# Patient Record
Sex: FEMALE | Race: WHITE | Employment: OTHER | URBAN - METROPOLITAN AREA
[De-identification: names, ages, dates, MRNs, and addresses within clinical notes are randomized per-mention and may not be internally consistent; named-entity substitution may affect disease eponyms.]

---

## 2017-01-02 ENCOUNTER — HOSPITAL ENCOUNTER (OUTPATIENT)
Dept: GENERAL RADIOLOGY | Age: 77
Discharge: HOME | End: 2017-01-02
Attending: INTERNAL MEDICINE
Payer: MEDICARE

## 2017-01-02 DIAGNOSIS — R13.10 DYSPHAGIA, UNSPECIFIED TYPE: ICD-10-CM

## 2017-01-02 DIAGNOSIS — K22.0 ACHALASIA: ICD-10-CM

## 2017-01-02 PROCEDURE — 74240 X-RAY XM UPR GI TRC 1CNTRST: CPT

## 2017-01-09 RX ORDER — LISINOPRIL 10 MG/1
TABLET ORAL
Qty: 90 TABLET | Refills: 1 | Status: SHIPPED | OUTPATIENT
Start: 2017-01-09 | End: 2017-04-14

## 2017-03-13 RX ORDER — GLYBURIDE 2.5 MG/1
TABLET ORAL
Qty: 5 TABLET | Refills: 0 | Status: SHIPPED | OUTPATIENT
Start: 2017-03-13 | End: 2017-03-15

## 2017-03-15 RX ORDER — GLYBURIDE 2.5 MG/1
2.5 TABLET ORAL
Qty: 30 TABLET | Refills: 0 | Status: SHIPPED
Start: 2017-03-15 | End: 2017-04-24

## 2017-03-15 NOTE — TELEPHONE ENCOUNTER
Per protocol; failed - route to provider  Patient's last a1c not at goal, super visit 4/3/17 with AS but only received 5 tabs.  Needs refill sent

## 2017-04-03 ENCOUNTER — OFFICE VISIT (OUTPATIENT)
Dept: INTERNAL MEDICINE CLINIC | Facility: CLINIC | Age: 77
End: 2017-04-03

## 2017-04-03 VITALS
SYSTOLIC BLOOD PRESSURE: 124 MMHG | HEIGHT: 69 IN | BODY MASS INDEX: 31.4 KG/M2 | HEART RATE: 64 BPM | TEMPERATURE: 98 F | WEIGHT: 212 LBS | DIASTOLIC BLOOD PRESSURE: 76 MMHG | RESPIRATION RATE: 16 BRPM

## 2017-04-03 DIAGNOSIS — E78.00 PURE HYPERCHOLESTEROLEMIA: ICD-10-CM

## 2017-04-03 DIAGNOSIS — E11.59 TYPE 2 DIABETES MELLITUS WITH OTHER CIRCULATORY COMPLICATION, WITHOUT LONG-TERM CURRENT USE OF INSULIN (HCC): ICD-10-CM

## 2017-04-03 DIAGNOSIS — K21.9 GASTROESOPHAGEAL REFLUX DISEASE, ESOPHAGITIS PRESENCE NOT SPECIFIED: ICD-10-CM

## 2017-04-03 DIAGNOSIS — I71.4 ABDOMINAL AORTIC ANEURYSM (AAA) WITHOUT RUPTURE (HCC): ICD-10-CM

## 2017-04-03 DIAGNOSIS — I70.0 AORTIC ATHEROSCLEROSIS (HCC): ICD-10-CM

## 2017-04-03 DIAGNOSIS — H35.30 MACULAR DEGENERATION: ICD-10-CM

## 2017-04-03 DIAGNOSIS — G89.29 CHRONIC BILATERAL LOW BACK PAIN WITHOUT SCIATICA: ICD-10-CM

## 2017-04-03 DIAGNOSIS — I48.0 PAROXYSMAL ATRIAL FIBRILLATION (HCC): ICD-10-CM

## 2017-04-03 DIAGNOSIS — M47.816 ARTHRITIS, LUMBAR SPINE: ICD-10-CM

## 2017-04-03 DIAGNOSIS — E89.0 H/O PARTIAL THYROIDECTOMY: ICD-10-CM

## 2017-04-03 DIAGNOSIS — I70.1 RENAL ARTERY STENOSIS (HCC): ICD-10-CM

## 2017-04-03 DIAGNOSIS — Z91.81 AT RISK FOR FALLING: ICD-10-CM

## 2017-04-03 DIAGNOSIS — M54.50 CHRONIC BILATERAL LOW BACK PAIN WITHOUT SCIATICA: ICD-10-CM

## 2017-04-03 DIAGNOSIS — I10 ESSENTIAL HYPERTENSION: ICD-10-CM

## 2017-04-03 DIAGNOSIS — Z00.00 ENCOUNTER FOR ANNUAL HEALTH EXAMINATION: ICD-10-CM

## 2017-04-03 DIAGNOSIS — E55.9 VITAMIN D DEFICIENCY: ICD-10-CM

## 2017-04-03 DIAGNOSIS — K22.0 ACHALASIA: Primary | ICD-10-CM

## 2017-04-03 DIAGNOSIS — K22.4 CORKSCREW ESOPHAGUS: ICD-10-CM

## 2017-04-03 DIAGNOSIS — I25.10 CORONARY ARTERY DISEASE INVOLVING NATIVE CORONARY ARTERY OF NATIVE HEART WITHOUT ANGINA PECTORIS: ICD-10-CM

## 2017-04-03 DIAGNOSIS — Z96.651 HISTORY OF TOTAL RIGHT KNEE REPLACEMENT: ICD-10-CM

## 2017-04-03 DIAGNOSIS — M20.40 HAMMER TOE, ACQUIRED: ICD-10-CM

## 2017-04-03 DIAGNOSIS — I71.02 DISSECTION OF ABDOMINAL AORTA (HCC): ICD-10-CM

## 2017-04-03 PROBLEM — I71.40 ABDOMINAL AORTIC ANEURYSM (AAA) WITHOUT RUPTURE (HCC): Status: ACTIVE | Noted: 2017-04-03

## 2017-04-03 PROBLEM — E11.51 TYPE 2 DIABETES MELLITUS WITH ATHEROSCLEROSIS OF AORTA (HCC): Status: ACTIVE | Noted: 2017-04-03

## 2017-04-03 PROBLEM — I71.40 ABDOMINAL AORTIC ANEURYSM (AAA) WITHOUT RUPTURE: Status: ACTIVE | Noted: 2017-04-03

## 2017-04-03 PROCEDURE — 93000 ELECTROCARDIOGRAM COMPLETE: CPT | Performed by: INTERNAL MEDICINE

## 2017-04-03 PROCEDURE — 96160 PT-FOCUSED HLTH RISK ASSMT: CPT | Performed by: INTERNAL MEDICINE

## 2017-04-03 PROCEDURE — G0439 PPPS, SUBSEQ VISIT: HCPCS | Performed by: INTERNAL MEDICINE

## 2017-04-03 RX ORDER — GLYBURIDE 2.5 MG/1
2.5 TABLET ORAL
Qty: 90 TABLET | Refills: 1 | Status: SHIPPED | OUTPATIENT
Start: 2017-04-03 | End: 2017-07-26

## 2017-04-03 NOTE — PROGRESS NOTES
HPI:   Theron Lundberg is a 68year old female who presents for a MA (Medicare Advantage) [facility] (Once per calendar year). Here for supervisit and pre op eval for achalasia requiring egd with dilation with Dr Radha Dougherty on 4/20 at [facility].    Pt had pa Value Date   AST 13* 07/22/2016   ALT 14 07/22/2016   CA 9.2 11/29/2016   ALB 3.2* 07/22/2016   TSH 3.050 10/28/2016   CREATSERUM 0.85 11/29/2016   * 11/29/2016        CBC  (most recent labs)     Lab Results  Component Value Date   WBC 8.3 11/29/201 colonoscopy; [id number], screening (); cholecystectomy; hernia surgery; knee replacement surgery; ; tubal ligation; cataract (Bilateral); other; thyroidectomy; and egd (N/A, 2016).     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 Discussed with patient and provided information          PLAN:  The patient indicates understanding of these issues and agrees to the plan. No Follow-up on file.      Derick Millan MD, 4/3/2017     Carmenza Traylor Scoring: 3    Scoring Interpretation: 0 - 3 No Risk     Depression Screening (PHQ-2/PHQ-9): Over the LAST 2 WEEKS   Little interest or pleasure in doing things (over the last two weeks)?: Not at all    Feeling down, depressed, or hopeless (over the last tw Fecal Occult Blood Annually No results found for: FOB No flowsheet data found.     Glaucoma Screening      Ophthalmology Visit Annually: Diabetics, FHx Glaucoma, AA>50, > 65 Data entered on: 6/13/2016   Last Dilated Eye Exam 6/1/2016       Bone Dens Lab or Procedure External Lab or Procedure   Annual Monitoring of Persistent     Medications (ACE/ARB, digoxin diuretics, anticonvulsants.)    Potassium  Annually POTASSIUM (mmol/L)   Date Value   11/29/2016 3.9   02/18/2015 4.9   01/11/2013 4.5    No flow

## 2017-04-03 NOTE — PATIENT INSTRUCTIONS
[facility] SCREENING SCHEDULE   Tests on this list are recommended by your physician but may not be covered, or covered at this frequency, by your insurer. Please check with your insurance carrier before scheduling to verify coverage.    ASH Welcome to Medicare Visit    Abdominal aortic aneurysm screening (once between ages 73-68) IPPE only No results found for this or any previous visit.  Limited to patients who meet one of the following criteria:   • Men who are 73-68 years old and have smoke data found.     Screening Mammogram      Mammogram    Recommend Annually to at least age 76, and as needed after 76 Mammogram,1 Yr due on 04/20/2017 Please get this Mammogram regularly   Immunizations      Influenza  Covered Annually   Orders placed or perf the different types of Advance Directives. It also has the State forms available on it's website for anyone to review and print using their home computer and printer. (the forms are also available in [address])  [email or web address]  This link also has inf

## 2017-04-04 ENCOUNTER — TELEPHONE (OUTPATIENT)
Dept: INTERNAL MEDICINE CLINIC | Facility: CLINIC | Age: 77
End: 2017-04-04

## 2017-04-04 DIAGNOSIS — Z12.31 ENCOUNTER FOR SCREENING MAMMOGRAM FOR MALIGNANT NEOPLASM OF BREAST: Primary | ICD-10-CM

## 2017-04-04 NOTE — TELEPHONE ENCOUNTER
LOV 4/3/17  Orders pended for approval.     Last  mammo - Notes Recorded by Laurita Bales MD on 4/20/2016 at 4:10 PM  WNL; Repeat one year.

## 2017-04-05 ENCOUNTER — LAB ENCOUNTER (OUTPATIENT)
Dept: LAB | Age: 77
End: 2017-04-05
Attending: INTERNAL MEDICINE
Payer: MEDICARE

## 2017-04-05 DIAGNOSIS — I10 ESSENTIAL HYPERTENSION: ICD-10-CM

## 2017-04-05 DIAGNOSIS — I25.10 CORONARY ARTERY DISEASE INVOLVING NATIVE CORONARY ARTERY OF NATIVE HEART WITHOUT ANGINA PECTORIS: ICD-10-CM

## 2017-04-05 DIAGNOSIS — E89.0 H/O PARTIAL THYROIDECTOMY: ICD-10-CM

## 2017-04-05 DIAGNOSIS — E11.59 TYPE 2 DIABETES MELLITUS WITH OTHER CIRCULATORY COMPLICATION, WITHOUT LONG-TERM CURRENT USE OF INSULIN (HCC): ICD-10-CM

## 2017-04-05 PROCEDURE — 83036 HEMOGLOBIN GLYCOSYLATED A1C: CPT

## 2017-04-05 PROCEDURE — 36415 COLL VENOUS BLD VENIPUNCTURE: CPT

## 2017-04-05 PROCEDURE — 85025 COMPLETE CBC W/AUTO DIFF WBC: CPT

## 2017-04-05 PROCEDURE — 84443 ASSAY THYROID STIM HORMONE: CPT

## 2017-04-05 PROCEDURE — 82043 UR ALBUMIN QUANTITATIVE: CPT

## 2017-04-05 PROCEDURE — 80061 LIPID PANEL: CPT

## 2017-04-05 PROCEDURE — 82570 ASSAY OF URINE CREATININE: CPT

## 2017-04-05 PROCEDURE — 80053 COMPREHEN METABOLIC PANEL: CPT

## 2017-04-06 DIAGNOSIS — E11.59 TYPE 2 DIABETES MELLITUS WITH OTHER CIRCULATORY COMPLICATION, WITHOUT LONG-TERM CURRENT USE OF INSULIN (HCC): ICD-10-CM

## 2017-04-14 PROBLEM — I25.10 CORONARY ARTERY DISEASE INVOLVING NATIVE CORONARY ARTERY OF NATIVE HEART WITHOUT ANGINA PECTORIS: Status: ACTIVE | Noted: 2017-04-14

## 2017-04-24 RX ORDER — GLYBURIDE 2.5 MG/1
TABLET ORAL
Qty: 30 TABLET | Refills: 1 | Status: SHIPPED | OUTPATIENT
Start: 2017-04-24 | End: 2017-07-03

## 2017-04-26 RX ORDER — GLYBURIDE 2.5 MG/1
TABLET ORAL
Qty: 5 TABLET | Refills: 0 | OUTPATIENT
Start: 2017-04-26

## 2017-06-07 ENCOUNTER — APPOINTMENT (OUTPATIENT)
Dept: GENERAL RADIOLOGY | Age: 77
End: 2017-06-07
Attending: PHYSICIAN ASSISTANT
Payer: MEDICARE

## 2017-06-07 ENCOUNTER — HOSPITAL ENCOUNTER (OUTPATIENT)
Age: 77
Discharge: HOME | End: 2017-06-07
Attending: FAMILY MEDICINE
Payer: MEDICARE

## 2017-06-07 VITALS
DIASTOLIC BLOOD PRESSURE: 71 MMHG | RESPIRATION RATE: 18 BRPM | TEMPERATURE: 99 F | WEIGHT: 204 LBS | SYSTOLIC BLOOD PRESSURE: 145 MMHG | HEIGHT: 69.5 IN | BODY MASS INDEX: 29.53 KG/M2 | HEART RATE: 68 BPM | OXYGEN SATURATION: 98 %

## 2017-06-07 DIAGNOSIS — S92.502A FRACTURE OF FIFTH TOE, LEFT, CLOSED, INITIAL ENCOUNTER: Primary | ICD-10-CM

## 2017-06-07 DIAGNOSIS — S80.11XA LEG HEMATOMA, RIGHT, INITIAL ENCOUNTER: ICD-10-CM

## 2017-06-07 DIAGNOSIS — S90.31XA CONTUSION OF RIGHT FOOT, INITIAL ENCOUNTER: ICD-10-CM

## 2017-06-07 PROCEDURE — 73590 X-RAY EXAM OF LOWER LEG: CPT | Performed by: PHYSICIAN ASSISTANT

## 2017-06-07 PROCEDURE — 28510 TREATMENT OF TOE FRACTURE: CPT

## 2017-06-07 PROCEDURE — 73610 X-RAY EXAM OF ANKLE: CPT | Performed by: PHYSICIAN ASSISTANT

## 2017-06-07 PROCEDURE — 99203 OFFICE O/P NEW LOW 30 MIN: CPT

## 2017-06-07 PROCEDURE — 73630 X-RAY EXAM OF FOOT: CPT | Performed by: PHYSICIAN ASSISTANT

## 2017-06-07 PROCEDURE — 99214 OFFICE O/P EST MOD 30 MIN: CPT

## 2017-06-07 NOTE — ED INITIAL ASSESSMENT (HPI)
C/O right foot and ankle pain that occurred 2 weeks ago after tripping on a stair and falling against the house.

## 2017-06-08 NOTE — ED PROVIDER NOTES
Patient Seen in: THE [facility]    History   Patient presents with:   Foot Pain    Stated Complaint: rt foot inj    HPI  41-year-old female, review patient's history and PAs note, patient was transferred to my care awaiting a tibia X double contrast Barium Swallow Esophagram     COLONOSCOPY  2003    Comment Screening- Normal    COLONOSCOPY      [id number], SCREENING      CHOLECYSTECTOMY      HERNIA SURGERY      Comment umbilica'    KNEE REPLACEMENT SURGERY            TUBAL mmHg   Pulse 06/07/17 1822 74   Resp 06/07/17 1822 18   Temp 06/07/17 1822 98.5 °F (36.9 °C)   Temp src 06/07/17 1822 Temporal   SpO2 06/07/17 1822 95 %   O2 Device 06/07/17 1822 None (Room air)       Current:/71 mmHg  Pulse 68  Temp(Src) 98.5 °F (36 is the Relevant Clinical Indication / Reason for Exam?  Answer: rt foot inj  XR TIBIA + FIBULA (2 VIEWS), RIGHT (CPT=73590) Once  Order Comments:   rt foot inj C/O right foot and ankle pain that occurred 2 weeks ago after tripping on    a stair and falling first through fifth toes for two weeks. Patient tripped while walking up a step into the house. FINDINGS:  Irregularity to the distal aspect of the proximal phalanx of the fifth digit. Sanchez Ba Phalangeal relationships are within normal limits.   No significant next 5-7 days   Fracture of the fifth toe   Ortho shoe for support   Hematoma of the leg - keep wrapped with ace wrap for compression  If any worsening of pain or any worsening symptoms / discoloration return immediately / go to the ER  Patient wants me to

## 2017-06-08 NOTE — ED PROVIDER NOTES
Patient Seen in: [facility] END    History   Patient presents with:   Foot Pain    Stated Complaint: rt foot inj    HPI    Hailee Loren is a 14-year-old female who presents today for evaluation of pain, swelling and bruising to her right fo Comment right knee replacement 2008    OTHER SURGICAL HISTORY      Comment stent of LAD heart 2005    OTHER SURGICAL HISTORY  04/12/07    Comment EGD- double contrast Barium Swallow Esophagram     COLONOSCOPY  04/21/2003    Comment Screening- Normal    CO All other systems reviewed and negative except as noted above. PSFH elements reviewed from today and agreed except as otherwise stated in HPI.     Physical Exam       ED Triage Vitals   BP 06/07/17 1822 178/63 mmHg   Pulse 06/07/17 1822 74   Resp 06/07/1 6/7/2017  PROCEDURE:  XR ANKLE (MIN 3 VIEWS), RIGHT (CPT=73610)  TECHNIQUE:  Three views were obtained. COMPARISON:  None. INDICATIONS:  Right foot injury.   PATIENT STATED HISTORY: (As transcribed by Technologist)  Patient has swelling and pain throughou 6/7/2017  CONCLUSION:  Right ureter where he to the distal aspect of the proximal phalanx of the fifth digit. Correlation to region of pain is recommended to exclude fracture. . Osteoarthritic changes.    Dictated by: Malachi Holstein, MD on 6/07/2017 at 18:4

## 2017-07-03 RX ORDER — GLYBURIDE 2.5 MG/1
TABLET ORAL
Qty: 30 TABLET | Refills: 0 | Status: SHIPPED | OUTPATIENT
Start: 2017-07-03 | End: 2017-07-26

## 2017-07-03 NOTE — TELEPHONE ENCOUNTER
Pt is requesting that we change the rx to once in am and once in evening like how it used to be before, She also is requesting a 90 day supply. Informed her an appt will be required for change of meds. Pt insists I ask re: change.  Please advise

## 2017-07-18 RX ORDER — PRAVASTATIN SODIUM 80 MG/1
TABLET ORAL
Qty: 90 TABLET | Refills: 2 | Status: SHIPPED | OUTPATIENT
Start: 2017-07-18 | End: 2017-07-26 | Stop reason: ALTCHOICE

## 2017-07-18 RX ORDER — LISINOPRIL 10 MG/1
TABLET ORAL
Qty: 90 TABLET | Refills: 0 | Status: SHIPPED | OUTPATIENT
Start: 2017-07-18 | End: 2017-07-26

## 2017-07-26 ENCOUNTER — OFFICE VISIT (OUTPATIENT)
Dept: INTERNAL MEDICINE CLINIC | Facility: CLINIC | Age: 77
End: 2017-07-26

## 2017-07-26 VITALS
TEMPERATURE: 98 F | RESPIRATION RATE: 16 BRPM | SYSTOLIC BLOOD PRESSURE: 132 MMHG | BODY MASS INDEX: 31 KG/M2 | DIASTOLIC BLOOD PRESSURE: 70 MMHG | HEART RATE: 64 BPM | WEIGHT: 213 LBS

## 2017-07-26 DIAGNOSIS — E11.59 TYPE 2 DIABETES MELLITUS WITH OTHER CIRCULATORY COMPLICATION, WITHOUT LONG-TERM CURRENT USE OF INSULIN (HCC): ICD-10-CM

## 2017-07-26 DIAGNOSIS — E11.42 TYPE 2 DIABETES MELLITUS WITH DIABETIC POLYNEUROPATHY, WITHOUT LONG-TERM CURRENT USE OF INSULIN (HCC): ICD-10-CM

## 2017-07-26 DIAGNOSIS — I10 ESSENTIAL HYPERTENSION: ICD-10-CM

## 2017-07-26 DIAGNOSIS — I71.4 ABDOMINAL AORTIC ANEURYSM (AAA) WITHOUT RUPTURE (HCC): Primary | ICD-10-CM

## 2017-07-26 DIAGNOSIS — E89.0 STATUS POST PARTIAL THYROIDECTOMY: ICD-10-CM

## 2017-07-26 DIAGNOSIS — I70.0 TYPE 2 DIABETES MELLITUS WITH ATHEROSCLEROSIS OF AORTA (HCC): ICD-10-CM

## 2017-07-26 DIAGNOSIS — E11.51 TYPE 2 DIABETES MELLITUS WITH ATHEROSCLEROSIS OF AORTA (HCC): ICD-10-CM

## 2017-07-26 PROCEDURE — 99214 OFFICE O/P EST MOD 30 MIN: CPT | Performed by: PHYSICIAN ASSISTANT

## 2017-07-26 RX ORDER — METOPROLOL TARTRATE 50 MG/1
25 TABLET, FILM COATED ORAL 2 TIMES DAILY
Qty: 90 TABLET | Refills: 0 | Status: SHIPPED | OUTPATIENT
Start: 2017-07-26 | End: 2017-12-22

## 2017-07-26 RX ORDER — GLYBURIDE 5 MG/1
2.5 TABLET ORAL 2 TIMES DAILY WITH MEALS
Qty: 90 TABLET | Refills: 0 | Status: SHIPPED | OUTPATIENT
Start: 2017-07-26 | End: 2017-10-16

## 2017-07-26 RX ORDER — METOPROLOL TARTRATE 50 MG/1
25 TABLET, FILM COATED ORAL 2 TIMES DAILY
COMMUNITY
End: 2017-07-26

## 2017-07-26 NOTE — PROGRESS NOTES
Patient presents with: Follow - Up: medications have been updated with patient       HPI:  Pt presents with need to follow up of a AAA that was found last year on abd CT.   Pt saw Dr. Sofia Thomas (Vascular) last year and was told to re-eval with abd US in 5-6 mo Visual impairment     glasses       Patient Active Problem List:     CAD (coronary artery disease)     Hyperlipemia     GERD (gastroesophageal reflux disease)     Corkscrew esophagus     Chronic low back pain     Hammer toe, acquired     Vitamin D deficien kg/m²   Constitutional:  No distress. HEENT:  Normocephalic and atraumatic. Cardiovascular: Normal rate, regular rhythm. No murmur, rubs or gallops. Pulmonary/Chest: Effort normal and breath sounds normal. No respiratory distress.  No wheezes, rhonchi ABDOMINAL AORTA COMPLETE  (CPT=76770)     Return in about 6 months (around 1/26/2018) for Diabetes etc.  There are no Patient Instructions on file for this visit. All questions were answered and the patient understands the plan.

## 2017-08-01 ENCOUNTER — HOSPITAL ENCOUNTER (OUTPATIENT)
Dept: ULTRASOUND IMAGING | Age: 77
Discharge: HOME | End: 2017-08-01
Attending: PHYSICIAN ASSISTANT
Payer: MEDICARE

## 2017-08-01 DIAGNOSIS — I71.4 ABDOMINAL AORTIC ANEURYSM (AAA) WITHOUT RUPTURE (HCC): Primary | ICD-10-CM

## 2017-08-01 DIAGNOSIS — I71.4 ABDOMINAL AORTIC ANEURYSM (AAA) WITHOUT RUPTURE (HCC): ICD-10-CM

## 2017-08-01 PROCEDURE — 76770 US EXAM ABDO BACK WALL COMP: CPT | Performed by: PHYSICIAN ASSISTANT

## 2017-08-01 NOTE — PROGRESS NOTES
3.8 X 3.7 cm AAA. \"stable\" per radiologist who read (compared to CT from 11/3/16). Repeat US in 6 mos to reassess size.

## 2017-08-18 ENCOUNTER — TELEPHONE (OUTPATIENT)
Dept: INTERNAL MEDICINE CLINIC | Facility: CLINIC | Age: 77
End: 2017-08-18

## 2017-08-18 DIAGNOSIS — E13.9 OTHER SPECIFIED DIABETES MELLITUS: Primary | ICD-10-CM

## 2017-08-18 DIAGNOSIS — H26.9 CATARACT, UNSPECIFIED CATARACT TYPE, UNSPECIFIED LATERALITY: ICD-10-CM

## 2017-08-18 NOTE — TELEPHONE ENCOUNTER
Referral entered as requested and pended to provider.      Ladonna Garcia  [facility] Clinical Staff   Cc: [facility] Referral Pool   Phone Number: [phone]             .Reason for the order/referral:OV 08/22   PCP:  Dr Gus Gr   Refer to Provider (fi

## 2017-09-06 ENCOUNTER — MED REC SCAN ONLY (OUTPATIENT)
Dept: INTERNAL MEDICINE CLINIC | Facility: CLINIC | Age: 77
End: 2017-09-06

## 2017-10-16 DIAGNOSIS — E11.59 TYPE 2 DIABETES MELLITUS WITH OTHER CIRCULATORY COMPLICATION, WITHOUT LONG-TERM CURRENT USE OF INSULIN (HCC): ICD-10-CM

## 2017-10-16 RX ORDER — GLYBURIDE 5 MG/1
TABLET ORAL
Qty: 90 TABLET | Refills: 0 | Status: SHIPPED | OUTPATIENT
Start: 2017-10-16 | End: 2018-02-15

## 2017-10-16 RX ORDER — LISINOPRIL 10 MG/1
TABLET ORAL
Qty: 90 TABLET | Refills: 0 | Status: SHIPPED | OUTPATIENT
Start: 2017-10-16 | End: 2018-02-05

## 2017-10-23 ENCOUNTER — OFFICE VISIT (OUTPATIENT)
Dept: INTERNAL MEDICINE CLINIC | Facility: CLINIC | Age: 77
End: 2017-10-23

## 2017-10-23 VITALS
BODY MASS INDEX: 31.25 KG/M2 | TEMPERATURE: 98 F | HEART RATE: 60 BPM | HEIGHT: 69 IN | SYSTOLIC BLOOD PRESSURE: 128 MMHG | WEIGHT: 211 LBS | DIASTOLIC BLOOD PRESSURE: 88 MMHG

## 2017-10-23 DIAGNOSIS — I70.0 TYPE 2 DIABETES MELLITUS WITH ATHEROSCLEROSIS OF AORTA (HCC): ICD-10-CM

## 2017-10-23 DIAGNOSIS — E78.00 PURE HYPERCHOLESTEROLEMIA: ICD-10-CM

## 2017-10-23 DIAGNOSIS — I70.0 AORTIC ATHEROSCLEROSIS (HCC): ICD-10-CM

## 2017-10-23 DIAGNOSIS — K22.0 ACHALASIA: Primary | ICD-10-CM

## 2017-10-23 DIAGNOSIS — I10 ESSENTIAL HYPERTENSION: ICD-10-CM

## 2017-10-23 DIAGNOSIS — I71.4 ABDOMINAL AORTIC ANEURYSM (AAA) WITHOUT RUPTURE (HCC): ICD-10-CM

## 2017-10-23 DIAGNOSIS — Z01.818 PRE-OP EXAMINATION: ICD-10-CM

## 2017-10-23 DIAGNOSIS — E11.59 TYPE 2 DIABETES MELLITUS WITH OTHER CIRCULATORY COMPLICATION, WITHOUT LONG-TERM CURRENT USE OF INSULIN (HCC): ICD-10-CM

## 2017-10-23 DIAGNOSIS — I25.10 CORONARY ARTERY DISEASE INVOLVING NATIVE CORONARY ARTERY OF NATIVE HEART WITHOUT ANGINA PECTORIS: ICD-10-CM

## 2017-10-23 DIAGNOSIS — E11.51 TYPE 2 DIABETES MELLITUS WITH ATHEROSCLEROSIS OF AORTA (HCC): ICD-10-CM

## 2017-10-23 DIAGNOSIS — I70.1 RENAL ARTERY STENOSIS (HCC): ICD-10-CM

## 2017-10-23 PROCEDURE — G0008 ADMIN INFLUENZA VIRUS VAC: HCPCS | Performed by: INTERNAL MEDICINE

## 2017-10-23 PROCEDURE — 90653 IIV ADJUVANT VACCINE IM: CPT | Performed by: INTERNAL MEDICINE

## 2017-10-23 PROCEDURE — 99214 OFFICE O/P EST MOD 30 MIN: CPT | Performed by: INTERNAL MEDICINE

## 2017-10-23 RX ORDER — FLUCONAZOLE 100 MG/1
1 TABLET ORAL DAILY
Refills: 0 | COMMUNITY
Start: 2017-09-28 | End: 2018-01-29 | Stop reason: ALTCHOICE

## 2017-10-23 NOTE — PATIENT INSTRUCTIONS
Stop your aspirin now. Hold diabetic meds starting 48 hours before surgery, until able to tolerate significant foods beyond clear liquids. Please call with any questions.

## 2017-10-23 NOTE — PROGRESS NOTES
Patient presents with:  Pre-Op Exam: She is having POEM on 11/1/2017 with Dr.Eric Cortez      HPI:  Here for pre op for POEM procedure on 11/1.  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 (HCC)     Aortic heart 2005 04/12/07: OTHER SURGICAL HISTORY      Comment: EGD- double contrast Barium Swallow Esophagram  No date: THYROIDECTOMY  No date: TUBAL LIGATION  Family History   Problem Relation Age of Onset   • Other[other] [OTHER] Mother      Stroke at 64   • 05/21/2015      Pneumovax 23          06/01/2008 04/16/2014      TDAP                  12/31/2013    Pended                  Date(s) Pended    Fluad High dose 72 yr and older (46686)                          10/23/2017        Physical Exam  BP 12 with atherosclerosis of aorta (hcc)-stable, ok to hold meds, see patient instructions  Type 2 diabetes mellitus with other circulatory complication, without long-term current use of insulin (hcc)-see above  Essential hypertension-stable, continue meds  Pre

## 2017-10-27 ENCOUNTER — LAB ENCOUNTER (OUTPATIENT)
Dept: LAB | Age: 77
End: 2017-10-27
Attending: INTERNAL MEDICINE
Payer: MEDICARE

## 2017-10-27 DIAGNOSIS — I25.10 CORONARY ARTERY DISEASE INVOLVING NATIVE CORONARY ARTERY OF NATIVE HEART WITHOUT ANGINA PECTORIS: ICD-10-CM

## 2017-10-27 DIAGNOSIS — E11.51 TYPE 2 DIABETES MELLITUS WITH ATHEROSCLEROSIS OF AORTA (HCC): ICD-10-CM

## 2017-10-27 DIAGNOSIS — E11.59 TYPE 2 DIABETES MELLITUS WITH OTHER CIRCULATORY COMPLICATION, WITHOUT LONG-TERM CURRENT USE OF INSULIN (HCC): Primary | ICD-10-CM

## 2017-10-27 DIAGNOSIS — E89.0 STATUS POST PARTIAL THYROIDECTOMY: ICD-10-CM

## 2017-10-27 DIAGNOSIS — E89.0 STATUS POST PARTIAL THYROIDECTOMY: Primary | ICD-10-CM

## 2017-10-27 DIAGNOSIS — I70.0 TYPE 2 DIABETES MELLITUS WITH ATHEROSCLEROSIS OF AORTA (HCC): ICD-10-CM

## 2017-10-27 DIAGNOSIS — E11.59 TYPE 2 DIABETES MELLITUS WITH OTHER CIRCULATORY COMPLICATION, WITHOUT LONG-TERM CURRENT USE OF INSULIN (HCC): ICD-10-CM

## 2017-10-27 DIAGNOSIS — E78.00 PURE HYPERCHOLESTEROLEMIA: ICD-10-CM

## 2017-10-27 PROCEDURE — 80053 COMPREHEN METABOLIC PANEL: CPT

## 2017-10-27 PROCEDURE — 80061 LIPID PANEL: CPT

## 2017-10-27 PROCEDURE — 36415 COLL VENOUS BLD VENIPUNCTURE: CPT

## 2017-10-27 PROCEDURE — 85025 COMPLETE CBC W/AUTO DIFF WBC: CPT

## 2017-10-27 PROCEDURE — 84443 ASSAY THYROID STIM HORMONE: CPT

## 2017-10-27 PROCEDURE — 83036 HEMOGLOBIN GLYCOSYLATED A1C: CPT

## 2017-11-03 NOTE — PROGRESS NOTES
Addendum to A/P:  Abdominal aortic aneurysm (AAA) without rupture ([address]  I71.4 -stabel evaluated by vascular, continue observation

## 2017-12-22 DIAGNOSIS — I10 ESSENTIAL HYPERTENSION: ICD-10-CM

## 2017-12-22 RX ORDER — METOPROLOL TARTRATE 50 MG/1
TABLET, FILM COATED ORAL
Qty: 90 TABLET | Refills: 0 | Status: SHIPPED | OUTPATIENT
Start: 2017-12-22 | End: 2018-04-06

## 2018-01-19 ENCOUNTER — APPOINTMENT (OUTPATIENT)
Dept: GENERAL RADIOLOGY | Age: 78
DRG: 605 | End: 2018-01-19
Attending: EMERGENCY MEDICINE
Payer: MEDICARE

## 2018-01-19 ENCOUNTER — APPOINTMENT (OUTPATIENT)
Dept: CT IMAGING | Age: 78
DRG: 605 | End: 2018-01-19
Attending: EMERGENCY MEDICINE
Payer: MEDICARE

## 2018-01-19 ENCOUNTER — HOSPITAL ENCOUNTER (INPATIENT)
Facility: HOSPITAL | Age: 78
LOS: 2 days | Discharge: HOME HEALTH | DRG: 605 | End: 2018-01-21
Attending: EMERGENCY MEDICINE | Admitting: INTERNAL MEDICINE
Payer: MEDICARE

## 2018-01-19 DIAGNOSIS — S01.01XA LACERATION OF SCALP, INITIAL ENCOUNTER: ICD-10-CM

## 2018-01-19 DIAGNOSIS — S70.02XA HEMATOMA OF LEFT HIP, INITIAL ENCOUNTER: Primary | ICD-10-CM

## 2018-01-19 DIAGNOSIS — S60.212A CONTUSION OF LEFT WRIST, INITIAL ENCOUNTER: ICD-10-CM

## 2018-01-19 DIAGNOSIS — D64.9 ANEMIA, UNSPECIFIED TYPE: ICD-10-CM

## 2018-01-19 LAB
ATRIAL RATE: 61 BPM
BASOPHILS # BLD AUTO: 0.04 X10(3) UL (ref 0–0.1)
BASOPHILS NFR BLD AUTO: 0.3 %
BUN BLD-MCNC: 18 MG/DL (ref 8–20)
CALCIUM BLD-MCNC: 8.5 MG/DL (ref 8.3–10.3)
CHLORIDE: 105 MMOL/L (ref 101–111)
CO2: 28 MMOL/L (ref 22–32)
CREAT BLD-MCNC: 0.92 MG/DL (ref 0.55–1.02)
EOSINOPHIL # BLD AUTO: 0.11 X10(3) UL (ref 0–0.3)
EOSINOPHIL NFR BLD AUTO: 0.9 %
ERYTHROCYTE [DISTWIDTH] IN BLOOD BY AUTOMATED COUNT: 14.1 % (ref 11.5–16)
ERYTHROCYTE [DISTWIDTH] IN BLOOD BY AUTOMATED COUNT: 14.2 % (ref 11.5–16)
GLUCOSE BLD-MCNC: 140 MG/DL (ref 70–99)
GLUCOSE BLD-MCNC: 175 MG/DL (ref 65–99)
HCT VFR BLD AUTO: 37.8 % (ref 34–50)
HCT VFR BLD AUTO: 40.9 % (ref 34–50)
HGB BLD-MCNC: 11.9 G/DL (ref 12–16)
HGB BLD-MCNC: 13 G/DL (ref 12–16)
IMMATURE GRANULOCYTE COUNT: 0.04 X10(3) UL (ref 0–1)
IMMATURE GRANULOCYTE RATIO %: 0.3 %
LYMPHOCYTES # BLD AUTO: 0.89 X10(3) UL (ref 0.9–4)
LYMPHOCYTES NFR BLD AUTO: 7.4 %
MCH RBC QN AUTO: 27.2 PG (ref 27–33.2)
MCH RBC QN AUTO: 27.2 PG (ref 27–33.2)
MCHC RBC AUTO-ENTMCNC: 31.5 G/DL (ref 31–37)
MCHC RBC AUTO-ENTMCNC: 31.8 G/DL (ref 31–37)
MCV RBC AUTO: 85.6 FL (ref 81–100)
MCV RBC AUTO: 86.3 FL (ref 81–100)
MONOCYTES # BLD AUTO: 0.7 X10(3) UL (ref 0.1–0.6)
MONOCYTES NFR BLD AUTO: 5.8 %
NEUTROPHIL ABS PRELIM: 10.22 X10 (3) UL (ref 1.3–6.7)
NEUTROPHILS # BLD AUTO: 10.22 X10(3) UL (ref 1.3–6.7)
NEUTROPHILS NFR BLD AUTO: 85.3 %
P AXIS: 79 DEGREES
P-R INTERVAL: 180 MS
PLATELET # BLD AUTO: 204 10(3)UL (ref 150–450)
PLATELET # BLD AUTO: 210 10(3)UL (ref 150–450)
POTASSIUM SERPL-SCNC: 4.9 MMOL/L (ref 3.6–5.1)
Q-T INTERVAL: 430 MS
QRS DURATION: 82 MS
QTC CALCULATION (BEZET): 432 MS
R AXIS: 17 DEGREES
RBC # BLD AUTO: 4.38 X10(6)UL (ref 3.8–5.1)
RBC # BLD AUTO: 4.78 X10(6)UL (ref 3.8–5.1)
RED CELL DISTRIBUTION WIDTH-SD: 43.7 FL (ref 35.1–46.3)
RED CELL DISTRIBUTION WIDTH-SD: 43.9 FL (ref 35.1–46.3)
SODIUM SERPL-SCNC: 139 MMOL/L (ref 136–144)
T AXIS: 50 DEGREES
VENTRICULAR RATE: 61 BPM
WBC # BLD AUTO: 12 X10(3) UL (ref 4–13)
WBC # BLD AUTO: 14.8 X10(3) UL (ref 4–13)

## 2018-01-19 PROCEDURE — 99222 1ST HOSP IP/OBS MODERATE 55: CPT | Performed by: INTERNAL MEDICINE

## 2018-01-19 PROCEDURE — 70450 CT HEAD/BRAIN W/O DYE: CPT | Performed by: EMERGENCY MEDICINE

## 2018-01-19 PROCEDURE — 73700 CT LOWER EXTREMITY W/O DYE: CPT | Performed by: EMERGENCY MEDICINE

## 2018-01-19 PROCEDURE — 73110 X-RAY EXAM OF WRIST: CPT | Performed by: EMERGENCY MEDICINE

## 2018-01-19 PROCEDURE — 73502 X-RAY EXAM HIP UNI 2-3 VIEWS: CPT | Performed by: EMERGENCY MEDICINE

## 2018-01-19 PROCEDURE — 76377 3D RENDER W/INTRP POSTPROCES: CPT | Performed by: EMERGENCY MEDICINE

## 2018-01-19 RX ORDER — ACETAMINOPHEN 325 MG/1
650 TABLET ORAL EVERY 6 HOURS PRN
Status: DISCONTINUED | OUTPATIENT
Start: 2018-01-19 | End: 2018-01-21

## 2018-01-19 RX ORDER — DEXTROSE MONOHYDRATE 25 G/50ML
50 INJECTION, SOLUTION INTRAVENOUS
Status: DISCONTINUED | OUTPATIENT
Start: 2018-01-19 | End: 2018-01-21

## 2018-01-19 RX ORDER — ATORVASTATIN CALCIUM 20 MG/1
20 TABLET, FILM COATED ORAL NIGHTLY
Status: DISCONTINUED | OUTPATIENT
Start: 2018-01-19 | End: 2018-01-21

## 2018-01-19 RX ORDER — ONDANSETRON 2 MG/ML
4 INJECTION INTRAMUSCULAR; INTRAVENOUS EVERY 6 HOURS PRN
Status: DISCONTINUED | OUTPATIENT
Start: 2018-01-19 | End: 2018-01-21

## 2018-01-19 RX ORDER — LISINOPRIL 10 MG/1
10 TABLET ORAL DAILY
Status: DISCONTINUED | OUTPATIENT
Start: 2018-01-19 | End: 2018-01-21

## 2018-01-19 RX ORDER — VITS A,C,E/LUTEIN/MINERALS 300MCG-200
1 TABLET ORAL NIGHTLY
Status: DISCONTINUED | OUTPATIENT
Start: 2018-01-19 | End: 2018-01-21

## 2018-01-19 NOTE — ED NOTES
Entered room, Dr Carlos Webb at bedside w/pt on cart. Speech groggy, Dr. Vinayak Cullen to bedside. Pt placed back on telemetry and SaO2 monitors, vitals obtained.  Pt became more alert

## 2018-01-19 NOTE — ED INITIAL ASSESSMENT (HPI)
\"I was walking up steps and lost balance, fell backwards and hit pew behind me in [place], no loc, ambulance called but I didn't go with them\", takes daily baby ASA

## 2018-01-19 NOTE — ED NOTES
Pt returns from  X-ray and CT. Side rails up, bed locked and low, call light with bell in reach. Pt agrees not to get up without help.

## 2018-01-19 NOTE — ED NOTES
Pt assisted to sit on side of bed then to stand, upon standing pt became dizzy and wobbled a little to the left. Assisted back to lying. Skin warm and dry. Color pink. VSS.   Dr Oneyda Bell aware

## 2018-01-19 NOTE — ED NOTES
Leo Vega PCT at bedside to stand pt for road test as ordered by Dr Neymar Kapoor. As pt stood she became dizzy and said she felt like passing out.   Per Leo Vega PCT he assisted pt to lay down and at that time she had a syncopal episode for approx 15 seconds, then became res

## 2018-01-19 NOTE — ED NOTES
\"I just had a moment where I felt borderline like fainting but it passed\". Skin warm and dry. Resp full and easy. Bed remains locked and low, call light in reach.   Dr Vinayak Cullen aware

## 2018-01-19 NOTE — ED PROVIDER NOTES
Patient Seen in: THE [facility] In [place]    History   Patient presents with:  Head Neck Injury (neurologic, musculoskeletal)    Stated Complaint: fall, head injury, laceration. daily baby ASA    HPI      Patient reports a fall.   Patient food   • Type II or unspecified type diabetes mellitus without mention of complication, not stated as uncontrolled    • Unspecified essential hypertension    • Visual impairment     glasses       Past Surgical History:  No date: CATARACT Bilateral  No date conversant. Patient answers questions quickly and appropriately. Face is atraumatic. Over the occipital scalp, there is a 2 cm laceration.   Eyes: sclera white, conjunctiva pink and moist, midrange atraumatic pupils, equal round reactive to light, extrao DIFFERENTIAL WITH PLATELET    Narrative: The following orders were created for panel order CBC WITH DIFFERENTIAL WITH PLATELET.   Procedure                               Abnormality         Status                     --------- fracture or dislocation.  Normal mineralization.  Probable phleboliths.  Surgical clips in the right lower quadrant.  Vascular calcifications.  Mild osteoarthritic changes in the left femoral acetabular joint.    Minimal enthesopathy of the greater trochan encounter  Anemia, unspecified type    Disposition:  Admit  1/19/2018  7:57 pm    Follow-up:  No follow-up provider specified.       Medications Prescribed:  Current Discharge Medication List        Present on Admission  Date Reviewed: 10/23/2017          I

## 2018-01-20 LAB
ANTIBODY SCREEN: NEGATIVE
ERYTHROCYTE [DISTWIDTH] IN BLOOD BY AUTOMATED COUNT: 14.2 % (ref 11.5–16)
EST. AVERAGE GLUCOSE BLD GHB EST-MCNC: 137 MG/DL (ref 68–126)
GLUCOSE BLD-MCNC: 130 MG/DL (ref 65–99)
GLUCOSE BLD-MCNC: 134 MG/DL (ref 65–99)
GLUCOSE BLD-MCNC: 140 MG/DL (ref 65–99)
GLUCOSE BLD-MCNC: 155 MG/DL (ref 65–99)
GLUCOSE BLD-MCNC: 184 MG/DL (ref 65–99)
HBA1C MFR BLD HPLC: 6.4 % (ref ?–5.7)
HCT VFR BLD AUTO: 32.8 % (ref 34–50)
HGB BLD-MCNC: 10.8 G/DL (ref 12–16)
HGB BLD-MCNC: 11.5 G/DL (ref 12–16)
HGB BLD-MCNC: 11.5 G/DL (ref 12–16)
MCH RBC QN AUTO: 27.6 PG (ref 27–33.2)
MCHC RBC AUTO-ENTMCNC: 32.9 G/DL (ref 31–37)
MCV RBC AUTO: 83.9 FL (ref 81–100)
PLATELET # BLD AUTO: 179 10(3)UL (ref 150–450)
RBC # BLD AUTO: 3.91 X10(6)UL (ref 3.8–5.1)
RED CELL DISTRIBUTION WIDTH-SD: 43.2 FL (ref 35.1–46.3)
RH BLOOD TYPE: POSITIVE
WBC # BLD AUTO: 12.3 X10(3) UL (ref 4–13)

## 2018-01-20 PROCEDURE — 99232 SBSQ HOSP IP/OBS MODERATE 35: CPT | Performed by: INTERNAL MEDICINE

## 2018-01-20 NOTE — PHYSICAL THERAPY NOTE
PHYSICAL THERAPY EVALUATION - INPATIENT     Room Number: 524/738-Y  Evaluation Date: 1/20/2018  Type of Evaluation: Initial  Physician Order: PT Eval and Treat    Presenting Problem: S/p Fall on 1/19/18 - Left Hip hematoma  Reason for Therapy: Mobility Bilateral  No date: CHOLECYSTECTOMY  04/21/2003: COLONOSCOPY      Comment: Screening- Normal  No date: COLONOSCOPY  12/6/2016: EGD N/A      Comment: Procedure: ESOPHAGOGASTRODUODENOSCOPY (EGD);                  Surgeon: Norma Donald MD;  Location: [facility] above areas  Management Techniques: Activity promotion; Body mechanics;Breathing techniques;Relaxation;Repositioning    COGNITION  · Overall Cognitive Status:  WFL - within functional limits  · Safety Judgement:  decreased awareness of need for assistance a CMS Modifier (G-Code): CK    FUNCTIONAL ABILITY STATUS  Gait Assessment   Gait Assistance: Dependent assistance (Actual assist - min for safety)  Distance (ft): 15 ft  Assistive Device: Rolling walker  Pattern: L Decreased stance time (Decreased weight b transfers, and gait & stairs management skills. The patient is below baseline and would benefit from skilled inpatient PT to address the above deficits to assist patient in returning to prior to level of function.   DISCHARGE RECOMMENDATIONS  PT Discharge

## 2018-01-20 NOTE — H&P
IRON HOSPITALIST  History and Physical     [facility] Patient Status:  Emergency    1940 MRN [id number]   Location [facility] Attending Rosie Cortes MD   Hosp Day # 0 PCP Kvng Corona MD     [facility] END[facility]  No date: HERNIA SURGERY      Comment: Mark Burdick  No date: HYSTERECTOMY      Comment: partial, still has ovaries  No date: KNEE REPLACEMENT SURGERY  : [id number], SCREENING  No date:   No date: OTHER      Comment: right hammer toe surg 80 MG Oral Tab Take 80 mg by mouth nightly. Disp:  Rfl:    OCUVITE (OCUVITE) Oral Tab Take 1 tablet by mouth nightly. Disp:  Rfl:    Cholecalciferol (VITAMIN D) 1000 UNITS Oral Tab Take 1,000 Units by mouth daily.    Disp:  Rfl:    fluconazole 100 MG Oral hip Hematoma   1. Monitor CBC  2. Hold ASA  3. Type and screen  4. Serial Hgb, transfuse as needed  2. Fall/Chronic gait difficulty  1. PT/OT eval  2. CT head without acute fracture or bleed  3. Afib  1. Rate controlled, not on AC  4. CAD  1.  Continue home

## 2018-01-20 NOTE — PLAN OF CARE
Diabetes/Glucose Control    • Glucose maintained within prescribed range Progressing        Patient/Family Goals    • Patient/Family Long Term Goal Progressing    • Patient/Family Short Term Goal Progressing        Received from [facility] by karan.

## 2018-01-20 NOTE — ED NOTES
Report given to [facility].

## 2018-01-20 NOTE — PLAN OF CARE
Diabetes/Glucose Control    • Glucose maintained within prescribed range Progressing        Patient/Family Goals    • Patient/Family Long Term Goal Progressing    • Patient/Family Short Term Goal Progressing        SKIN/TISSUE INTEGRITY - ADULT    • Davidi

## 2018-01-20 NOTE — OCCUPATIONAL THERAPY NOTE
OCCUPATIONAL THERAPY EVALUATION - INPATIENT     Room Number: 396/741-Q  Evaluation Date: 1/20/2018  Type of Evaluation: Initial  Presenting Problem: fall, L gluteal intramuscular hematoma    Physician Order: IP Consult to Occupational Therapy  Reason for T Bilateral  No date: CHOLECYSTECTOMY  04/21/2003: COLONOSCOPY      Comment: Screening- Normal  No date: COLONOSCOPY  12/6/2016: EGD N/A      Comment: Procedure: ESOPHAGOGASTRODUODENOSCOPY (EGD);                  Surgeon: Rosibel Brown MD;  Location: [facility] limits      RANGE OF MOTION AND STRENGTH ASSESSMENT  pper extremity ROM is within functional limits     Upper extremity strength is within functional limits     COORDINATION  Gross Motor    WFL    Fine Motor    WFL      ADDITIONAL TESTS 1/19/2018 for fall, L hip hematoma. Complete medical history and occupational profile noted above. Functional outcome measures completed include AM-PAC, ROM.  In this OT evaluation patient presents with the following performance deficits: decreased standing perform toileting: with supervision    Functional Transfer Goals  Patient will transfer to toilet:  with supervision    UE Exercise Program Goal  Patient will be independent with bilateral AROM HEP (home exercise program).     Additional Goals:  Pt will sta

## 2018-01-20 NOTE — PROGRESS NOTES
[facility]  Progress Note    Lori Deras Patient Status:  Inpatient    1940 MRN [id number]   [facility] 3SW-A Attending Linda Maxwell MD   [facility] Day # 1 PCP Angel Norwood MD     CC: Fall and left hip pain    SUBJECTIVE:  Gopi Cedeño 32.8 01/20/2018   .0 01/20/2018   CREATSERUM 0.92 01/19/2018   BUN 18 01/19/2018    01/19/2018   K 4.9 01/19/2018    01/19/2018   CO2 28.0 01/19/2018    01/19/2018   CA 8.5 01/19/2018   PGLU 155 01/20/2018       Imaging:     CT HI [facility] M[facility]) injection 4 mg 4 mg Intravenous Q6H PRN   glucose (DEX4) oral liquid 15 g 15 g Oral Q15 Min PRN   Or      Glucose-Vitamin C (DEX-4) 4-0.006 g chewable tab 4 tablet 4 tablet Oral Q15 Min PRN   Or      dextrose 50% injection 50 mL 50 mL Intravenous patient and family[patient's daughter Asuncion] by bedside. Patient's son is one of our ER physicians Dr. Hansa Roy.     **Certification      PHYSICIAN Certification of Need for Inpatient Hospitalization - Initial Certification    Patient will require inpat

## 2018-01-21 VITALS
BODY MASS INDEX: 28.92 KG/M2 | HEART RATE: 67 BPM | DIASTOLIC BLOOD PRESSURE: 67 MMHG | RESPIRATION RATE: 17 BRPM | HEIGHT: 70 IN | TEMPERATURE: 98 F | OXYGEN SATURATION: 98 % | SYSTOLIC BLOOD PRESSURE: 126 MMHG | WEIGHT: 202 LBS

## 2018-01-21 PROBLEM — S01.01XA SCALP LACERATION: Status: ACTIVE | Noted: 2018-01-19

## 2018-01-21 LAB
GLUCOSE BLD-MCNC: 151 MG/DL (ref 65–99)
GLUCOSE BLD-MCNC: 153 MG/DL (ref 65–99)
HGB BLD-MCNC: 10.6 G/DL (ref 12–16)

## 2018-01-21 PROCEDURE — 99238 HOSP IP/OBS DSCHRG MGMT 30/<: CPT | Performed by: INTERNAL MEDICINE

## 2018-01-21 NOTE — PROGRESS NOTES
[facility]  Progress Note    Early Radha Patient Status:  Inpatient    1940 MRN [id number]   [facility] 3SW-A Attending Idania Rojas MD   [facility] Day # 2 PCP Andrei Cardoso MD     CC: Fall and left hip pain    SUBJECTIVE:  Beulah Finn COMPARISON:  None. INDICATIONS:  fall, head injury, laceration. daily baby ASA     TECHNIQUE:  Multi-planar CT images were created without intravenous contrast. Shaded surface renderings are generated on an independent CT scanner workstation.   Dose r (DEX4) oral liquid 30 g 30 g Oral Q15 Min PRN   Or      Glucose-Vitamin C (DEX-4) 4-0.006 g chewable tab 8 tablet 8 tablet Oral Q15 Min PRN   Insulin Aspart Pen (NOVOLOG) 100 UNIT/ML flexpen 1-5 Units 1-5 Units Subcutaneous TID CC and HS       ASSESSMENT /

## 2018-01-21 NOTE — OCCUPATIONAL THERAPY NOTE
OCCUPATIONAL THERAPY TREATMENT NOTE - INPATIENT     Room Number: 335/953-W  Session: 1   Number of Visits to Meet Established Goals: 5    Presenting Problem: fall, L gluteal intramuscular hematoma    History related to current admission: Pt was admitted on HERNIA SURGERY      Comment: [facility]  No date: HYSTERECTOMY      Comment: partial, still has ovaries  No date: KNEE REPLACEMENT SURGERY  : [id number], SCREENING  No date:   No date: OTHER      Comment: right hammer toe surgery  No date: OTHER SURG Supervision for sit to stand, Supervision for ambulation with RW down hallway x 200 feet. Pt seated up in chair upon end of session. Patient End of Session: Up in chair;Needs met;RN aware of session/findings;Call light within reach; All patient questions recommendation ideas. .....  Goal met

## 2018-01-21 NOTE — HOME CARE LIAISON
Referral received for home health. Unable to accept patient's insurance that is documented on face sheet. Per registration this is what was submitted for claims. Kelly Burton SW informed to re-refer the patient.

## 2018-01-21 NOTE — DISCHARGE SUMMARY
[facility]  Discharge Summary    Roro Hernandez Patient Status:  Inpatient    1940 MRN [id number]   [facility] 3SW-A Attending Chuy Hannon MD   [facility] Day # 2 PCP Sophie Sorto MD     Date of Admission: 2018    Date of D daily baby ASA     TECHNIQUE:  Multi-planar CT images were created without intravenous contrast. Shaded surface renderings are generated on an independent CT scanner workstation.  Dose reduction techniques were used.  Dose information is transmitted to the • none    Incidental or significant findings and recommendations (brief descriptions):  • none    Lab/Test results pending at Discharge:   · none      Discharge Medications:        Discharge Medications      CONTINUE taking these medications      Instruc Resp 17   Ht 5' 10\" (1.778 m)   Wt 202 lb (91.6 kg)   SpO2 98%   BMI 28.98 kg/m²       General appearance: alert and cooperative  Head: Normocephalic, without obvious abnormality, atraumatic  Throat: oral mucosa moist  Neck: no adenopathy, no carotid brui

## 2018-01-21 NOTE — CM/SW NOTE
SW notified pt will be medically cleared for d/c today and PT recommendation for [place]. Referral sent to [facility], [facility] liaison notified.      ADDENDUM:  CAT notified by [facility] liaison they are refusing referral due to \"accident\" listed under insurance, pt's insurance

## 2018-01-21 NOTE — PHYSICAL THERAPY NOTE
PHYSICAL THERAPY HIP TREATMENT NOTE - INPATIENT      Room Number: 816/161-M     Session: 2  Number of Visits to Meet Established Goals: 3    Presenting Problem: S/p Fall on 1/19/18 - Left Hip hematoma  History related to current admission: Patient was admi ENDOSCOPY  No date: HERNIA SURGERY      Comment: Rosalia Costello  No date: HYSTERECTOMY      Comment: partial, still has ovaries  No date: KNEE REPLACEMENT SURGERY  : [id number], SCREENING  No date:   No date: OTHER      Comment: right hammer toe surg Degree of Impairment Score: 41.77%   Standardized Score (AM-PAC Scale): 45.44   CMS Modifier (G-Code): CK    FUNCTIONAL ABILITY STATUS  Gait Assessment   Gait Assistance: Maximum assistance (per FIM: actual SBA )  Distance (ft): 100  Assistive Device: Roll Good  Frequency (Obs): Daily    CURRENT GOALS     Goal #1 Patient is able to demonstrate supine - sit EOB @ level: independent   Goal #2 Patient is able to demonstrate transfers EOB to/from Chair/Wheelchair at assistance level: modified independent   Goal

## 2018-01-22 ENCOUNTER — PATIENT MESSAGE (OUTPATIENT)
Dept: CASE MANAGEMENT | Age: 78
End: 2018-01-22

## 2018-01-22 ENCOUNTER — PATIENT OUTREACH (OUTPATIENT)
Dept: CASE MANAGEMENT | Age: 78
End: 2018-01-22

## 2018-01-22 NOTE — CM/SW NOTE
01/22/18 0800   Discharge disposition   Discharged to: Home-Health   Name of [address] services after discharge Skilled home care   Discharge transportation Private car

## 2018-01-29 ENCOUNTER — OFFICE VISIT (OUTPATIENT)
Dept: INTERNAL MEDICINE CLINIC | Facility: CLINIC | Age: 78
End: 2018-01-29

## 2018-01-29 VITALS
TEMPERATURE: 98 F | BODY MASS INDEX: 30 KG/M2 | DIASTOLIC BLOOD PRESSURE: 74 MMHG | SYSTOLIC BLOOD PRESSURE: 126 MMHG | HEART RATE: 68 BPM | RESPIRATION RATE: 17 BRPM | WEIGHT: 209 LBS
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
## 2018-01-29 DIAGNOSIS — T14.8XXA HEMATOMA: Primary | ICD-10-CM

## 2018-01-29 DIAGNOSIS — I25.10 CORONARY ARTERY DISEASE INVOLVING NATIVE CORONARY ARTERY OF NATIVE HEART WITHOUT ANGINA PECTORIS: ICD-10-CM

## 2018-01-29 DIAGNOSIS — S70.02XA HEMATOMA OF LEFT HIP, INITIAL ENCOUNTER: ICD-10-CM

## 2018-01-29 DIAGNOSIS — I10 BENIGN ESSENTIAL HTN: ICD-10-CM

## 2018-01-29 DIAGNOSIS — S01.01XD LACERATION OF SCALP, SUBSEQUENT ENCOUNTER: ICD-10-CM

## 2018-01-29 DIAGNOSIS — E11.59 TYPE 2 DIABETES MELLITUS WITH OTHER CIRCULATORY COMPLICATION, WITHOUT LONG-TERM CURRENT USE OF INSULIN (HCC): ICD-10-CM

## 2018-01-29 DIAGNOSIS — I48.0 PAROXYSMAL ATRIAL FIBRILLATION (HCC): ICD-10-CM

## 2018-01-29 DIAGNOSIS — R26.81 UNSTEADY GAIT: ICD-10-CM

## 2018-01-29 PROCEDURE — 99214 OFFICE O/P EST MOD 30 MIN: CPT | Performed by: NURSE PRACTITIONER

## 2018-01-29 NOTE — PROGRESS NOTES
Alex García is a 68year old female. Patient presents with:  Staple Removal: Room 10      HPI:   Here for staple removal.  She fell backward on the stairs and hit her head on [facility]   [facility] and noted bu Aortic atherosclerosis (HCC)     Renal artery stenosis (HCC)     Achalasia     Arthritis, lumbar spine ([address]     H/O partial thyroidectomy     Coronary artery disease involving native coronary artery of native heart without angina pectoris     Hematoma of Comment: glasses   Social History:  Smoking status: Never Smoker                                                              Smokeless tobacco: Never Used                      Alcohol use:  No              Family History   Problem Relation Age of Onset stable when hospitalized. Same meds. Benign essential htn  stable  Hematoma of left hip, initial encounter  Laceration of scalp, subsequent encounter  Well approximated. Fall, subsequent encounter   Would benefit from PT eval and treat outpatient.   Luis Bueno

## 2018-02-05 RX ORDER — LISINOPRIL 10 MG/1
TABLET ORAL
Qty: 90 TABLET | Refills: 0 | Status: SHIPPED | OUTPATIENT
Start: 2018-02-05 | End: 2018-05-07

## 2018-02-09 ENCOUNTER — OFFICE VISIT (OUTPATIENT)
Dept: PHYSICAL THERAPY | Age: 78
End: 2018-02-09
Attending: FAMILY MEDICINE
Payer: MEDICARE

## 2018-02-09 PROCEDURE — 97112 NEUROMUSCULAR REEDUCATION: CPT | Performed by: PHYSICAL THERAPIST

## 2018-02-09 PROCEDURE — 97530 THERAPEUTIC ACTIVITIES: CPT | Performed by: PHYSICAL THERAPIST

## 2018-02-09 PROCEDURE — 97161 PT EVAL LOW COMPLEX 20 MIN: CPT | Performed by: PHYSICAL THERAPIST

## 2018-02-09 NOTE — PROGRESS NOTES
NEUROLOGICAL EVALUATION:   Referring Provider: Disha Seals. LARRY Morrow  Diagnosis: Fall, subsequent encounter (N70.GWNX), Unsteady gait (R26.81) Date of Service: 2/9/2018     PATIENT SUMMARY   Guillermo Winslow is a 68year old y/o female who presents to therap yes  Gait antalgic left with partially compensated Trendelenburg, with cane no Trendelenburg sign     Timed Up and Go (AD, time): cane, 21 sec; without cane 19 sec   Fall Risk: yes  Functional Gait Assessment: 20/30     Fall Risk: no    Today’s Treatment a your referral. Please co-sign this evaluation at your earliest convenience. If you have any questions, please contact me at Dept: [phone].     Sincerely,  Electronically signed by therapist: Lay Holley PT

## 2018-02-12 ENCOUNTER — OFFICE VISIT (OUTPATIENT)
Dept: PHYSICAL THERAPY | Age: 78
End: 2018-02-12
Attending: FAMILY MEDICINE
Payer: MEDICARE

## 2018-02-12 PROCEDURE — 97112 NEUROMUSCULAR REEDUCATION: CPT | Performed by: PHYSICAL THERAPIST

## 2018-02-12 PROCEDURE — 97110 THERAPEUTIC EXERCISES: CPT | Performed by: PHYSICAL THERAPIST

## 2018-02-12 NOTE — PROGRESS NOTES
Dx: Fall, subsequent encounter (N68.FTED), Unsteady gait (R26.81)  Authorized # of Visits:  2/8  Next MD visit: none scheduled  Fall Risk: standard  Precautions: n/a    Subjective: Patient states that she was in the pool this morning for a 45 minute class

## 2018-02-15 DIAGNOSIS — E11.59 TYPE 2 DIABETES MELLITUS WITH OTHER CIRCULATORY COMPLICATION, WITHOUT LONG-TERM CURRENT USE OF INSULIN (HCC): ICD-10-CM

## 2018-02-16 RX ORDER — GLYBURIDE 5 MG/1
TABLET ORAL
Qty: 90 TABLET | Refills: 0 | Status: SHIPPED | OUTPATIENT
Start: 2018-02-16 | End: 2018-10-11

## 2018-02-17 DIAGNOSIS — E11.59 TYPE 2 DIABETES MELLITUS WITH OTHER CIRCULATORY COMPLICATION, WITHOUT LONG-TERM CURRENT USE OF INSULIN (HCC): ICD-10-CM

## 2018-02-19 ENCOUNTER — OFFICE VISIT (OUTPATIENT)
Dept: PHYSICAL THERAPY | Age: 78
End: 2018-02-19
Attending: NURSE PRACTITIONER
Payer: MEDICARE

## 2018-02-19 PROCEDURE — 97110 THERAPEUTIC EXERCISES: CPT | Performed by: PHYSICAL THERAPIST

## 2018-02-19 PROCEDURE — 97112 NEUROMUSCULAR REEDUCATION: CPT | Performed by: PHYSICAL THERAPIST

## 2018-02-19 RX ORDER — GLYBURIDE 5 MG/1
TABLET ORAL
Qty: 90 TABLET | Refills: 0 | Status: SHIPPED | OUTPATIENT
Start: 2018-02-19 | End: 2018-04-16

## 2018-02-19 NOTE — PROGRESS NOTES
Dx: Fall, subsequent encounter (S72.[facility]), Unsteady gait (R26.81)  Authorized # of Visits:  3/8  Next MD visit: none scheduled  Fall Risk: standard  Precautions: n/a    Subjective: Patient states that she is feeling better and was in the pool this morning

## 2018-02-20 ENCOUNTER — TELEPHONE (OUTPATIENT)
Dept: INTERNAL MEDICINE CLINIC | Facility: CLINIC | Age: 78
End: 2018-02-20

## 2018-02-23 ENCOUNTER — OFFICE VISIT (OUTPATIENT)
Dept: PHYSICAL THERAPY | Age: 78
End: 2018-02-23
Attending: NURSE PRACTITIONER
Payer: MEDICARE

## 2018-02-23 PROCEDURE — 97110 THERAPEUTIC EXERCISES: CPT | Performed by: PHYSICAL THERAPIST

## 2018-02-23 PROCEDURE — 97530 THERAPEUTIC ACTIVITIES: CPT | Performed by: PHYSICAL THERAPIST

## 2018-02-23 NOTE — PROGRESS NOTES
Discharge Summary    Dx:  Fall, subsequent encounter (A90.UIKZ), Unsteady gait (R26.81)  Authorized # of Visits: 4/8  Next MD visit: none scheduled  Fall Risk: standard  Precautions: n/a    Dear Carlos Locke,    Thank you for the referral of Breanne Montano to physical th Moving Around CK: 40-59% impaired, limited, or restricted  Projected G Code:  Mobility: Walking and Moving Around CJ: 20-39% impaired, limited, or restricted    Rehab Potential: good    Plan: Discharge from Physical Therapy with independent home program.

## 2018-03-09 ENCOUNTER — LAB ENCOUNTER (OUTPATIENT)
Dept: LAB | Age: 78
End: 2018-03-09
Attending: INTERNAL MEDICINE
Payer: MEDICARE

## 2018-03-09 DIAGNOSIS — T14.8XXA HEMATOMA: ICD-10-CM

## 2018-03-09 DIAGNOSIS — E11.59 TYPE 2 DIABETES MELLITUS WITH OTHER CIRCULATORY COMPLICATION, WITHOUT LONG-TERM CURRENT USE OF INSULIN (HCC): ICD-10-CM

## 2018-03-09 LAB
ALBUMIN SERPL-MCNC: 3.3 G/DL (ref 3.5–4.8)
ALP LIVER SERPL-CCNC: 80 U/L (ref 55–142)
ALT SERPL-CCNC: 12 U/L (ref 14–54)
AST SERPL-CCNC: 8 U/L (ref 15–41)
BASOPHILS # BLD AUTO: 0.05 X10(3) UL (ref 0–0.1)
BASOPHILS NFR BLD AUTO: 0.7 %
BILIRUB SERPL-MCNC: 0.2 MG/DL (ref 0.1–2)
BUN BLD-MCNC: 16 MG/DL (ref 8–20)
CALCIUM BLD-MCNC: 9.1 MG/DL (ref 8.3–10.3)
CHLORIDE: 105 MMOL/L (ref 101–111)
CHOLEST SMN-MCNC: 145 MG/DL (ref ?–200)
CO2: 31 MMOL/L (ref 22–32)
CREAT BLD-MCNC: 0.8 MG/DL (ref 0.55–1.02)
CREAT UR-SCNC: 51.6 MG/DL
EOSINOPHIL # BLD AUTO: 0.25 X10(3) UL (ref 0–0.3)
EOSINOPHIL NFR BLD AUTO: 3.3 %
ERYTHROCYTE [DISTWIDTH] IN BLOOD BY AUTOMATED COUNT: 14.5 % (ref 11.5–16)
EST. AVERAGE GLUCOSE BLD GHB EST-MCNC: 117 MG/DL (ref 68–126)
GLUCOSE BLD-MCNC: 109 MG/DL (ref 70–99)
HBA1C MFR BLD HPLC: 5.7 % (ref ?–5.7)
HCT VFR BLD AUTO: 42.4 % (ref 34–50)
HDLC SERPL-MCNC: 62 MG/DL (ref 45–?)
HDLC SERPL: 2.34 {RATIO} (ref ?–4.44)
HGB BLD-MCNC: 13.1 G/DL (ref 12–16)
IMMATURE GRANULOCYTE COUNT: 0.01 X10(3) UL (ref 0–1)
IMMATURE GRANULOCYTE RATIO %: 0.1 %
LDLC SERPL CALC-MCNC: 57 MG/DL (ref ?–130)
LYMPHOCYTES # BLD AUTO: 1.23 X10(3) UL (ref 0.9–4)
LYMPHOCYTES NFR BLD AUTO: 16.2 %
M PROTEIN MFR SERPL ELPH: 6.5 G/DL (ref 6.1–8.3)
MCH RBC QN AUTO: 27.6 PG (ref 27–33.2)
MCHC RBC AUTO-ENTMCNC: 30.9 G/DL (ref 31–37)
MCV RBC AUTO: 89.3 FL (ref 81–100)
MICROALBUMIN UR-MCNC: 0.64 MG/DL
MICROALBUMIN/CREAT 24H UR-RTO: 12.4 UG/MG (ref ?–30)
MONOCYTES # BLD AUTO: 0.52 X10(3) UL (ref 0.1–1)
MONOCYTES NFR BLD AUTO: 6.8 %
NEUTROPHIL ABS PRELIM: 5.54 X10 (3) UL (ref 1.3–6.7)
NEUTROPHILS # BLD AUTO: 5.54 X10(3) UL (ref 1.3–6.7)
NEUTROPHILS NFR BLD AUTO: 72.9 %
NONHDLC SERPL-MCNC: 83 MG/DL (ref ?–130)
PLATELET # BLD AUTO: 199 10(3)UL (ref 150–450)
POTASSIUM SERPL-SCNC: 4.4 MMOL/L (ref 3.6–5.1)
RBC # BLD AUTO: 4.75 X10(6)UL (ref 3.8–5.1)
RED CELL DISTRIBUTION WIDTH-SD: 46.7 FL (ref 35.1–46.3)
SODIUM SERPL-SCNC: 140 MMOL/L (ref 136–144)
TRIGL SERPL-MCNC: 128 MG/DL (ref ?–150)
VLDLC SERPL CALC-MCNC: 26 MG/DL (ref 5–40)
WBC # BLD AUTO: 7.6 X10(3) UL (ref 4–13)

## 2018-03-09 PROCEDURE — 36415 COLL VENOUS BLD VENIPUNCTURE: CPT

## 2018-03-09 PROCEDURE — 82043 UR ALBUMIN QUANTITATIVE: CPT

## 2018-03-09 PROCEDURE — 80053 COMPREHEN METABOLIC PANEL: CPT

## 2018-03-09 PROCEDURE — 83036 HEMOGLOBIN GLYCOSYLATED A1C: CPT

## 2018-03-09 PROCEDURE — 80061 LIPID PANEL: CPT

## 2018-03-09 PROCEDURE — 82570 ASSAY OF URINE CREATININE: CPT

## 2018-03-09 PROCEDURE — 85025 COMPLETE CBC W/AUTO DIFF WBC: CPT

## 2018-04-06 DIAGNOSIS — I10 ESSENTIAL HYPERTENSION: ICD-10-CM

## 2018-04-06 RX ORDER — METOPROLOL TARTRATE 50 MG/1
TABLET, FILM COATED ORAL
Qty: 90 TABLET | Refills: 0 | Status: SHIPPED | OUTPATIENT
Start: 2018-04-06 | End: 2018-06-08

## 2018-04-16 ENCOUNTER — OFFICE VISIT (OUTPATIENT)
Dept: INTERNAL MEDICINE CLINIC | Facility: CLINIC | Age: 78
End: 2018-04-16

## 2018-04-16 ENCOUNTER — TELEPHONE (OUTPATIENT)
Dept: INTERNAL MEDICINE CLINIC | Facility: CLINIC | Age: 78
End: 2018-04-16

## 2018-04-16 VITALS
RESPIRATION RATE: 16 BRPM | TEMPERATURE: 98 F | BODY MASS INDEX: 29.49 KG/M2 | DIASTOLIC BLOOD PRESSURE: 82 MMHG | WEIGHT: 206 LBS | SYSTOLIC BLOOD PRESSURE: 138 MMHG | HEART RATE: 60 BPM | HEIGHT: 70 IN

## 2018-04-16 DIAGNOSIS — R22.1 NECK MASS: Primary | ICD-10-CM

## 2018-04-16 PROCEDURE — 99214 OFFICE O/P EST MOD 30 MIN: CPT | Performed by: NURSE PRACTITIONER

## 2018-04-16 NOTE — PROGRESS NOTES
Lori Deras is a 68year old female. Patient presents with:  Mass: TN Exam Room 10: patient here with complaints of a hard mass on the right side of her neck.   Also with complaints of a more non-palpable mass on the back of the head towards the left TWO TIMES A DAY  Disp: 90 tablet Rfl: 0   METFORMIN HCL 1000 MG Oral Tab TAKE 1/2 TABLET BY MOUTH TWO TIMES A DAY WITH MEALS Disp: 90 tablet Rfl: 1   GLYBURIDE 5 MG Oral Tab TAKE 1/2 TABLET BY MOUTH TWO TIMES A DAY WITH MEALS Disp: 90 tablet Rfl: 0   LISIN Text Annotation: Adhesive Tape;             fragile skin-->skin tears    REVIEW OF SYSTEMS:   GENERAL HEALTH: feels well otherwise  SKIN: as above.    RESPIRATORY: denies shortness of breath with exertion, no cough  CARDIOVASCULAR: denies chest pain on exer

## 2018-04-16 NOTE — TELEPHONE ENCOUNTER
Calling in regards to the CT order. Based on her symptoms they recommend only with Contrast. Pt is scheduled for tomorrow.  Please advise

## 2018-04-17 ENCOUNTER — HOSPITAL ENCOUNTER (OUTPATIENT)
Dept: CT IMAGING | Age: 78
Discharge: HOME | End: 2018-04-17
Attending: NURSE PRACTITIONER
Payer: MEDICARE

## 2018-04-17 DIAGNOSIS — R22.1 NECK MASS: ICD-10-CM

## 2018-04-17 DIAGNOSIS — R59.1 LYMPHADENOPATHY: Primary | ICD-10-CM

## 2018-04-17 PROCEDURE — 70491 CT SOFT TISSUE NECK W/DYE: CPT | Performed by: NURSE PRACTITIONER

## 2018-04-23 ENCOUNTER — TELEPHONE (OUTPATIENT)
Dept: INTERNAL MEDICINE CLINIC | Facility: CLINIC | Age: 78
End: 2018-04-23

## 2018-04-23 NOTE — TELEPHONE ENCOUNTER
Forms have been received from [address] [facility] [address] from [facility]. Placed in AS bin SD n/a to sign home health orders.

## 2018-04-24 ENCOUNTER — LAB REQUISITION (OUTPATIENT)
Dept: LAB | Facility: HOSPITAL | Age: 78
End: 2018-04-24
Payer: MEDICARE

## 2018-04-24 ENCOUNTER — TELEPHONE (OUTPATIENT)
Dept: INTERNAL MEDICINE CLINIC | Facility: CLINIC | Age: 78
End: 2018-04-24

## 2018-04-24 DIAGNOSIS — R59.1 LYMPHADENOPATHY: Primary | ICD-10-CM

## 2018-04-24 DIAGNOSIS — A18.2 TUBERCULOUS PERIPHERAL LYMPHADENOPATHY: ICD-10-CM

## 2018-04-24 PROCEDURE — 88184 FLOWCYTOMETRY/ TC 1 MARKER: CPT | Performed by: INTERNAL MEDICINE

## 2018-04-24 PROCEDURE — 88341 IMHCHEM/IMCYTCHM EA ADD ANTB: CPT | Performed by: EMERGENCY MEDICINE

## 2018-04-24 PROCEDURE — 88307 TISSUE EXAM BY PATHOLOGIST: CPT | Performed by: EMERGENCY MEDICINE

## 2018-04-24 PROCEDURE — 88185 FLOWCYTOMETRY/TC ADD-ON: CPT | Performed by: INTERNAL MEDICINE

## 2018-04-24 PROCEDURE — 88312 SPECIAL STAINS GROUP 1: CPT | Performed by: EMERGENCY MEDICINE

## 2018-04-24 PROCEDURE — 88342 IMHCHEM/IMCYTCHM 1ST ANTB: CPT | Performed by: EMERGENCY MEDICINE

## 2018-04-24 NOTE — TELEPHONE ENCOUNTER
Forms received from [place] for POC/Order for PT Plan of care to be signed by PCP. Placed in AS bin for completion .

## 2018-05-02 ENCOUNTER — MED REC SCAN ONLY (OUTPATIENT)
Dept: INTERNAL MEDICINE CLINIC | Facility: CLINIC | Age: 78
End: 2018-05-02

## 2018-05-02 ENCOUNTER — TELEPHONE (OUTPATIENT)
Dept: INTERNAL MEDICINE CLINIC | Facility: CLINIC | Age: 78
End: 2018-05-02

## 2018-05-02 DIAGNOSIS — R22.1 NECK MASS: Primary | ICD-10-CM

## 2018-05-02 DIAGNOSIS — C85.91 LYMPHOMA OF LYMPH NODES OF NECK, UNSPECIFIED LYMPHOMA TYPE (HCC): Primary | ICD-10-CM

## 2018-05-02 NOTE — TELEPHONE ENCOUNTER
Contacted Dr. Fay [facility] [phone] s/w Dejon Rojassten patient has been seen x2 and has return visit for suture removal.  Dejon Christine will fax over all records she has pertaining to patient visits.       Dejon Christine also mentioned pt seen by Dr. Mickael Schwab as wel

## 2018-05-02 NOTE — TELEPHONE ENCOUNTER
Records received just indicates pathology pending, no formal dx at this time.     Called pt LMTCB as pt also requesting referral to Dr. Jimbo Robbins

## 2018-05-02 NOTE — TELEPHONE ENCOUNTER
Called lab spoke with Fabricio Olson at [facility], stating path and flow cytometry path pending under AS have not been signed off by pathologist but they have been resulted. Yan provided pathology ext [id number].  She is unsure why results have not been signed of

## 2018-05-02 NOTE — TELEPHONE ENCOUNTER
Reason for the order/referral: positive findings of lymphoma   PCP:  Adam Schriedel   Refer to Provider (first and last name): Garima Cancer   Specialty: oncology   Patient Insurance: Payor: [facility] MED ADV HMO BLUE / Plan: Mingo Gallagher MA HMO / Product T

## 2018-05-02 NOTE — TELEPHONE ENCOUNTER
Pt called to FU on status of referral to Dr. Elisha Neville, oncologist. No appt can be made until referral is in place.

## 2018-05-02 NOTE — TELEPHONE ENCOUNTER
Please look into this. Path pending from 4/24 ordered by her son???  (he's and ER physician)   Flow cytometry path pending under AS? ? Do not see diagnosis of lymphoma confirmed.

## 2018-05-02 NOTE — TELEPHONE ENCOUNTER
Lalitha Bolton 171, advised pt was seen 04/17, CT ordered and then referred to Dr. Margie Colon we have no information following for referrals. OV notes all in [facility] with primary provider office.     Will need to contact Dr. Milad Long office for consult n

## 2018-05-03 NOTE — TELEPHONE ENCOUNTER
Patient states she had a lymph node-neck bx with Dr. Desiree Philippe. Pt's son is . Rufina STORY MD at [facility] ([phone]) spoke with the pathologist, verna lymphoma and wants pt to see Dr. Jordana llanes(appt scheduled for 5/9/18 12am).   Slides are being sent to

## 2018-05-07 RX ORDER — LISINOPRIL 10 MG/1
TABLET ORAL
Qty: 90 TABLET | Refills: 0 | Status: SHIPPED | OUTPATIENT
Start: 2018-05-07 | End: 2018-08-08

## 2018-05-09 ENCOUNTER — OFFICE VISIT (OUTPATIENT)
Dept: HEMATOLOGY/ONCOLOGY | Facility: HOSPITAL | Age: 78
End: 2018-05-09
Attending: INTERNAL MEDICINE
Payer: MEDICARE

## 2018-05-09 VITALS
WEIGHT: 206.19 LBS | TEMPERATURE: 98 F | HEIGHT: 70 IN | HEART RATE: 59 BPM | BODY MASS INDEX: 29.52 KG/M2 | DIASTOLIC BLOOD PRESSURE: 58 MMHG | SYSTOLIC BLOOD PRESSURE: 187 MMHG | RESPIRATION RATE: 18 BRPM

## 2018-05-09 DIAGNOSIS — C82.11 GRADE 2 FOLLICULAR LYMPHOMA OF LYMPH NODES OF NECK (HCC): Primary | ICD-10-CM

## 2018-05-09 DIAGNOSIS — R53.1 WEAKNESS: ICD-10-CM

## 2018-05-09 PROCEDURE — 99205 OFFICE O/P NEW HI 60 MIN: CPT | Performed by: INTERNAL MEDICINE

## 2018-05-09 RX ORDER — VIT A/VIT C/VIT E/ZINC/COPPER 4296-226
1 CAPSULE ORAL 2 TIMES DAILY
COMMUNITY
End: 2020-10-09 | Stop reason: CLARIF

## 2018-05-10 NOTE — CONSULTS
[facility]    PATIENT'S NAME: Elke Tomlinson   CONSULTING PHYSICIAN: Almita Olmos MD   PATIENT ACCOUNT #: [de-identified] LOCATION: [address] [place] [address] RECORD #: [id number] YOB: 1940   CONSULTATION DATE: 05/09/2018       CANCER taste has been a little bit off the last 6 months. She denies any increased frequency of infections. She is still active and feels well. She denies any other new complaints. She is not aware of any other nodes in other sites.     PAST MEDICAL HISTORY: adopted child. She has 2 children who are . There is no malignancy among her children. REVIEW OF SYSTEMS:  Remarkable for diminished hearing. She states that she is developing macular degeneration. She has no glaucoma.   Her teeth and gums had been done in March. Her CBC had been normal, with a normal hemoglobin, normal platelets, essentially normal indices, and normal white count and differential.  Her chemistries were quite normal as well. Her hemoglobin A1c was 5.7.   She had normal live therapy. I did explain that the majority of patients have gradual progression of the disease and will require treatment.   I also explained that a very small number of patients will progress to more aggressive lymphoma and will require more intensive treat

## 2018-05-14 ENCOUNTER — OFFICE VISIT (OUTPATIENT)
Dept: INTERNAL MEDICINE CLINIC | Facility: CLINIC | Age: 78
End: 2018-05-14

## 2018-05-14 VITALS
DIASTOLIC BLOOD PRESSURE: 80 MMHG | WEIGHT: 207 LBS | HEIGHT: 70 IN | TEMPERATURE: 97 F | BODY MASS INDEX: 29.63 KG/M2 | RESPIRATION RATE: 16 BRPM | HEART RATE: 60 BPM | SYSTOLIC BLOOD PRESSURE: 126 MMHG

## 2018-05-14 DIAGNOSIS — E11.51 TYPE 2 DIABETES MELLITUS WITH ATHEROSCLEROSIS OF AORTA (HCC): ICD-10-CM

## 2018-05-14 DIAGNOSIS — M47.816 ARTHRITIS, LUMBAR SPINE: ICD-10-CM

## 2018-05-14 DIAGNOSIS — Z00.00 ENCOUNTER FOR ANNUAL HEALTH EXAMINATION: ICD-10-CM

## 2018-05-14 DIAGNOSIS — I70.0 TYPE 2 DIABETES MELLITUS WITH ATHEROSCLEROSIS OF AORTA (HCC): ICD-10-CM

## 2018-05-14 DIAGNOSIS — Z00.00 MEDICARE ANNUAL WELLNESS VISIT, SUBSEQUENT: Primary | ICD-10-CM

## 2018-05-14 DIAGNOSIS — I71.02 DISSECTION OF ABDOMINAL AORTA (HCC): ICD-10-CM

## 2018-05-14 DIAGNOSIS — I71.4 ABDOMINAL AORTIC ANEURYSM (AAA) WITHOUT RUPTURE (HCC): ICD-10-CM

## 2018-05-14 DIAGNOSIS — C82.10 FOLLICULAR LYMPHOMA GRADE II, UNSPECIFIED BODY REGION (HCC): ICD-10-CM

## 2018-05-14 DIAGNOSIS — I70.0 AORTIC ATHEROSCLEROSIS (HCC): ICD-10-CM

## 2018-05-14 DIAGNOSIS — I48.0 PAROXYSMAL ATRIAL FIBRILLATION (HCC): ICD-10-CM

## 2018-05-14 DIAGNOSIS — I70.1 RENAL ARTERY STENOSIS (HCC): ICD-10-CM

## 2018-05-14 PROBLEM — S01.01XA SCALP LACERATION: Status: RESOLVED | Noted: 2018-01-19 | Resolved: 2018-05-14

## 2018-05-14 PROBLEM — S70.02XA HEMATOMA OF LEFT HIP: Status: RESOLVED | Noted: 2018-01-19 | Resolved: 2018-05-14

## 2018-05-14 PROBLEM — S70.02XA HEMATOMA OF LEFT HIP, INITIAL ENCOUNTER: Status: RESOLVED | Noted: 2018-01-19 | Resolved: 2018-05-14

## 2018-05-14 PROBLEM — S60.212A CONTUSION OF LEFT WRIST, INITIAL ENCOUNTER: Status: RESOLVED | Noted: 2018-01-19 | Resolved: 2018-05-14

## 2018-05-14 PROCEDURE — G0439 PPPS, SUBSEQ VISIT: HCPCS | Performed by: INTERNAL MEDICINE

## 2018-05-14 PROCEDURE — 96160 PT-FOCUSED HLTH RISK ASSMT: CPT | Performed by: INTERNAL MEDICINE

## 2018-05-14 NOTE — PROGRESS NOTES
HPI:   Roro Hernandez is a 68year old female who presents for a MA (Medicare Advantage) [facility] (Once per calendar year). Here for supervisit.  Has new diagnosis of lymphoma, getting PET this week to determine naga, seeing mago, notes some unus file in [address] [place] [address] Rd. The patient has this document but we do not have it in [facility], and patient is instructed to get our office a copy of it for scanning into Epic. She does have a POA but we do NOT have it on file in [address] [place] [address] Rd.    The patient has this document Results  Component Value Date   CHOLEST 145 03/09/2018   HDL 62 03/09/2018   LDL 57 03/09/2018   TRIG 128 03/09/2018          Last Chemistry Labs:     Lab Results  Component Value Date   AST 8 (L) 03/09/2018   ALT 12 (L) 03/09/2018   CA 9.1 03/09/2018   AL uncontrolled; Unspecified essential hypertension; and Visual impairment.     She  has a past surgical history that includes hysterectomy; other surgical history; other surgical history; other surgical history; other surgical history (04/12/07); colonoscopy to auscultation bilaterally, respirations unlabored   Heart:  Regular rate and rhythm, S1 and S2 normal, no murmur, rub, or gallop   Abdomen:   Soft, non-tender, bowel sounds active all four quadrants,  no masses, no organomegaly   Pelvic: Deferred   Extre Maria Del Carmen Campos MD, 5/14/2018     General Health     In the past six months, have you lost more than 10 pounds without trying?: 2 - No  Has your appetite been poor?: No  How does the patient maintain a good energy level?: Appropriate Exercise  How would you latesha 04/20/2016    No flowsheet data found. Pap and Pelvic      Pap: Every 3 yrs age 21-68 or Pap+HPV every 5 yrs age 33-67, age 72 and older at high risk There are no preventive care reminders to display for this patient.  Update [employer] if applic Date Value   02/18/2015 0.80     Creatinine (mg/dL)   Date Value   03/09/2018 0.80    No flowsheet data found. Drug Serum Conc  Annually No results found for: DIGOXIN, DIG, VALP No flowsheet data found.        Diabetes      HgbA1C  Annually HEMOGLOBIN

## 2018-05-14 NOTE — PATIENT INSTRUCTIONS
[facility] SCREENING SCHEDULE   Tests on this list are recommended by your physician but may not be covered, or covered at this frequency, by your insurer. Please check with your insurance carrier before scheduling to verify coverage.    ASH Visit    Abdominal aortic aneurysm screening (once between ages 73-68) IPPE only No results found for this or any previous visit.  Limited to patients who meet one of the following criteria:   • Men who are 73-68 years old and have smoked more than 100 ciga flowsheet data found. Screening Mammogram      Mammogram    Recommend Annually to at least age 76, and as needed after 76 There are no preventive care reminders to display for this patient.  Please get this Mammogram regularly   Immunizations      Influe of good information including definitions of the different types of Advance Directives.  It also has the State forms available on it's website for anyone to review and print using their home computer and printer. (the forms are also available in [address])  w

## 2018-05-17 ENCOUNTER — HOSPITAL ENCOUNTER (OUTPATIENT)
Dept: NUCLEAR MEDICINE | Facility: HOSPITAL | Age: 78
Discharge: HOME | End: 2018-05-17
Attending: INTERNAL MEDICINE
Payer: MEDICARE

## 2018-05-17 ENCOUNTER — TELEPHONE (OUTPATIENT)
Dept: HEMATOLOGY/ONCOLOGY | Facility: HOSPITAL | Age: 78
End: 2018-05-17

## 2018-05-17 DIAGNOSIS — C82.11 FOLLICULAR LYMPHOMA GRADE II OF LYMPH NODES OF HEAD, FACE, AND NECK (HCC): ICD-10-CM

## 2018-05-17 PROCEDURE — 82962 GLUCOSE BLOOD TEST: CPT

## 2018-05-17 PROCEDURE — 78815 PET IMAGE W/CT SKULL-THIGH: CPT | Performed by: INTERNAL MEDICINE

## 2018-05-17 NOTE — TELEPHONE ENCOUNTER
Called and left message about pet - No uptake except what we already knew about. Will meet on the 29th and may consider a marrow afterward.   Harry Bowie MD

## 2018-05-22 ENCOUNTER — TELEPHONE (OUTPATIENT)
Dept: INTERNAL MEDICINE CLINIC | Facility: CLINIC | Age: 78
End: 2018-05-22

## 2018-05-22 DIAGNOSIS — H35.30 MACULAR DEGENERATION, UNSPECIFIED LATERALITY, UNSPECIFIED TYPE: ICD-10-CM

## 2018-05-22 DIAGNOSIS — E11.59 TYPE 2 DIABETES MELLITUS WITH OTHER CIRCULATORY COMPLICATION, WITHOUT LONG-TERM CURRENT USE OF INSULIN (HCC): Primary | ICD-10-CM

## 2018-05-22 NOTE — TELEPHONE ENCOUNTER
Referral   Received:  Today   Message Contents   Lyly Montaño CNA  [facility] Clinical Staff Cc: [facility] Referral Pool   Phone Number: [phone]             .Reason for the order/referral:Referral   PCP: Dr. Jase Mak   Refer to Provider Dr. Cristal Marie

## 2018-05-29 ENCOUNTER — OFFICE VISIT (OUTPATIENT)
Dept: HEMATOLOGY/ONCOLOGY | Facility: HOSPITAL | Age: 78
End: 2018-05-29
Attending: INTERNAL MEDICINE
Payer: MEDICARE

## 2018-05-29 VITALS
WEIGHT: 206 LBS | HEART RATE: 66 BPM | BODY MASS INDEX: 29.49 KG/M2 | HEIGHT: 70 IN | RESPIRATION RATE: 18 BRPM | SYSTOLIC BLOOD PRESSURE: 160 MMHG | DIASTOLIC BLOOD PRESSURE: 81 MMHG | TEMPERATURE: 98 F | OXYGEN SATURATION: 94 %

## 2018-05-29 DIAGNOSIS — C85.91 LYMPHOMA OF LYMPH NODES OF NECK, UNSPECIFIED LYMPHOMA TYPE (HCC): ICD-10-CM

## 2018-05-29 DIAGNOSIS — C82.11 GRADE 2 FOLLICULAR LYMPHOMA OF LYMPH NODES OF NECK (HCC): Primary | ICD-10-CM

## 2018-05-29 PROCEDURE — 99214 OFFICE O/P EST MOD 30 MIN: CPT | Performed by: INTERNAL MEDICINE

## 2018-05-29 NOTE — PROGRESS NOTES
Patient is here for MD f/u for Lymphoma. Patient was diagnosed with central retinal vein occulusion. Received an injection in the left eye on Thursday. Eye pain has subsided. Patient had a PET on 5/17. Here to discuss results and POC.        Education Recor

## 2018-06-07 ENCOUNTER — OFFICE VISIT (OUTPATIENT)
Dept: HEMATOLOGY/ONCOLOGY | Age: 78
End: 2018-06-07
Attending: INTERNAL MEDICINE
Payer: MEDICARE

## 2018-06-07 VITALS
TEMPERATURE: 99 F | HEART RATE: 58 BPM | OXYGEN SATURATION: 94 % | RESPIRATION RATE: 18 BRPM | DIASTOLIC BLOOD PRESSURE: 63 MMHG | BODY MASS INDEX: 30 KG/M2 | WEIGHT: 206.63 LBS | SYSTOLIC BLOOD PRESSURE: 103 MMHG

## 2018-06-07 DIAGNOSIS — C85.18 B-CELL LYMPHOMA OF LYMPH NODES OF MULTIPLE REGIONS, UNSPECIFIED B-CELL LYMPHOMA TYPE (HCC): ICD-10-CM

## 2018-06-07 DIAGNOSIS — C85.90 LYMPHOMA (HCC): Primary | ICD-10-CM

## 2018-06-07 PROCEDURE — 38222 DX BONE MARROW BX & ASPIR: CPT | Performed by: INTERNAL MEDICINE

## 2018-06-07 PROCEDURE — 12020 TX SUPFC WND DEHSN SMPL CLSR: CPT | Performed by: INTERNAL MEDICINE

## 2018-06-07 NOTE — PROGRESS NOTES
Patient here for bone marrow biopsy and aspiration. Consent signed, universal checklist completed. Tolerated procedure well. Post procedure instructions given. Pressure dressing applied. Questions answered. Verbalized understanding.     Outpatient Onco

## 2018-06-07 NOTE — PROGRESS NOTES
Date of visit: 6/7/2018    Diagnosis:  Lymphoma (hcc)  (primary encounter diagnosis)    Narrative:  Bora Albert presents for a bone marrow biopsy and aspirate. I explained the procedure to the patient including the risk and benefits.   She expressed u

## 2018-06-08 ENCOUNTER — MED REC SCAN ONLY (OUTPATIENT)
Dept: INTERNAL MEDICINE CLINIC | Facility: CLINIC | Age: 78
End: 2018-06-08

## 2018-06-08 DIAGNOSIS — I10 ESSENTIAL HYPERTENSION: ICD-10-CM

## 2018-06-08 RX ORDER — METOPROLOL TARTRATE 50 MG/1
TABLET, FILM COATED ORAL
Qty: 90 TABLET | Refills: 0 | Status: SHIPPED | OUTPATIENT
Start: 2018-06-08 | End: 2018-10-22

## 2018-06-13 ENCOUNTER — TELEPHONE (OUTPATIENT)
Dept: HEMATOLOGY/ONCOLOGY | Facility: HOSPITAL | Age: 78
End: 2018-06-13

## 2018-06-13 NOTE — TELEPHONE ENCOUNTER
Patient is calling for bone marrow biopsy results. Called patient back no answer. Called son Meme Mcginnis, with patient's approval, and notified him no results as of yet.

## 2018-06-18 ENCOUNTER — TELEPHONE (OUTPATIENT)
Dept: HEMATOLOGY/ONCOLOGY | Facility: HOSPITAL | Age: 78
End: 2018-06-18

## 2018-06-18 NOTE — TELEPHONE ENCOUNTER
\"Let her know the marrow was normal.  She should come and see me to finalize plans in the next few weeks. \"    Called to patient. She will call back and schedule.

## 2018-07-01 ENCOUNTER — HOSPITAL ENCOUNTER (OUTPATIENT)
Dept: RADIATION ONCOLOGY | Age: 78
Discharge: HOME | End: 2018-07-01
Attending: RADIOLOGY
Payer: MEDICARE

## 2018-07-10 RX ORDER — PRAVASTATIN SODIUM 80 MG/1
TABLET ORAL
Qty: 90 TABLET | Refills: 1 | Status: SHIPPED | OUTPATIENT
Start: 2018-07-10 | End: 2018-12-28

## 2018-07-10 NOTE — PROGRESS NOTES
[facility]    PATIENT'S NAME: Rosetta Murray   ATTENDING PHYSICIAN: Sarahy Broderick M.D.    PATIENT ACCOUNT #: [de-identified] LOCATION: [address] [place] [address] RECORD #: [id number] YOB: 1940   DATE OF SERVICE: 05/29/2018       CANCER GONZALO Pink conjunctivae, anicteric sclerae. Pharynx is without lesions. LYMPHATICS:  No cervical, supraclavicular or axillary adenopathy. LUNGS:  Resonant to percussion and clear to auscultation. No wheezing, rales or rhonchi.   HEART:  Normal.  ABDOMEN:  No

## 2018-07-12 ENCOUNTER — OFFICE VISIT (OUTPATIENT)
Dept: HEMATOLOGY/ONCOLOGY | Age: 78
End: 2018-07-12
Attending: INTERNAL MEDICINE
Payer: MEDICARE

## 2018-07-12 VITALS
TEMPERATURE: 97 F | WEIGHT: 207.38 LBS | RESPIRATION RATE: 148 BRPM | HEART RATE: 65 BPM | BODY MASS INDEX: 30 KG/M2 | OXYGEN SATURATION: 93 % | SYSTOLIC BLOOD PRESSURE: 150 MMHG | DIASTOLIC BLOOD PRESSURE: 73 MMHG

## 2018-07-12 DIAGNOSIS — C82.11 GRADE 2 FOLLICULAR LYMPHOMA OF LYMPH NODES OF NECK (HCC): Primary | ICD-10-CM

## 2018-07-12 PROCEDURE — 99214 OFFICE O/P EST MOD 30 MIN: CPT | Performed by: INTERNAL MEDICINE

## 2018-07-12 NOTE — PROGRESS NOTES
Pt here for 1 month MD f/u. Pt notes having right sided neck itching. Energy level and appetite has been good. Denies pain. Pt has no further complaints.      Outpatient Oncology Care Plan  Problem list:  fatigue  knowledge deficit    Problems related to:

## 2018-07-13 ENCOUNTER — TELEPHONE (OUTPATIENT)
Dept: RADIATION ONCOLOGY | Facility: HOSPITAL | Age: 78
End: 2018-07-13

## 2018-07-14 PROBLEM — C82.11 GRADE 2 FOLLICULAR LYMPHOMA OF LYMPH NODES OF NECK (HCC): Status: ACTIVE | Noted: 2018-07-14

## 2018-07-16 NOTE — PROGRESS NOTES
[facility]    PATIENT'S NAME: Rosetta Murray   ATTENDING PHYSICIAN: Sarahy Broderick M.D.    PATIENT ACCOUNT #: [de-identified] LOCATION: [address] [place] [address] RECORD #: [id number] YOB: 1940   DATE OF SERVICE: 07/12/2018       CANCER GONZALO EXTREMITIES:  She has no clubbing, cyanosis, or edema. NEUROLOGIC:  She is intact. IMPRESSION:  The patient has a stage II follicular lymphoma. I explained to her that it is reasonable to consider giving her radiation in this area.   Although ther

## 2018-07-17 NOTE — PROGRESS NOTES
Nursing Consultation Note  Patient: Jim Kessler  YOB: 1940  Age: 68year old  Radiation Oncologist: Dr. Yonis Martinez  Referring Physician: Cirilo Noyola, Dr. Leann Ho, Dr. Margie Colon  Diagnosis: FOLLICULAR LYMPHOMA  Con of Systems   Constitutional: Negative. HENT: Negative. Eyes: Negative. Respiratory: Positive for shortness of breath. SOB with increased excertion   Cardiovascular: Negative. Gastrointestinal: Negative. Endocrine: Negative.     [address] [facility] , [phone], [phone]  Lanie   [place] [address]  Phone: [phone] Fax: [phone]      Past Medical History:   Diagnosis Date   • AAA (abdominal aortic aneurysm) ([facility] [address]    • Arrhythmia     a- Social History Main Topics   Smoking status: Never Smoker    Smokeless tobacco: Never Used    Alcohol use No    Drug use: No    Sexual activity: Not on file     Other Topics Concern    Seat Belt Yes     Social History Narrative   None on file       ECO

## 2018-07-18 ENCOUNTER — HOSPITAL ENCOUNTER (OUTPATIENT)
Dept: RADIATION ONCOLOGY | Age: 78
Discharge: HOME | End: 2018-07-18
Attending: RADIOLOGY
Payer: MEDICARE

## 2018-07-18 VITALS
BODY MASS INDEX: 29.8 KG/M2 | DIASTOLIC BLOOD PRESSURE: 79 MMHG | HEIGHT: 70 IN | TEMPERATURE: 98 F | RESPIRATION RATE: 16 BRPM | WEIGHT: 208.19 LBS | SYSTOLIC BLOOD PRESSURE: 151 MMHG | OXYGEN SATURATION: 98 % | HEART RATE: 58 BPM

## 2018-07-18 DIAGNOSIS — C82.11 GRADE 2 FOLLICULAR LYMPHOMA OF LYMPH NODES OF NECK (HCC): ICD-10-CM

## 2018-07-18 PROCEDURE — 99214 OFFICE O/P EST MOD 30 MIN: CPT

## 2018-07-18 NOTE — PATIENT INSTRUCTIONS
RADIATION INSTRUCTIONS:    - CT SIMULATION SCHEDULED FOR July 19 AT 1:30 PM AT [facility]    - IF YOU HAVE ANY QUESTIONS OR CONCERNS REGARDING RADIATION, PLEASE CALL  [phone].

## 2018-07-19 ENCOUNTER — HOSPITAL ENCOUNTER (OUTPATIENT)
Dept: RADIATION ONCOLOGY | Age: 78
Discharge: HOME | End: 2018-07-19
Attending: RADIOLOGY
Payer: MEDICARE

## 2018-07-19 PROCEDURE — 77334 RADIATION TREATMENT AID(S): CPT | Performed by: RADIOLOGY

## 2018-07-19 PROCEDURE — 77290 THER RAD SIMULAJ FIELD CPLX: CPT | Performed by: RADIOLOGY

## 2018-07-20 PROCEDURE — 77399 UNLISTED PX MED RADJ PHYSICS: CPT | Performed by: RADIOLOGY

## 2018-07-20 NOTE — CONSULTS
[facility]    PATIENT'S NAME: Eva Rangel   RADIATION ONCOLOGIST: Lilian Gaming M.D.    PATIENT ACCOUNT #: [de-identified] REREEricka Campoverde   SARAH   MEDICAL RECORD #: [id number] YOB: 1940   CONSULTATION DATE: 07/17/2018       RAD on 05/17/2018. This revealed increased uptake within the right cervical and supraclavicular lymph nodes with maximum SUV of 11.01. This is consistent with her known follicular lymphoma. No abnormal uptake was seen within the chest, abdomen, and pelvis. at [facility] in , EGD performed by Dr. Mckinley Laguna in 2016, cholecystectomy. PAST GYNECOLOGIC HISTORY:  She is  5, para 5. Menarche at age 15, menopause due to hysterectomy. Oral contraceptive use for 1 to 2 years. popliteal, inguinal, or epitrochlear adenopathy. ABDOMEN:  Soft, nondistended, nontender, without hepatosplenomegaly. She has normoactive bowel sounds. EXTREMITIES:  No clubbing, cyanosis, or edema.     ASSESSMENT:  The patient is a 66-year-old female wi

## 2018-07-24 PROCEDURE — 77295 3-D RADIOTHERAPY PLAN: CPT | Performed by: RADIOLOGY

## 2018-07-24 PROCEDURE — 77300 RADIATION THERAPY DOSE PLAN: CPT | Performed by: RADIOLOGY

## 2018-07-24 PROCEDURE — 77334 RADIATION TREATMENT AID(S): CPT | Performed by: RADIOLOGY

## 2018-07-27 NOTE — PROGRESS NOTES
[facility] Radiation Treatment Management Note 1-5    Patient:  Jim Kessler  Age:  68year old  Visit Diagnosis:    1.  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 Tanika Jacome

## 2018-07-30 ENCOUNTER — HOSPITAL ENCOUNTER (OUTPATIENT)
Dept: RADIATION ONCOLOGY | Age: 78
Discharge: HOME | End: 2018-07-30
Attending: RADIOLOGY
Payer: MEDICARE

## 2018-07-30 DIAGNOSIS — C82.11 GRADE 2 FOLLICULAR LYMPHOMA OF LYMPH NODES OF NECK (HCC): Primary | ICD-10-CM

## 2018-07-30 PROCEDURE — 77412 RADIATION TX DELIVERY LVL 3: CPT | Performed by: RADIOLOGY

## 2018-07-30 PROCEDURE — 77280 THER RAD SIMULAJ FIELD SMPL: CPT | Performed by: RADIOLOGY

## 2018-07-31 PROCEDURE — 77412 RADIATION TX DELIVERY LVL 3: CPT | Performed by: RADIOLOGY

## 2018-07-31 PROCEDURE — 77387 GUIDANCE FOR RADJ TX DLVR: CPT | Performed by: RADIOLOGY

## 2018-08-01 ENCOUNTER — HOSPITAL ENCOUNTER (OUTPATIENT)
Dept: RADIATION ONCOLOGY | Age: 78
Discharge: HOME | End: 2018-08-01
Attending: RADIOLOGY
Payer: MEDICARE

## 2018-08-01 PROCEDURE — 77387 GUIDANCE FOR RADJ TX DLVR: CPT | Performed by: RADIOLOGY

## 2018-08-01 PROCEDURE — 77412 RADIATION TX DELIVERY LVL 3: CPT | Performed by: RADIOLOGY

## 2018-08-02 ENCOUNTER — NURSE ONLY (OUTPATIENT)
Dept: HEMATOLOGY/ONCOLOGY | Age: 78
End: 2018-08-02
Attending: RADIOLOGY
Payer: MEDICARE

## 2018-08-02 PROCEDURE — 77412 RADIATION TX DELIVERY LVL 3: CPT | Performed by: RADIOLOGY

## 2018-08-02 PROCEDURE — 77387 GUIDANCE FOR RADJ TX DLVR: CPT | Performed by: RADIOLOGY

## 2018-08-03 PROCEDURE — 77336 RADIATION PHYSICS CONSULT: CPT | Performed by: RADIOLOGY

## 2018-08-03 PROCEDURE — 77412 RADIATION TX DELIVERY LVL 3: CPT | Performed by: RADIOLOGY

## 2018-08-03 PROCEDURE — 77387 GUIDANCE FOR RADJ TX DLVR: CPT | Performed by: RADIOLOGY

## 2018-08-06 ENCOUNTER — HOSPITAL ENCOUNTER (OUTPATIENT)
Dept: RADIATION ONCOLOGY | Age: 78
Discharge: HOME | End: 2018-08-06
Attending: RADIOLOGY
Payer: MEDICARE

## 2018-08-06 VITALS
OXYGEN SATURATION: 95 % | BODY MASS INDEX: 30 KG/M2 | WEIGHT: 208.88 LBS | HEART RATE: 58 BPM | DIASTOLIC BLOOD PRESSURE: 83 MMHG | TEMPERATURE: 97 F | RESPIRATION RATE: 16 BRPM | SYSTOLIC BLOOD PRESSURE: 153 MMHG

## 2018-08-06 DIAGNOSIS — C82.11 GRADE 2 FOLLICULAR LYMPHOMA OF LYMPH NODES OF NECK (HCC): Primary | ICD-10-CM

## 2018-08-06 PROCEDURE — 77412 RADIATION TX DELIVERY LVL 3: CPT | Performed by: RADIOLOGY

## 2018-08-06 PROCEDURE — 77387 GUIDANCE FOR RADJ TX DLVR: CPT | Performed by: RADIOLOGY

## 2018-08-06 NOTE — PROGRESS NOTES
[facility] Radiation Treatment Management Note 6-10    Patient:  Caitie Hammond  Age:  68year old  Visit Diagnosis:    1.  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 Kathie Cedillo

## 2018-08-07 PROCEDURE — 77387 GUIDANCE FOR RADJ TX DLVR: CPT | Performed by: RADIOLOGY

## 2018-08-07 PROCEDURE — 77412 RADIATION TX DELIVERY LVL 3: CPT | Performed by: RADIOLOGY

## 2018-08-08 PROCEDURE — 77387 GUIDANCE FOR RADJ TX DLVR: CPT | Performed by: RADIOLOGY

## 2018-08-08 PROCEDURE — 77412 RADIATION TX DELIVERY LVL 3: CPT | Performed by: RADIOLOGY

## 2018-08-08 RX ORDER — LISINOPRIL 10 MG/1
TABLET ORAL
Qty: 90 TABLET | Refills: 0 | Status: SHIPPED | OUTPATIENT
Start: 2018-08-08 | End: 2018-11-04

## 2018-08-09 PROCEDURE — 77412 RADIATION TX DELIVERY LVL 3: CPT | Performed by: RADIOLOGY

## 2018-08-09 PROCEDURE — 77387 GUIDANCE FOR RADJ TX DLVR: CPT | Performed by: RADIOLOGY

## 2018-08-10 PROCEDURE — 77412 RADIATION TX DELIVERY LVL 3: CPT | Performed by: RADIOLOGY

## 2018-08-10 PROCEDURE — 77387 GUIDANCE FOR RADJ TX DLVR: CPT | Performed by: RADIOLOGY

## 2018-08-10 PROCEDURE — 77336 RADIATION PHYSICS CONSULT: CPT | Performed by: RADIOLOGY

## 2018-08-13 ENCOUNTER — HOSPITAL ENCOUNTER (OUTPATIENT)
Dept: RADIATION ONCOLOGY | Age: 78
Discharge: HOME | End: 2018-08-13
Attending: RADIOLOGY
Payer: MEDICARE

## 2018-08-13 VITALS
HEART RATE: 60 BPM | RESPIRATION RATE: 17 BRPM | WEIGHT: 209.19 LBS | SYSTOLIC BLOOD PRESSURE: 151 MMHG | OXYGEN SATURATION: 98 % | BODY MASS INDEX: 30 KG/M2 | DIASTOLIC BLOOD PRESSURE: 84 MMHG

## 2018-08-13 DIAGNOSIS — C82.11 GRADE 2 FOLLICULAR LYMPHOMA OF LYMPH NODES OF NECK (HCC): Primary | ICD-10-CM

## 2018-08-13 PROCEDURE — 77387 GUIDANCE FOR RADJ TX DLVR: CPT | Performed by: RADIOLOGY

## 2018-08-13 PROCEDURE — 77412 RADIATION TX DELIVERY LVL 3: CPT | Performed by: RADIOLOGY

## 2018-08-13 NOTE — PROGRESS NOTES
[facility] Radiation Treatment Management Note 11-15    Patient:  Kimberly Seay  Age:  68year old  Visit Diagnosis:    1.  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 Carlito Siddiqui

## 2018-08-15 PROCEDURE — 77387 GUIDANCE FOR RADJ TX DLVR: CPT | Performed by: RADIOLOGY

## 2018-08-15 PROCEDURE — 77412 RADIATION TX DELIVERY LVL 3: CPT | Performed by: RADIOLOGY

## 2018-08-16 PROCEDURE — 77387 GUIDANCE FOR RADJ TX DLVR: CPT | Performed by: RADIOLOGY

## 2018-08-16 PROCEDURE — 77412 RADIATION TX DELIVERY LVL 3: CPT | Performed by: RADIOLOGY

## 2018-08-17 ENCOUNTER — TELEPHONE (OUTPATIENT)
Dept: INTERNAL MEDICINE CLINIC | Facility: CLINIC | Age: 78
End: 2018-08-17

## 2018-08-17 PROCEDURE — 77412 RADIATION TX DELIVERY LVL 3: CPT | Performed by: RADIOLOGY

## 2018-08-17 PROCEDURE — 77387 GUIDANCE FOR RADJ TX DLVR: CPT | Performed by: RADIOLOGY

## 2018-08-17 PROCEDURE — 77336 RADIATION PHYSICS CONSULT: CPT | Performed by: RADIOLOGY

## 2018-08-20 PROCEDURE — 77387 GUIDANCE FOR RADJ TX DLVR: CPT | Performed by: RADIOLOGY

## 2018-08-20 PROCEDURE — 77412 RADIATION TX DELIVERY LVL 3: CPT | Performed by: RADIOLOGY

## 2018-08-21 ENCOUNTER — MEDICAL CORRESPONDENCE (OUTPATIENT)
Dept: RADIATION ONCOLOGY | Age: 78
End: 2018-08-21

## 2018-08-21 NOTE — PROGRESS NOTES
RADIATION ONCOLOGY TREATMENT SUMMARY         [facility] Location: [facility]   Date of Birth   11/5/1940 Radiation Oncologist     Iqra Bolton MD, [facility]       RADIATION ONCOLOGY TREATMENT SUMMARY     PHYSICIANS: uptake within the right cervical and supraclavicular lymph nodes with maximum SUV of 11.01. This is consistent with her known follicular lymphoma. No abnormal uptake was seen within the chest, abdomen, and pelvis.   She has a known infrarenal abdominal ao expect with any further radiation therapy effects. FOLLOWUP AND PLAN: The patient will return for routine follow up in 2-3 months to assess healing.   The patient will contact the office sooner if there are any new symptoms, problems, or questions ngozi

## 2018-08-24 PROCEDURE — 77336 RADIATION PHYSICS CONSULT: CPT | Performed by: RADIOLOGY

## 2018-09-12 DIAGNOSIS — E11.59 TYPE 2 DIABETES MELLITUS WITH OTHER CIRCULATORY COMPLICATION, WITHOUT LONG-TERM CURRENT USE OF INSULIN (HCC): ICD-10-CM

## 2018-09-12 RX ORDER — GLYBURIDE 5 MG/1
TABLET ORAL
Qty: 90 TABLET | Refills: 0 | Status: SHIPPED | OUTPATIENT
Start: 2018-09-12 | End: 2018-12-16

## 2018-09-12 NOTE — TELEPHONE ENCOUNTER
Last Office Visit: 5-14-18 with AS for CPE  Last Rx Filled: 2-16-18 90 tabs with no refills  Last Labs: 3-9-18 urine micro albumin/lipid/cbc/cmp/hga1c  Future Appointment: none    Per protocol to provider

## 2018-09-12 NOTE — TELEPHONE ENCOUNTER
LOV: 05/14/2018  Future Visit: No appointment scheduled   Last Labs: 06/07/2018 CBC,  Last Rx: 02/19/2018  Per protocol sent to provider

## 2018-10-11 ENCOUNTER — OFFICE VISIT (OUTPATIENT)
Dept: HEMATOLOGY/ONCOLOGY | Age: 78
End: 2018-10-11
Attending: RADIOLOGY
Payer: MEDICARE

## 2018-10-11 VITALS
SYSTOLIC BLOOD PRESSURE: 158 MMHG | WEIGHT: 212.5 LBS | OXYGEN SATURATION: 96 % | BODY MASS INDEX: 30 KG/M2 | RESPIRATION RATE: 18 BRPM | HEART RATE: 62 BPM | DIASTOLIC BLOOD PRESSURE: 81 MMHG | TEMPERATURE: 97 F

## 2018-10-11 DIAGNOSIS — C82.11 GRADE 2 FOLLICULAR LYMPHOMA OF LYMPH NODES OF NECK (HCC): Primary | ICD-10-CM

## 2018-10-11 PROCEDURE — 99213 OFFICE O/P EST LOW 20 MIN: CPT | Performed by: INTERNAL MEDICINE

## 2018-10-11 NOTE — PROGRESS NOTES
Patient is here for MD f/u for Lymphoma. Denies any fevers, night sweats or chills. Feeling well. Appetite and energy level is good. Last PET was on 5/17/18. Patient completed radiation to the right side of neck in August. No complaints.      Outpatient Onc

## 2018-10-22 DIAGNOSIS — I10 ESSENTIAL HYPERTENSION: ICD-10-CM

## 2018-10-22 RX ORDER — METOPROLOL TARTRATE 50 MG/1
TABLET, FILM COATED ORAL
Qty: 90 TABLET | Refills: 0 | Status: SHIPPED | OUTPATIENT
Start: 2018-10-22 | End: 2018-12-28

## 2018-10-22 NOTE — PROGRESS NOTES
[facility]    PATIENT'S NAME: Lis Davis   ATTENDING PHYSICIAN: Hugh Johansen M.D.    PATIENT ACCOUNT #: [de-identified] LOCATION: [address] [place] [address] RECORD #: [id number] YOB: 1940   DATE OF SERVICE: 10/11/2018       CANCER C to auscultation, with no wheezing, rales, or rhonchi. HEART:  Normal.  ABDOMEN:  No hepatosplenomegaly or tenderness. EXTREMITIES:  She has no clubbing, cyanosis, or edema. NEUROLOGIC:  Intact. LABORATORY DATA:  She had a CBC and CMP drawn today.

## 2018-11-01 ENCOUNTER — TELEPHONE (OUTPATIENT)
Dept: INTERNAL MEDICINE CLINIC | Facility: CLINIC | Age: 78
End: 2018-11-01

## 2018-11-01 DIAGNOSIS — C82.11 GRADE 2 FOLLICULAR LYMPHOMA OF LYMPH NODES OF NECK (HCC): Primary | ICD-10-CM

## 2018-11-01 NOTE — TELEPHONE ENCOUNTER
referral   Received:  Today   Message Contents   Mitchell Gomes [facility] Clinical Staff Cc: [facility] Referral Pool   Phone Number: [phone]             .Reason for the order/referral: office visit   PCP:  Dr Dilshad Garcia   Refer to Provider (first

## 2018-11-05 RX ORDER — LISINOPRIL 10 MG/1
TABLET ORAL
Qty: 90 TABLET | Refills: 0 | Status: SHIPPED | OUTPATIENT
Start: 2018-11-05 | End: 2018-12-28

## 2018-12-06 ENCOUNTER — TELEPHONE (OUTPATIENT)
Dept: INTERNAL MEDICINE CLINIC | Facility: CLINIC | Age: 78
End: 2018-12-06

## 2018-12-06 DIAGNOSIS — D31.31 CHOROIDAL NEVUS OF RIGHT EYE: ICD-10-CM

## 2018-12-06 DIAGNOSIS — H34.8120 CENTRAL RETINAL VEIN OCCLUSION WITH MACULAR EDEMA OF LEFT EYE: ICD-10-CM

## 2018-12-06 DIAGNOSIS — H35.3131 EARLY STAGE NONEXUDATIVE AGE-RELATED MACULAR DEGENERATION OF BOTH EYES: ICD-10-CM

## 2018-12-06 DIAGNOSIS — E11.59 TYPE 2 DIABETES MELLITUS WITH OTHER CIRCULATORY COMPLICATION, WITHOUT LONG-TERM CURRENT USE OF INSULIN (HCC): Primary | ICD-10-CM

## 2018-12-06 NOTE — TELEPHONE ENCOUNTER
Referral needed. Pt is scheduled for a follow up visit on 12/10/18 with Dr Peng Page.      Dx codes   C19.7092   E68.8222   D31.31    E11.9     Please advise

## 2018-12-13 ENCOUNTER — APPOINTMENT (OUTPATIENT)
Dept: RADIATION ONCOLOGY | Age: 78
End: 2018-12-13
Attending: RADIOLOGY
Payer: MEDICARE

## 2018-12-16 DIAGNOSIS — E11.59 TYPE 2 DIABETES MELLITUS WITH OTHER CIRCULATORY COMPLICATION, WITHOUT LONG-TERM CURRENT USE OF INSULIN (HCC): ICD-10-CM

## 2018-12-17 ENCOUNTER — HOSPITAL ENCOUNTER (OUTPATIENT)
Dept: NUCLEAR MEDICINE | Facility: HOSPITAL | Age: 78
Discharge: HOME | End: 2018-12-17
Attending: INTERNAL MEDICINE
Payer: MEDICARE

## 2018-12-17 DIAGNOSIS — C82.11 GRADE 2 FOLLICULAR LYMPHOMA OF LYMPH NODES OF NECK (HCC): ICD-10-CM

## 2018-12-17 PROCEDURE — 82962 GLUCOSE BLOOD TEST: CPT

## 2018-12-17 PROCEDURE — 78815 PET IMAGE W/CT SKULL-THIGH: CPT | Performed by: INTERNAL MEDICINE

## 2018-12-17 RX ORDER — GLYBURIDE 5 MG/1
TABLET ORAL
Qty: 90 TABLET | Refills: 0 | Status: SHIPPED | OUTPATIENT
Start: 2018-12-17 | End: 2018-12-28

## 2018-12-28 ENCOUNTER — TELEPHONE (OUTPATIENT)
Dept: INTERNAL MEDICINE CLINIC | Facility: CLINIC | Age: 78
End: 2018-12-28

## 2018-12-28 ENCOUNTER — OFFICE VISIT (OUTPATIENT)
Dept: INTERNAL MEDICINE CLINIC | Facility: CLINIC | Age: 78
End: 2018-12-28

## 2018-12-28 VITALS
SYSTOLIC BLOOD PRESSURE: 138 MMHG | DIASTOLIC BLOOD PRESSURE: 84 MMHG | TEMPERATURE: 98 F | RESPIRATION RATE: 18 BRPM | WEIGHT: 212 LBS | HEIGHT: 70 IN | BODY MASS INDEX: 30.35 KG/M2 | HEART RATE: 64 BPM | OXYGEN SATURATION: 96 %

## 2018-12-28 DIAGNOSIS — R13.10 DYSPHAGIA, UNSPECIFIED TYPE: Primary | ICD-10-CM

## 2018-12-28 DIAGNOSIS — E11.59 TYPE 2 DIABETES MELLITUS WITH OTHER CIRCULATORY COMPLICATION, WITHOUT LONG-TERM CURRENT USE OF INSULIN (HCC): ICD-10-CM

## 2018-12-28 DIAGNOSIS — I10 ESSENTIAL HYPERTENSION: ICD-10-CM

## 2018-12-28 DIAGNOSIS — M46.96 INFLAMMATORY SPONDYLOPATHY OF LUMBAR REGION (HCC): ICD-10-CM

## 2018-12-28 PROCEDURE — G0008 ADMIN INFLUENZA VIRUS VAC: HCPCS | Performed by: INTERNAL MEDICINE

## 2018-12-28 PROCEDURE — 90653 IIV ADJUVANT VACCINE IM: CPT | Performed by: INTERNAL MEDICINE

## 2018-12-28 PROCEDURE — 99214 OFFICE O/P EST MOD 30 MIN: CPT | Performed by: INTERNAL MEDICINE

## 2018-12-28 RX ORDER — LISINOPRIL 10 MG/1
TABLET ORAL
Qty: 90 TABLET | Refills: 3 | Status: SHIPPED | OUTPATIENT
Start: 2018-12-28 | End: 2019-01-11

## 2018-12-28 RX ORDER — METOPROLOL TARTRATE 50 MG/1
TABLET, FILM COATED ORAL
Qty: 90 TABLET | Refills: 3 | Status: SHIPPED | OUTPATIENT
Start: 2018-12-28 | End: 2019-01-11

## 2018-12-28 RX ORDER — PRAVASTATIN SODIUM 80 MG/1
TABLET ORAL
Qty: 90 TABLET | Refills: 3 | Status: SHIPPED | OUTPATIENT
Start: 2018-12-28 | End: 2019-01-11

## 2018-12-28 RX ORDER — GLYBURIDE 5 MG/1
TABLET ORAL
Qty: 90 TABLET | Refills: 3 | Status: SHIPPED | OUTPATIENT
Start: 2018-12-28 | End: 2019-03-19

## 2018-12-28 NOTE — PROGRESS NOTES
Patient presents with: Follow - Up: Room 9  Difficulty Swallowing: restarted Nexium       HPI:  Here for f/u of dm2, htn, high chol, doing well taking meds no se[s. Pt notes some dysphagia with food and gerd at times.  Rare, not on ppi, hx of pem procedure [facility])     Renal artery stenosis ([facility] [address]     Achalasia     Arthritis, lumbar spine     H/O partial thyroidectomy     Coronary artery disease involving native coronary artery of native heart without angina pectoris     Anemia     Grade 2 follicular lymphoma of distal pulses. No murmur, rubs or gallops. Pulmonary/Chest: Effort normal and breath sounds normal. No respiratory distress. Abdominal: Soft. Bowel sounds are normal. Non tender, no masses, no organomegaly or hernias.   Musculoskeletal: No edema  Lympha

## 2019-01-09 ENCOUNTER — TELEPHONE (OUTPATIENT)
Dept: INTERNAL MEDICINE CLINIC | Facility: CLINIC | Age: 79
End: 2019-01-09

## 2019-01-09 DIAGNOSIS — R13.10 DYSPHAGIA, UNSPECIFIED TYPE: Primary | ICD-10-CM

## 2019-01-09 NOTE — TELEPHONE ENCOUNTER
Specialty: GI    Full Name of Specialist:Dr Leslie Saldivar    Date of Appointment:    Reason for the Appointment (be specific):  colonscopy and upper GI    Has the patient seen a provider in our office for stated problem?:  yes  Is this request for an out of

## 2019-01-09 NOTE — TELEPHONE ENCOUNTER
LOV-12/28/2018  Referral placed to dean ROSE per AS LOV.    A/P:   Type 2 diabetes mellitus with other circulatory complication, without long-term current use of insulin (hcc)  Essential hypertension  Inflammatory spondylopathy of lumbar region (hcc)  Dysphagi

## 2019-01-11 DIAGNOSIS — I10 ESSENTIAL HYPERTENSION: ICD-10-CM

## 2019-01-11 DIAGNOSIS — E11.59 TYPE 2 DIABETES MELLITUS WITH OTHER CIRCULATORY COMPLICATION, WITHOUT LONG-TERM CURRENT USE OF INSULIN (HCC): ICD-10-CM

## 2019-01-11 RX ORDER — LANCETS
1 EACH MISCELLANEOUS DAILY
Qty: 1 BOX | Refills: 0 | Status: SHIPPED | OUTPATIENT
Start: 2019-01-11 | End: 2019-05-16 | Stop reason: ALTCHOICE

## 2019-01-11 RX ORDER — METOPROLOL TARTRATE 50 MG/1
TABLET, FILM COATED ORAL
Qty: 90 TABLET | Refills: 3 | Status: SHIPPED | OUTPATIENT
Start: 2019-01-11 | End: 2019-08-22 | Stop reason: CLARIF

## 2019-01-11 RX ORDER — PRAVASTATIN SODIUM 80 MG/1
TABLET ORAL
Qty: 90 TABLET | Refills: 3 | Status: SHIPPED | OUTPATIENT
Start: 2019-01-11 | End: 2019-08-22 | Stop reason: CLARIF

## 2019-01-11 RX ORDER — ALCOHOL ANTISEPTIC PADS
PADS, MEDICATED (EA) TOPICAL
Qty: 100 EACH | Refills: 0 | Status: SHIPPED | OUTPATIENT
Start: 2019-01-11 | End: 2019-05-16 | Stop reason: ALTCHOICE

## 2019-01-11 RX ORDER — BLOOD-GLUCOSE METER
1 EACH MISCELLANEOUS 2 TIMES DAILY
Qty: 1 KIT | Refills: 0 | Status: SHIPPED | OUTPATIENT
Start: 2019-01-11 | End: 2019-05-16 | Stop reason: ALTCHOICE

## 2019-01-11 RX ORDER — BLOOD SUGAR DIAGNOSTIC
STRIP MISCELLANEOUS
Qty: 100 STRIP | Refills: 1 | Status: SHIPPED | OUTPATIENT
Start: 2019-01-11 | End: 2019-06-29

## 2019-01-11 RX ORDER — LISINOPRIL 10 MG/1
TABLET ORAL
Qty: 90 TABLET | Refills: 3 | Status: SHIPPED | OUTPATIENT
Start: 2019-01-11 | End: 2019-08-22 | Stop reason: CLARIF

## 2019-01-11 NOTE — TELEPHONE ENCOUNTER
Pharmacy is requesting glipizide which is not on medication list or history.  Patient is now using mail order     Protocol failed due to Last HgBA1C < 7.5,HgBA1C procedure resulted in past 6 months for metformin    Please advise,    LOV:12/28/18 AS  FOV:non

## 2019-01-21 ENCOUNTER — TELEPHONE (OUTPATIENT)
Dept: INTERNAL MEDICINE CLINIC | Facility: CLINIC | Age: 79
End: 2019-01-21

## 2019-01-21 DIAGNOSIS — E11.59 TYPE 2 DIABETES MELLITUS WITH OTHER CIRCULATORY COMPLICATION, WITHOUT LONG-TERM CURRENT USE OF INSULIN (HCC): ICD-10-CM

## 2019-01-21 DIAGNOSIS — H35.30 MACULAR DEGENERATION, UNSPECIFIED LATERALITY, UNSPECIFIED TYPE: Primary | ICD-10-CM

## 2019-01-21 NOTE — TELEPHONE ENCOUNTER
New referral for Dr Supa Girard MD for macular degeneration and Type 2 DM ordered with a STAT reference(ok per AS). Pt had appt with Dr Supa Girard MD today. LM for Ryann Lainez at [facility] Referrals [id number]. Hold for response.

## 2019-01-21 NOTE — TELEPHONE ENCOUNTER
[facility] that was [facility] was entered the old way through [facility]. She was told new referral needs to be placed and faxed to Dr Roopa Wills at [phone] asap.  This referral will need to be back dated since she was seen already and due to o

## 2019-01-21 NOTE — TELEPHONE ENCOUNTER
Christie Salas at [facility] Referral Dept got approval for Dr Dorian Ceja referral for 12 visits and faxed to Dr Kain Weathers office. Spoke to Hayes Bravo and Sophia Feng at Dr Kain Weathers office, faxed will not go through, gave referral information to Sophia Feng.   Sophia Feng asking that additional C

## 2019-01-22 NOTE — TELEPHONE ENCOUNTER
Per Elizabeth Vo @ [facility] Centralized Referrals Dr Olivia Morse office needs to call [facility] to see if the additional codes [phone], [id number], and  need to be added or need authorization.  Chely Cage @ Dr Olivia Morse office was notified of the above and he will call [facility]

## 2019-01-22 NOTE — TELEPHONE ENCOUNTER
Spoke to [facility] @ Dr Esteban Delatorre office. I told her we have been unable to fax the referral to them @ fax# [phone]. She confirmed that is their only fax. I gave her the referral number along with the start and exp.  Date and told her it was good for 15

## 2019-02-01 ENCOUNTER — APPOINTMENT (OUTPATIENT)
Dept: LAB | Age: 79
End: 2019-02-01
Attending: INTERNAL MEDICINE
Payer: MEDICARE

## 2019-02-01 DIAGNOSIS — E11.59 TYPE 2 DIABETES MELLITUS WITH OTHER CIRCULATORY COMPLICATION, WITHOUT LONG-TERM CURRENT USE OF INSULIN (HCC): ICD-10-CM

## 2019-02-01 LAB
ALBUMIN SERPL-MCNC: 3.2 G/DL (ref 3.1–4.5)
ALBUMIN/GLOB SERPL: 0.9 {RATIO} (ref 1–2)
ALP LIVER SERPL-CCNC: 78 U/L (ref 55–142)
ALT SERPL-CCNC: 12 U/L (ref 14–54)
ANION GAP SERPL CALC-SCNC: 4 MMOL/L (ref 0–18)
AST SERPL-CCNC: 14 U/L (ref 15–41)
BILIRUB SERPL-MCNC: 0.3 MG/DL (ref 0.1–2)
BUN BLD-MCNC: 14 MG/DL (ref 8–20)
BUN/CREAT SERPL: 17.7 (ref 10–20)
CALCIUM BLD-MCNC: 9.1 MG/DL (ref 8.3–10.3)
CHLORIDE SERPL-SCNC: 105 MMOL/L (ref 101–111)
CHOLEST SMN-MCNC: 168 MG/DL (ref ?–200)
CO2 SERPL-SCNC: 34 MMOL/L (ref 22–32)
CREAT BLD-MCNC: 0.79 MG/DL (ref 0.55–1.02)
CREAT UR-SCNC: 89.3 MG/DL
EST. AVERAGE GLUCOSE BLD GHB EST-MCNC: 143 MG/DL (ref 68–126)
GLOBULIN PLAS-MCNC: 3.5 G/DL (ref 2.8–4.4)
GLUCOSE BLD-MCNC: 141 MG/DL (ref 70–99)
HBA1C MFR BLD HPLC: 6.6 % (ref ?–5.7)
HDLC SERPL-MCNC: 63 MG/DL (ref 40–59)
LDLC SERPL CALC-MCNC: 79 MG/DL (ref ?–100)
M PROTEIN MFR SERPL ELPH: 6.7 G/DL (ref 6.4–8.2)
MICROALBUMIN UR-MCNC: 1.62 MG/DL
MICROALBUMIN/CREAT 24H UR-RTO: 18.1 UG/MG (ref ?–30)
NONHDLC SERPL-MCNC: 105 MG/DL (ref ?–130)
OSMOLALITY SERPL CALC.SUM OF ELEC: 299 MOSM/KG (ref 275–295)
POTASSIUM SERPL-SCNC: 4.9 MMOL/L (ref 3.6–5.1)
SODIUM SERPL-SCNC: 143 MMOL/L (ref 136–144)
TRIGL SERPL-MCNC: 131 MG/DL (ref 30–149)
VLDLC SERPL CALC-MCNC: 26 MG/DL (ref 0–30)

## 2019-02-01 PROCEDURE — 82570 ASSAY OF URINE CREATININE: CPT

## 2019-02-01 PROCEDURE — 80053 COMPREHEN METABOLIC PANEL: CPT

## 2019-02-01 PROCEDURE — 83036 HEMOGLOBIN GLYCOSYLATED A1C: CPT

## 2019-02-01 PROCEDURE — 82043 UR ALBUMIN QUANTITATIVE: CPT

## 2019-02-01 PROCEDURE — 80061 LIPID PANEL: CPT

## 2019-02-01 PROCEDURE — 36415 COLL VENOUS BLD VENIPUNCTURE: CPT

## 2019-03-19 DIAGNOSIS — E11.59 TYPE 2 DIABETES MELLITUS WITH OTHER CIRCULATORY COMPLICATION, WITHOUT LONG-TERM CURRENT USE OF INSULIN (HCC): ICD-10-CM

## 2019-03-19 RX ORDER — GLYBURIDE 5 MG/1
TABLET ORAL
Qty: 90 TABLET | Refills: 2 | Status: SHIPPED | OUTPATIENT
Start: 2019-03-19 | End: 2019-08-22 | Stop reason: CLARIF

## 2019-03-19 NOTE — TELEPHONE ENCOUNTER
Yes, patient does want to use the [facility] Fluid-1 mail order for this prescription.

## 2019-03-19 NOTE — TELEPHONE ENCOUNTER
Received a refill request from [facility] mail order pharmacy for glyburide 5mg. It looks like she was printed a script on 12-28-18 90 tabs with 3 refills.  Wanting to know if she is wanting to use [employer] for her refills

## 2019-03-25 ENCOUNTER — OFFICE VISIT (OUTPATIENT)
Dept: INTERNAL MEDICINE CLINIC | Facility: CLINIC | Age: 79
End: 2019-03-25
Payer: MEDICARE

## 2019-03-25 VITALS
BODY MASS INDEX: 30.96 KG/M2 | WEIGHT: 209 LBS | HEART RATE: 64 BPM | DIASTOLIC BLOOD PRESSURE: 82 MMHG | SYSTOLIC BLOOD PRESSURE: 120 MMHG | RESPIRATION RATE: 16 BRPM | HEIGHT: 69 IN

## 2019-03-25 DIAGNOSIS — R13.10 DYSPHAGIA, UNSPECIFIED TYPE: ICD-10-CM

## 2019-03-25 DIAGNOSIS — I25.10 CORONARY ARTERY DISEASE INVOLVING NATIVE CORONARY ARTERY OF NATIVE HEART WITHOUT ANGINA PECTORIS: Primary | ICD-10-CM

## 2019-03-25 DIAGNOSIS — G89.29 CHRONIC BILATERAL LOW BACK PAIN WITHOUT SCIATICA: ICD-10-CM

## 2019-03-25 DIAGNOSIS — I70.0 TYPE 2 DIABETES MELLITUS WITH ATHEROSCLEROSIS OF AORTA (HCC): ICD-10-CM

## 2019-03-25 DIAGNOSIS — I10 BENIGN ESSENTIAL HTN: ICD-10-CM

## 2019-03-25 DIAGNOSIS — Z91.81 AT RISK FOR FALLING: ICD-10-CM

## 2019-03-25 DIAGNOSIS — Z00.00 ENCOUNTER FOR ANNUAL HEALTH EXAMINATION: ICD-10-CM

## 2019-03-25 DIAGNOSIS — M47.816 ARTHRITIS, LUMBAR SPINE: ICD-10-CM

## 2019-03-25 DIAGNOSIS — I71.02 DISSECTION OF ABDOMINAL AORTA (HCC): ICD-10-CM

## 2019-03-25 DIAGNOSIS — K22.0 ACHALASIA: ICD-10-CM

## 2019-03-25 DIAGNOSIS — I70.1 RENAL ARTERY STENOSIS (HCC): ICD-10-CM

## 2019-03-25 DIAGNOSIS — K21.9 GASTROESOPHAGEAL REFLUX DISEASE, ESOPHAGITIS PRESENCE NOT SPECIFIED: ICD-10-CM

## 2019-03-25 DIAGNOSIS — M46.96 INFLAMMATORY SPONDYLOPATHY OF LUMBAR REGION (HCC): ICD-10-CM

## 2019-03-25 DIAGNOSIS — I48.0 PAROXYSMAL ATRIAL FIBRILLATION (HCC): ICD-10-CM

## 2019-03-25 DIAGNOSIS — E89.0 H/O PARTIAL THYROIDECTOMY: ICD-10-CM

## 2019-03-25 DIAGNOSIS — E04.9 ENLARGED THYROID: ICD-10-CM

## 2019-03-25 DIAGNOSIS — E11.59 TYPE 2 DIABETES MELLITUS WITH OTHER CIRCULATORY COMPLICATION, WITHOUT LONG-TERM CURRENT USE OF INSULIN (HCC): ICD-10-CM

## 2019-03-25 DIAGNOSIS — Z96.651 HISTORY OF TOTAL RIGHT KNEE REPLACEMENT: ICD-10-CM

## 2019-03-25 DIAGNOSIS — E78.00 PURE HYPERCHOLESTEROLEMIA: ICD-10-CM

## 2019-03-25 DIAGNOSIS — I71.4 ABDOMINAL AORTIC ANEURYSM (AAA) WITHOUT RUPTURE (HCC): ICD-10-CM

## 2019-03-25 DIAGNOSIS — K22.4 CORKSCREW ESOPHAGUS: ICD-10-CM

## 2019-03-25 DIAGNOSIS — D62 ANEMIA DUE TO ACUTE BLOOD LOSS: ICD-10-CM

## 2019-03-25 DIAGNOSIS — I70.0 AORTIC ATHEROSCLEROSIS (HCC): ICD-10-CM

## 2019-03-25 DIAGNOSIS — C82.11 GRADE 2 FOLLICULAR LYMPHOMA OF LYMPH NODES OF NECK (HCC): ICD-10-CM

## 2019-03-25 DIAGNOSIS — H35.30 MACULAR DEGENERATION, UNSPECIFIED LATERALITY, UNSPECIFIED TYPE: ICD-10-CM

## 2019-03-25 DIAGNOSIS — M54.50 CHRONIC BILATERAL LOW BACK PAIN WITHOUT SCIATICA: ICD-10-CM

## 2019-03-25 DIAGNOSIS — M20.40 HAMMER TOE, ACQUIRED: ICD-10-CM

## 2019-03-25 DIAGNOSIS — E11.51 TYPE 2 DIABETES MELLITUS WITH ATHEROSCLEROSIS OF AORTA (HCC): ICD-10-CM

## 2019-03-25 DIAGNOSIS — E55.9 VITAMIN D DEFICIENCY: ICD-10-CM

## 2019-03-25 DIAGNOSIS — E04.1 THYROID NODULE: ICD-10-CM

## 2019-03-25 PROCEDURE — 96160 PT-FOCUSED HLTH RISK ASSMT: CPT | Performed by: INTERNAL MEDICINE

## 2019-03-25 PROCEDURE — 99397 PER PM REEVAL EST PAT 65+ YR: CPT | Performed by: INTERNAL MEDICINE

## 2019-03-25 PROCEDURE — G0439 PPPS, SUBSEQ VISIT: HCPCS | Performed by: INTERNAL MEDICINE

## 2019-03-25 NOTE — PROGRESS NOTES
HPI:   Bruce Madera is a 66year old female who presents for a MA (Medicare Advantage) [facility] (Once per calendar year). Here for supervisit. Stable chronic issues.    Annual Physical due on 05/14/2019        Fall/Risk Assessment   She has been (Physical Therapy)  Dhara Jo MD (Radiation Oncology)    Patient Active Problem List:     CAD (coronary artery disease)     Hyperlipemia     GERD (gastroesophageal reflux disease)     Dysphagia     Corkscrew esophagus     Chronic low back pain MD:  glyBURIDE 5 MG Oral Tab TAKE 1/2 TABLET BY MOUTH TWO TIMES A DAY WITH MEALS   Blood Glucose Monitoring Suppl (ACCU-CHEK WAYNE PLUS) w/Device Does not apply Kit 1 Device by Other route 2 (two) times daily.    Glucose Blood (ACCU-CHEK WAYNE PLUS) In Vitr colonoscopy; [id number], screening (); cholecystectomy; hernia surgery; knee replacement surgery (Right); ; tubal ligation; cataract (Bilateral); other; thyroidectomy; esophagus surg procedure unlisted (2017); endovas repair, infrarenl abdom aort General Appearance:  Alert, cooperative, no distress, appears stated age   Head:  Normocephalic, without obvious abnormality, atraumatic   Eyes:  PERRL, conjunctiva/corneas clear, EOM's intact both eyes   Ears:  Normal TM's and external ear canals, bot meds  Paroxysmal atrial fibrillation (HCC)  stable continue meds  Dissection of abdominal aorta (HCC)  Stable continue meds  Inflammatory spondylopathy of lumbar region ([facility] [address]  Stable continue meds  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  Update Health Maintenance if applicable    Chlamydia  Annually if high risk No results found for: [facility] 02/01/2019 0.79    No flowsheet data found. Drug Serum Conc  Annually No results found for: DIGOXIN, DIG, VALP No flowsheet data found.        Diabetes      HgbA1C  Annually HEMOGLOBIN A1c (% of total Hgb)   Date Value   02/18/2015 5.8 (H)     HgbA1C (

## 2019-03-25 NOTE — PATIENT INSTRUCTIONS
[facility] SCREENING SCHEDULE   Tests on this list are recommended by your physician but may not be covered, or covered at this frequency, by your insurer. Please check with your insurance carrier before scheduling to verify coverage.    ASH screening (once between ages 73-68) IPPE only No results found for this or any previous visit.  Limited to patients who meet one of the following criteria:   • Men who are 73-68 years old and have smoked more than 100 cigarettes in their lifetime   • Anyone Mammogram      Mammogram    Recommend Annually to at least age 76, and as needed after 76 There are no preventive care reminders to display for this patient.  Please get this Mammogram regularly   Immunizations      Influenza  Covered Annually Orders placed definitions of the different types of Advance Directives. It also has the State forms available on it's website for anyone to review and print using their home computer and printer. (the forms are also available in [place]  www. putitinwriting. org  This li

## 2019-04-04 ENCOUNTER — TELEPHONE (OUTPATIENT)
Dept: INTERNAL MEDICINE CLINIC | Facility: CLINIC | Age: 79
End: 2019-04-04

## 2019-04-04 DIAGNOSIS — H35.30 MACULAR DEGENERATION, UNSPECIFIED LATERALITY, UNSPECIFIED TYPE: Primary | ICD-10-CM

## 2019-04-04 NOTE — TELEPHONE ENCOUNTER
Specialty: Opthamology     Full Name of Specialist: Dr. Jovon Galo     Date of Appointment: 5-6-19    Reason for the Appointment (be specific):  Annual check up, Macular degeneration     Has the patient seen a provider in our office for stated problem?:

## 2019-05-09 ENCOUNTER — HOSPITAL ENCOUNTER (OUTPATIENT)
Facility: HOSPITAL | Age: 79
Setting detail: HOSPITAL OUTPATIENT SURGERY
Discharge: HOME | End: 2019-05-09
Attending: INTERNAL MEDICINE | Admitting: INTERNAL MEDICINE
Payer: MEDICARE

## 2019-05-09 ENCOUNTER — ANESTHESIA (OUTPATIENT)
Dept: ENDOSCOPY | Facility: HOSPITAL | Age: 79
End: 2019-05-09
Payer: MEDICARE

## 2019-05-09 ENCOUNTER — ANESTHESIA EVENT (OUTPATIENT)
Dept: ENDOSCOPY | Facility: HOSPITAL | Age: 79
End: 2019-05-09
Payer: MEDICARE

## 2019-05-09 VITALS
BODY MASS INDEX: 30.51 KG/M2 | SYSTOLIC BLOOD PRESSURE: 169 MMHG | WEIGHT: 206 LBS | TEMPERATURE: 98 F | RESPIRATION RATE: 18 BRPM | DIASTOLIC BLOOD PRESSURE: 68 MMHG | HEIGHT: 69 IN | OXYGEN SATURATION: 98 % | HEART RATE: 59 BPM

## 2019-05-09 DIAGNOSIS — K22.0 ACHALASIA: ICD-10-CM

## 2019-05-09 DIAGNOSIS — Z86.010 PERSONAL HISTORY OF COLONIC POLYPS: ICD-10-CM

## 2019-05-09 DIAGNOSIS — R12 CHRONIC HEARTBURN: ICD-10-CM

## 2019-05-09 PROCEDURE — 0DBL8ZX EXCISION OF TRANSVERSE COLON, VIA NATURAL OR ARTIFICIAL OPENING ENDOSCOPIC, DIAGNOSTIC: ICD-10-PCS | Performed by: INTERNAL MEDICINE

## 2019-05-09 PROCEDURE — 88305 TISSUE EXAM BY PATHOLOGIST: CPT | Performed by: INTERNAL MEDICINE

## 2019-05-09 PROCEDURE — 82962 GLUCOSE BLOOD TEST: CPT

## 2019-05-09 PROCEDURE — 0DBN8ZX EXCISION OF SIGMOID COLON, VIA NATURAL OR ARTIFICIAL OPENING ENDOSCOPIC, DIAGNOSTIC: ICD-10-PCS | Performed by: INTERNAL MEDICINE

## 2019-05-09 PROCEDURE — 0DBH8ZX EXCISION OF CECUM, VIA NATURAL OR ARTIFICIAL OPENING ENDOSCOPIC, DIAGNOSTIC: ICD-10-PCS | Performed by: INTERNAL MEDICINE

## 2019-05-09 PROCEDURE — 0DBK8ZX EXCISION OF ASCENDING COLON, VIA NATURAL OR ARTIFICIAL OPENING ENDOSCOPIC, DIAGNOSTIC: ICD-10-PCS | Performed by: INTERNAL MEDICINE

## 2019-05-09 PROCEDURE — 0DB98ZX EXCISION OF DUODENUM, VIA NATURAL OR ARTIFICIAL OPENING ENDOSCOPIC, DIAGNOSTIC: ICD-10-PCS | Performed by: INTERNAL MEDICINE

## 2019-05-09 PROCEDURE — 0DB68ZX EXCISION OF STOMACH, VIA NATURAL OR ARTIFICIAL OPENING ENDOSCOPIC, DIAGNOSTIC: ICD-10-PCS | Performed by: INTERNAL MEDICINE

## 2019-05-09 RX ORDER — SODIUM CHLORIDE, SODIUM LACTATE, POTASSIUM CHLORIDE, CALCIUM CHLORIDE 600; 310; 30; 20 MG/100ML; MG/100ML; MG/100ML; MG/100ML
INJECTION, SOLUTION INTRAVENOUS CONTINUOUS
Status: DISCONTINUED | OUTPATIENT
Start: 2019-05-09 | End: 2019-05-09

## 2019-05-09 RX ORDER — SODIUM CHLORIDE, SODIUM LACTATE, POTASSIUM CHLORIDE, CALCIUM CHLORIDE 600; 310; 30; 20 MG/100ML; MG/100ML; MG/100ML; MG/100ML
INJECTION, SOLUTION INTRAVENOUS CONTINUOUS
Status: CANCELLED | OUTPATIENT
Start: 2019-05-09

## 2019-05-09 RX ORDER — DEXTROSE MONOHYDRATE 25 G/50ML
50 INJECTION, SOLUTION INTRAVENOUS
Status: CANCELLED | OUTPATIENT
Start: 2019-05-09

## 2019-05-09 RX ORDER — DEXTROSE MONOHYDRATE 25 G/50ML
50 INJECTION, SOLUTION INTRAVENOUS
Status: DISCONTINUED | OUTPATIENT
Start: 2019-05-09 | End: 2019-05-09

## 2019-05-09 RX ORDER — NALOXONE HYDROCHLORIDE 0.4 MG/ML
80 INJECTION, SOLUTION INTRAMUSCULAR; INTRAVENOUS; SUBCUTANEOUS AS NEEDED
Status: CANCELLED | OUTPATIENT
Start: 2019-05-09 | End: 2019-05-09

## 2019-05-09 NOTE — H&P
[place] Oost[address]el [address] Patient Status:  Hospital Outpatient Surgery    1940 MRN [id number]   [facility] ENDOSCOPY Attending Sayda Kate MD   Hosp Day # 0 PCP Andrew Whitley MD     History umbilica'   • HYSTERECTOMY      partial, still has ovaries   • KNEE REPLACEMENT SURGERY Right    • [id number], SCREENING     •      • OTHER      right hammer toe surgery   • OTHER SURGICAL HISTORY      Hernia 2010- ventral or umbilical   • OTHER SARA (PRESERVISION AREDS) Oral Cap, Take by mouth daily. , Disp: , Rfl:   •  aspirin 81 MG Oral Tab, Take 81 mg by mouth daily. , Disp: , Rfl:   •  Cholecalciferol (VITAMIN D) 1000 UNITS Oral Tab, Take 1,000 Units by mouth daily.   , Disp: , Rfl:   •  Blood Glucos sleep apnea. Genitourinary: Denies kidney stones, painful/difficult urination, frequent urinary infections, frequent urination, blood in urine, incontinence, kidney failure, prostate problems. Endocrine: Denies thyroid disease, denies diabetes.   Female c Colonoscopy  Risk/Benefits: The risks and benefits of the procedure were discussed in detail with the patient, including the risk of bleeding, infection, pain, sedation, and perforation.   The patient was also informed that polyps and tumors can be missed

## 2019-05-09 NOTE — OPERATIVE REPORT
Colon operative report  Patient Name: Harshil Ferrell  Procedure: Colonoscopy with snare polypectomy  Date of procedure: 5/9/2019    Indication: Personal history of adenomatous colon polyps  Date of last colonoscopy: 6/6/2013  Attending: Melissa Sanchez position, and the rectal bulb was thus visualized. The endoscope was righted, and air was suctioned from the colon to the best of my ability, as it was during withdrawn from the colon. The endoscope was then removed from the patient.   The patient tolerat

## 2019-05-09 NOTE — ANESTHESIA PREPROCEDURE EVALUATION
PRE-OP EVALUATION    Patient Name: Nguyen Ngo    Pre-op Diagnosis: Chronic heartburn [R12]  Personal history of colonic polyps [Z86.010]  Achalasia [K22.0]    Procedure(s):  COLONOSCOPY WITH COLD SNARE POLYPECTOMY, CLIP APPLICATION  ESOPHAGOGASTRODUO Cardiovascular                  (+) hypertension   (+) hyperlipidemia  (+) CAD  (+) past MI           (+) dysrhythmias and atrial fibrillation                  Endo/Other      (+) diabetes                            Pulmonary                           Ne management plan discussed with surgeon and patient.       Plan/risks discussed with: patient                Present on Admission:  **None**

## 2019-05-09 NOTE — OPERATIVE REPORT
EGD operative report  Patient Name: Kelly Guzman  Procedure: Esophagogastroduodenoscopy with cold forceps biopsy   Date of procedure: 5/9/2019    Indication: Heartburn, achalasia  Attending: Alex Rizvi M.D.   Consent:  The risks, benefits, and al and angularis were normal, without masses, polyps, ulcers, erosions, diverticula, or varices. There was some subtle granularity in the fundus and cardia. Non-specific patchy erythema was appreciated. Upon retroflexion, the EGJxn is patent.   Cold forceps

## 2019-05-15 ENCOUNTER — TELEPHONE (OUTPATIENT)
Dept: INTERNAL MEDICINE CLINIC | Facility: CLINIC | Age: 79
End: 2019-05-15

## 2019-05-16 ENCOUNTER — APPOINTMENT (OUTPATIENT)
Dept: CT IMAGING | Age: 79
End: 2019-05-16
Attending: PHYSICIAN ASSISTANT
Payer: MEDICARE

## 2019-05-16 ENCOUNTER — HOSPITAL ENCOUNTER (OUTPATIENT)
Age: 79
Discharge: HOME | End: 2019-05-16
Payer: MEDICARE

## 2019-05-16 VITALS
RESPIRATION RATE: 18 BRPM | OXYGEN SATURATION: 95 % | DIASTOLIC BLOOD PRESSURE: 74 MMHG | SYSTOLIC BLOOD PRESSURE: 182 MMHG | HEART RATE: 74 BPM | TEMPERATURE: 98 F

## 2019-05-16 DIAGNOSIS — S02.2XXB OPEN FRACTURE OF NASAL BONE, INITIAL ENCOUNTER: Primary | ICD-10-CM

## 2019-05-16 DIAGNOSIS — I10 ELEVATED BLOOD PRESSURE READING WITH DIAGNOSIS OF HYPERTENSION: ICD-10-CM

## 2019-05-16 DIAGNOSIS — S13.9XXA CERVICAL SPRAIN, INITIAL ENCOUNTER: ICD-10-CM

## 2019-05-16 DIAGNOSIS — S09.90XA INJURY OF HEAD, INITIAL ENCOUNTER: ICD-10-CM

## 2019-05-16 PROCEDURE — 99214 OFFICE O/P EST MOD 30 MIN: CPT

## 2019-05-16 PROCEDURE — 76377 3D RENDER W/INTRP POSTPROCES: CPT

## 2019-05-16 PROCEDURE — 70450 CT HEAD/BRAIN W/O DYE: CPT | Performed by: PHYSICIAN ASSISTANT

## 2019-05-16 PROCEDURE — 72125 CT NECK SPINE W/O DYE: CPT | Performed by: PHYSICIAN ASSISTANT

## 2019-05-16 PROCEDURE — 99215 OFFICE O/P EST HI 40 MIN: CPT

## 2019-05-16 PROCEDURE — 21310 HC CLOSED TX NASAL BONE FX WITHOUT MANIPULATION: CPT

## 2019-05-16 PROCEDURE — 12011 RPR F/E/E/N/L/M 2.5 CM/<: CPT

## 2019-05-16 PROCEDURE — 70486 CT MAXILLOFACIAL W/O DYE: CPT | Performed by: PHYSICIAN ASSISTANT

## 2019-05-16 PROCEDURE — 21310 PR CLOSED TX NASAL BONE FX WITHOUT MANIPULATION: CPT

## 2019-05-16 RX ORDER — ACETAMINOPHEN 500 MG
1000 TABLET ORAL ONCE
Status: COMPLETED | OUTPATIENT
Start: 2019-05-16 | End: 2019-05-16

## 2019-05-16 RX ORDER — AMOXICILLIN AND CLAVULANATE POTASSIUM 875; 125 MG/1; MG/1
1 TABLET, FILM COATED ORAL 2 TIMES DAILY
Qty: 20 TABLET | Refills: 0 | Status: SHIPPED | OUTPATIENT
Start: 2019-05-16 | End: 2019-05-26

## 2019-05-17 ENCOUNTER — TELEPHONE (OUTPATIENT)
Dept: INTERNAL MEDICINE CLINIC | Facility: CLINIC | Age: 79
End: 2019-05-17

## 2019-05-17 DIAGNOSIS — Z91.81 STATUS POST FALL: Primary | ICD-10-CM

## 2019-05-17 DIAGNOSIS — S02.2XXD CLOSED FRACTURE OF NASAL BONE WITH ROUTINE HEALING, SUBSEQUENT ENCOUNTER: ICD-10-CM

## 2019-05-17 NOTE — ED PROVIDER NOTES
Patient Seen in: [facility]    History   Patient presents with:  Fall (musculoskeletal, neurologic)    Stated Complaint: nose lac    HPI    26-year-old female here with complaint of a facial head and neck contusion in tandem wit INFRARENL ABDOM AORTIC ANEURYSM/DISSECT     • ESOPHAGEAL MANOMETRY N/A 11/18/2016    Performed by Cuauhtemoc Larson MD at [facility] ENDOSCOPY   • ESOPHAGOGASTRODUODENOSCOPY (EGD) N/A 5/9/2019    Performed by Cuauhtemoc Larson MD at [facility] ENDOSCOPY   • [address] [facility] Pulse 73   Resp 16   Temp 98.2 °F (36.8 °C)   Temp src Temporal   SpO2 95 %   O2 Device None (Room air)       Current:BP (!) 181/84   Pulse 73   Temp 98.2 °F (36.8 °C) (Temporal)   Resp 16   SpO2 95%         Physical Exam   Constitutional: She is oriente Radiology) NRDR ([address]) which includes the Dose Index Registry.   PATIENT STATED HISTORY: (As transcribed by Technologist)  Patient states she fell on her face and frontal head prior to arrival. Pain around frontal head, neck and maxillary sinus. Left-sided middle turbinate isauro bullosa. Small amount of mucosal disease within the left ostium and infundibulum of the maxillary sinus. NASAL FOSSA:  Nondepressed fracture right and left nasal ala.   Fracture of the anterior nasal sep multiple level mild disc narrowing C4-5 thru C6-7 most severe at C6-7. There anterior and posterior endplate osteophytes noted. Small central C3-4 and to a lesser extent C5-6 disc protrusion noted. No canal stenosis. No nerve root deformity.       CONCL [facility]  [phone]    Schedule an appointment as soon as possible for a visit       Christian Merrill MD  [address] [facility] Filipe Ibanez [address] [place] [address] Emiliano [address]          Dominic Martin MD  [facility]

## 2019-05-17 NOTE — ED INITIAL ASSESSMENT (HPI)
Tripped on furniture leg and fell face forward to ground. Here with laceration of nose bridge. No loss of consciousness.

## 2019-05-17 NOTE — TELEPHONE ENCOUNTER
Pt called stating last night she fell on her face and went to [facility]-they did xrays and she does have fx nose and sutures in her face-she stated today her L nostril is still bleeding and her knuckle on her hand is very swollen and painful-they did

## 2019-05-17 NOTE — TELEPHONE ENCOUNTER
Patient called back indicating her son who is an MD indicates she does not need to return to [facility], just needs a referral to see Dr Krystle Britt ENT. Spoke to TB, ok to put in referral for Dr Dalton Never for 3 visits.   Ravinder Whitfield notified pt to schedule with Dr Carlee Ferreira

## 2019-05-18 ENCOUNTER — HOSPITAL ENCOUNTER (OUTPATIENT)
Age: 79
Discharge: HOME | End: 2019-05-18
Payer: MEDICARE

## 2019-05-18 ENCOUNTER — HOSPITAL ENCOUNTER (EMERGENCY)
Facility: HOSPITAL | Age: 79
Discharge: HOME | End: 2019-05-18
Attending: EMERGENCY MEDICINE
Payer: MEDICARE

## 2019-05-18 VITALS
HEIGHT: 70 IN | HEART RATE: 66 BPM | RESPIRATION RATE: 16 BRPM | WEIGHT: 200 LBS | BODY MASS INDEX: 28.63 KG/M2 | DIASTOLIC BLOOD PRESSURE: 94 MMHG | TEMPERATURE: 98 F | SYSTOLIC BLOOD PRESSURE: 192 MMHG

## 2019-05-18 VITALS
HEART RATE: 72 BPM | OXYGEN SATURATION: 97 % | RESPIRATION RATE: 16 BRPM | DIASTOLIC BLOOD PRESSURE: 75 MMHG | SYSTOLIC BLOOD PRESSURE: 158 MMHG

## 2019-05-18 DIAGNOSIS — I10 HYPERTENSION, UNSPECIFIED TYPE: ICD-10-CM

## 2019-05-18 DIAGNOSIS — R04.0 EPISTAXIS: Primary | ICD-10-CM

## 2019-05-18 PROCEDURE — 30903 CONTROL OF NOSEBLEED: CPT

## 2019-05-18 PROCEDURE — 85610 PROTHROMBIN TIME: CPT | Performed by: EMERGENCY MEDICINE

## 2019-05-18 PROCEDURE — 99214 OFFICE O/P EST MOD 30 MIN: CPT

## 2019-05-18 PROCEDURE — 99283 EMERGENCY DEPT VISIT LOW MDM: CPT

## 2019-05-18 PROCEDURE — 99284 EMERGENCY DEPT VISIT MOD MDM: CPT

## 2019-05-18 PROCEDURE — 85025 COMPLETE CBC W/AUTO DIFF WBC: CPT | Performed by: EMERGENCY MEDICINE

## 2019-05-18 PROCEDURE — 99213 OFFICE O/P EST LOW 20 MIN: CPT

## 2019-05-18 PROCEDURE — 36415 COLL VENOUS BLD VENIPUNCTURE: CPT

## 2019-05-18 PROCEDURE — 85025 COMPLETE CBC W/AUTO DIFF WBC: CPT | Performed by: NURSE PRACTITIONER

## 2019-05-18 NOTE — ED INITIAL ASSESSMENT (HPI)
Pt arrives with left nostril nose bleed  Ecchymosis to upper cheeks and nose. Sutures intact to bridge of nose. Pt states she fell on cement floor striking face 2 days ago. ED had CT shows nasal fracture.  (C-spine, face and head CT done.)  Nasal clamp a

## 2019-05-18 NOTE — ED INITIAL ASSESSMENT (HPI)
Here for nosebleeding, have nasal packing on left nose, fall 2 days ago and broke her nose. Also complaining of right shoulder pain, right hand pain, pt does not think she broke anything.

## 2019-05-18 NOTE — ED PROVIDER NOTES
Patient Seen in: [facility] Emergency Department    History   Patient presents with:  Nose Bleed (nasopharyngeal)    Stated Complaint: nose bleed    HPI    68-year-old female presents emergency room with chief complaint of nosebleed.   Patient presen ANEURYSM/DISSECT     • ESOPHAGEAL MANOMETRY N/A 11/18/2016    Performed by Pretty Murillo MD at [facility] ENDOSCOPY   • ESOPHAGOGASTRODUODENOSCOPY (EGD) N/A 5/9/2019    Performed by Pretty Murillo MD at [facility] ENDOSCOPY   • ESOPHAGOGASTRODUODENOSCOPY (EGD) N/A Oropharynx clear, uvula midline. Small amount of blood in posterior pharynx. There is a Rhino Rocket in the left nares with blood dripping anteriorly, the balloon is not inflated. HEART: Regular rate and rhythm, no murmurs.   LUNGS: Clear to auscultati Clinical Impression:  Epistaxis  (primary encounter diagnosis)  Hypertension, unspecified type    Disposition:  Discharge  5/18/2019  5:03 pm    Follow-up:  Raji Donnelly MD  [address] [address]  [phone]    In 2 d

## 2019-05-18 NOTE — ED PROVIDER NOTES
Patient Seen in: [facility]    History   Patient presents with:  Nose Bleed (nasopharyngeal)    Stated Complaint: NOSE BLEED    HPI  Patient is a 75-year-old female with past medical history of hypertension, GERD, MI with cardia coronary artery    • Type II or unspecified type diabetes mellitus without mention of complication, not stated as uncontrolled     NIDDM   • Unspecified essential hypertension    • Visual impairment     glasses       Past Surgical History:   Procedure Late BLEED  Other systems are as noted in HPI. Constitutional and vital signs reviewed. All other systems reviewed and negative except as noted above.     Physical Exam     ED Triage Vitals [05/18/19 1133]   BP (!) 166/76   Pulse 72   Resp 20   Temp    Tem specific site of active leading identified. Rhino Rocket insertion recommended by Dr. Memo Sosa. Rhino Rocket soaked in saline and then inserted into left nare without difficulty. Afrin spray applied topically following insertion. Nasal clip applied.   E

## 2019-05-22 NOTE — PROGRESS NOTES
[facility]    Patients Name: Kendy Georges  Attending Physician: Olena att. providers found  CSN: [id number]    Location:  [facility] ENDO POOL ROOMS/EH EN*  MRN: [id number]    YOB: 1940  Admission Date: 5/9/2019     Anesthesia Post-op Note    Pr

## 2019-06-19 NOTE — TELEPHONE ENCOUNTER
Alison from the [facility] calling and needing information from us.   Needs last office visit notes and results of biopsy of lymph node    Also checking on referral Leg Pain  Increase water, potassium intake. Quit smoking. Eat a healthy diet, decrease sugar intake.     Smoking Cessation  Set a quit date.  Begin tapering your tobacco use one week prior.    Let people know that you are planning to quit, looking for support/encouragement, and what your triggers are for smoking so that they can help you avoid them.     Make a list of your triggers and some viable alternatives to smoking when you have an urge.     List stress management techniques. New stresses will occur during this period of time. Having techniques to deal with this is important.    Counseling and support is available from the tobacco settlement fund through [employer] app.  They have coaches and phone counselors available.     Context app  Https://www.[employer]      Patient Education   Personalized Prevention Plan  You are due for the preventive services outlined below.  Your care team is available to assist you in scheduling these services.  If you have already completed any of these items, please share that information with your care team to update in your medical record.  Health Maintenance Due   Topic Date Due     Zoster (Shingles) Vaccine (1 of 2) 02/07/1985     Annual Wellness Visit  03/31/2016     PHQ-2  01/01/2019       Understanding USDA MyPlate  The USDA (U.S. Department of Agriculture) has guidelines to help you make healthy food choices. These are called MyPlate. MyPlate shows the food groups that make up healthy meals using the image of a place setting. Before you eat, think about the healthiest choices for what to put onto your plate or into your cup or bowl. To learn more about building a healthy plate, visit www.choosemyplate.gov.    The food groups    Fruits. Any fruit or 100% fruit juice counts as part of the Fruit Group. Fruits may be fresh, canned, frozen, or dried, and may be whole, cut-up, or pureed. Make half your plate fruits and vegetables.    Vegetables. Any vegetable or 100% vegetable juice  counts as a member of the Vegetable Group. Vegetables may be fresh, frozen, canned, or dried. They can be served raw or cooked and may be whole, cut-up, or mashed. Make half your plate fruits and vegetables.    Grains. All foods made from grains are part of the Grains Group. These include wheat, rice, oats, cornmeal, and barley such as bread, pasta, oatmeal, cereal, tortillas, and grits. Grains should be no more than a quarter of your plate. At least half of your grains should be whole grains.    Protein. This group includes meat, poultry, seafood, beans and peas, eggs, processed soy products (like tofu), nuts (including nut butters), and seeds. Make protein choices no more than a quarter of your plate. Meat and poultry choices should be lean or low fat.    Dairy. All fluid milk products and foods made from milk that contain calcium, like yogurt and cheese, are part of the Dairy Group. (Foods that have little calcium, such as cream, butter, and cream cheese, are not part of the group.) Most dairy choices should be low-fat or fat-free.    Oils. These are fats that are liquid at room temperature. They include canola, corn, olive, soybean, and sunflower oil. Foods that are mainly oil include mayonnaise, certain salad dressings, and soft margarines. You should have only 5 to 7 teaspoons of oils a day. You probably already get this much from the food you eat.  Date Last Reviewed: 8/1/2017 2000-2018 [employer]. [address] [place] [address]. All rights reserved. This information is not intended as a substitute for professional medical care. Always follow your healthcare professional's instructions.

## 2019-06-29 RX ORDER — BLOOD SUGAR DIAGNOSTIC
STRIP MISCELLANEOUS
Qty: 100 STRIP | Refills: 2 | Status: SHIPPED | OUTPATIENT
Start: 2019-06-29 | End: 2019-08-22 | Stop reason: CLARIF

## 2019-07-01 RX ORDER — LANCETS
EACH MISCELLANEOUS
Qty: 100 EACH | Refills: 2 | Status: SHIPPED | OUTPATIENT
Start: 2019-07-01 | End: 2019-08-22 | Stop reason: CLARIF

## 2019-08-01 ENCOUNTER — TELEPHONE (OUTPATIENT)
Dept: INTERNAL MEDICINE CLINIC | Facility: CLINIC | Age: 79
End: 2019-08-01

## 2019-08-01 NOTE — TELEPHONE ENCOUNTER
Received DM eye exam from [facility] done on 7/29/19 it was abstracted in chart and placed on AS bin for review.

## 2019-08-22 ENCOUNTER — APPOINTMENT (OUTPATIENT)
Dept: GENERAL RADIOLOGY | Facility: HOSPITAL | Age: 79
DRG: 247 | End: 2019-08-22
Attending: EMERGENCY MEDICINE
Payer: MEDICARE

## 2019-08-22 ENCOUNTER — OFFICE VISIT (OUTPATIENT)
Dept: INTERNAL MEDICINE CLINIC | Facility: CLINIC | Age: 79
End: 2019-08-22
Payer: MEDICARE

## 2019-08-22 ENCOUNTER — HOSPITAL ENCOUNTER (INPATIENT)
Facility: HOSPITAL | Age: 79
LOS: 2 days | Discharge: HOME | DRG: 247 | End: 2019-08-24
Attending: EMERGENCY MEDICINE | Admitting: STUDENT IN AN ORGANIZED HEALTH CARE EDUCATION/TRAINING PROGRAM
Payer: MEDICARE

## 2019-08-22 VITALS
HEIGHT: 69 IN | HEART RATE: 60 BPM | SYSTOLIC BLOOD PRESSURE: 130 MMHG | WEIGHT: 199 LBS | TEMPERATURE: 98 F | BODY MASS INDEX: 29.47 KG/M2 | DIASTOLIC BLOOD PRESSURE: 82 MMHG

## 2019-08-22 DIAGNOSIS — R07.89 CHEST PRESSURE: Primary | ICD-10-CM

## 2019-08-22 DIAGNOSIS — I21.4 NON-ST ELEVATION MI (NSTEMI) (HCC): Primary | ICD-10-CM

## 2019-08-22 LAB
ALBUMIN SERPL-MCNC: 3.4 G/DL (ref 3.4–5)
ALBUMIN/GLOB SERPL: 0.9 {RATIO} (ref 1–2)
ALP LIVER SERPL-CCNC: 88 U/L (ref 55–142)
ALT SERPL-CCNC: 15 U/L (ref 13–56)
ANION GAP SERPL CALC-SCNC: 5 MMOL/L (ref 0–18)
APTT PPP: 34 SECONDS (ref 25.4–36.1)
AST SERPL-CCNC: 15 U/L (ref 15–37)
BASOPHILS # BLD AUTO: 0.03 X10(3) UL (ref 0–0.2)
BASOPHILS NFR BLD AUTO: 0.3 %
BILIRUB SERPL-MCNC: 0.3 MG/DL (ref 0.1–2)
BUN BLD-MCNC: 14 MG/DL (ref 7–18)
BUN/CREAT SERPL: 15.4 (ref 10–20)
CALCIUM BLD-MCNC: 9.5 MG/DL (ref 8.5–10.1)
CHLORIDE SERPL-SCNC: 102 MMOL/L (ref 98–112)
CO2 SERPL-SCNC: 34 MMOL/L (ref 21–32)
CREAT BLD-MCNC: 0.91 MG/DL (ref 0.55–1.02)
D-DIMER: 0.68 UG/ML FEU (ref ?–0.78)
DEPRECATED RDW RBC AUTO: 44.4 FL (ref 35.1–46.3)
EOSINOPHIL # BLD AUTO: 0.25 X10(3) UL (ref 0–0.7)
EOSINOPHIL NFR BLD AUTO: 2.5 %
ERYTHROCYTE [DISTWIDTH] IN BLOOD BY AUTOMATED COUNT: 14.6 % (ref 11–15)
GLOBULIN PLAS-MCNC: 3.8 G/DL (ref 2.8–4.4)
GLUCOSE BLD-MCNC: 130 MG/DL (ref 70–99)
HCT VFR BLD AUTO: 44.3 % (ref 35–48)
HGB BLD-MCNC: 13.8 G/DL (ref 12–16)
IMM GRANULOCYTES # BLD AUTO: 0.02 X10(3) UL (ref 0–1)
IMM GRANULOCYTES NFR BLD: 0.2 %
INR BLD: 1.02 (ref 0.9–1.1)
LYMPHOCYTES # BLD AUTO: 1.32 X10(3) UL (ref 1–4)
LYMPHOCYTES NFR BLD AUTO: 13 %
M PROTEIN MFR SERPL ELPH: 7.2 G/DL (ref 6.4–8.2)
MCH RBC QN AUTO: 26.5 PG (ref 26–34)
MCHC RBC AUTO-ENTMCNC: 31.2 G/DL (ref 31–37)
MCV RBC AUTO: 85 FL (ref 80–100)
MONOCYTES # BLD AUTO: 0.74 X10(3) UL (ref 0.1–1)
MONOCYTES NFR BLD AUTO: 7.3 %
NEUTROPHILS # BLD AUTO: 7.76 X10 (3) UL (ref 1.5–7.7)
NEUTROPHILS # BLD AUTO: 7.76 X10(3) UL (ref 1.5–7.7)
NEUTROPHILS NFR BLD AUTO: 76.7 %
OSMOLALITY SERPL CALC.SUM OF ELEC: 294 MOSM/KG (ref 275–295)
PLATELET # BLD AUTO: 195 10(3)UL (ref 150–450)
POTASSIUM SERPL-SCNC: 4.2 MMOL/L (ref 3.5–5.1)
PSA SERPL DL<=0.01 NG/ML-MCNC: 13.8 SECONDS (ref 12.5–14.7)
RBC # BLD AUTO: 5.21 X10(6)UL (ref 3.8–5.3)
SODIUM SERPL-SCNC: 141 MMOL/L (ref 136–145)
TROPONIN I SERPL-MCNC: 0.27 NG/ML (ref ?–0.04)
TROPONIN I SERPL-MCNC: 0.28 NG/ML (ref ?–0.04)
WBC # BLD AUTO: 10.1 X10(3) UL (ref 4–11)

## 2019-08-22 PROCEDURE — 99214 OFFICE O/P EST MOD 30 MIN: CPT | Performed by: PHYSICIAN ASSISTANT

## 2019-08-22 PROCEDURE — 93000 ELECTROCARDIOGRAM COMPLETE: CPT | Performed by: PHYSICIAN ASSISTANT

## 2019-08-22 PROCEDURE — 99223 1ST HOSP IP/OBS HIGH 75: CPT | Performed by: STUDENT IN AN ORGANIZED HEALTH CARE EDUCATION/TRAINING PROGRAM

## 2019-08-22 PROCEDURE — 71045 X-RAY EXAM CHEST 1 VIEW: CPT | Performed by: EMERGENCY MEDICINE

## 2019-08-22 RX ORDER — ASPIRIN 81 MG/1
324 TABLET, CHEWABLE ORAL ONCE
Status: COMPLETED | OUTPATIENT
Start: 2019-08-22 | End: 2019-08-22

## 2019-08-22 RX ORDER — PRAVASTATIN SODIUM 80 MG/1
80 TABLET ORAL NIGHTLY
COMMUNITY
End: 2019-11-27

## 2019-08-22 RX ORDER — GLYBURIDE 5 MG/1
2.5 TABLET ORAL 2 TIMES DAILY
COMMUNITY
End: 2019-11-27

## 2019-08-22 RX ORDER — HYDRALAZINE HYDROCHLORIDE 20 MG/ML
10 INJECTION INTRAMUSCULAR; INTRAVENOUS
Status: DISCONTINUED | OUTPATIENT
Start: 2019-08-22 | End: 2019-08-24

## 2019-08-22 RX ORDER — METOPROLOL TARTRATE 50 MG/1
25 TABLET, FILM COATED ORAL 2 TIMES DAILY
COMMUNITY
End: 2019-11-13

## 2019-08-22 RX ORDER — LISINOPRIL 10 MG/1
10 TABLET ORAL DAILY
COMMUNITY
End: 2019-11-27

## 2019-08-22 RX ORDER — POLYETHYLENE GLYCOL 3350 17 G/17G
17 POWDER, FOR SOLUTION ORAL DAILY PRN
Status: DISCONTINUED | OUTPATIENT
Start: 2019-08-22 | End: 2019-08-24

## 2019-08-22 RX ORDER — LISINOPRIL 10 MG/1
10 TABLET ORAL DAILY
Status: DISCONTINUED | OUTPATIENT
Start: 2019-08-22 | End: 2019-08-24

## 2019-08-22 RX ORDER — DEXTROSE MONOHYDRATE 25 G/50ML
50 INJECTION, SOLUTION INTRAVENOUS
Status: DISCONTINUED | OUTPATIENT
Start: 2019-08-22 | End: 2019-08-24

## 2019-08-22 RX ORDER — ONDANSETRON 2 MG/ML
4 INJECTION INTRAMUSCULAR; INTRAVENOUS EVERY 6 HOURS PRN
Status: DISCONTINUED | OUTPATIENT
Start: 2019-08-22 | End: 2019-08-24

## 2019-08-22 RX ORDER — HEPARIN SODIUM AND DEXTROSE 10000; 5 [USP'U]/100ML; G/100ML
INJECTION INTRAVENOUS CONTINUOUS
Status: DISCONTINUED | OUTPATIENT
Start: 2019-08-23 | End: 2019-08-24

## 2019-08-22 RX ORDER — ENOXAPARIN SODIUM 100 MG/ML
40 INJECTION SUBCUTANEOUS DAILY
Status: CANCELLED | OUTPATIENT
Start: 2019-08-23

## 2019-08-22 RX ORDER — ASPIRIN 325 MG
325 TABLET ORAL DAILY
Status: DISCONTINUED | OUTPATIENT
Start: 2019-08-22 | End: 2019-08-23

## 2019-08-22 RX ORDER — ACETAMINOPHEN 325 MG/1
650 TABLET ORAL EVERY 6 HOURS PRN
Status: DISCONTINUED | OUTPATIENT
Start: 2019-08-22 | End: 2019-08-24

## 2019-08-22 RX ORDER — NITROGLYCERIN 0.4 MG/1
0.4 TABLET SUBLINGUAL EVERY 5 MIN PRN
Status: DISCONTINUED | OUTPATIENT
Start: 2019-08-22 | End: 2019-08-24

## 2019-08-22 RX ORDER — METOCLOPRAMIDE HYDROCHLORIDE 5 MG/ML
10 INJECTION INTRAMUSCULAR; INTRAVENOUS EVERY 8 HOURS PRN
Status: DISCONTINUED | OUTPATIENT
Start: 2019-08-22 | End: 2019-08-24

## 2019-08-22 RX ORDER — SODIUM PHOSPHATE, DIBASIC AND SODIUM PHOSPHATE, MONOBASIC 7; 19 G/133ML; G/133ML
1 ENEMA RECTAL ONCE AS NEEDED
Status: DISCONTINUED | OUTPATIENT
Start: 2019-08-22 | End: 2019-08-24

## 2019-08-22 RX ORDER — SODIUM CHLORIDE 9 MG/ML
INJECTION, SOLUTION INTRAVENOUS
Status: ACTIVE | OUTPATIENT
Start: 2019-08-23 | End: 2019-08-23

## 2019-08-22 RX ORDER — HEPARIN SODIUM 5000 [USP'U]/ML
5000 INJECTION INTRAVENOUS; SUBCUTANEOUS ONCE
Status: COMPLETED | OUTPATIENT
Start: 2019-08-22 | End: 2019-08-22

## 2019-08-22 RX ORDER — ATORVASTATIN CALCIUM 20 MG/1
20 TABLET, FILM COATED ORAL NIGHTLY
Status: DISCONTINUED | OUTPATIENT
Start: 2019-08-22 | End: 2019-08-24

## 2019-08-22 RX ORDER — SODIUM CHLORIDE 9 MG/ML
INJECTION, SOLUTION INTRAVENOUS CONTINUOUS
Status: DISCONTINUED | OUTPATIENT
Start: 2019-08-22 | End: 2019-08-24

## 2019-08-22 RX ORDER — BISACODYL 10 MG
10 SUPPOSITORY, RECTAL RECTAL
Status: DISCONTINUED | OUTPATIENT
Start: 2019-08-22 | End: 2019-08-24

## 2019-08-22 RX ORDER — HEPARIN SODIUM AND DEXTROSE 10000; 5 [USP'U]/100ML; G/100ML
1000 INJECTION INTRAVENOUS ONCE
Status: COMPLETED | OUTPATIENT
Start: 2019-08-22 | End: 2019-08-23

## 2019-08-22 NOTE — ED INITIAL ASSESSMENT (HPI)
Pt to ED from home with c/o chest pain for a couple of hours yesterday and some this morning.  She reports it was about 5/10 tightness and pressure

## 2019-08-22 NOTE — CONSULTS
[facility] Cardiology Consultation    Heavenly Mahoney Patient Status:  Emergency    1940 MRN [id number]   Location [facility] Attending Sheryl Alexis MD   Hosp Day # 0 PCP Kerry Huntley MD     Reason for Consultation:  Chest pa Normal   • COLONOSCOPY     • COLONOSCOPY N/A 5/9/2019    Performed by Sayda Kate MD at [facility] ENDOSCOPY   • ENDOVAS REPAIR, INFRARENL ABDOM AORTIC ANEURYSM/DISSECT     • ESOPHAGEAL MANOMETRY N/A 11/18/2016    Performed by Sayda Kate MD at [facility] ENDO smoked. She has never used smokeless tobacco. She reports that she does not drink alcohol or use drugs. Review of Systems:  All systems were reviewed and are negative except as described above in HPI. Physical Exam:      Temp:  [97.3 °F (36.3 °C)-97.

## 2019-08-22 NOTE — ED PROVIDER NOTES
Patient Seen in: [facility] Emergency Department    History   Patient presents with:  Chest Pain Angina (cardiovascular)    Stated Complaint: cp    HPI    Patient is a pleasant 79-year-old female with a history of hypertension, high cholesterol, par ([facility] [address]    • Other and unspecified hyperlipidemia    • PONV (postoperative nausea and vomiting)    • Problems with swallowing     chokes very easily on food   • Stented coronary artery    • Type II or unspecified type diabetes mellitus without mention of com use: No      Alcohol/week: 0.0 standard drinks    Drug use: No             Review of Systems    Positive for stated complaint: cp  Other systems are as noted in HPI. Constitutional and vital signs reviewed.       All other systems reviewed and negative exc Units.    D-Dimer results of less than 0.5 ug/mL (FEU) have been shown to contribute to the exclusion of venous thromboembolism with a negative predictive value of approximately 95% when results are used as part of the total clinical evaluation of the richard Clinical Impression:  Non-ST elevation MI (NSTEMI) ([facility] [address]  (primary encounter diagnosis)    Disposition:  Admit  8/22/2019  6:28 pm    Follow-up:  No follow-up provider specified.       Medications Prescribed:  Current Discharge Medication List

## 2019-08-22 NOTE — PROGRESS NOTES
Patient presents with:  Chest Pressure: LG. Room 6. Some lightheadedness and dizziness. No SOB.  Chest pressure that lasted a couple of hours yesterday that felt alot like when she had a previous heart attack       HPI:  Pt presents with c/o chest pressure (gastroesophageal reflux disease)     Dysphagia     Corkscrew esophagus     Chronic low back pain     Hammer toe, acquired     Vitamin D deficiency     Macular degeneration     History of total right knee replacement     At risk for falling     Enlarged th daily.   Disp:  Rfl:        Physical Exam  /82 (BP Location: Left arm, Patient Position: Sitting, Cuff Size: adult)   Pulse 60   Temp 97.5 °F (36.4 °C) (Oral)   Ht 69\"   Wt 199 lb   BMI 29.39 kg/m²   Constitutional:  No distress.    HEENT:  Normoceph

## 2019-08-23 ENCOUNTER — APPOINTMENT (OUTPATIENT)
Dept: INTERVENTIONAL RADIOLOGY/VASCULAR | Facility: HOSPITAL | Age: 79
DRG: 247 | End: 2019-08-23
Attending: INTERNAL MEDICINE
Payer: MEDICARE

## 2019-08-23 ENCOUNTER — APPOINTMENT (OUTPATIENT)
Dept: CV DIAGNOSTICS | Facility: HOSPITAL | Age: 79
DRG: 247 | End: 2019-08-23
Attending: STUDENT IN AN ORGANIZED HEALTH CARE EDUCATION/TRAINING PROGRAM
Payer: MEDICARE

## 2019-08-23 LAB
ANION GAP SERPL CALC-SCNC: 7 MMOL/L (ref 0–18)
APTT PPP: 107.6 SECONDS (ref 25.4–36.1)
APTT PPP: 37.6 SECONDS (ref 25.4–36.1)
ATRIAL RATE: 66 BPM
ATRIAL RATE: 74 BPM
BASOPHILS # BLD AUTO: 0.03 X10(3) UL (ref 0–0.2)
BASOPHILS NFR BLD AUTO: 0.4 %
BUN BLD-MCNC: 14 MG/DL (ref 7–18)
BUN/CREAT SERPL: 21.2 (ref 10–20)
CALCIUM BLD-MCNC: 8.4 MG/DL (ref 8.5–10.1)
CHLORIDE SERPL-SCNC: 107 MMOL/L (ref 98–112)
CHOLEST SMN-MCNC: 155 MG/DL (ref ?–200)
CO2 SERPL-SCNC: 28 MMOL/L (ref 21–32)
CREAT BLD-MCNC: 0.66 MG/DL (ref 0.55–1.02)
DEPRECATED RDW RBC AUTO: 44.1 FL (ref 35.1–46.3)
EOSINOPHIL # BLD AUTO: 0.3 X10(3) UL (ref 0–0.7)
EOSINOPHIL NFR BLD AUTO: 3.6 %
ERYTHROCYTE [DISTWIDTH] IN BLOOD BY AUTOMATED COUNT: 14.3 % (ref 11–15)
EST. AVERAGE GLUCOSE BLD GHB EST-MCNC: 154 MG/DL (ref 68–126)
GLUCOSE BLD-MCNC: 116 MG/DL (ref 70–99)
GLUCOSE BLD-MCNC: 126 MG/DL (ref 70–99)
GLUCOSE BLD-MCNC: 131 MG/DL (ref 70–99)
GLUCOSE BLD-MCNC: 133 MG/DL (ref 70–99)
GLUCOSE BLD-MCNC: 136 MG/DL (ref 70–99)
GLUCOSE BLD-MCNC: 155 MG/DL (ref 70–99)
GLUCOSE BLD-MCNC: 181 MG/DL (ref 70–99)
HBA1C MFR BLD HPLC: 7 % (ref ?–5.7)
HCT VFR BLD AUTO: 39.3 % (ref 35–48)
HDLC SERPL-MCNC: 57 MG/DL (ref 40–59)
HGB BLD-MCNC: 12.1 G/DL (ref 12–16)
IMM GRANULOCYTES # BLD AUTO: 0.01 X10(3) UL (ref 0–1)
IMM GRANULOCYTES NFR BLD: 0.1 %
LDLC SERPL CALC-MCNC: 61 MG/DL (ref ?–100)
LYMPHOCYTES # BLD AUTO: 1 X10(3) UL (ref 1–4)
LYMPHOCYTES NFR BLD AUTO: 11.9 %
MCH RBC QN AUTO: 26.4 PG (ref 26–34)
MCHC RBC AUTO-ENTMCNC: 30.8 G/DL (ref 31–37)
MCV RBC AUTO: 85.8 FL (ref 80–100)
MONOCYTES # BLD AUTO: 0.6 X10(3) UL (ref 0.1–1)
MONOCYTES NFR BLD AUTO: 7.1 %
NEUTROPHILS # BLD AUTO: 6.48 X10 (3) UL (ref 1.5–7.7)
NEUTROPHILS # BLD AUTO: 6.48 X10(3) UL (ref 1.5–7.7)
NEUTROPHILS NFR BLD AUTO: 76.9 %
NONHDLC SERPL-MCNC: 98 MG/DL (ref ?–130)
OSMOLALITY SERPL CALC.SUM OF ELEC: 296 MOSM/KG (ref 275–295)
P AXIS: 81 DEGREES
P AXIS: 87 DEGREES
P-R INTERVAL: 174 MS
P-R INTERVAL: 202 MS
PLATELET # BLD AUTO: 165 10(3)UL (ref 150–450)
POTASSIUM SERPL-SCNC: 3.9 MMOL/L (ref 3.5–5.1)
Q-T INTERVAL: 394 MS
Q-T INTERVAL: 400 MS
QRS DURATION: 84 MS
QRS DURATION: 92 MS
QTC CALCULATION (BEZET): 419 MS
QTC CALCULATION (BEZET): 437 MS
R AXIS: 3 DEGREES
R AXIS: 9 DEGREES
RBC # BLD AUTO: 4.58 X10(6)UL (ref 3.8–5.3)
SODIUM SERPL-SCNC: 142 MMOL/L (ref 136–145)
T AXIS: 25 DEGREES
T AXIS: 76 DEGREES
TRIGL SERPL-MCNC: 183 MG/DL (ref 30–149)
TROPONIN I SERPL-MCNC: 0.18 NG/ML (ref ?–0.04)
VENTRICULAR RATE: 66 BPM
VENTRICULAR RATE: 74 BPM
VLDLC SERPL CALC-MCNC: 37 MG/DL (ref 0–30)
WBC # BLD AUTO: 8.4 X10(3) UL (ref 4–11)

## 2019-08-23 PROCEDURE — 99232 SBSQ HOSP IP/OBS MODERATE 35: CPT | Performed by: HOSPITALIST

## 2019-08-23 PROCEDURE — B2151ZZ FLUOROSCOPY OF LEFT HEART USING LOW OSMOLAR CONTRAST: ICD-10-PCS | Performed by: INTERNAL MEDICINE

## 2019-08-23 PROCEDURE — 4A023N7 MEASUREMENT OF CARDIAC SAMPLING AND PRESSURE, LEFT HEART, PERCUTANEOUS APPROACH: ICD-10-PCS | Performed by: INTERNAL MEDICINE

## 2019-08-23 PROCEDURE — 93306 TTE W/DOPPLER COMPLETE: CPT | Performed by: STUDENT IN AN ORGANIZED HEALTH CARE EDUCATION/TRAINING PROGRAM

## 2019-08-23 PROCEDURE — B2111ZZ FLUOROSCOPY OF MULTIPLE CORONARY ARTERIES USING LOW OSMOLAR CONTRAST: ICD-10-PCS | Performed by: INTERNAL MEDICINE

## 2019-08-23 PROCEDURE — 027035Z DILATION OF CORONARY ARTERY, ONE ARTERY WITH TWO DRUG-ELUTING INTRALUMINAL DEVICES, PERCUTANEOUS APPROACH: ICD-10-PCS | Performed by: INTERNAL MEDICINE

## 2019-08-23 RX ORDER — LIDOCAINE HYDROCHLORIDE 10 MG/ML
INJECTION, SOLUTION EPIDURAL; INFILTRATION; INTRACAUDAL; PERINEURAL
Status: COMPLETED
Start: 2019-08-23 | End: 2019-08-23

## 2019-08-23 RX ORDER — MIDAZOLAM HYDROCHLORIDE 1 MG/ML
INJECTION INTRAMUSCULAR; INTRAVENOUS
Status: COMPLETED
Start: 2019-08-23 | End: 2019-08-23

## 2019-08-23 RX ORDER — ONDANSETRON 2 MG/ML
INJECTION INTRAMUSCULAR; INTRAVENOUS
Status: COMPLETED
Start: 2019-08-23 | End: 2019-08-23

## 2019-08-23 RX ORDER — CLOPIDOGREL BISULFATE 75 MG/1
75 TABLET ORAL DAILY
Status: DISCONTINUED | OUTPATIENT
Start: 2019-08-24 | End: 2019-08-24

## 2019-08-23 RX ORDER — NICARDIPINE HYDROCHLORIDE 2.5 MG/ML
INJECTION INTRAVENOUS
Status: COMPLETED
Start: 2019-08-23 | End: 2019-08-23

## 2019-08-23 RX ORDER — HEPARIN SODIUM 5000 [USP'U]/ML
INJECTION, SOLUTION INTRAVENOUS; SUBCUTANEOUS
Status: COMPLETED
Start: 2019-08-23 | End: 2019-08-23

## 2019-08-23 RX ORDER — VERAPAMIL HYDROCHLORIDE 2.5 MG/ML
INJECTION, SOLUTION INTRAVENOUS
Status: COMPLETED
Start: 2019-08-23 | End: 2019-08-23

## 2019-08-23 RX ORDER — ASPIRIN 81 MG/1
81 TABLET ORAL DAILY
Status: DISCONTINUED | OUTPATIENT
Start: 2019-08-24 | End: 2019-08-24

## 2019-08-23 RX ORDER — HYDRALAZINE HYDROCHLORIDE 20 MG/ML
INJECTION INTRAMUSCULAR; INTRAVENOUS
Status: COMPLETED
Start: 2019-08-23 | End: 2019-08-23

## 2019-08-23 RX ORDER — CLOPIDOGREL BISULFATE 75 MG/1
TABLET ORAL
Status: COMPLETED
Start: 2019-08-23 | End: 2019-08-23

## 2019-08-23 RX ORDER — SODIUM CHLORIDE 9 MG/ML
INJECTION, SOLUTION INTRAVENOUS CONTINUOUS
Status: ACTIVE | OUTPATIENT
Start: 2019-08-23 | End: 2019-08-23

## 2019-08-23 NOTE — PROCEDURES
[facility]    PATIENT'S NAME: [facility]   ATTENDING PHYSICIAN: Kathleen Spain M.D. OPERATING PHYSICIAN: Morenita Funez.  Carmen Rojas MD   PATIENT ACCOUNT#:   [id number]    LOCATION:  [address] [place] [address]  MEDICAL RECORD #:   [id number]       DATE OF BIRTH:  [birth year] ended the case. Of note, we gave Cardene intracoronary to relieve any vasospasm final to final pictures. ACC DATA:    1. Codominant.     2.   A 99% right coronary artery stenosis with SARANYA 1 flow, reduced to 0% stenosis and SARANYA 3 flow with 2 [place] d

## 2019-08-23 NOTE — PROGRESS NOTES
[facility]  Progress Note     James Moss Patient Status:  Inpatient    1940 MRN [id number]   [facility] 8NE-A Attending Chato Yap MD   Hosp Day # 1 PCP James Sanders MD     Chief Complaint: Chest pain    S: Patient fe Epic.    Medications:   • aspirin  325 mg Oral Daily   • lisinopril  10 mg Oral Daily   • Metoprolol Tartrate  25 mg Oral 2x Daily(Beta Blocker)   • atorvastatin  20 mg Oral Nightly   • sodium chloride   Intravenous On Call   • Insulin Aspart Pen  1-5 Unit

## 2019-08-23 NOTE — PROGRESS NOTES
Cath:      LM: Nearly separate ostia. LAD: Large. Patent stent. 90% after diagonal.  CX: Large, codominant. Mild disease. RCA: TIMI1 flow. Moderate caliber. Large AM, moderate PDA. 99% proximal stenosis.   PCI: 2.5x15 and 2.59k30rc Alvaro CHANTELL, overlap

## 2019-08-23 NOTE — PROGRESS NOTES
[facility] Cardiology Progress Note        Rolandmatt [place]ahouston Patient Status:  Inpatient    1940 MRN [id number]   [facility] 8NE-A Attending Allyssa Tompkins MD   Hosp Day # 1 PCP Mandi Thompson MD     Subjective:  No s Labs   Lab 08/22/19  1656   ALT 15   AST 15   ALB 3.4       Recent Labs   Lab 08/22/19  1656 08/22/19  2034 08/23/19  0215   TROP 0.285* 0.274* 0.185*       Recent Labs   Lab 08/22/19  1656   PTP 13.8   INR 1.02                 Impression:  1.  Chest pain,

## 2019-08-23 NOTE — PAYOR COMM NOTE
--------------  ADMISSION REVIEW     Koreycarolyne Crouch [facility]  Subscriber #:  [id number]  Authorization Number: [id number]    Admit date: 8/22/19  Admit time: 1932       Admitting Physician: Josesito Clark MD  Attending Physician:  Yonatan Prater MD  Primary Car lymph node    • Exposure to medical diagnostic radiation 8/20148   • GERD (gastroesophageal reflux disease)    • Glaucoma    • Hammer toe 2013   • Heart attack [facility] 2008   • High cholesterol    • Myocardial infarction [facility])    • Other and unspecified hype Sean Anguiano MD at [facility] MAIN OR   • TOTAL ABDOM HYSTERECTOMY         Physical Exam     ED Triage Vitals [08/22/19 1643]   BP (!) 162/85   Pulse 65   Resp 16   Temp 97.3 °F (36.3 °C)   Temp src Temporal   SpO2 98 %   O2 Device None (Room air)     Current:BP (!) 11/5/1940 MRN [id number]   Location [address] [facility] Attending Aimee Noel MD   Hosp Day # 0 PCP Kvng Corona MD     Chief Complaint: Chest pressure     History of Present Illness: Cee Pro is a 66year old female with  H/o Type 2 DM  6. Mixed HL        Plan:  IV heparin  Cardiac cath in am.  Stop metformin. 8/23:    CARDS:    No sx's over night.   Tele negative.     Medications:  • aspirin  325 mg Oral Daily   • lisinopril  10 mg Oral Daily   • Metoprolol Tartrate  25 mg Intravenous Sedonia Orn, RN      lisinopril (PRINIVIL,ZESTRIL) tab 10 mg     Date Action Dose Route User    8/23/2019 0757 Given 10 mg Oral Severino WEAVER RN      metoprolol Tartrate (LOPRESSOR) tab 25 mg     Date Action Dose Route User    8/23/2019 0

## 2019-08-23 NOTE — PLAN OF CARE
NURSING ADMISSION NOTE      Patient admitted via Cart  Oriented to room. Safety precautions initiated. Bed in low position. Call light in reach. Pt AOX4. RA. NSR/SB on tele. Denies CP at this time. Heparin gtt started per protocol.  IVF infusing

## 2019-08-23 NOTE — PLAN OF CARE
Denies any chest pain, no sob, on room air  Heparin gtt on going at 7.5 cc/hr   Troponin 0.185  Npo maintained,consent signed,groin prep done      Problem: CARDIOVASCULAR - ADULT  Goal: Maintains optimal cardiac output and hemodynamic stability  Descriptio

## 2019-08-24 VITALS
DIASTOLIC BLOOD PRESSURE: 66 MMHG | OXYGEN SATURATION: 97 % | TEMPERATURE: 99 F | SYSTOLIC BLOOD PRESSURE: 148 MMHG | WEIGHT: 199.31 LBS | BODY MASS INDEX: 29 KG/M2 | RESPIRATION RATE: 16 BRPM | HEART RATE: 59 BPM

## 2019-08-24 LAB
ANION GAP SERPL CALC-SCNC: 5 MMOL/L (ref 0–18)
BUN BLD-MCNC: 14 MG/DL (ref 7–18)
BUN/CREAT SERPL: 17.9 (ref 10–20)
CALCIUM BLD-MCNC: 8.5 MG/DL (ref 8.5–10.1)
CHLORIDE SERPL-SCNC: 106 MMOL/L (ref 98–112)
CO2 SERPL-SCNC: 31 MMOL/L (ref 21–32)
CREAT BLD-MCNC: 0.78 MG/DL (ref 0.55–1.02)
DEPRECATED RDW RBC AUTO: 46.1 FL (ref 35.1–46.3)
ERYTHROCYTE [DISTWIDTH] IN BLOOD BY AUTOMATED COUNT: 14.6 % (ref 11–15)
GLUCOSE BLD-MCNC: 114 MG/DL (ref 70–99)
GLUCOSE BLD-MCNC: 147 MG/DL (ref 70–99)
HCT VFR BLD AUTO: 38 % (ref 35–48)
HGB BLD-MCNC: 12 G/DL (ref 12–16)
MCH RBC QN AUTO: 27.2 PG (ref 26–34)
MCHC RBC AUTO-ENTMCNC: 31.6 G/DL (ref 31–37)
MCV RBC AUTO: 86.2 FL (ref 80–100)
OSMOLALITY SERPL CALC.SUM OF ELEC: 295 MOSM/KG (ref 275–295)
PLATELET # BLD AUTO: 169 10(3)UL (ref 150–450)
POTASSIUM SERPL-SCNC: 4.2 MMOL/L (ref 3.5–5.1)
RBC # BLD AUTO: 4.41 X10(6)UL (ref 3.8–5.3)
SODIUM SERPL-SCNC: 142 MMOL/L (ref 136–145)
WBC # BLD AUTO: 9.7 X10(3) UL (ref 4–11)

## 2019-08-24 PROCEDURE — 99239 HOSP IP/OBS DSCHRG MGMT >30: CPT | Performed by: HOSPITALIST

## 2019-08-24 RX ORDER — CLOPIDOGREL BISULFATE 75 MG/1
75 TABLET ORAL DAILY
Qty: 30 TABLET | Refills: 0 | Status: SHIPPED | OUTPATIENT
Start: 2019-08-24 | End: 2019-09-05

## 2019-08-24 RX ORDER — METOPROLOL TARTRATE 50 MG/1
50 TABLET, FILM COATED ORAL
Status: DISCONTINUED | OUTPATIENT
Start: 2019-08-24 | End: 2019-08-24

## 2019-08-24 NOTE — PROGRESS NOTES
IRON HOSPITALIST  Progress Note     April Noss Patient Status:  Inpatient    1940 MRN [id number]   [facility] 8NE-A Attending Kaylah Grossman MD   Hosp Day # 2 PCP Ritu Spivey MD     Chief Complaint: Chest pain    S: Patient do 08/22/19 2034 08/23/19  0215   TROP 0.285* 0.274* 0.185*            Imaging: Imaging data reviewed in Epic.     Medications:   • Metoprolol Tartrate  50 mg Oral 2x Daily(Beta Blocker)   • Clopidogrel Bisulfate  75 mg Oral Daily   • aspirin  81 mg Oral Jae

## 2019-08-24 NOTE — PLAN OF CARE
Received patient on bed, awake, alert and oriented. Denied chest pain, denied SOB. Right wrist TR band over LHC site removed. No oozing noted, band aid applied. Site bruised, puffy and tender. Discussed POC. Monitored puncture site, arm precaution applied.

## 2019-08-24 NOTE — DIETARY NOTE
Nutrition Short Note    Dietitian consult received per cardiac rehab/CHF protocol. Pt to be educated by cardiac rehab staff and encouraged to attend outpatient classes taught by JONES. JONES available PRN.     Viola Fernandez RD, LDN, CSP, CNSC

## 2019-08-24 NOTE — PLAN OF CARE
Received report on, and this Pt., from cath lab to room 8627 at 32 61 16. Pt., A&Ox4, was very drowsy, calm and cooperative, her sons stayed at her bedside for over an hour.   Pt., is SR on Tele monitor, sats greater than 92% on RA, denies any pain or distress

## 2019-08-24 NOTE — CARDIAC REHAB
CAD teaching complete stent card given to Pt needs staged PCI so cardiac rehab offered and explained will wait until after next procedure

## 2019-08-24 NOTE — PROGRESS NOTES
[facility] Cardiology Progress Note        Herman Maya Patient Status:  Inpatient    1940 MRN [id number]   [facility] 8NE-A Attending Cb Aguilera MD   Hosp Day # 2 PCP Bhargav Brown MD     Subjective:  Post 141 142 142   K 4.2 3.9 4.2    107 106   CO2 34.0* 28.0 31.0   BUN 14 14 14   CREATSERUM 0.91 0.66 0.78   CA 9.5 8.4* 8.5   * 131* 114*       Recent Labs   Lab 08/22/19  1656   ALT 15   AST 15   ALB 3.4       Recent Labs   Lab 08/22/19  1656 0

## 2019-08-24 NOTE — PLAN OF CARE
Aox4, room air, no complaints of chest pain or SOB, right radial site c/d/I, bruised but no signs of hematoma or oozing, VSS, NSR on tele, safety precautions in place; bed in lowest position, call light within reach, anti-slip socks placed.  Will continue t

## 2019-08-25 NOTE — DISCHARGE SUMMARY
IRON HOSPITALIST  DISCHARGE SUMMARY     Cee Meter Patient Status:  Inpatient    1940 MRN [id number]   [facility] 8NE-A Attending No att. providers found   Hosp Day # 2 PCP Kvng Corona MD     Date of Admission: 2019  Date 30 tablet  Refills:  0        CONTINUE taking these medications      Instructions Prescription details   aspirin 81 MG Tabs      Take 81 mg by mouth nightly.    Refills:  0     glyBURIDE 5 MG Tabs  Commonly known as:  DIABETA      Take 2.5 mg by mouth 2 (t Musculoskeletal: Moves all extremities. Extremities: No edema.   -----------------------------------------------------------------------------------------------  PATIENT DISCHARGE INSTRUCTIONS: See electronic chart    Carlee Schuster MD    Time spent:  > 3

## 2019-08-26 ENCOUNTER — PATIENT OUTREACH (OUTPATIENT)
Dept: CASE MANAGEMENT | Age: 79
End: 2019-08-26

## 2019-08-26 ENCOUNTER — TELEPHONE (OUTPATIENT)
Dept: CASE MANAGEMENT | Age: 79
End: 2019-08-26

## 2019-08-26 ENCOUNTER — PATIENT MESSAGE (OUTPATIENT)
Dept: CASE MANAGEMENT | Age: 79
End: 2019-08-26

## 2019-08-26 DIAGNOSIS — Z02.9 ENCOUNTERS FOR UNSPECIFIED ADMINISTRATIVE PURPOSE: ICD-10-CM

## 2019-08-26 NOTE — TELEPHONE ENCOUNTER
Called to make TCC appt per post f/u dc order. Patient declined an [place]. Says she's going back for another stent soon. Waiting to hear back from cardilogy. She'd like to hold off on scheduling anything at this time. She's very busy.   Her son is a physicia

## 2019-08-26 NOTE — PAYOR COMM NOTE
--------------  DISCHARGE REVIEW    Demond Camara MA [facility]  Subscriber #:  [id number]  Authorization Number: [id number]/ [id number]    Admit date: 8/22/19  Admit time:  1932  Discharge Date: 8/24/2019 11:56 AM     Admitting Physician: Arlene Mondragon MD  Attendi was referred to the [facility]. TCM Follow-Up Recommendation:  LACE > 58:  High Risk of readmission after discharge from the hospital.    Procedures during hospitalization:   • As above, cardiac angiogram    Incidental or significa n/a    Follow-up appointment:   Vitor Butts MD  [address] [phone]    In 1 week      Appointments for Next 30 Days 8/25/2019 - 9/24/2019    None          Vital signs:  Temp:  [98.7 °F (37.1 °C)] 98.7 °

## 2019-08-27 ENCOUNTER — TELEPHONE (OUTPATIENT)
Dept: OTHER | Facility: HOSPITAL | Age: 79
End: 2019-08-27

## 2019-08-27 ENCOUNTER — TELEPHONE (OUTPATIENT)
Dept: INTERNAL MEDICINE CLINIC | Facility: CLINIC | Age: 79
End: 2019-08-27

## 2019-08-27 NOTE — TELEPHONE ENCOUNTER
Pt was contacted for TCM. Offered an appt at [facility] initially however pt declined stating she was seeing the cardiologist soon and would need another procedure therefore she plans to FU after all that is complete.  Pt was then offered an appt with PCP for HFU,

## 2019-08-27 NOTE — PROGRESS NOTES
Initial Post Discharge Follow Up   Discharge Date: 8/24/19  Contact Date: 8/27/2019    Consent Verification:  Assessment Completed With: Patient  HIPAA Verified?   Yes    Discharge Dx:    NSTEMI  CAD  PAF  HTN    Was TCC ordered: yes    General:   • How other medications were the same. Asked to review each one and pt declined stating they are all the same. Med rec to be completed at [facility] appt.      Current Outpatient Medications:  Clopidogrel Bisulfate 75 MG Oral Tab Take 1 tablet (75 mg total) by mouth autumn much which caused her to feel SOB. Pt stated the SOB improved once she rested and she denies SOB at this time. Did you have a procedure (cardiac cath or angiogram) while in the hospital? yes    If yes, is the site healed?     yes      Is the site red or s to call 911 or go the ED. Discussed s/s of infection and AMI. Pt verbalized understanding and will contact the office with any further questions or concerns. TE sent to PCP Office to FU on HFU appt.      BOOK BY DATE:     [x]  Discharge Summary, Discharge

## 2019-08-27 NOTE — TELEPHONE ENCOUNTER
Appt scheduled with CB for 8/29/19 at 9:45pm 30 minutes. Pt states she will cancel though if she can get in to see either Dr Orquidea Badillo, Dr Barb Hawley or Dr Milton Beatty as she needs an additional stent placed in the LAD. FYI to CB.

## 2019-08-27 NOTE — PROGRESS NOTES
AMI Follow up Call  1. How are you doing now that you're home? Patient just returned from the [employer], c/o mild shortness of breath with exertion at this time. By the end of our conversation, the patient was no longer dyspneic.   Denies any pain,

## 2019-08-27 NOTE — TELEPHONE ENCOUNTER
I do not see that she is even scheduled with Cardiology at this time. I suppose my answer depends on if and when she will be seeing her cardiologist.  She needs to follow up with one of us within 1 week.

## 2019-08-28 NOTE — TELEPHONE ENCOUNTER
Pt is scheduled to see Dr Jason Camejo Tuesday to have another stent put in. Ok to cxl appt tomorrow? Or does she need to keep that appt?  Please call [phone]

## 2019-08-28 NOTE — TELEPHONE ENCOUNTER
Discussed with AS. Since she will be seeing Cards next Tuesday it is OK for her to cancel appt with me tomorrow IF she is NOT having any problems. If she is having any complaints or issues then of course she should keep her visit with me tomorrow.   If sh

## 2019-08-28 NOTE — TELEPHONE ENCOUNTER
Called and spoke to pt, she is not having any current complaints and she would like to cancel appt tomorrow with CB.  HFU cancelled. Pt aware and verbalized understanding. Advised pt if she has any CP or SOB, she needs to proceed to ER.   Pt verbalized un

## 2019-09-03 ENCOUNTER — HOSPITAL ENCOUNTER (OUTPATIENT)
Dept: INTERVENTIONAL RADIOLOGY/VASCULAR | Facility: HOSPITAL | Age: 79
Discharge: HOME | End: 2019-09-03
Attending: INTERNAL MEDICINE | Admitting: INTERNAL MEDICINE
Payer: MEDICARE

## 2019-09-03 VITALS
HEART RATE: 60 BPM | BODY MASS INDEX: 27.86 KG/M2 | TEMPERATURE: 97 F | RESPIRATION RATE: 20 BRPM | OXYGEN SATURATION: 98 % | WEIGHT: 199 LBS | DIASTOLIC BLOOD PRESSURE: 66 MMHG | SYSTOLIC BLOOD PRESSURE: 159 MMHG | HEIGHT: 71 IN

## 2019-09-03 DIAGNOSIS — I21.4 NON-STEMI (NON-ST ELEVATED MYOCARDIAL INFARCTION) (HCC): ICD-10-CM

## 2019-09-03 LAB
GLUCOSE BLD-MCNC: 149 MG/DL (ref 70–99)
ISTAT ACTIVATED CLOTTING TIME: 356 SECONDS (ref 74–137)

## 2019-09-03 PROCEDURE — 99153 MOD SED SAME PHYS/QHP EA: CPT

## 2019-09-03 PROCEDURE — 85347 COAGULATION TIME ACTIVATED: CPT

## 2019-09-03 PROCEDURE — 027135Z DILATION OF CORONARY ARTERY, TWO ARTERIES WITH TWO DRUG-ELUTING INTRALUMINAL DEVICES, PERCUTANEOUS APPROACH: ICD-10-PCS | Performed by: INTERNAL MEDICINE

## 2019-09-03 PROCEDURE — 99152 MOD SED SAME PHYS/QHP 5/>YRS: CPT

## 2019-09-03 PROCEDURE — 82962 GLUCOSE BLOOD TEST: CPT

## 2019-09-03 PROCEDURE — 99211 OFF/OP EST MAY X REQ PHY/QHP: CPT

## 2019-09-03 PROCEDURE — 93010 ELECTROCARDIOGRAM REPORT: CPT | Performed by: INTERNAL MEDICINE

## 2019-09-03 PROCEDURE — 93005 ELECTROCARDIOGRAM TRACING: CPT

## 2019-09-03 RX ORDER — HEPARIN SODIUM 5000 [USP'U]/ML
INJECTION, SOLUTION INTRAVENOUS; SUBCUTANEOUS
Status: COMPLETED
Start: 2019-09-03 | End: 2019-09-03

## 2019-09-03 RX ORDER — TRAMADOL HYDROCHLORIDE 50 MG/1
100 TABLET ORAL EVERY 6 HOURS PRN
Status: DISCONTINUED | OUTPATIENT
Start: 2019-09-03 | End: 2019-09-03

## 2019-09-03 RX ORDER — VERAPAMIL HYDROCHLORIDE 2.5 MG/ML
INJECTION, SOLUTION INTRAVENOUS
Status: COMPLETED
Start: 2019-09-03 | End: 2019-09-03

## 2019-09-03 RX ORDER — SODIUM CHLORIDE 9 MG/ML
INJECTION, SOLUTION INTRAVENOUS
Status: DISCONTINUED | OUTPATIENT
Start: 2019-09-04 | End: 2019-09-03

## 2019-09-03 RX ORDER — LIDOCAINE HYDROCHLORIDE 10 MG/ML
INJECTION, SOLUTION EPIDURAL; INFILTRATION; INTRACAUDAL; PERINEURAL
Status: COMPLETED
Start: 2019-09-03 | End: 2019-09-03

## 2019-09-03 RX ORDER — TRAMADOL HYDROCHLORIDE 50 MG/1
50 TABLET ORAL EVERY 6 HOURS PRN
Status: DISCONTINUED | OUTPATIENT
Start: 2019-09-03 | End: 2019-09-03

## 2019-09-03 RX ORDER — CLOPIDOGREL BISULFATE 75 MG/1
75 TABLET ORAL DAILY
Status: DISCONTINUED | OUTPATIENT
Start: 2019-09-04 | End: 2019-09-03

## 2019-09-03 RX ORDER — SODIUM CHLORIDE 9 MG/ML
INJECTION, SOLUTION INTRAVENOUS CONTINUOUS
Status: DISCONTINUED | OUTPATIENT
Start: 2019-09-03 | End: 2019-09-03

## 2019-09-03 RX ORDER — ACETAMINOPHEN 325 MG/1
650 TABLET ORAL EVERY 4 HOURS PRN
Status: DISCONTINUED | OUTPATIENT
Start: 2019-09-03 | End: 2019-09-03

## 2019-09-03 RX ORDER — MIDAZOLAM HYDROCHLORIDE 1 MG/ML
INJECTION INTRAMUSCULAR; INTRAVENOUS
Status: COMPLETED
Start: 2019-09-03 | End: 2019-09-03

## 2019-09-03 RX ORDER — ASPIRIN 81 MG/1
81 TABLET ORAL DAILY
Status: DISCONTINUED | OUTPATIENT
Start: 2019-09-04 | End: 2019-09-03

## 2019-09-03 NOTE — PROGRESS NOTES
Pt A&O x 3 and ready to go home. VSS on RA. Vasc band removed from right radial area and pressure dressing applied. Pt recovered for 4 hours with the same day PCI. Cardiac rehab here to see her, EKG done. Discharge instructions reviewed with pt.  All questi

## 2019-09-03 NOTE — PROCEDURES
[address]    PATIENT'S NAME: Chris Mg   ATTENDING PHYSICIAN: Juan M Bustos MD   OPERATING PHYSICIAN: Juan M Bustos MD   PATIENT ACCOUNT#:   [id number]    LOCATION:  [facility] 7 EDWP 10  MEDICAL RECORD #:   [id number]       DATE OF BIRTH:  [birth year] We then turned our attention to the obtuse marginal.  We wired it with a BMW wire. We stented it with a 2.25 x 12 Alvaro drug-eluting stent at 14 atmospheres. We post it with the 2.5 mm noncompliant balloon making 2 inflations to 16 atmospheres.   We had a

## 2019-09-03 NOTE — PROGRESS NOTES
Cards    PCI. 2.0x12 Alvaro to mid LAD just after diagonal, posted with 2.5mm NC to 16ATM. 2.89d13xp [place] to OM posted with 2.5NC to 16ATM. Excellent result in both lesions. Radial.    PLAN: DAPT 1 year.   If ambulating and good hemostasis, can DC

## 2019-09-03 NOTE — H&P
Cards    See EMR. No changes.     Past Medical History:   Diagnosis Date   • AAA (abdominal aortic aneurysm) ([facility] [address]    • Abdominal hernia    • Arrhythmia     a-fib   • Arthritis    • Atherosclerosis of coronary artery    • CAD (coronary artery disease)

## 2019-09-05 LAB
ATRIAL RATE: 57 BPM
P AXIS: 86 DEGREES
P-R INTERVAL: 216 MS
Q-T INTERVAL: 428 MS
QRS DURATION: 90 MS
QTC CALCULATION (BEZET): 416 MS
R AXIS: -5 DEGREES
T AXIS: 22 DEGREES
VENTRICULAR RATE: 57 BPM

## 2019-10-01 ENCOUNTER — CARDPULM VISIT (OUTPATIENT)
Dept: CARDIAC REHAB | Facility: HOSPITAL | Age: 79
End: 2019-10-01
Attending: INTERNAL MEDICINE
Payer: MEDICARE

## 2019-10-01 PROCEDURE — 82962 GLUCOSE BLOOD TEST: CPT

## 2019-10-01 PROCEDURE — 93798 PHYS/QHP OP CAR RHAB W/ECG: CPT

## 2019-10-02 ENCOUNTER — TELEPHONE (OUTPATIENT)
Dept: HEMATOLOGY/ONCOLOGY | Facility: HOSPITAL | Age: 79
End: 2019-10-02

## 2019-10-02 DIAGNOSIS — C82.11 GRADE 2 FOLLICULAR LYMPHOMA OF LYMPH NODES OF NECK (HCC): Primary | ICD-10-CM

## 2019-10-02 NOTE — TELEPHONE ENCOUNTER
CHRISTA to call back and schedule follow-up appt with Dr. Courtney Light.     Re:  Labs, need to complete LFTs and LDH since 8/19 CBC and BMP were WNL

## 2019-10-02 NOTE — TELEPHONE ENCOUNTER
[place] called to say that she was coming up on the 1 year kevin since she was last seen by Dr. Marshall Moreno. She had called to see if there was any sort of follow up that needed to be done because there was nothing she could find in her notes saying there was. She ask that she please get a call to clarify what needs to be done.  Please advsie

## 2019-10-03 ENCOUNTER — CARDPULM VISIT (OUTPATIENT)
Dept: CARDIAC REHAB | Facility: HOSPITAL | Age: 79
End: 2019-10-03
Attending: INTERNAL MEDICINE
Payer: MEDICARE

## 2019-10-03 PROCEDURE — 93798 PHYS/QHP OP CAR RHAB W/ECG: CPT

## 2019-10-07 ENCOUNTER — APPOINTMENT (OUTPATIENT)
Dept: CARDIAC REHAB | Facility: HOSPITAL | Age: 79
End: 2019-10-07
Attending: INTERNAL MEDICINE
Payer: MEDICARE

## 2019-10-07 PROCEDURE — 93798 PHYS/QHP OP CAR RHAB W/ECG: CPT

## 2019-10-09 ENCOUNTER — APPOINTMENT (OUTPATIENT)
Dept: CARDIAC REHAB | Facility: HOSPITAL | Age: 79
End: 2019-10-09
Attending: INTERNAL MEDICINE
Payer: MEDICARE

## 2019-10-11 ENCOUNTER — APPOINTMENT (OUTPATIENT)
Dept: CARDIAC REHAB | Facility: HOSPITAL | Age: 79
End: 2019-10-11
Attending: INTERNAL MEDICINE
Payer: MEDICARE

## 2019-10-11 PROCEDURE — 93798 PHYS/QHP OP CAR RHAB W/ECG: CPT

## 2019-10-14 ENCOUNTER — APPOINTMENT (OUTPATIENT)
Dept: CARDIAC REHAB | Facility: HOSPITAL | Age: 79
End: 2019-10-14
Attending: INTERNAL MEDICINE
Payer: MEDICARE

## 2019-10-14 PROCEDURE — 93798 PHYS/QHP OP CAR RHAB W/ECG: CPT

## 2019-10-16 ENCOUNTER — APPOINTMENT (OUTPATIENT)
Dept: CARDIAC REHAB | Facility: HOSPITAL | Age: 79
End: 2019-10-16
Attending: INTERNAL MEDICINE
Payer: MEDICARE

## 2019-10-16 PROCEDURE — 93798 PHYS/QHP OP CAR RHAB W/ECG: CPT

## 2019-10-18 ENCOUNTER — APPOINTMENT (OUTPATIENT)
Dept: CARDIAC REHAB | Facility: HOSPITAL | Age: 79
End: 2019-10-18
Attending: INTERNAL MEDICINE
Payer: MEDICARE

## 2019-10-21 ENCOUNTER — CARDPULM VISIT (OUTPATIENT)
Dept: CARDIAC REHAB | Facility: HOSPITAL | Age: 79
End: 2019-10-21
Attending: INTERNAL MEDICINE
Payer: MEDICARE

## 2019-10-21 PROCEDURE — 93798 PHYS/QHP OP CAR RHAB W/ECG: CPT

## 2019-10-23 ENCOUNTER — APPOINTMENT (OUTPATIENT)
Dept: CARDIAC REHAB | Facility: HOSPITAL | Age: 79
End: 2019-10-23
Attending: INTERNAL MEDICINE
Payer: MEDICARE

## 2019-10-23 PROCEDURE — 93798 PHYS/QHP OP CAR RHAB W/ECG: CPT

## 2019-10-25 ENCOUNTER — APPOINTMENT (OUTPATIENT)
Dept: CARDIAC REHAB | Facility: HOSPITAL | Age: 79
End: 2019-10-25
Attending: INTERNAL MEDICINE
Payer: MEDICARE

## 2019-10-25 PROCEDURE — 93798 PHYS/QHP OP CAR RHAB W/ECG: CPT

## 2019-10-25 RX ORDER — BLOOD SUGAR DIAGNOSTIC
STRIP MISCELLANEOUS
Qty: 100 STRIP | Refills: 0 | Status: SHIPPED | OUTPATIENT
Start: 2019-10-25 | End: 2019-12-31

## 2019-10-28 ENCOUNTER — APPOINTMENT (OUTPATIENT)
Dept: CARDIAC REHAB | Facility: HOSPITAL | Age: 79
End: 2019-10-28
Attending: INTERNAL MEDICINE
Payer: MEDICARE

## 2019-10-28 ENCOUNTER — TELEPHONE (OUTPATIENT)
Dept: INTERNAL MEDICINE CLINIC | Facility: CLINIC | Age: 79
End: 2019-10-28

## 2019-10-28 DIAGNOSIS — C82.11 GRADE 2 FOLLICULAR LYMPHOMA OF LYMPH NODES OF NECK (HCC): Primary | ICD-10-CM

## 2019-10-28 PROCEDURE — 93798 PHYS/QHP OP CAR RHAB W/ECG: CPT

## 2019-10-28 NOTE — TELEPHONE ENCOUNTER
LOV 3/25/19 with AS. Pended referral for Dr Chula Hurd on 10/31/19 for f/u on radiation for Lymphoma. OK to order?

## 2019-10-28 NOTE — TELEPHONE ENCOUNTER
Specialty: Oncology    Full Name of Specialist: Dr. Ira Stafford     Date of Appointment: 10/31/19    Reason for the Appointment (be specific): FU on radiation for lymphoma     Has the patient seen a provider in our office for stated problem?: AS aware

## 2019-10-30 ENCOUNTER — CARDPULM VISIT (OUTPATIENT)
Dept: CARDIAC REHAB | Facility: HOSPITAL | Age: 79
End: 2019-10-30
Attending: INTERNAL MEDICINE
Payer: MEDICARE

## 2019-10-30 PROCEDURE — 93798 PHYS/QHP OP CAR RHAB W/ECG: CPT

## 2019-10-31 ENCOUNTER — OFFICE VISIT (OUTPATIENT)
Dept: HEMATOLOGY/ONCOLOGY | Age: 79
End: 2019-10-31
Attending: INTERNAL MEDICINE
Payer: MEDICARE

## 2019-10-31 VITALS
BODY MASS INDEX: 29 KG/M2 | OXYGEN SATURATION: 96 % | WEIGHT: 198.81 LBS | TEMPERATURE: 98 F | DIASTOLIC BLOOD PRESSURE: 85 MMHG | RESPIRATION RATE: 18 BRPM | HEART RATE: 69 BPM | SYSTOLIC BLOOD PRESSURE: 173 MMHG

## 2019-10-31 DIAGNOSIS — C82.11 GRADE 2 FOLLICULAR LYMPHOMA OF LYMPH NODES OF NECK (HCC): Primary | ICD-10-CM

## 2019-10-31 PROCEDURE — 99214 OFFICE O/P EST MOD 30 MIN: CPT | Performed by: INTERNAL MEDICINE

## 2019-10-31 NOTE — PROGRESS NOTES
Patient is here for MD f/u Lymphoma. Patient noticed a few new lumps in the right side of her neck. Patient does have problems with dysphagia but attributes this to achalasia. Denies fevers, night sweats or chills.  Patient had a heart attack in August and

## 2019-11-01 ENCOUNTER — APPOINTMENT (OUTPATIENT)
Dept: CARDIAC REHAB | Facility: HOSPITAL | Age: 79
End: 2019-11-01
Attending: INTERNAL MEDICINE
Payer: MEDICARE

## 2019-11-01 PROCEDURE — 93798 PHYS/QHP OP CAR RHAB W/ECG: CPT

## 2019-11-04 ENCOUNTER — CARDPULM VISIT (OUTPATIENT)
Dept: CARDIAC REHAB | Facility: HOSPITAL | Age: 79
End: 2019-11-04
Attending: INTERNAL MEDICINE
Payer: MEDICARE

## 2019-11-04 PROCEDURE — 93798 PHYS/QHP OP CAR RHAB W/ECG: CPT

## 2019-11-05 NOTE — PROGRESS NOTES
[facility]    PATIENT'S NAME: Melodie MCCONNELL   ATTENDING PHYSICIAN: Sincere Briones M.D.    PATIENT ACCOUNT #: [de-identified] LOCATION: [address] [place] [address] RECORD #: [id number] YOB: 1940   DATE OF SERVICE: 10/31/2019       [facility] pounds. Her blood pressure today is a little elevated at 173/85, but it has been normal in other settings. Pulse 69, respiratory rate 20, temperature 98. 3. HEENT:  To me does not demonstrate any pathologically enlarged lymph nodes.   She has small shot

## 2019-11-06 ENCOUNTER — CARDPULM VISIT (OUTPATIENT)
Dept: CARDIAC REHAB | Facility: HOSPITAL | Age: 79
End: 2019-11-06
Attending: INTERNAL MEDICINE
Payer: MEDICARE

## 2019-11-06 PROCEDURE — 93798 PHYS/QHP OP CAR RHAB W/ECG: CPT

## 2019-11-08 ENCOUNTER — APPOINTMENT (OUTPATIENT)
Dept: CARDIAC REHAB | Facility: HOSPITAL | Age: 79
End: 2019-11-08
Attending: INTERNAL MEDICINE
Payer: MEDICARE

## 2019-11-11 ENCOUNTER — CARDPULM VISIT (OUTPATIENT)
Dept: CARDIAC REHAB | Facility: HOSPITAL | Age: 79
End: 2019-11-11
Attending: INTERNAL MEDICINE
Payer: MEDICARE

## 2019-11-11 PROCEDURE — 93798 PHYS/QHP OP CAR RHAB W/ECG: CPT

## 2019-11-13 ENCOUNTER — CARDPULM VISIT (OUTPATIENT)
Dept: CARDIAC REHAB | Facility: HOSPITAL | Age: 79
End: 2019-11-13
Attending: INTERNAL MEDICINE
Payer: MEDICARE

## 2019-11-13 PROCEDURE — 93798 PHYS/QHP OP CAR RHAB W/ECG: CPT

## 2019-11-13 RX ORDER — METOPROLOL TARTRATE 50 MG/1
TABLET, FILM COATED ORAL
Qty: 90 TABLET | Refills: 0 | Status: SHIPPED | OUTPATIENT
Start: 2019-11-13 | End: 2019-12-26

## 2019-11-15 ENCOUNTER — CARDPULM VISIT (OUTPATIENT)
Dept: CARDIAC REHAB | Facility: HOSPITAL | Age: 79
End: 2019-11-15
Attending: INTERNAL MEDICINE
Payer: MEDICARE

## 2019-11-15 PROCEDURE — 93798 PHYS/QHP OP CAR RHAB W/ECG: CPT

## 2019-11-18 ENCOUNTER — APPOINTMENT (OUTPATIENT)
Dept: CARDIAC REHAB | Facility: HOSPITAL | Age: 79
End: 2019-11-18
Attending: INTERNAL MEDICINE
Payer: MEDICARE

## 2019-11-20 ENCOUNTER — CARDPULM VISIT (OUTPATIENT)
Dept: CARDIAC REHAB | Facility: HOSPITAL | Age: 79
End: 2019-11-20
Attending: INTERNAL MEDICINE
Payer: MEDICARE

## 2019-11-20 PROCEDURE — 93798 PHYS/QHP OP CAR RHAB W/ECG: CPT

## 2019-11-22 ENCOUNTER — CARDPULM VISIT (OUTPATIENT)
Dept: CARDIAC REHAB | Facility: HOSPITAL | Age: 79
End: 2019-11-22
Attending: INTERNAL MEDICINE
Payer: MEDICARE

## 2019-11-22 ENCOUNTER — HOSPITAL ENCOUNTER (OUTPATIENT)
Dept: CT IMAGING | Age: 79
Discharge: HOME | End: 2019-11-22
Attending: INTERNAL MEDICINE
Payer: MEDICARE

## 2019-11-22 DIAGNOSIS — C82.11 GRADE 2 FOLLICULAR LYMPHOMA OF LYMPH NODES OF NECK (HCC): ICD-10-CM

## 2019-11-22 PROCEDURE — 82565 ASSAY OF CREATININE: CPT

## 2019-11-22 PROCEDURE — 74177 CT ABD & PELVIS W/CONTRAST: CPT | Performed by: INTERNAL MEDICINE

## 2019-11-22 PROCEDURE — 93798 PHYS/QHP OP CAR RHAB W/ECG: CPT

## 2019-11-22 PROCEDURE — 71260 CT THORAX DX C+: CPT | Performed by: INTERNAL MEDICINE

## 2019-11-25 ENCOUNTER — TELEPHONE (OUTPATIENT)
Dept: INTERNAL MEDICINE CLINIC | Facility: CLINIC | Age: 79
End: 2019-11-25

## 2019-11-25 ENCOUNTER — CARDPULM VISIT (OUTPATIENT)
Dept: CARDIAC REHAB | Facility: HOSPITAL | Age: 79
End: 2019-11-25
Attending: INTERNAL MEDICINE
Payer: MEDICARE

## 2019-11-25 DIAGNOSIS — I71.4 ABDOMINAL AORTIC ANEURYSM (AAA) WITHOUT RUPTURE (HCC): ICD-10-CM

## 2019-11-25 DIAGNOSIS — I71.02 DISSECTION OF ABDOMINAL AORTA (HCC): Primary | ICD-10-CM

## 2019-11-25 PROCEDURE — 93798 PHYS/QHP OP CAR RHAB W/ECG: CPT

## 2019-11-25 NOTE — TELEPHONE ENCOUNTER
LOV 3/25/19 with AS. Pended referral for Dr Carissa Jansen Vascular Surgeon for 3 visits. OK to order?

## 2019-11-25 NOTE — TELEPHONE ENCOUNTER
Specialty: Vascular surgeon     Full Name of Specialist: Dr. Ronell Brittle    Date of Appointment: 12/17/19    Reason for the Appointment (be specific): Abdominal Aortic Aneurysm     Has the patient seen a provider in our office for stated problem?:  Yes

## 2019-11-26 ENCOUNTER — HOSPITAL ENCOUNTER (OUTPATIENT)
Dept: CARDIOLOGY CLINIC | Facility: HOSPITAL | Age: 79
Discharge: HOME | End: 2019-11-26
Attending: INTERNAL MEDICINE

## 2019-11-26 DIAGNOSIS — Z13.9 ENCOUNTER FOR SCREENING: ICD-10-CM

## 2019-11-27 ENCOUNTER — CARDPULM VISIT (OUTPATIENT)
Dept: CARDIAC REHAB | Facility: HOSPITAL | Age: 79
End: 2019-11-27
Attending: INTERNAL MEDICINE
Payer: MEDICARE

## 2019-11-27 PROCEDURE — 93798 PHYS/QHP OP CAR RHAB W/ECG: CPT

## 2019-11-27 RX ORDER — LISINOPRIL 10 MG/1
TABLET ORAL
Qty: 90 TABLET | Refills: 0 | Status: SHIPPED | OUTPATIENT
Start: 2019-11-27 | End: 2019-12-26

## 2019-11-27 RX ORDER — GLYBURIDE 5 MG/1
TABLET ORAL
Qty: 90 TABLET | Refills: 0 | Status: SHIPPED | OUTPATIENT
Start: 2019-11-27 | End: 2020-01-27

## 2019-11-27 RX ORDER — PRAVASTATIN SODIUM 80 MG/1
TABLET ORAL
Qty: 90 TABLET | Refills: 0 | Status: SHIPPED | OUTPATIENT
Start: 2019-11-27 | End: 2020-01-27

## 2019-11-29 ENCOUNTER — APPOINTMENT (OUTPATIENT)
Dept: CARDIAC REHAB | Facility: HOSPITAL | Age: 79
End: 2019-11-29
Attending: INTERNAL MEDICINE
Payer: MEDICARE

## 2019-12-02 ENCOUNTER — CARDPULM VISIT (OUTPATIENT)
Dept: CARDIAC REHAB | Facility: HOSPITAL | Age: 79
End: 2019-12-02
Attending: INTERNAL MEDICINE
Payer: MEDICARE

## 2019-12-02 PROCEDURE — 93798 PHYS/QHP OP CAR RHAB W/ECG: CPT

## 2019-12-04 ENCOUNTER — CARDPULM VISIT (OUTPATIENT)
Dept: CARDIAC REHAB | Facility: HOSPITAL | Age: 79
End: 2019-12-04
Attending: INTERNAL MEDICINE
Payer: MEDICARE

## 2019-12-04 PROCEDURE — 93798 PHYS/QHP OP CAR RHAB W/ECG: CPT

## 2019-12-06 ENCOUNTER — CARDPULM VISIT (OUTPATIENT)
Dept: CARDIAC REHAB | Facility: HOSPITAL | Age: 79
End: 2019-12-06
Attending: INTERNAL MEDICINE
Payer: MEDICARE

## 2019-12-06 PROCEDURE — 93798 PHYS/QHP OP CAR RHAB W/ECG: CPT

## 2019-12-09 ENCOUNTER — CARDPULM VISIT (OUTPATIENT)
Dept: CARDIAC REHAB | Facility: HOSPITAL | Age: 79
End: 2019-12-09
Attending: INTERNAL MEDICINE
Payer: MEDICARE

## 2019-12-09 PROCEDURE — 93798 PHYS/QHP OP CAR RHAB W/ECG: CPT

## 2019-12-11 ENCOUNTER — CARDPULM VISIT (OUTPATIENT)
Dept: CARDIAC REHAB | Facility: HOSPITAL | Age: 79
End: 2019-12-11
Attending: INTERNAL MEDICINE
Payer: MEDICARE

## 2019-12-11 PROCEDURE — 93798 PHYS/QHP OP CAR RHAB W/ECG: CPT

## 2019-12-13 ENCOUNTER — APPOINTMENT (OUTPATIENT)
Dept: CARDIAC REHAB | Facility: HOSPITAL | Age: 79
End: 2019-12-13
Attending: INTERNAL MEDICINE
Payer: MEDICARE

## 2019-12-16 ENCOUNTER — APPOINTMENT (OUTPATIENT)
Dept: CARDIAC REHAB | Facility: HOSPITAL | Age: 79
End: 2019-12-16
Attending: INTERNAL MEDICINE
Payer: MEDICARE

## 2019-12-18 ENCOUNTER — APPOINTMENT (OUTPATIENT)
Dept: CARDIAC REHAB | Facility: HOSPITAL | Age: 79
End: 2019-12-18
Attending: INTERNAL MEDICINE
Payer: MEDICARE

## 2019-12-20 ENCOUNTER — APPOINTMENT (OUTPATIENT)
Dept: CARDIAC REHAB | Facility: HOSPITAL | Age: 79
End: 2019-12-20
Attending: INTERNAL MEDICINE
Payer: MEDICARE

## 2019-12-23 ENCOUNTER — APPOINTMENT (OUTPATIENT)
Dept: CARDIAC REHAB | Facility: HOSPITAL | Age: 79
End: 2019-12-23
Attending: INTERNAL MEDICINE
Payer: MEDICARE

## 2019-12-25 ENCOUNTER — APPOINTMENT (OUTPATIENT)
Dept: CARDIAC REHAB | Facility: HOSPITAL | Age: 79
End: 2019-12-25
Attending: INTERNAL MEDICINE
Payer: MEDICARE

## 2019-12-31 RX ORDER — BLOOD SUGAR DIAGNOSTIC
1 STRIP MISCELLANEOUS DAILY
Qty: 100 STRIP | Refills: 1 | Status: SHIPPED | OUTPATIENT
Start: 2019-12-31 | End: 2020-01-27

## 2020-01-02 ENCOUNTER — TELEPHONE (OUTPATIENT)
Dept: INTERNAL MEDICINE CLINIC | Facility: CLINIC | Age: 80
End: 2020-01-02

## 2020-01-02 ENCOUNTER — LAB ENCOUNTER (OUTPATIENT)
Dept: LAB | Age: 80
End: 2020-01-02
Attending: SURGERY
Payer: MEDICARE

## 2020-01-02 DIAGNOSIS — I10 HTN (HYPERTENSION): ICD-10-CM

## 2020-01-02 DIAGNOSIS — E11.9 DIABETES (HCC): ICD-10-CM

## 2020-01-02 DIAGNOSIS — E78.00 HYPERCHOLESTEREMIA: ICD-10-CM

## 2020-01-02 DIAGNOSIS — Z01.818 PRE-OP TESTING: ICD-10-CM

## 2020-01-02 DIAGNOSIS — I71.4 AAA (ABDOMINAL AORTIC ANEURYSM) (HCC): ICD-10-CM

## 2020-01-02 DIAGNOSIS — C82.11 GRADE 2 FOLLICULAR LYMPHOMA OF LYMPH NODES OF NECK (HCC): ICD-10-CM

## 2020-01-02 LAB
ALBUMIN SERPL-MCNC: 3.1 G/DL (ref 3.4–5)
ALBUMIN/GLOB SERPL: 0.9 {RATIO} (ref 1–2)
ALP LIVER SERPL-CCNC: 85 U/L (ref 55–142)
ALT SERPL-CCNC: 11 U/L (ref 13–56)
ANION GAP SERPL CALC-SCNC: 3 MMOL/L (ref 0–18)
ANTIBODY SCREEN: NEGATIVE
APTT PPP: 36 SECONDS (ref 25.4–36.1)
AST SERPL-CCNC: 5 U/L (ref 15–37)
BASOPHILS # BLD AUTO: 0.05 X10(3) UL (ref 0–0.2)
BASOPHILS NFR BLD AUTO: 0.5 %
BILIRUB DIRECT SERPL-MCNC: 0.1 MG/DL (ref 0–0.2)
BILIRUB SERPL-MCNC: 0.4 MG/DL (ref 0.1–2)
BUN BLD-MCNC: 11 MG/DL (ref 7–18)
BUN/CREAT SERPL: 13.3 (ref 10–20)
CALCIUM BLD-MCNC: 9 MG/DL (ref 8.5–10.1)
CHLORIDE SERPL-SCNC: 104 MMOL/L (ref 98–112)
CO2 SERPL-SCNC: 34 MMOL/L (ref 21–32)
CREAT BLD-MCNC: 0.83 MG/DL (ref 0.55–1.02)
DEPRECATED RDW RBC AUTO: 45.1 FL (ref 35.1–46.3)
EOSINOPHIL # BLD AUTO: 0.27 X10(3) UL (ref 0–0.7)
EOSINOPHIL NFR BLD AUTO: 3 %
ERYTHROCYTE [DISTWIDTH] IN BLOOD BY AUTOMATED COUNT: 14.5 % (ref 11–15)
GLOBULIN PLAS-MCNC: 3.6 G/DL (ref 2.8–4.4)
GLUCOSE BLD-MCNC: 153 MG/DL (ref 70–99)
HCT VFR BLD AUTO: 43.9 % (ref 35–48)
HGB BLD-MCNC: 13.2 G/DL (ref 12–16)
IMM GRANULOCYTES # BLD AUTO: 0.02 X10(3) UL (ref 0–1)
IMM GRANULOCYTES NFR BLD: 0.2 %
INR BLD: 1.08 (ref 0.9–1.1)
LDH SERPL L TO P-CCNC: 144 U/L (ref 84–246)
LYMPHOCYTES # BLD AUTO: 1.01 X10(3) UL (ref 1–4)
LYMPHOCYTES NFR BLD AUTO: 11.1 %
M PROTEIN MFR SERPL ELPH: 6.7 G/DL (ref 6.4–8.2)
MCH RBC QN AUTO: 26.1 PG (ref 26–34)
MCHC RBC AUTO-ENTMCNC: 30.1 G/DL (ref 31–37)
MCV RBC AUTO: 86.8 FL (ref 80–100)
MONOCYTES # BLD AUTO: 0.66 X10(3) UL (ref 0.1–1)
MONOCYTES NFR BLD AUTO: 7.2 %
NEUTROPHILS # BLD AUTO: 7.11 X10 (3) UL (ref 1.5–7.7)
NEUTROPHILS # BLD AUTO: 7.11 X10(3) UL (ref 1.5–7.7)
NEUTROPHILS NFR BLD AUTO: 78 %
OSMOLALITY SERPL CALC.SUM OF ELEC: 294 MOSM/KG (ref 275–295)
PATIENT FASTING Y/N/NP: NO
PLATELET # BLD AUTO: 208 10(3)UL (ref 150–450)
POTASSIUM SERPL-SCNC: 4.6 MMOL/L (ref 3.5–5.1)
PSA SERPL DL<=0.01 NG/ML-MCNC: 14.5 SECONDS (ref 12.5–14.7)
RBC # BLD AUTO: 5.06 X10(6)UL (ref 3.8–5.3)
RH BLOOD TYPE: POSITIVE
SODIUM SERPL-SCNC: 141 MMOL/L (ref 136–145)
WBC # BLD AUTO: 9.1 X10(3) UL (ref 4–11)

## 2020-01-02 PROCEDURE — 85025 COMPLETE CBC W/AUTO DIFF WBC: CPT

## 2020-01-02 PROCEDURE — 85730 THROMBOPLASTIN TIME PARTIAL: CPT

## 2020-01-02 PROCEDURE — 80053 COMPREHEN METABOLIC PANEL: CPT

## 2020-01-02 PROCEDURE — 86850 RBC ANTIBODY SCREEN: CPT

## 2020-01-02 PROCEDURE — 82248 BILIRUBIN DIRECT: CPT

## 2020-01-02 PROCEDURE — 83615 LACTATE (LD) (LDH) ENZYME: CPT

## 2020-01-02 PROCEDURE — 36415 COLL VENOUS BLD VENIPUNCTURE: CPT

## 2020-01-02 PROCEDURE — 85610 PROTHROMBIN TIME: CPT

## 2020-01-02 PROCEDURE — 86900 BLOOD TYPING SEROLOGIC ABO: CPT

## 2020-01-02 PROCEDURE — 86901 BLOOD TYPING SEROLOGIC RH(D): CPT

## 2020-01-02 NOTE — TELEPHONE ENCOUNTER
Pt called back and stated that she is dealing with an AAA and is getting that repaired with Dr. Pedro Rueda on 01/08/20.      She will callback after to schedule

## 2020-01-07 ENCOUNTER — ANESTHESIA EVENT (OUTPATIENT)
Dept: CARDIAC SURGERY | Facility: HOSPITAL | Age: 80
DRG: 269 | End: 2020-01-07
Payer: MEDICARE

## 2020-01-07 NOTE — ANESTHESIA PREPROCEDURE EVALUATION
PRE-OP EVALUATION    Patient Name: Theron Lundberg    Pre-op Diagnosis: abdominal aortic aneurysm    Procedure(s):  stent graft repair abdominal aortic aneurysm, using cell saver  SA pending    Surgeon(s) and Role:     Lucinda Robles MD - Primary    Pr reviewed.             (+) hypertension     (+) CAD    (+) CABG/stent                            Endo/Other  Comment: Right TKR  H/o  Thyroidectomy  Treated for lymphoma neck region.    (+) diabetes  type 2, not using insulin  (+) hypothyroidism Used    Alcohol use: No      Alcohol/week: 0.0 standard drinks      Drug use: No     Available pre-op labs reviewed.   Lab Results   Component Value Date    WBC 9.1 01/02/2020    RBC 5.06 01/02/2020    HGB 13.2 01/02/2020    HCT 43.9 01/02/2020    MCV 86.8

## 2020-01-08 ENCOUNTER — HOSPITAL ENCOUNTER (INPATIENT)
Facility: HOSPITAL | Age: 80
LOS: 1 days | Discharge: HOME | DRG: 269 | End: 2020-01-09
Attending: SURGERY | Admitting: SURGERY
Payer: MEDICARE

## 2020-01-08 ENCOUNTER — ANESTHESIA (OUTPATIENT)
Dept: CARDIAC SURGERY | Facility: HOSPITAL | Age: 80
DRG: 269 | End: 2020-01-08
Payer: MEDICARE

## 2020-01-08 PROBLEM — I71.4 ENLARGING ABDOMINAL AORTIC ANEURYSM (AAA): Status: ACTIVE | Noted: 2020-01-08

## 2020-01-08 PROBLEM — I71.40 ENLARGING ABDOMINAL AORTIC ANEURYSM (AAA): Status: ACTIVE | Noted: 2020-01-08

## 2020-01-08 PROBLEM — I71.40 ENLARGING ABDOMINAL AORTIC ANEURYSM (AAA) (HCC): Status: ACTIVE | Noted: 2020-01-08

## 2020-01-08 PROBLEM — I71.4 ENLARGING ABDOMINAL AORTIC ANEURYSM (AAA) (HCC): Status: ACTIVE | Noted: 2020-01-08

## 2020-01-08 PROBLEM — E11.9 TYPE 2 DIABETES MELLITUS WITHOUT COMPLICATION, WITHOUT LONG-TERM CURRENT USE OF INSULIN (HCC): Status: ACTIVE | Noted: 2017-04-03

## 2020-01-08 LAB
ALBUMIN SERPL-MCNC: 2.6 G/DL (ref 3.4–5)
ALBUMIN/GLOB SERPL: 0.9 {RATIO} (ref 1–2)
ALP LIVER SERPL-CCNC: 71 U/L (ref 55–142)
ALT SERPL-CCNC: 9 U/L (ref 13–56)
ANION GAP SERPL CALC-SCNC: 3 MMOL/L (ref 0–18)
APTT PPP: 44.9 SECONDS (ref 25.4–36.1)
AST SERPL-CCNC: 6 U/L (ref 15–37)
ATRIAL RATE: 59 BPM
BILIRUB SERPL-MCNC: 0.5 MG/DL (ref 0.1–2)
BUN BLD-MCNC: 15 MG/DL (ref 7–18)
BUN/CREAT SERPL: 18.8 (ref 10–20)
CALCIUM BLD-MCNC: 7.9 MG/DL (ref 8.5–10.1)
CHLORIDE SERPL-SCNC: 109 MMOL/L (ref 98–112)
CO2 SERPL-SCNC: 29 MMOL/L (ref 21–32)
CREAT BLD-MCNC: 0.8 MG/DL (ref 0.55–1.02)
DEPRECATED RDW RBC AUTO: 44.3 FL (ref 35.1–46.3)
ERYTHROCYTE [DISTWIDTH] IN BLOOD BY AUTOMATED COUNT: 14.4 % (ref 11–15)
EST. AVERAGE GLUCOSE BLD GHB EST-MCNC: 151 MG/DL (ref 68–126)
GLOBULIN PLAS-MCNC: 3 G/DL (ref 2.8–4.4)
GLUCOSE BLD-MCNC: 148 MG/DL (ref 70–99)
GLUCOSE BLD-MCNC: 150 MG/DL (ref 70–99)
GLUCOSE BLD-MCNC: 151 MG/DL (ref 70–99)
GLUCOSE BLD-MCNC: 159 MG/DL (ref 70–99)
GLUCOSE BLD-MCNC: 185 MG/DL (ref 70–99)
HBA1C MFR BLD HPLC: 6.9 % (ref ?–5.7)
HCT VFR BLD AUTO: 36.6 % (ref 35–48)
HGB BLD-MCNC: 11.4 G/DL (ref 12–16)
INR BLD: 1.24 (ref 0.9–1.1)
ISTAT ACTIVATED CLOTTING TIME: 142 SECONDS (ref 74–137)
ISTAT ACTIVATED CLOTTING TIME: 368 SECONDS (ref 74–137)
ISTAT BLOOD GAS BASE EXCESS: 9 MMOL/L (ref ?–30)
ISTAT BLOOD GAS HCO3: 31.8 MEQ/L (ref 22–26)
ISTAT BLOOD GAS O2 SATURATION: 100 % (ref 92–100)
ISTAT BLOOD GAS PCO2: 41.7 MMHG (ref 35–45)
ISTAT BLOOD GAS PH: 7.49 (ref 7.35–7.45)
ISTAT BLOOD GAS PO2: 295 MMHG (ref 80–105)
ISTAT BLOOD GAS TCO2: 33 MMOL/L (ref 22–32)
ISTAT HEMATOCRIT: 34 % (ref 34–50)
ISTAT IONIZED CALCIUM: 1.11 MMOL/L (ref 1.12–1.32)
ISTAT POTASSIUM: 4.2 MMOL/L (ref 3.6–5.1)
ISTAT SODIUM: 139 MMOL/L (ref 136–145)
M PROTEIN MFR SERPL ELPH: 5.6 G/DL (ref 6.4–8.2)
MCH RBC QN AUTO: 26.5 PG (ref 26–34)
MCHC RBC AUTO-ENTMCNC: 31.1 G/DL (ref 31–37)
MCV RBC AUTO: 84.9 FL (ref 80–100)
OSMOLALITY SERPL CALC.SUM OF ELEC: 298 MOSM/KG (ref 275–295)
P AXIS: 81 DEGREES
P-R INTERVAL: 192 MS
PLATELET # BLD AUTO: 134 10(3)UL (ref 150–450)
POTASSIUM SERPL-SCNC: 3.9 MMOL/L (ref 3.5–5.1)
PSA SERPL DL<=0.01 NG/ML-MCNC: 16.2 SECONDS (ref 12.5–14.7)
Q-T INTERVAL: 410 MS
QRS DURATION: 88 MS
QTC CALCULATION (BEZET): 405 MS
R AXIS: 15 DEGREES
RBC # BLD AUTO: 4.31 X10(6)UL (ref 3.8–5.3)
SODIUM SERPL-SCNC: 141 MMOL/L (ref 136–145)
T AXIS: 39 DEGREES
VENTRICULAR RATE: 59 BPM
WBC # BLD AUTO: 10.4 X10(3) UL (ref 4–11)

## 2020-01-08 PROCEDURE — 04V03FZ RESTRICTION OF ABDOMINAL AORTA WITH BRANCHED OR FENESTRATED INTRALUMINAL DEVICE, THREE OR MORE ARTERIES, PERCUTANEOUS APPROACH: ICD-10-PCS | Performed by: SURGERY

## 2020-01-08 PROCEDURE — B41D1ZZ FLUOROSCOPY OF AORTA AND BILATERAL LOWER EXTREMITY ARTERIES USING LOW OSMOLAR CONTRAST: ICD-10-PCS | Performed by: SURGERY

## 2020-01-08 PROCEDURE — 99222 1ST HOSP IP/OBS MODERATE 55: CPT | Performed by: STUDENT IN AN ORGANIZED HEALTH CARE EDUCATION/TRAINING PROGRAM

## 2020-01-08 RX ORDER — MORPHINE SULFATE 4 MG/ML
2 INJECTION, SOLUTION INTRAMUSCULAR; INTRAVENOUS EVERY 2 HOUR PRN
Status: DISCONTINUED | OUTPATIENT
Start: 2020-01-08 | End: 2020-01-09

## 2020-01-08 RX ORDER — NITROGLYCERIN 20 MG/100ML
INJECTION INTRAVENOUS CONTINUOUS
Status: DISCONTINUED | OUTPATIENT
Start: 2020-01-08 | End: 2020-01-09

## 2020-01-08 RX ORDER — CEFAZOLIN SODIUM/WATER 2 G/20 ML
SYRINGE (ML) INTRAVENOUS AS NEEDED
Status: DISCONTINUED | OUTPATIENT
Start: 2020-01-08 | End: 2020-01-08 | Stop reason: SURG

## 2020-01-08 RX ORDER — BISACODYL 10 MG
10 SUPPOSITORY, RECTAL RECTAL
Status: DISCONTINUED | OUTPATIENT
Start: 2020-01-08 | End: 2020-01-09

## 2020-01-08 RX ORDER — DEXTROSE MONOHYDRATE 25 G/50ML
50 INJECTION, SOLUTION INTRAVENOUS
Status: DISCONTINUED | OUTPATIENT
Start: 2020-01-08 | End: 2020-01-09

## 2020-01-08 RX ORDER — DEXTROSE MONOHYDRATE 50 MG/ML
INJECTION, SOLUTION INTRAVENOUS CONTINUOUS
Status: DISCONTINUED | OUTPATIENT
Start: 2020-01-08 | End: 2020-01-09

## 2020-01-08 RX ORDER — CEFAZOLIN SODIUM/WATER 2 G/20 ML
2 SYRINGE (ML) INTRAVENOUS EVERY 8 HOURS
Status: COMPLETED | OUTPATIENT
Start: 2020-01-08 | End: 2020-01-08

## 2020-01-08 RX ORDER — NITROGLYCERIN 20 MG/100ML
INJECTION INTRAVENOUS CONTINUOUS
Status: DISCONTINUED | OUTPATIENT
Start: 2020-01-08 | End: 2020-01-08

## 2020-01-08 RX ORDER — DEXTROSE MONOHYDRATE 25 G/50ML
50 INJECTION, SOLUTION INTRAVENOUS
Status: DISCONTINUED | OUTPATIENT
Start: 2020-01-08 | End: 2020-01-08 | Stop reason: HOSPADM

## 2020-01-08 RX ORDER — HEPARIN SODIUM 5000 [USP'U]/ML
5000 INJECTION, SOLUTION INTRAVENOUS; SUBCUTANEOUS ONCE
Status: DISCONTINUED | OUTPATIENT
Start: 2020-01-08 | End: 2020-01-08 | Stop reason: HOSPADM

## 2020-01-08 RX ORDER — DEXAMETHASONE SODIUM PHOSPHATE 4 MG/ML
VIAL (ML) INJECTION AS NEEDED
Status: DISCONTINUED | OUTPATIENT
Start: 2020-01-08 | End: 2020-01-08 | Stop reason: SURG

## 2020-01-08 RX ORDER — BLOOD SUGAR DIAGNOSTIC
1 STRIP MISCELLANEOUS DAILY
Status: DISCONTINUED | OUTPATIENT
Start: 2020-01-08 | End: 2020-01-08 | Stop reason: RX

## 2020-01-08 RX ORDER — METOPROLOL TARTRATE 50 MG/1
50 TABLET, FILM COATED ORAL 2 TIMES DAILY
Status: DISCONTINUED | OUTPATIENT
Start: 2020-01-08 | End: 2020-01-09

## 2020-01-08 RX ORDER — HEPARIN SODIUM 1000 [USP'U]/ML
INJECTION, SOLUTION INTRAVENOUS; SUBCUTANEOUS AS NEEDED
Status: DISCONTINUED | OUTPATIENT
Start: 2020-01-08 | End: 2020-01-08 | Stop reason: SURG

## 2020-01-08 RX ORDER — ONDANSETRON 2 MG/ML
INJECTION INTRAMUSCULAR; INTRAVENOUS AS NEEDED
Status: DISCONTINUED | OUTPATIENT
Start: 2020-01-08 | End: 2020-01-08 | Stop reason: SURG

## 2020-01-08 RX ORDER — BUPIVACAINE HYDROCHLORIDE 5 MG/ML
INJECTION, SOLUTION EPIDURAL; INTRACAUDAL AS NEEDED
Status: DISCONTINUED | OUTPATIENT
Start: 2020-01-08 | End: 2020-01-08 | Stop reason: HOSPADM

## 2020-01-08 RX ORDER — ACETAMINOPHEN 325 MG/1
650 TABLET ORAL EVERY 4 HOURS PRN
Status: DISCONTINUED | OUTPATIENT
Start: 2020-01-08 | End: 2020-01-09

## 2020-01-08 RX ORDER — PRAVASTATIN SODIUM 20 MG
80 TABLET ORAL
Status: DISCONTINUED | OUTPATIENT
Start: 2020-01-08 | End: 2020-01-09

## 2020-01-08 RX ORDER — INSULIN ASPART 100 [IU]/ML
INJECTION, SOLUTION INTRAVENOUS; SUBCUTANEOUS
Status: COMPLETED
Start: 2020-01-08 | End: 2020-01-08

## 2020-01-08 RX ORDER — EPHEDRINE SULFATE 50 MG/ML
INJECTION, SOLUTION INTRAVENOUS AS NEEDED
Status: DISCONTINUED | OUTPATIENT
Start: 2020-01-08 | End: 2020-01-08 | Stop reason: SURG

## 2020-01-08 RX ORDER — NALOXONE HYDROCHLORIDE 0.4 MG/ML
80 INJECTION, SOLUTION INTRAMUSCULAR; INTRAVENOUS; SUBCUTANEOUS AS NEEDED
Status: DISCONTINUED | OUTPATIENT
Start: 2020-01-08 | End: 2020-01-08 | Stop reason: HOSPADM

## 2020-01-08 RX ORDER — LABETALOL HYDROCHLORIDE 5 MG/ML
INJECTION, SOLUTION INTRAVENOUS
Status: COMPLETED
Start: 2020-01-08 | End: 2020-01-08

## 2020-01-08 RX ORDER — CLOPIDOGREL BISULFATE 75 MG/1
75 TABLET ORAL DAILY
Status: DISCONTINUED | OUTPATIENT
Start: 2020-01-08 | End: 2020-01-09

## 2020-01-08 RX ORDER — SODIUM CHLORIDE, SODIUM LACTATE, POTASSIUM CHLORIDE, CALCIUM CHLORIDE 600; 310; 30; 20 MG/100ML; MG/100ML; MG/100ML; MG/100ML
INJECTION, SOLUTION INTRAVENOUS CONTINUOUS
Status: DISCONTINUED | OUTPATIENT
Start: 2020-01-08 | End: 2020-01-09

## 2020-01-08 RX ORDER — INSULIN ASPART 100 [IU]/ML
INJECTION, SOLUTION INTRAVENOUS; SUBCUTANEOUS ONCE
Status: COMPLETED | OUTPATIENT
Start: 2020-01-08 | End: 2020-01-08

## 2020-01-08 RX ORDER — PHENYLEPHRINE HCL 10 MG/ML
VIAL (ML) INJECTION AS NEEDED
Status: DISCONTINUED | OUTPATIENT
Start: 2020-01-08 | End: 2020-01-08 | Stop reason: SURG

## 2020-01-08 RX ORDER — HYDROCODONE BITARTRATE AND ACETAMINOPHEN 5; 325 MG/1; MG/1
2 TABLET ORAL EVERY 4 HOURS PRN
Status: DISCONTINUED | OUTPATIENT
Start: 2020-01-08 | End: 2020-01-09

## 2020-01-08 RX ORDER — PROTAMINE SULFATE 10 MG/ML
INJECTION, SOLUTION INTRAVENOUS AS NEEDED
Status: DISCONTINUED | OUTPATIENT
Start: 2020-01-08 | End: 2020-01-08 | Stop reason: SURG

## 2020-01-08 RX ORDER — NITROGLYCERIN 20 MG/100ML
INJECTION INTRAVENOUS
Status: DISCONTINUED | OUTPATIENT
Start: 2020-01-08 | End: 2020-01-09

## 2020-01-08 RX ORDER — NEOSTIGMINE METHYLSULFATE 1 MG/ML
INJECTION INTRAVENOUS AS NEEDED
Status: DISCONTINUED | OUTPATIENT
Start: 2020-01-08 | End: 2020-01-08 | Stop reason: SURG

## 2020-01-08 RX ORDER — AMLODIPINE BESYLATE 5 MG/1
5 TABLET ORAL DAILY
Status: DISCONTINUED | OUTPATIENT
Start: 2020-01-08 | End: 2020-01-09

## 2020-01-08 RX ORDER — ROCURONIUM BROMIDE 10 MG/ML
INJECTION, SOLUTION INTRAVENOUS AS NEEDED
Status: DISCONTINUED | OUTPATIENT
Start: 2020-01-08 | End: 2020-01-08 | Stop reason: SURG

## 2020-01-08 RX ORDER — GLYCOPYRROLATE 0.2 MG/ML
INJECTION, SOLUTION INTRAMUSCULAR; INTRAVENOUS AS NEEDED
Status: DISCONTINUED | OUTPATIENT
Start: 2020-01-08 | End: 2020-01-08 | Stop reason: SURG

## 2020-01-08 RX ORDER — MIDAZOLAM HYDROCHLORIDE 1 MG/ML
INJECTION INTRAMUSCULAR; INTRAVENOUS AS NEEDED
Status: DISCONTINUED | OUTPATIENT
Start: 2020-01-08 | End: 2020-01-08 | Stop reason: SURG

## 2020-01-08 RX ORDER — ONDANSETRON 2 MG/ML
INJECTION INTRAMUSCULAR; INTRAVENOUS
Status: COMPLETED
Start: 2020-01-08 | End: 2020-01-08

## 2020-01-08 RX ORDER — LIDOCAINE HYDROCHLORIDE 10 MG/ML
INJECTION, SOLUTION EPIDURAL; INFILTRATION; INTRACAUDAL; PERINEURAL AS NEEDED
Status: DISCONTINUED | OUTPATIENT
Start: 2020-01-08 | End: 2020-01-08 | Stop reason: SURG

## 2020-01-08 RX ORDER — DEXTROSE MONOHYDRATE 25 G/50ML
50 INJECTION, SOLUTION INTRAVENOUS
Status: DISCONTINUED | OUTPATIENT
Start: 2020-01-08 | End: 2020-01-08

## 2020-01-08 RX ORDER — LISINOPRIL 20 MG/1
20 TABLET ORAL 2 TIMES DAILY
Status: DISCONTINUED | OUTPATIENT
Start: 2020-01-08 | End: 2020-01-09

## 2020-01-08 RX ORDER — HYDROCODONE BITARTRATE AND ACETAMINOPHEN 5; 325 MG/1; MG/1
1 TABLET ORAL EVERY 4 HOURS PRN
Status: DISCONTINUED | OUTPATIENT
Start: 2020-01-08 | End: 2020-01-09

## 2020-01-08 RX ORDER — SODIUM CHLORIDE, SODIUM LACTATE, POTASSIUM CHLORIDE, CALCIUM CHLORIDE 600; 310; 30; 20 MG/100ML; MG/100ML; MG/100ML; MG/100ML
INJECTION, SOLUTION INTRAVENOUS CONTINUOUS PRN
Status: DISCONTINUED | OUTPATIENT
Start: 2020-01-08 | End: 2020-01-08 | Stop reason: SURG

## 2020-01-08 RX ORDER — SODIUM CHLORIDE 9 MG/ML
INJECTION, SOLUTION INTRAVENOUS CONTINUOUS
Status: DISCONTINUED | OUTPATIENT
Start: 2020-01-08 | End: 2020-01-09

## 2020-01-08 RX ORDER — HYDROMORPHONE HYDROCHLORIDE 1 MG/ML
0.4 INJECTION, SOLUTION INTRAMUSCULAR; INTRAVENOUS; SUBCUTANEOUS EVERY 5 MIN PRN
Status: DISCONTINUED | OUTPATIENT
Start: 2020-01-08 | End: 2020-01-08 | Stop reason: HOSPADM

## 2020-01-08 RX ORDER — LABETALOL HYDROCHLORIDE 5 MG/ML
5 INJECTION, SOLUTION INTRAVENOUS EVERY 5 MIN PRN
Status: DISCONTINUED | OUTPATIENT
Start: 2020-01-08 | End: 2020-01-08 | Stop reason: HOSPADM

## 2020-01-08 RX ORDER — ASPIRIN 81 MG/1
81 TABLET ORAL DAILY
Status: DISCONTINUED | OUTPATIENT
Start: 2020-01-08 | End: 2020-01-09

## 2020-01-08 RX ORDER — DOCUSATE SODIUM 100 MG/1
100 CAPSULE, LIQUID FILLED ORAL 2 TIMES DAILY
Status: DISCONTINUED | OUTPATIENT
Start: 2020-01-08 | End: 2020-01-09

## 2020-01-08 RX ORDER — SODIUM CHLORIDE 9 MG/ML
INJECTION, SOLUTION INTRAVENOUS CONTINUOUS PRN
Status: DISCONTINUED | OUTPATIENT
Start: 2020-01-08 | End: 2020-01-08 | Stop reason: SURG

## 2020-01-08 RX ORDER — LABETALOL HYDROCHLORIDE 5 MG/ML
INJECTION, SOLUTION INTRAVENOUS AS NEEDED
Status: DISCONTINUED | OUTPATIENT
Start: 2020-01-08 | End: 2020-01-08 | Stop reason: SURG

## 2020-01-08 RX ORDER — ONDANSETRON 2 MG/ML
4 INJECTION INTRAMUSCULAR; INTRAVENOUS EVERY 6 HOURS PRN
Status: DISCONTINUED | OUTPATIENT
Start: 2020-01-08 | End: 2020-01-09

## 2020-01-08 RX ORDER — ONDANSETRON 2 MG/ML
4 INJECTION INTRAMUSCULAR; INTRAVENOUS ONCE
Status: COMPLETED | OUTPATIENT
Start: 2020-01-08 | End: 2020-01-08

## 2020-01-08 RX ORDER — SODIUM CHLORIDE, SODIUM LACTATE, POTASSIUM CHLORIDE, CALCIUM CHLORIDE 600; 310; 30; 20 MG/100ML; MG/100ML; MG/100ML; MG/100ML
INJECTION, SOLUTION INTRAVENOUS CONTINUOUS
Status: DISCONTINUED | OUTPATIENT
Start: 2020-01-08 | End: 2020-01-08 | Stop reason: HOSPADM

## 2020-01-08 RX ORDER — MORPHINE SULFATE 4 MG/ML
1 INJECTION, SOLUTION INTRAMUSCULAR; INTRAVENOUS EVERY 2 HOUR PRN
Status: DISCONTINUED | OUTPATIENT
Start: 2020-01-08 | End: 2020-01-09

## 2020-01-08 RX ADMIN — ONDANSETRON 4 MG: 2 INJECTION INTRAMUSCULAR; INTRAVENOUS at 07:46:00

## 2020-01-08 RX ADMIN — CEFAZOLIN SODIUM/WATER 2 G: 2 G/20 ML SYRINGE (ML) INTRAVENOUS at 07:31:00

## 2020-01-08 RX ADMIN — PROTAMINE SULFATE 25 MG: 10 INJECTION, SOLUTION INTRAVENOUS at 09:23:00

## 2020-01-08 RX ADMIN — EPHEDRINE SULFATE 5 MG: 50 INJECTION, SOLUTION INTRAVENOUS at 07:34:00

## 2020-01-08 RX ADMIN — ROCURONIUM BROMIDE 40 MG: 10 INJECTION, SOLUTION INTRAVENOUS at 07:17:00

## 2020-01-08 RX ADMIN — MIDAZOLAM HYDROCHLORIDE 1 MG: 1 INJECTION INTRAMUSCULAR; INTRAVENOUS at 07:17:00

## 2020-01-08 RX ADMIN — HEPARIN SODIUM 12000 UNITS: 1000 INJECTION, SOLUTION INTRAVENOUS; SUBCUTANEOUS at 08:21:00

## 2020-01-08 RX ADMIN — SODIUM CHLORIDE: 9 INJECTION, SOLUTION INTRAVENOUS at 07:30:00

## 2020-01-08 RX ADMIN — MIDAZOLAM HYDROCHLORIDE 1 MG: 1 INJECTION INTRAMUSCULAR; INTRAVENOUS at 08:08:00

## 2020-01-08 RX ADMIN — GLYCOPYRROLATE 0.4 MG: 0.2 INJECTION, SOLUTION INTRAMUSCULAR; INTRAVENOUS at 09:36:00

## 2020-01-08 RX ADMIN — PROTAMINE SULFATE 10 MG: 10 INJECTION, SOLUTION INTRAVENOUS at 09:28:00

## 2020-01-08 RX ADMIN — EPHEDRINE SULFATE 5 MG: 50 INJECTION, SOLUTION INTRAVENOUS at 07:38:00

## 2020-01-08 RX ADMIN — SODIUM CHLORIDE, SODIUM LACTATE, POTASSIUM CHLORIDE, CALCIUM CHLORIDE: 600; 310; 30; 20 INJECTION, SOLUTION INTRAVENOUS at 07:39:00

## 2020-01-08 RX ADMIN — LABETALOL HYDROCHLORIDE 5 MG: 5 INJECTION, SOLUTION INTRAVENOUS at 09:22:00

## 2020-01-08 RX ADMIN — DEXAMETHASONE SODIUM PHOSPHATE 4 MG: 4 MG/ML VIAL (ML) INJECTION at 07:46:00

## 2020-01-08 RX ADMIN — EPHEDRINE SULFATE 5 MG: 50 INJECTION, SOLUTION INTRAVENOUS at 07:32:00

## 2020-01-08 RX ADMIN — NEOSTIGMINE METHYLSULFATE 3 MG: 1 INJECTION INTRAVENOUS at 09:36:00

## 2020-01-08 RX ADMIN — PHENYLEPHRINE HCL 40 MCG: 10 MG/ML VIAL (ML) INJECTION at 07:37:00

## 2020-01-08 RX ADMIN — LIDOCAINE HYDROCHLORIDE 50 MG: 10 INJECTION, SOLUTION EPIDURAL; INFILTRATION; INTRACAUDAL; PERINEURAL at 07:17:00

## 2020-01-08 RX ADMIN — LABETALOL HYDROCHLORIDE 5 MG: 5 INJECTION, SOLUTION INTRAVENOUS at 09:20:00

## 2020-01-08 RX ADMIN — ROCURONIUM BROMIDE 10 MG: 10 INJECTION, SOLUTION INTRAVENOUS at 08:08:00

## 2020-01-08 NOTE — OPERATIVE REPORT
[facility]    PATIENT'S NAME: Louis Duke   ATTENDING PHYSICIAN: Lana Rosenbaum M.D. OPERATING PHYSICIAN: Lana Rosenbaum M.D.    PATIENT ACCOUNT#:   [de-identified]    LOCATION:  PACU [facility] PACU 1 [facility]  MEDICAL RECORD #:   [id number]       DATE OF BIRTH: portion, landing zone of the stent graft, and also allow for possible future ChEVAR procedure in the future. Case reviewed and discussed with Dr. Arpita Kidd and Dr. Nusrat Westfall.     The procedure with risks and complications were explained to the patient and f after initial angiogram was done showing the renal arteries, the neck of the aneurysm, the aneurysm itself, the aortic bifurcation, and iliac bifurcations.   The patient then had, over the Amplatz Super Stiff guidewire, an 18-[place] [employer] DrySeal sheath plac performed showed a type 1 endoleak, delayed. The patient had this reinflated with the MOB, and it showed marked improvement, but still slight endoleak type 1 along the left posterior wall.   The patient then had a Coda balloon catheter placed up and reinfl Excluder stent graft, main body right of 35 mm x 14.5 mm x 16 cm, a contralateral limb of 14.5 mm x 12 cm, with a right limb extension of 14.5 mm x 7 cm.     Dictated By Ana Monroe M.D.  d: 01/08/2020 09:44:24  t: 01/08/2020 10:30:16  [facility] [id number]

## 2020-01-08 NOTE — OPERATIVE REPORT
Pre-op Dx: Enlarging AAA. Post-op Dx: Same. Procedure: Duplex US guided femoral artery percutaneous access. Aortic angiogram/ Placement Main body right WL [place] Excluder stent graft- 35x14.5x16cm/ contralateral- 14.5x12/ right extension- 14.5x7cm.  18Fr Dry

## 2020-01-08 NOTE — PLAN OF CARE
1330-received from PACU. Aa/ox4. Breathing unlabored. Pain controlled. Advancing diet, tolerating well so far. Bedrest for today. Incisions cdi. Family at bedside.

## 2020-01-08 NOTE — CONSULTS
[facility]  [address] Cardiology Progress Note - Shazia Cm Patient Status:  Inpatient    1940 MRN [id number]   [facility] 6NE-A Attending Sami Dominguez MD   Hosp Day # 0 PCP Ivory Rubi MD     Subjective:  Pt fee 120/38    Intake/Output:     Intake/Output Summary (Last 24 hours) at 1/8/2020 1434  Last data filed at 1/8/2020 1225  Gross per 24 hour   Intake 1050 ml   Output 275 ml   Net 775 ml       Last 3 Weights  01/08/20 0623 : 189 lb 11.2 oz (86 kg)  12/30/19 14 Or  HYDROcodone-acetaminophen (NORCO) 5-325 MG per tab 1 tablet, 1 tablet, Oral, Q4H PRN    Or  HYDROcodone-acetaminophen (NORCO) 5-325 MG per tab 2 tablet, 2 tablet, Oral, Q4H PRN  morphINE sulfate (PF) 4 MG/ML injection 1 mg, 1 mg, Intravenous, Q2H PRN negative  Respiratory: denies shortness of breath, wheezing or cough   Cardiovascular:  See HPI  GI: denies nausea, vomiting,   Genital/: no dysuria,   Musculoskeletal: no joint complaints upper or lower extremities  Neuro: no sensory or motor complaint

## 2020-01-08 NOTE — H&P
[facility]    PATIENT'S NAME: Claudette Anis   ATTENDING PHYSICIAN: Melanie Rosas M.D.    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 REVIEW OF SYSTEMS:  She currently denies any CVA, TIA, visual field defect, or aphasia. She has no gangrene, tissue loss, ulceration, or rest pain in the lower legs. Currently, no chest pain or shortness of breath.   No loss, future graft thrombosis, future limb loss, renal failure, colon ischemia, multisystem organ failure, endoleak, graft structural fatigue, were all explained. Risks and complications of medical management alone were explained.   The patient and son are

## 2020-01-08 NOTE — OPERATIVE REPORT
[facility]    PATIENT'S NAME: Listerry Davis   ATTENDING PHYSICIAN: William Velasquez M.D. OPERATING PHYSICIAN: Rayshawn Horvath M.D.    PATIENT ACCOUNT#:   [de-identified]    LOCATION:  [place] [address] EDWP  MEDICAL RECORD #:   [id number]       DATE OF BIR somewhat to the right. Next, a Kumpe catheter and a glidewire were used to cannulate the gate without difficulty. We exchanged the Kumpe catheter to a pigtail catheter, which spun very nicely in the graft. This was exchanged again to an Amplatz wire.   Sami Solano underwent placement of an abdominal aortic stent graft, as noted above.     Dictated By Joanie Hopkins M.D.  d: 01/08/2020 09:27:12  t: 01/08/2020 09:41:50  [facility] [id number]  AR/

## 2020-01-08 NOTE — CONSULTS
[address] [facility] Patient Status:  Inpatient    1940 MRN [id number]   [facility] 6NE-A Attending Kyara Matamoros MD   Hosp Day # 0 PCP Sophie Sorto MD     Reason for consult: Medical Mgt    Requested ANEURYSM/DISSECT     • ESOPHAGEAL MANOMETRY N/A 11/18/2016    Performed by Sonya Brandt MD at [facility] ENDOSCOPY   • ESOPHAGOGASTRODUODENOSCOPY (EGD) N/A 5/9/2019    Performed by Sonya Brandt MD at [facility] ENDOSCOPY   • ESOPHAGOGASTRODUODENOSCOPY (EGD) N/A facility-administered medications on file prior to encounter.    PRAVASTATIN SODIUM 80 MG Oral Tab, TAKE 1 TABLET EVERY DAY, Disp: 90 tablet, Rfl: 0  GLYBURIDE 5 MG Oral Tab, TAKE 1/2 TABLET BY MOUTH TWO TIMES A DAY WITH MEALS, Disp: 90 tablet, Rfl: 0  Esom 10. 4   HGB 13.2 11.4*   MCV 86.8 84.9   .0 134.0*   INR 1.08 1.24*       Recent Labs   Lab 01/02/20  0729 01/08/20  1026   * 185*   BUN 11 15   CREATSERUM 0.83 0.80   GFRAA 78 81   GFRNAA 67 70   CA 9.0 7.9*   ALB 3.1* 2.6*    141   K 4

## 2020-01-08 NOTE — ANESTHESIA PROCEDURE NOTES
Airway  Date/Time: 1/8/2020 7:23 AM  Urgency: elective    Airway not difficult    General Information and Staff    Patient location during procedure: OR  Anesthesiologist: Aislinn Beebe MD  Performed: anesthesiologist     Indications and Patient Conditi

## 2020-01-08 NOTE — ANESTHESIA PROCEDURE NOTES
Arterial Line  Performed by: Fernandez Vega MD  Authorized by: Fernandez Vega MD     General Information and Staff    Procedure Start:  1/8/2020 7:26 AM  Procedure End:  1/8/2020 7:27 AM  Anesthesiologist:  Fernandez Vega MD  Performed By:  Carol Piedra

## 2020-01-08 NOTE — ANESTHESIA POSTPROCEDURE EVALUATION
[address] [facility] Patient Status:  Inpatient   Age/Gender 78year old female MRN [id number]   Location [address] Attending Yessica Wilkerson MD   Hosp Day # 0 PCP Andrew Whitley MD       Anesthesia Post-op No

## 2020-01-09 VITALS
HEART RATE: 58 BPM | RESPIRATION RATE: 19 BRPM | WEIGHT: 198.19 LBS | TEMPERATURE: 98 F | OXYGEN SATURATION: 95 % | SYSTOLIC BLOOD PRESSURE: 131 MMHG | BODY MASS INDEX: 28.37 KG/M2 | HEIGHT: 70 IN | DIASTOLIC BLOOD PRESSURE: 57 MMHG

## 2020-01-09 LAB
ALBUMIN SERPL-MCNC: 2.4 G/DL (ref 3.4–5)
ALBUMIN/GLOB SERPL: 0.9 {RATIO} (ref 1–2)
ALP LIVER SERPL-CCNC: 69 U/L (ref 55–142)
ALT SERPL-CCNC: 8 U/L (ref 13–56)
ANION GAP SERPL CALC-SCNC: 5 MMOL/L (ref 0–18)
AST SERPL-CCNC: 8 U/L (ref 15–37)
BILIRUB SERPL-MCNC: 0.3 MG/DL (ref 0.1–2)
BUN BLD-MCNC: 13 MG/DL (ref 7–18)
BUN/CREAT SERPL: 18.8 (ref 10–20)
CALCIUM BLD-MCNC: 8.1 MG/DL (ref 8.5–10.1)
CHLORIDE SERPL-SCNC: 110 MMOL/L (ref 98–112)
CO2 SERPL-SCNC: 29 MMOL/L (ref 21–32)
CREAT BLD-MCNC: 0.69 MG/DL (ref 0.55–1.02)
GLOBULIN PLAS-MCNC: 2.8 G/DL (ref 2.8–4.4)
GLUCOSE BLD-MCNC: 107 MG/DL (ref 70–99)
GLUCOSE BLD-MCNC: 120 MG/DL (ref 70–99)
GLUCOSE BLD-MCNC: 154 MG/DL (ref 70–99)
M PROTEIN MFR SERPL ELPH: 5.2 G/DL (ref 6.4–8.2)
OSMOLALITY SERPL CALC.SUM OF ELEC: 299 MOSM/KG (ref 275–295)
POTASSIUM SERPL-SCNC: 4 MMOL/L (ref 3.5–5.1)
SODIUM SERPL-SCNC: 144 MMOL/L (ref 136–145)

## 2020-01-09 PROCEDURE — 99232 SBSQ HOSP IP/OBS MODERATE 35: CPT | Performed by: STUDENT IN AN ORGANIZED HEALTH CARE EDUCATION/TRAINING PROGRAM

## 2020-01-09 RX ORDER — HYDROCODONE BITARTRATE AND ACETAMINOPHEN 5; 325 MG/1; MG/1
1 TABLET ORAL EVERY 6 HOURS PRN
Qty: 12 TABLET | Refills: 0 | Status: SHIPPED | OUTPATIENT
Start: 2020-01-09 | End: 2020-01-27

## 2020-01-09 NOTE — PLAN OF CARE
Assumed care of this patient at [address] [place] [address]. A&O x 4, slightly forgetful at times. Bilateral groin sites soft, nontender, no hematoma; some soreness in the right upper thigh area.  Drinking fluids slowly, pain with swallowing (hx of achalasia.) No other acute even

## 2020-01-09 NOTE — PROGRESS NOTES
No complaints- afebrile/ wounds clean, dry. Neurologically intact. Pulses present. Abdomen soft- no tenderness. Discussed surgery/ wound care/ activity/ follow up.  If doing well- home today

## 2020-01-09 NOTE — OCCUPATIONAL THERAPY NOTE
OCCUPATIONAL THERAPY QUICK EVALUATION - INPATIENT    Room Number: 5782/7860-W  Evaluation Date: 1/9/2020     Type of Evaluation: Initial  Presenting Problem: AAA repair    Physician Order: IP Consult to Occupational Therapy  Reason for Therapy:  ADL/IADL D ESOPHAGEAL MANOMETRY N/A 11/18/2016    Performed by Glenis Balderas MD at [facility] ENDOSCOPY   • ESOPHAGOGASTRODUODENOSCOPY (EGD) N/A 5/9/2019    Performed by Glenis Balderas MD at [facility] ENDOSCOPY   • ESOPHAGOGASTRODUODENOSCOPY (EGD) N/A 12/6/2016    Performed OF MOTION AND STRENGTH ASSESSMENT  Upper extremity ROM is within functional limits     Upper extremity strength is within functional limits     NEUROLOGICAL FINDINGS                   ACTIVITY TOLERANCE                         O2 SATURATIONS without loss of balance, and at supervision level or above. Patient reports no further questions or concerns about safe return to I/ADL tasks. No further OT services needed at this time.       Patient Complexity  Occupational Profile/Medical History  LOW -

## 2020-01-09 NOTE — PHYSICAL THERAPY NOTE
PHYSICAL THERAPY QUICK EVALUATION - INPATIENT    Room Number: 8767/5715-R  Evaluation Date: 1/9/2020  Presenting Problem: S/p AAA repair 1/8/20  Physician Order: PT Eval and Treat     History Related to Current Admission: Pt is 78year old female admitte Screening- Normal   • COLONOSCOPY     • COLONOSCOPY N/A 5/9/2019    Performed by Bahman Ayala MD at [facility] ENDOSCOPY   • ENDOVAS REPAIR, INFRARENL ABDOM AORTIC ANEURYSM/DISSECT     • ESOPHAGEAL MANOMETRY N/A 11/18/2016    Performed by Bahman Ayala MD thigh pain  Management Techniques: Repositioning   RANGE OF MOTION AND STRENGTH ASSESSMENT  Upper extremity ROM and strength -see OT evaluation    Lower extremity ROM is within functional limits     Lower extremity strength is within functional limits met;Call light within reach;RN aware of session/findings; All patient questions and concerns addressed; Family present(Bienvenido Courtney aware of eval session)    ASSESSMENT   Patient is a 78year old female admitted on 1/8/2020 for AAA repair.   Pertinent comorbidi

## 2020-01-09 NOTE — PROGRESS NOTES
[facility] Cardiology Progress Note - Destin Kirk Patient Status:  Inpatient    1940 MRN [id number]   [facility] 6NE-A Attending Chelsi Youngblood MD   Hosp Day # 1 PCP Franny Gupta MD     Subjective:  Felipe Pearson 1/9/2020 1118  Last data filed at 1/9/2020 0800  Gross per 24 hour   Intake 3206 ml   Output 1325 ml   Net 1881 ml       Last 3 Weights  01/09/20 0500 : 198 lb 3.1 oz (89.9 kg)  01/08/20 0623 : 189 lb 11.2 oz (86 kg)  12/30/19 1445 : 196 lb (88.9 kg)  11/1 Or  HYDROcodone-acetaminophen (NORCO) 5-325 MG per tab 2 tablet, 2 tablet, Oral, Q4H PRN  morphINE sulfate (PF) 4 MG/ML injection 1 mg, 1 mg, Intravenous, Q2H PRN    Or  morphINE sulfate (PF) 4 MG/ML injection 2 mg, 2 mg, Intravenous, Q2H PRN  nitroGLYCERI Cardiovascular:  See HPI  GI: denies nausea, vomiting,   Genital/: no dysuria,   Musculoskeletal: no joint complaints upper or lower extremities  Neuro: no sensory or motor complaint  Psyche: no symptoms of depression or anxiety  Hematology: denies bru

## 2020-01-09 NOTE — PROGRESS NOTES
IRON HOSPITALIST  Progress Note     Josph Friendly Patient Status:  Inpatient    1940 MRN [id number]   [facility] 6NE-A Attending Unique Hayward MD   Hosp Day # 1 PCP Jasmyne Tavarez MD     Chief Complaint: Medical Mgt    S: Jannette Cox in [address] [place] [address] Rd.     Medications:   • aspirin  81 mg Oral Daily   • docusate sodium  100 mg Oral BID   • amLODIPine Besylate  5 mg Oral Daily   • Clopidogrel Bisulfate  75 mg Oral Daily   • lisinopril  20 mg Oral BID   • Metoprolol Tartrate  50 mg Oral BID   • Prav

## 2020-01-09 NOTE — PLAN OF CARE
C/O of Some tenderness to right groin. No hematoma dermabond. C/D/I. Received on 2 liters NC for desat's lungs clear on right diminished on left. Non productive cough occasional. Son @ bedside encouraging IS. Weaned off O2 will monitor .  Monitor with sat's

## 2020-01-10 ENCOUNTER — PATIENT OUTREACH (OUTPATIENT)
Dept: CASE MANAGEMENT | Age: 80
End: 2020-01-10

## 2020-01-10 ENCOUNTER — TELEPHONE (OUTPATIENT)
Dept: INTERNAL MEDICINE CLINIC | Facility: CLINIC | Age: 80
End: 2020-01-10

## 2020-01-10 DIAGNOSIS — I48.0 PAROXYSMAL ATRIAL FIBRILLATION (HCC): Primary | ICD-10-CM

## 2020-01-10 DIAGNOSIS — E11.59 TYPE 2 DIABETES MELLITUS WITH OTHER CIRCULATORY COMPLICATION, WITHOUT LONG-TERM CURRENT USE OF INSULIN (HCC): ICD-10-CM

## 2020-01-10 DIAGNOSIS — I10 BENIGN ESSENTIAL HTN: ICD-10-CM

## 2020-01-10 DIAGNOSIS — I25.10 CORONARY ARTERY DISEASE INVOLVING NATIVE CORONARY ARTERY OF NATIVE HEART WITHOUT ANGINA PECTORIS: ICD-10-CM

## 2020-01-10 DIAGNOSIS — I71.4 ABDOMINAL AORTIC ANEURYSM (AAA) WITHOUT RUPTURE (HCC): ICD-10-CM

## 2020-01-10 DIAGNOSIS — Z02.9 ENCOUNTERS FOR UNSPECIFIED ADMINISTRATIVE PURPOSE: ICD-10-CM

## 2020-01-10 PROCEDURE — 1111F DSCHRG MED/CURRENT MED MERGE: CPT

## 2020-01-10 NOTE — TELEPHONE ENCOUNTER
Spoke to pt for TCM today. Pt does not have HFU appt scheduled at this time. TCM/HFU appt recommended by 1/16/2020 as pt is a high risk for readmission.  The patient deferred scheduling with NCM preferring to only follow up with cardiologist and vascular

## 2020-01-10 NOTE — PROGRESS NOTES
Initial Post Discharge Follow Up   Discharge Date: 1/9/20  Contact Date: 1/10/2020    Consent Verification:  Assessment Completed With: Patient  HIPAA Verified?   Yes    Discharge Dx:     S/p endograft d/t abdominal aortic aneurysm enlargement  AFIB  Hyp patient any urinary frequency, increased thirst, or fruity breath. The patient will continue to monitor this closely as well.    • Do you have any pain since discharge?  yes   If Yes:  o Where right groin tenderness    Rating on pain scale 1-10, 10 being th Metoprolol Tartrate 50 MG Oral Tab Take 1 tablet (50 mg total) by mouth 2 (two) times daily.  90 tablet 0   • PRAVASTATIN SODIUM 80 MG Oral Tab TAKE 1 TABLET EVERY DAY 90 tablet 0   • GLYBURIDE 5 MG Oral Tab TAKE 1/2 TABLET BY MOUTH TWO TIMES A DAY WITH CLARISSA are home, are there any needs or concerns you need addressed before your next visit with your PCP?  (DME, meds, disease concerns, Etc): No     Follow up appointments:      Your appointments     Date & Time Appointment Department [facility])    Jan 23, 2020 10 will contact the office with any further questions or concerns. NCM referred to CCM.        BOOK BY DATE: 1/23/2020    [x]  Discharge Summary, Discharge medications reviewed/discussed/and reconciled against outpatient medications with patient,  and orders r

## 2020-01-11 LAB — BLOOD TYPE BARCODE: 5100

## 2020-01-13 ENCOUNTER — TELEPHONE (OUTPATIENT)
Dept: INTERNAL MEDICINE CLINIC | Facility: CLINIC | Age: 80
End: 2020-01-13

## 2020-01-13 RX ORDER — LISINOPRIL 20 MG/1
20 TABLET ORAL 2 TIMES DAILY
Qty: 180 TABLET | Refills: 0 | Status: ON HOLD | OUTPATIENT
Start: 2020-01-13 | End: 2020-03-07

## 2020-01-13 RX ORDER — METOPROLOL TARTRATE 50 MG/1
50 TABLET, FILM COATED ORAL 2 TIMES DAILY
Qty: 180 TABLET | Refills: 0 | Status: ON HOLD | OUTPATIENT
Start: 2020-01-13 | End: 2020-03-07

## 2020-01-13 NOTE — TELEPHONE ENCOUNTER
Metoprolol 50mg and Lisinopril 20mg refilled by Dr. Dayna Roy refilled on 12/26/19. Please advise if OK to fill or should be refilled by Dr. Dayna Roy.

## 2020-01-13 NOTE — TELEPHONE ENCOUNTER
Metoprolol Tartrate 50 MG Oral Tab 90 tablet 0 12/26/2019    Sig:   Take 1 tablet (50 mg total) by mouth 2 (two) times daily. lisinopril 20 MG Oral Tab   12/26/2019    Sig:   Take 1 tablet (20 mg total) by mouth 2 (two) times daily.        Pt is dasha

## 2020-01-13 NOTE — CM/SW NOTE
01/13/20 0900   Discharge disposition   Expected discharge disposition Home or Self   Name of [id number] [facility]   Discharge transportation Private car

## 2020-01-14 ENCOUNTER — PATIENT OUTREACH (OUTPATIENT)
Dept: CASE MANAGEMENT | Age: 80
End: 2020-01-14

## 2020-01-16 NOTE — PROGRESS NOTES
Spoke with patient to introduce CCM services. Pt relates she is very knowledgeable and does well with keeping up with her health. Pt relates she also has a great support system and refused CCM services.     Patient identified with a potential need for Chron

## 2020-01-22 ENCOUNTER — TELEPHONE (OUTPATIENT)
Dept: INTERNAL MEDICINE CLINIC | Facility: CLINIC | Age: 80
End: 2020-01-22

## 2020-01-22 DIAGNOSIS — H35.30 MACULAR DEGENERATION, UNSPECIFIED LATERALITY, UNSPECIFIED TYPE: Primary | ICD-10-CM

## 2020-01-22 PROBLEM — I71.4 ENLARGING ABDOMINAL AORTIC ANEURYSM (AAA): Status: RESOLVED | Noted: 2020-01-08 | Resolved: 2020-01-22

## 2020-01-22 PROBLEM — I25.10 CORONARY ARTERY DISEASE INVOLVING NATIVE CORONARY ARTERY OF NATIVE HEART WITHOUT ANGINA PECTORIS: Status: RESOLVED | Noted: 2017-04-14 | Resolved: 2020-01-22

## 2020-01-22 PROBLEM — I71.40 ENLARGING ABDOMINAL AORTIC ANEURYSM (AAA) (HCC): Status: RESOLVED | Noted: 2020-01-08 | Resolved: 2020-01-22

## 2020-01-22 PROBLEM — I71.40 ENLARGING ABDOMINAL AORTIC ANEURYSM (AAA): Status: RESOLVED | Noted: 2020-01-08 | Resolved: 2020-01-22

## 2020-01-22 PROBLEM — I71.4 ENLARGING ABDOMINAL AORTIC ANEURYSM (AAA) (HCC): Status: RESOLVED | Noted: 2020-01-08 | Resolved: 2020-01-22

## 2020-01-22 NOTE — TELEPHONE ENCOUNTER
Specialty:  Ophthalmology     Full Name of Specialist: Amber Mahan    Date of Appointment: 1/27/20    Reason for the Appointment (be specific): fup to retinal occlusion    Has the patient seen a provider in our office for stated problem?: yes    Is this r

## 2020-01-23 ENCOUNTER — HOSPITAL ENCOUNTER (OUTPATIENT)
Age: 80
Discharge: HOME | End: 2020-01-23
Attending: FAMILY MEDICINE
Payer: MEDICARE

## 2020-01-23 VITALS
HEIGHT: 70 IN | HEART RATE: 72 BPM | DIASTOLIC BLOOD PRESSURE: 70 MMHG | RESPIRATION RATE: 20 BRPM | BODY MASS INDEX: 26.77 KG/M2 | WEIGHT: 187 LBS | TEMPERATURE: 99 F | SYSTOLIC BLOOD PRESSURE: 172 MMHG | OXYGEN SATURATION: 96 %

## 2020-01-23 DIAGNOSIS — D64.9 ANEMIA, UNSPECIFIED TYPE: ICD-10-CM

## 2020-01-23 DIAGNOSIS — R53.1 WEAKNESS GENERALIZED: Primary | ICD-10-CM

## 2020-01-23 DIAGNOSIS — K92.2 GASTROINTESTINAL HEMORRHAGE, UNSPECIFIED GASTROINTESTINAL HEMORRHAGE TYPE: ICD-10-CM

## 2020-01-23 LAB
#MXD IC: 1 X10ˆ3/UL (ref 0.1–1)
HCT VFR BLD AUTO: 34.5 % (ref 35–48)
HGB BLD-MCNC: 10.8 G/DL (ref 12–16)
LYMPHOCYTES # BLD AUTO: 0.8 X10ˆ3/UL (ref 1–4)
LYMPHOCYTES NFR BLD AUTO: 7.4 %
MCH RBC QN AUTO: 26.4 PG (ref 26–34)
MCHC RBC AUTO-ENTMCNC: 31.3 G/DL (ref 31–37)
MCV RBC AUTO: 84.4 FL (ref 80–100)
MIXED CELL %: 9.1 %
NEUTROPHILS # BLD AUTO: 8.8 X10ˆ3/UL (ref 1.5–7.7)
NEUTROPHILS NFR BLD AUTO: 83.5 %
PLATELET # BLD AUTO: 271 X10ˆ3/UL (ref 150–450)
POCT HEMOCCULT: POSITIVE
RBC # BLD AUTO: 4.09 X10ˆ6/UL (ref 3.8–5.3)
WBC # BLD AUTO: 10.6 X10ˆ3/UL (ref 4–11)

## 2020-01-23 PROCEDURE — 36415 COLL VENOUS BLD VENIPUNCTURE: CPT

## 2020-01-23 PROCEDURE — 82272 OCCULT BLD FECES 1-3 TESTS: CPT | Performed by: FAMILY MEDICINE

## 2020-01-23 PROCEDURE — 85025 COMPLETE CBC W/AUTO DIFF WBC: CPT | Performed by: FAMILY MEDICINE

## 2020-01-23 PROCEDURE — 99213 OFFICE O/P EST LOW 20 MIN: CPT

## 2020-01-24 NOTE — ED PROVIDER NOTES
Patient Seen in: THE [facility] In [facility]      History   Patient presents with:  Blood In Stool    Stated Complaint: post surgery, possible blood in stool and SOB 1 wk    HPI    79-year-old female status post AAA repair January 8, 2020 with a Unspecified essential hypertension    • Visual impairment     glasses              No pertinent past surgical history. Family history reviewed with patient/caregiver and is not pertinent to presenting problem. No pertinent social history. Normal affect    ED Course     Labs Reviewed   POCT CBC - Abnormal; Notable for the following components:       Result Value    HGB IC 10.8 (*)     HCT IC 34.5 (*)     # Neutrophil 8.8 (*)     # Lymphocyte 0.8 (*)     All other components within normal lopez

## 2020-01-24 NOTE — ED INITIAL ASSESSMENT (HPI)
Pt lizama had recent surgery, and is on a lot of blood thinners, she was pale at dinner, and she believes she may have some blood in her stool.   Her son is an ER Md and was concerned for a GI bleed

## 2020-01-27 ENCOUNTER — APPOINTMENT (OUTPATIENT)
Dept: GENERAL RADIOLOGY | Facility: HOSPITAL | Age: 80
End: 2020-01-27
Attending: EMERGENCY MEDICINE
Payer: MEDICARE

## 2020-01-27 ENCOUNTER — HOSPITAL ENCOUNTER (OUTPATIENT)
Facility: HOSPITAL | Age: 80
Setting detail: OBSERVATION
Discharge: HOME | End: 2020-01-29
Attending: EMERGENCY MEDICINE | Admitting: HOSPITALIST
Payer: MEDICARE

## 2020-01-27 ENCOUNTER — TELEPHONE (OUTPATIENT)
Dept: INTERNAL MEDICINE CLINIC | Facility: CLINIC | Age: 80
End: 2020-01-27

## 2020-01-27 DIAGNOSIS — I50.9 ACUTE ON CHRONIC CONGESTIVE HEART FAILURE, UNSPECIFIED HEART FAILURE TYPE (HCC): Primary | ICD-10-CM

## 2020-01-27 LAB
ALBUMIN SERPL-MCNC: 2.8 G/DL (ref 3.4–5)
ALBUMIN/GLOB SERPL: 0.7 {RATIO} (ref 1–2)
ALP LIVER SERPL-CCNC: 79 U/L (ref 55–142)
ALT SERPL-CCNC: 8 U/L (ref 13–56)
ANION GAP SERPL CALC-SCNC: 3 MMOL/L (ref 0–18)
ANTIBODY SCREEN: NEGATIVE
APTT PPP: 56.4 SECONDS (ref 25.4–36.1)
AST SERPL-CCNC: 5 U/L (ref 15–37)
ATRIAL RATE: 300 BPM
BASOPHILS # BLD AUTO: 0.03 X10(3) UL (ref 0–0.2)
BASOPHILS NFR BLD AUTO: 0.3 %
BILIRUB SERPL-MCNC: 0.6 MG/DL (ref 0.1–2)
BUN BLD-MCNC: 10 MG/DL (ref 7–18)
BUN/CREAT SERPL: 11.5 (ref 10–20)
CALCIUM BLD-MCNC: 9.2 MG/DL (ref 8.5–10.1)
CHLORIDE SERPL-SCNC: 104 MMOL/L (ref 98–112)
CO2 SERPL-SCNC: 32 MMOL/L (ref 21–32)
CREAT BLD-MCNC: 0.87 MG/DL (ref 0.55–1.02)
DEPRECATED RDW RBC AUTO: 44.6 FL (ref 35.1–46.3)
EOSINOPHIL # BLD AUTO: 0.12 X10(3) UL (ref 0–0.7)
EOSINOPHIL NFR BLD AUTO: 1.2 %
ERYTHROCYTE [DISTWIDTH] IN BLOOD BY AUTOMATED COUNT: 14.6 % (ref 11–15)
GLOBULIN PLAS-MCNC: 4.2 G/DL (ref 2.8–4.4)
GLUCOSE BLD-MCNC: 115 MG/DL (ref 70–99)
GLUCOSE BLD-MCNC: 164 MG/DL (ref 70–99)
GLUCOSE BLD-MCNC: 201 MG/DL (ref 70–99)
HCT VFR BLD AUTO: 36.1 % (ref 35–48)
HGB BLD-MCNC: 11.1 G/DL (ref 12–16)
IMM GRANULOCYTES # BLD AUTO: 0.03 X10(3) UL (ref 0–1)
IMM GRANULOCYTES NFR BLD: 0.3 %
INR BLD: 2.35 (ref 0.9–1.1)
LYMPHOCYTES # BLD AUTO: 0.58 X10(3) UL (ref 1–4)
LYMPHOCYTES NFR BLD AUTO: 5.9 %
M PROTEIN MFR SERPL ELPH: 7 G/DL (ref 6.4–8.2)
MCH RBC QN AUTO: 26.2 PG (ref 26–34)
MCHC RBC AUTO-ENTMCNC: 30.7 G/DL (ref 31–37)
MCV RBC AUTO: 85.3 FL (ref 80–100)
MONOCYTES # BLD AUTO: 0.56 X10(3) UL (ref 0.1–1)
MONOCYTES NFR BLD AUTO: 5.7 %
NEUTROPHILS # BLD AUTO: 8.47 X10 (3) UL (ref 1.5–7.7)
NEUTROPHILS # BLD AUTO: 8.47 X10(3) UL (ref 1.5–7.7)
NEUTROPHILS NFR BLD AUTO: 86.6 %
NT-PROBNP SERPL-MCNC: 4014 PG/ML (ref ?–450)
OSMOLALITY SERPL CALC.SUM OF ELEC: 293 MOSM/KG (ref 275–295)
PLATELET # BLD AUTO: 247 10(3)UL (ref 150–450)
POTASSIUM SERPL-SCNC: 4 MMOL/L (ref 3.5–5.1)
PSA SERPL DL<=0.01 NG/ML-MCNC: 27.2 SECONDS (ref 12.5–14.7)
Q-T INTERVAL: 418 MS
QRS DURATION: 86 MS
QTC CALCULATION (BEZET): 413 MS
R AXIS: 31 DEGREES
RBC # BLD AUTO: 4.23 X10(6)UL (ref 3.8–5.3)
RH BLOOD TYPE: POSITIVE
SODIUM SERPL-SCNC: 139 MMOL/L (ref 136–145)
T AXIS: 18 DEGREES
TROPONIN I SERPL-MCNC: <0.045 NG/ML (ref ?–0.04)
VENTRICULAR RATE: 59 BPM
WBC # BLD AUTO: 9.8 X10(3) UL (ref 4–11)

## 2020-01-27 PROCEDURE — 71045 X-RAY EXAM CHEST 1 VIEW: CPT | Performed by: EMERGENCY MEDICINE

## 2020-01-27 PROCEDURE — 99220 INITIAL OBSERVATION CARE,LEVL III: CPT | Performed by: HOSPITALIST

## 2020-01-27 RX ORDER — DEXTROSE MONOHYDRATE 25 G/50ML
50 INJECTION, SOLUTION INTRAVENOUS
Status: DISCONTINUED | OUTPATIENT
Start: 2020-01-27 | End: 2020-01-29

## 2020-01-27 RX ORDER — GLYBURIDE 5 MG/1
2.5 TABLET ORAL 2 TIMES DAILY WITH MEALS
COMMUNITY
End: 2020-02-03

## 2020-01-27 RX ORDER — ENOXAPARIN SODIUM 100 MG/ML
40 INJECTION SUBCUTANEOUS DAILY
Status: CANCELLED | OUTPATIENT
Start: 2020-01-27

## 2020-01-27 RX ORDER — CLOPIDOGREL BISULFATE 75 MG/1
75 TABLET ORAL DAILY
Status: DISCONTINUED | OUTPATIENT
Start: 2020-01-28 | End: 2020-01-29

## 2020-01-27 RX ORDER — METOCLOPRAMIDE HYDROCHLORIDE 5 MG/ML
10 INJECTION INTRAMUSCULAR; INTRAVENOUS EVERY 8 HOURS PRN
Status: DISCONTINUED | OUTPATIENT
Start: 2020-01-27 | End: 2020-01-29

## 2020-01-27 RX ORDER — ATORVASTATIN CALCIUM 20 MG/1
20 TABLET, FILM COATED ORAL NIGHTLY
Status: DISCONTINUED | OUTPATIENT
Start: 2020-01-27 | End: 2020-01-27

## 2020-01-27 RX ORDER — FUROSEMIDE 10 MG/ML
40 INJECTION INTRAMUSCULAR; INTRAVENOUS
Status: DISCONTINUED | OUTPATIENT
Start: 2020-01-27 | End: 2020-01-29

## 2020-01-27 RX ORDER — PANTOPRAZOLE SODIUM 20 MG/1
20 TABLET, DELAYED RELEASE ORAL
Status: DISCONTINUED | OUTPATIENT
Start: 2020-01-28 | End: 2020-01-29

## 2020-01-27 RX ORDER — LISINOPRIL 20 MG/1
20 TABLET ORAL 2 TIMES DAILY
Status: DISCONTINUED | OUTPATIENT
Start: 2020-01-27 | End: 2020-01-29

## 2020-01-27 RX ORDER — PRAVASTATIN SODIUM 80 MG/1
80 TABLET ORAL NIGHTLY
COMMUNITY
End: 2020-02-03

## 2020-01-27 RX ORDER — METOPROLOL TARTRATE 50 MG/1
50 TABLET, FILM COATED ORAL 2 TIMES DAILY
Status: DISCONTINUED | OUTPATIENT
Start: 2020-01-27 | End: 2020-01-27

## 2020-01-27 RX ORDER — PRAVASTATIN SODIUM 20 MG
20 TABLET ORAL NIGHTLY
Status: DISCONTINUED | OUTPATIENT
Start: 2020-01-27 | End: 2020-01-29

## 2020-01-27 RX ORDER — AMLODIPINE BESYLATE 5 MG/1
5 TABLET ORAL DAILY
Status: DISCONTINUED | OUTPATIENT
Start: 2020-01-27 | End: 2020-01-29

## 2020-01-27 RX ORDER — ONDANSETRON 2 MG/ML
4 INJECTION INTRAMUSCULAR; INTRAVENOUS EVERY 6 HOURS PRN
Status: DISCONTINUED | OUTPATIENT
Start: 2020-01-27 | End: 2020-01-29

## 2020-01-27 RX ORDER — METOPROLOL TARTRATE 50 MG/1
50 TABLET, FILM COATED ORAL
Status: DISCONTINUED | OUTPATIENT
Start: 2020-01-28 | End: 2020-01-29

## 2020-01-27 RX ORDER — FUROSEMIDE 10 MG/ML
40 INJECTION INTRAMUSCULAR; INTRAVENOUS ONCE
Status: COMPLETED | OUTPATIENT
Start: 2020-01-27 | End: 2020-01-27

## 2020-01-27 NOTE — CONSULTS
[facility] Cardiology Consultation Napoleon Holt MD    The patient was interviewed, examined, the chart was reviewed and the consult was dictated. This is a 78year old female with a chief complaint of shortness of breath. Impression:  1. CHF  2.   Status p

## 2020-01-27 NOTE — ED PROVIDER NOTES
Patient Seen in: [facility] Emergency Department      History   Patient presents with:  Dyspnea HAYLEE SOB    Stated Complaint: shortnees of breath x 1 week, AAA repair on 1/8    HPI    She is a very pleasant 41-year-old female with a history of atrial 04/21/2003    Screening- Normal   • COLONOSCOPY     • COLONOSCOPY N/A 5/9/2019    Performed by Radha Garcia MD at [facility] 50 ABDOM AORTIC ANEURYSM/DISSECT     • ESOPHAGEAL MANOMETRY N/A 11/18/2016    Performed by Roro Gifford Device None (Room air)       Current:/75   Pulse 57   Temp 97.7 °F (36.5 °C) (Temporal)   Resp 18   Ht 177.8 cm (5' 10\")   Wt 84.4 kg   SpO2 94%   BMI 26.69 kg/m²         Physical Exam    General: Patient is resting comfortably in no acute distress ------                     CBC W/ DIFFERENTIAL[[id number]]          Abnormal            Final result                 Please view results for these tests on the individual orders. TYPE AND SCREEN    Narrative:      The following orders were created for pm    Follow-up:  No follow-up provider specified.       Medications Prescribed:  Current Discharge Medication List                   Present on Admission  Date Reviewed: 1/23/2020          ICD-10-CM Noted POA    Acute on chronic congestive heart failure (H

## 2020-01-27 NOTE — CONSULTS
[facility]    PATIENT'S NAME: Douglas Hillman   ATTENDING PHYSICIAN: Yobany Rey M.D.   [facility] Medin: Mardene Goodpasture, M.D.    PATIENT ACCOUNT#:   [de-identified]    LOCATION:  [address]  MEDICAL RECORD #:   [id number]       DATE OF LOUISE might have been related to her stress and anxiety, and it had actually been better in recent visits and checks. The patient states over the last several days she just has not felt well. She has had some breathlessness with exertion.   Last night she was o distention, carotid bruit, third heart sound, or murmur. ABDOMEN:  Soft, nontender. EXTREMITIES:  Without clubbing, cyanosis, or edema. Distal pulses are present. There is no significant peripheral edema. SKIN:  Warm and dry.   PSYCHIATRIC:  Alert, viki

## 2020-01-27 NOTE — TELEPHONE ENCOUNTER
AS-  ROSHAN  Spoke w/ pt. She is 3wks s/p AAA repair (1/8). Hx a-fib, was off thinners for a while and just started back on xarelto about a week ago. Past week has had difficulty breathing, pain with deep inspiration, increased sob with exertion.  Wakes her up

## 2020-01-27 NOTE — ED INITIAL ASSESSMENT (HPI)
Pt here for sob x 1 week. Increasing since yesterday and last night. Pt states slight nausea and diarrhea x 1 last night. Pt denies fever. Pt had sx on 1.8. 20.

## 2020-01-27 NOTE — TELEPHONE ENCOUNTER
Amisha Miguel, [phone] calling stating she is having a hard time breathing-had stroke and surgery recently and wants appt today-can someone please triage now-sending to triage

## 2020-01-27 NOTE — H&P
IRON HOSPITALIST  History and Physical     [address] [facility] Patient Status:  Emergency    1940 MRN [id number]   Location [facility] Attending Kev Weinberg MD   Hosp Day # 0 PCP Tk Rosenthal MD     Chief Complain hypertension    • Visual impairment     glasses        Past Surgical History:   Past Surgical History:   Procedure Laterality Date   • ABDOMINAL AORTIC ANEURYSM ENDOSCOPIC N/A 1/8/2020    Performed by Severino Arauz MD at [facility] CVOR   • CATARACT Bilateral Mother         Stroke at 64   • Hypertension Brother    • Heart Disorder Brother    • Diabetes Brother    • Heart Attack Brother    • Diabetes Maternal Grandmother    • Hypertension Brother    • Heart Attack Brother    • Stroke Maternal Aunt    • Stroke Ma 18   Ht 5' 10\" (1.778 m)   Wt 186 lb (84.4 kg)   SpO2 94%   BMI 26.69 kg/m²   General: No acute distress. Alert and oriented x 3. HEENT: Normocephalic atraumatic. Moist mucous membranes. EOM-I. PERRLA. Anicteric. Neck: No lymphadenopathy. No JVD.  No car

## 2020-01-28 ENCOUNTER — APPOINTMENT (OUTPATIENT)
Dept: CV DIAGNOSTICS | Facility: HOSPITAL | Age: 80
End: 2020-01-28
Attending: INTERNAL MEDICINE
Payer: MEDICARE

## 2020-01-28 LAB
BASOPHILS # BLD AUTO: 0.04 X10(3) UL (ref 0–0.2)
BASOPHILS NFR BLD AUTO: 0.5 %
BUN BLD-MCNC: 12 MG/DL (ref 7–18)
CALCIUM BLD-MCNC: 8.6 MG/DL (ref 8.5–10.1)
CHLORIDE SERPL-SCNC: 104 MMOL/L (ref 98–112)
CO2 SERPL-SCNC: 34 MMOL/L (ref 21–32)
CREAT BLD-MCNC: 0.67 MG/DL (ref 0.55–1.02)
DEPRECATED RDW RBC AUTO: 44.6 FL (ref 35.1–46.3)
EOSINOPHIL # BLD AUTO: 0.25 X10(3) UL (ref 0–0.7)
EOSINOPHIL NFR BLD AUTO: 3.2 %
ERYTHROCYTE [DISTWIDTH] IN BLOOD BY AUTOMATED COUNT: 14.6 % (ref 11–15)
GLUCOSE BLD-MCNC: 126 MG/DL (ref 70–99)
GLUCOSE BLD-MCNC: 137 MG/DL (ref 70–99)
GLUCOSE BLD-MCNC: 161 MG/DL (ref 70–99)
GLUCOSE BLD-MCNC: 161 MG/DL (ref 70–99)
GLUCOSE BLD-MCNC: 207 MG/DL (ref 70–99)
HAV IGM SER QL: 1.5 MG/DL (ref 1.6–2.6)
HCT VFR BLD AUTO: 33.5 % (ref 35–48)
HGB BLD-MCNC: 10.2 G/DL (ref 12–16)
IMM GRANULOCYTES # BLD AUTO: 0.02 X10(3) UL (ref 0–1)
IMM GRANULOCYTES NFR BLD: 0.3 %
LYMPHOCYTES # BLD AUTO: 0.71 X10(3) UL (ref 1–4)
LYMPHOCYTES NFR BLD AUTO: 9.2 %
MCH RBC QN AUTO: 25.8 PG (ref 26–34)
MCHC RBC AUTO-ENTMCNC: 30.4 G/DL (ref 31–37)
MCV RBC AUTO: 84.8 FL (ref 80–100)
MONOCYTES # BLD AUTO: 0.67 X10(3) UL (ref 0.1–1)
MONOCYTES NFR BLD AUTO: 8.7 %
NEUTROPHILS # BLD AUTO: 6.05 X10 (3) UL (ref 1.5–7.7)
NEUTROPHILS # BLD AUTO: 6.05 X10(3) UL (ref 1.5–7.7)
NEUTROPHILS NFR BLD AUTO: 78.1 %
PLATELET # BLD AUTO: 209 10(3)UL (ref 150–450)
POTASSIUM SERPL-SCNC: 3.9 MMOL/L (ref 3.5–5.1)
RBC # BLD AUTO: 3.95 X10(6)UL (ref 3.8–5.3)
SODIUM SERPL-SCNC: 143 MMOL/L (ref 136–145)
WBC # BLD AUTO: 7.7 X10(3) UL (ref 4–11)

## 2020-01-28 PROCEDURE — 93306 TTE W/DOPPLER COMPLETE: CPT | Performed by: INTERNAL MEDICINE

## 2020-01-28 PROCEDURE — 99225 SUBSEQUENT OBSERVATION CARE: CPT | Performed by: HOSPITALIST

## 2020-01-28 RX ORDER — MAGNESIUM OXIDE 400 MG (241.3 MG MAGNESIUM) TABLET
800 TABLET ONCE
Status: COMPLETED | OUTPATIENT
Start: 2020-01-28 | End: 2020-01-28

## 2020-01-28 NOTE — PROGRESS NOTES
IRON HOSPITALIST  Progress Note     Fan Oconnor Patient Status:  Observation    1940 MRN [id number]   [facility] 8NE-A Attending Jose Horn MD   Hosp Day # 0 PCP Laura South MD     Chief Complaint: SOB    S: Patient is Imaging data reviewed in Epic.     Medications:   • amLODIPine Besylate  5 mg Oral Daily   • Clopidogrel Bisulfate  75 mg Oral Daily   • lisinopril  20 mg Oral BID   • Pravastatin Sodium  20 mg Oral Nightly   • Rivaroxaban  20 mg Oral Daily with food   • fu

## 2020-01-28 NOTE — PROGRESS NOTES
Pt received from ED via cart. Ambulatory, A&OX4, no c/o pain. SOB on exertion. O2 sat 94% on room air. A fib on the monitor. Admission navigator completed with family at bedside with patient's consent. Lung sounds clear. No edema noted.  Incisions to bilate

## 2020-01-28 NOTE — PROGRESS NOTES
[facility] Cardiology Progress Note - Marlene Eastman Patient Status:  Observation    1940 MRN [id number]   [facility] 8NE-A Attending Wes Crowe MD   Hosp Day # 0 PCP Derick Millan MD     Subjective:  Feel artery     Acute on chronic congestive heart failure (HCC)     Acute on chronic congestive heart failure, unspecified heart failure type (HCC)      Objective:   Temp: 97.7 °F (36.5 °C)  Pulse: 66  Resp: 18  BP: 135/77    Intake/Output:     Intake/Output Barriga RASH    Comment:Cerner Allergy Text Annotation: Adhesive Tape;             fragile skin-->skin tears    Medications:  magnesium oxide (MAG-OX) tab 800 mg, 800 mg, Oral, Once  amLODIPine Besylate (NORVASC) tab 5 mg, 5 mg, Oral, Daily  Clopidogrel Bis ruba Medina MD  1/28/2020  9:06 AM

## 2020-01-28 NOTE — DIETARY NOTE
[address] [facility] [place]     Admitting diagnosis:  Acute on chronic congestive heart failure, unspecified heart failure type ([facility] [address] [I50.9]    Ht: 177.8 cm (5' 10\")  Wt: 83.9 kg (184 lb 15.5 oz).  This is 123% of IBW  Body ma

## 2020-01-28 NOTE — PLAN OF CARE
Alert. Oriented. Afib per tele. Hr 50-60s. Denies sob, cp. No edema noted. Up w/ sb assist. Desats to 85% on room air while sleeping. 2l/nc applied and sat up to 99%. Declined scds. Poc updated w/ pt. Cont. to monitor pt.

## 2020-01-29 ENCOUNTER — APPOINTMENT (OUTPATIENT)
Dept: CV DIAGNOSTICS | Facility: HOSPITAL | Age: 80
End: 2020-01-29
Attending: INTERNAL MEDICINE
Payer: MEDICARE

## 2020-01-29 VITALS
SYSTOLIC BLOOD PRESSURE: 125 MMHG | RESPIRATION RATE: 18 BRPM | WEIGHT: 181 LBS | HEART RATE: 59 BPM | TEMPERATURE: 98 F | BODY MASS INDEX: 25.91 KG/M2 | OXYGEN SATURATION: 98 % | DIASTOLIC BLOOD PRESSURE: 83 MMHG | HEIGHT: 70 IN

## 2020-01-29 LAB
BUN BLD-MCNC: 18 MG/DL (ref 7–18)
CALCIUM BLD-MCNC: 9.1 MG/DL (ref 8.5–10.1)
CHLORIDE SERPL-SCNC: 100 MMOL/L (ref 98–112)
CO2 SERPL-SCNC: 39 MMOL/L (ref 21–32)
CREAT BLD-MCNC: 0.71 MG/DL (ref 0.55–1.02)
GLUCOSE BLD-MCNC: 133 MG/DL (ref 70–99)
GLUCOSE BLD-MCNC: 148 MG/DL (ref 70–99)
GLUCOSE BLD-MCNC: 179 MG/DL (ref 70–99)
GLUCOSE BLD-MCNC: 194 MG/DL (ref 70–99)
HAV IGM SER QL: 1.7 MG/DL (ref 1.6–2.6)
POTASSIUM SERPL-SCNC: 3.7 MMOL/L (ref 3.5–5.1)
SODIUM SERPL-SCNC: 141 MMOL/L (ref 136–145)

## 2020-01-29 PROCEDURE — 78452 HT MUSCLE IMAGE SPECT MULT: CPT | Performed by: INTERNAL MEDICINE

## 2020-01-29 PROCEDURE — 99217 OBSERVATION CARE DISCHARGE: CPT | Performed by: INTERNAL MEDICINE

## 2020-01-29 PROCEDURE — 93017 CV STRESS TEST TRACING ONLY: CPT | Performed by: INTERNAL MEDICINE

## 2020-01-29 PROCEDURE — 93018 CV STRESS TEST I&R ONLY: CPT | Performed by: INTERNAL MEDICINE

## 2020-01-29 RX ORDER — FUROSEMIDE 20 MG/1
20 TABLET ORAL DAILY
Status: DISCONTINUED | OUTPATIENT
Start: 2020-01-30 | End: 2020-01-29

## 2020-01-29 RX ORDER — POTASSIUM CHLORIDE 20 MEQ/1
40 TABLET, EXTENDED RELEASE ORAL ONCE
Status: COMPLETED | OUTPATIENT
Start: 2020-01-29 | End: 2020-01-29

## 2020-01-29 RX ORDER — FUROSEMIDE 20 MG/1
20 TABLET ORAL DAILY
Qty: 30 TABLET | Refills: 3 | Status: ON HOLD | OUTPATIENT
Start: 2020-01-30 | End: 2020-03-07

## 2020-01-29 RX ORDER — MAGNESIUM OXIDE 400 MG (241.3 MG MAGNESIUM) TABLET
400 TABLET ONCE
Status: COMPLETED | OUTPATIENT
Start: 2020-01-29 | End: 2020-01-29

## 2020-01-29 NOTE — PLAN OF CARE
Preliminary stress test results per radiology: EF 62% with small area of apical ischemia, chronic and unchanged from prior imaging from 2014. Discussed with Dr. Bell Nation, stable for discharge from cardiology standpoint.  Follow up with cardiology in 1-2

## 2020-01-29 NOTE — DISCHARGE SUMMARY
[facility] [place] HOSPITALIST  DISCHARGE SUMMARY     Randall Baca Patient Status:  Observation    1940 MRN [id number]   [facility] 8NE-A Attending Jamie Vaughan MD   Hosp Day # 0 PCP Kerry Huntley MD     Date of Admission: 2020  Da Tabs  Commonly known as:  LASIX      Take 1 tablet (20 mg total) by mouth daily.    Quantity:  30 tablet  Refills:  3        CONTINUE taking these medications      Instructions Prescription details   amLODIPine Besylate 5 MG Tabs  Commonly known as:  James Shahids na    Follow-up appointment:   Chelsie Cortes  [address] [address] [phone]    On 2/5/2020  cardiology follow up 2/5/20 at 10 am     Appointments for Next 30 Days 1/30/2020 - 2/29/2020      Date Arrival Time Visit

## 2020-01-29 NOTE — PROGRESS NOTES
IRON HOSPITALIST  Progress Note     Nguyen Ngo Patient Status:  Observation    1940 MRN [id number]   [facility] 8NE-A Attending Jeanette Urbina MD   Hosp Day # 0 PCP Ritu Spivey MD     Chief Complaint: SOB    S: Patient wit 01/27/20  1046   PTP 27.2*   INR 2.35*       Recent Labs   Lab 01/27/20  1046   TROP <0.045            Imaging: Imaging data reviewed in Epic.     Medications:   • regadenoson       • Potassium Chloride ER  40 mEq Oral Once   • magnesium oxide  400 mg Oral

## 2020-01-29 NOTE — PROGRESS NOTES
[facility] Cardiology Progress Note - Riana Mcleod Patient Status:  Observation    1940 MRN [id number]   [place] 8NE-A Attending Ariadna Reeves MD   Hosp Day # 0 PCP Galileo Swanson MD     Subjective:  Pa ([address]      Objective:   Temp: 98.1 °F (36.7 °C)  Pulse: 63  Resp: 18  BP: 140/70    Intake/Output:     Intake/Output Summary (Last 24 hours) at 1/29/2020 1238  Last data filed at 1/29/2020 7972  Gross per 24 hour   Intake 240 ml   Output 2000 ml   Net -176 tears    Medications:  amLODIPine Besylate (NORVASC) tab 5 mg, 5 mg, Oral, Daily  Clopidogrel Bisulfate (PLAVIX) tab 75 mg, 75 mg, Oral, Daily  lisinopril tab 20 mg, 20 mg, Oral, BID  Pravastatin Sodium (PRAVACHOL) tab 20 mg, 20 mg, Oral, Nightly  Rivaroxa

## 2020-01-29 NOTE — PLAN OF CARE
NURSING DISCHARGE NOTE    Discharged Home via Wheelchair. Accompanied by Support staff  Belongings Taken by patient/family     IV removed. Dc instructions and f/u appts discussed, HF education given, all questions answered.  Pt requested RN cancel OP C Performed By: Linda DENG CMA

## 2020-01-29 NOTE — PLAN OF CARE
Alert. Oriented. Afib per tele. Hr 50-60s. Desats on room air while sleeping to 86%. O2-2lnc applied and o2 sat up to 97%. Denies sob, pain. Up w/ standby assist. No edema noted. Pt requesting if her outpt scheduled ct abd can be done today.  Will inform th

## 2020-01-29 NOTE — PLAN OF CARE
Problem: CARDIOVASCULAR - ADULT  Goal: Maintains optimal cardiac output and hemodynamic stability  Description  INTERVENTIONS:  - Monitor vital signs, rhythm, and trends  - Monitor for bleeding, hypotension and signs of decreased cardiac output  - Evalua Monitor Blood Glucose as ordered  - Assess for signs and symptoms of hyperglycemia and hypoglycemia  - Administer ordered medications to maintain glucose within target range  - Assess barriers to adequate nutritional intake and initiate nutrition consult a

## 2020-01-30 ENCOUNTER — PATIENT OUTREACH (OUTPATIENT)
Dept: CASE MANAGEMENT | Age: 80
End: 2020-01-30

## 2020-01-30 ENCOUNTER — TELEPHONE (OUTPATIENT)
Dept: INTERNAL MEDICINE CLINIC | Facility: CLINIC | Age: 80
End: 2020-01-30

## 2020-01-30 DIAGNOSIS — Z02.9 ENCOUNTERS FOR UNSPECIFIED ADMINISTRATIVE PURPOSE: ICD-10-CM

## 2020-01-30 DIAGNOSIS — I50.9 ACUTE ON CHRONIC CONGESTIVE HEART FAILURE, UNSPECIFIED HEART FAILURE TYPE (HCC): ICD-10-CM

## 2020-01-30 PROBLEM — I50.32 CHRONIC DIASTOLIC CHF (CONGESTIVE HEART FAILURE) (HCC): Status: ACTIVE | Noted: 2020-01-30

## 2020-01-30 PROCEDURE — 1111F DSCHRG MED/CURRENT MED MERGE: CPT

## 2020-01-30 NOTE — TELEPHONE ENCOUNTER
Spoke to pt for TCM today. Pt does not have HFU appt scheduled at this time. TCM/HFU appt recommended by 2/5/2020 as pt is a high risk for readmission. Please advise.     BOOK BY DATE (last date for TCM):  2/12/2020    TRIAGE:  Please f/u with pt and try

## 2020-01-30 NOTE — PROGRESS NOTES
Initial Post Discharge Follow Up   Discharge Date: 1/29/20  Contact Date: 1/30/2020    Consent Verification:  Assessment Completed With: Patient  HIPAA Verified?   Yes    Discharge Dx:    CHF    Was TCC ordered: yes    General:   • How have you been sinc Oral Tab Take 1 tablet (20 mg total) by mouth daily with food. 30 tablet 11   • amLODIPine Besylate 5 MG Oral Tab Take 1 tablet (5 mg total) by mouth daily.  90 tablet 0   • Esomeprazole Magnesium 20 MG Oral Capsule Delayed Release Take 20 mg by mouth every no      Ankles or Legs?   no  Questions/Concerns: none per pt. Re-Admit:  Tell me what led up to your readmission:  Pt states she started feeling SOB and had pain with deep breaths.     Did you call your PCP office first? yes  Did you attempt to get in w exacerbation with pt. Advised pt to contact cardiology with any weight gain of 2lbs overnight or 5lbs within 1 week. Reviewed low-sodium diet and fluid restrictions with pt.   Educated pt on the importance of taking all meds as prescribed as well as close

## 2020-02-02 ENCOUNTER — HOSPITAL ENCOUNTER (OUTPATIENT)
Age: 80
Discharge: HOME | End: 2020-02-02
Attending: FAMILY MEDICINE
Payer: MEDICARE

## 2020-02-02 VITALS
SYSTOLIC BLOOD PRESSURE: 119 MMHG | HEART RATE: 61 BPM | RESPIRATION RATE: 18 BRPM | DIASTOLIC BLOOD PRESSURE: 77 MMHG | TEMPERATURE: 98 F | OXYGEN SATURATION: 93 %

## 2020-02-02 DIAGNOSIS — N30.01 ACUTE CYSTITIS WITH HEMATURIA: Primary | ICD-10-CM

## 2020-02-02 LAB
POCT BILIRUBIN URINE: NEGATIVE
POCT GLUCOSE URINE: NEGATIVE MG/DL
POCT KETONE URINE: NEGATIVE MG/DL
POCT NITRITE URINE: NEGATIVE
POCT PH URINE: 6.5 (ref 5–8)
POCT PROTEIN URINE: 30 MG/DL
POCT SPECIFIC GRAVITY URINE: 1.01
POCT UROBILINOGEN URINE: 0.2 MG/DL

## 2020-02-02 PROCEDURE — 81002 URINALYSIS NONAUTO W/O SCOPE: CPT | Performed by: FAMILY MEDICINE

## 2020-02-02 PROCEDURE — 99214 OFFICE O/P EST MOD 30 MIN: CPT

## 2020-02-02 PROCEDURE — 87086 URINE CULTURE/COLONY COUNT: CPT | Performed by: FAMILY MEDICINE

## 2020-02-02 RX ORDER — CEFPROZIL 250 MG/1
250 TABLET, FILM COATED ORAL 2 TIMES DAILY
Qty: 14 TABLET | Refills: 0 | Status: SHIPPED | OUTPATIENT
Start: 2020-02-02 | End: 2020-02-12

## 2020-02-02 NOTE — ED PROVIDER NOTES
Patient Seen in: THE [facility]mediate Care In [facility]      History   Patient presents with:  Urinary Symptoms    Stated Complaint: blood in urine    HPI    This 77-year-old female with history for atrial fibrillation, repair of AAA on 1/8/2020 and recent Procedure Laterality Date   • ABDOMINAL AORTIC ANEURYSM ENDOSCOPIC N/A 1/8/2020    Performed by Cee Avila MD at [address] [place] [address] And [address] [facility]    • CATH DRUG ELUTING STENT     • CHOLECYSTECTOMY     • COLONOSCOPY  04/21/2003    Screening- Normal   • noted in HPI. Constitutional and vital signs reviewed. All other systems reviewed and negative except as noted above.     Physical Exam     ED Triage Vitals [02/02/20 1201]   /77   Pulse 61   Resp 18   Temp 97.7 °F (36.5 °C)   Temp src Oral   Sp days for any new or worsening symptoms. We will call her with her culture results.       Disposition and Plan     Clinical Impression:  Acute cystitis with hematuria  (primary encounter diagnosis)    Disposition:  Discharge  2/2/2020 12:46 pm    Follow-up:

## 2020-02-02 NOTE — ED INITIAL ASSESSMENT (HPI)
Urinary symptoms-  Started  Friday  C/o dribbling, low pressure, and burning with urination. Pt was  Discharged Wednesday  EDW hosptial for CHF ,  Had Triple A January 8. Pt here due to blood in urine which started Friday. Denies fever.     Pt states she

## 2020-02-03 RX ORDER — GLYBURIDE 5 MG/1
TABLET ORAL
Qty: 90 TABLET | Refills: 0 | Status: SHIPPED | OUTPATIENT
Start: 2020-02-03 | End: 2020-05-07

## 2020-02-03 RX ORDER — PRAVASTATIN SODIUM 80 MG/1
TABLET ORAL
Qty: 90 TABLET | Refills: 0 | Status: SHIPPED | OUTPATIENT
Start: 2020-02-03 | End: 2020-05-07

## 2020-02-06 ENCOUNTER — LAB ENCOUNTER (OUTPATIENT)
Dept: LAB | Age: 80
End: 2020-02-06
Attending: PHYSICIAN ASSISTANT
Payer: MEDICARE

## 2020-02-06 DIAGNOSIS — R31.9 HEMATURIA, UNSPECIFIED TYPE: ICD-10-CM

## 2020-02-06 LAB
BASOPHILS # BLD AUTO: 0.04 X10(3) UL (ref 0–0.2)
BASOPHILS NFR BLD AUTO: 0.5 %
DEPRECATED RDW RBC AUTO: 46.5 FL (ref 35.1–46.3)
EOSINOPHIL # BLD AUTO: 0.3 X10(3) UL (ref 0–0.7)
EOSINOPHIL NFR BLD AUTO: 4 %
ERYTHROCYTE [DISTWIDTH] IN BLOOD BY AUTOMATED COUNT: 14.6 % (ref 11–15)
HCT VFR BLD AUTO: 40.7 % (ref 35–48)
HGB BLD-MCNC: 11.9 G/DL (ref 12–16)
IMM GRANULOCYTES # BLD AUTO: 0.02 X10(3) UL (ref 0–1)
IMM GRANULOCYTES NFR BLD: 0.3 %
LYMPHOCYTES # BLD AUTO: 0.8 X10(3) UL (ref 1–4)
LYMPHOCYTES NFR BLD AUTO: 10.6 %
MCH RBC QN AUTO: 25.3 PG (ref 26–34)
MCHC RBC AUTO-ENTMCNC: 29.2 G/DL (ref 31–37)
MCV RBC AUTO: 86.6 FL (ref 80–100)
MONOCYTES # BLD AUTO: 0.53 X10(3) UL (ref 0.1–1)
MONOCYTES NFR BLD AUTO: 7 %
NEUTROPHILS # BLD AUTO: 5.87 X10 (3) UL (ref 1.5–7.7)
NEUTROPHILS # BLD AUTO: 5.87 X10(3) UL (ref 1.5–7.7)
NEUTROPHILS NFR BLD AUTO: 77.6 %
PLATELET # BLD AUTO: 192 10(3)UL (ref 150–450)
RBC # BLD AUTO: 4.7 X10(6)UL (ref 3.8–5.3)
WBC # BLD AUTO: 7.6 X10(3) UL (ref 4–11)

## 2020-02-06 PROCEDURE — 85025 COMPLETE CBC W/AUTO DIFF WBC: CPT

## 2020-02-06 PROCEDURE — 36415 COLL VENOUS BLD VENIPUNCTURE: CPT

## 2020-02-07 ENCOUNTER — HOSPITAL ENCOUNTER (OUTPATIENT)
Dept: CT IMAGING | Facility: HOSPITAL | Age: 80
Discharge: HOME | End: 2020-02-07
Attending: SURGERY
Payer: MEDICARE

## 2020-02-07 DIAGNOSIS — I71.4 AAA (ABDOMINAL AORTIC ANEURYSM) (HCC): ICD-10-CM

## 2020-02-07 PROCEDURE — 74174 CTA ABD&PLVS W/CONTRAST: CPT | Performed by: SURGERY

## 2020-02-12 ENCOUNTER — OFFICE VISIT (OUTPATIENT)
Dept: INTERNAL MEDICINE CLINIC | Facility: CLINIC | Age: 80
End: 2020-02-12
Payer: MEDICARE

## 2020-02-12 VITALS
RESPIRATION RATE: 18 BRPM | DIASTOLIC BLOOD PRESSURE: 76 MMHG | HEIGHT: 71 IN | TEMPERATURE: 97 F | HEART RATE: 60 BPM | WEIGHT: 184 LBS | BODY MASS INDEX: 25.76 KG/M2 | OXYGEN SATURATION: 95 % | SYSTOLIC BLOOD PRESSURE: 122 MMHG

## 2020-02-12 DIAGNOSIS — I48.0 PAROXYSMAL ATRIAL FIBRILLATION (HCC): ICD-10-CM

## 2020-02-12 DIAGNOSIS — I10 BENIGN ESSENTIAL HTN: ICD-10-CM

## 2020-02-12 DIAGNOSIS — L98.9 SKIN LESION: ICD-10-CM

## 2020-02-12 DIAGNOSIS — E11.59 TYPE 2 DIABETES MELLITUS WITH OTHER CIRCULATORY COMPLICATION, WITHOUT LONG-TERM CURRENT USE OF INSULIN (HCC): ICD-10-CM

## 2020-02-12 DIAGNOSIS — I71.02 DISSECTION OF ABDOMINAL AORTA (HCC): ICD-10-CM

## 2020-02-12 DIAGNOSIS — Z95.828 HISTORY OF REPAIR OF ANEURYSM OF ABDOMINAL AORTA USING ENDOVASCULAR STENT GRAFT: ICD-10-CM

## 2020-02-12 DIAGNOSIS — I50.32 CHRONIC DIASTOLIC CHF (CONGESTIVE HEART FAILURE) (HCC): Primary | ICD-10-CM

## 2020-02-12 DIAGNOSIS — M46.96 INFLAMMATORY SPONDYLOPATHY OF LUMBAR REGION (HCC): ICD-10-CM

## 2020-02-12 DIAGNOSIS — C82.11 GRADE 2 FOLLICULAR LYMPHOMA OF LYMPH NODES OF NECK (HCC): ICD-10-CM

## 2020-02-12 DIAGNOSIS — Z09 HOSPITAL DISCHARGE FOLLOW-UP: ICD-10-CM

## 2020-02-12 DIAGNOSIS — R31.0 GROSS HEMATURIA: ICD-10-CM

## 2020-02-12 PROBLEM — D69.6 THROMBOCYTOPENIA (HCC): Chronic | Status: ACTIVE | Noted: 2020-02-12

## 2020-02-12 PROCEDURE — 99495 TRANSJ CARE MGMT MOD F2F 14D: CPT | Performed by: INTERNAL MEDICINE

## 2020-02-12 PROCEDURE — 1111F DSCHRG MED/CURRENT MED MERGE: CPT | Performed by: INTERNAL MEDICINE

## 2020-02-12 NOTE — PROGRESS NOTES
HPI:    Laurence Mcgee is a 78year old female here today for hospital follow up.    She was discharged from Inpatient hospital, [facility] to Home   Admission Date: 2/2/20   Discharge Date: 2/2/20  Hospital Discharge Diagnoses (since 1/13/2020) edema or wegith gain. Allergies:  She is allergic to adhesive tape. Current Meds:  amLODIPine Besylate 5 MG Oral Tab, Take 1 tablet (5 mg total) by mouth daily.   PRAVASTATIN SODIUM 80 MG Oral Tab, TAKE 1 TABLET EVERY DAY  GLYBURIDE 5 MG Oral Tab, TA Problems with swallowing, Stented coronary artery, Type II or unspecified type diabetes mellitus without mention of complication, not stated as uncontrolled, Unspecified essential hypertension, and Visual impairment.     She  has a past surgical history miller denies thyroid history  ALL/ASTHMA: denies hx of allergy or asthma    PHYSICAL EXAM:   No LMP recorded. Patient has had a hysterectomy. Estimated body mass index is 25.66 kg/m² as calculated from the following:    Height as of this encounter: 71\".     [place] [place] ([address]  Stable contieu f/u with hem/oc  History of repair of aneurysm of abdominal aorta using endovascular stent graft  Stable see above  Gross hematuria  -     UROLOGY - INTERNAL  -     Cancel: CBC WITH DIFFERENTIAL WITH PLATELET;  Future  -     Cancel: BA

## 2020-03-01 ENCOUNTER — APPOINTMENT (OUTPATIENT)
Dept: GENERAL RADIOLOGY | Facility: HOSPITAL | Age: 80
DRG: 481 | End: 2020-03-01
Attending: EMERGENCY MEDICINE
Payer: MEDICARE

## 2020-03-01 ENCOUNTER — HOSPITAL ENCOUNTER (INPATIENT)
Facility: HOSPITAL | Age: 80
LOS: 8 days | Discharge: NURSING, REHAB OR LONG-TERM CARE | DRG: 481 | End: 2020-03-09
Attending: EMERGENCY MEDICINE | Admitting: HOSPITALIST
Payer: MEDICARE

## 2020-03-01 ENCOUNTER — APPOINTMENT (OUTPATIENT)
Dept: CT IMAGING | Facility: HOSPITAL | Age: 80
DRG: 481 | End: 2020-03-01
Attending: EMERGENCY MEDICINE
Payer: MEDICARE

## 2020-03-01 ENCOUNTER — ANESTHESIA EVENT (OUTPATIENT)
Dept: EMERGENCY DEPT | Facility: HOSPITAL | Age: 80
DRG: 481 | End: 2020-03-01
Payer: MEDICARE

## 2020-03-01 ENCOUNTER — ANESTHESIA (OUTPATIENT)
Dept: EMERGENCY DEPT | Facility: HOSPITAL | Age: 80
DRG: 481 | End: 2020-03-01
Payer: MEDICARE

## 2020-03-01 DIAGNOSIS — M25.552 PAIN OF LEFT HIP JOINT: ICD-10-CM

## 2020-03-01 DIAGNOSIS — S72.002A CLOSED FRACTURE OF NECK OF LEFT FEMUR, INITIAL ENCOUNTER (HCC): Primary | ICD-10-CM

## 2020-03-01 PROBLEM — R73.9 HYPERGLYCEMIA: Status: ACTIVE | Noted: 2020-03-01

## 2020-03-01 PROBLEM — D64.9 NORMOCYTIC ANEMIA: Status: ACTIVE | Noted: 2018-01-19

## 2020-03-01 LAB
ALBUMIN SERPL-MCNC: 3.2 G/DL (ref 3.4–5)
ALBUMIN/GLOB SERPL: 0.8 {RATIO} (ref 1–2)
ALP LIVER SERPL-CCNC: 74 U/L (ref 55–142)
ALT SERPL-CCNC: 15 U/L (ref 13–56)
ANION GAP SERPL CALC-SCNC: 4 MMOL/L (ref 0–18)
AST SERPL-CCNC: 20 U/L (ref 15–37)
BASOPHILS # BLD AUTO: 0.05 X10(3) UL (ref 0–0.2)
BASOPHILS NFR BLD AUTO: 0.5 %
BILIRUB SERPL-MCNC: 0.5 MG/DL (ref 0.1–2)
BUN BLD-MCNC: 16 MG/DL (ref 7–18)
BUN/CREAT SERPL: 18.8 (ref 10–20)
CALCIUM BLD-MCNC: 9.1 MG/DL (ref 8.5–10.1)
CHLORIDE SERPL-SCNC: 102 MMOL/L (ref 98–112)
CO2 SERPL-SCNC: 32 MMOL/L (ref 21–32)
CREAT BLD-MCNC: 0.85 MG/DL (ref 0.55–1.02)
DEPRECATED RDW RBC AUTO: 46 FL (ref 35.1–46.3)
EOSINOPHIL # BLD AUTO: 0.16 X10(3) UL (ref 0–0.7)
EOSINOPHIL NFR BLD AUTO: 1.6 %
ERYTHROCYTE [DISTWIDTH] IN BLOOD BY AUTOMATED COUNT: 15.3 % (ref 11–15)
GLOBULIN PLAS-MCNC: 3.8 G/DL (ref 2.8–4.4)
GLUCOSE BLD-MCNC: 148 MG/DL (ref 70–99)
HCT VFR BLD AUTO: 39.1 % (ref 35–48)
HGB BLD-MCNC: 11.8 G/DL (ref 12–16)
IMM GRANULOCYTES # BLD AUTO: 0.09 X10(3) UL (ref 0–1)
IMM GRANULOCYTES NFR BLD: 0.9 %
LYMPHOCYTES # BLD AUTO: 0.62 X10(3) UL (ref 1–4)
LYMPHOCYTES NFR BLD AUTO: 6.2 %
M PROTEIN MFR SERPL ELPH: 7 G/DL (ref 6.4–8.2)
MCH RBC QN AUTO: 25.4 PG (ref 26–34)
MCHC RBC AUTO-ENTMCNC: 30.2 G/DL (ref 31–37)
MCV RBC AUTO: 84.1 FL (ref 80–100)
MONOCYTES # BLD AUTO: 0.64 X10(3) UL (ref 0.1–1)
MONOCYTES NFR BLD AUTO: 6.4 %
MRSA NASAL: NEGATIVE
NEUTROPHILS # BLD AUTO: 8.43 X10 (3) UL (ref 1.5–7.7)
NEUTROPHILS # BLD AUTO: 8.43 X10(3) UL (ref 1.5–7.7)
NEUTROPHILS NFR BLD AUTO: 84.4 %
OSMOLALITY SERPL CALC.SUM OF ELEC: 290 MOSM/KG (ref 275–295)
PLATELET # BLD AUTO: 176 10(3)UL (ref 150–450)
POTASSIUM SERPL-SCNC: 4.5 MMOL/L (ref 3.5–5.1)
RBC # BLD AUTO: 4.65 X10(6)UL (ref 3.8–5.3)
SODIUM SERPL-SCNC: 138 MMOL/L (ref 136–145)
STAPH A BY PCR: NEGATIVE
WBC # BLD AUTO: 10 X10(3) UL (ref 4–11)

## 2020-03-01 PROCEDURE — 0JQH0ZZ REPAIR LEFT LOWER ARM SUBCUTANEOUS TISSUE AND FASCIA, OPEN APPROACH: ICD-10-PCS | Performed by: EMERGENCY MEDICINE

## 2020-03-01 PROCEDURE — 70450 CT HEAD/BRAIN W/O DYE: CPT | Performed by: EMERGENCY MEDICINE

## 2020-03-01 PROCEDURE — 3E0T3BZ INTRODUCTION OF ANESTHETIC AGENT INTO PERIPHERAL NERVES AND PLEXI, PERCUTANEOUS APPROACH: ICD-10-PCS | Performed by: EMERGENCY MEDICINE

## 2020-03-01 PROCEDURE — 71045 X-RAY EXAM CHEST 1 VIEW: CPT | Performed by: EMERGENCY MEDICINE

## 2020-03-01 PROCEDURE — 99223 1ST HOSP IP/OBS HIGH 75: CPT | Performed by: HOSPITALIST

## 2020-03-01 PROCEDURE — 73502 X-RAY EXAM HIP UNI 2-3 VIEWS: CPT | Performed by: EMERGENCY MEDICINE

## 2020-03-01 RX ORDER — ATORVASTATIN CALCIUM 20 MG/1
20 TABLET, FILM COATED ORAL NIGHTLY
Status: DISCONTINUED | OUTPATIENT
Start: 2020-03-01 | End: 2020-03-09

## 2020-03-01 RX ORDER — MORPHINE SULFATE 4 MG/ML
2 INJECTION, SOLUTION INTRAMUSCULAR; INTRAVENOUS EVERY 2 HOUR PRN
Status: DISCONTINUED | OUTPATIENT
Start: 2020-03-01 | End: 2020-03-09

## 2020-03-01 RX ORDER — ONDANSETRON 2 MG/ML
4 INJECTION INTRAMUSCULAR; INTRAVENOUS EVERY 6 HOURS PRN
Status: DISCONTINUED | OUTPATIENT
Start: 2020-03-01 | End: 2020-03-09

## 2020-03-01 RX ORDER — METOPROLOL TARTRATE 50 MG/1
50 TABLET, FILM COATED ORAL
Status: DISCONTINUED | OUTPATIENT
Start: 2020-03-01 | End: 2020-03-04

## 2020-03-01 RX ORDER — AMLODIPINE BESYLATE 5 MG/1
5 TABLET ORAL DAILY
Status: DISCONTINUED | OUTPATIENT
Start: 2020-03-01 | End: 2020-03-04

## 2020-03-01 RX ORDER — 0.9 % SODIUM CHLORIDE 0.9 %
10 VIAL (ML) INJECTION AS NEEDED
Status: DISCONTINUED | OUTPATIENT
Start: 2020-03-01 | End: 2020-03-09

## 2020-03-01 RX ORDER — ROPIVACAINE HYDROCHLORIDE 5 MG/ML
30 INJECTION, SOLUTION EPIDURAL; INFILTRATION; PERINEURAL AS NEEDED
Status: DISCONTINUED | OUTPATIENT
Start: 2020-03-01 | End: 2020-03-09

## 2020-03-01 RX ORDER — DIPHENHYDRAMINE HYDROCHLORIDE 50 MG/ML
25 INJECTION INTRAMUSCULAR; INTRAVENOUS ONCE
Status: COMPLETED | OUTPATIENT
Start: 2020-03-01 | End: 2020-03-01

## 2020-03-01 RX ORDER — ENOXAPARIN SODIUM 100 MG/ML
40 INJECTION SUBCUTANEOUS DAILY
Status: DISCONTINUED | OUTPATIENT
Start: 2020-03-02 | End: 2020-03-03

## 2020-03-01 RX ORDER — MORPHINE SULFATE 4 MG/ML
4 INJECTION, SOLUTION INTRAMUSCULAR; INTRAVENOUS ONCE
Status: COMPLETED | OUTPATIENT
Start: 2020-03-01 | End: 2020-03-01

## 2020-03-01 RX ORDER — METOCLOPRAMIDE HYDROCHLORIDE 5 MG/ML
5 INJECTION INTRAMUSCULAR; INTRAVENOUS ONCE
Status: COMPLETED | OUTPATIENT
Start: 2020-03-01 | End: 2020-03-01

## 2020-03-01 RX ORDER — BUPRENORPHINE HYDROCHLORIDE 0.32 MG/ML
INJECTION INTRAMUSCULAR; INTRAVENOUS AS NEEDED
Status: DISCONTINUED | OUTPATIENT
Start: 2020-03-01 | End: 2020-03-01

## 2020-03-01 RX ORDER — DEXTROSE MONOHYDRATE 25 G/50ML
50 INJECTION, SOLUTION INTRAVENOUS
Status: DISCONTINUED | OUTPATIENT
Start: 2020-03-01 | End: 2020-03-09

## 2020-03-01 RX ORDER — SODIUM PHOSPHATE, DIBASIC AND SODIUM PHOSPHATE, MONOBASIC 7; 19 G/133ML; G/133ML
1 ENEMA RECTAL ONCE AS NEEDED
Status: DISCONTINUED | OUTPATIENT
Start: 2020-03-01 | End: 2020-03-09

## 2020-03-01 RX ORDER — DEXAMETHASONE SODIUM PHOSPHATE 10 MG/ML
INJECTION, SOLUTION INTRAMUSCULAR; INTRAVENOUS
Status: DISPENSED
Start: 2020-03-01 | End: 2020-03-02

## 2020-03-01 RX ORDER — SODIUM CHLORIDE 9 MG/ML
INJECTION, SOLUTION INTRAVENOUS ONCE
Status: COMPLETED | OUTPATIENT
Start: 2020-03-01 | End: 2020-03-01

## 2020-03-01 RX ORDER — LIDOCAINE HYDROCHLORIDE 10 MG/ML
2 INJECTION, SOLUTION EPIDURAL; INFILTRATION; INTRACAUDAL; PERINEURAL AS NEEDED
Status: DISCONTINUED | OUTPATIENT
Start: 2020-03-01 | End: 2020-03-09

## 2020-03-01 RX ORDER — BUPIVACAINE HYDROCHLORIDE AND EPINEPHRINE 5; 5 MG/ML; UG/ML
30 INJECTION, SOLUTION EPIDURAL; INTRACAUDAL; PERINEURAL ONCE
Status: DISCONTINUED | OUTPATIENT
Start: 2020-03-01 | End: 2020-03-09

## 2020-03-01 RX ORDER — MORPHINE SULFATE 4 MG/ML
1 INJECTION, SOLUTION INTRAMUSCULAR; INTRAVENOUS EVERY 2 HOUR PRN
Status: DISCONTINUED | OUTPATIENT
Start: 2020-03-01 | End: 2020-03-09

## 2020-03-01 RX ORDER — MORPHINE SULFATE 4 MG/ML
4 INJECTION, SOLUTION INTRAMUSCULAR; INTRAVENOUS EVERY 30 MIN PRN
Status: ACTIVE | OUTPATIENT
Start: 2020-03-01 | End: 2020-03-02

## 2020-03-01 RX ORDER — ONDANSETRON 2 MG/ML
4 INJECTION INTRAMUSCULAR; INTRAVENOUS EVERY 4 HOURS PRN
Status: DISCONTINUED | OUTPATIENT
Start: 2020-03-01 | End: 2020-03-09

## 2020-03-01 RX ORDER — POLYETHYLENE GLYCOL 3350 17 G/17G
17 POWDER, FOR SOLUTION ORAL DAILY PRN
Status: DISCONTINUED | OUTPATIENT
Start: 2020-03-01 | End: 2020-03-09

## 2020-03-01 RX ORDER — MORPHINE SULFATE 4 MG/ML
4 INJECTION, SOLUTION INTRAMUSCULAR; INTRAVENOUS EVERY 2 HOUR PRN
Status: DISCONTINUED | OUTPATIENT
Start: 2020-03-01 | End: 2020-03-09

## 2020-03-01 RX ORDER — PANTOPRAZOLE SODIUM 20 MG/1
20 TABLET, DELAYED RELEASE ORAL
Status: DISCONTINUED | OUTPATIENT
Start: 2020-03-02 | End: 2020-03-09

## 2020-03-01 RX ORDER — DEXAMETHASONE SODIUM PHOSPHATE 10 MG/ML
10 INJECTION, SOLUTION INTRAMUSCULAR; INTRAVENOUS ONCE
Status: DISCONTINUED | OUTPATIENT
Start: 2020-03-01 | End: 2020-03-09

## 2020-03-01 RX ORDER — DEXAMETHASONE SODIUM PHOSPHATE 10 MG/ML
INJECTION, SOLUTION INTRAMUSCULAR; INTRAVENOUS AS NEEDED
Status: DISCONTINUED | OUTPATIENT
Start: 2020-03-01 | End: 2020-03-01

## 2020-03-01 RX ORDER — BISACODYL 10 MG
10 SUPPOSITORY, RECTAL RECTAL
Status: DISCONTINUED | OUTPATIENT
Start: 2020-03-01 | End: 2020-03-09

## 2020-03-01 RX ORDER — ONDANSETRON 2 MG/ML
4 INJECTION INTRAMUSCULAR; INTRAVENOUS ONCE
Status: COMPLETED | OUTPATIENT
Start: 2020-03-01 | End: 2020-03-01

## 2020-03-01 RX ORDER — LISINOPRIL 20 MG/1
20 TABLET ORAL 2 TIMES DAILY
Status: DISCONTINUED | OUTPATIENT
Start: 2020-03-01 | End: 2020-03-04

## 2020-03-01 RX ORDER — ACETAMINOPHEN 325 MG/1
650 TABLET ORAL EVERY 6 HOURS PRN
Status: DISCONTINUED | OUTPATIENT
Start: 2020-03-01 | End: 2020-03-09

## 2020-03-01 RX ORDER — DOCUSATE SODIUM 100 MG/1
100 CAPSULE, LIQUID FILLED ORAL 2 TIMES DAILY PRN
Status: DISCONTINUED | OUTPATIENT
Start: 2020-03-01 | End: 2020-03-09

## 2020-03-01 RX ORDER — BUPIVACAINE HYDROCHLORIDE 5 MG/ML
INJECTION, SOLUTION EPIDURAL; INTRACAUDAL AS NEEDED
Status: DISCONTINUED | OUTPATIENT
Start: 2020-03-01 | End: 2020-03-01

## 2020-03-01 RX ADMIN — BUPRENORPHINE HYDROCHLORIDE 0.3 MG: 0.32 INJECTION INTRAMUSCULAR; INTRAVENOUS at 20:50:00

## 2020-03-01 RX ADMIN — BUPIVACAINE HYDROCHLORIDE 25 ML: 5 INJECTION, SOLUTION EPIDURAL; INTRACAUDAL at 20:50:00

## 2020-03-01 RX ADMIN — DEXAMETHASONE SODIUM PHOSPHATE 2 MG: 10 INJECTION, SOLUTION INTRAMUSCULAR; INTRAVENOUS at 20:50:00

## 2020-03-02 ENCOUNTER — ANESTHESIA EVENT (OUTPATIENT)
Dept: SURGERY | Facility: HOSPITAL | Age: 80
DRG: 481 | End: 2020-03-02
Payer: MEDICARE

## 2020-03-02 LAB
ANION GAP SERPL CALC-SCNC: 1 MMOL/L (ref 0–18)
ANTIBODY SCREEN: NEGATIVE
ATRIAL RATE: 75 BPM
BASOPHILS # BLD AUTO: 0.01 X10(3) UL (ref 0–0.2)
BASOPHILS NFR BLD AUTO: 0.1 %
BILIRUB UR QL STRIP.AUTO: NEGATIVE
BUN BLD-MCNC: 14 MG/DL (ref 7–18)
BUN/CREAT SERPL: 18.4 (ref 10–20)
CALCIUM BLD-MCNC: 8.8 MG/DL (ref 8.5–10.1)
CHLORIDE SERPL-SCNC: 103 MMOL/L (ref 98–112)
CO2 SERPL-SCNC: 33 MMOL/L (ref 21–32)
COLOR UR AUTO: YELLOW
CREAT BLD-MCNC: 0.76 MG/DL (ref 0.55–1.02)
DEPRECATED RDW RBC AUTO: 47.9 FL (ref 35.1–46.3)
EOSINOPHIL # BLD AUTO: 0.01 X10(3) UL (ref 0–0.7)
EOSINOPHIL NFR BLD AUTO: 0.1 %
ERYTHROCYTE [DISTWIDTH] IN BLOOD BY AUTOMATED COUNT: 15.2 % (ref 11–15)
GLUCOSE BLD-MCNC: 163 MG/DL (ref 70–99)
GLUCOSE BLD-MCNC: 165 MG/DL (ref 70–99)
GLUCOSE BLD-MCNC: 172 MG/DL (ref 70–99)
GLUCOSE BLD-MCNC: 197 MG/DL (ref 70–99)
GLUCOSE BLD-MCNC: 204 MG/DL (ref 70–99)
GLUCOSE UR STRIP.AUTO-MCNC: NEGATIVE MG/DL
HCT VFR BLD AUTO: 36.6 % (ref 35–48)
HGB BLD-MCNC: 10.9 G/DL (ref 12–16)
IMM GRANULOCYTES # BLD AUTO: 0.05 X10(3) UL (ref 0–1)
IMM GRANULOCYTES NFR BLD: 0.6 %
INR BLD: 1.38 (ref 0.9–1.1)
KETONES UR STRIP.AUTO-MCNC: NEGATIVE MG/DL
LYMPHOCYTES # BLD AUTO: 0.36 X10(3) UL (ref 1–4)
LYMPHOCYTES NFR BLD AUTO: 4.1 %
MCH RBC QN AUTO: 25.8 PG (ref 26–34)
MCHC RBC AUTO-ENTMCNC: 29.8 G/DL (ref 31–37)
MCV RBC AUTO: 86.5 FL (ref 80–100)
MONOCYTES # BLD AUTO: 0.26 X10(3) UL (ref 0.1–1)
MONOCYTES NFR BLD AUTO: 3 %
NEUTROPHILS # BLD AUTO: 7.99 X10 (3) UL (ref 1.5–7.7)
NEUTROPHILS # BLD AUTO: 7.99 X10(3) UL (ref 1.5–7.7)
NEUTROPHILS NFR BLD AUTO: 92.1 %
NITRITE UR QL STRIP.AUTO: NEGATIVE
OSMOLALITY SERPL CALC.SUM OF ELEC: 289 MOSM/KG (ref 275–295)
PH UR STRIP.AUTO: 5 [PH] (ref 4.5–8)
PLATELET # BLD AUTO: 166 10(3)UL (ref 150–450)
POTASSIUM SERPL-SCNC: 5 MMOL/L (ref 3.5–5.1)
PROT UR STRIP.AUTO-MCNC: 30 MG/DL
PSA SERPL DL<=0.01 NG/ML-MCNC: 17.7 SECONDS (ref 12.5–14.7)
Q-T INTERVAL: 390 MS
QRS DURATION: 80 MS
QTC CALCULATION (BEZET): 438 MS
R AXIS: 20 DEGREES
RBC # BLD AUTO: 4.23 X10(6)UL (ref 3.8–5.3)
RBC UR QL AUTO: NEGATIVE
RH BLOOD TYPE: POSITIVE
SODIUM SERPL-SCNC: 137 MMOL/L (ref 136–145)
SP GR UR STRIP.AUTO: 1.02 (ref 1–1.03)
T AXIS: 19 DEGREES
UROBILINOGEN UR STRIP.AUTO-MCNC: 2 MG/DL
VENTRICULAR RATE: 76 BPM
WBC # BLD AUTO: 8.7 X10(3) UL (ref 4–11)
WBC #/AREA URNS AUTO: >50 /HPF

## 2020-03-02 PROCEDURE — 99233 SBSQ HOSP IP/OBS HIGH 50: CPT | Performed by: INTERNAL MEDICINE

## 2020-03-02 NOTE — PLAN OF CARE
Per Pt lives with son Barrington Coates, other son Sandro Garcia) is an ER MD here at [facility], [address] has a Hx of A-fib and cardiac stents, last took Xeralto the morning of 3/1/2020. Normally has no issues ambulating and uses no DME.

## 2020-03-02 NOTE — PLAN OF CARE
ER admit Lt femur Fx S/P fall over rug, from home, nerve block still in effect, controlling pain well, Cardiology consulted for Sx clearance, Pt is AAOX4, room air, TELE, currently in A-Fib, VSS, room air, SCD's, IVF, glucose monitored and treated per orde

## 2020-03-02 NOTE — ANESTHESIA PREPROCEDURE EVALUATION
PRE-OP EVALUATION    Patient Name: Alex García    Pre-op Diagnosis: Pain of left hip joint [M25.552]    Procedure(s):  LEFT HIP PERCUTANEOUS SCREW FIXATION    Surgeon(s) and Role:     Eun Santiago MD - Primary    Pre-op vitals reviewed.   Temp: 9 100 mg, Oral, BID PRN  PEG 3350 (MIRALAX) powder packet 17 g, 17 g, Oral, Daily PRN  magnesium hydroxide (MILK OF MAGNESIA) 400 MG/5ML suspension 30 mL, 30 mL, Oral, Daily PRN  bisacodyl (DULCOLAX) rectal suppository 10 mg, 10 mg, Rectal, Daily PRN  Fleet Evaluation    Patient summary reviewed.     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

## 2020-03-02 NOTE — ED NOTES
Pt remained alert & coherent, CT of brain within normal, no nausea or vomiting noted.  Suturing & dressing to L upper arm done

## 2020-03-02 NOTE — ED INITIAL ASSESSMENT (HPI)
Pt tripped over rug at home injurying L hip and L elbow. Pt denies hitting head. Arrives alert and oriented. She states she cannot bear any weight on L leg.

## 2020-03-02 NOTE — PAYOR COMM NOTE
--------------  ADMISSION REVIEW     PayorSamie Meghana [facility]  Subscriber #:  [id number]  Authorization Number: [id number]    Admit date: 3/1/20  Admit time: 2324       Admitting Physician: Devaughn Doyle MD  Attending Physician:  Sarika Abrams MD  Primary C Zuleika Flores MD at [facility] CVOR   • CATARACT Bilateral    • CATH DRUG ELUTING STENT     • CHOLECYSTECTOMY     • COLONOSCOPY  04/21/2003    Screening- Normal   • COLONOSCOPY     • COLONOSCOPY N/A 5/9/2019    Performed by Barry Kelley MD at [facility] ENDOSCOPY METABOLIC PANEL (14) - Abnormal; Notable for the following components:       Result Value    Glucose 148 (*)     Albumin 3.2 (*)     A/G Ratio 0.8 (*)     All other components within normal limits   CBC W/ DIFFERENTIAL - Abnormal; Notable for the following her left. Patient denies hitting head or LOC. Patient immediately developed pain to left hip rated 8/10 and described as sharp. Patient denies CP/SOB. Denies N/V/D. Medications:  No current facility-administered medications on file prior to encounter. to pain. Extremities: No edema or cyanosis.     Lab 03/01/20  1849   WBC 10.0   HGB 11.8*   MCV 84.1   .0     *   BUN 16   CREATSERUM 0.85   GFRAA 75   GFRNAA 65   CA 9.1   ALB 3.2*      K 4.5      CO2 32.0   ALKPHO 74   AST 20 atorvastatin  20 mg Oral Nightly   • enoxaparin  40 mg Subcutaneous Daily   • Insulin Aspart Pen  1-10 Units Subcutaneous TID AC and HS   • dexamethasone PF  10 mg Intravenous Once         ASSESSMENT / PLAN:      1. Left hip fracture secondary to fall/hema Given 0.3 mg Injection Kenyon Morataya MD      dexamethasone PF (DECADRON) 10 MG/ML injection     Date Action Dose Route User    3/1/2020 2050 Given 2 mg Injection Kenyon Morataya MD      diphenhydrAMINE HCl (BENADRYL) injection 25 mg     Date Action Dose Rout Appropriate for INPT status per guidelines for HIP FX MELA  PT WITH multiple comorbidities.     PLEASE FAX INPT DAYS AUTHORIZED ALONG WITH NRD [phone]    [facility] YOU

## 2020-03-02 NOTE — ANESTHESIA PROCEDURE NOTES
LV  Performed by: Trung Gibbs MD  Authorized by: Trung Gibbs MD       General Information and Staff    Start Time:   Anesthesiologist: Trung Gibbs MD  Performed by:   Anesthesiologist  Patient Location:  ED    Block Placement: Post Induction  Site I

## 2020-03-02 NOTE — PHYSICAL THERAPY NOTE
Per ABDOUL Tom : Patient is awaiting surgery. Will d/c PT evaluation this time. Will need new orders post surgical with appropriate weight bearing status.

## 2020-03-02 NOTE — PROGRESS NOTES
IRON HOSPITALIST  Progress Note     Bruce Madera Patient Status:  Inpatient    1940 MRN [id number]   [facility] 3SW-A Attending Albertina Omalley MD   Hosp Day # 1 PCP Mandi Thompson MD     Chief Complaint: fall, hip pain    S: for input(s): TROP, CK in the last 168 hours. Imaging: Imaging data reviewed in Epic.     Medications:   • bupivacaine-EPINEPHrine (PF)  30 mL Infiltration Once   • amLODIPine Besylate  5 mg Oral Daily   • Pantoprazole Sodium  20 mg Oral QAM AC   • two midnight's based on the clinical documentation in H+P. Based on patients current state of illness, I anticipate that, after discharge, patient will require TBD.

## 2020-03-02 NOTE — PAYOR COMM NOTE
--------------  CONTINUED STAY REVIEW    Chani Humphreys MA O  Subscriber #:  [id number]  Authorization Number: [id number]    3/2:    ORTHO:  Reason for Consultation:  Left femoral neck fracture     History of Present Illness:  Melyssa Graham is a a(n) 78 Action Dose Route User    3/2/2020 0905 Given 20 mg Oral Nelly Menjivar RN    3/2/2020 0045 Given 20 mg Oral Cyndi Greene RN      Metoclopramide HCl (REGLAN) injection 5 mg     Date Action Dose Route User    3/1/2020 2158 Given 5 mg Intravenous Alejandro Man

## 2020-03-02 NOTE — ED PROVIDER NOTES
Patient Seen in: [facility] Emergency Department      History   Patient presents with:  Fall    Stated Complaint: fall, on thinners    HPI    Patient is a very pleasant 66-year-old woman with multiple medical problems including A. fib, coronary dise ENDOSCOPY   • ENDOVAS REPAIR, INFRARENL ABDOM AORTIC ANEURYSM/DISSECT     • ESOPHAGEAL MANOMETRY N/A 11/18/2016    Performed by Franci Rivera MD at [facility] ENDOSCOPY   • ESOPHAGOGASTRODUODENOSCOPY (EGD) N/A 5/9/2019    Performed by Franci Rivera MD at  98%   BMI 26.26 kg/m²         Physical Exam    General: Patient is resting comfortably in no acute distress  HEENT: Normal cephalic atraumatic. Nonicteric sclera. Moist mucous membranes. No meningismus. No adenopathy  Lungs: No tachypnea.   Lungs clear changes. Accident was noted. Otherwise, agree with EKG report              X-ray: Atelectasis. No acute pathology.   Report reviewed, x-ray reviewed    Left hip: Cortical lucency along the medial aspect of the left femoral neck concerning for likely nond

## 2020-03-02 NOTE — PLAN OF CARE
Pt A&O. On O2 wL per NC. Desat to 88% on room air today. Encouraged use of incentive spirometer and ankle pump exercises every hour while awake. Scds to BLE. Lovenox/xarelto on hold. Tele and  monitoring maintained. Last BM 3/1/20.  Tolerating ADA diet a

## 2020-03-02 NOTE — ANESTHESIA POSTPROCEDURE EVALUATION
[address] [facility] Patient Status:  Emergency   Age/Gender 78year old female MRN [id number]   Location [facility] Attending Oliver Finn MD   Hosp Day # 0 PCP Andrew Whitley MD       Anesthesia Post-op Note

## 2020-03-02 NOTE — ANESTHESIA PREPROCEDURE EVALUATION
PRE-OP EVALUATION    Patient Name: Kimberly Seay    Pre-op Diagnosis:Hip fracture    *Regional block    * Surgery not found *    Pre-op vitals reviewed.   Temp: 98.2 °F (36.8 °C)  Pulse: 70  Resp: 18  BP: 172/85  SpO2: 98 %  Body mass index is 26.26 kg/m Procedure Laterality Date   • ABDOMINAL AORTIC ANEURYSM ENDOSCOPIC N/A 1/8/2020    Performed by Chiquita Bell MD at [address] [place] [address] And [address] [place] [address]    • CATH DRUG ELUTING STENT     • CHOLECYSTECTOMY     • COLONOSCOPY  04/21/2003    Screening- Normal   • 03/01/2020    MCHC 30.2 (L) 03/01/2020    RDW 15.3 (H) 03/01/2020    .0 03/01/2020     Lab Results   Component Value Date     03/01/2020    K 4.5 03/01/2020     03/01/2020    CO2 32.0 03/01/2020    BUN 16 03/01/2020    CREATSERUM 0.85 03

## 2020-03-02 NOTE — CONSULTS
[facility] Cardiology Consultation Dane Gu MD    The patient was interviewed, examined, the chart was reviewed and the consult was dictated. This is a 78year old female with a chief complaint of hip pain. Impression: 1. Fx hip with hematoma,

## 2020-03-02 NOTE — H&P
IRON HOSPITALIST  History and Physical     [address] [facility] Patient Status:  Emergency    1940 MRN [id number]   Location [facility] Attending Lucila Navas, [address] Day # 0 PCP Leann Ho MD     Chief Complaint: STENT     • CHOLECYSTECTOMY     • COLONOSCOPY  04/21/2003    Screening- Normal   • COLONOSCOPY     • COLONOSCOPY N/A 5/9/2019    Performed by Glenis Balderas MD at [place] [address] ABDOM AORTIC ANEURYSM/DISSECT     • ESOPHAGEAL Attack Other         cousin        Allergies:   Adhesive Tape           RASH    Comment:Tresa Allergy Text Annotation: Adhesive Tape;             fragile skin-->skin tears    Medications:  No current facility-administered medications on file prior to enco (Temporal)   Resp 18   Ht 177.8 cm (5' 10\")   Wt 182 lb 15.7 oz (83 kg)   SpO2 98%   BMI 26.26 kg/m²   General: No acute distress. Alert and oriented x 3. HEENT: Normocephalic atraumatic. Moist mucous membranes. Neck:  No carotid bruits.   Respiratory: Lovenox  · CODE status: Full  · Chavez: No    Plan of care discussed with patient and family.     Gilbert Alcocer DO  3/1/2020

## 2020-03-02 NOTE — CONSULTS
[facility]    PATIENT'S NAME: [facility]   ATTENDING PHYSICIAN: Jessica Naidu MD   CONSULTING PHYSICIAN: Trino Mcnally M.D.    PATIENT ACCOUNT#:   [de-identified]    LOCATION:  [address] [place] [address]  MEDICAL RECORD #:   [id number]       DATE OF BIRTH: nonsmoker. FAMILY HISTORY:  Noncontributory. REVIEW OF SYSTEMS:  Hip pain. Otherwise, ten-point organ system review is negative. PHYSICAL EXAMINATION:    GENERAL:  Healthy-appearing female in no acute distress.   VITAL SIGNS:  Blood pressure is

## 2020-03-02 NOTE — CONSULTS
[facility]  Report of Consultation    Paula Buchanan Patient Status:  Inpatient    1940 MRN [id number]   [facility] 3SW-A Attending Sarika Abrams MD   Hosp Day # 1 PCP Tk Rosenthal MD     Reason for Consultation:  Left fe N/A 11/18/2016    Performed by Layne Heimlich, MD at [facility] ENDOSCOPY   • ESOPHAGOGASTRODUODENOSCOPY (EGD) N/A 5/9/2019    Performed by Layne Heimlich, MD at [facility] ENDOSCOPY   • ESOPHAGOGASTRODUODENOSCOPY (EGD) N/A 12/6/2016    Performed by Layne Heimlich, PRN  •  lidocaine PF (XYLOCAINE) 1% injection, 2 mL, Injection, PRN  •  ropivacaine (NAROPIN) 0.5% injection, 30 mL, Injection, PRN  •  EPINEPHrine HCl (ADRENALIN) 1 MG/ML injection (Allergic Reaction Kit) 1 mg, 1 mg, Injection, PRN  •  Sodium Chloride 0.9 (NOVOLOG) 100 UNIT/ML flexpen 1-10 Units, 1-10 Units, Subcutaneous, TID AC and HS  •  dexamethasone PF (DECADRON) 10 MG/ML injection 10 mg, 10 mg, Intravenous, Once    Review of Systems:  Pertinent items are noted in HPI.     Physical Exam:    General: Matthew (CPT=73502), 1/19/2018, 15:00.      INDICATIONS:  fall, on thinners     PATIENT STATED HISTORY: (As transcribed by Technologist)  Patient fell, left hip injury.            =====  CONCLUSION:  Diffuse demineralization of the pelvis and left hip limits radiog Closed fracture of neck of left femur (HCC)     Hyperglycemia     Closed fracture of neck of left femur, initial encounter ([facility])     Dyslipidemia      Nondisplaced left femoral neck fracture. PAR consultation re operative vs nonoperative treatment held.

## 2020-03-02 NOTE — ED NOTES
Mild dizziness persitent, otherwise neurologically intact, Dr Zane Landers aware of sx,, no orders. Dr Marilu Keller aware.

## 2020-03-02 NOTE — CM/SW NOTE
03/02/20 1400   CM/SW Referral Data   Referral Source    Reason for Referral Discharge planning   Informant Patient; Children  (dtr dona)   Pertinent Medical Hx   Primary Care Physician Name Luis Carlos Rios   Patient Info   Patient's Mental too far. I encouraged her to speak with her kids and have them assist her with making a safe discharge plan. I advised her myself or unit SW would f/u with her and her family once PT makes recommendations post op.      Daria Shell RN, [facility]  Extension [phone]

## 2020-03-03 ENCOUNTER — ANESTHESIA (OUTPATIENT)
Dept: SURGERY | Facility: HOSPITAL | Age: 80
DRG: 481 | End: 2020-03-03
Payer: MEDICARE

## 2020-03-03 ENCOUNTER — APPOINTMENT (OUTPATIENT)
Dept: GENERAL RADIOLOGY | Facility: HOSPITAL | Age: 80
DRG: 481 | End: 2020-03-03
Attending: ORTHOPAEDIC SURGERY
Payer: MEDICARE

## 2020-03-03 LAB
GLUCOSE BLD-MCNC: 124 MG/DL (ref 70–99)
GLUCOSE BLD-MCNC: 129 MG/DL (ref 70–99)
GLUCOSE BLD-MCNC: 142 MG/DL (ref 70–99)
GLUCOSE BLD-MCNC: 162 MG/DL (ref 70–99)

## 2020-03-03 PROCEDURE — 99232 SBSQ HOSP IP/OBS MODERATE 35: CPT | Performed by: INTERNAL MEDICINE

## 2020-03-03 PROCEDURE — 0QS734Z REPOSITION LEFT UPPER FEMUR WITH INTERNAL FIXATION DEVICE, PERCUTANEOUS APPROACH: ICD-10-PCS | Performed by: ORTHOPAEDIC SURGERY

## 2020-03-03 PROCEDURE — 76000 FLUOROSCOPY <1 HR PHYS/QHP: CPT | Performed by: ORTHOPAEDIC SURGERY

## 2020-03-03 DEVICE — SCREW BN 6.5MM 8MM 90MM 16MM: Type: IMPLANTABLE DEVICE | Site: HIP | Status: FUNCTIONAL

## 2020-03-03 DEVICE — IMPLANTABLE DEVICE: Type: IMPLANTABLE DEVICE | Site: HIP | Status: FUNCTIONAL

## 2020-03-03 RX ORDER — ACETAMINOPHEN 500 MG
1000 TABLET ORAL ONCE AS NEEDED
Status: DISCONTINUED | OUTPATIENT
Start: 2020-03-03 | End: 2020-03-03 | Stop reason: HOSPADM

## 2020-03-03 RX ORDER — PHENYLEPHRINE HCL 10 MG/ML
VIAL (ML) INJECTION AS NEEDED
Status: DISCONTINUED | OUTPATIENT
Start: 2020-03-03 | End: 2020-03-03 | Stop reason: SURG

## 2020-03-03 RX ORDER — HYDROCODONE BITARTRATE AND ACETAMINOPHEN 5; 325 MG/1; MG/1
1 TABLET ORAL AS NEEDED
Status: DISCONTINUED | OUTPATIENT
Start: 2020-03-03 | End: 2020-03-03 | Stop reason: HOSPADM

## 2020-03-03 RX ORDER — MEPERIDINE HYDROCHLORIDE 25 MG/ML
12.5 INJECTION INTRAMUSCULAR; INTRAVENOUS; SUBCUTANEOUS AS NEEDED
Status: DISCONTINUED | OUTPATIENT
Start: 2020-03-03 | End: 2020-03-03 | Stop reason: HOSPADM

## 2020-03-03 RX ORDER — HYDROMORPHONE HYDROCHLORIDE 1 MG/ML
0.4 INJECTION, SOLUTION INTRAMUSCULAR; INTRAVENOUS; SUBCUTANEOUS EVERY 5 MIN PRN
Status: DISCONTINUED | OUTPATIENT
Start: 2020-03-03 | End: 2020-03-03 | Stop reason: HOSPADM

## 2020-03-03 RX ORDER — HYDROCODONE BITARTRATE AND ACETAMINOPHEN 5; 325 MG/1; MG/1
2 TABLET ORAL EVERY 6 HOURS PRN
Status: DISCONTINUED | OUTPATIENT
Start: 2020-03-03 | End: 2020-03-09

## 2020-03-03 RX ORDER — ONDANSETRON 2 MG/ML
4 INJECTION INTRAMUSCULAR; INTRAVENOUS AS NEEDED
Status: DISCONTINUED | OUTPATIENT
Start: 2020-03-03 | End: 2020-03-03 | Stop reason: HOSPADM

## 2020-03-03 RX ORDER — HYDROCODONE BITARTRATE AND ACETAMINOPHEN 5; 325 MG/1; MG/1
2 TABLET ORAL AS NEEDED
Status: DISCONTINUED | OUTPATIENT
Start: 2020-03-03 | End: 2020-03-03 | Stop reason: HOSPADM

## 2020-03-03 RX ORDER — DEXTROSE MONOHYDRATE 25 G/50ML
50 INJECTION, SOLUTION INTRAVENOUS
Status: DISCONTINUED | OUTPATIENT
Start: 2020-03-03 | End: 2020-03-03 | Stop reason: HOSPADM

## 2020-03-03 RX ORDER — HYDROCODONE BITARTRATE AND ACETAMINOPHEN 5; 325 MG/1; MG/1
1-2 TABLET ORAL EVERY 6 HOURS PRN
Status: DISCONTINUED | OUTPATIENT
Start: 2020-03-03 | End: 2020-03-03

## 2020-03-03 RX ORDER — DEXAMETHASONE SODIUM PHOSPHATE 4 MG/ML
VIAL (ML) INJECTION AS NEEDED
Status: DISCONTINUED | OUTPATIENT
Start: 2020-03-03 | End: 2020-03-03 | Stop reason: SURG

## 2020-03-03 RX ORDER — NALOXONE HYDROCHLORIDE 0.4 MG/ML
80 INJECTION, SOLUTION INTRAMUSCULAR; INTRAVENOUS; SUBCUTANEOUS AS NEEDED
Status: DISCONTINUED | OUTPATIENT
Start: 2020-03-03 | End: 2020-03-03 | Stop reason: HOSPADM

## 2020-03-03 RX ORDER — LABETALOL HYDROCHLORIDE 5 MG/ML
5 INJECTION, SOLUTION INTRAVENOUS EVERY 5 MIN PRN
Status: DISCONTINUED | OUTPATIENT
Start: 2020-03-03 | End: 2020-03-03 | Stop reason: HOSPADM

## 2020-03-03 RX ORDER — MIDAZOLAM HYDROCHLORIDE 1 MG/ML
1 INJECTION INTRAMUSCULAR; INTRAVENOUS EVERY 5 MIN PRN
Status: DISCONTINUED | OUTPATIENT
Start: 2020-03-03 | End: 2020-03-03 | Stop reason: HOSPADM

## 2020-03-03 RX ORDER — SODIUM CHLORIDE, SODIUM LACTATE, POTASSIUM CHLORIDE, CALCIUM CHLORIDE 600; 310; 30; 20 MG/100ML; MG/100ML; MG/100ML; MG/100ML
INJECTION, SOLUTION INTRAVENOUS CONTINUOUS
Status: DISCONTINUED | OUTPATIENT
Start: 2020-03-03 | End: 2020-03-03 | Stop reason: HOSPADM

## 2020-03-03 RX ORDER — HYDROCODONE BITARTRATE AND ACETAMINOPHEN 5; 325 MG/1; MG/1
1 TABLET ORAL EVERY 6 HOURS PRN
Status: DISCONTINUED | OUTPATIENT
Start: 2020-03-03 | End: 2020-03-09

## 2020-03-03 RX ORDER — SODIUM CHLORIDE 9 MG/ML
INJECTION, SOLUTION INTRAVENOUS CONTINUOUS
Status: DISCONTINUED | OUTPATIENT
Start: 2020-03-03 | End: 2020-03-04

## 2020-03-03 RX ORDER — METOCLOPRAMIDE HYDROCHLORIDE 5 MG/ML
10 INJECTION INTRAMUSCULAR; INTRAVENOUS AS NEEDED
Status: DISCONTINUED | OUTPATIENT
Start: 2020-03-03 | End: 2020-03-03 | Stop reason: HOSPADM

## 2020-03-03 RX ORDER — CEFAZOLIN SODIUM/WATER 2 G/20 ML
SYRINGE (ML) INTRAVENOUS AS NEEDED
Status: DISCONTINUED | OUTPATIENT
Start: 2020-03-03 | End: 2020-03-03 | Stop reason: SURG

## 2020-03-03 RX ADMIN — PHENYLEPHRINE HCL 100 MCG: 10 MG/ML VIAL (ML) INJECTION at 18:37:00

## 2020-03-03 RX ADMIN — PHENYLEPHRINE HCL 100 MCG: 10 MG/ML VIAL (ML) INJECTION at 18:14:00

## 2020-03-03 RX ADMIN — CEFAZOLIN SODIUM/WATER 2 G: 2 G/20 ML SYRINGE (ML) INTRAVENOUS at 18:24:00

## 2020-03-03 RX ADMIN — PHENYLEPHRINE HCL 100 MCG: 10 MG/ML VIAL (ML) INJECTION at 18:46:00

## 2020-03-03 RX ADMIN — LIDOCAINE HYDROCHLORIDE 50 MG: 10 INJECTION, SOLUTION EPIDURAL; INFILTRATION; INTRACAUDAL; PERINEURAL at 18:11:00

## 2020-03-03 RX ADMIN — DEXAMETHASONE SODIUM PHOSPHATE 4 MG: 4 MG/ML VIAL (ML) INJECTION at 18:14:00

## 2020-03-03 NOTE — PLAN OF CARE
Patient alert and oriented ,resting in bed ,denies pain when resting ,repositioned in bed for comfort and to prevent skin breakdown ,heel protectors placed ,left hip is hard to touch and swollen ,bruised around the area ,ice in place ,tele with AFIB heart

## 2020-03-03 NOTE — PROGRESS NOTES
IRON HOSPITALIST  Progress Note     Kimberlee Sagastume Patient Status:  Inpatient    1940 MRN [id number]   [facility] 3SW-A Attending Devonte Houston MD   Hosp Day # 2 PCP Luzma Nuñez MD     Chief Complaint: fall, hip pain    S: bupivacaine-EPINEPHrine (PF)  30 mL Infiltration Once   • amLODIPine Besylate  5 mg Oral Daily   • Pantoprazole Sodium  20 mg Oral QAM AC   • lisinopril  20 mg Oral BID   • Metoprolol Tartrate  50 mg Oral 2x Daily(Beta Blocker)   • atorvastatin  20 mg Oral

## 2020-03-03 NOTE — PROGRESS NOTES
Pt sent to OR, accompanied by her son, Katherine Gilliam. Tele and jewelry removed. Personal items kept at pt's bedside.

## 2020-03-03 NOTE — PLAN OF CARE
Pt A&O. On O2 wL per NC. Desat to 88-89% on room air today. Encouraged use of incentive spirometer and ankle pump exercises every hour while awake. Scds to BLE. Lovenox/xarelto on hold. Tele and  monitoring maintained. Last BM 3/1/20.  Reains NPO for margareth

## 2020-03-03 NOTE — PROGRESS NOTES
[facility] Cardiology Progress Note - Roxene Dubin Patient Status:  Inpatient    1940 MRN [id number]   [facility] 3SW-A Attending Sarika Abrams MD   Hosp Day # 2 PCP Tk Rosenthal MD     Subjective:  Ulises Echeverria 15  BP: 114/52    Intake/Output:     Intake/Output Summary (Last 24 hours) at 3/3/2020 1007  Last data filed at 3/3/2020 0600  Gross per 24 hour   Intake 420 ml   Output 800 ml   Net -380 ml       Last 3 Weights  03/01/20 1837 : 182 lb 15.7 oz (83 kg)  02/ injection (Allergic Reaction Kit) 1 mg, 1 mg, Injection, PRN  Sodium Chloride 0.9 % solution 10 mL, 10 mL, Injection, PRN  bupivacaine 0.5%-EPINEPHrine 1:200,000 PF (MARCAINE/EPINEPHRINE) injection, 30 mL, Infiltration, Once  amLODIPine Besylate (NORVASC) stable  Constitutiona: no recent fevers  Skin: denies any unusual skin lesions or rashes  Eyes: no visual complaints or deficits  HEENT: denies nasal congestion, sinus pain; hearing loss negative  Respiratory: denies shortness of breath, wheezing or cough

## 2020-03-04 LAB
ANION GAP SERPL CALC-SCNC: 2 MMOL/L (ref 0–18)
BASOPHILS # BLD AUTO: 0.02 X10(3) UL (ref 0–0.2)
BASOPHILS NFR BLD AUTO: 0.2 %
BUN BLD-MCNC: 21 MG/DL (ref 7–18)
BUN/CREAT SERPL: 27.3 (ref 10–20)
CALCIUM BLD-MCNC: 8.3 MG/DL (ref 8.5–10.1)
CHLORIDE SERPL-SCNC: 104 MMOL/L (ref 98–112)
CO2 SERPL-SCNC: 32 MMOL/L (ref 21–32)
CREAT BLD-MCNC: 0.77 MG/DL (ref 0.55–1.02)
DEPRECATED RDW RBC AUTO: 46.4 FL (ref 35.1–46.3)
EOSINOPHIL # BLD AUTO: 0.02 X10(3) UL (ref 0–0.7)
EOSINOPHIL NFR BLD AUTO: 0.2 %
ERYTHROCYTE [DISTWIDTH] IN BLOOD BY AUTOMATED COUNT: 15.2 % (ref 11–15)
GLUCOSE BLD-MCNC: 151 MG/DL (ref 70–99)
GLUCOSE BLD-MCNC: 156 MG/DL (ref 70–99)
GLUCOSE BLD-MCNC: 159 MG/DL (ref 70–99)
GLUCOSE BLD-MCNC: 177 MG/DL (ref 70–99)
GLUCOSE BLD-MCNC: 182 MG/DL (ref 70–99)
HCT VFR BLD AUTO: 31.1 % (ref 35–48)
HGB BLD-MCNC: 9.2 G/DL (ref 12–16)
IMM GRANULOCYTES # BLD AUTO: 0.03 X10(3) UL (ref 0–1)
IMM GRANULOCYTES NFR BLD: 0.3 %
LYMPHOCYTES # BLD AUTO: 0.57 X10(3) UL (ref 1–4)
LYMPHOCYTES NFR BLD AUTO: 6.4 %
MCH RBC QN AUTO: 25.2 PG (ref 26–34)
MCHC RBC AUTO-ENTMCNC: 29.6 G/DL (ref 31–37)
MCV RBC AUTO: 85.2 FL (ref 80–100)
MONOCYTES # BLD AUTO: 0.73 X10(3) UL (ref 0.1–1)
MONOCYTES NFR BLD AUTO: 8.2 %
NEUTROPHILS # BLD AUTO: 7.56 X10 (3) UL (ref 1.5–7.7)
NEUTROPHILS # BLD AUTO: 7.56 X10(3) UL (ref 1.5–7.7)
NEUTROPHILS NFR BLD AUTO: 84.7 %
OSMOLALITY SERPL CALC.SUM OF ELEC: 292 MOSM/KG (ref 275–295)
PLATELET # BLD AUTO: 176 10(3)UL (ref 150–450)
POTASSIUM SERPL-SCNC: 4.1 MMOL/L (ref 3.5–5.1)
RBC # BLD AUTO: 3.65 X10(6)UL (ref 3.8–5.3)
SODIUM SERPL-SCNC: 138 MMOL/L (ref 136–145)
WBC # BLD AUTO: 8.9 X10(3) UL (ref 4–11)

## 2020-03-04 PROCEDURE — 99232 SBSQ HOSP IP/OBS MODERATE 35: CPT | Performed by: HOSPITALIST

## 2020-03-04 RX ORDER — FUROSEMIDE 20 MG/1
20 TABLET ORAL DAILY
Status: DISCONTINUED | OUTPATIENT
Start: 2020-03-04 | End: 2020-03-06

## 2020-03-04 NOTE — CONSULTS
[facility]    Kimberlee Sagastume Patient Status:  Inpatient    1940 MRN [id number]   [facility] 3SW-A Attending Michael Mccarthy MD   Hosp Day # 3 PCP Luzma Nuñez MD     Patient Identification  Kimberlee Sagastume is a 78year old f (coronary artery disease)    • Cancer (HCC)     lymphoma   • Cataracts, bilateral 4/16/2014   • Coronary atherosclerosis    • Disorder of thyroid    • Dysphagia    • Easy bruising    • Enlarged lymph node    • Exposure to medical diagnostic radiation 8/201 SURGICAL HISTORY      right knee replacement 2008   • OTHER SURGICAL HISTORY      stent of LAD heart 2005   • OTHER SURGICAL HISTORY  04/12/07    EGD- double contrast Barium Swallow Esophagram    • REMOVAL GALLBLADDER     • REPAIR ING HERNIA,5+Y/O,REDUCIBL Intravenous, Q2H PRN    Or  morphINE sulfate (PF) 4 MG/ML injection 2 mg, 2 mg, Intravenous, Q2H PRN    Or  morphINE sulfate (PF) 4 MG/ML injection 4 mg, 4 mg, Intravenous, Q2H PRN  ondansetron HCl (ZOFRAN) injection 4 mg, 4 mg, Intravenous, Q6H PRN  docus Heart Attack Other         cousin       Allergies:    Adhesive Tape           RASH    Comment:Cerner Allergy Text Annotation: Adhesive Tape;             fragile skin-->skin tears        Lab Results   Component Value Date    WBC 8.9 03/04/2020    HGB 9.2 03 Extremity:  Strength  is 5. ROM WNL. Left Lower Extremity: Strength  is 5 at ankle. ROM WNL. Neuro: CNII-XII are grossly intact.  Sensation to dull touch intact in all extremities and reflexes are 2+, bilateral and symmetric for biceps, brach rehabilitation level of care is 2 weeks. The patient has good potential to achieve the goal to return home at Mod I level of function. Advance directives reviewed and noted.           Would be happy to reassess should medi

## 2020-03-04 NOTE — PROGRESS NOTES
[facility] Cardiology Progress Note - Destin Kirk Patient Status:  Inpatient    1940 MRN [id number]   [facility] 3SW-A Attending Carisa Justin MD   Hosp Day # 3 PCP Franny Gupta MD     Subjective:  Mild po Temp: 98.6 °F (37 °C)  Pulse: 70  Resp: 18  BP: 102/56    Intake/Output:     Intake/Output Summary (Last 24 hours) at 3/4/2020 1417  Last data filed at 3/4/2020 1408  Gross per 24 hour   Intake 1234 ml   Output 1030 ml   Net 204 ml       Last 3 Weights (NORCO) 5-325 MG per tab 1 tablet, 1 tablet, Oral, Q6H PRN    Or  HYDROcodone-acetaminophen (NORCO) 5-325 MG per tab 2 tablet, 2 tablet, Oral, Q6H PRN  ondansetron HCl (ZOFRAN) injection 4 mg, 4 mg, Intravenous, Q4H PRN  lidocaine PF (XYLOCAINE) 1% injecti Units, 1-10 Units, Subcutaneous, TID AC and HS  dexamethasone PF (DECADRON) 10 MG/ML injection 10 mg, 10 mg, Intravenous, Once        ROS:  General Health: otherwise feels well, weight stable  Constitutiona: no recent fevers  Skin: denies any unusual skin

## 2020-03-04 NOTE — PROGRESS NOTES
Comfortable    /64   Pulse 64   Temp 98.3 °F (36.8 °C) (Oral)   Resp 20   Ht 5' 10\" (1.778 m)   Wt 182 lb 15.7 oz (83 kg)   SpO2 95%   BMI 26.26 kg/m²       Dressing intact  Neuro intact  Neg homans       Left hip percutaneous screw fixation.   50% w

## 2020-03-04 NOTE — PHYSICAL THERAPY NOTE
PHYSICAL THERAPY EVALUATION - INPATIENT     Room Number: 384/384-A  Evaluation Date: 3/4/2020  Type of Evaluation: Initial  Physician Order: PT Eval and Treat    Presenting Problem: s/p left hip percutaneous screw fixation 3/3/2020  Reason for Therap Performed by Lakia Valera MD at [facility] CVOR   • CATARACT Bilateral    • CATH DRUG ELUTING STENT     • CHOLECYSTECTOMY     • COLONOSCOPY  04/21/2003    Screening- Normal   • COLONOSCOPY     • COLONOSCOPY N/A 5/9/2019    Performed by Kayla Hsu MD at  participates in many activities.       SUBJECTIVE  \"The pain is not so bad, it's more about my equilibrium\"  rn aware    Patient self-stated goal is to go home    OBJECTIVE  Precautions: (50% wt bearing left LE)  Fall Risk: High fall risk    WEIGHT BEARIN need...   -   Moving to and from a bed to a chair (including a wheelchair)?: A Lot   -   Need to walk in hospital room?: A Lot   -   Climbing 3-5 steps with a railing?: Total       AM-PAC Score:  Raw Score: 11   Approx Degree of Impairment: 72.57%   Favian Clement Session: Up in chair;Needs met;Call light within reach;RN aware of session/findings; All patient questions and concerns addressed;SCDs in place; Ice applied    ASSESSMENT   Patient is a 78year old female admitted on 3/1/2020 for fall resulting in left hip f transfers Sit to/from Stand at assistance level: minimum assistance 50% wt bearing left LE     Goal #3 Patient is able to ambulate 50 feet with assist device: walker - rolling at assistance level: moderate assistance 50% wt bearing left LE     Goal #4    G

## 2020-03-04 NOTE — BRIEF OP NOTE
Pre-Operative Diagnosis: Pain of left hip joint [M25.552]     Post-Operative Diagnosis: same as pre-op      Procedure Performed:   Procedure(s):  LEFT HIP PERCUTANEOUS SCREW FIXATION    Surgeon(s) and Role:     Letty Voss MD - Primary    Assistant(

## 2020-03-04 NOTE — PROGRESS NOTES
IRON HOSPITALIST  Progress Note     Nguyen Huber Patient Status:  Inpatient    1940 MRN [id number]   [facility] 3SW-A Attending Kristeen Hashimoto, MD   Hosp Day # 3 PCP Ritu Spivey MD     Chief Complaint: fall, hip pain    S: bupivacaine-EPINEPHrine (PF)  30 mL Infiltration Once   • Pantoprazole Sodium  20 mg Oral QAM AC   • Metoprolol Tartrate  50 mg Oral 2x Daily(Beta Blocker)   • atorvastatin  20 mg Oral Nightly   • Insulin Aspart Pen  1-10 Units Subcutaneous TID AC and HS

## 2020-03-04 NOTE — PLAN OF CARE
Pt is AOx4,   Pt c/o mild pain or no pain and declines pain medication. Able to lift legs, sensation normal, denies n/t. Microfoam dressing to the left hip is cdi. Discussed with Dr. Donya Childress and dressing to be changed starting tomorrow to dry dressing.   I

## 2020-03-04 NOTE — CM/SW NOTE
Spoke with Martínez Walker from PT who stated acute rehab is recommended.   She spoke with pt who is still hopeful to return to home and expressed concern about distance to [facility], but was agreeable with acute rehab consult in order to consider [facility] re[facility] as an opti

## 2020-03-04 NOTE — PLAN OF CARE
PT arrived from PACU drowsy, easily awakened, oriented x4. Pain controlled. Strong dorsi/plantar flexion, palpable pulses, cool dry extremities. VSS on 2LO2. , IS encouraged. Continues to be in afib. On tele. Scds on bilaterally. Voiding via nobles.  Pres

## 2020-03-04 NOTE — ANESTHESIA POSTPROCEDURE EVALUATION
[address] [facility] Patient Status:  Inpatient   Age/Gender 78year old female MRN [id number]   [facility] SURGERY Attending Alfredo Tang, [address] [facility] [address] Day # 2 PCP James Sanders MD       Anesthesia Post-op Note    Procedure(

## 2020-03-05 ENCOUNTER — APPOINTMENT (OUTPATIENT)
Dept: GENERAL RADIOLOGY | Facility: HOSPITAL | Age: 80
DRG: 481 | End: 2020-03-05
Attending: PHYSICIAN ASSISTANT
Payer: MEDICARE

## 2020-03-05 LAB
ANION GAP SERPL CALC-SCNC: 1 MMOL/L (ref 0–18)
BASOPHILS # BLD AUTO: 0.03 X10(3) UL (ref 0–0.2)
BASOPHILS NFR BLD AUTO: 0.3 %
BUN BLD-MCNC: 19 MG/DL (ref 7–18)
BUN/CREAT SERPL: 28.4 (ref 10–20)
CALCIUM BLD-MCNC: 8.3 MG/DL (ref 8.5–10.1)
CHLORIDE SERPL-SCNC: 103 MMOL/L (ref 98–112)
CO2 SERPL-SCNC: 35 MMOL/L (ref 21–32)
CREAT BLD-MCNC: 0.67 MG/DL (ref 0.55–1.02)
DEPRECATED RDW RBC AUTO: 46.7 FL (ref 35.1–46.3)
EOSINOPHIL # BLD AUTO: 0.36 X10(3) UL (ref 0–0.7)
EOSINOPHIL NFR BLD AUTO: 4.1 %
ERYTHROCYTE [DISTWIDTH] IN BLOOD BY AUTOMATED COUNT: 15.3 % (ref 11–15)
GLUCOSE BLD-MCNC: 155 MG/DL (ref 70–99)
GLUCOSE BLD-MCNC: 179 MG/DL (ref 70–99)
GLUCOSE BLD-MCNC: 179 MG/DL (ref 70–99)
GLUCOSE BLD-MCNC: 197 MG/DL (ref 70–99)
GLUCOSE BLD-MCNC: 221 MG/DL (ref 70–99)
HCT VFR BLD AUTO: 30.1 % (ref 35–48)
HGB BLD-MCNC: 9.1 G/DL (ref 12–16)
IMM GRANULOCYTES # BLD AUTO: 0.03 X10(3) UL (ref 0–1)
IMM GRANULOCYTES NFR BLD: 0.3 %
LYMPHOCYTES # BLD AUTO: 0.55 X10(3) UL (ref 1–4)
LYMPHOCYTES NFR BLD AUTO: 6.2 %
MCH RBC QN AUTO: 25.6 PG (ref 26–34)
MCHC RBC AUTO-ENTMCNC: 30.2 G/DL (ref 31–37)
MCV RBC AUTO: 84.6 FL (ref 80–100)
MONOCYTES # BLD AUTO: 0.69 X10(3) UL (ref 0.1–1)
MONOCYTES NFR BLD AUTO: 7.8 %
NEUTROPHILS # BLD AUTO: 7.19 X10 (3) UL (ref 1.5–7.7)
NEUTROPHILS # BLD AUTO: 7.19 X10(3) UL (ref 1.5–7.7)
NEUTROPHILS NFR BLD AUTO: 81.3 %
OSMOLALITY SERPL CALC.SUM OF ELEC: 293 MOSM/KG (ref 275–295)
PLATELET # BLD AUTO: 163 10(3)UL (ref 150–450)
POTASSIUM SERPL-SCNC: 4.5 MMOL/L (ref 3.5–5.1)
RBC # BLD AUTO: 3.56 X10(6)UL (ref 3.8–5.3)
SODIUM SERPL-SCNC: 139 MMOL/L (ref 136–145)
WBC # BLD AUTO: 8.9 X10(3) UL (ref 4–11)

## 2020-03-05 PROCEDURE — 71045 X-RAY EXAM CHEST 1 VIEW: CPT | Performed by: PHYSICIAN ASSISTANT

## 2020-03-05 PROCEDURE — 99232 SBSQ HOSP IP/OBS MODERATE 35: CPT | Performed by: HOSPITALIST

## 2020-03-05 RX ORDER — HYDROCODONE BITARTRATE AND ACETAMINOPHEN 5; 325 MG/1; MG/1
1 TABLET ORAL EVERY 6 HOURS PRN
Qty: 40 TABLET | Refills: 0 | Status: SHIPPED | OUTPATIENT
Start: 2020-03-05 | End: 2020-06-16 | Stop reason: ALTCHOICE

## 2020-03-05 RX ORDER — FUROSEMIDE 20 MG/1
20 TABLET ORAL DAILY
Status: DISCONTINUED | OUTPATIENT
Start: 2020-03-05 | End: 2020-03-05

## 2020-03-05 RX ORDER — FUROSEMIDE 10 MG/ML
20 INJECTION INTRAMUSCULAR; INTRAVENOUS DAILY
Status: DISCONTINUED | OUTPATIENT
Start: 2020-03-05 | End: 2020-03-06

## 2020-03-05 RX ORDER — IPRATROPIUM BROMIDE AND ALBUTEROL SULFATE 2.5; .5 MG/3ML; MG/3ML
3 SOLUTION RESPIRATORY (INHALATION)
Status: DISCONTINUED | OUTPATIENT
Start: 2020-03-05 | End: 2020-03-09

## 2020-03-05 NOTE — PROGRESS NOTES
IRON HOSPITALIST  Progress Note     Vernel Picking Patient Status:  Inpatient    1940 MRN [id number]   [facility] 3SW-A Attending Sb Murguia MD   Hosp Day # 4 PCP Miguelina Osborne MD     Chief Complaint: fall, hip pain    S: mg/dL). Recent Labs   Lab 03/02/20  0807   PTP 17.7*   INR 1.38*       No results for input(s): TROP, CK in the last 168 hours. Imaging: Imaging data reviewed in Epic.     Medications:   • furosemide  20 mg Oral Daily   • Metoprolol Tartrate  25

## 2020-03-05 NOTE — OCCUPATIONAL THERAPY NOTE
OCCUPATIONAL THERAPY EVALUATION - INPATIENT     Room Number: 384/384-A  Evaluation Date: 3/5/2020  Type of Evaluation: Initial  Presenting Problem: Left hip fx s/p hip pinning 3/3/20    Physician Order: IP Consult to Occupational Therapy  Reason for Lloyd Orozco Procedure Laterality Date   • ABDOMINAL AORTIC ANEURYSM ENDOSCOPIC N/A 1/8/2020    Performed by Severino Arauz MD at [address] [place] [address] And [address] [place] [address]    • CATH DRUG ELUTING STENT     • CHOLECYSTECTOMY     • COLONOSCOPY  04/21/2003    Screening- Normal   • Level of Function: Independent with ADLs and functional mobility without use of AD. Patient reports she is active in the community, however also states she has not been doing much since last admit in Jan/2020.   Patient reports 2 falls in the past 6 months does the patient currently need…  -   Putting on and taking off regular lower body clothing?: A Lot  -   Bathing (including washing, rinsing, drying)?: A Lot  -   Toileting, which includes using toilet, bedpan or urinal? : A Lot  -   Putting on and taking the following performance deficits: decreased balance, endurance, strength, insight, safety judgement.  These deficits impact the patient’s ability to participate in self-care, functional mobility, instrumental activities of daily living, rest and sleep, wo activities; Compensatory technique education  Rehab Potential : Good  Frequency (Obs): 5x/week  Number of Visits to Meet Established Goals: 5    ADL GOALS:  Patient will perform lower body dressing w/ mod I and with adaptive equipment PRN  Patient will perf

## 2020-03-05 NOTE — PROGRESS NOTES
Cardiology F/U  Pt has mild SOB but is requiring O2 to keep sat in 90s  DDx: atelectasis, CHF, PE  exam - decreased BS bilat  Plan -   -CXR  -consider more aggressive diuretic, CTA PE, IS  discussed with Gold Barclay and Maisha Ballard

## 2020-03-05 NOTE — PLAN OF CARE
Per im md will do voiding trial tomorrow. Pt A&Ox4. On ra  & scds in place tolerating diet, pt having some nausea per hx, declining antinausea meds. Chavez cath in place draining clear yellow urine. Up with mod assist walker & gait belt, 50% wb to LLE.

## 2020-03-05 NOTE — CM/SW NOTE
Noted PMR recommendation for acute. Spoke with pt's Zoë QUEEN who stated pt is now agreeable with [facility] at [place]. Spoke with Brett Crews from [facility]  who stated insurance Carlos Av[employer] is pending.   / to remain available for support and/or discharge plann

## 2020-03-05 NOTE — OPERATIVE REPORT
[facility]    PATIENT'S NAME: Harpal Tineo   ATTENDING PHYSICIAN: Carlos Perez M.D. OPERATING PHYSICIAN: Saad Reilly M.D.    PATIENT ACCOUNT#:   [de-identified]    LOCATION:  [facility]  MEDICAL RECORD #:   [id number]       DATE OF BIRTH:  1

## 2020-03-05 NOTE — PLAN OF CARE
Pt is AOX4. Denies any pain. MF dressing is C/D/I. Pain management plan explained. Up with mod x 2 using the walker + gait belt, 50% WB allowed. Ankle pumps, IS encouraged.  Patient had a hard time sleeping last night, refused for RN to page hospitalist for

## 2020-03-05 NOTE — PROGRESS NOTES
[facility] Cardiology Progress Note - Nata Bruno Patient Status:  Inpatient    1940 MRN [id number]   [facility] 3SW-A Attending Bird Steward MD   Hosp Day # 4 PCP Jasmyne Tavarez MD     Subjective:  Feeling 113/50    Intake/Output:     Intake/Output Summary (Last 24 hours) at 3/5/2020 1226  Last data filed at 3/5/2020 0838  Gross per 24 hour   Intake 720 ml   Output 1150 ml   Net -430 ml       Last 3 Weights  03/05/20 0500 : 194 lb 8 oz (88.2 kg)  03/01/20 18 (LASIX) tab 20 mg, 20 mg, Oral, Daily  metoprolol Tartrate (LOPRESSOR) tab 25 mg, 25 mg, Oral, 2x Daily(Beta Blocker)  rivaroxaban (XARELTO) tab 20 mg, 20 mg, Oral, Daily with food  HYDROcodone-acetaminophen (NORCO) 5-325 MG per tab 1 tablet, 1 tablet, Tenna Job Or  Glucose-Vitamin C (DEX-4) chewable tab 8 tablet, 8 tablet, Oral, Q15 Min PRN  Insulin Aspart Pen (NOVOLOG) 100 UNIT/ML flexpen 1-10 Units, 1-10 Units, Subcutaneous, TID AC and HS  dexamethasone PF (DECADRON) 10 MG/ML injection 10 mg, 10 mg, Intravenous

## 2020-03-05 NOTE — PROGRESS NOTES
[facility] Patient Status:  Inpatient    1940 MRN [id number]   [facility] 3SW-A Attending Michael Mccarthy MD   Hosp Day # 4 PCP Luzma Nuñez MD         S:  Patient doing well. No nausea.   No SOB, CP or calf p

## 2020-03-05 NOTE — PROGRESS NOTES
Cardiology F/U  CXR - mild PV redistribution  Will give dose of IV Lasix, plan BD and flutter valve and encourage IS. Pt is on full dose factor X inhibitor that will be continued due to a fib. , will not do CTA.  Discussed with Gold Mccracken and Tavares Echeverria

## 2020-03-05 NOTE — PHYSICAL THERAPY NOTE
PHYSICAL THERAPY TREATMENT NOTE - INPATIENT    Room Number: 384/384-A     Session: 1   Number of Visits to Meet Established Goals: 5    Presenting Problem: s/p left hip percutaneous screw fixation 3/3/2020  History related to current admission:  Pt admitt CHOLECYSTECTOMY     • COLONOSCOPY  04/21/2003    Screening- Normal   • COLONOSCOPY     • COLONOSCOPY N/A 5/9/2019    Performed by Talita Keen MD at [facility] ENDOSCOPY   • ENDOVAS REPAIR, INFRARENL ABDOM AORTIC ANEURYSM/DISSECT     • ESOPHAGEAL MANOMETRY N/ Activity promotion; Body mechanics;Repositioning    BALANCE                                                                                                                       Static Sitting: Good  Dynamic Sitting: Fair -           Static Standing: Poor + demonstrating as evidenced by placement of therapist's hand under pt left LE during stance phase. Pt amb two trials with seated rest break between due to fatigue. Pt O2sat on RA with activity >90%. Discussed cont recommendation for rehab upon edw luis alberto GARCIA assistance 50% wt bearing left LE      Goal #3 Patient is able to ambulate 50 feet with assist device: walker - rolling at assistance level: moderate assistance 50% wt bearing left LE      Goal #4     Goal #5     Goal #6     Goal Comments: Goals establishe

## 2020-03-06 LAB
ANION GAP SERPL CALC-SCNC: 1 MMOL/L (ref 0–18)
BASOPHILS # BLD AUTO: 0.04 X10(3) UL (ref 0–0.2)
BASOPHILS NFR BLD AUTO: 0.5 %
BUN BLD-MCNC: 21 MG/DL (ref 7–18)
BUN/CREAT SERPL: 33.9 (ref 10–20)
CALCIUM BLD-MCNC: 8.4 MG/DL (ref 8.5–10.1)
CHLORIDE SERPL-SCNC: 101 MMOL/L (ref 98–112)
CO2 SERPL-SCNC: 37 MMOL/L (ref 21–32)
CREAT BLD-MCNC: 0.62 MG/DL (ref 0.55–1.02)
DEPRECATED RDW RBC AUTO: 46.6 FL (ref 35.1–46.3)
EOSINOPHIL # BLD AUTO: 0.4 X10(3) UL (ref 0–0.7)
EOSINOPHIL NFR BLD AUTO: 4.6 %
ERYTHROCYTE [DISTWIDTH] IN BLOOD BY AUTOMATED COUNT: 15.5 % (ref 11–15)
GLUCOSE BLD-MCNC: 160 MG/DL (ref 70–99)
GLUCOSE BLD-MCNC: 175 MG/DL (ref 70–99)
GLUCOSE BLD-MCNC: 181 MG/DL (ref 70–99)
GLUCOSE BLD-MCNC: 208 MG/DL (ref 70–99)
GLUCOSE BLD-MCNC: 229 MG/DL (ref 70–99)
HAV IGM SER QL: 1.9 MG/DL (ref 1.6–2.6)
HCT VFR BLD AUTO: 28.8 % (ref 35–48)
HGB BLD-MCNC: 8.8 G/DL (ref 12–16)
IMM GRANULOCYTES # BLD AUTO: 0.04 X10(3) UL (ref 0–1)
IMM GRANULOCYTES NFR BLD: 0.5 %
LYMPHOCYTES # BLD AUTO: 0.71 X10(3) UL (ref 1–4)
LYMPHOCYTES NFR BLD AUTO: 8.2 %
MCH RBC QN AUTO: 25.5 PG (ref 26–34)
MCHC RBC AUTO-ENTMCNC: 30.6 G/DL (ref 31–37)
MCV RBC AUTO: 83.5 FL (ref 80–100)
MONOCYTES # BLD AUTO: 0.67 X10(3) UL (ref 0.1–1)
MONOCYTES NFR BLD AUTO: 7.8 %
NEUTROPHILS # BLD AUTO: 6.78 X10 (3) UL (ref 1.5–7.7)
NEUTROPHILS # BLD AUTO: 6.78 X10(3) UL (ref 1.5–7.7)
NEUTROPHILS NFR BLD AUTO: 78.4 %
OSMOLALITY SERPL CALC.SUM OF ELEC: 294 MOSM/KG (ref 275–295)
PLATELET # BLD AUTO: 177 10(3)UL (ref 150–450)
POTASSIUM SERPL-SCNC: 4.1 MMOL/L (ref 3.5–5.1)
RBC # BLD AUTO: 3.45 X10(6)UL (ref 3.8–5.3)
SODIUM SERPL-SCNC: 139 MMOL/L (ref 136–145)
WBC # BLD AUTO: 8.6 X10(3) UL (ref 4–11)

## 2020-03-06 PROCEDURE — 99232 SBSQ HOSP IP/OBS MODERATE 35: CPT | Performed by: HOSPITALIST

## 2020-03-06 RX ORDER — POLYETHYLENE GLYCOL 3350 17 G/17G
17 POWDER, FOR SOLUTION ORAL DAILY
Status: DISCONTINUED | OUTPATIENT
Start: 2020-03-06 | End: 2020-03-09

## 2020-03-06 RX ORDER — MAGNESIUM OXIDE 400 MG (241.3 MG MAGNESIUM) TABLET
3 TABLET NIGHTLY
Status: DISCONTINUED | OUTPATIENT
Start: 2020-03-06 | End: 2020-03-09

## 2020-03-06 RX ORDER — SODIUM CHLORIDE 9 MG/ML
INJECTION, SOLUTION INTRAVENOUS CONTINUOUS
Status: ACTIVE | OUTPATIENT
Start: 2020-03-06 | End: 2020-03-06

## 2020-03-06 NOTE — CM/SW NOTE
Per Do with Stefan Force, acute rehab was denied by insurance. Dr. Gabby Ballard will do peer to peer appeal with insurance. Do was also reviewing if pt could be appropirate for [facility] subacute unit.  If not appropriate for [facility] subacute and only subacute level i

## 2020-03-06 NOTE — OCCUPATIONAL THERAPY NOTE
OCCUPATIONAL THERAPY TREATMENT NOTE - INPATIENT     Room Number: 384/384-A  Session: 1   Number of Visits to Meet Established Goals: 5    Presenting Problem: Left hip fx s/p hip pinning 3/3/20    History related to current admission: Patient is a 67y/o fem MD at [facility]   • CATARACT Bilateral    • CATH DRUG ELUTING STENT     • CHOLECYSTECTOMY     • COLONOSCOPY  04/21/2003    Screening- Normal   • COLONOSCOPY     • COLONOSCOPY N/A 5/9/2019    Performed by Bahman Ayala MD at [facility] ENDOSCOPY   • Shakeel Morelosable ASSESSMENT  AM-PAC ‘6-Clicks’ Inpatient Daily Activity Short Form  How much help from another person does the patient currently need…  -   Putting on and taking off regular lower body clothing?: A Lot  -   Bathing (including washing, rinsing, drying)?: A L Continues to be appropriate for continued skilled OT intervention to maximize ADL I and safety. OT Discharge Recommendations: Acute rehabilitation  OT Device Recommendations: TBD    PLAN  OT Treatment Plan: Balance activities; Energy conservation/work si

## 2020-03-06 NOTE — PLAN OF CARE
While getting patient up from commode to bed ,she felt dizzy ,diaphoretic ,states she is going to pass out ,unable to keep her eyes open or stay awake ,had some shakes for few seconds ,lifted her to bed with assist,blood  Pressure in the 90's ,02 sat 94% ,

## 2020-03-06 NOTE — CM/SW NOTE
Spoke with Clara Rae from [facility] who stated that pt's insurance has denied acute rehab even after peer to peer completed by Dr Low Mack. [facility] does not have any beds available on their [facility]. They do not anticipate [facility] beds until Tuesday.       Spoke with pt's Tiana QUEEN

## 2020-03-06 NOTE — PROGRESS NOTES
Had vasovagal episode. Better now    /53 (BP Location: Right arm)   Pulse 64   Temp 98.3 °F (36.8 °C) (Oral)   Resp 16   Ht 5' 10\" (1.778 m)   Wt 194 lb 8 oz (88.2 kg)   SpO2 95%   BMI 27.91 kg/m²     Wound clean dry and intact.   Neuro intact  Neg

## 2020-03-06 NOTE — PROGRESS NOTES
[facility] H[facility]PITAL[facility]  Progress Note     [facility] Patient Status:  Inpatient    1940 MRN [id number]   [facility] 3SW-A Attending Joselyn Vasquez MD   Hosp Day # 5 PCP Elizabet Delgado MD     Chief Complaint: fall, hip pain    S: --    AST 20  --   --   --   --    ALT 15  --   --   --   --    BILT 0.5  --   --   --   --    TP 7.0  --   --   --   --     < > = values in this interval not displayed. Estimated Creatinine Clearance: 79.6 mL/min (based on SCr of 0.62 mg/dL).     Rec Ms. Maisha Ballard is expected to be discharge to: ar    Plan of care discussed with patient, RN, Santy Dave, SW/CM.     Ronda Mehta MD  [facility]

## 2020-03-06 NOTE — PHYSICAL THERAPY NOTE
PHYSICAL THERAPY TREATMENT NOTE - INPATIENT    Room Number: 384/384-A     Session: 2   Number of Visits to Meet Established Goals: 5    Presenting Problem: s/p left hip percutaneous screw fixation 3/3/2020  History related to current admission:  Pt admitt CHOLECYSTECTOMY     • COLONOSCOPY  04/21/2003    Screening- Normal   • COLONOSCOPY     • COLONOSCOPY N/A 5/9/2019    Performed by Lisy Robins MD at [facility] ENDOSCOPY   • ENDOVAS REPAIR, INFRARENL ABDOM AORTIC ANEURYSM/DISSECT     • ESOPHAGEAL MANOMETRY N/ LE  Management Techniques:  Activity promotion    BALANCE                                                                                                                       Static Sitting: Good  Dynamic Sitting: Fair           Static Standing: Poor  Yumiko Pt denied feeling light headed in standing. Pt requesting use of commode, transferred to commode with min a. Pt instructed in performing intentional breathing to prevent vasovagal response again. Pt dependent for pericare.   Gait training with rw 50% RECOMMENDATIONS  PT Discharge Recommendations: Acute rehabilitation     PLAN  PT Treatment Plan: Bed mobility; Endurance; Energy conservation;Patient education;Gait training;Range of motion;Strengthening;Transfer training  Rehab Potential : Good  Frequency (

## 2020-03-06 NOTE — PLAN OF CARE
Patient alert and oriented ,gets up with moderate assist with walker ,pain is minimal and controlled with norco ,blood pressure stable ,tele with a fib heart rate in the 70's ,on 1 L of o2 via cannula ,sats in the 90's ,neb treatment and CPT done by RT,den

## 2020-03-06 NOTE — PROGRESS NOTES
[facility] Cardiology Progress Note - [place] Patient Status:  Inpatient    1940 MRN [id number]   [facility] 3SW-A Attending Tan Jarrett MD   Hosp Day # 5 PCP Abhishek Mejia MD     Subjective:  Leno Camarena initial encounter ([facility]     Dyslipidemia      Objective:   Temp: 97.5 °F (36.4 °C)  Pulse: 71  Resp: 18  BP: 115/48    Intake/Output:     Intake/Output Summary (Last 24 hours) at 3/6/2020 1418  Last data filed at 3/6/2020 1300  Gross per 24 hour   Intake 7 03/01/20  1849   ALT 15   AST 20   ALB 3.2*       No results for input(s): TROP in the last 168 hours.     Allergies:     Adhesive Tape           RASH    Comment:Cerner Allergy Text Annotation: Adhesive Tape;             fragile skin-->skin tears    Medicat rectal suppository 10 mg, 10 mg, Rectal, Daily PRN  Fleet Enema (FLEET) 7-19 GM/118ML enema 133 mL, 1 enema, Rectal, Once PRN  glucose (DEX4) oral liquid 15 g, 15 g, Oral, Q15 Min PRN    Or  Glucose-Vitamin C (DEX-4) chewable tab 4 tablet, 4 tablet, Oral,

## 2020-03-07 LAB
ANION GAP SERPL CALC-SCNC: 2 MMOL/L (ref 0–18)
BASOPHILS # BLD AUTO: 0.04 X10(3) UL (ref 0–0.2)
BASOPHILS NFR BLD AUTO: 0.5 %
BUN BLD-MCNC: 20 MG/DL (ref 7–18)
BUN/CREAT SERPL: 30.3 (ref 10–20)
CALCIUM BLD-MCNC: 9 MG/DL (ref 8.5–10.1)
CHLORIDE SERPL-SCNC: 101 MMOL/L (ref 98–112)
CO2 SERPL-SCNC: 37 MMOL/L (ref 21–32)
CREAT BLD-MCNC: 0.66 MG/DL (ref 0.55–1.02)
DEPRECATED RDW RBC AUTO: 48.2 FL (ref 35.1–46.3)
EOSINOPHIL # BLD AUTO: 0.3 X10(3) UL (ref 0–0.7)
EOSINOPHIL NFR BLD AUTO: 3.6 %
ERYTHROCYTE [DISTWIDTH] IN BLOOD BY AUTOMATED COUNT: 15.7 % (ref 11–15)
GLUCOSE BLD-MCNC: 169 MG/DL (ref 70–99)
GLUCOSE BLD-MCNC: 178 MG/DL (ref 70–99)
GLUCOSE BLD-MCNC: 181 MG/DL (ref 70–99)
GLUCOSE BLD-MCNC: 186 MG/DL (ref 70–99)
GLUCOSE BLD-MCNC: 228 MG/DL (ref 70–99)
HCT VFR BLD AUTO: 29.6 % (ref 35–48)
HGB BLD-MCNC: 8.9 G/DL (ref 12–16)
IMM GRANULOCYTES # BLD AUTO: 0.04 X10(3) UL (ref 0–1)
IMM GRANULOCYTES NFR BLD: 0.5 %
LYMPHOCYTES # BLD AUTO: 0.69 X10(3) UL (ref 1–4)
LYMPHOCYTES NFR BLD AUTO: 8.2 %
MCH RBC QN AUTO: 25.6 PG (ref 26–34)
MCHC RBC AUTO-ENTMCNC: 30.1 G/DL (ref 31–37)
MCV RBC AUTO: 85.3 FL (ref 80–100)
MONOCYTES # BLD AUTO: 0.68 X10(3) UL (ref 0.1–1)
MONOCYTES NFR BLD AUTO: 8.1 %
NEUTROPHILS # BLD AUTO: 6.68 X10 (3) UL (ref 1.5–7.7)
NEUTROPHILS # BLD AUTO: 6.68 X10(3) UL (ref 1.5–7.7)
NEUTROPHILS NFR BLD AUTO: 79.1 %
OSMOLALITY SERPL CALC.SUM OF ELEC: 297 MOSM/KG (ref 275–295)
PLATELET # BLD AUTO: 193 10(3)UL (ref 150–450)
POTASSIUM SERPL-SCNC: 4.7 MMOL/L (ref 3.5–5.1)
RBC # BLD AUTO: 3.47 X10(6)UL (ref 3.8–5.3)
SODIUM SERPL-SCNC: 140 MMOL/L (ref 136–145)
WBC # BLD AUTO: 8.4 X10(3) UL (ref 4–11)

## 2020-03-07 PROCEDURE — 99232 SBSQ HOSP IP/OBS MODERATE 35: CPT | Performed by: HOSPITALIST

## 2020-03-07 RX ORDER — METOPROLOL TARTRATE 50 MG/1
25 TABLET, FILM COATED ORAL 2 TIMES DAILY
Qty: 180 TABLET | Refills: 0 | Status: SHIPPED | OUTPATIENT
Start: 2020-03-07 | End: 2020-03-09

## 2020-03-07 NOTE — CM/SW NOTE
CM spoke with admissions at [place] [address] who states they have medically accepted pt and have submitted for insurance auth. Await  insurance auth.     VINH Lazaro RN, [facility]  P: [phone]

## 2020-03-07 NOTE — PHYSICAL THERAPY NOTE
PHYSICAL THERAPY TREATMENT NOTE - INPATIENT    Room Number: 384/384-A     Session: 3   Number of Visits to Meet Established Goals: 5    Presenting Problem: s/p left hip percutaneous screw fixation 3/3/2020  History related to current admission:  Pt admitt CHOLECYSTECTOMY     • COLONOSCOPY  04/21/2003    Screening- Normal   • COLONOSCOPY     • COLONOSCOPY N/A 5/9/2019    Performed by Meng Ellis MD at [facility] ENDOSCOPY   • ENDOVAS REPAIR, INFRARENL ABDOM AORTIC ANEURYSM/DISSECT     • ESOPHAGEAL MANOMETRY N/ Static Sitting: Good  Dynamic Sitting: Fair           Static Standing: Fair -  Dynamic Standing: Fair -    ACTIVITY TOLERANCE  Pulse: 73  Heart Rate Source: Monitor     BP: 120/50  BP Location: Left arm  BP Method: Automatic  Patient Position: Lying    Sit demonstrated maintenance of precautions with transfers and gait - pt needs further education, discussed HEP of AROM therex as above, d/c planning, goals, pt participative, needs further reinforcement    Patient End of Session: Up in chair; With [facility] staff;Zainab

## 2020-03-07 NOTE — PLAN OF CARE
Left hip dressing dry, intact. Bruising noted to left hip. Denies numbness, tingling. Cap refill < 3 seconds to ble. Sitting up in chair. States pain well controlled on oral pain medication.   Instructed on plan of care, call don't fall protocol, call

## 2020-03-07 NOTE — PROGRESS NOTES
[facility] Cardiology Progress Note - Celeste Portillo Patient Status:  Inpatient    1940 MRN [id number]   [facility] 3SW-A Attending Uli Salmeron MD   Hosp Day # 6 PCP Danish Zelaya MD     Subjective:  Had Ricki Brito Closed fracture of neck of left femur (HCC)     Hyperglycemia     Closed fracture of neck of left femur, initial encounter ([facility])     Dyslipidemia      Objective:   Temp: 97.2 °F (36.2 °C)  Pulse: 68  Resp: 20  BP: 127/61    Intake/Output:     Intake/Outp BUN 14 21* 19* 21* 20*   CREATSERUM 0.76 0.77 0.67 0.62 0.66   CA 8.8 8.3* 8.3* 8.4* 9.0   MG  --   --   --  1.9  --    * 151* 155* 160* 186*       Recent Labs   Lab 03/01/20  7239   ALT 15   AST 20   ALB 3.2*       No results for input(s): TROP i 3350 (MIRALAX) powder packet 17 g, 17 g, Oral, Daily PRN  magnesium hydroxide (MILK OF MAGNESIA) 400 MG/5ML suspension 30 mL, 30 mL, Oral, Daily PRN  bisacodyl (DULCOLAX) rectal suppository 10 mg, 10 mg, Rectal, Daily PRN  Fleet Enema (FLEET) 7-19 GM/118ML

## 2020-03-07 NOTE — PLAN OF CARE
Alert and oriented x4. VSS on RA. , IS encouraged and demonstrated. On tele-Afib. SCDs on/Xarelto. Ankle pumps encouraged bilaterally. Tolerating diet, no N/V. SSI given as ordered. Voiding freely to commode, monitoring PVRs. Last BM 3/6, colace given.

## 2020-03-07 NOTE — PROGRESS NOTES
IRON HOSPITALIST  Progress Note     Jim Kessler Patient Status:  Inpatient    1940 MRN [id number]   [facility] 3SW-A Attending Lance Nichols MD   Hosp Day # 6 PCP Leann Ho MD     Chief Complaint: fall, hip pain    S: --   --   --   --    BILT 0.5  --   --   --   --    TP 7.0  --   --   --   --     < > = values in this interval not displayed. Estimated Creatinine Clearance: 74.7 mL/min (based on SCr of 0.66 mg/dL).     Recent Labs   Lab 03/02/20  0807   PTP 17.7* MD  [facility]

## 2020-03-07 NOTE — PROGRESS NOTES
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

## 2020-03-08 LAB
GLUCOSE BLD-MCNC: 162 MG/DL (ref 70–99)
GLUCOSE BLD-MCNC: 178 MG/DL (ref 70–99)
GLUCOSE BLD-MCNC: 191 MG/DL (ref 70–99)
GLUCOSE BLD-MCNC: 232 MG/DL (ref 70–99)

## 2020-03-08 PROCEDURE — 99231 SBSQ HOSP IP/OBS SF/LOW 25: CPT | Performed by: HOSPITALIST

## 2020-03-08 NOTE — PHYSICAL THERAPY NOTE
PHYSICAL THERAPY TREATMENT NOTE - INPATIENT    Room Number: 384/384-A     Session: 4   Number of Visits to Meet Established Goals: 5    Presenting Problem: s/p left hip percutaneous screw fixation 3/3/2020  History related to current admission:  Pt admitt CHOLECYSTECTOMY     • COLONOSCOPY  04/21/2003    Screening- Normal   • COLONOSCOPY     • COLONOSCOPY N/A 5/9/2019    Performed by Pretty Murillo MD at [facility] ENDOSCOPY   • ENDOVAS REPAIR, INFRARENL ABDOM AORTIC ANEURYSM/DISSECT     • ESOPHAGEAL MANOMETRY N/ Static Sitting: Good  Dynamic Sitting: Good           Static Standing: Fair +  Dynamic Standing: Poor +    ACTIVITY TOLERANCE                         Sittin/67  Standin/56, 82bpm  Recovery sittin/58     O2 WALK length DC recommendations to home with HHPT and intermittent supervision. Discussed safety awareness, fall precautions, activity level and the importance of adhering to WB restrictions. Pt in understanding.  Pt reports confidence in return to home, Discusse ambulate x 25 with 50% L LE WB and supervision with RW   Goal #4  Pt to ascend/descend 1 step with supervision   Goal #5     Goal #6     Goal Comments: Goals established on 3/4/2020, in progress 3/8/20

## 2020-03-08 NOTE — RESPIRATORY THERAPY NOTE
Pt on RA, BS clear. Pt states she does not feel she needs neb treatments at the moment. Pt does not feel wheezy or SOB when asked about her breathing. Pt continues to do IS and flutter regularly. Will continue to monitor pt.

## 2020-03-08 NOTE — PLAN OF CARE
Alert and oriented x4. VSS on 2L O2 via NC while sleeping. , IS encouraged and demonstrated. On tele-Afib. SCDs on/Xarelto. Ankle pumps encouraged bilaterally. Tolerating diet, no N/V. SSI given as ordered. Voiding freely to commode. Last BM 3/6.  Denies

## 2020-03-08 NOTE — PROGRESS NOTES
[facility] Cardiology Progress Note - Marlene Eastman Patient Status:  Inpatient    1940 MRN [id number]   [facility] 3SW-A Attending Jorgito Aden MD   Hosp Day # 7 PCP Derick Millan MD     Subjective:  Had Juma Riley fracture of neck of left femur (HCC)     Hyperglycemia     Closed fracture of neck of left femur, initial encounter ([facility])     Dyslipidemia      Objective:   Temp: 98.2 °F (36.8 °C)  Pulse: 60  Resp: 20  BP: 129/61    Intake/Output:     Intake/Output Summar 32.0 35.0* 37.0* 37.0*   BUN 14 21* 19* 21* 20*   CREATSERUM 0.76 0.77 0.67 0.62 0.66   CA 8.8 8.3* 8.3* 8.4* 9.0   MG  --   --   --  1.9  --    * 151* 155* 160* 186*       Recent Labs   Lab 03/01/20  1849   ALT 15   AST 20   ALB 3.2*       No resul mg, Oral, BID PRN  PEG 3350 (MIRALAX) powder packet 17 g, 17 g, Oral, Daily PRN  magnesium hydroxide (MILK OF MAGNESIA) 400 MG/5ML suspension 30 mL, 30 mL, Oral, Daily PRN  bisacodyl (DULCOLAX) rectal suppository 10 mg, 10 mg, Rectal, Daily PRN  Fleet Enem

## 2020-03-08 NOTE — OCCUPATIONAL THERAPY NOTE
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 [facility].    OT Discharge Recommendations: Home; Intermittent Supervision(Home Health OT)  OT Device Recommendations: TBD    PLAN  OT Treatment Plan: Balance activities; Energy conservation/work

## 2020-03-08 NOTE — PROGRESS NOTES
[facility]  Progress Note    Jocelyn Gamez Patient Status:  Inpatient    1940 MRN [id number]   [facility] 3SW-A Attending Jorgito Aden MD   Hosp Day # 7 PCP Derick Millan MD     Subjective:  Jocelyn Gamez is a(n) 78 year aneurysm of abdominal aorta using endovascular stent graft     Closed fracture of neck of left femur (HCC)     Hyperglycemia     Closed fracture of neck of left femur, initial encounter ([facility])     Dyslipidemia      L hip fem neck fx    S/P pinning  PT  DVT

## 2020-03-08 NOTE — PROGRESS NOTES
[facility] HOSPITAL[facility]  Progress Note     Paula Buchanan Patient Status:  Inpatient    1940 MRN [id number]   [facility] 3SW-A Attending Sarika Abarms MD   Hosp Day # 7 PCP Tk Rosenthal MD     Chief Complaint: fall, hip pain    S: --   --   --    BILT 0.5  --   --   --   --    TP 7.0  --   --   --   --     < > = values in this interval not displayed. Estimated Creatinine Clearance: 74.7 mL/min (based on SCr of 0.66 mg/dL).     Recent Labs   Lab 03/02/20  0807   PTP 17.7*   INR RN.    Breanna Tamez MD  [facility]

## 2020-03-08 NOTE — CM/SW NOTE
SW spoke with RN and OT, new recommendation for home health upgraded from [facility] recommendation. SW spoke with patient regarding d/c planning. Patient reported h/o [employer] and does not wish to use them again.  Patient stated she is indifferent of home healt

## 2020-03-09 VITALS
WEIGHT: 190.5 LBS | SYSTOLIC BLOOD PRESSURE: 118 MMHG | HEART RATE: 66 BPM | TEMPERATURE: 99 F | HEIGHT: 70 IN | OXYGEN SATURATION: 90 % | BODY MASS INDEX: 27.27 KG/M2 | DIASTOLIC BLOOD PRESSURE: 71 MMHG | RESPIRATION RATE: 20 BRPM

## 2020-03-09 LAB
BASOPHILS # BLD AUTO: 0.04 X10(3) UL (ref 0–0.2)
BASOPHILS NFR BLD AUTO: 0.5 %
DEPRECATED RDW RBC AUTO: 48.9 FL (ref 35.1–46.3)
EOSINOPHIL # BLD AUTO: 0.3 X10(3) UL (ref 0–0.7)
EOSINOPHIL NFR BLD AUTO: 3.4 %
ERYTHROCYTE [DISTWIDTH] IN BLOOD BY AUTOMATED COUNT: 15.8 % (ref 11–15)
GLUCOSE BLD-MCNC: 170 MG/DL (ref 70–99)
GLUCOSE BLD-MCNC: 183 MG/DL (ref 70–99)
HCT VFR BLD AUTO: 29.8 % (ref 35–48)
HGB BLD-MCNC: 8.6 G/DL (ref 12–16)
IMM GRANULOCYTES # BLD AUTO: 0.03 X10(3) UL (ref 0–1)
IMM GRANULOCYTES NFR BLD: 0.3 %
LYMPHOCYTES # BLD AUTO: 0.59 X10(3) UL (ref 1–4)
LYMPHOCYTES NFR BLD AUTO: 6.7 %
MCH RBC QN AUTO: 24.9 PG (ref 26–34)
MCHC RBC AUTO-ENTMCNC: 28.9 G/DL (ref 31–37)
MCV RBC AUTO: 86.1 FL (ref 80–100)
MONOCYTES # BLD AUTO: 0.69 X10(3) UL (ref 0.1–1)
MONOCYTES NFR BLD AUTO: 7.8 %
NEUTROPHILS # BLD AUTO: 7.22 X10 (3) UL (ref 1.5–7.7)
NEUTROPHILS # BLD AUTO: 7.22 X10(3) UL (ref 1.5–7.7)
NEUTROPHILS NFR BLD AUTO: 81.3 %
PLATELET # BLD AUTO: 205 10(3)UL (ref 150–450)
RBC # BLD AUTO: 3.46 X10(6)UL (ref 3.8–5.3)
WBC # BLD AUTO: 8.9 X10(3) UL (ref 4–11)

## 2020-03-09 PROCEDURE — 99239 HOSP IP/OBS DSCHRG MGMT >30: CPT | Performed by: HOSPITALIST

## 2020-03-09 RX ORDER — FUROSEMIDE 20 MG/1
10 TABLET ORAL DAILY
Qty: 15 TABLET | Refills: 11 | Status: SHIPPED | OUTPATIENT
Start: 2020-03-09 | End: 2020-06-12

## 2020-03-09 RX ORDER — IPRATROPIUM BROMIDE AND ALBUTEROL SULFATE 2.5; .5 MG/3ML; MG/3ML
3 SOLUTION RESPIRATORY (INHALATION) EVERY 6 HOURS PRN
Status: DISCONTINUED | OUTPATIENT
Start: 2020-03-09 | End: 2020-03-09

## 2020-03-09 NOTE — PLAN OF CARE
Pt A&Ox4. On ra  & scds in place tolerating diet. pt voiding Up with mod assist walker & gait belt, 50% wb to LLE. Coverlet  changed this am by noc rn. Pt verbalized understanding of poc & call dont fall protocol. Will continue to monitor.  plan dc to ho

## 2020-03-09 NOTE — PLAN OF CARE
POD #6, A/oX4, 2L O2 vica NC at HS, , IS aggressively encouraged, Tele with A-Nicolasa, SCD's on, on xarelto, LBM 03/6/20, voiding freely, pain medication offered but denies pain, 50% weight bearing to Left Leg, up with min assist, gait belt and walker, mode

## 2020-03-09 NOTE — PHYSICAL THERAPY NOTE
PHYSICAL THERAPY TREATMENT NOTE - INPATIENT    Room Number: 384/384-A     Session: 5  Number of Visits to Meet Established Goals: 5    Presenting Problem: s/p left hip percutaneous screw fixation 3/3/2020  History related to current admission:  Bhupendra rodriguez CHOLECYSTECTOMY     • COLONOSCOPY  04/21/2003    Screening- Normal   • COLONOSCOPY     • COLONOSCOPY N/A 5/9/2019    Performed by Conrado Longo MD at [facility] ENDOSCOPY   • ENDOVAS REPAIR, INFRARENL ABDOM AORTIC ANEURYSM/DISSECT     • ESOPHAGEAL MANOMETRY N/ techniques;Relaxation;Repositioning    BALANCE                                                                                                                     Static Sitting: Good  Dynamic Sitting: Good           Static Standin [address]  Dynamic Standing concerns addressed;SCDs in place; Alarm set; Discussed recommendations with /    ASSESSMENT   Patient with significant progression in overall functional mobility.  Patient Mod I for bed mobility, supervision for transfers and ambulati

## 2020-03-09 NOTE — PLAN OF CARE
Pt son (Dr. Marina Baker) at bedside. Patient able to get herself out of bed & ambulate with s/b asst and walker in hallway. Ambulated approx 50 feet. States tolerated well. Discharge plan discussed.   Both in agreement to home health with rn and home p

## 2020-03-09 NOTE — PLAN OF CARE
Pt ok to dc to [facility] per cards, ortho & im md. Report given to rn at [facility].

## 2020-03-09 NOTE — PROGRESS NOTES
[facility] HOSPITAL[facility]  Progress Note     [facility] Patient Status:  Inpatient    1940 MRN [id number]   [facility] 3SW-A Attending Angelina Lazaro MD   Hosp Day # 8 PCP Jeramie Zabala MD     Chief Complaint: fall, hip pain    S: • melatonin  3 mg Oral Nightly   • ipratropium-albuterol  3 mL Nebulization Q6H WA   • Metoprolol Tartrate  25 mg Oral 2x Daily(Beta Blocker)   • rivaroxaban  20 mg Oral Daily with food   • bupivacaine-EPINEPHrine (PF)  30 mL Infiltration Once   • Pantop

## 2020-03-09 NOTE — CM/SW NOTE
Spoke with Dionicio Lynne from [place] who confirmed they have received insurance auth and pt can be accepted to [facility] today. Met with pt who does agree with plan for [facility] today.   Discussed transportation and informed pt of costs for medicar transp

## 2020-03-09 NOTE — PROGRESS NOTES
[facility] Cardiology Progress Note - Calin Weaver Patient Status:  Inpatient    1940 MRN [id number]   [facility] 3SW-A Attending Shauna Foley MD   Hosp Day # 8 PCP Rowan Sanders MD     Subjective:  feeling Dyslipidemia      Objective:   Temp: 98.6 °F (37 °C)  Pulse: 66  Resp: 20  BP: 118/71    Intake/Output:     Intake/Output Summary (Last 24 hours) at 3/9/2020 1343  Last data filed at 3/9/2020 0800  Gross per 24 hour   Intake 720 ml   Output 475 ml   Net 24 last 168 hours. Invalid input(s): ALPHOS, TBIL, DBIL, TPROT    No results for input(s): TROP in the last 168 hours.     Allergies:     Adhesive Tape           RASH    Comment:Cerner Allergy Text Annotation: Adhesive Tape;             fragile skin-->skin (DULCOLAX) rectal suppository 10 mg, 10 mg, Rectal, Daily PRN  Fleet Enema (FLEET) 7-19 GM/118ML enema 133 mL, 1 enema, Rectal, Once PRN  glucose (DEX4) oral liquid 15 g, 15 g, Oral, Q15 Min PRN    Or  Glucose-Vitamin C (DEX-4) chewable tab 4 tablet, 4 tab

## 2020-03-09 NOTE — DISCHARGE SUMMARY
[facility] [place] HOSPITALIST  DISCHARGE SUMMARY     Early [facility] Patient Status:  Inpatient    1940 MRN [id number]   [facility] 3SW-A Attending No att. providers found   [address] Day # 8 PCP Andrei Cardoso MD     Date of Admission: 3/1/2020  Da Instructions Prescription details   HYDROcodone-acetaminophen 5-325 MG Tabs  Commonly known as:  Norco      Take 1 tablet by mouth every 6 (six) hours as needed for Pain.    Quantity:  40 tablet  Refills:  0        CHANGE how you take these medications known as:  NORVASC        lisinopril 20 MG Tabs              Where to Get Your Medications      These medications were sent to [facility] [address], [phone], [phone]  Lanie  deficits. Musculoskeletal: Moves all extremities. Extremities: No edema.   -----------------------------------------------------------------------------------------------  PATIENT DISCHARGE INSTRUCTIONS: See electronic chart    João Motta MD    Time s

## 2020-03-09 NOTE — CM/SW NOTE
03/09/20 1100   Discharge disposition   Expected discharge disposition Skilled Nurs   Name of [facility]   Discharge transportation [facility]

## 2020-03-10 NOTE — PAYOR COMM NOTE
--------------  DISCHARGE REVIEW    Val Mejia [place] [facility]  Subscriber #:  [id number]  Authorization Number: [id number]    Admit date: 3/1/20  Admit time:  2324  Discharge Date: 3/9/2020  3:07 PM     Admitting Physician: Anuj Stearns MD  Attending Physician: vasovagal episode. She has not had any further events, her medications were adjusted by cardiology. She was evaluated by physical therapy who initially recommended acute rehabilitation.   Insurance denied acute rehabilitation, thus patient was transferred as:   GLUCOPHAGE      TAKE 1/2 TABLET BY MOUTH TWO TIMES A DAY WITH MEALS   Quantity:  90 tablet  Refills:  0     mupirocin 2 % Oint  Commonly known as:  BACTROBAN      Apply to wounds 3 times daily for 10 days   Quantity:  15 g  Refills:  1     Pravastatin Next 30 Days 3/9/2020 - 4/8/2020      Date Arrival Time Visit Type Length Department Provider     3/27/2020  3:00 PM  SURGERY 30 [37108] 30 min.  Axel Siemens, PC Jenean Brawn, MD    Patient Instructions:          Location Instructions:

## 2020-03-13 ENCOUNTER — TELEPHONE (OUTPATIENT)
Dept: INTERNAL MEDICINE CLINIC | Facility: CLINIC | Age: 80
End: 2020-03-13

## 2020-03-13 NOTE — TELEPHONE ENCOUNTER
Needs verbal OK that AS will sign HH orders     Verbal OK can be left on confidential VM.  Planning to see opt Sunday, Please call ASAP

## 2020-03-13 NOTE — TELEPHONE ENCOUNTER
LM for Jose Eduardo Craft at [facility] [place] [facility] orders per AS.

## 2020-03-20 ENCOUNTER — TELEPHONE (OUTPATIENT)
Dept: INTERNAL MEDICINE CLINIC | Facility: CLINIC | Age: 80
End: 2020-03-20

## 2020-03-27 ENCOUNTER — TELEPHONE (OUTPATIENT)
Dept: INTERNAL MEDICINE CLINIC | Facility: CLINIC | Age: 80
End: 2020-03-27

## 2020-03-30 ENCOUNTER — TELEPHONE (OUTPATIENT)
Dept: INTERNAL MEDICINE CLINIC | Facility: CLINIC | Age: 80
End: 2020-03-30

## 2020-03-30 NOTE — TELEPHONE ENCOUNTER
Orders from [facility] [place] received for discharge. Placed in MD bin for signature. Holding for fax.

## 2020-04-24 ENCOUNTER — TELEPHONE (OUTPATIENT)
Dept: INTERNAL MEDICINE CLINIC | Facility: CLINIC | Age: 80
End: 2020-04-24

## 2020-04-24 NOTE — TELEPHONE ENCOUNTER
Received fax from [facility] with discharge summary for our records for this patient. Placed in provider bin to review.

## 2020-05-04 ENCOUNTER — TELEPHONE (OUTPATIENT)
Dept: INTERNAL MEDICINE CLINIC | Facility: CLINIC | Age: 80
End: 2020-05-04

## 2020-05-04 NOTE — TELEPHONE ENCOUNTER
Received discharge paperwork from [facility] [place] requesting AS signature. Paperwork signed and faxed back to [phone]. Confirmation received.

## 2020-05-05 ENCOUNTER — TELEPHONE (OUTPATIENT)
Dept: INTERNAL MEDICINE CLINIC | Facility: CLINIC | Age: 80
End: 2020-05-05

## 2020-05-07 RX ORDER — PRAVASTATIN SODIUM 80 MG/1
TABLET ORAL
Qty: 90 TABLET | Refills: 0 | Status: SHIPPED | OUTPATIENT
Start: 2020-05-07 | End: 2020-09-09

## 2020-05-07 RX ORDER — GLYBURIDE 5 MG/1
TABLET ORAL
Qty: 90 TABLET | Refills: 0 | Status: SHIPPED | OUTPATIENT
Start: 2020-05-07 | End: 2020-06-16

## 2020-05-11 ENCOUNTER — TELEPHONE (OUTPATIENT)
Dept: INTERNAL MEDICINE CLINIC | Facility: CLINIC | Age: 80
End: 2020-05-11

## 2020-05-11 DIAGNOSIS — H90.12 CONDUCTIVE HEARING LOSS OF LEFT EAR WITH UNRESTRICTED HEARING OF RIGHT EAR: Primary | ICD-10-CM

## 2020-05-11 NOTE — TELEPHONE ENCOUNTER
Patient notified/referred to Dr Andria Leblanc ENT for evaluation of left ear, hearing. Pt verbalizes understanding. Referral placed as urgent.

## 2020-05-11 NOTE — TELEPHONE ENCOUNTER
Pt is losing the hearing left ear, been getting worse. Has spoken to Dr. Amaya Bernstein already to discuss a problem with that ear. Pt fell In May last year, broke her nose.  Pt did see Dr. Ciaran Kearns (did not go in for that reason but he did look)  Pt commente

## 2020-05-11 NOTE — TELEPHONE ENCOUNTER
Patient states a year ago she fell on her left side, hit her head/left ear, saw Dr Lucia Aquino who indicated she had a 'wet ear' which resolved.   Pt fell again March 1st, fell on her left side again but this time did not hit her head but hit the floor which w

## 2020-05-19 RX ORDER — LANCETS
EACH MISCELLANEOUS
Qty: 100 EACH | Refills: 0 | Status: SHIPPED | OUTPATIENT
Start: 2020-05-19 | End: 2020-07-24

## 2020-05-21 NOTE — TELEPHONE ENCOUNTER
Future Appointments   Date Time Provider Nahid Hobbs   6/16/2020  1:00 PM WILLY Bautista [facility] [facility]   7/28/2020  1:00 PM Maya Serrano  [facility],[address]

## 2020-06-15 ENCOUNTER — LAB ENCOUNTER (OUTPATIENT)
Dept: LAB | Age: 80
End: 2020-06-15
Attending: INTERNAL MEDICINE
Payer: MEDICARE

## 2020-06-15 DIAGNOSIS — E11.59 TYPE 2 DIABETES MELLITUS WITH OTHER CIRCULATORY COMPLICATION, WITHOUT LONG-TERM CURRENT USE OF INSULIN (HCC): ICD-10-CM

## 2020-06-15 DIAGNOSIS — Z79.899 LONG TERM CURRENT USE OF DIURETIC: ICD-10-CM

## 2020-06-15 PROCEDURE — 80053 COMPREHEN METABOLIC PANEL: CPT

## 2020-06-15 PROCEDURE — 82570 ASSAY OF URINE CREATININE: CPT

## 2020-06-15 PROCEDURE — 82043 UR ALBUMIN QUANTITATIVE: CPT

## 2020-06-15 PROCEDURE — 80061 LIPID PANEL: CPT

## 2020-06-15 PROCEDURE — 83036 HEMOGLOBIN GLYCOSYLATED A1C: CPT

## 2020-06-15 PROCEDURE — 36415 COLL VENOUS BLD VENIPUNCTURE: CPT

## 2020-06-15 NOTE — PROGRESS NOTES
Ms. Layne Yates,     Your lab work is stable from prior. Continue with your current medications and follow up as scheduled.      LARRY Haro

## 2020-06-16 ENCOUNTER — OFFICE VISIT (OUTPATIENT)
Dept: INTERNAL MEDICINE CLINIC | Facility: CLINIC | Age: 80
End: 2020-06-16
Payer: MEDICARE

## 2020-06-16 VITALS
SYSTOLIC BLOOD PRESSURE: 112 MMHG | DIASTOLIC BLOOD PRESSURE: 60 MMHG | HEART RATE: 72 BPM | WEIGHT: 177 LBS | BODY MASS INDEX: 26.22 KG/M2 | RESPIRATION RATE: 16 BRPM | TEMPERATURE: 99 F | HEIGHT: 69 IN

## 2020-06-16 DIAGNOSIS — I25.10 CORONARY ARTERY DISEASE INVOLVING NATIVE CORONARY ARTERY OF NATIVE HEART WITHOUT ANGINA PECTORIS: ICD-10-CM

## 2020-06-16 DIAGNOSIS — I25.2 HISTORY OF MI (MYOCARDIAL INFARCTION): ICD-10-CM

## 2020-06-16 DIAGNOSIS — K22.0 ACHALASIA: ICD-10-CM

## 2020-06-16 DIAGNOSIS — D09.9 SQUAMOUS CELL CARCINOMA IN SITU (SCCIS): ICD-10-CM

## 2020-06-16 DIAGNOSIS — E11.59 TYPE 2 DIABETES MELLITUS WITH OTHER CIRCULATORY COMPLICATION, WITHOUT LONG-TERM CURRENT USE OF INSULIN (HCC): ICD-10-CM

## 2020-06-16 DIAGNOSIS — Z95.5 STENTED CORONARY ARTERY: ICD-10-CM

## 2020-06-16 DIAGNOSIS — I71.02 DISSECTION OF ABDOMINAL AORTA (HCC): ICD-10-CM

## 2020-06-16 DIAGNOSIS — I10 BENIGN ESSENTIAL HTN: ICD-10-CM

## 2020-06-16 DIAGNOSIS — Z96.651 HISTORY OF TOTAL RIGHT KNEE REPLACEMENT: ICD-10-CM

## 2020-06-16 DIAGNOSIS — I70.0 AORTIC ATHEROSCLEROSIS (HCC): ICD-10-CM

## 2020-06-16 DIAGNOSIS — C82.11 GRADE 2 FOLLICULAR LYMPHOMA OF LYMPH NODES OF NECK (HCC): ICD-10-CM

## 2020-06-16 DIAGNOSIS — E89.0 H/O PARTIAL THYROIDECTOMY: ICD-10-CM

## 2020-06-16 DIAGNOSIS — R13.10 DYSPHAGIA, UNSPECIFIED TYPE: ICD-10-CM

## 2020-06-16 DIAGNOSIS — K21.9 GASTROESOPHAGEAL REFLUX DISEASE, ESOPHAGITIS PRESENCE NOT SPECIFIED: ICD-10-CM

## 2020-06-16 DIAGNOSIS — H35.30 MACULAR DEGENERATION, UNSPECIFIED LATERALITY, UNSPECIFIED TYPE: ICD-10-CM

## 2020-06-16 DIAGNOSIS — D64.9 NORMOCYTIC ANEMIA: ICD-10-CM

## 2020-06-16 DIAGNOSIS — E55.9 VITAMIN D DEFICIENCY: ICD-10-CM

## 2020-06-16 DIAGNOSIS — I70.1 RENAL ARTERY STENOSIS (HCC): ICD-10-CM

## 2020-06-16 DIAGNOSIS — K22.4 CORKSCREW ESOPHAGUS: ICD-10-CM

## 2020-06-16 DIAGNOSIS — E04.9 ENLARGED THYROID: ICD-10-CM

## 2020-06-16 DIAGNOSIS — E11.9 TYPE 2 DIABETES MELLITUS WITHOUT COMPLICATION, WITHOUT LONG-TERM CURRENT USE OF INSULIN (HCC): ICD-10-CM

## 2020-06-16 DIAGNOSIS — E04.1 THYROID NODULE: ICD-10-CM

## 2020-06-16 DIAGNOSIS — M54.50 CHRONIC BILATERAL LOW BACK PAIN WITHOUT SCIATICA: ICD-10-CM

## 2020-06-16 DIAGNOSIS — M20.40 HAMMER TOE, ACQUIRED: ICD-10-CM

## 2020-06-16 DIAGNOSIS — I48.0 PAROXYSMAL ATRIAL FIBRILLATION (HCC): ICD-10-CM

## 2020-06-16 DIAGNOSIS — G89.29 CHRONIC BILATERAL LOW BACK PAIN WITHOUT SCIATICA: ICD-10-CM

## 2020-06-16 DIAGNOSIS — M47.816 ARTHRITIS, LUMBAR SPINE: ICD-10-CM

## 2020-06-16 DIAGNOSIS — I71.4 ABDOMINAL AORTIC ANEURYSM (AAA) WITHOUT RUPTURE (HCC): ICD-10-CM

## 2020-06-16 DIAGNOSIS — Z00.00 ENCOUNTER FOR ANNUAL HEALTH EXAMINATION: Primary | ICD-10-CM

## 2020-06-16 DIAGNOSIS — D69.6 THROMBOCYTOPENIA (HCC): Chronic | ICD-10-CM

## 2020-06-16 DIAGNOSIS — Z95.828 HISTORY OF REPAIR OF ANEURYSM OF ABDOMINAL AORTA USING ENDOVASCULAR STENT GRAFT: ICD-10-CM

## 2020-06-16 DIAGNOSIS — E78.5 DYSLIPIDEMIA: ICD-10-CM

## 2020-06-16 DIAGNOSIS — E78.00 PURE HYPERCHOLESTEROLEMIA: ICD-10-CM

## 2020-06-16 DIAGNOSIS — I50.32 CHRONIC DIASTOLIC CHF (CONGESTIVE HEART FAILURE) (HCC): ICD-10-CM

## 2020-06-16 DIAGNOSIS — M46.96 INFLAMMATORY SPONDYLOPATHY OF LUMBAR REGION (HCC): ICD-10-CM

## 2020-06-16 PROBLEM — Z87.81 HISTORY OF FEMUR FRACTURE: Status: ACTIVE | Noted: 2020-03-01

## 2020-06-16 PROBLEM — S72.002A CLOSED FRACTURE OF NECK OF LEFT FEMUR (HCC): Status: RESOLVED | Noted: 2020-03-01 | Resolved: 2020-06-16

## 2020-06-16 PROBLEM — R73.9 HYPERGLYCEMIA: Status: RESOLVED | Noted: 2020-03-01 | Resolved: 2020-06-16

## 2020-06-16 PROCEDURE — 99397 PER PM REEVAL EST PAT 65+ YR: CPT | Performed by: NURSE PRACTITIONER

## 2020-06-16 PROCEDURE — G0439 PPPS, SUBSEQ VISIT: HCPCS | Performed by: NURSE PRACTITIONER

## 2020-06-16 PROCEDURE — 96160 PT-FOCUSED HLTH RISK ASSMT: CPT | Performed by: NURSE PRACTITIONER

## 2020-06-16 NOTE — PATIENT INSTRUCTIONS
[facility] SCREENING SCHEDULE   Tests on this list are recommended by your physician but may not be covered, or covered at this frequency, by your insurer. Please check with your insurance carrier before scheduling to verify coverage.    ASH only No results found for this or any previous visit.  Limited to patients who meet one of the following criteria:   • Men who are 73-68 years old and have smoked more than 100 cigarettes in their lifetime   • Anyone with a family history    Colorectal Canc Annually to at least age 76, and as needed after 76 There are no preventive care reminders to display for this patient.  Please get this Mammogram regularly   Immunizations      Influenza  Covered Annually Orders placed or performed in visit on 12/11/14   • Advance Directives. It also has the State forms available on it's website for anyone to review and print using their home computer and printer. (the forms are also available in [address])  [email or web address] org  This link also has information from the [facility] [place]

## 2020-06-16 NOTE — PROGRESS NOTES
HPI:   Paula Buchanan is a 78year old female who presents for a MA (Medicare Advantage) [facility] (Once per calendar year). Squamous cell carcinoma in situ (SCCIS)  Stable  Per Dr Dhiraj Arredondo.     Type 2 diabetes mellitus with other circulatory compl Stable as above. Hold glyburde. Thyroid nodule  Due for US  Order placed. Enlarged thyroid  Stable  Due for US  Order placed. Vitamin D deficiency  Stable  Cont vit d supplement.      Gastroesophageal reflux disease, esophagitis presence not sp helps)   She has Hearing problems based on screening of functional status.    Hearing Problems?: Yes      Depression Screening (PHQ-2/PHQ-9): Over the LAST 2 WEEKS   Little interest or pleasure in doing things (over the last two weeks)?: Not at all  Feeling Benign essential HTN     Type 2 diabetes mellitus with circulatory disorder, without long-term current use of insulin (HCC)     Dissection of abdominal aorta (HCC)     Abdominal aortic aneurysm (AAA) without rupture (HCC)     Type 2 diabetes mellitus witho (20 MG TOTAL) BY MOUTH DAILY WITH FOOD.  metoprolol Tartrate 25 MG Oral Tab, Take 1 tablet (25 mg total) by mouth 2x Daily(Beta Blocker).   METFORMIN HCL 1000 MG Oral Tab, TAKE 1/2 TABLET BY MOUTH TWO TIMES A DAY WITH MEALS  Esomeprazole Magnesium 20 MG Ora hysterectomy; repair ing hernia,5+y/o,reducibl; removal gallbladder; Eye surgery; total knee replacement; cath drug eluting stent; and hip surgery (Left, 3/3/20--Dr. Esdras Pabon).     Her family history includes Diabetes in her brother, maternal grandmother, and canals, both ears   Nose: Nares normal, septum midline,mucosa normal, no drainage or sinus tenderness   Throat: Lips, mucosa, and tongue normal; teeth and gums normal   Neck: Supple, symmetrical, trachea midline, no adenopathy;  thyroid: not enlarged, no J her   Will try off glyburide at this time and monitor glucoses. She has had 4 fsting glucoses < 90 in the am with glyburide bid. She will email me readings in a few weeks.   May consider just am.  On statin     Stented coronary artery  Stable  Per DR Kaitlyn Dye closely what she eats and chews extra to avoid discomfor twith swallowing. Cont to joeior. Achalasia Stable  She watches very closely what she eats and chews extra to avoid discomfor twith swallowing. Cont to joeior.   Dr Murali Benjamin     Inflammatory GLUCOSE (mg/dL)   Date Value   02/18/2015 116 (H)          Cardiovascular Disease Screening     LDL Annually LDL Cholesterol (mg/dL)   Date Value   06/15/2020 62     LDL-CHOLESTEROL (mg/dL (calc))   Date Value   02/18/2015 68        EKG - w/ Initial Pr Medium/high risk factors:   End-stage renal disease   Hemophiliacs who received Factor VIII or IX concentrates   Clients of institutions for the mentally retarded   Persons who live in the same house as a HepB virus carrier   Homosexual men   Illicit injec

## 2020-06-25 ENCOUNTER — TELEPHONE (OUTPATIENT)
Dept: INTERNAL MEDICINE CLINIC | Facility: CLINIC | Age: 80
End: 2020-06-25

## 2020-06-25 DIAGNOSIS — E11.59 TYPE 2 DIABETES MELLITUS WITH OTHER CIRCULATORY COMPLICATION, WITHOUT LONG-TERM CURRENT USE OF INSULIN (HCC): Primary | ICD-10-CM

## 2020-06-25 NOTE — TELEPHONE ENCOUNTER
Specialty:Opthamalogist    Full Name of Specialist:Dr. Orlando Soto, but has also seen wife Campbell Lai    Date of Appointment:No appt yet, she needs a referral 1st    Reason for the Appointment (be specific):DM eye exam    Has the patient seen a bernice

## 2020-07-02 ENCOUNTER — HOSPITAL ENCOUNTER (OUTPATIENT)
Dept: ULTRASOUND IMAGING | Age: 80
Discharge: HOME | End: 2020-07-02
Attending: NURSE PRACTITIONER
Payer: MEDICARE

## 2020-07-02 DIAGNOSIS — E04.9 ENLARGED THYROID: ICD-10-CM

## 2020-07-02 DIAGNOSIS — E04.1 THYROID NODULE: ICD-10-CM

## 2020-07-02 PROCEDURE — 76536 US EXAM OF HEAD AND NECK: CPT | Performed by: NURSE PRACTITIONER

## 2020-07-23 ENCOUNTER — HOSPITAL ENCOUNTER (OUTPATIENT)
Dept: CARDIOLOGY CLINIC | Facility: HOSPITAL | Age: 80
Discharge: HOME | End: 2020-07-23
Attending: SURGERY
Payer: MEDICARE

## 2020-07-23 DIAGNOSIS — I71.4 ABDOMINAL AORTIC ANEURYSM (AAA) WITHOUT RUPTURE (HCC): ICD-10-CM

## 2020-07-24 RX ORDER — LANCETS
EACH MISCELLANEOUS
Qty: 100 EACH | Refills: 2 | Status: SHIPPED | OUTPATIENT
Start: 2020-07-24 | End: 2020-11-04

## 2020-07-30 NOTE — TELEPHONE ENCOUNTER
Pt called stating Dr. Cinthia Zarate office does not have referral-I printed it and took over to their office and put referral under the door since they were not in the office and I also faxed it to fax [phone] and I received confirmation that it went thru

## 2020-08-13 NOTE — TELEPHONE ENCOUNTER
Cirilo Multani At Dr Wayne Jacome office [phone], Calling to follow up on referral. Stated she has been calling the referral dept and pt has been calling as well     I called and spoke to RAISA WINKLER in the referral dept and she stated this referral has been approv

## 2020-08-17 ENCOUNTER — APPOINTMENT (OUTPATIENT)
Dept: CT IMAGING | Facility: HOSPITAL | Age: 80
End: 2020-08-17
Attending: EMERGENCY MEDICINE
Payer: MEDICARE

## 2020-08-17 ENCOUNTER — HOSPITAL ENCOUNTER (EMERGENCY)
Facility: HOSPITAL | Age: 80
Discharge: HOME | End: 2020-08-17
Attending: EMERGENCY MEDICINE
Payer: MEDICARE

## 2020-08-17 VITALS
SYSTOLIC BLOOD PRESSURE: 160 MMHG | HEART RATE: 70 BPM | BODY MASS INDEX: 25.48 KG/M2 | OXYGEN SATURATION: 96 % | DIASTOLIC BLOOD PRESSURE: 76 MMHG | RESPIRATION RATE: 18 BRPM | WEIGHT: 172 LBS | HEIGHT: 69 IN

## 2020-08-17 DIAGNOSIS — S09.90XA INJURY OF HEAD, INITIAL ENCOUNTER: Primary | ICD-10-CM

## 2020-08-17 PROCEDURE — 70450 CT HEAD/BRAIN W/O DYE: CPT | Performed by: EMERGENCY MEDICINE

## 2020-08-17 PROCEDURE — 99284 EMERGENCY DEPT VISIT MOD MDM: CPT

## 2020-08-18 NOTE — ED INITIAL ASSESSMENT (HPI)
Patient ambulatory to ED, reports she fell this afternoon, hit her head, is on xarelto.  + right hip pain, + right elbow abrasion.     Denies LOC

## 2020-08-18 NOTE — ED PROVIDER NOTES
Patient Seen in: [facility] Emergency Department      History   Patient presents with:  Fall    Stated Complaint: fall hit head on blood thinners     HPI    This is a pleasant 66-year-old female coming with complaints of fall.   Patient slipped falli Normal   • COLONOSCOPY     • COLONOSCOPY N/A 5/9/2019    Performed by Lisy Robins MD at [facility] ENDOSCOPY   • ENDOVAS REPAIR, INFRARENL ABDOM AORTIC ANEURYSM/DISSECT     • ESOPHAGEAL MANOMETRY N/A 11/18/2016    Performed by Lisy Robins MD at [facility] ENDO Vitals [08/17/20 1923]   /76   Pulse 70   Resp 18   Temp    Temp src    SpO2 96 %   O2 Device None (Room air)       Current:/76   Pulse 70   Resp 18   Ht 175.3 cm (5' 9\")   Wt 78 kg   SpO2 96%   BMI 25.40 kg/m²         Physical Exam  Alert and

## 2020-09-09 RX ORDER — PRAVASTATIN SODIUM 80 MG/1
TABLET ORAL
Qty: 90 TABLET | Refills: 0 | Status: SHIPPED | OUTPATIENT
Start: 2020-09-09 | End: 2020-10-09 | Stop reason: CLARIF

## 2020-09-16 ENCOUNTER — TELEPHONE (OUTPATIENT)
Dept: INTERNAL MEDICINE CLINIC | Facility: CLINIC | Age: 80
End: 2020-09-16

## 2020-09-16 DIAGNOSIS — E11.59 TYPE 2 DIABETES MELLITUS WITH OTHER CIRCULATORY COMPLICATION, WITHOUT LONG-TERM CURRENT USE OF INSULIN (HCC): Primary | ICD-10-CM

## 2020-09-16 NOTE — TELEPHONE ENCOUNTER
Specialty: Opthalmology     Full Name of Specialist: Dr. Nic Kiser    Date of Appointment: N/A is requesting as many visits as possible     Reason for the Appointment (be specific): Diabetic eye F/U     Has the patient seen a provider in our office fo

## 2020-10-02 ENCOUNTER — HOSPITAL ENCOUNTER (OUTPATIENT)
Age: 80
Discharge: ANOTHER HOSPITAL | End: 2020-10-02
Attending: EMERGENCY MEDICINE
Payer: MEDICARE

## 2020-10-02 ENCOUNTER — APPOINTMENT (OUTPATIENT)
Dept: GENERAL RADIOLOGY | Age: 80
End: 2020-10-02
Attending: EMERGENCY MEDICINE
Payer: MEDICARE

## 2020-10-02 ENCOUNTER — HOSPITAL ENCOUNTER (OUTPATIENT)
Facility: HOSPITAL | Age: 80
Setting detail: OBSERVATION
Discharge: HOME | End: 2020-10-05
Attending: EMERGENCY MEDICINE | Admitting: HOSPITALIST
Payer: MEDICARE

## 2020-10-02 ENCOUNTER — APPOINTMENT (OUTPATIENT)
Dept: CT IMAGING | Age: 80
End: 2020-10-02
Attending: PHYSICIAN ASSISTANT
Payer: MEDICARE

## 2020-10-02 VITALS
HEART RATE: 74 BPM | DIASTOLIC BLOOD PRESSURE: 83 MMHG | OXYGEN SATURATION: 94 % | TEMPERATURE: 98 F | SYSTOLIC BLOOD PRESSURE: 162 MMHG | RESPIRATION RATE: 14 BRPM

## 2020-10-02 DIAGNOSIS — S09.90XA HEAD INJURY, CLOSED, WITHOUT LOC, INITIAL ENCOUNTER: Primary | ICD-10-CM

## 2020-10-02 DIAGNOSIS — R42 DIZZINESS: ICD-10-CM

## 2020-10-02 DIAGNOSIS — R55 SYNCOPE, UNSPECIFIED SYNCOPE TYPE: ICD-10-CM

## 2020-10-02 DIAGNOSIS — S09.90XA INJURY OF HEAD, INITIAL ENCOUNTER: ICD-10-CM

## 2020-10-02 DIAGNOSIS — I48.91 ATRIAL FIBRILLATION WITH CONTROLLED VENTRICULAR RESPONSE (HCC): ICD-10-CM

## 2020-10-02 DIAGNOSIS — R29.6 FALLS FREQUENTLY: Primary | ICD-10-CM

## 2020-10-02 DIAGNOSIS — S01.01XA SCALP LACERATION, INITIAL ENCOUNTER: ICD-10-CM

## 2020-10-02 PROCEDURE — 85025 COMPLETE CBC W/AUTO DIFF WBC: CPT | Performed by: EMERGENCY MEDICINE

## 2020-10-02 PROCEDURE — 84484 ASSAY OF TROPONIN QUANT: CPT

## 2020-10-02 PROCEDURE — 93005 ELECTROCARDIOGRAM TRACING: CPT

## 2020-10-02 PROCEDURE — 71046 X-RAY EXAM CHEST 2 VIEWS: CPT | Performed by: EMERGENCY MEDICINE

## 2020-10-02 PROCEDURE — 82962 GLUCOSE BLOOD TEST: CPT

## 2020-10-02 PROCEDURE — 99220 INITIAL OBSERVATION CARE,LEVL III: CPT | Performed by: HOSPITALIST

## 2020-10-02 PROCEDURE — 12001 RPR S/N/AX/GEN/TRNK 2.5CM/<: CPT

## 2020-10-02 PROCEDURE — 70450 CT HEAD/BRAIN W/O DYE: CPT | Performed by: PHYSICIAN ASSISTANT

## 2020-10-02 PROCEDURE — 36415 COLL VENOUS BLD VENIPUNCTURE: CPT

## 2020-10-02 PROCEDURE — 80047 BASIC METABLC PNL IONIZED CA: CPT

## 2020-10-02 PROCEDURE — 93010 ELECTROCARDIOGRAM REPORT: CPT

## 2020-10-02 PROCEDURE — 99215 OFFICE O/P EST HI 40 MIN: CPT

## 2020-10-02 RX ORDER — SODIUM CHLORIDE 9 MG/ML
INJECTION, SOLUTION INTRAVENOUS CONTINUOUS
Status: DISCONTINUED | OUTPATIENT
Start: 2020-10-02 | End: 2020-10-05

## 2020-10-02 RX ORDER — GLYBURIDE 5 MG/1
2.5 TABLET ORAL 2 TIMES DAILY WITH MEALS
COMMUNITY
End: 2020-11-16

## 2020-10-02 RX ORDER — METOCLOPRAMIDE HYDROCHLORIDE 5 MG/ML
10 INJECTION INTRAMUSCULAR; INTRAVENOUS EVERY 8 HOURS PRN
Status: DISCONTINUED | OUTPATIENT
Start: 2020-10-02 | End: 2020-10-05

## 2020-10-02 RX ORDER — ACETAMINOPHEN 325 MG/1
650 TABLET ORAL EVERY 6 HOURS PRN
Status: DISCONTINUED | OUTPATIENT
Start: 2020-10-02 | End: 2020-10-05

## 2020-10-02 RX ORDER — DEXTROSE MONOHYDRATE 25 G/50ML
50 INJECTION, SOLUTION INTRAVENOUS
Status: DISCONTINUED | OUTPATIENT
Start: 2020-10-02 | End: 2020-10-05

## 2020-10-02 RX ORDER — PANTOPRAZOLE SODIUM 20 MG/1
20 TABLET, DELAYED RELEASE ORAL
Status: DISCONTINUED | OUTPATIENT
Start: 2020-10-03 | End: 2020-10-05

## 2020-10-02 RX ORDER — ONDANSETRON 2 MG/ML
4 INJECTION INTRAMUSCULAR; INTRAVENOUS EVERY 6 HOURS PRN
Status: DISCONTINUED | OUTPATIENT
Start: 2020-10-02 | End: 2020-10-05

## 2020-10-02 RX ORDER — ATORVASTATIN CALCIUM 20 MG/1
20 TABLET, FILM COATED ORAL 2 TIMES DAILY
Status: DISCONTINUED | OUTPATIENT
Start: 2020-10-02 | End: 2020-10-05

## 2020-10-02 NOTE — ED INITIAL ASSESSMENT (HPI)
Patient presents to IC with cc of losing her balance at 1545 today and fell straight back unto the ceramic tile. No LOC. On xarelto. Alert and oriented x3. Ambulating with single prong cane. Sustained laceration to back of her head.

## 2020-10-02 NOTE — ED PROVIDER NOTES
Patient Seen in: [facility]      History   Patient presents with:  Laceration/Abrasion    Stated Complaint: Head Laceration     HPI  24-year-old female with a known history of atrial fibrillation on Xarelto, coronary artery dise • CHOLECYSTECTOMY     • COLONOSCOPY  04/21/2003    Screening- Normal   • COLONOSCOPY     • COLONOSCOPY N/A 5/9/2019    Performed by Lisy Robins MD at [facility] ENDOSCOPY   • Uriel Romero ABDOM AORTIC ANEURYSM/DISSECT     • ESOPHAGEAL Cecillia Paci HPI.  Constitutional and vital signs reviewed. All other systems reviewed and negative except as noted above.     Physical Exam     ED Triage Vitals [10/02/20 1646]   /85   Pulse 64   Resp 16   Temp 98.2 °F (36.8 °C)   Temp src Temporal   SpO2 96 and lower extremity motor strength is intact   Lymphadenopathy:      Cervical: No cervical adenopathy. Skin:     General: Skin is warm and dry. Coloration: Skin is not pale. Findings: No erythema or rash.    Neurological:      General: No focal partially imaged upper abdomen. Elevation of the left diaphragm. Minimal atelectasis/scarring in the lower lungs. Calcified plaque in the thoracic aorta. No pleural effusion or pneumothorax. Degenerative changes in the spine. Stable COPD changes. Sudeep Marie DO on 10/02/2020 at 6:05 PM     Finalized by (CST): Sudeep Marie DO on 10/02/2020 at 6:07 PM             MDM      Discussed with and evaluated the patient with Dr. Nanette Gomez who agrees with assessment and plan.       While patient was in CT scan

## 2020-10-03 ENCOUNTER — APPOINTMENT (OUTPATIENT)
Dept: CV DIAGNOSTICS | Facility: HOSPITAL | Age: 80
End: 2020-10-03
Attending: NURSE PRACTITIONER
Payer: MEDICARE

## 2020-10-03 ENCOUNTER — APPOINTMENT (OUTPATIENT)
Dept: ULTRASOUND IMAGING | Facility: HOSPITAL | Age: 80
End: 2020-10-03
Attending: NURSE PRACTITIONER
Payer: MEDICARE

## 2020-10-03 PROCEDURE — 93880 EXTRACRANIAL BILAT STUDY: CPT | Performed by: NURSE PRACTITIONER

## 2020-10-03 PROCEDURE — 93306 TTE W/DOPPLER COMPLETE: CPT | Performed by: NURSE PRACTITIONER

## 2020-10-03 PROCEDURE — 99225 SUBSEQUENT OBSERVATION CARE: CPT | Performed by: HOSPITALIST

## 2020-10-03 RX ORDER — POTASSIUM CHLORIDE 20 MEQ/1
40 TABLET, EXTENDED RELEASE ORAL ONCE
Status: COMPLETED | OUTPATIENT
Start: 2020-10-03 | End: 2020-10-03

## 2020-10-03 NOTE — PLAN OF CARE
Assumed care @0730  VSS,   Controlled afib, HR in the 50's. Orthostatics (-). Denies Pain,   Up with 1 assist and walker as tolerated. Still complains of dizziness  Head hematoma w/staples with dried blood at site. D/I.  PT/OT to see  IVF infusing 0.

## 2020-10-03 NOTE — DIETARY NOTE
NUTRITION INITIAL ASSESSMENT    Pt is at moderate nutrition risk. Pt does not meet malnutrition criteria.     NUTRITION DIAGNOSIS/PROBLEM:    Inadequate oral intake related to inability to take sufficient po evidenced by achalasia, decreased appetite, previ per kg)  Fluid: ~1 ml/kcal or per MD discretion    MONITOR AND EVALUATE/NUTRITION GOALS:    1. PO intake to meet at least 75% patient nutrition prescription  2. At least 75% intake of oral supplements  3. No signs of skin breakdown  4.  Maintain lean body m

## 2020-10-03 NOTE — ED PROVIDER NOTES
Patient Seen in: [facility] Emergency Department      History   No chief complaint on file. Stated Complaint:     HPI    This is a pleasant 79-year-old female presenting to the emergency department for falls.   Patient was seen by the same ED phys Procedure Laterality Date   • ABDOMINAL AORTIC ANEURYSM ENDOSCOPIC N/A 1/8/2020    Performed by Ke Alexis MD at [address] [place] [address] And [address] [place] [address]    • CATH DRUG ELUTING STENT     • CHOLECYSTECTOMY     • COLONOSCOPY  04/21/2003    Screening- Normal   • Systems    Positive for stated complaint:   Other systems are as noted in HPI. Constitutional and vital signs reviewed. All other systems reviewed and negative except as noted above.     Physical Exam     ED Triage Vitals [10/02/20 1959]   BP (!) 161/

## 2020-10-03 NOTE — PROGRESS NOTES
10/03/20 0850 10/03/20 0855 10/03/20 0900   Vital Signs   Pulse 71 79 66   /66 129/83 123/72   Patient Position Lying Sitting Standing

## 2020-10-03 NOTE — PLAN OF CARE
NURSING ADMISSION NOTE    Pt admitted from ED to room 5671, dx: syncope. Patient admitted via Cart. Oriented to room. Safety precautions initiated. Bed in low position. Call light in reach. Admission navigator completed. Pt a/ox4. VSS.   Afib c

## 2020-10-03 NOTE — PHYSICAL THERAPY NOTE
Orders received; per RN, patient is getting an Echo. Will see patient for PT Evaluation tomorrow, RN aware.

## 2020-10-03 NOTE — PROGRESS NOTES
[facility]  Progress Note     Amada Richardson Patient Status:  Observation    1940 MRN [id number]   [facility] 7NE-A Attending Lito Tafoya MD   Hosp Day # 0 PCP Rivera Ruth MD     Chief Complaint: Syncope    S: Patient PTP, INR in the last 168 hours. No results for input(s): TROP, CK in the last 168 hours. Imaging: Imaging data reviewed in Epic.     Medications:   • Pantoprazole Sodium  20 mg Oral QAM AC   • metoprolol Tartrate  25 mg Oral 2x Daily(Beta Blocker

## 2020-10-03 NOTE — H&P
[facility]ITAL[facility]  History and Physical     [address] [facility] Patient Status:  Observation    1940 MRN [id number]   [facility] 7NE-A Attending Samira Bush MD   Hosp Day # 0 PCP Galileo Swanson MD     Chief Complaint: syncope    H Unspecified essential hypertension    • Visual impairment     glasses        Past Surgical History:   Past Surgical History:   Procedure Laterality Date   • ABDOMINAL AORTIC ANEURYSM ENDOSCOPIC N/A 1/8/2020    Performed by Corona Mon MD at [facility] CVOR   • reports that she does not drink alcohol or use drugs.     Family History:   Family History   Problem Relation Age of Onset   • Diabetes Mother    • Stroke Mother    • Other (Other) Mother         Stroke at 64   • Hypertension Brother    • Heart Disorder Bro Disp: 100 each, Rfl: 2        Review of Systems:   A comprehensive 14 point review of systems was completed. Pertinent positives and negatives noted in the HPI.     Physical Exam:    /71 (BP Location: Left arm)   Pulse 75   Temp 97 °F (36.1 °C) (Or laceration s/p staples  1. Gentle IVF (elevated BNP)  2. Recheck orthostatics  3. Nutrition  4. Consider GI eval in AM to f/u on achalasia. Will defer to AM hospitalist  5. afib  1. brian  2. Was in afib in ED and currently  3.  Rate controlled, hx more c

## 2020-10-03 NOTE — PLAN OF CARE
Pt's cane not w/ pt when admitted to floor. Transport person checked back in ED, cane not found. Notified charge RN.

## 2020-10-03 NOTE — CONSULTS
[facility]  Report of Consultation    Nava Lainez Patient Status:  Observation    1940 MRN [id number]   [facility] 7NE-A Attending Rolanda Moctezuma MD   Hosp Day # 0 PCP Jennifer Haynes MD     Reason for Consultation:  Syncope, with swallowing     chokes very easily on food   • Stented coronary artery    • Type II or unspecified type diabetes mellitus without mention of complication, not stated as uncontrolled     NIDDM   • Unspecified essential hypertension    • Visual impairmen TOTAL ABDOM HYSTERECTOMY     • TOTAL KNEE REPLACEMENT     • TUBAL LIGATION       Family History   Problem Relation Age of Onset   • Diabetes Mother    • Stroke Mother    • Other (Other) Mother         Stroke at 64   • Hypertension Brother    • Heart Disord Exam:  Blood pressure 123/72, pulse 66, temperature 98.2 °F (36.8 °C), temperature source Oral, resp. rate 18, height 5' 11\" (1.803 m), weight 172 lb (78 kg), SpO2 93 %, not currently breastfeeding.   Temp (24hrs), Av.9 °F (36.6 °C), Min:97 °F (36.1 °C Component Value Date    INR 1.38 (H) 03/02/2020    INR 2.35 (H) 01/27/2020    INR 1.24 (H) 01/08/2020       Assessment/Plan:    Syncope/Dizziness  - possibly 2/2 dehydration/orthostatic hypotension.  - She has had reduced PO intake 2/2 achalasia, and sti

## 2020-10-03 NOTE — ED INITIAL ASSESSMENT (HPI)
PT FELL, HIT HER HEAD, SAW MD AT , PT HAD COUPLE SYNCOPAL EPISODES AFTER HER CT. PT IS AAOX4 AT THIS TIME AND HAS NO COMPLAINTS.

## 2020-10-04 ENCOUNTER — APPOINTMENT (OUTPATIENT)
Dept: CT IMAGING | Facility: HOSPITAL | Age: 80
End: 2020-10-04
Attending: HOSPITALIST
Payer: MEDICARE

## 2020-10-04 ENCOUNTER — APPOINTMENT (OUTPATIENT)
Dept: MRI IMAGING | Facility: HOSPITAL | Age: 80
End: 2020-10-04
Attending: Other
Payer: MEDICARE

## 2020-10-04 PROCEDURE — 70549 MR ANGIOGRAPH NECK W/O&W/DYE: CPT | Performed by: OTHER

## 2020-10-04 PROCEDURE — 99225 SUBSEQUENT OBSERVATION CARE: CPT | Performed by: HOSPITALIST

## 2020-10-04 PROCEDURE — 70450 CT HEAD/BRAIN W/O DYE: CPT | Performed by: HOSPITALIST

## 2020-10-04 PROCEDURE — 70546 MR ANGIOGRAPH HEAD W/O&W/DYE: CPT | Performed by: OTHER

## 2020-10-04 PROCEDURE — 70553 MRI BRAIN STEM W/O & W/DYE: CPT | Performed by: OTHER

## 2020-10-04 PROCEDURE — 99205 OFFICE O/P NEW HI 60 MIN: CPT | Performed by: OTHER

## 2020-10-04 NOTE — CONSULTS
[facility]  Report of Consultation    [facility] Patient Status:  Observation    1940 MRN [id number]   [facility] 7NE-A Attending Jessica Zhu MD   Hosp Day # 0 PCP Kenn Marin MD     Reason for Consultation:  Av Conley chills, nausea, double vision/ blurry vision / loss of vision, chest pain, palpitations, shortness of breath, rashes, joint pains, bowel / bladder incontinence or mood issues.        History:  Past Medical History:   Diagnosis Date   • AAA (abdominal aortic ENDOSCOPY   • ESOPHAGUS SURG PROCEDURE UNLISTED  11/2017    done at [facility]   • EYE SURGERY      Cataract   • HERNIA SURGERY      Jocelynn Lerma'   • HIP PINNING Left 3/3/2020    Performed by Valerie Stephens MD at [address] [place] [address] OR   • HIP SURGERY Left 3/3/20--Dr. Bubba Gooden , Rfl: , 10/2/2020 at 0900    •  PRAVASTATIN SODIUM 80 MG Oral Tab, TAKE 1 TABLET EVERY DAY (Patient taking differently: 80 mg 2 (two) times a day.  ), Disp: 90 tablet, Rfl: 0, 10/2/2020 at 0900    •  metoprolol Tartrate 25 MG Oral Tab, Take 1 tablet (25 m injection 4 mg, 4 mg, Intravenous, Q6H PRN  •  Metoclopramide HCl (REGLAN) injection 10 mg, 10 mg, Intravenous, Q8H PRN  •  Insulin Aspart Pen (NOVOLOG) 100 UNIT/ML flexpen 2-10 Units, 2-10 Units, Subcutaneous, TID CC and HS    Review of Systems:  A 10-poi tone normal; no drift, though left LE limited by hip pain and not able to lift as high  DTR: 2+ symmetric throughout, except absent ankle jerks; toes downgoing bilaterally, no clonus  Sensory: intact to light touch, pinprick and proprioception symmetricall region measuring 5.5 x 3.1 cm in craniocaudal and transverse dimensions respectively. OTHER:             None.                   =====  CONCLUSION:    1. Posterior scalp hematoma as described above. No underlying fracture.   2. No acute intracranial proce repeat CT Brain negative as well    Patient initially orthostatic and history concerning for orthostatic hypotension but now with nausea and elevated BP this AM, along with vertigo on lying down.     Differential includes peripheral vertigo vs central ische

## 2020-10-04 NOTE — PLAN OF CARE
Problem: Impaired Activities of Daily Living  Goal: Achieve highest/safest level of independence in self care  Description: Interventions:  - Assess ability and encourage patient to participate in ADLs to maximize function  - Promote sitting position [facility]

## 2020-10-04 NOTE — PLAN OF CARE
Assumed care of pt. At [address]  A&O x 4  Room Air- lungs clear  Afib on telemetry  Orthostatics positive this evening  Pt.  Educated to call before getting out of bed  Voids  No c/o pain  Head hematoma/laceration to anterior head- staples SARWAT  IVF  Will contin

## 2020-10-04 NOTE — OCCUPATIONAL THERAPY NOTE
OCCUPATIONAL THERAPY QUICK EVALUATION - INPATIENT    Room Number: 7310/6088-B  Evaluation Date: 10/4/2020     Type of Evaluation: Initial  Presenting Problem: falls    Physician Order: IP Consult to Occupational Therapy  Reason for Therapy:  ADL/IADL Dysfu DRUG ELUTING STENT     • CHOLECYSTECTOMY     • COLONOSCOPY  04/21/2003    Screening- Normal   • COLONOSCOPY     • COLONOSCOPY N/A 5/9/2019    Performed by Meng Ellis MD at [facility], INFRARENL ABDOM AORTIC ANEURYSM/DISSECT AD.    SUBJECTIVE   Pt stated, \"I am doing okay, I just have been dizzy. \"    Patient self-stated goal is to go home    OBJECTIVE     Fall Risk: High fall risk    WEIGHT BEARING RESTRICTION  Weight Bearing Restriction: None                PAIN ASSESSMENT dizziness. Pt educated on gaze stabilization. RN notified this writer that STAT CT ordered during session, pt t/f back to bed due to this. Patient End of Session: Up in chair;Needs met;Call light within reach;RN aware of session/findings; All patient q

## 2020-10-04 NOTE — PROGRESS NOTES
[facility]  Progress Note     Silvio Sic Patient Status:  Observation    1940 MRN [id number]   [facility] 7NE-A Attending Hipolito Saldivar MD   Hosp Day # 0 PCP Robin Pack MD     Chief Complaint: Syncope    S: Patient 78.4 mL/min (based on SCr of 0.65 mg/dL). No results for input(s): PTP, INR in the last 168 hours. No results for input(s): TROP, CK in the last 168 hours. Imaging: Imaging data reviewed in Epic.     Medications:   • metoprolol Tartrate  12.5

## 2020-10-04 NOTE — PHYSICAL THERAPY NOTE
PHYSICAL THERAPY EVALUATION - INPATIENT     Room Number: 1006/6672-Y  Evaluation Date: 10/4/2020  Type of Evaluation: Initial  Physician Order: PT Eval and Treat    Presenting Problem: LOB with fall  Reason for Therapy: Mobility Dysfunction and Disch NIDDM   • Unspecified essential hypertension    • Visual impairment     glasses       Past Surgical History  Past Surgical History:   Procedure Laterality Date   • ABDOMINAL AORTIC ANEURYSM ENDOSCOPIC N/A 1/8/2020    Performed by Kyara Matamoros MD at [facility] C Lives With: Son  Drives: Yes  Patient Owned Equipment: Cane       Prior Level of Wirt: Pt reported using the cane when out in the community & was independent gait, was able to drive, was independent with ADL, was very active    SUBJECTIVE  \ CK    FUNCTIONAL ABILITY STATUS  Gait Assessment   Gait Assistance: Contact guard assist  Distance (ft): 3  Assistive Device: Rolling walker  Pattern: (decr roxy/step length/heel-toe pattern)        PPE worn: gloves, mask    Skilled Therapy Provided: Pa functional limitations in independent bed mobility, transfers, and gait. The patient is below baseline and would benefit from skilled inpatient PT to address the above deficits to assist patient in returning to prior to level of function.   DISCHARGE RECOM

## 2020-10-04 NOTE — PROGRESS NOTES
[address] [facility] Cardiology  Progress Note    Kimberlee Sagastume Patient Status:  Observation    1940 MRN [id number]   [facility] 7NE-A Attending Pola Sheth MD   Hosp Day # 0 PCP Luzma Nuñez MD     Outpatient Cardiologist: Carlos Hernandez PGLU 127* 128* 145* 147* 132*       Tele: Afib. Imaging:    US Carotid Doppler Bilat - Diag Img (cpt=93880)    Result Date: 10/3/2020    CONCLUSION:   1.  Mildly elevated velocities in the distal bilateral internal carotid artery suggest a 50-69% sten BP stable, HR stable.      HFpEF  - well compensated at present. - hold diuretics  - echo stable as above.      Afib with slow VR  - no pauses noted  - rates have increased without metoprolol, but are still well controlled.  D/C metoprolol.   - continue x

## 2020-10-05 VITALS
BODY MASS INDEX: 24.08 KG/M2 | HEIGHT: 71 IN | OXYGEN SATURATION: 95 % | TEMPERATURE: 98 F | WEIGHT: 172 LBS | SYSTOLIC BLOOD PRESSURE: 137 MMHG | HEART RATE: 60 BPM | RESPIRATION RATE: 19 BRPM | DIASTOLIC BLOOD PRESSURE: 62 MMHG

## 2020-10-05 PROCEDURE — 99214 OFFICE O/P EST MOD 30 MIN: CPT | Performed by: OTHER

## 2020-10-05 PROCEDURE — 99217 OBSERVATION CARE DISCHARGE: CPT | Performed by: HOSPITALIST

## 2020-10-05 RX ORDER — ASPIRIN 81 MG/1
81 TABLET ORAL DAILY
Status: DISCONTINUED | OUTPATIENT
Start: 2020-10-05 | End: 2020-10-05

## 2020-10-05 RX ORDER — ASPIRIN 81 MG/1
81 TABLET ORAL DAILY
Qty: 30 TABLET | Refills: 1 | Status: SHIPPED | OUTPATIENT
Start: 2020-10-05

## 2020-10-05 NOTE — PROGRESS NOTES
[address] [facility] Neurology Progress Note    Josph Friendly Patient Status:  Observation    1940 MRN [id number]   [facility] 7NE-A Attending Jose G Simmons MD   Hosp Day # 0 PCP Jasmyne Tavarez MD       Subjective:  Miya Zuniga (36.8 °C) (Oral)   Resp 21   Ht 71\"   Wt 172 lb (78 kg)   SpO2 98%   BMI 23.99 kg/m²   GENERAL:  Patient is a 78year old female in no acute distress.   HEENT:  Normocephalic, atraumatic  ABD: Soft, non tender  SKIN: Warm, dry, no rashes    NEUROLOGICAL: artery suggest a 50-69% stenosis that could be further assessed by CTA and/or MRA of the neck as clinically directed.    2. Irregular heart rate suggesting arrhythmia.     Echo TTE 10/3/20: EF 60-65%, no WMA, LA and RA dilated      Assessment:   Syncope, di head. Being on [facility] puts her at higher risk of intracranial bleed if she does sustain head trauma. She verbalized understanding. She is otherwise very active and should uses a rolling walker for balance. PT to evaluate gait. P:  1.  Dizziness/veertigo  -

## 2020-10-05 NOTE — PLAN OF CARE
Assumed care of pt @ 8961. Pt is A/Ox4, VSS. RA, clear to auscultation. AFib on tele. Increased dizziness, weakness, and nausea. Neuro consulted. Repeat CTH negative, MRI/MRA pending. Orthos negative this AM but positive this afternoon. Voids. No pain.  IVF ambulation  Outcome: Progressing     Problem: Impaired Activities of Daily Living  Goal: Achieve highest/safest level of independence in self care  Description: Interventions:  - Assess ability and encourage patient to participate in ADLs to maximize funct

## 2020-10-05 NOTE — PHYSICAL THERAPY NOTE
PHYSICAL THERAPY TREATMENT NOTE - INPATIENT    Room Number: 0891/2188-O     Session: 1  Number of Visits to Meet Established Goals: 3    Presenting Problem: LOB with fall  History related to current admission:  Patient went to [facility] after incident of [facility] Arrhythmia     a-fib   • Arthritis    • Atherosclerosis of coronary artery    • CAD (coronary artery disease)    • Cancer (HCC)     lymphoma   • Cataracts, bilateral 4/16/2014   • Coronary atherosclerosis    • Disorder of thyroid    • Dysphagia    • Easy b percutaneous screw fixation   • HYSTERECTOMY      partial, still has ovaries   • KNEE REPLACEMENT SURGERY Right    • [id number], SCREENING     •      • OTHER      right hammer toe surgery   • OTHER SURGICAL HISTORY      Hernia 2010- ventral or umbil Marina       AM-PAC Score:  Raw Score: 18   Approx Degree of Impairment: 46.58%   Standardized Score (AM-PAC Scale): 43.63   CMS Modifier (G-Code): CK    FUNCTIONAL ABILITY STATUS  Gait Assessment   Gait Assistance: Minimum assistance  Distance (ft): 150 Discharge Recommendations: Outpatient PT     PLAN  PT Treatment Plan: Bed mobility; Patient education;Gait training;Range of motion;Strengthening;Transfer training  Rehab Potential : Good  Frequency (Obs): 5x/week    CURRENT GOALS     Goal #1 Patient is abl

## 2020-10-05 NOTE — PROGRESS NOTES
[facility] HOSPITALIST  Progress Note     [facility] Patient Status:  Observation    1940 MRN [id number]   [facility] 7[facility]-A Attending Jessica Zhu MD   Hosp Day # 0 PCP Kenn Marin MD     Chief Complaint: Syncope    S: Patient Creatinine Clearance: 78.4 mL/min (based on SCr of 0.65 mg/dL). No results for input(s): PTP, INR in the last 168 hours. No results for input(s): TROP, CK in the last 168 hours. Imaging: Imaging data reviewed in Epic.     Medications:   • Pant

## 2020-10-05 NOTE — PLAN OF CARE
Assumed care of pt.  At 1930  A&O x 4  Room Air  NSR on telemetry  Orthostatics positive  C/o dizziness  IVF  Abdomen soft round nontender  Voids  Up with walker x1 assist  Will continue to monitor      Problem: Patient/Family Goals  Goal: Patient/Family Lo mobility/gait  Description: Interventions:  - Assess patient's functional ability and stability  - Promote increasing activity/tolerance for mobility and gait  - Educate and engage patient/family in tolerated activity level and precautions    Outcome: Prog

## 2020-10-05 NOTE — PLAN OF CARE
NURSING DISCHARGE NOTE    Assumed pt care at 0730  VSS  Pt denies pain, states dizziness is improved  Scalp incision c/d/I  Ok to d/c per all consults    IV removed  Tele removed    Medications, education and follow ups reviewed with patient  Questions engage patient/family in tolerated activity level and precautions    Outcome: Adequate for Discharge     Problem: Impaired Activities of Daily Living  Goal: Achieve highest/safest level of independence in self care  Description: Interventions:  - Assess ab

## 2020-10-05 NOTE — PROGRESS NOTES
[facility] Cardiology Progress Note - Jacy Okeefe Patient Status:  Observation    1940 MRN [id number]   [facility] 7NE-A Attending Pollo Chin MD   Hosp Day # 0 PCP Patt Alcantara MD     Subjective:  Elva Bueno aortic aneurysm (AAA) without rupture (HCC)     Type 2 diabetes mellitus without complication, without long-term current use of insulin (HCC)     Aortic atherosclerosis (HCC)     Renal artery stenosis (HCC)     Achalasia     Arthritis, lumbar spine     H/O are 2+. Neurologic: Alert and oriented, normal affect. No motor or coordinational deficit. Skin: Warm and dry.      Laboratory/Data:    Labs:         Recent Labs   Lab 10/02/20  1832 10/02/20  2006 10/03/20  0555 10/03/20  1139 10/04/20  1136   WBC 12.2* well, weight stable  Constitutiona: no recent fevers  Skin: denies any unusual skin lesions or rashes  Eyes: no visual complaints or deficits  HEENT: denies nasal congestion, sinus pain; hearing loss negative  Respiratory: denies shortness of breath, wheez

## 2020-10-06 ENCOUNTER — PATIENT OUTREACH (OUTPATIENT)
Dept: CASE MANAGEMENT | Age: 80
End: 2020-10-06

## 2020-10-06 DIAGNOSIS — R29.6 FALLS FREQUENTLY: ICD-10-CM

## 2020-10-06 DIAGNOSIS — Z02.9 ENCOUNTERS FOR UNSPECIFIED ADMINISTRATIVE PURPOSE: ICD-10-CM

## 2020-10-06 PROCEDURE — 1111F DSCHRG MED/CURRENT MED MERGE: CPT

## 2020-10-06 NOTE — PROGRESS NOTES
Initial Post Discharge Follow Up   Discharge Date: 10/5/20  Contact Date: 10/6/2020    Consent Verification:  Assessment Completed With: Patient  HIPAA Verified? Yes    Discharge Dx:  1. Syncope, orthostatic  2. Dizziness, likely peripheral  3.  Subacut Take 1 tablet (81 mg total) by mouth daily. 30 tablet 1   • glyBURIDE 2.5 MG Oral Tab Take 2.5 mg by mouth 2 (two) times daily with meals.      • PRAVASTATIN SODIUM 80 MG Oral Tab TAKE 1 TABLET EVERY DAY (Patient taking differently: 80 mg 2 (two) times a da with Raad Vega, [address] [facility])        Oct 13, 2020  8:30 AM CDT Follow Up Visit with Roni Banks Dr, [place] (Dionna Aguilar Dr)            Alexey Reinoso

## 2020-10-06 NOTE — DISCHARGE SUMMARY
[facility] HOSPITALIST  DISCHARGE SUMMARY     Randall Baca Patient Status:  Observation    1940 MRN [id number]   [facility] 7NE-A Attending No att. providers found   Hosp Day # 0 PCP Kerry Huntley MD     Date of Admission: 10/2/2020 [facility]. TCM Follow-Up Recommendation:  LACE > 58:  High Risk of readmission after discharge from the hospital.    Procedures during hospitalization:   • none    Incidental or significant findings and recommendations (brief description known as: LOPRESSOR              Where to Get Your Medications      These medications were sent to Liane Chaparro, [address] AT [address], [phone], [phone]  [place] , [place] [address]

## 2020-10-08 PROBLEM — R00.1 BRADYCARDIA: Status: ACTIVE | Noted: 2020-10-08

## 2020-10-08 PROBLEM — I63.9 CVA (CEREBROVASCULAR ACCIDENT) (HCC): Status: ACTIVE | Noted: 2020-10-08

## 2020-10-08 NOTE — PROGRESS NOTES
Oni Fragoso 6      HISTORY   CHIEF COMPLAINT: post hospital follow up visit; syncope, subacute CVA, bradycardia  HPI: Janes Jj is a 78year old female here today for follow up after hospitalization for syncop tablet, Rfl: 0    •  Esomeprazole Magnesium 20 MG Oral Capsule Delayed Release, Take 20 mg by mouth every morning before breakfast., Disp: , Rfl:     •  Multiple Vitamins-Minerals (PRESERVISION AREDS) Oral Cap, Take 1 capsule by mouth 2 (two) times daily. ANEURYSM/DISSECT     • ESOPHAGEAL MANOMETRY N/A 11/18/2016    Performed by Sandro Valera MD at [facility] ENDOSCOPY   • ESOPHAGOGASTRODUODENOSCOPY (EGD) N/A 5/9/2019    Performed by Sandro Valera MD at [facility] ENDOSCOPY   • ESOPHAGOGASTRODUODENOSCOPY (EGD) N/A Consults:  Xr Chest Pa + Lat Chest (cpt=71046)    Result Date: 10/2/2020  CONCLUSION:  No lobar pneumonia or overt congestive failure. Minimal atelectasis/scarring in the lower lungs.     Dictated by (CST): Dawn Goodpasture, MD on 10/02/2020 at 5:53 PM     F 10/02/2020 at 6:05 PM     Finalized by (CST): Desiree De La Cruz DO on 10/02/2020 at 6:07 PM       Us Carotid Doppler Bilat - Diag Img (cpt=93880)    Result Date: 10/3/2020  CONCLUSION:   1.  Mildly elevated velocities in the distal bilateral internal carotid ar CO2 32.0 10/03/2020 05:54 AM    BUN 21 (H) 10/03/2020 05:54 AM    CREATSERUM 0.65 10/03/2020 05:54 AM    CA 8.6 10/03/2020 05:54 AM    MG 1.9 03/06/2020 05:12 AM    ALB 3.2 (L) 10/02/2020 08:06 PM    ALT 12 (L) 10/02/2020 08:06 PM    AST 7 (L) 10/02/2020 0 discontinued  · PT/OT vestibular therapy  · Meclizine PRN  · Staple to posterior aspect of scalp removed    3.  CAD - h/o stenting/Bradycardia/Chronic afib/Diastolic heart failure/Hypertension/Hyperlipidemia  · Check daily weights, BP, HR at home; bring rec Eye Exam due on 07/29/2020  Influenza Vaccine(1) due on 10/01/2020  Diabetes Care A1C due on 04/02/2021  LDL Control due on 06/15/2021  Diabetes Care Nephropathy Screening due on 06/15/2021  Annual Physical due on 06/16/2021  Annual Depression Screen due o Dom)        Nov 19, 2020 10:20 AM [facility] Follow Up with MD Sal Durán 26 ([facility])            [address], [address], [address]  [place] [id number]

## 2020-10-09 ENCOUNTER — OFFICE VISIT (OUTPATIENT)
Dept: INTERNAL MEDICINE CLINIC | Facility: CLINIC | Age: 80
End: 2020-10-09
Payer: MEDICARE

## 2020-10-09 VITALS
OXYGEN SATURATION: 96 % | TEMPERATURE: 98 F | DIASTOLIC BLOOD PRESSURE: 65 MMHG | WEIGHT: 185 LBS | RESPIRATION RATE: 18 BRPM | HEIGHT: 70 IN | SYSTOLIC BLOOD PRESSURE: 136 MMHG | HEART RATE: 77 BPM | BODY MASS INDEX: 26.48 KG/M2

## 2020-10-09 DIAGNOSIS — R00.1 BRADYCARDIA: ICD-10-CM

## 2020-10-09 DIAGNOSIS — R55 SYNCOPE, UNSPECIFIED SYNCOPE TYPE: ICD-10-CM

## 2020-10-09 DIAGNOSIS — I95.1 ORTHOSTATIC HYPOTENSION: ICD-10-CM

## 2020-10-09 DIAGNOSIS — I63.9 CEREBROVASCULAR ACCIDENT (CVA), UNSPECIFIED MECHANISM (HCC): Primary | ICD-10-CM

## 2020-10-09 PROCEDURE — 99495 TRANSJ CARE MGMT MOD F2F 14D: CPT | Performed by: CLINICAL NURSE SPECIALIST

## 2020-10-09 PROCEDURE — 3008F BODY MASS INDEX DOCD: CPT | Performed by: CLINICAL NURSE SPECIALIST

## 2020-10-09 PROCEDURE — 1111F DSCHRG MED/CURRENT MED MERGE: CPT | Performed by: CLINICAL NURSE SPECIALIST

## 2020-10-09 PROCEDURE — 3075F SYST BP GE 130 - 139MM HG: CPT | Performed by: CLINICAL NURSE SPECIALIST

## 2020-10-09 PROCEDURE — 3078F DIAST BP <80 MM HG: CPT | Performed by: CLINICAL NURSE SPECIALIST

## 2020-10-09 RX ORDER — PRAVASTATIN SODIUM 80 MG/1
80 TABLET ORAL DAILY
COMMUNITY
End: 2020-11-07

## 2020-10-09 RX ORDER — ANTIOX #8/OM3/DHA/EPA/LUT/ZEAX 250-2.5 MG
1 CAPSULE ORAL 2 TIMES DAILY
COMMUNITY

## 2020-10-09 NOTE — PROGRESS NOTES
TRANSITIONAL CARE CLINIC PHARMACIST MEDICATION RECONCILIATION        Jagdeep Hayes MRN [id number]    1940 PCP Franny Gupta MD       Comments: Medication history completed by the [address] [place] [address] Pharmacist with the patient in the [facility] confirmed understanding.      Thank you,    Donnell Dandy, PharmD, 10/9/2020, 10:27 AM  [facility]

## 2020-11-04 ENCOUNTER — APPOINTMENT (OUTPATIENT)
Dept: GENERAL RADIOLOGY | Facility: HOSPITAL | Age: 80
DRG: 202 | End: 2020-11-04
Attending: EMERGENCY MEDICINE
Payer: MEDICARE

## 2020-11-04 ENCOUNTER — HOSPITAL ENCOUNTER (INPATIENT)
Facility: HOSPITAL | Age: 80
LOS: 1 days | Discharge: HOME | DRG: 202 | End: 2020-11-05
Attending: EMERGENCY MEDICINE | Admitting: INTERNAL MEDICINE
Payer: MEDICARE

## 2020-11-04 DIAGNOSIS — J98.01 BRONCHOSPASM: ICD-10-CM

## 2020-11-04 DIAGNOSIS — B34.9 VIRAL SYNDROME: Primary | ICD-10-CM

## 2020-11-04 PROBLEM — D72.829 LEUKOCYTOSIS: Status: ACTIVE | Noted: 2020-11-04

## 2020-11-04 PROCEDURE — 99223 1ST HOSP IP/OBS HIGH 75: CPT | Performed by: HOSPITALIST

## 2020-11-04 PROCEDURE — 71045 X-RAY EXAM CHEST 1 VIEW: CPT | Performed by: EMERGENCY MEDICINE

## 2020-11-04 RX ORDER — DEXTROSE MONOHYDRATE 25 G/50ML
50 INJECTION, SOLUTION INTRAVENOUS
Status: DISCONTINUED | OUTPATIENT
Start: 2020-11-04 | End: 2020-11-05

## 2020-11-04 RX ORDER — METHYLPREDNISOLONE SODIUM SUCCINATE 125 MG/2ML
80 INJECTION, POWDER, LYOPHILIZED, FOR SOLUTION INTRAMUSCULAR; INTRAVENOUS ONCE
Status: COMPLETED | OUTPATIENT
Start: 2020-11-04 | End: 2020-11-04

## 2020-11-04 RX ORDER — FUROSEMIDE 20 MG/1
20 TABLET ORAL
Status: DISCONTINUED | OUTPATIENT
Start: 2020-11-04 | End: 2020-11-05

## 2020-11-04 RX ORDER — PANTOPRAZOLE SODIUM 20 MG/1
20 TABLET, DELAYED RELEASE ORAL DAILY
Status: DISCONTINUED | OUTPATIENT
Start: 2020-11-05 | End: 2020-11-05

## 2020-11-04 RX ORDER — ATORVASTATIN CALCIUM 20 MG/1
20 TABLET, FILM COATED ORAL NIGHTLY
Status: DISCONTINUED | OUTPATIENT
Start: 2020-11-04 | End: 2020-11-05

## 2020-11-04 RX ORDER — METHYLPREDNISOLONE SODIUM SUCCINATE 40 MG/ML
40 INJECTION, POWDER, LYOPHILIZED, FOR SOLUTION INTRAMUSCULAR; INTRAVENOUS EVERY 8 HOURS
Status: DISCONTINUED | OUTPATIENT
Start: 2020-11-04 | End: 2020-11-05

## 2020-11-04 RX ORDER — ONDANSETRON 2 MG/ML
4 INJECTION INTRAMUSCULAR; INTRAVENOUS EVERY 6 HOURS PRN
Status: DISCONTINUED | OUTPATIENT
Start: 2020-11-04 | End: 2020-11-05

## 2020-11-04 RX ORDER — ASPIRIN 81 MG/1
81 TABLET ORAL DAILY
Status: DISCONTINUED | OUTPATIENT
Start: 2020-11-04 | End: 2020-11-05

## 2020-11-04 RX ORDER — ALBUTEROL SULFATE 90 UG/1
2 AEROSOL, METERED RESPIRATORY (INHALATION) 4 TIMES DAILY
Status: DISCONTINUED | OUTPATIENT
Start: 2020-11-04 | End: 2020-11-05

## 2020-11-04 RX ORDER — LOSARTAN POTASSIUM 25 MG/1
12.5 TABLET ORAL DAILY
Status: DISCONTINUED | OUTPATIENT
Start: 2020-11-04 | End: 2020-11-05

## 2020-11-04 RX ORDER — ALBUTEROL SULFATE 90 UG/1
AEROSOL, METERED RESPIRATORY (INHALATION)
Status: COMPLETED
Start: 2020-11-04 | End: 2020-11-04

## 2020-11-04 RX ORDER — ALBUTEROL SULFATE 90 UG/1
2 AEROSOL, METERED RESPIRATORY (INHALATION) 4 TIMES DAILY
Status: DISCONTINUED | OUTPATIENT
Start: 2020-11-04 | End: 2020-11-04

## 2020-11-04 RX ORDER — ACETAMINOPHEN 325 MG/1
650 TABLET ORAL EVERY 6 HOURS PRN
Status: DISCONTINUED | OUTPATIENT
Start: 2020-11-04 | End: 2020-11-05

## 2020-11-04 NOTE — ED PROVIDER NOTES
Patient Seen in: [facility] Emergency Department      History   Patient presents with:  Difficulty Breathing  Cough/URI    Stated Complaint: sob x5 days     HPI    This is a 43-year-old woman, history of atrial fibrillation on Xarelto, AAA status po wheezing to bilateral lung fields along with rhonchi. Productive cough.   Good air entry bilaterally  Abdominal: Soft nontender nondistended, normal bowel sounds, no guarding no rebound tenderness  Skin: Warm and dry  Neurological: Awake alert, speech is n Please view results for these tests on the individual orders.    URINALYSIS WITH CULTURE REFLEX   RAINBOW DRAW BLUE   RAINBOW DRAW LAVENDER   RAINBOW DRAW LIGHT GREEN   RAINBOW DRAW GOLD   BLOOD CULTURE   BLOOD CULTURE   SPUTUM CULTURE   SARS-COV-2 BY P meantime. Patient is hemodynamically stable, will be admitted for further treatment and evaluation.   Admission disposition: 11/4/2020  9:48 AM                        Disposition and Plan     Clinical Impression:  Viral syndrome  (primary encounter diagnos

## 2020-11-04 NOTE — ED NOTES
Pt taken to the bathroom via WC. Pt became sob, labored breathing pattern, audible wheezing. SPO2 back in room on RA 88%. Pt weak, needs assistance to bathroom and back to bed.

## 2020-11-04 NOTE — ED NOTES
Called lab, spoke to Reagan murillo regarding re-order of labs. Sts she will be able to match up the barcodes on blood work in lab.

## 2020-11-04 NOTE — ED NOTES
Pt to ED with c/o cough and chills for 10 days. Pt sts she has been coughing up yellow sputum. Labored breathing noted at rest. Pt sts she did not take her temp but was diaphoretic and had chills last night. Last COVID test one month ago, negative.  Pt repo

## 2020-11-04 NOTE — H&P
[facility] HOSPITALIST  History and Physical     [address] [facility] Patient Status:  Emergency    1940 MRN [id number]   Location [address] [facility] [address] Attending Daryn Bingham MD   Hosp Day # 0 PCP Kvng Corona MD     Chief Complaint: ABDOMINAL AORTIC ANEURYSM ENDOSCOPIC N/A 1/8/2020    Performed by Gabe Bedolla MD at [address] [place] [address] [address] [facility]    • CATH DRUG ELUTING STENT     • CHOLECYSTECTOMY     • COLONOSCOPY  04/21/2003    Screening- Normal   • COLONOSCOPY     • COLONOSCOPY N Mother    • Other (Other) Mother         Stroke at 64   • Hypertension Brother    • Heart Disorder Brother    • Diabetes Brother    • Heart Attack Brother    • Diabetes Maternal Grandmother    • Hypertension Brother    • Heart Attack Brother    • Stroke Ma Systems:   A comprehensive 14 point review of systems was completed. Pertinent positives and negatives noted in the HPI.     Physical Exam:    /70   Pulse 77   Temp 98.4 °F (36.9 °C) (Oral)   Resp 22   Ht 177.8 cm (5' 10\")   Wt 175 lb (79.4 kg) lymphoma     Quality:  · DVT Prophylaxis: Xarelto  · CODE status: Full  · Chavez: No  · If COVID testing is negative, may discontinue isolation: Yes     Plan of care discussed with patient.      Vasile Gomez DO  11/4/2020

## 2020-11-04 NOTE — ED NOTES
Round on pt. All personal belongings packed up. Pt remains on 2L O2. Pt handed glasses. Pt denies any needs prior to transport. Pt thanks staff for care.

## 2020-11-04 NOTE — ED NOTES
Ensure provided and spring veliz. Pt smiling. Able to talk in complete sentences without being sob. 2L O2 remains on pt.

## 2020-11-05 VITALS
HEART RATE: 84 BPM | HEIGHT: 70 IN | TEMPERATURE: 98 F | BODY MASS INDEX: 25.05 KG/M2 | OXYGEN SATURATION: 94 % | WEIGHT: 175 LBS | DIASTOLIC BLOOD PRESSURE: 73 MMHG | SYSTOLIC BLOOD PRESSURE: 129 MMHG | RESPIRATION RATE: 18 BRPM

## 2020-11-05 PROCEDURE — 99232 SBSQ HOSP IP/OBS MODERATE 35: CPT | Performed by: HOSPITALIST

## 2020-11-05 RX ORDER — METHYLPREDNISOLONE 4 MG/1
TABLET ORAL
Qty: 21 TABLET | Refills: 0 | Status: SHIPPED | OUTPATIENT
Start: 2020-11-05 | End: 2020-11-19

## 2020-11-05 RX ORDER — ALBUTEROL SULFATE 90 UG/1
2 AEROSOL, METERED RESPIRATORY (INHALATION) EVERY 6 HOURS PRN
Qty: 1 INHALER | Refills: 0 | Status: SHIPPED | OUTPATIENT
Start: 2020-11-05 | End: 2021-03-22

## 2020-11-05 RX ORDER — CEPHALEXIN 500 MG/1
500 CAPSULE ORAL 4 TIMES DAILY
Qty: 20 CAPSULE | Refills: 0 | Status: SHIPPED | OUTPATIENT
Start: 2020-11-05 | End: 2020-11-10

## 2020-11-05 NOTE — PAYOR COMM NOTE
--------------  ADMISSION REVIEW     [facility] [employer]  Subscriber #:  [id number]  Authorization Number: [id number]       ED Provider Notes        Patient Seen in: [facility] Emergency Department      History   Patient presents with:  Difficulty [facility] Normal rate and regular rhythm. No Edema  Pulmonary: Pulmonary effort is normal,  scattered expiratory wheezing to bilateral lung fields along with rhonchi. Productive cough.   Good air entry bilaterally  Abdominal: Soft nontender nondistended, normal bow CBC W/ DIFFERENTIAL[[id number]]          Abnormal            Final result                 Please view results for these tests on the individual orders.    URINALYSIS WITH CULTURE REFLEX   RAINBOW DRAW BLUE   RAINBOW DRAW LAVENDER   RAINBOW DRAW LIG recent positive contacts, we will send in M 2000 for confirmation and maintain isolation in the meantime. Patient is hemodynamically stable, will be admitted for further treatment and evaluation.       Disposition and Plan     Clinical Impression:  Viral s MCV 84.1   .0       Recent Labs   Lab 11/04/20  0844   *   BUN 17   CREATSERUM 0.70   GFRAA 95   GFRNAA 83   CA 9.1   ALB 3.3*      K 4.6      CO2 31.0   ALKPHO 91   AST 11*   ALT 10*   BILT 0.4   TP 6.9       Recent Labs   Lab

## 2020-11-05 NOTE — PLAN OF CARE
Page to Dr. Snider Bearded at approx [phone] regarding isolation status. Patient w negative rapid and negative . Page to clarify if isolation is required. Writer will continue strict Isolation pending clarification.      Protocol for patient to remain on isolati

## 2020-11-05 NOTE — PLAN OF CARE
Assumed care of pt at 1900. Alert, oriented x4.   2L O2. Lung sounds diminished, exp wheezing. Denies any sob. Has a congested cough. Aflutter on tele. Denies any pain. Up with sba and a walker. Needs attended to. Will monitor.     Problem: RESPIR

## 2020-11-05 NOTE — PLAN OF CARE
Patient cleared for DC from all services. Remains on room air w no hypoxia. Walking room air trial performed with saturations 93-98% on room air with activity. Patient remains afebrile. Educated on new medications at own pharmacy.  Family Kris Gins informed of

## 2020-11-05 NOTE — PLAN OF CARE
Patient discharged at approx 1350 via wheelchair accompanied by transport to private vehicle driven by son. AVS provided. All belongings with patient. New medications discussed w patient.

## 2020-11-05 NOTE — PHYSICAL THERAPY NOTE
PHYSICAL THERAPY QUICK EVALUATION - INPATIENT    Room Number: 333/333-A  Evaluation Date: 11/5/2020  Presenting Problem: viral syndrome  Physician Order: PT Eval and Treat     Pt admitted with dyspnea and cough, Rapid and PCR (-) for covid, falls March a at [facility] ENDOSCOPY   • ESOPHAGOGASTRODUODENOSCOPY (EGD) N/A 5/9/2019    Performed by Ines Carlton MD at [facility] ENDOSCOPY   • ESOPHAGOGASTRODUODENOSCOPY (EGD) N/A 12/6/2016    Performed by Ines Carlton MD at [facility] ENDOSCOPY   • ESOPHAGUS SURG PROCEDURE Chevy Menchaca Lower extremity ROM is within functional limits     Lower extremity strength is within functional limits     NEUROLOGICAL FINDINGS                      ACTIVITY TOLERANCE                         O2 WALK                  AM-PAC '6-Clicks' INPATIENT SHOR complexity is considered low. PT Discharge Recommendations: Home    PLAN  Patient has been evaluated and presents with no skilled Physical Therapy needs at this time. Patient discharged from Physical Therapy services.   Please re-order if a new functional

## 2020-11-05 NOTE — DISCHARGE SUMMARY
[facility] HOSPITALIST  DISCHARGE SUMMARY     Bruce Madera Patient Status:  Inpatient    1940 MRN [id number]   [facility] 3NW-A Attending Gabby Herrera, [address] [facility] Day # 1 PCP Mandi Thompson MD     Date of Admission: 2020  Date of pending at Discharge:   · None    Consultants:  • None    Discharge Medication List:     Discharge Medications      START taking these medications      Instructions Prescription details   Albuterol Sulfate  (90 Base) MCG/ACT Aers      Inhale 2 puffs Caps      Take 1 capsule by mouth 2 (two) times a day. Refills: 0     Vitamin D 1000 units Tabs      Take 1,000 Units by mouth daily.    Refills: 0     Xarelto 20 MG Tabs  Generic drug: Rivaroxaban      TAKE 1 TABLET EVERY DAY WITH FOOD   Quantity: 90 tab

## 2020-11-06 ENCOUNTER — PATIENT OUTREACH (OUTPATIENT)
Dept: CASE MANAGEMENT | Age: 80
End: 2020-11-06

## 2020-11-06 DIAGNOSIS — Z02.9 ENCOUNTERS FOR UNSPECIFIED ADMINISTRATIVE PURPOSE: ICD-10-CM

## 2020-11-06 PROCEDURE — 1111F DSCHRG MED/CURRENT MED MERGE: CPT

## 2020-11-06 NOTE — PROGRESS NOTES
Attempted to reach the patient to complete [facility]Hospital FU call. NCM unable to leave a message due to the mailbox being full. NCM will try again later.

## 2020-11-07 RX ORDER — PRAVASTATIN SODIUM 80 MG/1
TABLET ORAL
Qty: 90 TABLET | Refills: 0 | Status: SHIPPED | OUTPATIENT
Start: 2020-11-07 | End: 2021-06-07

## 2020-11-07 NOTE — TELEPHONE ENCOUNTER
PASSED per protocol, refill sent.   Last PE 6.16.20   Future Appointments   Date Time Provider Nahid Faby   11/11/2020  3:00 PM Vanita Reyes MD [facility]

## 2020-11-09 NOTE — PROGRESS NOTES
Initial Post Discharge Follow Up   Discharge Date: 11/5/20  Contact Date: 11/9/2020    Consent Verification:  Assessment Completed With: Patient  HIPAA Verified?   Yes    Discharge Dx:     Bronchitis  Right foot cellulitis  CAD  PAF  Chronic diastolic he 11/9 at 135. The patient denies any symptoms of hypo/hyperglycemia since discharge. • Do you have any pain since discharge?   no  • How well was your pain managed while in the hospital? N/A  • When you were leaving the hospital were your discharge instru Tab EC Take 1 tablet (81 mg total) by mouth daily. 30 tablet 1   • metFORMIN HCl 500 MG Oral Tab Take 500 mg by mouth 2 (two) times daily with meals. • glyBURIDE 5 MG Oral Tab Take 2.5 mg by mouth 2 (two) times daily with meals.        • Esomeprazole Needs post D/C:   Now that you are home, are there any needs or concerns you need addressed before your next visit with your PCP?  (DME, meds, disease concerns, Etc): No     Follow up appointments:      Your appointments     Date & Time Appointment Depar questions or concerns. CCM referral placed:  No; A referral was placed in 10/2020.      BOOK BY DATE: 11/19/2020    [x]  Discharge Summary, Discharge medications reviewed/discussed/and reconciled against outpatient medications with patient,  and orders

## 2020-11-12 NOTE — PAYOR COMM NOTE
--------------  DISCHARGE REVIEW    Joseph Seo MA [facility]  Subscriber #:  [id number]  Authorization Number: [id number]    Admit date: 11/4/20  Admit time:  6771  Discharge Date: 11/5/2020  2:01 PM     Admitting Physician: DO Bang Ram with antibiotics. Patient discharged home in good condition. Lace+ Score: 78  59-90 High Risk  29-58 Medium Risk  0-28   Low Risk         TCM Follow-Up Recommendation:  LACE > 58:  High Risk of readmission after discharge from the hospital.  **Certifica 2.5 mg by mouth 2 (two) times daily with meals. Refills: 0     Losartan Potassium 25 MG Tabs  Commonly known as: COZAAR      Take 0.5 tablets (12.5 mg total) by mouth daily.    Quantity: 45 tablet  Refills: 3     metFORMIN HCl 500 MG Tabs  Commonly known (114-138)/(50-73) 129/73    Physical Exam:    General: No acute distress. Respiratory: Clear to auscultation bilaterally. No wheezes. No rhonchi. Cardiovascular: Irregularly irregular. Abdomen: Soft, nontender, nondistended. Positive bowel sounds.  No

## 2020-11-16 RX ORDER — GLYBURIDE 5 MG/1
TABLET ORAL
Qty: 90 TABLET | Refills: 0 | Status: SHIPPED | OUTPATIENT
Start: 2020-11-16 | End: 2021-04-20

## 2020-11-16 NOTE — TELEPHONE ENCOUNTER
PASSED per protocol, refill sent.   Last PE 6.16.20   Future Appointments   Date Time Provider Nahid Hobbs   11/19/2020 10:00 AM Erlene Days, APRN [facility]

## 2020-11-19 ENCOUNTER — OFFICE VISIT (OUTPATIENT)
Dept: INTERNAL MEDICINE CLINIC | Facility: CLINIC | Age: 80
End: 2020-11-19
Payer: MEDICARE

## 2020-11-19 VITALS
HEART RATE: 64 BPM | HEIGHT: 70 IN | TEMPERATURE: 98 F | RESPIRATION RATE: 16 BRPM | BODY MASS INDEX: 25.05 KG/M2 | WEIGHT: 175 LBS | SYSTOLIC BLOOD PRESSURE: 116 MMHG | DIASTOLIC BLOOD PRESSURE: 64 MMHG | OXYGEN SATURATION: 97 %

## 2020-11-19 DIAGNOSIS — I10 BENIGN ESSENTIAL HTN: ICD-10-CM

## 2020-11-19 DIAGNOSIS — E11.59 TYPE 2 DIABETES MELLITUS WITH OTHER CIRCULATORY COMPLICATION, WITHOUT LONG-TERM CURRENT USE OF INSULIN (HCC): ICD-10-CM

## 2020-11-19 DIAGNOSIS — I48.91 ATRIAL FIBRILLATION WITH CONTROLLED VENTRICULAR RESPONSE (HCC): ICD-10-CM

## 2020-11-19 DIAGNOSIS — I48.0 PAF (PAROXYSMAL ATRIAL FIBRILLATION) (HCC): ICD-10-CM

## 2020-11-19 DIAGNOSIS — Z91.81 AT RISK FOR FALLING: ICD-10-CM

## 2020-11-19 DIAGNOSIS — J40 BRONCHITIS: Primary | ICD-10-CM

## 2020-11-19 DIAGNOSIS — C82.11 GRADE 2 FOLLICULAR LYMPHOMA OF LYMPH NODES OF NECK (HCC): ICD-10-CM

## 2020-11-19 DIAGNOSIS — E78.00 PURE HYPERCHOLESTEROLEMIA: ICD-10-CM

## 2020-11-19 PROBLEM — S09.90XA INJURY OF HEAD, INITIAL ENCOUNTER: Status: RESOLVED | Noted: 2020-10-02 | Resolved: 2020-11-19

## 2020-11-19 PROBLEM — R00.1 BRADYCARDIA: Status: RESOLVED | Noted: 2020-10-08 | Resolved: 2020-11-19

## 2020-11-19 PROBLEM — D72.829 LEUKOCYTOSIS: Status: RESOLVED | Noted: 2020-11-04 | Resolved: 2020-11-19

## 2020-11-19 PROCEDURE — 3008F BODY MASS INDEX DOCD: CPT | Performed by: NURSE PRACTITIONER

## 2020-11-19 PROCEDURE — 3078F DIAST BP <80 MM HG: CPT | Performed by: NURSE PRACTITIONER

## 2020-11-19 PROCEDURE — 3074F SYST BP LT 130 MM HG: CPT | Performed by: NURSE PRACTITIONER

## 2020-11-19 PROCEDURE — 99496 TRANSJ CARE MGMT HIGH F2F 7D: CPT | Performed by: NURSE PRACTITIONER

## 2020-11-19 RX ORDER — AZITHROMYCIN 250 MG/1
TABLET, FILM COATED ORAL
Qty: 6 TABLET | Refills: 0 | Status: SHIPPED | OUTPATIENT
Start: 2020-11-19 | End: 2020-11-24

## 2020-11-19 RX ORDER — AZITHROMYCIN 250 MG/1
TABLET, FILM COATED ORAL
Qty: 6 TABLET | Refills: 0 | Status: SHIPPED | OUTPATIENT
Start: 2020-11-19 | End: 2020-11-19

## 2020-11-19 NOTE — PROGRESS NOTES
HPI:    Theron Lundberg is a [de-identified]year old female here today for hospital follow up.    She was discharged from Inpatient hospital, [facility] to Home   Admission Date: 11/4/20   Discharge Date: 11/5/20  Hospital Discharge Diagnoses (since 10/20/2020) glyburide coverage      Sometimes waking < 100   Will stop evening dose of glyburide.   (she just got 90 tabs which will last her now 6 months.)  Labs early 2021    CHF  Lasix    GERD   nexium     afib  Xarelto  toprol XL    Dyslipidemia  Pravastatin.   LDL Enlarged lymph node, Exposure to medical diagnostic radiation (8/20148), GERD (gastroesophageal reflux disease), Glaucoma, Hammer toe (2013), Heart attack ([facility] (2008), High blood pressure, High cholesterol, [facility] Obesity, Other and of motion of extremities  NEURO: denies headaches, denies dizziness, denies weakness  PSYCHE: denies depression or anxiety    PHYSICAL EXAM:   No LMP recorded. Patient has had a hysterectomy.   Estimated body mass index is 25.11 kg/m² as calculated from the tablets (500 mg total) by mouth daily for 1 day, THEN 1 tablet (250 mg total) daily for 4 days. -     azithromycin (ZITHROMAX Z-MALLY) 250 MG Oral Tab; Take 2 tablets (500 mg total) by mouth daily for 1 day, THEN 1 tablet (250 mg total) daily for 4 days.

## 2020-12-16 ENCOUNTER — TELEPHONE (OUTPATIENT)
Dept: INTERNAL MEDICINE CLINIC | Facility: CLINIC | Age: 80
End: 2020-12-16

## 2020-12-16 NOTE — TELEPHONE ENCOUNTER
Received fax from [facility] with attached medication list and allergies. Verified that all meds are correct. Placed in SD bin to sign off.

## 2020-12-24 ENCOUNTER — TELEPHONE (OUTPATIENT)
Dept: INTERNAL MEDICINE CLINIC | Facility: CLINIC | Age: 80
End: 2020-12-24

## 2020-12-24 NOTE — TELEPHONE ENCOUNTER
ROSHAN    Pt had an [place] attack\" a couple days ago. Has since resolved. Denies cough, but has mild amount of yellow phlegm. Denies all other sx or covid exposure. Spoke to her son who is an ER MD. He told her to get tested for covid. Advised no appts this week and would best to go to [address] [place] [address] to be evaluated. Pt states these symtpoms are \"pretty normal\" for her. Pt doesn't seem too concerned. Aware of advice and other testing centers. Suggested isolation from all non-sick persons. Verbs understanding.

## 2021-01-01 NOTE — PROGRESS NOTES
Pt NPO since MN. Page sent to Dr Natalee Christine to see if he would like some IVFs 0.9% NS or D5 d/t Hx CHF and DM. Awaiting response. ,DirectAddress_Unknown

## 2021-01-25 DIAGNOSIS — Z23 NEED FOR VACCINATION: ICD-10-CM

## 2021-01-29 ENCOUNTER — IMMUNIZATION (OUTPATIENT)
Dept: LAB | Age: 81
End: 2021-01-29
Attending: HOSPITALIST
Payer: MEDICARE

## 2021-01-29 DIAGNOSIS — Z23 NEED FOR VACCINATION: Primary | ICD-10-CM

## 2021-01-29 PROCEDURE — 0001A SARSCOV2 VAC 30MCG/0.3ML IM: CPT

## 2021-02-19 ENCOUNTER — IMMUNIZATION (OUTPATIENT)
Dept: LAB | Age: 81
End: 2021-02-19
Attending: HOSPITALIST
Payer: MEDICARE

## 2021-02-19 DIAGNOSIS — Z23 NEED FOR VACCINATION: Primary | ICD-10-CM

## 2021-02-19 PROCEDURE — 0002A SARSCOV2 VAC 30MCG/0.3ML IM: CPT

## 2021-03-11 RX ORDER — BLOOD SUGAR DIAGNOSTIC
STRIP MISCELLANEOUS
Qty: 100 STRIP | Refills: 0 | Status: SHIPPED | OUTPATIENT
Start: 2021-03-11 | End: 2021-06-07

## 2021-03-11 NOTE — TELEPHONE ENCOUNTER
PASSED per protocol, refill sent.   Last PE 6.16.20   Future Appointments   Date Time Provider Nahid Hobbs   3/22/2021  2:40 PM Jf Martinez MD [facility]

## 2021-03-19 ENCOUNTER — LAB ENCOUNTER (OUTPATIENT)
Dept: LAB | Age: 81
End: 2021-03-19
Attending: INTERNAL MEDICINE
Payer: MEDICARE

## 2021-03-19 DIAGNOSIS — I50.33 ACUTE ON CHRONIC DIASTOLIC (CONGESTIVE) HEART FAILURE (HCC): ICD-10-CM

## 2021-03-19 DIAGNOSIS — I10 BENIGN ESSENTIAL HTN: ICD-10-CM

## 2021-03-19 DIAGNOSIS — E11.59 TYPE 2 DIABETES MELLITUS WITH OTHER CIRCULATORY COMPLICATION, WITHOUT LONG-TERM CURRENT USE OF INSULIN (HCC): ICD-10-CM

## 2021-03-19 LAB
ALBUMIN SERPL-MCNC: 3.2 G/DL (ref 3.4–5)
ALBUMIN/GLOB SERPL: 0.9 {RATIO} (ref 1–2)
ALP LIVER SERPL-CCNC: 77 U/L
ALT SERPL-CCNC: 10 U/L
ANION GAP SERPL CALC-SCNC: 7 MMOL/L (ref 0–18)
AST SERPL-CCNC: 6 U/L (ref 15–37)
BILIRUB SERPL-MCNC: 0.4 MG/DL (ref 0.1–2)
BUN BLD-MCNC: 19 MG/DL (ref 7–18)
BUN/CREAT SERPL: 21.3 (ref 10–20)
CALCIUM BLD-MCNC: 9.3 MG/DL (ref 8.5–10.1)
CHLORIDE SERPL-SCNC: 100 MMOL/L (ref 98–112)
CHOLEST SMN-MCNC: 179 MG/DL (ref ?–200)
CO2 SERPL-SCNC: 32 MMOL/L (ref 21–32)
CREAT BLD-MCNC: 0.89 MG/DL
CREAT UR-SCNC: 210 MG/DL
EST. AVERAGE GLUCOSE BLD GHB EST-MCNC: 143 MG/DL (ref 68–126)
GLOBULIN PLAS-MCNC: 3.4 G/DL (ref 2.8–4.4)
GLUCOSE BLD-MCNC: 150 MG/DL (ref 70–99)
HBA1C MFR BLD HPLC: 6.6 % (ref ?–5.7)
HDLC SERPL-MCNC: 65 MG/DL (ref 40–59)
LDLC SERPL CALC-MCNC: 88 MG/DL (ref ?–100)
M PROTEIN MFR SERPL ELPH: 6.6 G/DL (ref 6.4–8.2)
MICROALBUMIN UR-MCNC: 7.04 MG/DL
MICROALBUMIN/CREAT 24H UR-RTO: 33.5 UG/MG (ref ?–30)
NONHDLC SERPL-MCNC: 114 MG/DL (ref ?–130)
OSMOLALITY SERPL CALC.SUM OF ELEC: 293 MOSM/KG (ref 275–295)
PATIENT FASTING Y/N/NP: YES
PATIENT FASTING Y/N/NP: YES
POTASSIUM SERPL-SCNC: 3.9 MMOL/L (ref 3.5–5.1)
SODIUM SERPL-SCNC: 139 MMOL/L (ref 136–145)
TRIGL SERPL-MCNC: 131 MG/DL (ref 30–149)
VLDLC SERPL CALC-MCNC: 26 MG/DL (ref 0–30)

## 2021-03-19 PROCEDURE — 82043 UR ALBUMIN QUANTITATIVE: CPT

## 2021-03-19 PROCEDURE — 80053 COMPREHEN METABOLIC PANEL: CPT

## 2021-03-19 PROCEDURE — 80061 LIPID PANEL: CPT

## 2021-03-19 PROCEDURE — 82570 ASSAY OF URINE CREATININE: CPT

## 2021-03-19 PROCEDURE — 83036 HEMOGLOBIN GLYCOSYLATED A1C: CPT

## 2021-03-19 PROCEDURE — 36415 COLL VENOUS BLD VENIPUNCTURE: CPT

## 2021-04-12 ENCOUNTER — OFFICE VISIT (OUTPATIENT)
Dept: INTERNAL MEDICINE CLINIC | Facility: CLINIC | Age: 81
End: 2021-04-12
Payer: MEDICARE

## 2021-04-12 VITALS
SYSTOLIC BLOOD PRESSURE: 106 MMHG | BODY MASS INDEX: 26.92 KG/M2 | HEIGHT: 70 IN | HEART RATE: 54 BPM | DIASTOLIC BLOOD PRESSURE: 54 MMHG | TEMPERATURE: 97 F | WEIGHT: 188 LBS

## 2021-04-12 DIAGNOSIS — H35.30 MACULAR DEGENERATION, UNSPECIFIED LATERALITY, UNSPECIFIED TYPE: ICD-10-CM

## 2021-04-12 DIAGNOSIS — Z85.72 HISTORY OF FOLLICULAR LYMPHOMA: ICD-10-CM

## 2021-04-12 DIAGNOSIS — Z91.81 AT RISK FOR FALLING: ICD-10-CM

## 2021-04-12 DIAGNOSIS — Z96.651 HISTORY OF TOTAL RIGHT KNEE REPLACEMENT: ICD-10-CM

## 2021-04-12 DIAGNOSIS — M20.40 HAMMER TOE, ACQUIRED: ICD-10-CM

## 2021-04-12 DIAGNOSIS — E55.9 VITAMIN D DEFICIENCY: ICD-10-CM

## 2021-04-12 DIAGNOSIS — R13.10 DYSPHAGIA, UNSPECIFIED TYPE: ICD-10-CM

## 2021-04-12 DIAGNOSIS — I25.10 CORONARY ARTERY DISEASE INVOLVING NATIVE CORONARY ARTERY OF NATIVE HEART WITHOUT ANGINA PECTORIS: ICD-10-CM

## 2021-04-12 DIAGNOSIS — E04.1 THYROID NODULE: ICD-10-CM

## 2021-04-12 DIAGNOSIS — Z87.81 HISTORY OF FEMUR FRACTURE: ICD-10-CM

## 2021-04-12 DIAGNOSIS — Z00.00 ENCOUNTER FOR ANNUAL HEALTH EXAMINATION: Primary | ICD-10-CM

## 2021-04-12 DIAGNOSIS — E89.0 H/O PARTIAL THYROIDECTOMY: ICD-10-CM

## 2021-04-12 DIAGNOSIS — I70.0 AORTIC ATHEROSCLEROSIS (HCC): ICD-10-CM

## 2021-04-12 DIAGNOSIS — Z95.828 HISTORY OF REPAIR OF ANEURYSM OF ABDOMINAL AORTA USING ENDOVASCULAR STENT GRAFT: ICD-10-CM

## 2021-04-12 DIAGNOSIS — E11.59 TYPE 2 DIABETES MELLITUS WITH OTHER CIRCULATORY COMPLICATION, WITHOUT LONG-TERM CURRENT USE OF INSULIN (HCC): ICD-10-CM

## 2021-04-12 DIAGNOSIS — R80.9 MICROALBUMINURIA DUE TO TYPE 2 DIABETES MELLITUS (HCC): ICD-10-CM

## 2021-04-12 DIAGNOSIS — D64.9 NORMOCYTIC ANEMIA: ICD-10-CM

## 2021-04-12 DIAGNOSIS — K22.4 CORKSCREW ESOPHAGUS: ICD-10-CM

## 2021-04-12 DIAGNOSIS — D69.6 THROMBOCYTOPENIA (HCC): Chronic | ICD-10-CM

## 2021-04-12 DIAGNOSIS — M46.96 INFLAMMATORY SPONDYLOPATHY OF LUMBAR REGION (HCC): ICD-10-CM

## 2021-04-12 DIAGNOSIS — E78.00 PURE HYPERCHOLESTEROLEMIA: ICD-10-CM

## 2021-04-12 DIAGNOSIS — Z86.73 HISTORY OF EMBOLIC STROKE: ICD-10-CM

## 2021-04-12 DIAGNOSIS — Z95.5 STENTED CORONARY ARTERY: ICD-10-CM

## 2021-04-12 DIAGNOSIS — I10 BENIGN ESSENTIAL HTN: ICD-10-CM

## 2021-04-12 DIAGNOSIS — E04.9 ENLARGED THYROID: ICD-10-CM

## 2021-04-12 DIAGNOSIS — G89.29 CHRONIC BILATERAL LOW BACK PAIN WITHOUT SCIATICA: ICD-10-CM

## 2021-04-12 DIAGNOSIS — M54.50 CHRONIC BILATERAL LOW BACK PAIN WITHOUT SCIATICA: ICD-10-CM

## 2021-04-12 DIAGNOSIS — M47.816 ARTHRITIS, LUMBAR SPINE: ICD-10-CM

## 2021-04-12 DIAGNOSIS — E78.5 DYSLIPIDEMIA: ICD-10-CM

## 2021-04-12 DIAGNOSIS — I50.32 CHRONIC DIASTOLIC CHF (CONGESTIVE HEART FAILURE) (HCC): ICD-10-CM

## 2021-04-12 DIAGNOSIS — I48.91 ATRIAL FIBRILLATION WITH CONTROLLED VENTRICULAR RESPONSE (HCC): ICD-10-CM

## 2021-04-12 DIAGNOSIS — I70.1 RENAL ARTERY STENOSIS (HCC): ICD-10-CM

## 2021-04-12 DIAGNOSIS — I71.4 ABDOMINAL AORTIC ANEURYSM (AAA) WITHOUT RUPTURE (HCC): ICD-10-CM

## 2021-04-12 DIAGNOSIS — E11.29 MICROALBUMINURIA DUE TO TYPE 2 DIABETES MELLITUS (HCC): ICD-10-CM

## 2021-04-12 DIAGNOSIS — K21.9 GASTROESOPHAGEAL REFLUX DISEASE, UNSPECIFIED WHETHER ESOPHAGITIS PRESENT: ICD-10-CM

## 2021-04-12 DIAGNOSIS — I25.2 HISTORY OF MI (MYOCARDIAL INFARCTION): ICD-10-CM

## 2021-04-12 DIAGNOSIS — K22.0 ACHALASIA: ICD-10-CM

## 2021-04-12 PROBLEM — B34.9 VIRAL SYNDROME: Status: RESOLVED | Noted: 2020-11-04 | Resolved: 2021-04-12

## 2021-04-12 PROBLEM — R29.6 FALLS FREQUENTLY: Status: RESOLVED | Noted: 2020-10-02 | Resolved: 2021-04-12

## 2021-04-12 PROBLEM — E11.9 TYPE 2 DIABETES MELLITUS WITHOUT COMPLICATION, WITHOUT LONG-TERM CURRENT USE OF INSULIN (HCC): Status: RESOLVED | Noted: 2017-04-03 | Resolved: 2021-04-12

## 2021-04-12 PROBLEM — I63.9 CVA (CEREBROVASCULAR ACCIDENT) (HCC): Status: RESOLVED | Noted: 2020-10-08 | Resolved: 2021-04-12

## 2021-04-12 PROBLEM — I71.02 DISSECTION OF ABDOMINAL AORTA (HCC): Status: RESOLVED | Noted: 2017-04-03 | Resolved: 2021-04-12

## 2021-04-12 PROCEDURE — 3008F BODY MASS INDEX DOCD: CPT | Performed by: NURSE PRACTITIONER

## 2021-04-12 PROCEDURE — 3078F DIAST BP <80 MM HG: CPT | Performed by: NURSE PRACTITIONER

## 2021-04-12 PROCEDURE — 3074F SYST BP LT 130 MM HG: CPT | Performed by: NURSE PRACTITIONER

## 2021-04-12 PROCEDURE — G0439 PPPS, SUBSEQ VISIT: HCPCS | Performed by: NURSE PRACTITIONER

## 2021-04-12 PROCEDURE — 96160 PT-FOCUSED HLTH RISK ASSMT: CPT | Performed by: NURSE PRACTITIONER

## 2021-04-12 PROCEDURE — 99397 PER PM REEVAL EST PAT 65+ YR: CPT | Performed by: NURSE PRACTITIONER

## 2021-04-12 NOTE — PROGRESS NOTES
HPI:   Nava Lainez is a [de-identified]year old female who presents for a MA (Medicare Advantage) [facility] [place] (Once per calendar year).   Type 2 diabetes mellitus with other circulatory complication, without long-term current use of insulin ([facility] [address]  Checking her gl monitor. She has seen Dr Reji Haines   Will continue to monitor. Vitamin D deficiency  Stable  Cont vit d supplement. Gastroesophageal reflux disease, unspecified whether esophagitis present  Stable   Cont PPI  Per Dr Alexis Nichols.       Dysphagia, unspec based on screening of functional status.    Difficulty walking?: Yes (cane)       Depression Screening (PHQ-2/PHQ-9): Over the LAST 2 WEEKS   Little interest or pleasure in doing things: Not at all  Feeling down, depressed, or hopeless: Not at all  PHQ-2 SC lymphoma     Inflammatory spondylopathy of lumbar region [facility])     History of MI (myocardial infarction)     Stented coronary artery     Chronic diastolic CHF (congestive heart failure) (HCC)     Thrombocytopenia (HCC)     History of repair of aneurysm of daily.  Esomeprazole Magnesium 20 MG Oral Capsule Delayed Release, Take 20 mg by mouth every morning before breakfast.  Cholecalciferol (VITAMIN D) 1000 UNITS Oral Tab, Take 1,000 Units by mouth daily.          MEDICAL INFORMATION:   She  has a past medical Disorder in her brother; Hypertension in her brother and brother; Other in her mother; Stroke in her maternal aunt, maternal uncle, and mother. SOCIAL HISTORY:   She  reports that she has never smoked.  She has never used smokeless tobacco. She reports th no rashes or lesions   Lymph nodes: Cervical, supraclavicular, and axillary nodes normal   Neurologic: Normal   Diabetic foot exam: Both feet visually inspected. Bilateral barefoot monofilament exam completed and is within normal limits. No wounds.   Peda Diabetes Screening      HbgA1C   Annually Lab Results   Component Value Date    A1C 6.6 (H) 03/19/2021    No flowsheet data found.     Fasting Blood Sugar (FSB)Annually Glucose   Date Value   03/19/2021 150 mg/dL (H)   03/12/2021 132 mg/dL (H)   06/22/201 (Prevnar)  Covered Once after 65 05/21/2015 Please get once after your 65th birthday    Pneumococcal 23 (Pneumovax)  Covered Once after 65 04/16/2014 Please get once after your 65th birthday    Hepatitis B for Moderate/High Risk No vaccine history found Me (mg/dL (calc))   Date Value   02/18/2015 68    No flowsheet data found. Dilated Eye exam  Annually Data entered on: 7/29/2019   Last Dilated Eye Exam 7/29/2019     No flowsheet data found.              Template: SULEMA MONSON MEDICARE ANNUAL ASSESSMENT FEMALE

## 2021-04-12 NOTE — PATIENT INSTRUCTIONS
[facility] SCREENING SCHEDULE   Tests on this list are recommended by your physician but may not be covered, or covered at this frequency, by your insurer. Please check with your insurance carrier before scheduling to verify coverage.    ASH between ages 73-68) IPPE only No results found for this or any previous visit.  Limited to patients who meet one of the following criteria:   • Men who are 73-68 years old and have smoked more than 100 cigarettes in their lifetime   • Anyone with a family h Mammogram    Recommend Annually to at least age 76, and as needed after 76 There are no preventive care reminders to display for this patient.  Please get this Mammogram regularly   Immunizations      Influenza  Covered Annually Orders placed or performed i the different types of Advance Directives. It also has the State forms available on it's website for anyone to review and print using their home computer and printer. (the forms are also available in [address])  [email or web address]  This link also has inf

## 2021-04-20 RX ORDER — GLYBURIDE 5 MG/1
2.5 TABLET ORAL 2 TIMES DAILY WITH MEALS
Qty: 90 TABLET | Refills: 0 | Status: SHIPPED | OUTPATIENT
Start: 2021-04-20 | End: 2021-07-17

## 2021-05-19 ENCOUNTER — LAB ENCOUNTER (OUTPATIENT)
Dept: LAB | Age: 81
End: 2021-05-19
Attending: INTERNAL MEDICINE
Payer: MEDICARE

## 2021-05-19 DIAGNOSIS — I50.32 CHRONIC DIASTOLIC CHF (CONGESTIVE HEART FAILURE) (HCC): ICD-10-CM

## 2021-05-19 DIAGNOSIS — I48.91 ATRIAL FIBRILLATION WITH CONTROLLED VENTRICULAR RESPONSE (HCC): ICD-10-CM

## 2021-05-19 PROCEDURE — 84439 ASSAY OF FREE THYROXINE: CPT

## 2021-05-19 PROCEDURE — 80048 BASIC METABOLIC PNL TOTAL CA: CPT

## 2021-05-19 PROCEDURE — 84443 ASSAY THYROID STIM HORMONE: CPT

## 2021-05-19 PROCEDURE — 36415 COLL VENOUS BLD VENIPUNCTURE: CPT

## 2021-06-07 RX ORDER — BLOOD SUGAR DIAGNOSTIC
STRIP MISCELLANEOUS
Qty: 100 STRIP | Refills: 0 | Status: SHIPPED | OUTPATIENT
Start: 2021-06-07 | End: 2021-09-10

## 2021-06-07 RX ORDER — PRAVASTATIN SODIUM 80 MG/1
TABLET ORAL
Qty: 90 TABLET | Refills: 0 | Status: SHIPPED | OUTPATIENT
Start: 2021-06-07 | End: 2021-07-16

## 2021-06-07 RX ORDER — LEVOTHYROXINE SODIUM 0.05 MG/1
50 TABLET ORAL
Refills: 0 | COMMUNITY
Start: 2021-06-07

## 2021-06-07 NOTE — TELEPHONE ENCOUNTER
PASSED per protocol, refills sent.   Last PE 4.12.21  Future Appointments   Date Time Provider [facility] Faby   6/16/2021  1:30 PM [address]   6/22/2021  1:00 PM Lou Lucero MD [facility]

## 2021-06-16 ENCOUNTER — HOSPITAL ENCOUNTER (OUTPATIENT)
Dept: ULTRASOUND IMAGING | Age: 81
Discharge: HOME | End: 2021-06-16
Attending: OTOLARYNGOLOGY
Payer: MEDICARE

## 2021-06-16 DIAGNOSIS — E04.2 NONTOXIC MULTINODULAR GOITER: ICD-10-CM

## 2021-06-16 PROCEDURE — 76536 US EXAM OF HEAD AND NECK: CPT | Performed by: OTOLARYNGOLOGY

## 2021-06-25 RX ORDER — LANCETS
EACH MISCELLANEOUS
Qty: 100 EACH | Refills: 0 | Status: SHIPPED | OUTPATIENT
Start: 2021-06-25 | End: 2021-09-10

## 2021-06-25 RX ORDER — METOPROLOL TARTRATE 50 MG/1
TABLET, FILM COATED ORAL
Qty: 180 TABLET | Refills: 0 | Status: SHIPPED | OUTPATIENT
Start: 2021-06-25 | End: 2021-11-10

## 2021-07-15 NOTE — TELEPHONE ENCOUNTER
Last Ov: 4/12/21, SD, CPE  Last labs: TSH w Ref, BMP, Free T4 5/19/19  Last Rx: esomeprazole 20mg entered historically    Future Appointments   Date Time Provider Nahid Hobbs   12/28/2021  1:00 PM Soy Asencio MD [facility]       Per Pro

## 2021-07-15 NOTE — TELEPHONE ENCOUNTER
Prescription Refill Request - Patient advised can take 48-72 hours.     Name of Medication (strength, dose, qty requested:   Esomeprazole Magnesium 20 MG Oral Capsule Delayed Release       Sig:   Take 20 mg by mouth every morning before breakfast.

## 2021-07-17 RX ORDER — GLYBURIDE 5 MG/1
2.5 TABLET ORAL 2 TIMES DAILY WITH MEALS
Qty: 90 TABLET | Refills: 1 | Status: SHIPPED | OUTPATIENT
Start: 2021-07-17

## 2021-07-17 RX ORDER — PRAVASTATIN SODIUM 80 MG/1
80 TABLET ORAL DAILY
Qty: 90 TABLET | Refills: 1 | Status: SHIPPED | OUTPATIENT
Start: 2021-07-17

## 2021-08-12 ENCOUNTER — LAB ENCOUNTER (OUTPATIENT)
Dept: LAB | Age: 81
End: 2021-08-12
Attending: OTOLARYNGOLOGY
Payer: MEDICARE

## 2021-08-12 DIAGNOSIS — E04.2 NONTOXIC MULTINODULAR GOITER: ICD-10-CM

## 2021-08-12 DIAGNOSIS — R79.89 ABNORMAL THYROID BLOOD TEST: ICD-10-CM

## 2021-08-12 DIAGNOSIS — E89.0 HISTORY OF PARTIAL THYROIDECTOMY: ICD-10-CM

## 2021-08-12 LAB
T4 FREE SERPL-MCNC: 1.1 NG/DL (ref 0.8–1.7)
T4 SERPL-MCNC: 9.6 UG/DL
TSI SER-ACNC: 2.02 MIU/ML (ref 0.36–3.74)

## 2021-08-12 PROCEDURE — 84439 ASSAY OF FREE THYROXINE: CPT

## 2021-08-12 PROCEDURE — 84443 ASSAY THYROID STIM HORMONE: CPT

## 2021-08-12 PROCEDURE — 84436 ASSAY OF TOTAL THYROXINE: CPT

## 2021-08-12 PROCEDURE — 36415 COLL VENOUS BLD VENIPUNCTURE: CPT

## 2021-08-16 DIAGNOSIS — E03.9 HYPOTHYROIDISM, UNSPECIFIED TYPE: Primary | ICD-10-CM

## 2021-08-30 ENCOUNTER — TELEPHONE (OUTPATIENT)
Dept: INTERNAL MEDICINE CLINIC | Facility: CLINIC | Age: 81
End: 2021-08-30

## 2021-08-30 DIAGNOSIS — H35.30 MACULAR DEGENERATION, UNSPECIFIED LATERALITY, UNSPECIFIED TYPE: Primary | ICD-10-CM

## 2021-08-30 NOTE — TELEPHONE ENCOUNTER
Specialty: Opthalmology     Full Name of Specialist: Dr. Margreta Gitelman     Name of the Provider Group: [facility]  Address: [facility]   Phone number: [phone]  Fax number :     Date of Appointment: 09/24/2021    Reason for the Appointment (b

## 2021-09-10 RX ORDER — LANCETS
EACH MISCELLANEOUS
Qty: 100 EACH | Refills: 2 | Status: SHIPPED | OUTPATIENT
Start: 2021-09-10

## 2021-09-10 RX ORDER — BLOOD SUGAR DIAGNOSTIC
STRIP MISCELLANEOUS
Qty: 100 STRIP | Refills: 2 | Status: SHIPPED | OUTPATIENT
Start: 2021-09-10

## 2021-10-26 ENCOUNTER — TELEPHONE (OUTPATIENT)
Dept: INTERNAL MEDICINE CLINIC | Facility: CLINIC | Age: 81
End: 2021-10-26

## 2021-10-26 DIAGNOSIS — I50.32 CHRONIC DIASTOLIC CHF (CONGESTIVE HEART FAILURE) (HCC): ICD-10-CM

## 2021-10-26 DIAGNOSIS — Z95.5 STENTED CORONARY ARTERY: Primary | ICD-10-CM

## 2021-10-26 DIAGNOSIS — I70.0 AORTIC ATHEROSCLEROSIS (HCC): ICD-10-CM

## 2021-10-26 DIAGNOSIS — I25.2 HISTORY OF MI (MYOCARDIAL INFARCTION): ICD-10-CM

## 2021-10-26 DIAGNOSIS — I71.4 ABDOMINAL AORTIC ANEURYSM (AAA) WITHOUT RUPTURE (HCC): ICD-10-CM

## 2021-10-26 NOTE — TELEPHONE ENCOUNTER
Specialty: Cardiology     Full Name of Specialist: Dr Quiana Rooney    Name of the Provider Group:  Address:  Phone number:  Fax number :    Date of Appointment: 10/26/21    Reason for the Appointment (be specific with diagnosis code):  Stress test appt will nee

## 2021-10-26 NOTE — TELEPHONE ENCOUNTER
LOV 4/12/21 with [facility]. Pended referral for 6 visits for Dr Akosua Mckeon [facility]. OK to order?

## 2021-11-08 ENCOUNTER — HOSPITAL ENCOUNTER (OUTPATIENT)
Dept: GENERAL RADIOLOGY | Age: 81
Discharge: HOME | End: 2021-11-08
Attending: NURSE PRACTITIONER
Payer: MEDICARE

## 2021-11-08 ENCOUNTER — LAB ENCOUNTER (OUTPATIENT)
Dept: LAB | Age: 81
End: 2021-11-08
Attending: NURSE PRACTITIONER
Payer: MEDICARE

## 2021-11-08 DIAGNOSIS — R06.02 SHORTNESS OF BREATH: ICD-10-CM

## 2021-11-08 DIAGNOSIS — E03.9 HYPOTHYROIDISM, UNSPECIFIED TYPE: ICD-10-CM

## 2021-11-08 DIAGNOSIS — D64.9 ANEMIA, UNSPECIFIED TYPE: ICD-10-CM

## 2021-11-08 DIAGNOSIS — I50.32 CHRONIC DIASTOLIC CHF (CONGESTIVE HEART FAILURE) (HCC): ICD-10-CM

## 2021-11-08 PROCEDURE — 83550 IRON BINDING TEST: CPT

## 2021-11-08 PROCEDURE — 80053 COMPREHEN METABOLIC PANEL: CPT

## 2021-11-08 PROCEDURE — 85025 COMPLETE CBC W/AUTO DIFF WBC: CPT

## 2021-11-08 PROCEDURE — 84443 ASSAY THYROID STIM HORMONE: CPT

## 2021-11-08 PROCEDURE — 36415 COLL VENOUS BLD VENIPUNCTURE: CPT

## 2021-11-08 PROCEDURE — 82728 ASSAY OF FERRITIN: CPT

## 2021-11-08 PROCEDURE — 84439 ASSAY OF FREE THYROXINE: CPT

## 2021-11-08 PROCEDURE — 71046 X-RAY EXAM CHEST 2 VIEWS: CPT | Performed by: NURSE PRACTITIONER

## 2021-11-08 PROCEDURE — 83880 ASSAY OF NATRIURETIC PEPTIDE: CPT

## 2021-11-08 PROCEDURE — 83540 ASSAY OF IRON: CPT

## 2021-11-09 DIAGNOSIS — D64.9 ANEMIA, UNSPECIFIED TYPE: Primary | ICD-10-CM

## 2021-11-15 ENCOUNTER — OFFICE VISIT (OUTPATIENT)
Dept: INTERNAL MEDICINE CLINIC | Facility: CLINIC | Age: 81
End: 2021-11-15
Payer: MEDICARE

## 2021-11-15 VITALS
HEART RATE: 72 BPM | BODY MASS INDEX: 25.77 KG/M2 | SYSTOLIC BLOOD PRESSURE: 124 MMHG | WEIGHT: 180 LBS | RESPIRATION RATE: 17 BRPM | HEIGHT: 70 IN | TEMPERATURE: 98 F | DIASTOLIC BLOOD PRESSURE: 82 MMHG

## 2021-11-15 DIAGNOSIS — D50.9 IRON DEFICIENCY ANEMIA, UNSPECIFIED IRON DEFICIENCY ANEMIA TYPE: Primary | ICD-10-CM

## 2021-11-15 DIAGNOSIS — E78.5 DYSLIPIDEMIA: ICD-10-CM

## 2021-11-15 DIAGNOSIS — K21.9 GASTROESOPHAGEAL REFLUX DISEASE, UNSPECIFIED WHETHER ESOPHAGITIS PRESENT: ICD-10-CM

## 2021-11-15 DIAGNOSIS — E11.59 TYPE 2 DIABETES MELLITUS WITH OTHER CIRCULATORY COMPLICATION, WITHOUT LONG-TERM CURRENT USE OF INSULIN (HCC): ICD-10-CM

## 2021-11-15 DIAGNOSIS — I50.32 CHRONIC DIASTOLIC CHF (CONGESTIVE HEART FAILURE) (HCC): ICD-10-CM

## 2021-11-15 DIAGNOSIS — Z87.81 HISTORY OF FEMUR FRACTURE: ICD-10-CM

## 2021-11-15 DIAGNOSIS — I10 BENIGN ESSENTIAL HTN: ICD-10-CM

## 2021-11-15 DIAGNOSIS — I25.10 CORONARY ARTERY DISEASE INVOLVING NATIVE CORONARY ARTERY OF NATIVE HEART WITHOUT ANGINA PECTORIS: ICD-10-CM

## 2021-11-15 PROCEDURE — 3008F BODY MASS INDEX DOCD: CPT | Performed by: NURSE PRACTITIONER

## 2021-11-15 PROCEDURE — 3074F SYST BP LT 130 MM HG: CPT | Performed by: NURSE PRACTITIONER

## 2021-11-15 PROCEDURE — 3079F DIAST BP 80-89 MM HG: CPT | Performed by: NURSE PRACTITIONER

## 2021-11-15 PROCEDURE — 83036 HEMOGLOBIN GLYCOSYLATED A1C: CPT | Performed by: NURSE PRACTITIONER

## 2021-11-15 PROCEDURE — 99214 OFFICE O/P EST MOD 30 MIN: CPT | Performed by: NURSE PRACTITIONER

## 2021-11-15 RX ORDER — FUROSEMIDE 20 MG/1
20 TABLET ORAL DAILY
COMMUNITY
Start: 2021-10-02 | End: 2021-11-24

## 2021-11-15 NOTE — PROGRESS NOTES
Alexei Steward is a 80year old female. Patient presents with: Follow - Up: RM 10 JY, lab f/u      HPI:   Here for lab follow up     Diabetes. Glyburide 5mg and metformin 500mg bid. Checking at home. a1c today 7.3 from 6.6 On statin and ARB.   discssed repair of aneurysm of abdominal aorta using endovascular stent graft     History of femur fracture     Dyslipidemia     Atrial fibrillation with controlled ventricular response (HCC)     History of embolic stroke     Microalbuminuria due to type 2 diabetes a-fib   • Arthritis    • Atherosclerosis of coronary artery    • CAD (coronary artery disease)    • Cancer (HCC)     lymphoma   • Cataracts, bilateral 4/16/2014   • Coronary atherosclerosis    • Disorder of thyroid    • Dysphagia    • Easy bruising    • constipation  MUSCULOSKELETAL:  No arthralgias or myalgias  NEURO: denies headaches,     EXAM:   /82 (BP Location: Left arm, Patient Position: Sitting, Cuff Size: adult)   Pulse 72   Temp 98.4 °F (36.9 °C)   Resp 17   Ht 5' 10\" (1.778 m)   Wt 180 lb

## 2021-11-23 ENCOUNTER — LAB ENCOUNTER (OUTPATIENT)
Dept: LAB | Age: 81
End: 2021-11-23
Attending: NURSE PRACTITIONER
Payer: MEDICARE

## 2021-11-23 DIAGNOSIS — I50.32 CHRONIC DIASTOLIC CHF (CONGESTIVE HEART FAILURE) (HCC): ICD-10-CM

## 2021-11-23 PROCEDURE — 36415 COLL VENOUS BLD VENIPUNCTURE: CPT

## 2021-11-23 PROCEDURE — 83880 ASSAY OF NATRIURETIC PEPTIDE: CPT

## 2021-11-23 PROCEDURE — 80048 BASIC METABOLIC PNL TOTAL CA: CPT

## 2021-12-27 ENCOUNTER — LAB ENCOUNTER (OUTPATIENT)
Dept: LAB | Age: 81
End: 2021-12-27
Attending: NURSE PRACTITIONER
Payer: MEDICARE

## 2021-12-27 DIAGNOSIS — I50.32 CHRONIC DIASTOLIC CHF (CONGESTIVE HEART FAILURE) (HCC): ICD-10-CM

## 2021-12-27 PROCEDURE — 36415 COLL VENOUS BLD VENIPUNCTURE: CPT

## 2021-12-27 PROCEDURE — 80048 BASIC METABOLIC PNL TOTAL CA: CPT

## 2022-02-14 ENCOUNTER — TELEPHONE (OUTPATIENT)
Dept: INTERNAL MEDICINE CLINIC | Facility: CLINIC | Age: 82
End: 2022-02-14

## 2022-02-14 NOTE — TELEPHONE ENCOUNTER
Pt dropped off form to be completed by AS. It's a verification of chronic conditions to be sent to [facility]. Fax number is on the form. Pt would also like a call once complete and sent. Form is AS folder up front.

## 2022-02-21 ENCOUNTER — LAB ENCOUNTER (OUTPATIENT)
Dept: LAB | Age: 82
End: 2022-02-21
Attending: NURSE PRACTITIONER
Payer: MEDICARE

## 2022-02-21 DIAGNOSIS — D50.9 IRON DEFICIENCY ANEMIA, UNSPECIFIED IRON DEFICIENCY ANEMIA TYPE: ICD-10-CM

## 2022-02-21 DIAGNOSIS — E11.59 TYPE 2 DIABETES MELLITUS WITH OTHER CIRCULATORY COMPLICATION, WITHOUT LONG-TERM CURRENT USE OF INSULIN (HCC): ICD-10-CM

## 2022-02-21 LAB
ALBUMIN SERPL-MCNC: 3.1 G/DL (ref 3.4–5)
ALBUMIN/GLOB SERPL: 0.9 {RATIO} (ref 1–2)
ALP LIVER SERPL-CCNC: 97 U/L
ALT SERPL-CCNC: 10 U/L
ANION GAP SERPL CALC-SCNC: 6 MMOL/L (ref 0–18)
AST SERPL-CCNC: 9 U/L (ref 15–37)
BASOPHILS # BLD AUTO: 0.05 X10(3) UL (ref 0–0.2)
BASOPHILS NFR BLD AUTO: 0.5 %
BILIRUB SERPL-MCNC: 0.4 MG/DL (ref 0.1–2)
BUN BLD-MCNC: 20 MG/DL (ref 7–18)
CALCIUM BLD-MCNC: 9.6 MG/DL (ref 8.5–10.1)
CHLORIDE SERPL-SCNC: 102 MMOL/L (ref 98–112)
CHOLEST SERPL-MCNC: 192 MG/DL (ref ?–200)
CO2 SERPL-SCNC: 33 MMOL/L (ref 21–32)
CREAT BLD-MCNC: 1.26 MG/DL
CREAT UR-SCNC: 172 MG/DL
EOSINOPHIL # BLD AUTO: 0.4 X10(3) UL (ref 0–0.7)
EOSINOPHIL NFR BLD AUTO: 4.3 %
ERYTHROCYTE [DISTWIDTH] IN BLOOD BY AUTOMATED COUNT: 18.3 %
FASTING PATIENT LIPID ANSWER: YES
FASTING STATUS PATIENT QL REPORTED: YES
GLOBULIN PLAS-MCNC: 3.3 G/DL (ref 2.8–4.4)
GLUCOSE BLD-MCNC: 229 MG/DL (ref 70–99)
HCT VFR BLD AUTO: 41.4 %
HDLC SERPL-MCNC: 60 MG/DL (ref 40–59)
HGB BLD-MCNC: 12.5 G/DL
IMM GRANULOCYTES # BLD AUTO: 0.02 X10(3) UL (ref 0–1)
IMM GRANULOCYTES NFR BLD: 0.2 %
LDLC SERPL CALC-MCNC: 95 MG/DL (ref ?–100)
LYMPHOCYTES # BLD AUTO: 0.78 X10(3) UL (ref 1–4)
LYMPHOCYTES NFR BLD AUTO: 8.3 %
MCH RBC QN AUTO: 26.1 PG (ref 26–34)
MCHC RBC AUTO-ENTMCNC: 30.2 G/DL (ref 31–37)
MCV RBC AUTO: 86.4 FL
MICROALBUMIN UR-MCNC: 130 MG/DL
MICROALBUMIN/CREAT 24H UR-RTO: 755.8 UG/MG (ref ?–30)
MONOCYTES # BLD AUTO: 0.77 X10(3) UL (ref 0.1–1)
MONOCYTES NFR BLD AUTO: 8.2 %
NEUTROPHILS # BLD AUTO: 7.36 X10 (3) UL (ref 1.5–7.7)
NEUTROPHILS # BLD AUTO: 7.36 X10(3) UL (ref 1.5–7.7)
NEUTROPHILS NFR BLD AUTO: 78.5 %
NONHDLC SERPL-MCNC: 132 MG/DL (ref ?–130)
OSMOLALITY SERPL CALC.SUM OF ELEC: 302 MOSM/KG (ref 275–295)
PLATELET # BLD AUTO: 226 10(3)UL (ref 150–450)
POTASSIUM SERPL-SCNC: 5 MMOL/L (ref 3.5–5.1)
PROT SERPL-MCNC: 6.4 G/DL (ref 6.4–8.2)
RBC # BLD AUTO: 4.79 X10(6)UL
SODIUM SERPL-SCNC: 141 MMOL/L (ref 136–145)
TRIGL SERPL-MCNC: 218 MG/DL (ref 30–149)
VLDLC SERPL CALC-MCNC: 36 MG/DL (ref 0–30)
WBC # BLD AUTO: 9.4 X10(3) UL (ref 4–11)

## 2022-02-21 PROCEDURE — 82043 UR ALBUMIN QUANTITATIVE: CPT

## 2022-02-21 PROCEDURE — 36415 COLL VENOUS BLD VENIPUNCTURE: CPT

## 2022-02-21 PROCEDURE — 85025 COMPLETE CBC W/AUTO DIFF WBC: CPT

## 2022-02-21 PROCEDURE — 80061 LIPID PANEL: CPT

## 2022-02-21 PROCEDURE — 82570 ASSAY OF URINE CREATININE: CPT

## 2022-02-21 PROCEDURE — 83036 HEMOGLOBIN GLYCOSYLATED A1C: CPT

## 2022-02-21 PROCEDURE — 80053 COMPREHEN METABOLIC PANEL: CPT

## 2022-02-22 LAB
EST. AVERAGE GLUCOSE BLD GHB EST-MCNC: 206 MG/DL (ref 68–126)
HBA1C MFR BLD: 8.8 % (ref ?–5.7)

## 2022-03-16 ENCOUNTER — TELEPHONE (OUTPATIENT)
Dept: INTERNAL MEDICINE CLINIC | Facility: CLINIC | Age: 82
End: 2022-03-16

## 2022-03-16 ENCOUNTER — OFFICE VISIT (OUTPATIENT)
Dept: INTERNAL MEDICINE CLINIC | Facility: CLINIC | Age: 82
End: 2022-03-16
Payer: MEDICARE

## 2022-03-16 VITALS
HEART RATE: 76 BPM | TEMPERATURE: 98 F | BODY MASS INDEX: 27.73 KG/M2 | SYSTOLIC BLOOD PRESSURE: 116 MMHG | OXYGEN SATURATION: 100 % | RESPIRATION RATE: 16 BRPM | HEIGHT: 69 IN | WEIGHT: 187.19 LBS | DIASTOLIC BLOOD PRESSURE: 58 MMHG

## 2022-03-16 DIAGNOSIS — L98.9 SKIN LESION: ICD-10-CM

## 2022-03-16 DIAGNOSIS — Z95.5 STENTED CORONARY ARTERY: ICD-10-CM

## 2022-03-16 DIAGNOSIS — I70.1 RENAL ARTERY STENOSIS (HCC): ICD-10-CM

## 2022-03-16 DIAGNOSIS — Z95.828 HISTORY OF REPAIR OF ANEURYSM OF ABDOMINAL AORTA USING ENDOVASCULAR STENT GRAFT: ICD-10-CM

## 2022-03-16 DIAGNOSIS — M54.50 CHRONIC BILATERAL LOW BACK PAIN WITHOUT SCIATICA: ICD-10-CM

## 2022-03-16 DIAGNOSIS — K22.0 ACHALASIA: ICD-10-CM

## 2022-03-16 DIAGNOSIS — M20.40 HAMMER TOE, ACQUIRED: ICD-10-CM

## 2022-03-16 DIAGNOSIS — E55.9 VITAMIN D DEFICIENCY: ICD-10-CM

## 2022-03-16 DIAGNOSIS — E78.00 PURE HYPERCHOLESTEROLEMIA: ICD-10-CM

## 2022-03-16 DIAGNOSIS — R80.9 MICROALBUMINURIA DUE TO TYPE 2 DIABETES MELLITUS (HCC): ICD-10-CM

## 2022-03-16 DIAGNOSIS — I48.91 ATRIAL FIBRILLATION WITH CONTROLLED VENTRICULAR RESPONSE (HCC): ICD-10-CM

## 2022-03-16 DIAGNOSIS — J44.9 CHRONIC OBSTRUCTIVE PULMONARY DISEASE, UNSPECIFIED COPD TYPE (HCC): ICD-10-CM

## 2022-03-16 DIAGNOSIS — Z86.73 HISTORY OF EMBOLIC STROKE: ICD-10-CM

## 2022-03-16 DIAGNOSIS — G89.29 CHRONIC BILATERAL LOW BACK PAIN WITHOUT SCIATICA: ICD-10-CM

## 2022-03-16 DIAGNOSIS — H35.30 MACULAR DEGENERATION, UNSPECIFIED LATERALITY, UNSPECIFIED TYPE: ICD-10-CM

## 2022-03-16 DIAGNOSIS — I70.0 AORTIC ATHEROSCLEROSIS (HCC): ICD-10-CM

## 2022-03-16 DIAGNOSIS — K21.9 GASTROESOPHAGEAL REFLUX DISEASE, UNSPECIFIED WHETHER ESOPHAGITIS PRESENT: ICD-10-CM

## 2022-03-16 DIAGNOSIS — I10 BENIGN ESSENTIAL HTN: ICD-10-CM

## 2022-03-16 DIAGNOSIS — E11.29 MICROALBUMINURIA DUE TO TYPE 2 DIABETES MELLITUS (HCC): ICD-10-CM

## 2022-03-16 DIAGNOSIS — R13.10 DYSPHAGIA, UNSPECIFIED TYPE: ICD-10-CM

## 2022-03-16 DIAGNOSIS — E78.5 DYSLIPIDEMIA: ICD-10-CM

## 2022-03-16 DIAGNOSIS — D64.9 NORMOCYTIC ANEMIA: ICD-10-CM

## 2022-03-16 DIAGNOSIS — E04.1 THYROID NODULE: ICD-10-CM

## 2022-03-16 DIAGNOSIS — E11.59 TYPE 2 DIABETES MELLITUS WITH OTHER CIRCULATORY COMPLICATION, WITHOUT LONG-TERM CURRENT USE OF INSULIN (HCC): ICD-10-CM

## 2022-03-16 DIAGNOSIS — Z87.81 HISTORY OF FEMUR FRACTURE: ICD-10-CM

## 2022-03-16 DIAGNOSIS — I25.10 CORONARY ARTERY DISEASE INVOLVING NATIVE CORONARY ARTERY OF NATIVE HEART WITHOUT ANGINA PECTORIS: ICD-10-CM

## 2022-03-16 DIAGNOSIS — K22.4 CORKSCREW ESOPHAGUS: ICD-10-CM

## 2022-03-16 DIAGNOSIS — Z00.00 ENCOUNTER FOR ANNUAL HEALTH EXAMINATION: Primary | ICD-10-CM

## 2022-03-16 DIAGNOSIS — Z91.81 AT RISK FOR FALLING: ICD-10-CM

## 2022-03-16 DIAGNOSIS — C82.11 GRADE 2 FOLLICULAR LYMPHOMA OF LYMPH NODES OF NECK (HCC): ICD-10-CM

## 2022-03-16 DIAGNOSIS — I71.4 ABDOMINAL AORTIC ANEURYSM (AAA) WITHOUT RUPTURE (HCC): ICD-10-CM

## 2022-03-16 DIAGNOSIS — Z85.72 HISTORY OF FOLLICULAR LYMPHOMA: ICD-10-CM

## 2022-03-16 DIAGNOSIS — I50.32 CHRONIC DIASTOLIC CHF (CONGESTIVE HEART FAILURE) (HCC): ICD-10-CM

## 2022-03-16 DIAGNOSIS — M46.96 INFLAMMATORY SPONDYLOPATHY OF LUMBAR REGION (HCC): ICD-10-CM

## 2022-03-16 DIAGNOSIS — E89.0 H/O PARTIAL THYROIDECTOMY: ICD-10-CM

## 2022-03-16 DIAGNOSIS — E04.9 ENLARGED THYROID: ICD-10-CM

## 2022-03-16 DIAGNOSIS — M47.816 ARTHRITIS, LUMBAR SPINE: ICD-10-CM

## 2022-03-16 DIAGNOSIS — I25.2 HISTORY OF MI (MYOCARDIAL INFARCTION): ICD-10-CM

## 2022-03-16 DIAGNOSIS — Z96.651 HISTORY OF TOTAL RIGHT KNEE REPLACEMENT: ICD-10-CM

## 2022-03-16 DIAGNOSIS — D69.6 THROMBOCYTOPENIA (HCC): ICD-10-CM

## 2022-03-16 PROCEDURE — 3078F DIAST BP <80 MM HG: CPT | Performed by: INTERNAL MEDICINE

## 2022-03-16 PROCEDURE — 96160 PT-FOCUSED HLTH RISK ASSMT: CPT | Performed by: INTERNAL MEDICINE

## 2022-03-16 PROCEDURE — 3074F SYST BP LT 130 MM HG: CPT | Performed by: INTERNAL MEDICINE

## 2022-03-16 PROCEDURE — 3008F BODY MASS INDEX DOCD: CPT | Performed by: INTERNAL MEDICINE

## 2022-03-16 PROCEDURE — G0439 PPPS, SUBSEQ VISIT: HCPCS | Performed by: INTERNAL MEDICINE

## 2022-03-16 PROCEDURE — 99397 PER PM REEVAL EST PAT 65+ YR: CPT | Performed by: INTERNAL MEDICINE

## 2022-03-16 RX ORDER — FUROSEMIDE 20 MG/1
20 TABLET ORAL DAILY
COMMUNITY
Start: 2022-01-01 | End: 2022-03-16

## 2022-03-16 RX ORDER — GLYBURIDE 5 MG/1
5 TABLET ORAL 2 TIMES DAILY WITH MEALS
Qty: 180 TABLET | Refills: 1 | Status: SHIPPED | OUTPATIENT
Start: 2022-03-16

## 2022-03-16 RX ORDER — BLOOD SUGAR DIAGNOSTIC
STRIP MISCELLANEOUS
Qty: 200 STRIP | Refills: 2 | Status: SHIPPED | OUTPATIENT
Start: 2022-03-16

## 2022-03-16 NOTE — TELEPHONE ENCOUNTER
[facility]   Placed in A.S bin for review and completion  Needs to be faxed back to [phone]

## 2022-03-17 NOTE — PROGRESS NOTES
Can we work with Shelley Rankin on trying to get Jardiance covered or investigate jardiance coverage, I really would prefer to do this in place of increasing her glyburide. Please call pt.

## 2022-03-18 ENCOUNTER — TELEPHONE (OUTPATIENT)
Dept: INTERNAL MEDICINE CLINIC | Facility: CLINIC | Age: 82
End: 2022-03-18

## 2022-03-25 RX ORDER — PRAVASTATIN SODIUM 80 MG/1
TABLET ORAL
Qty: 90 TABLET | Refills: 1 | Status: SHIPPED | OUTPATIENT
Start: 2022-03-25

## 2022-04-15 ENCOUNTER — TELEPHONE (OUTPATIENT)
Dept: INTERNAL MEDICINE CLINIC | Facility: CLINIC | Age: 82
End: 2022-04-15

## 2022-04-15 NOTE — TELEPHONE ENCOUNTER
Pt stated she dropped her sugars readings for AS yesterday (were placed in AS's folder) and her sugar readings have been high - in the 200's in the am's in the afternoon they're in the 400 range. Pt added she doesn't feel good today.  High TE

## 2022-04-15 NOTE — TELEPHONE ENCOUNTER
Appt scheduled for pt to see Austin Bailey NP [facility] on 4/19/22 at 8:30am.  Referral placed. Pt verbalizes understanding.  FYI to AS

## 2022-04-19 ENCOUNTER — APPOINTMENT (OUTPATIENT)
Dept: GENERAL RADIOLOGY | Facility: HOSPITAL | Age: 82
End: 2022-04-19
Attending: EMERGENCY MEDICINE
Payer: MEDICARE

## 2022-04-19 ENCOUNTER — HOSPITAL ENCOUNTER (OUTPATIENT)
Facility: HOSPITAL | Age: 82
Setting detail: OBSERVATION
Discharge: HOME HEALTH | End: 2022-04-22
Attending: EMERGENCY MEDICINE | Admitting: HOSPITALIST
Payer: MEDICARE

## 2022-04-19 ENCOUNTER — OFFICE VISIT (OUTPATIENT)
Dept: ENDOCRINOLOGY CLINIC | Facility: CLINIC | Age: 82
End: 2022-04-19
Payer: MEDICARE

## 2022-04-19 VITALS
HEIGHT: 70 IN | HEART RATE: 88 BPM | DIASTOLIC BLOOD PRESSURE: 68 MMHG | BODY MASS INDEX: 25.2 KG/M2 | SYSTOLIC BLOOD PRESSURE: 102 MMHG | RESPIRATION RATE: 20 BRPM | WEIGHT: 176 LBS | TEMPERATURE: 98 F

## 2022-04-19 DIAGNOSIS — K22.0 ACHALASIA: ICD-10-CM

## 2022-04-19 DIAGNOSIS — E11.65 UNCONTROLLED TYPE 2 DIABETES MELLITUS WITH HYPERGLYCEMIA (HCC): Primary | ICD-10-CM

## 2022-04-19 DIAGNOSIS — R63.4 WEIGHT LOSS: ICD-10-CM

## 2022-04-19 DIAGNOSIS — R13.10 DYSPHAGIA, UNSPECIFIED TYPE: ICD-10-CM

## 2022-04-19 DIAGNOSIS — E11.59 TYPE 2 DIABETES MELLITUS WITH OTHER CIRCULATORY COMPLICATION, WITHOUT LONG-TERM CURRENT USE OF INSULIN (HCC): Primary | ICD-10-CM

## 2022-04-19 LAB
ALBUMIN SERPL-MCNC: 2.7 G/DL (ref 3.4–5)
ALBUMIN/GLOB SERPL: 0.7 {RATIO} (ref 1–2)
ALP LIVER SERPL-CCNC: 94 U/L
ALT SERPL-CCNC: 9 U/L
ANION GAP SERPL CALC-SCNC: 8 MMOL/L (ref 0–18)
AST SERPL-CCNC: 7 U/L (ref 15–37)
ATRIAL RATE: 67 BPM
BASOPHILS # BLD AUTO: 0.04 X10(3) UL (ref 0–0.2)
BASOPHILS NFR BLD AUTO: 0.5 %
BILIRUB SERPL-MCNC: 0.4 MG/DL (ref 0.1–2)
BILIRUB UR QL STRIP.AUTO: NEGATIVE
BUN BLD-MCNC: 24 MG/DL (ref 7–18)
CALCIUM BLD-MCNC: 9.2 MG/DL (ref 8.5–10.1)
CARTRIDGE LOT#: 918 NUMERIC
CHLORIDE SERPL-SCNC: 93 MMOL/L (ref 98–112)
CO2 SERPL-SCNC: 31 MMOL/L (ref 21–32)
CREAT BLD-MCNC: 1.31 MG/DL
EOSINOPHIL # BLD AUTO: 0.11 X10(3) UL (ref 0–0.7)
EOSINOPHIL NFR BLD AUTO: 1.3 %
ERYTHROCYTE [DISTWIDTH] IN BLOOD BY AUTOMATED COUNT: 16.1 %
EST. AVERAGE GLUCOSE BLD GHB EST-MCNC: 269 MG/DL (ref 68–126)
GLOBULIN PLAS-MCNC: 3.8 G/DL (ref 2.8–4.4)
GLUCOSE BLD-MCNC: 142 MG/DL (ref 70–99)
GLUCOSE BLD-MCNC: 191 MG/DL (ref 70–99)
GLUCOSE BLD-MCNC: 359 MG/DL (ref 70–99)
GLUCOSE BLD-MCNC: 364 MG/DL (ref 70–99)
GLUCOSE UR STRIP.AUTO-MCNC: >=500 MG/DL
HBA1C MFR BLD: 11 % (ref ?–5.7)
HCT VFR BLD AUTO: 38.9 %
HEMOGLOBIN A1C: 10.4 % (ref 4.3–5.6)
HGB BLD-MCNC: 12.2 G/DL
IMM GRANULOCYTES # BLD AUTO: 0.03 X10(3) UL (ref 0–1)
IMM GRANULOCYTES NFR BLD: 0.4 %
KETONES UR STRIP.AUTO-MCNC: NEGATIVE MG/DL
LYMPHOCYTES # BLD AUTO: 0.4 X10(3) UL (ref 1–4)
LYMPHOCYTES NFR BLD AUTO: 4.7 %
MCH RBC QN AUTO: 26.6 PG (ref 26–34)
MCHC RBC AUTO-ENTMCNC: 31.4 G/DL (ref 31–37)
MCV RBC AUTO: 84.9 FL
MONOCYTES # BLD AUTO: 0.63 X10(3) UL (ref 0.1–1)
MONOCYTES NFR BLD AUTO: 7.4 %
NEUTROPHILS # BLD AUTO: 7.29 X10 (3) UL (ref 1.5–7.7)
NEUTROPHILS # BLD AUTO: 7.29 X10(3) UL (ref 1.5–7.7)
NEUTROPHILS NFR BLD AUTO: 85.7 %
NITRITE UR QL STRIP.AUTO: NEGATIVE
OSMOLALITY SERPL CALC.SUM OF ELEC: 293 MOSM/KG (ref 275–295)
PH UR STRIP.AUTO: 7 [PH] (ref 5–8)
PLATELET # BLD AUTO: 220 10(3)UL (ref 150–450)
POTASSIUM SERPL-SCNC: 4.1 MMOL/L (ref 3.5–5.1)
PROT SERPL-MCNC: 6.5 G/DL (ref 6.4–8.2)
PROT UR STRIP.AUTO-MCNC: NEGATIVE MG/DL
Q-T INTERVAL: 368 MS
QRS DURATION: 80 MS
QTC CALCULATION (BEZET): 410 MS
R AXIS: 30 DEGREES
RBC # BLD AUTO: 4.58 X10(6)UL
RBC UR QL AUTO: NEGATIVE
SARS-COV-2 RNA RESP QL NAA+PROBE: NOT DETECTED
SODIUM SERPL-SCNC: 132 MMOL/L (ref 136–145)
SP GR UR STRIP.AUTO: 1 (ref 1–1.03)
T AXIS: 17 DEGREES
TROPONIN I HIGH SENSITIVITY: 6 NG/L
UROBILINOGEN UR STRIP.AUTO-MCNC: <2 MG/DL
VENTRICULAR RATE: 75 BPM
WBC # BLD AUTO: 8.5 X10(3) UL (ref 4–11)
WBC #/AREA URNS AUTO: >50 /HPF

## 2022-04-19 PROCEDURE — 99215 OFFICE O/P EST HI 40 MIN: CPT | Performed by: NURSE PRACTITIONER

## 2022-04-19 PROCEDURE — 3008F BODY MASS INDEX DOCD: CPT | Performed by: NURSE PRACTITIONER

## 2022-04-19 PROCEDURE — 71045 X-RAY EXAM CHEST 1 VIEW: CPT | Performed by: EMERGENCY MEDICINE

## 2022-04-19 PROCEDURE — 3074F SYST BP LT 130 MM HG: CPT | Performed by: NURSE PRACTITIONER

## 2022-04-19 PROCEDURE — 99220 INITIAL OBSERVATION CARE,LEVL III: CPT | Performed by: HOSPITALIST

## 2022-04-19 PROCEDURE — 83036 HEMOGLOBIN GLYCOSYLATED A1C: CPT | Performed by: NURSE PRACTITIONER

## 2022-04-19 PROCEDURE — 3078F DIAST BP <80 MM HG: CPT | Performed by: NURSE PRACTITIONER

## 2022-04-19 RX ORDER — ATORVASTATIN CALCIUM 20 MG/1
20 TABLET, FILM COATED ORAL NIGHTLY
Refills: 1 | Status: DISCONTINUED | OUTPATIENT
Start: 2022-04-20 | End: 2022-04-22

## 2022-04-19 RX ORDER — ACETAMINOPHEN 325 MG/1
650 TABLET ORAL EVERY 6 HOURS PRN
Status: DISCONTINUED | OUTPATIENT
Start: 2022-04-19 | End: 2022-04-22

## 2022-04-19 RX ORDER — ASPIRIN 81 MG/1
324 TABLET, CHEWABLE ORAL ONCE
Status: DISCONTINUED | OUTPATIENT
Start: 2022-04-19 | End: 2022-04-19

## 2022-04-19 RX ORDER — TRAZODONE HYDROCHLORIDE 50 MG/1
50 TABLET ORAL NIGHTLY PRN
Status: DISCONTINUED | OUTPATIENT
Start: 2022-04-19 | End: 2022-04-22

## 2022-04-19 RX ORDER — ALBUTEROL SULFATE 90 UG/1
2 AEROSOL, METERED RESPIRATORY (INHALATION) EVERY 4 HOURS PRN
Status: DISCONTINUED | OUTPATIENT
Start: 2022-04-19 | End: 2022-04-19

## 2022-04-19 RX ORDER — MELATONIN
3 NIGHTLY PRN
Status: DISCONTINUED | OUTPATIENT
Start: 2022-04-19 | End: 2022-04-22

## 2022-04-19 RX ORDER — MAGNESIUM HYDROXIDE/ALUMINUM HYDROXICE/SIMETHICONE 120; 1200; 1200 MG/30ML; MG/30ML; MG/30ML
30 SUSPENSION ORAL 4 TIMES DAILY PRN
Status: DISCONTINUED | OUTPATIENT
Start: 2022-04-19 | End: 2022-04-22

## 2022-04-19 RX ORDER — CALCIUM CARBONATE 200(500)MG
1000 TABLET,CHEWABLE ORAL 3 TIMES DAILY PRN
Status: DISCONTINUED | OUTPATIENT
Start: 2022-04-19 | End: 2022-04-22

## 2022-04-19 RX ORDER — BENZONATATE 100 MG/1
100 CAPSULE ORAL 3 TIMES DAILY PRN
Status: DISCONTINUED | OUTPATIENT
Start: 2022-04-19 | End: 2022-04-22

## 2022-04-19 RX ORDER — ONDANSETRON 2 MG/ML
8 INJECTION INTRAMUSCULAR; INTRAVENOUS EVERY 6 HOURS PRN
Status: DISCONTINUED | OUTPATIENT
Start: 2022-04-19 | End: 2022-04-22

## 2022-04-19 RX ORDER — LEVOTHYROXINE SODIUM 0.05 MG/1
50 TABLET ORAL
Status: DISCONTINUED | OUTPATIENT
Start: 2022-04-20 | End: 2022-04-22

## 2022-04-19 RX ORDER — NICOTINE POLACRILEX 4 MG
30 LOZENGE BUCCAL
Status: DISCONTINUED | OUTPATIENT
Start: 2022-04-19 | End: 2022-04-22

## 2022-04-19 RX ORDER — ALBUTEROL SULFATE 90 UG/1
1 AEROSOL, METERED RESPIRATORY (INHALATION) EVERY 4 HOURS PRN
Status: DISCONTINUED | OUTPATIENT
Start: 2022-04-19 | End: 2022-04-22

## 2022-04-19 RX ORDER — INSULIN ASPART 100 [IU]/ML
0.15 INJECTION, SOLUTION INTRAVENOUS; SUBCUTANEOUS ONCE
Status: COMPLETED | OUTPATIENT
Start: 2022-04-19 | End: 2022-04-19

## 2022-04-19 RX ORDER — GUAIFENESIN 600 MG
600 TABLET, EXTENDED RELEASE 12 HR ORAL 2 TIMES DAILY PRN
Status: DISCONTINUED | OUTPATIENT
Start: 2022-04-19 | End: 2022-04-22

## 2022-04-19 RX ORDER — PANTOPRAZOLE SODIUM 20 MG/1
20 TABLET, DELAYED RELEASE ORAL
Refills: 1 | Status: DISCONTINUED | OUTPATIENT
Start: 2022-04-20 | End: 2022-04-20

## 2022-04-19 RX ORDER — ASPIRIN 81 MG/1
81 TABLET ORAL DAILY
Status: DISCONTINUED | OUTPATIENT
Start: 2022-04-20 | End: 2022-04-22

## 2022-04-19 RX ORDER — DEXTROSE MONOHYDRATE 25 G/50ML
50 INJECTION, SOLUTION INTRAVENOUS
Status: DISCONTINUED | OUTPATIENT
Start: 2022-04-19 | End: 2022-04-22

## 2022-04-19 RX ORDER — SODIUM CHLORIDE, SODIUM LACTATE, POTASSIUM CHLORIDE, CALCIUM CHLORIDE 600; 310; 30; 20 MG/100ML; MG/100ML; MG/100ML; MG/100ML
INJECTION, SOLUTION INTRAVENOUS CONTINUOUS
Status: DISCONTINUED | OUTPATIENT
Start: 2022-04-19 | End: 2022-04-19

## 2022-04-19 RX ORDER — NICOTINE POLACRILEX 4 MG
15 LOZENGE BUCCAL
Status: DISCONTINUED | OUTPATIENT
Start: 2022-04-19 | End: 2022-04-22

## 2022-04-19 RX ORDER — ECHINACEA PURPUREA EXTRACT 125 MG
1 TABLET ORAL
Status: DISCONTINUED | OUTPATIENT
Start: 2022-04-19 | End: 2022-04-22

## 2022-04-19 RX ORDER — SIMETHICONE 80 MG
80 TABLET,CHEWABLE ORAL 4 TIMES DAILY PRN
Status: DISCONTINUED | OUTPATIENT
Start: 2022-04-19 | End: 2022-04-22

## 2022-04-19 RX ORDER — SODIUM CHLORIDE/ALOE VERA
GEL (GRAM) NASAL AS NEEDED
Status: DISCONTINUED | OUTPATIENT
Start: 2022-04-19 | End: 2022-04-22

## 2022-04-19 RX ORDER — HYOSCYAMINE SULFATE 0.125 MG
0.12 TABLET,DISINTEGRATING ORAL EVERY 4 HOURS PRN
Status: DISCONTINUED | OUTPATIENT
Start: 2022-04-19 | End: 2022-04-22

## 2022-04-19 RX ORDER — SODIUM CHLORIDE 9 MG/ML
INJECTION, SOLUTION INTRAVENOUS CONTINUOUS
Status: DISCONTINUED | OUTPATIENT
Start: 2022-04-19 | End: 2022-04-20

## 2022-04-19 NOTE — PROGRESS NOTES
NURSING ADMISSION NOTE      Patient admitted via Cart  Oriented to room. Safety precautions initiated. Bed in low position. Call light within reach. Patient return demonstrated use of call light. Admission navigator completed. New orders acknowledged. Patient is A&Ox4, cooperative. Vitals signs WNL. Afib controlled on tele. Denies any pain or palpitations. Endorses weight loss and swallowing difficulty from hx achalasia. Tenderness in RLQ with palpation. Tolerating diet, no nausea or vomiting at this time. Positive orthostatics, having dizziness with standing. Instructed to call prior to gettcing up. Call light within reach. High fall risk, up with walker. Receiving xarelto for prophylaxis. QID acuchecks and insulin for elevated blood glucose. IV rocephin for UTI. GI consulted and aware. Patient updated on plan of care, verbalized understanding. Hourly rounding. Will continue to monitor closely.

## 2022-04-19 NOTE — ED INITIAL ASSESSMENT (HPI)
Pt here due to chest pain. Two weeks ago her BS has been running high. She is having N/V. Friday and Saturday started having pressure on the left side of her chest. Saturday night had pinkish/red vomit. Today at her diabetic center sent her here because her levels are out of wack.

## 2022-04-19 NOTE — PROGRESS NOTES
04/19/22 1647 04/19/22 1649 04/19/22 1651   Vital Signs   Pulse 107 101 95   Heart Rate Source Monitor Monitor Monitor   Resp 18 18 22   Respiratory Quality Normal Normal Normal   /54 95/60 (!) 77/44   MAP (mmHg) 67 67 52   BP Location Left arm Left arm Left arm   BP Method Automatic Automatic Automatic   Patient Position Lying Sitting Standing      Orthostatic BP measurements. Patient experience dizziness upon standing during exam. MD made aware.

## 2022-04-19 NOTE — ED QUICK NOTES
Orders for admission, patient is aware of plan and ready to go upstairs. Any questions, please call ED RN  at extension [phone].      Patient Covid vaccination status: Fully vaccinated and immunocompromised     COVID Test Ordered in ED: Rapid SARS-CoV-2 by PCR    COVID Suspicion at Admission: N/A    Running Infusions:      Mental Status/LOC at time of transport: x4    Other pertinent information:   CIWA score: N/A   NIH score:  N/A

## 2022-04-20 LAB
ANION GAP SERPL CALC-SCNC: 6 MMOL/L (ref 0–18)
BASOPHILS # BLD AUTO: 0.04 X10(3) UL (ref 0–0.2)
BASOPHILS NFR BLD AUTO: 0.5 %
BUN BLD-MCNC: 20 MG/DL (ref 7–18)
CALCIUM BLD-MCNC: 8.4 MG/DL (ref 8.5–10.1)
CHLORIDE SERPL-SCNC: 101 MMOL/L (ref 98–112)
CO2 SERPL-SCNC: 31 MMOL/L (ref 21–32)
CREAT BLD-MCNC: 1.25 MG/DL
EOSINOPHIL # BLD AUTO: 0.23 X10(3) UL (ref 0–0.7)
EOSINOPHIL NFR BLD AUTO: 2.8 %
ERYTHROCYTE [DISTWIDTH] IN BLOOD BY AUTOMATED COUNT: 16.4 %
GLUCOSE BLD-MCNC: 130 MG/DL (ref 70–99)
GLUCOSE BLD-MCNC: 130 MG/DL (ref 70–99)
GLUCOSE BLD-MCNC: 152 MG/DL (ref 70–99)
GLUCOSE BLD-MCNC: 153 MG/DL (ref 70–99)
GLUCOSE BLD-MCNC: 211 MG/DL (ref 70–99)
GLUCOSE BLD-MCNC: 232 MG/DL (ref 70–99)
HCT VFR BLD AUTO: 35.9 %
HGB BLD-MCNC: 10.9 G/DL
IMM GRANULOCYTES # BLD AUTO: 0.02 X10(3) UL (ref 0–1)
IMM GRANULOCYTES NFR BLD: 0.2 %
LYMPHOCYTES # BLD AUTO: 0.5 X10(3) UL (ref 1–4)
LYMPHOCYTES NFR BLD AUTO: 6.1 %
MAGNESIUM SERPL-MCNC: 1.7 MG/DL (ref 1.6–2.6)
MCH RBC QN AUTO: 26.5 PG (ref 26–34)
MCHC RBC AUTO-ENTMCNC: 30.4 G/DL (ref 31–37)
MCV RBC AUTO: 87.1 FL
MONOCYTES # BLD AUTO: 0.66 X10(3) UL (ref 0.1–1)
MONOCYTES NFR BLD AUTO: 8.1 %
NEUTROPHILS # BLD AUTO: 6.69 X10 (3) UL (ref 1.5–7.7)
NEUTROPHILS # BLD AUTO: 6.69 X10(3) UL (ref 1.5–7.7)
NEUTROPHILS NFR BLD AUTO: 82.3 %
OSMOLALITY SERPL CALC.SUM OF ELEC: 292 MOSM/KG (ref 275–295)
PHOSPHATE SERPL-MCNC: 3.7 MG/DL (ref 2.5–4.9)
PLATELET # BLD AUTO: 211 10(3)UL (ref 150–450)
POTASSIUM SERPL-SCNC: 3.9 MMOL/L (ref 3.5–5.1)
RBC # BLD AUTO: 4.12 X10(6)UL
SODIUM SERPL-SCNC: 138 MMOL/L (ref 136–145)
WBC # BLD AUTO: 8.1 X10(3) UL (ref 4–11)

## 2022-04-20 PROCEDURE — 99225 SUBSEQUENT OBSERVATION CARE: CPT | Performed by: INTERNAL MEDICINE

## 2022-04-20 RX ORDER — MAGNESIUM OXIDE 400 MG (241.3 MG MAGNESIUM) TABLET
400 TABLET ONCE
Status: COMPLETED | OUTPATIENT
Start: 2022-04-20 | End: 2022-04-20

## 2022-04-20 RX ORDER — MULTIPLE VITAMINS W/ MINERALS TAB 9MG-400MCG
1 TAB ORAL DAILY
Status: DISCONTINUED | OUTPATIENT
Start: 2022-04-20 | End: 2022-04-22

## 2022-04-20 NOTE — PLAN OF CARE
Pt is aox4, VSS, afebrile. Pt can have a hard time swallowing with certain foods, she is aware what she can and cannot order, Due to achalasia. Sometimes vomits after meals, has not vomited today. On RA. Tele Afib. Afebrile. Xarelto on hold for procedure. Positive orthostats. C/o dizziness with standing. Up SB with walker. Briefed and continent. IV abx. QID accucheck. IV saline locked. Resting in chair with call light in place. Consent signed and in chair for procedure. Pt and family updated on poc. Pt has no c/o SOB or n/v/d. Will continue to monitor. Problem: SAFETY ADULT - FALL  Goal: Free from fall injury  Description: INTERVENTIONS:  - Assess pt frequently for physical needs  - Identify cognitive and physical deficits and behaviors that affect risk of falls.   - [place] fall precautions as indicated by assessment.  - Educate pt/family on patient safety including physical limitations  - Instruct pt to call for assistance with activity based on assessment  - Modify environment to reduce risk of injury  - Provide assistive devices as appropriate  - Consider OT/PT consult to assist with strengthening/mobility  - Encourage toileting schedule  Outcome: Progressing

## 2022-04-21 ENCOUNTER — ANESTHESIA EVENT (OUTPATIENT)
Dept: ENDOSCOPY | Facility: HOSPITAL | Age: 82
End: 2022-04-21
Payer: MEDICARE

## 2022-04-21 LAB
ANION GAP SERPL CALC-SCNC: 4 MMOL/L (ref 0–18)
BASOPHILS # BLD AUTO: 0.04 X10(3) UL (ref 0–0.2)
BASOPHILS NFR BLD AUTO: 0.6 %
BUN BLD-MCNC: 19 MG/DL (ref 7–18)
CALCIUM BLD-MCNC: 8.7 MG/DL (ref 8.5–10.1)
CHLORIDE SERPL-SCNC: 104 MMOL/L (ref 98–112)
CO2 SERPL-SCNC: 31 MMOL/L (ref 21–32)
CREAT BLD-MCNC: 0.94 MG/DL
EOSINOPHIL # BLD AUTO: 0.38 X10(3) UL (ref 0–0.7)
EOSINOPHIL NFR BLD AUTO: 5.6 %
ERYTHROCYTE [DISTWIDTH] IN BLOOD BY AUTOMATED COUNT: 16.6 %
GLUCOSE BLD-MCNC: 114 MG/DL (ref 70–99)
GLUCOSE BLD-MCNC: 154 MG/DL (ref 70–99)
GLUCOSE BLD-MCNC: 155 MG/DL (ref 70–99)
GLUCOSE BLD-MCNC: 172 MG/DL (ref 70–99)
GLUCOSE BLD-MCNC: 225 MG/DL (ref 70–99)
GLUCOSE BLD-MCNC: 72 MG/DL (ref 70–99)
HCT VFR BLD AUTO: 33.9 %
HGB BLD-MCNC: 10.6 G/DL
IMM GRANULOCYTES # BLD AUTO: 0.03 X10(3) UL (ref 0–1)
IMM GRANULOCYTES NFR BLD: 0.4 %
LYMPHOCYTES # BLD AUTO: 0.5 X10(3) UL (ref 1–4)
LYMPHOCYTES NFR BLD AUTO: 7.4 %
MAGNESIUM SERPL-MCNC: 1.9 MG/DL (ref 1.6–2.6)
MCH RBC QN AUTO: 27.1 PG (ref 26–34)
MCHC RBC AUTO-ENTMCNC: 31.3 G/DL (ref 31–37)
MCV RBC AUTO: 86.7 FL
MONOCYTES # BLD AUTO: 0.54 X10(3) UL (ref 0.1–1)
MONOCYTES NFR BLD AUTO: 8 %
NEUTROPHILS # BLD AUTO: 5.28 X10 (3) UL (ref 1.5–7.7)
NEUTROPHILS # BLD AUTO: 5.28 X10(3) UL (ref 1.5–7.7)
NEUTROPHILS NFR BLD AUTO: 78 %
OSMOLALITY SERPL CALC.SUM OF ELEC: 293 MOSM/KG (ref 275–295)
PHOSPHATE SERPL-MCNC: 3.8 MG/DL (ref 2.5–4.9)
PLATELET # BLD AUTO: 205 10(3)UL (ref 150–450)
POTASSIUM SERPL-SCNC: 3.9 MMOL/L (ref 3.5–5.1)
RBC # BLD AUTO: 3.91 X10(6)UL
SODIUM SERPL-SCNC: 139 MMOL/L (ref 136–145)
WBC # BLD AUTO: 6.8 X10(3) UL (ref 4–11)

## 2022-04-21 PROCEDURE — 99233 SBSQ HOSP IP/OBS HIGH 50: CPT | Performed by: CLINICAL NURSE SPECIALIST

## 2022-04-21 PROCEDURE — 99225 SUBSEQUENT OBSERVATION CARE: CPT | Performed by: INTERNAL MEDICINE

## 2022-04-21 NOTE — CM/SW NOTE
Department  notified of request for [facility], aidin referrals started. Assigned CM/SW to follow up with pt/family on further discharge planning.      Ezequiel ZIMMER [facility]

## 2022-04-21 NOTE — PLAN OF CARE
See flowsheets. Axo x 4, pleasant. RA,  sating well > 90%. No c/o of sob or coughing. Afebrile. QID accuchecks,- , see MAR. Positive orthostatics, c/o of dizziness when standing up, high fall risk, bed alarm on. Tele-controlled afib, HR 70s. Xeralto on hold. Continent. BM 4/20. No c/o of pain. Refuses SCDs. Full liquid diet, able to tolerate. SBA x walker. IV rocephin. Updated patient on plan of care. Frequent roundings, needs met. Call light within reach. TM. VSS. Plan for EGD/colonoscopy. Problem: Patient/Family Goals  Goal: Patient/Family Long Term Goal  Description: Patient's Long Term Goal: Discharge home with proper resources    Interventions:  - Follow plan of care  - See additional Care Plan goals for specific interventions  Outcome: Progressing  Goal: Patient/Family Short Term Goal  Description: Patient's Short Term Goal:  4/19 ([address] [place]): Able to sleep well, stable BS  4/20 ([address] [place]): Able to sleep well, stable BS    Interventions:   - Offer sleeping kit  -Offer sleeping aid  -Dim lighting  -Cluster care  -Scheduled insulin/QID accuchecks  - See additional Care Plan goals for specific interventions  Outcome: Progressing     Problem: SAFETY ADULT - FALL  Goal: Free from fall injury  Description: INTERVENTIONS:  - Assess pt frequently for physical needs  - Identify cognitive and physical deficits and behaviors that affect risk of falls.   - [place] fall precautions as indicated by assessment.  - Educate pt/family on patient safety including physical limitations  - Instruct pt to call for assistance with activity based on assessment  - Modify environment to reduce risk of injury  - Provide assistive devices as appropriate  - Consider OT/PT consult to assist with strengthening/mobility  - Encourage toileting schedule  Outcome: Progressing

## 2022-04-21 NOTE — PLAN OF CARE
Pt is aox4, VSS, afebrile. Sometimes vomits after meals, has not vomited today. Full liquid diet. On RA. Tele Afib. Afebrile. Xarelto on hold for procedure. Positive orthostats. Has not been dizzy with standing today. Up SB with walker, has cane from home at bedside. Briefed and continent. IV abx. QID accucheck. IV saline locked. Resting in chair with call light in place. Pt and family updated on poc. Pt has no c/o SOB or n/v/d. Will continue to monitor. Multidisciplinary Discharge Rounds held 4/21/2022. Treatment team members present today include , , Charge Nurse, Nurse, RT, PT and Pharmacy caring for Yohana. Other care providers present: GI, diabetic educator    Mobility Goal: up with cane    Readmission Risk:     [x] Low     [] Medium     [] High    Active issue(s) requiring resolution prior to discharge: orthostats, dizziness with standing, blood sugars    Anticipated discharge date: 4/23/2022    Current discharge plan: home    F/U appointment scheduled within 7 days. .. [] [facility] ([facility])     [] Pulmonologist     [x] Primary Care Physician     [x] Other Specialty    Watched the home discharge recovery videos related to diagnosis. .. [] Pneumonia     [] COPD    [] Home Health set up. [x] Care partner identified and updated with the plan of care. Problem: SAFETY ADULT - FALL  Goal: Free from fall injury  Description: INTERVENTIONS:  - Assess pt frequently for physical needs  - Identify cognitive and physical deficits and behaviors that affect risk of falls.   - [place] fall precautions as indicated by assessment.  - Educate pt/family on patient safety including physical limitations  - Instruct pt to call for assistance with activity based on assessment  - Modify environment to reduce risk of injury  - Provide assistive devices as appropriate  - Consider OT/PT consult to assist with strengthening/mobility  - Encourage toileting schedule  Outcome: Progressing

## 2022-04-22 ENCOUNTER — ANESTHESIA (OUTPATIENT)
Dept: ENDOSCOPY | Facility: HOSPITAL | Age: 82
End: 2022-04-22
Payer: MEDICARE

## 2022-04-22 VITALS
HEART RATE: 74 BPM | BODY MASS INDEX: 25.2 KG/M2 | OXYGEN SATURATION: 94 % | HEIGHT: 70 IN | DIASTOLIC BLOOD PRESSURE: 77 MMHG | SYSTOLIC BLOOD PRESSURE: 97 MMHG | WEIGHT: 176 LBS | TEMPERATURE: 98 F | RESPIRATION RATE: 22 BRPM

## 2022-04-22 LAB
GLUCOSE BLD-MCNC: 108 MG/DL (ref 70–99)
GLUCOSE BLD-MCNC: 149 MG/DL (ref 70–99)
GLUCOSE BLD-MCNC: 162 MG/DL (ref 70–99)

## 2022-04-22 PROCEDURE — 0DB78ZX EXCISION OF STOMACH, PYLORUS, VIA NATURAL OR ARTIFICIAL OPENING ENDOSCOPIC, DIAGNOSTIC: ICD-10-PCS | Performed by: INTERNAL MEDICINE

## 2022-04-22 PROCEDURE — 0DB98ZX EXCISION OF DUODENUM, VIA NATURAL OR ARTIFICIAL OPENING ENDOSCOPIC, DIAGNOSTIC: ICD-10-PCS | Performed by: INTERNAL MEDICINE

## 2022-04-22 PROCEDURE — 0D758ZZ DILATION OF ESOPHAGUS, VIA NATURAL OR ARTIFICIAL OPENING ENDOSCOPIC: ICD-10-PCS | Performed by: INTERNAL MEDICINE

## 2022-04-22 PROCEDURE — 99231 SBSQ HOSP IP/OBS SF/LOW 25: CPT | Performed by: CLINICAL NURSE SPECIALIST

## 2022-04-22 PROCEDURE — 0DB58ZX EXCISION OF ESOPHAGUS, VIA NATURAL OR ARTIFICIAL OPENING ENDOSCOPIC, DIAGNOSTIC: ICD-10-PCS | Performed by: INTERNAL MEDICINE

## 2022-04-22 PROCEDURE — 99217 OBSERVATION CARE DISCHARGE: CPT | Performed by: INTERNAL MEDICINE

## 2022-04-22 RX ORDER — SODIUM CHLORIDE, SODIUM LACTATE, POTASSIUM CHLORIDE, CALCIUM CHLORIDE 600; 310; 30; 20 MG/100ML; MG/100ML; MG/100ML; MG/100ML
INJECTION, SOLUTION INTRAVENOUS CONTINUOUS
Status: DISCONTINUED | OUTPATIENT
Start: 2022-04-22 | End: 2022-04-22

## 2022-04-22 RX ORDER — INSULIN DETEMIR 100 [IU]/ML
14 INJECTION, SOLUTION SUBCUTANEOUS DAILY
Qty: 5 EACH | Refills: 0 | Status: SHIPPED | OUTPATIENT
Start: 2022-04-22

## 2022-04-22 RX ORDER — HYDROCODONE BITARTRATE AND ACETAMINOPHEN 10; 325 MG/1; MG/1
2 TABLET ORAL AS NEEDED
Status: DISCONTINUED | OUTPATIENT
Start: 2022-04-22 | End: 2022-04-22 | Stop reason: HOSPADM

## 2022-04-22 RX ORDER — HYDROCODONE BITARTRATE AND ACETAMINOPHEN 10; 325 MG/1; MG/1
1 TABLET ORAL AS NEEDED
Status: DISCONTINUED | OUTPATIENT
Start: 2022-04-22 | End: 2022-04-22 | Stop reason: HOSPADM

## 2022-04-22 RX ORDER — SULFAMETHOXAZOLE AND TRIMETHOPRIM 800; 160 MG/1; MG/1
1 TABLET ORAL 2 TIMES DAILY
Qty: 4 TABLET | Refills: 0 | Status: SHIPPED | OUTPATIENT
Start: 2022-04-23 | End: 2022-04-25

## 2022-04-22 RX ORDER — PEN NEEDLE, DIABETIC 32GX 5/32"
NEEDLE, DISPOSABLE MISCELLANEOUS
Qty: 100 EACH | Refills: 6 | Status: SHIPPED | OUTPATIENT
Start: 2022-04-22

## 2022-04-22 RX ORDER — SODIUM CHLORIDE 9 MG/ML
INJECTION, SOLUTION INTRAVENOUS CONTINUOUS PRN
Status: DISCONTINUED | OUTPATIENT
Start: 2022-04-22 | End: 2022-04-22 | Stop reason: SURG

## 2022-04-22 RX ORDER — TORSEMIDE 20 MG/1
40 TABLET ORAL DAILY
Qty: 180 TABLET | Refills: 1 | Status: SHIPPED | COMMUNITY
Start: 2022-04-25

## 2022-04-22 RX ORDER — HYDROMORPHONE HYDROCHLORIDE 1 MG/ML
0.4 INJECTION, SOLUTION INTRAMUSCULAR; INTRAVENOUS; SUBCUTANEOUS EVERY 5 MIN PRN
Status: DISCONTINUED | OUTPATIENT
Start: 2022-04-22 | End: 2022-04-22 | Stop reason: HOSPADM

## 2022-04-22 RX ORDER — LIDOCAINE HYDROCHLORIDE 10 MG/ML
INJECTION, SOLUTION EPIDURAL; INFILTRATION; INTRACAUDAL; PERINEURAL AS NEEDED
Status: DISCONTINUED | OUTPATIENT
Start: 2022-04-22 | End: 2022-04-22 | Stop reason: SURG

## 2022-04-22 RX ORDER — NALOXONE HYDROCHLORIDE 0.4 MG/ML
80 INJECTION, SOLUTION INTRAMUSCULAR; INTRAVENOUS; SUBCUTANEOUS AS NEEDED
Status: DISCONTINUED | OUTPATIENT
Start: 2022-04-22 | End: 2022-04-22 | Stop reason: HOSPADM

## 2022-04-22 RX ORDER — ONDANSETRON 2 MG/ML
4 INJECTION INTRAMUSCULAR; INTRAVENOUS AS NEEDED
Status: DISCONTINUED | OUTPATIENT
Start: 2022-04-22 | End: 2022-04-22 | Stop reason: HOSPADM

## 2022-04-22 RX ORDER — METOCLOPRAMIDE HYDROCHLORIDE 5 MG/ML
10 INJECTION INTRAMUSCULAR; INTRAVENOUS AS NEEDED
Status: DISCONTINUED | OUTPATIENT
Start: 2022-04-22 | End: 2022-04-22 | Stop reason: HOSPADM

## 2022-04-22 RX ADMIN — LIDOCAINE HYDROCHLORIDE 25 MG: 10 INJECTION, SOLUTION EPIDURAL; INFILTRATION; INTRACAUDAL; PERINEURAL at 12:29:00

## 2022-04-22 RX ADMIN — SODIUM CHLORIDE: 9 INJECTION, SOLUTION INTRAVENOUS at 12:19:00

## 2022-04-22 NOTE — PLAN OF CARE
Pt is alert and oriented X4. RA. On tele, AFIB. IV abx. QID accuchecks, carb-controlled diet. Diabetes teaching done with pt, pt gave herself insulin and demonstrated understanding. Pt went down for EGD this am, tolerated procedure well. No c/o pain, SOB, or n/v/d. Up ad-karen with walker/cane. Pt resting in bed. Pt and family updated on POC. Call light within reach. Anticipated discharged this evening, cleared by all MDs. Safety precautions in place.        Problem: Patient/Family Goals  Goal: Patient/Family Long Term Goal  Description: Patient's Long Term Goal: Discharge home with proper resources    Interventions:  - Follow plan of care  - See additional Care Plan goals for specific interventions  Outcome: Progressing  Goal: Patient/Family Short Term Goal  Description: Patient's Short Term Goal:  4/19 ([address] [place]): Able to sleep well, stable BS  4/20 ([address] [place]): Able to sleep well, stable BS\  4/22 noc- sleep well   4/22 AM: rest    Interventions:   - Offer sleeping kit  -Offer sleeping aid  -Dim lighting  -Cluster care  -Scheduled insulin/QID accuchecks  - See additional Care Plan goals for specific interventions  Outcome: Progressing

## 2022-04-22 NOTE — PLAN OF CARE
Problem: Hyperglycemia   Data: Ax04. Telemetry- sinus rhythm- sinus phan. Intermittent pulse ox- room air. Afebrile. Denied pain. Continent. Full liquid diet. ADA 1800. Accu checks. . Saline lock. Up with sba assist. Placed on NPO at midnight, plan for EGD today. Xalerto on hold. Intervention: metoprolol, insulin  Education: Medications, call light   Response: Cooperative with care    Problem: Patient/Family Goals  Goal: Patient/Family Long Term Goal  Description: Patient's Long Term Goal: Discharge home with proper resources    Interventions:  - Follow plan of care  - See additional Care Plan goals for specific interventions  Outcome: Progressing  Goal: Patient/Family Short Term Goal  Description: Patient's Short Term Goal:  4/19 ([address] [place]): Able to sleep well, stable BS  4/20 ([address] [place]): Able to sleep well, stable BS\  4/22 noc- sleep well     Interventions:   - Offer sleeping kit  -Offer sleeping aid  -Dim lighting  -Cluster care  -Scheduled insulin/QID accuchecks  - See additional Care Plan goals for specific interventions  Outcome: Progressing     Problem: SAFETY ADULT - FALL  Goal: Free from fall injury  Description: INTERVENTIONS:  - Assess pt frequently for physical needs  - Identify cognitive and physical deficits and behaviors that affect risk of falls.   - [place] fall precautions as indicated by assessment.  - Educate pt/family on patient safety including physical limitations  - Instruct pt to call for assistance with activity based on assessment  - Modify environment to reduce risk of injury  - Provide assistive devices as appropriate  - Consider OT/PT consult to assist with strengthening/mobility  - Encourage toileting schedule  Outcome: Progressing

## 2022-04-22 NOTE — HOME CARE LIAISON
Received referral via Aidin for Home Health services. Spoke w/ patient and [facility] [address] [place] [address], patient choice is [facility] [address] [place] [address] in [address] [place] [address] and contact information placed on AVS.Financial interest disclosure provided to patient. Notified CAT.

## 2022-04-25 ENCOUNTER — PATIENT OUTREACH (OUTPATIENT)
Dept: CASE MANAGEMENT | Age: 82
End: 2022-04-25

## 2022-04-25 PROCEDURE — 1111F DSCHRG MED/CURRENT MED MERGE: CPT

## 2022-04-27 ENCOUNTER — OFFICE VISIT (OUTPATIENT)
Dept: INTERNAL MEDICINE CLINIC | Facility: CLINIC | Age: 82
End: 2022-04-27
Payer: MEDICARE

## 2022-04-27 ENCOUNTER — TELEPHONE (OUTPATIENT)
Dept: ENDOCRINOLOGY CLINIC | Facility: CLINIC | Age: 82
End: 2022-04-27

## 2022-04-27 VITALS
TEMPERATURE: 99 F | HEIGHT: 69.69 IN | OXYGEN SATURATION: 98 % | WEIGHT: 180.19 LBS | BODY MASS INDEX: 26.09 KG/M2 | RESPIRATION RATE: 16 BRPM

## 2022-04-27 DIAGNOSIS — E11.65 TYPE 2 DIABETES MELLITUS WITH HYPERGLYCEMIA, WITH LONG-TERM CURRENT USE OF INSULIN (HCC): Primary | ICD-10-CM

## 2022-04-27 DIAGNOSIS — K22.0 ACHALASIA: ICD-10-CM

## 2022-04-27 DIAGNOSIS — Z79.4 TYPE 2 DIABETES MELLITUS WITH HYPERGLYCEMIA, WITH LONG-TERM CURRENT USE OF INSULIN (HCC): Primary | ICD-10-CM

## 2022-04-27 DIAGNOSIS — N17.9 AKI (ACUTE KIDNEY INJURY) (HCC): ICD-10-CM

## 2022-04-27 DIAGNOSIS — N30.00 ACUTE CYSTITIS WITHOUT HEMATURIA: ICD-10-CM

## 2022-04-27 PROBLEM — I71.40 ABDOMINAL AORTIC ANEURYSM (AAA) WITHOUT RUPTURE (HCC): Status: RESOLVED | Noted: 2017-04-03 | Resolved: 2022-04-27

## 2022-04-27 PROBLEM — I71.4 ABDOMINAL AORTIC ANEURYSM (AAA) WITHOUT RUPTURE (HCC): Status: RESOLVED | Noted: 2017-04-03 | Resolved: 2022-04-27

## 2022-04-27 PROBLEM — I71.4 ABDOMINAL AORTIC ANEURYSM (AAA) WITHOUT RUPTURE: Status: RESOLVED | Noted: 2017-04-03 | Resolved: 2022-04-27

## 2022-04-27 PROBLEM — I71.40 ABDOMINAL AORTIC ANEURYSM (AAA) WITHOUT RUPTURE: Status: RESOLVED | Noted: 2017-04-03 | Resolved: 2022-04-27

## 2022-04-27 PROCEDURE — 99496 TRANSJ CARE MGMT HIGH F2F 7D: CPT | Performed by: PHYSICIAN ASSISTANT

## 2022-04-27 PROCEDURE — 3008F BODY MASS INDEX DOCD: CPT | Performed by: PHYSICIAN ASSISTANT

## 2022-04-27 PROCEDURE — 87086 URINE CULTURE/COLONY COUNT: CPT | Performed by: PHYSICIAN ASSISTANT

## 2022-04-27 PROCEDURE — 1111F DSCHRG MED/CURRENT MED MERGE: CPT | Performed by: PHYSICIAN ASSISTANT

## 2022-04-27 NOTE — TELEPHONE ENCOUNTER
Received request to help with [facility] alarms while patient was seen by Sharyle Scrape, PA. Changed alarms to 70 and 250 okay per patient. Pt reports she is unsure what to do when she is high. Drink water. [facility] report to provider for review but advised pt report looks pretty good, so adjustments may not need to be made yet.

## 2022-04-27 NOTE — TELEPHONE ENCOUNTER
Reviewed Sydnie CGM. Overall control is excellent, 83% TIR with no hypoglycemia events. No change to diabetes medications at this time. Will discuss further at next follow up.

## 2022-04-28 NOTE — PROGRESS NOTES
Multiple attempts to reach pt and messages left with no return call. Patient went in for HFU appt with PCP office on 4/27/22. Encounter closing.

## 2022-04-28 NOTE — PROGRESS NOTES
Date: 2022    To: Elisa Ornelas  : 1940     I hope this letter finds you doing well. I am writing to inform you of the following: The biopsies obtained at the time of your recent endoscopic procedure were benign and showed no evidence of infection or malignancy. Please call the office at [phone] if there are any questions.     Ever Quach M.D.

## 2022-04-29 ENCOUNTER — LAB ENCOUNTER (OUTPATIENT)
Dept: LAB | Age: 82
End: 2022-04-29
Attending: PHYSICIAN ASSISTANT
Payer: MEDICARE

## 2022-04-29 DIAGNOSIS — N17.9 AKI (ACUTE KIDNEY INJURY) (HCC): ICD-10-CM

## 2022-04-29 LAB
ANION GAP SERPL CALC-SCNC: 10 MMOL/L (ref 0–18)
BUN BLD-MCNC: 24 MG/DL (ref 7–18)
CALCIUM BLD-MCNC: 9.2 MG/DL (ref 8.5–10.1)
CHLORIDE SERPL-SCNC: 97 MMOL/L (ref 98–112)
CO2 SERPL-SCNC: 30 MMOL/L (ref 21–32)
CREAT BLD-MCNC: 1.39 MG/DL
FASTING STATUS PATIENT QL REPORTED: NO
GLUCOSE BLD-MCNC: 171 MG/DL (ref 70–99)
OSMOLALITY SERPL CALC.SUM OF ELEC: 292 MOSM/KG (ref 275–295)
POTASSIUM SERPL-SCNC: 4.8 MMOL/L (ref 3.5–5.1)
SODIUM SERPL-SCNC: 137 MMOL/L (ref 136–145)

## 2022-04-29 PROCEDURE — 80048 BASIC METABOLIC PNL TOTAL CA: CPT

## 2022-04-29 PROCEDURE — 36415 COLL VENOUS BLD VENIPUNCTURE: CPT

## 2022-04-29 NOTE — PROGRESS NOTES
UTI has resolved. Please remind pt to do the BMP I ordered for her at [facility] visit. She does not need to fast, the plan was for her to do it before leaving the buildling.

## 2022-05-02 NOTE — PROGRESS NOTES
Cr is mildly elevated, trending up though not far from her basline of the last few months. Please notify pt and just remind her to drink water as able (h/o achalasia). Pt already has appt with me on Wed.

## 2022-05-04 ENCOUNTER — TELEPHONE (OUTPATIENT)
Dept: INTERNAL MEDICINE CLINIC | Facility: CLINIC | Age: 82
End: 2022-05-04

## 2022-05-04 NOTE — TELEPHONE ENCOUNTER
Pt informed to push fluids as tolerated. Will assess further tomorrow. Pt stated understanding and agreed to plan.

## 2022-05-04 NOTE — TELEPHONE ENCOUNTER
Spoke to pt. Pt said that ever since she left the hospital, she has still been feeling weak. When she is sitting, she is ok. But when she gets up out of her chair, she gets dizzy. When she walks to her front door, she get fatigued and has to sit down. She does not feel like she is drinking enough. Will sip water throughout the day. Gets maybe 2 glasses a day. She tries to drink 1-2 ensures a day. Ate cereal this morning and drank all of the milk. Pt scheduled with CB tomorrow. Her son will be taking her to the appt. Advised pt to try and drink more water. CB, anything needed prior to appt other than increasing fluids? Scheduled tomorrow.

## 2022-05-04 NOTE — TELEPHONE ENCOUNTER
Called pt to do ts'ing questions and pt stated she's been dizzy since she got home from the hospital and isn't feeling well and feels exhausted. Pt added she's feeling dehydrated but can hardly drink water.   High TE    Pt scheduled tomorrow with CB  Future Appointments   Date Time Provider Nahid Hobbs   5/5/2022  1:30 PM Izabella Becerril PA-C [facility] [facility]

## 2022-05-05 ENCOUNTER — HOSPITAL ENCOUNTER (INPATIENT)
Facility: HOSPITAL | Age: 82
LOS: 1 days | Discharge: HOME | End: 2022-05-08
Attending: EMERGENCY MEDICINE | Admitting: HOSPITALIST
Payer: MEDICARE

## 2022-05-05 ENCOUNTER — OFFICE VISIT (OUTPATIENT)
Dept: INTERNAL MEDICINE CLINIC | Facility: CLINIC | Age: 82
End: 2022-05-05
Payer: MEDICARE

## 2022-05-05 ENCOUNTER — APPOINTMENT (OUTPATIENT)
Dept: CT IMAGING | Facility: HOSPITAL | Age: 82
End: 2022-05-05
Attending: EMERGENCY MEDICINE
Payer: MEDICARE

## 2022-05-05 ENCOUNTER — HOSPITAL ENCOUNTER (OUTPATIENT)
Facility: HOSPITAL | Age: 82
Setting detail: OBSERVATION
Discharge: HOME HEALTH | End: 2022-05-08
Attending: EMERGENCY MEDICINE | Admitting: HOSPITALIST
Payer: MEDICARE

## 2022-05-05 VITALS
HEART RATE: 78 BPM | OXYGEN SATURATION: 98 % | SYSTOLIC BLOOD PRESSURE: 118 MMHG | WEIGHT: 180 LBS | RESPIRATION RATE: 18 BRPM | BODY MASS INDEX: 26.06 KG/M2 | TEMPERATURE: 98 F | HEIGHT: 69.69 IN | DIASTOLIC BLOOD PRESSURE: 74 MMHG

## 2022-05-05 DIAGNOSIS — R14.0 ABDOMINAL DISTENSION: ICD-10-CM

## 2022-05-05 DIAGNOSIS — R11.2 NAUSEA AND VOMITING, UNSPECIFIED VOMITING TYPE: Primary | ICD-10-CM

## 2022-05-05 DIAGNOSIS — C85.10 B-CELL LYMPHOMA (HCC): ICD-10-CM

## 2022-05-05 DIAGNOSIS — Z79.4 TYPE 2 DIABETES MELLITUS WITH HYPERGLYCEMIA, WITH LONG-TERM CURRENT USE OF INSULIN (HCC): ICD-10-CM

## 2022-05-05 DIAGNOSIS — R10.32 LEFT LOWER QUADRANT ABDOMINAL PAIN: ICD-10-CM

## 2022-05-05 DIAGNOSIS — E11.65 TYPE 2 DIABETES MELLITUS WITH HYPERGLYCEMIA, WITH LONG-TERM CURRENT USE OF INSULIN (HCC): ICD-10-CM

## 2022-05-05 DIAGNOSIS — Z85.79 HISTORY OF LYMPHOMA: ICD-10-CM

## 2022-05-05 DIAGNOSIS — R11.10 INTRACTABLE VOMITING: ICD-10-CM

## 2022-05-05 DIAGNOSIS — C85.90 LYMPHOMA, UNSPECIFIED BODY REGION, UNSPECIFIED LYMPHOMA TYPE (HCC): Primary | ICD-10-CM

## 2022-05-05 DIAGNOSIS — Z85.72 HISTORY OF FOLLICULAR LYMPHOMA: ICD-10-CM

## 2022-05-05 DIAGNOSIS — I71.4 ABDOMINAL ANEURYSM (HCC): ICD-10-CM

## 2022-05-05 PROBLEM — R10.9 ABDOMINAL PAIN: Status: ACTIVE | Noted: 2022-05-05

## 2022-05-05 PROBLEM — E11.9 TYPE 2 DIABETES MELLITUS WITHOUT COMPLICATION, WITH LONG-TERM CURRENT USE OF INSULIN (HCC): Status: ACTIVE | Noted: 2017-04-03

## 2022-05-05 PROBLEM — I48.91 ATRIAL FIBRILLATION (HCC): Status: ACTIVE | Noted: 2020-10-02

## 2022-05-05 PROBLEM — I71.40 ABDOMINAL ANEURYSM (HCC): Status: ACTIVE | Noted: 2022-05-05

## 2022-05-05 PROBLEM — I71.40 ABDOMINAL ANEURYSM: Status: ACTIVE | Noted: 2022-05-05

## 2022-05-05 LAB
ALBUMIN SERPL-MCNC: 2.8 G/DL (ref 3.4–5)
ALBUMIN/GLOB SERPL: 0.7 {RATIO} (ref 1–2)
ALP LIVER SERPL-CCNC: 80 U/L
ALT SERPL-CCNC: 9 U/L
ANION GAP SERPL CALC-SCNC: 8 MMOL/L (ref 0–18)
AST SERPL-CCNC: 15 U/L (ref 15–37)
BASOPHILS # BLD AUTO: 0.04 X10(3) UL (ref 0–0.2)
BASOPHILS NFR BLD AUTO: 0.4 %
BILIRUB SERPL-MCNC: 0.4 MG/DL (ref 0.1–2)
BILIRUB UR QL STRIP.AUTO: NEGATIVE
BUN BLD-MCNC: 25 MG/DL (ref 7–18)
CALCIUM BLD-MCNC: 9.5 MG/DL (ref 8.5–10.1)
CHLORIDE SERPL-SCNC: 99 MMOL/L (ref 98–112)
CO2 SERPL-SCNC: 26 MMOL/L (ref 21–32)
COLOR UR AUTO: YELLOW
CREAT BLD-MCNC: 1.32 MG/DL
EOSINOPHIL # BLD AUTO: 0.07 X10(3) UL (ref 0–0.7)
EOSINOPHIL NFR BLD AUTO: 0.6 %
ERYTHROCYTE [DISTWIDTH] IN BLOOD BY AUTOMATED COUNT: 16.1 %
GLOBULIN PLAS-MCNC: 3.8 G/DL (ref 2.8–4.4)
GLUCOSE BLD-MCNC: 169 MG/DL (ref 70–99)
GLUCOSE BLD-MCNC: 183 MG/DL (ref 70–99)
GLUCOSE BLD-MCNC: 197 MG/DL (ref 70–99)
GLUCOSE UR STRIP.AUTO-MCNC: NEGATIVE MG/DL
HCT VFR BLD AUTO: 40.5 %
HGB BLD-MCNC: 12.5 G/DL
IMM GRANULOCYTES # BLD AUTO: 0.05 X10(3) UL (ref 0–1)
IMM GRANULOCYTES NFR BLD: 0.5 %
KETONES UR STRIP.AUTO-MCNC: NEGATIVE MG/DL
LDH SERPL L TO P-CCNC: 335 U/L
LIPASE SERPL-CCNC: 44 U/L (ref 73–393)
LYMPHOCYTES # BLD AUTO: 0.63 X10(3) UL (ref 1–4)
LYMPHOCYTES NFR BLD AUTO: 5.7 %
MCH RBC QN AUTO: 26.8 PG (ref 26–34)
MCHC RBC AUTO-ENTMCNC: 30.9 G/DL (ref 31–37)
MCV RBC AUTO: 86.7 FL
MONOCYTES # BLD AUTO: 0.73 X10(3) UL (ref 0.1–1)
MONOCYTES NFR BLD AUTO: 6.6 %
NEUTROPHILS # BLD AUTO: 9.57 X10 (3) UL (ref 1.5–7.7)
NEUTROPHILS # BLD AUTO: 9.57 X10(3) UL (ref 1.5–7.7)
NEUTROPHILS NFR BLD AUTO: 86.2 %
NITRITE UR QL STRIP.AUTO: NEGATIVE
OSMOLALITY SERPL CALC.SUM OF ELEC: 286 MOSM/KG (ref 275–295)
PH UR STRIP.AUTO: 6 [PH] (ref 5–8)
PLATELET # BLD AUTO: 300 10(3)UL (ref 150–450)
POTASSIUM SERPL-SCNC: 5.1 MMOL/L (ref 3.5–5.1)
PROT SERPL-MCNC: 6.6 G/DL (ref 6.4–8.2)
PROT UR STRIP.AUTO-MCNC: NEGATIVE MG/DL
RBC # BLD AUTO: 4.67 X10(6)UL
RBC UR QL AUTO: NEGATIVE
SARS-COV-2 RNA RESP QL NAA+PROBE: NOT DETECTED
SODIUM SERPL-SCNC: 133 MMOL/L (ref 136–145)
SP GR UR STRIP.AUTO: 1.02 (ref 1–1.03)
TROPONIN I HIGH SENSITIVITY: 6 NG/L
UROBILINOGEN UR STRIP.AUTO-MCNC: 2 MG/DL
WBC # BLD AUTO: 11.1 X10(3) UL (ref 4–11)

## 2022-05-05 PROCEDURE — 74177 CT ABD & PELVIS W/CONTRAST: CPT | Performed by: EMERGENCY MEDICINE

## 2022-05-05 PROCEDURE — 99220 INITIAL OBSERVATION CARE,LEVL III: CPT | Performed by: HOSPITALIST

## 2022-05-05 PROCEDURE — 3078F DIAST BP <80 MM HG: CPT | Performed by: PHYSICIAN ASSISTANT

## 2022-05-05 PROCEDURE — 3074F SYST BP LT 130 MM HG: CPT | Performed by: PHYSICIAN ASSISTANT

## 2022-05-05 PROCEDURE — 1111F DSCHRG MED/CURRENT MED MERGE: CPT | Performed by: HOSPITALIST

## 2022-05-05 PROCEDURE — 3074F SYST BP LT 130 MM HG: CPT | Performed by: HOSPITALIST

## 2022-05-05 PROCEDURE — 1111F DSCHRG MED/CURRENT MED MERGE: CPT | Performed by: PHYSICIAN ASSISTANT

## 2022-05-05 PROCEDURE — 3008F BODY MASS INDEX DOCD: CPT | Performed by: HOSPITALIST

## 2022-05-05 PROCEDURE — 3078F DIAST BP <80 MM HG: CPT | Performed by: HOSPITALIST

## 2022-05-05 PROCEDURE — 3008F BODY MASS INDEX DOCD: CPT | Performed by: PHYSICIAN ASSISTANT

## 2022-05-05 PROCEDURE — 99215 OFFICE O/P EST HI 40 MIN: CPT | Performed by: PHYSICIAN ASSISTANT

## 2022-05-05 RX ORDER — ACETAMINOPHEN 325 MG/1
650 TABLET ORAL EVERY 6 HOURS PRN
Status: DISCONTINUED | OUTPATIENT
Start: 2022-05-05 | End: 2022-05-08

## 2022-05-05 RX ORDER — TRAMADOL HYDROCHLORIDE 50 MG/1
50 TABLET ORAL EVERY 6 HOURS PRN
Status: DISCONTINUED | OUTPATIENT
Start: 2022-05-05 | End: 2022-05-08

## 2022-05-05 RX ORDER — DEXTROSE MONOHYDRATE 25 G/50ML
50 INJECTION, SOLUTION INTRAVENOUS
Status: DISCONTINUED | OUTPATIENT
Start: 2022-05-05 | End: 2022-05-08

## 2022-05-05 RX ORDER — SODIUM CHLORIDE 9 MG/ML
INJECTION, SOLUTION INTRAVENOUS CONTINUOUS
Status: ACTIVE | OUTPATIENT
Start: 2022-05-05 | End: 2022-05-05

## 2022-05-05 RX ORDER — ONDANSETRON 2 MG/ML
4 INJECTION INTRAMUSCULAR; INTRAVENOUS EVERY 6 HOURS PRN
Status: DISCONTINUED | OUTPATIENT
Start: 2022-05-05 | End: 2022-05-08

## 2022-05-05 RX ORDER — HYDROMORPHONE HYDROCHLORIDE 1 MG/ML
0.5 INJECTION, SOLUTION INTRAMUSCULAR; INTRAVENOUS; SUBCUTANEOUS EVERY 30 MIN PRN
Status: ACTIVE | OUTPATIENT
Start: 2022-05-05 | End: 2022-05-05

## 2022-05-05 RX ORDER — SODIUM CHLORIDE 9 MG/ML
INJECTION, SOLUTION INTRAVENOUS CONTINUOUS
Status: DISCONTINUED | OUTPATIENT
Start: 2022-05-05 | End: 2022-05-07

## 2022-05-05 RX ORDER — HYOSCYAMINE SULFATE 0.125 MG
0.12 TABLET,DISINTEGRATING ORAL EVERY 4 HOURS PRN
Status: DISCONTINUED | OUTPATIENT
Start: 2022-05-05 | End: 2022-05-08

## 2022-05-05 RX ORDER — ENOXAPARIN SODIUM 100 MG/ML
1 INJECTION SUBCUTANEOUS EVERY 12 HOURS SCHEDULED
Status: DISCONTINUED | OUTPATIENT
Start: 2022-05-05 | End: 2022-05-08

## 2022-05-05 RX ORDER — PANTOPRAZOLE SODIUM 20 MG/1
20 TABLET, DELAYED RELEASE ORAL
Status: DISCONTINUED | OUTPATIENT
Start: 2022-05-06 | End: 2022-05-08

## 2022-05-05 RX ORDER — ONDANSETRON 2 MG/ML
4 INJECTION INTRAMUSCULAR; INTRAVENOUS ONCE
Status: COMPLETED | OUTPATIENT
Start: 2022-05-05 | End: 2022-05-05

## 2022-05-05 RX ORDER — LEVOTHYROXINE SODIUM 0.05 MG/1
50 TABLET ORAL
Status: DISCONTINUED | OUTPATIENT
Start: 2022-05-06 | End: 2022-05-08

## 2022-05-05 RX ORDER — ONDANSETRON 2 MG/ML
4 INJECTION INTRAMUSCULAR; INTRAVENOUS EVERY 4 HOURS PRN
Status: DISCONTINUED | OUTPATIENT
Start: 2022-05-05 | End: 2022-05-05

## 2022-05-05 RX ORDER — IOHEXOL 350 MG/ML
90 INJECTION, SOLUTION INTRAVENOUS
Status: COMPLETED | OUTPATIENT
Start: 2022-05-05 | End: 2022-05-05

## 2022-05-05 RX ORDER — ATORVASTATIN CALCIUM 20 MG/1
20 TABLET, FILM COATED ORAL NIGHTLY
Status: DISCONTINUED | OUTPATIENT
Start: 2022-05-05 | End: 2022-05-08

## 2022-05-05 RX ORDER — MELATONIN
3 NIGHTLY PRN
Status: DISCONTINUED | OUTPATIENT
Start: 2022-05-05 | End: 2022-05-08

## 2022-05-05 RX ORDER — ASPIRIN 81 MG/1
81 TABLET ORAL DAILY
Status: DISCONTINUED | OUTPATIENT
Start: 2022-05-06 | End: 2022-05-08

## 2022-05-05 RX ORDER — HYDROMORPHONE HYDROCHLORIDE 1 MG/ML
0.5 INJECTION, SOLUTION INTRAMUSCULAR; INTRAVENOUS; SUBCUTANEOUS EVERY 30 MIN PRN
Status: COMPLETED | OUTPATIENT
Start: 2022-05-05 | End: 2022-05-06

## 2022-05-05 RX ORDER — NICOTINE POLACRILEX 4 MG
30 LOZENGE BUCCAL
Status: DISCONTINUED | OUTPATIENT
Start: 2022-05-05 | End: 2022-05-08

## 2022-05-05 RX ORDER — NICOTINE POLACRILEX 4 MG
15 LOZENGE BUCCAL
Status: DISCONTINUED | OUTPATIENT
Start: 2022-05-05 | End: 2022-05-08

## 2022-05-05 NOTE — ED QUICK NOTES
Orders for admission, patient is aware of plan and ready to go upstairs.  Any questions, please call ED RN yoni at extension [phone]    Patient Covid vaccination status: Fully vaccinated and immunocompromised     COVID Test Ordered in ED: Rapid SARS-CoV-2 by PCR    COVID Suspicion at Admission: No risk with negative rapid    Running Infusions:  None    Mental Status/LOC at time of transport: aox4    Other pertinent information:   CIWA score: N/A   NIH score:  N/A

## 2022-05-05 NOTE — ED INITIAL ASSESSMENT (HPI)
Patient c/o abd pain umbilical and slightly lower left. Vomiting a few times a day worse at night. Night sweats for the past week.

## 2022-05-06 ENCOUNTER — APPOINTMENT (OUTPATIENT)
Dept: CT IMAGING | Facility: HOSPITAL | Age: 82
End: 2022-05-06
Attending: INTERNAL MEDICINE
Payer: MEDICARE

## 2022-05-06 PROBLEM — R19.06 EPIGASTRIC MASS: Status: ACTIVE | Noted: 2022-05-06

## 2022-05-06 PROBLEM — Z85.72 HISTORY OF FOLLICULAR LYMPHOMA: Status: ACTIVE | Noted: 2022-05-06

## 2022-05-06 LAB
ANION GAP SERPL CALC-SCNC: 6 MMOL/L (ref 0–18)
ATRIAL RATE: 375 BPM
BUN BLD-MCNC: 24 MG/DL (ref 7–18)
CALCIUM BLD-MCNC: 9.1 MG/DL (ref 8.5–10.1)
CANCER AG19-9 SERPL-ACNC: 31.3 U/ML (ref ?–37)
CEA SERPL-MCNC: 1.9 NG/ML (ref ?–5)
CHLORIDE SERPL-SCNC: 101 MMOL/L (ref 98–112)
CO2 SERPL-SCNC: 27 MMOL/L (ref 21–32)
CREAT BLD-MCNC: 1.07 MG/DL
GLUCOSE BLD-MCNC: 137 MG/DL (ref 70–99)
GLUCOSE BLD-MCNC: 161 MG/DL (ref 70–99)
GLUCOSE BLD-MCNC: 161 MG/DL (ref 70–99)
GLUCOSE BLD-MCNC: 174 MG/DL (ref 70–99)
GLUCOSE BLD-MCNC: 197 MG/DL (ref 70–99)
OSMOLALITY SERPL CALC.SUM OF ELEC: 286 MOSM/KG (ref 275–295)
POTASSIUM SERPL-SCNC: 5 MMOL/L (ref 3.5–5.1)
Q-T INTERVAL: 370 MS
QRS DURATION: 78 MS
QTC CALCULATION (BEZET): 407 MS
R AXIS: 28 DEGREES
SODIUM SERPL-SCNC: 134 MMOL/L (ref 136–145)
T AXIS: -4 DEGREES
VENTRICULAR RATE: 73 BPM

## 2022-05-06 PROCEDURE — 99223 1ST HOSP IP/OBS HIGH 75: CPT | Performed by: INTERNAL MEDICINE

## 2022-05-06 PROCEDURE — 71260 CT THORAX DX C+: CPT | Performed by: INTERNAL MEDICINE

## 2022-05-06 PROCEDURE — 99225 SUBSEQUENT OBSERVATION CARE: CPT | Performed by: INTERNAL MEDICINE

## 2022-05-06 RX ORDER — HYDROMORPHONE HYDROCHLORIDE 1 MG/ML
0.4 INJECTION, SOLUTION INTRAMUSCULAR; INTRAVENOUS; SUBCUTANEOUS EVERY 2 HOUR PRN
Status: DISCONTINUED | OUTPATIENT
Start: 2022-05-06 | End: 2022-05-07

## 2022-05-06 RX ORDER — IOHEXOL 350 MG/ML
75 INJECTION, SOLUTION INTRAVENOUS
Status: COMPLETED | OUTPATIENT
Start: 2022-05-06 | End: 2022-05-06

## 2022-05-06 RX ORDER — HYDROMORPHONE HYDROCHLORIDE 1 MG/ML
0.2 INJECTION, SOLUTION INTRAMUSCULAR; INTRAVENOUS; SUBCUTANEOUS EVERY 2 HOUR PRN
Status: DISCONTINUED | OUTPATIENT
Start: 2022-05-06 | End: 2022-05-07

## 2022-05-06 RX ORDER — HYDROMORPHONE HYDROCHLORIDE 1 MG/ML
0.8 INJECTION, SOLUTION INTRAMUSCULAR; INTRAVENOUS; SUBCUTANEOUS EVERY 2 HOUR PRN
Status: DISCONTINUED | OUTPATIENT
Start: 2022-05-06 | End: 2022-05-07

## 2022-05-06 RX ORDER — RUFINAMIDE 40 MG/ML
1 SUSPENSION ORAL DAILY
Status: DISCONTINUED | OUTPATIENT
Start: 2022-05-07 | End: 2022-05-08

## 2022-05-06 NOTE — IMAGING NOTE
SPoke with Delia Jordan RN re plan for CT guided Gastrohepatic ligament mass biopsy with DR Maria D Sanders plan for 5/7/22 am.   Patient will receive therapeutic dose of lovenox this am for bridging. No further blood thinners after that. Last dose of xarelto 5/5/22 am  Last dose of ASA 5/5/22 am  Please keep patient NPO after MN.   PT/INR in am 5/7/22

## 2022-05-06 NOTE — CONSULTS
[address] [place]    PATIENT'S NAME: Juan Jose MCCONNELL   ATTENDING PHYSICIAN: Georgana Angelucci. Yumiko Bacilio PHYSICIAN: Shanell Diaz M.D. PATIENT ACCOUNT#:   [de-identified]    LOCATION:  [address] [place] [address]  MEDICAL RECORD #:   [id number]       YOB: 1940  ADMISSION DATE:       05/05/2022      CONSULT DATE:  05/05/2022    REPORT OF CONSULTATION    HISTORY OF PRESENT ILLNESS:  An 80-year-old white female, consulted by Dr. 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 The patient has lost her appetite, but she does have a history of achalasia which partly contributes to this. Back in January 2020, she had a 5.4 cm aortic aneurysm that was treated with an endovascular stent graft repair by myself and Dr. Russ Vega with assistance from Dr. Suzy Al. The aneurysm was treated with an 18-[place] DrySeal sheath, a 12-[place] DrySeal sheath on the left side, with a 25 mm x 14.5 mm x 16 cm main body right, contralateral limb of 14.5 x 12 cm, with a right limb extension of 14.5 x 7 cm. Immediately after surgery in February 2020, a CT angiogram was done which showed no evidence for any endoleak with good apposition of the graft against the wall in the proximal portion, the overlapped areas, then distally, and decrease in size of the aneurysm down to 5.2 cm. 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   There seems to be good apposition proximally and distally and at the overlapped areas, so I do not think this is a type I, type III or type IV endoleak. The patient has no abdominal complaints right now, and she had no complaints over the area of the aneurysm. She had some tenderness in the left lower quadrant when examined by her son, Dr. Anita Kramer, when he examined her earlier. She denies any gangrene, tissue loss, ulceration, rest pain in lower legs. She currently has no chest pain or shortness breath, though she carries a diagnosis of atrial fibrillation with cardiomyopathy, diastolic heart failure, being followed by Dr. Steven Sauceda. She denies any CVA, TIA, visual field defect, or aphasia. PAST MEDICAL HISTORY:  She has a history of atrial fibrillation. The patient had some anemia in the past.  She has had endoscopies recently and biopsies done on the GI tract by the Gastroenterology service. History of non ST-segment myocardial infarction back in August 2019. She carries a diagnosis of hypertension and dyslipidemia. PAST SURGICAL HISTORY:  Hysterectomy, knee replacement surgery. She has had previous PTCA of the coronary arteries on the LAD, hernia repair, podiatric surgery of her feet, thyroidectomy. MEDICATIONS:  Insulin, Septra, torsemide 20 mg a day, pravastatin 80 mg a day, metoprolol tartrate 25 mg b.i.d., levothyroxine, aspirin 81 mg a day, potassium chloride, omeprazole, metformin 500 mg b.i.d. ALLERGIES:  She has an allergy to tape. SOCIAL HISTORY:  She denies any EtOH abuse, drug abuse, tobacco abuse. She is single, lives with 1 of her sons. She has 4 kids. Still active. She was playing music the last time I saw her. FAMILY HISTORY:  The patient has a family history of diabetes, CVA, hypertension. There is a family history of aneurysm per the patient. PHYSICAL EXAMINATION:  GENERAL:  She currently is awake, alert. She is in no acute distress. VITAL SIGNS:  Stable.   Blood pressure is 119/80, heart rate 72, respirations 20, afebrile. HEENT:  Pupils equal, round. Sclerae clear. Mouth without lesions. NECK:  No cervical bruit. No JVD. LUNGS:  Clear to auscultation. HEART:  Normal.  Unable to appreciate a murmur. ABDOMEN:  Soft. Maybe slightly enlarged, but there is no tenderness. EXTREMITIES:  Femoral, popliteal, and pedal pulses are palpable. Upper extremity pulses are palpable. No gangrene or tissue loss in lower legs. No clubbing of the fingertips. NEUROLOGIC:  She is moving all 4 extremities. LABORATORY DATA:  Labs were reviewed. Her last creatinine was 1.32 with a BUN of 25. Sodium was up to 5.1. Her hemoglobin was 11.1, platelet count 565,174. CT angiogram was reviewed and as previously mentioned was the stent graft. IMPRESSION:  Patient with multiple lymph nodes based upon CT angiogram imaging with a past history of a lymphoma, being seen tomorrow morning by Dr. 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 We will have the films reviewed. We will discuss and review with Dr. Ladonna Salgado. She may have to have intervention with Dr. Jai Iglesias who works at [facility], who may provide the best approach for treating this with an endovascular procedure or possibly through a CT scan-directed coiling embolization within the aneurysm sac as an outpatient procedure. We will discuss and review this with the patient's son, Dr. Yoel Estevez. Currently, I think the aneurysm is not the main priority as well as finding out the possibility for recurrence of her cancer and the strategy by Dr. Marshall Moreno for treatment. Patient's medical management per Dr. Brad Hilario, and he and Dr. Marshall Moreno will make the final decisions for this treatment. I spent at least 45 minutes with the patient trying to answer all her questions.   I gave her my card with my cell phone number.     Dictated By Genoveva Tyson M.D.  d: 05/05/2022 22:22:03  t: 05/05/2022 22:52:47  [facility] [id number]  JJW/    cc: Genoveva Tyson M.D.

## 2022-05-06 NOTE — PLAN OF CARE
Assumed care for this pt upon admission or room 7601. Pt alert oriented [place] and had family at bedside and attentive. Pt iv fluids iniitiated per orders, pain managaged by prn medications.  Will continue to follow  Problem: Diabetes/Glucose Control  Goal: Glucose maintained within prescribed range  Description: INTERVENTIONS:  - Monitor Blood Glucose as ordered  - Assess for signs and symptoms of hyperglycemia and hypoglycemia  - Administer ordered medications to maintain glucose within target range  - Assess barriers to adequate nutritional intake and initiate nutrition consult as needed  - Instruct patient on self management of diabetes  Outcome: Progressing     Problem: GASTROINTESTINAL - ADULT  Goal: Minimal or absence of nausea and vomiting  Description: INTERVENTIONS:  - Maintain adequate hydration with IV or PO as ordered and tolerated  - Nasogastric tube to low intermittent suction as ordered  - Evaluate effectiveness of ordered antiemetic medications  - Provide nonpharmacologic comfort measures as appropriate  - Advance diet as tolerated, if ordered  - Obtain nutritional consult as needed  - Evaluate fluid balance  Outcome: Progressing  Goal: Maintains or returns to baseline bowel function  Description: INTERVENTIONS:  - Assess bowel function  - Maintain adequate hydration with IV or PO as ordered and tolerated  - Evaluate effectiveness of GI medications  - Encourage mobilization and activity  - Obtain nutritional consult as needed  - Establish a toileting routine/schedule  - Consider collaborating with pharmacy to review patient's medication profile  Outcome: Progressing  Goal: Maintains adequate nutritional intake (undernourished)  Description: INTERVENTIONS:  - Monitor percentage of each meal consumed  - Identify factors contributing to decreased intake, treat as appropriate  - Assist with meals as needed  - Monitor I&O, WT and lab values  - Obtain nutritional consult as needed  - Optimize oral hygiene and moisture  - Encourage food from home; allow for food preferences  - Enhance eating environment  Outcome: Progressing  Goal: Achieves appropriate nutritional intake (bariatric)  Description: INTERVENTIONS:  - Monitor for over-consumption  - Identify factors contributing to increased intake, treat as appropriate  - Monitor I&O, WT and lab values  - Obtain nutritional consult as needed  - Evaluate psychosocial factors contributing to over-consumption  Outcome: Progressing     Problem: MUSCULOSKELETAL - ADULT  Goal: Return mobility to safest level of function  Description: INTERVENTIONS:  - Assess patient stability and activity tolerance for standing, transferring and ambulating w/ or w/o assistive devices  - Assist with transfers and ambulation using safe patient handling equipment as needed  - Ensure adequate protection for wounds/incisions during mobilization  - Obtain PT/OT consults as needed  - Advance activity as appropriate  - Communicate ordered activity level and limitations with patient/family  Outcome: Progressing  Goal: Maintain proper alignment of affected body part  Description: INTERVENTIONS:  - Support and protect limb and body alignment per provider's orders  - Instruct and reinforce with patient and family use of appropriate assistive device and precautions (e.g. spinal or hip dislocation precautions)  Outcome: Progressing     Problem: PAIN - ADULT  Goal: Verbalizes/displays adequate comfort level or patient's stated pain goal  Description: INTERVENTIONS:  - Encourage pt to monitor pain and request assistance  - Assess pain using appropriate pain scale  - Administer analgesics based on type and severity of pain and evaluate response  - Implement non-pharmacological measures as appropriate and evaluate response  - Consider cultural and social influences on pain and pain management  - Manage/alleviate anxiety  - Utilize distraction and/or relaxation techniques  - Monitor for opioid side effects  - Notify MD/LIP if interventions unsuccessful or patient reports new pain  - Anticipate increased pain with activity and pre-medicate as appropriate  Outcome: Progressing     Problem: SAFETY ADULT - FALL  Goal: Free from fall injury  Description: INTERVENTIONS:  - Assess pt frequently for physical needs  - Identify cognitive and physical deficits and behaviors that affect risk of falls.   - [place] fall precautions as indicated by assessment.  - Educate pt/family on patient safety including physical limitations  - Instruct pt to call for assistance with activity based on assessment  - Modify environment to reduce risk of injury  - Provide assistive devices as appropriate  - Consider OT/PT consult to assist with strengthening/mobility  - Encourage toileting schedule  Outcome: Progressing     Problem: GASTROINTESTINAL - ADULT  Goal: Minimal or absence of nausea and vomiting  Description: INTERVENTIONS:  - Maintain adequate hydration with IV or PO as ordered and tolerated  - Nasogastric tube to low intermittent suction as ordered  - Evaluate effectiveness of ordered antiemetic medications  - Provide nonpharmacologic comfort measures as appropriate  - Advance diet as tolerated, if ordered  - Obtain nutritional consult as needed  - Evaluate fluid balance  Outcome: Progressing     Problem: GASTROINTESTINAL - ADULT  Goal: Maintains or returns to baseline bowel function  Description: INTERVENTIONS:  - Assess bowel function  - Maintain adequate hydration with IV or PO as ordered and tolerated  - Evaluate effectiveness of GI medications  - Encourage mobilization and activity  - Obtain nutritional consult as needed  - Establish a toileting routine/schedule  - Consider collaborating with pharmacy to review patient's medication profile  Outcome: Progressing     Problem: PAIN - ADULT  Goal: Verbalizes/displays adequate comfort level or patient's stated pain goal  Description: INTERVENTIONS:  - Encourage pt to monitor pain and request assistance  - Assess pain using appropriate pain scale  - Administer analgesics based on type and severity of pain and evaluate response  - Implement non-pharmacological measures as appropriate and evaluate response  - Consider cultural and social influences on pain and pain management  - Manage/alleviate anxiety  - Utilize distraction and/or relaxation techniques  - Monitor for opioid side effects  - Notify MD/LIP if interventions unsuccessful or patient reports new pain  - Anticipate increased pain with activity and pre-medicate as appropriate  Outcome: Progressing     Problem: SAFETY ADULT - FALL  Goal: Free from fall injury  Description: INTERVENTIONS:  - Assess pt frequently for physical needs  - Identify cognitive and physical deficits and behaviors that affect risk of falls.   - [place] fall precautions as indicated by assessment.  - Educate pt/family on patient safety including physical limitations  - Instruct pt to call for assistance with activity based on assessment  - Modify environment to reduce risk of injury  - Provide assistive devices as appropriate  - Consider OT/PT consult to assist with strengthening/mobility  - Encourage toileting schedule  Outcome: Progressing

## 2022-05-06 NOTE — PROGRESS NOTES
Consult dictated- CSA will follow. I think type 2 endoleak- will review with DR. Gallagher/ Dr. Daly Clarity.  Discussed with Naty Hyatt- await input from Dr. Adela Watts

## 2022-05-06 NOTE — IMAGING NOTE
Pre procedure instruction given to SRINIVASA. Pt to be  NPO from MN hold  Lovenox  Starting tonight. PT/INR in AM. Procedure to be  Done  At 10 AM per Dr Pierre Carrasquillo.

## 2022-05-06 NOTE — PLAN OF CARE
Assumed care at 0700  Patient alert, oriented x4  Dilaudid given for pain  Ambulated in halls with walker  Room air throughout day  CT chest completed  IV fluids infusing    Biopsy tomorrow @ 10am. lovenox and xarelto on hold. NPO @ midnight.  Plan of care discussed with patient and family at bedside

## 2022-05-07 ENCOUNTER — APPOINTMENT (OUTPATIENT)
Dept: CT IMAGING | Facility: HOSPITAL | Age: 82
End: 2022-05-07
Attending: INTERNAL MEDICINE
Payer: MEDICARE

## 2022-05-07 PROBLEM — R19.00 ABDOMINAL MASS: Status: ACTIVE | Noted: 2022-05-07

## 2022-05-07 LAB
ANION GAP SERPL CALC-SCNC: 6 MMOL/L (ref 0–18)
BUN BLD-MCNC: 17 MG/DL (ref 7–18)
CALCIUM BLD-MCNC: 8.7 MG/DL (ref 8.5–10.1)
CHLORIDE SERPL-SCNC: 104 MMOL/L (ref 98–112)
CO2 SERPL-SCNC: 25 MMOL/L (ref 21–32)
CREAT BLD-MCNC: 0.89 MG/DL
ERYTHROCYTE [DISTWIDTH] IN BLOOD BY AUTOMATED COUNT: 15.9 %
GLUCOSE BLD-MCNC: 151 MG/DL (ref 70–99)
GLUCOSE BLD-MCNC: 154 MG/DL (ref 70–99)
HCT VFR BLD AUTO: 38.2 %
HGB BLD-MCNC: 11.2 G/DL
INR BLD: 1.32 (ref 0.8–1.2)
MCH RBC QN AUTO: 26.6 PG (ref 26–34)
MCHC RBC AUTO-ENTMCNC: 29.3 G/DL (ref 31–37)
MCV RBC AUTO: 90.7 FL
OSMOLALITY SERPL CALC.SUM OF ELEC: 285 MOSM/KG (ref 275–295)
PLATELET # BLD AUTO: 229 10(3)UL (ref 150–450)
POTASSIUM SERPL-SCNC: 4.8 MMOL/L (ref 3.5–5.1)
PROTHROMBIN TIME: 16.4 SECONDS (ref 11.6–14.8)
RBC # BLD AUTO: 4.21 X10(6)UL
SODIUM SERPL-SCNC: 135 MMOL/L (ref 136–145)
WBC # BLD AUTO: 9 X10(3) UL (ref 4–11)

## 2022-05-07 PROCEDURE — 99225 SUBSEQUENT OBSERVATION CARE: CPT | Performed by: INTERNAL MEDICINE

## 2022-05-07 PROCEDURE — 99152 MOD SED SAME PHYS/QHP 5/>YRS: CPT | Performed by: INTERNAL MEDICINE

## 2022-05-07 PROCEDURE — 49180 BIOPSY ABDOMINAL MASS: CPT | Performed by: INTERNAL MEDICINE

## 2022-05-07 PROCEDURE — 77012 CT SCAN FOR NEEDLE BIOPSY: CPT | Performed by: INTERNAL MEDICINE

## 2022-05-07 PROCEDURE — 99213 OFFICE O/P EST LOW 20 MIN: CPT | Performed by: SPECIALIST

## 2022-05-07 PROCEDURE — 0WBH3ZX EXCISION OF RETROPERITONEUM, PERCUTANEOUS APPROACH, DIAGNOSTIC: ICD-10-PCS | Performed by: RADIOLOGY

## 2022-05-07 RX ORDER — HYDROMORPHONE HYDROCHLORIDE 2 MG/1
2 TABLET ORAL
Status: DISCONTINUED | OUTPATIENT
Start: 2022-05-07 | End: 2022-05-08

## 2022-05-07 RX ORDER — NALOXONE HYDROCHLORIDE 0.4 MG/ML
80 INJECTION, SOLUTION INTRAMUSCULAR; INTRAVENOUS; SUBCUTANEOUS AS NEEDED
Status: DISCONTINUED | OUTPATIENT
Start: 2022-05-07 | End: 2022-05-07 | Stop reason: HOSPADM

## 2022-05-07 RX ORDER — SODIUM CHLORIDE 9 MG/ML
INJECTION, SOLUTION INTRAVENOUS CONTINUOUS
Status: DISCONTINUED | OUTPATIENT
Start: 2022-05-07 | End: 2022-05-07

## 2022-05-07 RX ORDER — MIDAZOLAM HYDROCHLORIDE 1 MG/ML
INJECTION INTRAMUSCULAR; INTRAVENOUS
Status: COMPLETED
Start: 2022-05-07 | End: 2022-05-07

## 2022-05-07 RX ORDER — FLUMAZENIL 0.1 MG/ML
0.2 INJECTION, SOLUTION INTRAVENOUS AS NEEDED
Status: DISCONTINUED | OUTPATIENT
Start: 2022-05-07 | End: 2022-05-07 | Stop reason: HOSPADM

## 2022-05-07 RX ORDER — MIDAZOLAM HYDROCHLORIDE 1 MG/ML
1 INJECTION INTRAMUSCULAR; INTRAVENOUS EVERY 5 MIN PRN
Status: DISCONTINUED | OUTPATIENT
Start: 2022-05-07 | End: 2022-05-07 | Stop reason: HOSPADM

## 2022-05-07 RX ORDER — HYDROMORPHONE HYDROCHLORIDE 1 MG/ML
0.5 INJECTION, SOLUTION INTRAMUSCULAR; INTRAVENOUS; SUBCUTANEOUS EVERY 2 HOUR PRN
Status: DISCONTINUED | OUTPATIENT
Start: 2022-05-07 | End: 2022-05-08

## 2022-05-07 NOTE — PROGRESS NOTES
Assumed care @ 1930. Pt a/o x4, VSS. Tele Afib. HR 70-80's. No acute respiratory distress noted. C/O abd pain radiating to back, relieved with PRN IV Dilaudid. Pt ambulated to the bathroom with 1 person and walker. C/O nausea, relieved with PRN IV Zofran. Maintained NPO since MN for abd mass biopsy @ 10 AM.  No other complaints made. Will continue to monitor.

## 2022-05-07 NOTE — PROCEDURES
[facility]  Pre-Procedure Note    Name: Braulio Bernstein  MRN#: [id number]  : 1940    Procedure:  CT guided peritoneal mass Needle Core Biopsy    Indication: Adenopathy and peritoneal masses    Allergies:      Adhesive Tape           RASH    Comment:[place] Allergy Text Annotation: Adhesive Tape;             fragile skin-->skin tears    Pertinent Medications:    Is patient on any Aspirin, Coumadin, or any other Anticoagulations/Antiplatelet medications? yes   Currently on Xarelto. Off x 48 hours    Adverse Reactions to Medication:  no    Mental Status:  Alert and Oriented      Health Status: Acceptable for Procedure    Impression and Plans:    Abdominal peritoneal mass. Needle core biopsy requested. I have reviewed the above information prior to procedure. I have discussed the risks and benefits and alternatives with the patient/family. They understand and agree to proceed with plan of care.     Geneva Mujica MD  2022  10:52 AM

## 2022-05-07 NOTE — PLAN OF CARE
Assumed care of patient at 0700  A/Ox4, RA, VSS  Biopsy today, incision site C/D/I. Slightly hypotensive post procedure but maintaining in low 100s  Poor appetite  PRN dilaudid for pain  Okay to restart ASA and Xarelto per Radiology, cardiology notified.   Possible discharge tomorrow  Currently resting in bed, call light within reach      Problem: Diabetes/Glucose Control  Goal: Glucose maintained within prescribed range  Description: INTERVENTIONS:  - Monitor Blood Glucose as ordered  - Assess for signs and symptoms of hyperglycemia and hypoglycemia  - Administer ordered medications to maintain glucose within target range  - Assess barriers to adequate nutritional intake and initiate nutrition consult as needed  - Instruct patient on self management of diabetes  Outcome: Progressing     Problem: Patient/Family Goals  Goal: Patient/Family Long Term Goal  Description: Patient's Long Term Goal:     Interventions:  -   - See additional Care Plan goals for specific interventions  Outcome: Progressing  Goal: Patient/Family Short Term Goal  Description: Patient's Short Term Goal:     Interventions:   -  - See additional Care Plan goals for specific interventions  Outcome: Progressing     Problem: GASTROINTESTINAL - ADULT  Goal: Minimal or absence of nausea and vomiting  Description: INTERVENTIONS:  - Maintain adequate hydration with IV or PO as ordered and tolerated  - Nasogastric tube to low intermittent suction as ordered  - Evaluate effectiveness of ordered antiemetic medications  - Provide nonpharmacologic comfort measures as appropriate  - Advance diet as tolerated, if ordered  - Obtain nutritional consult as needed  - Evaluate fluid balance  Outcome: Progressing  Goal: Maintains or returns to baseline bowel function  Description: INTERVENTIONS:  - Assess bowel function  - Maintain adequate hydration with IV or PO as ordered and tolerated  - Evaluate effectiveness of GI medications  - Encourage mobilization and activity  - Obtain nutritional consult as needed  - Establish a toileting routine/schedule  - Consider collaborating with pharmacy to review patient's medication profile  Outcome: Progressing  Goal: Maintains adequate nutritional intake (undernourished)  Description: INTERVENTIONS:  - Monitor percentage of each meal consumed  - Identify factors contributing to decreased intake, treat as appropriate  - Assist with meals as needed  - Monitor I&O, WT and lab values  - Obtain nutritional consult as needed  - Optimize oral hygiene and moisture  - Encourage food from home; allow for food preferences  - Enhance eating environment  Outcome: Progressing  Goal: Achieves appropriate nutritional intake (bariatric)  Description: INTERVENTIONS:  - Monitor for over-consumption  - Identify factors contributing to increased intake, treat as appropriate  - Monitor I&O, WT and lab values  - Obtain nutritional consult as needed  - Evaluate psychosocial factors contributing to over-consumption  Outcome: Progressing     Problem: MUSCULOSKELETAL - ADULT  Goal: Return mobility to safest level of function  Description: INTERVENTIONS:  - Assess patient stability and activity tolerance for standing, transferring and ambulating w/ or w/o assistive devices  - Assist with transfers and ambulation using safe patient handling equipment as needed  - Ensure adequate protection for wounds/incisions during mobilization  - Obtain PT/OT consults as needed  - Advance activity as appropriate  - Communicate ordered activity level and limitations with patient/family  Outcome: Progressing  Goal: Maintain proper alignment of affected body part  Description: INTERVENTIONS:  - Support and protect limb and body alignment per provider's orders  - Instruct and reinforce with patient and family use of appropriate assistive device and precautions (e.g. spinal or hip dislocation precautions)  Outcome: Progressing     Problem: PAIN - ADULT  Goal: Verbalizes/displays adequate comfort level or patient's stated pain goal  Description: INTERVENTIONS:  - Encourage pt to monitor pain and request assistance  - Assess pain using appropriate pain scale  - Administer analgesics based on type and severity of pain and evaluate response  - Implement non-pharmacological measures as appropriate and evaluate response  - Consider cultural and social influences on pain and pain management  - Manage/alleviate anxiety  - Utilize distraction and/or relaxation techniques  - Monitor for opioid side effects  - Notify MD/LIP if interventions unsuccessful or patient reports new pain  - Anticipate increased pain with activity and pre-medicate as appropriate  Outcome: Progressing     Problem: SAFETY ADULT - FALL  Goal: Free from fall injury  Description: INTERVENTIONS:  - Assess pt frequently for physical needs  - Identify cognitive and physical deficits and behaviors that affect risk of falls.   - [place] fall precautions as indicated by assessment.  - Educate pt/family on patient safety including physical limitations  - Instruct pt to call for assistance with activity based on assessment  - Modify environment to reduce risk of injury  - Provide assistive devices as appropriate  - Consider OT/PT consult to assist with strengthening/mobility  - Encourage toileting schedule  Outcome: Progressing

## 2022-05-07 NOTE — IMAGING NOTE
Pt post peritonem mass biopsy. Tolerated procedure and sedation well. Denies any pain. Biospy site LUQ CDI with tegaderm. Alert and oriented.  Report called to ABDOUL Coe

## 2022-05-07 NOTE — PROCEDURES
[facility]    Felicitas Resendiz Patient Status:  Observation    1940 MRN [id number]   [facility] 7NE-A Attending Radha Garcia MD   Hosp Day # 0 PCP Tata Dennis MD         Brief Procedure Report    Pre-Operative Diagnosis: Peritoneal mass    Post-Operative Diagnosis: Same as above. Procedure Performed: CT guided needle core biopsy of peritoneal mass    Anesthesia: 1% lidocaine. Moderate sedation. EBL: 2cc    Complications: None    Summary of Case: 18g Needle core biopsy sample of perigastric peritoneal mass obtained without immediate complication. Full report to follow in PACS.      [place]

## 2022-05-08 VITALS
WEIGHT: 173 LBS | TEMPERATURE: 98 F | RESPIRATION RATE: 18 BRPM | DIASTOLIC BLOOD PRESSURE: 65 MMHG | BODY MASS INDEX: 24.77 KG/M2 | SYSTOLIC BLOOD PRESSURE: 105 MMHG | OXYGEN SATURATION: 95 % | HEIGHT: 70 IN | HEART RATE: 89 BPM

## 2022-05-08 LAB
ANION GAP SERPL CALC-SCNC: 7 MMOL/L (ref 0–18)
BUN BLD-MCNC: 16 MG/DL (ref 7–18)
CALCIUM BLD-MCNC: 8.7 MG/DL (ref 8.5–10.1)
CHLORIDE SERPL-SCNC: 105 MMOL/L (ref 98–112)
CO2 SERPL-SCNC: 25 MMOL/L (ref 21–32)
CREAT BLD-MCNC: 0.84 MG/DL
GLUCOSE BLD-MCNC: 114 MG/DL (ref 70–99)
GLUCOSE BLD-MCNC: 118 MG/DL (ref 70–99)
GLUCOSE BLD-MCNC: 134 MG/DL (ref 70–99)
OSMOLALITY SERPL CALC.SUM OF ELEC: 286 MOSM/KG (ref 275–295)
POTASSIUM SERPL-SCNC: 4.8 MMOL/L (ref 3.5–5.1)
SODIUM SERPL-SCNC: 137 MMOL/L (ref 136–145)

## 2022-05-08 PROCEDURE — 99213 OFFICE O/P EST LOW 20 MIN: CPT | Performed by: SPECIALIST

## 2022-05-08 PROCEDURE — 99217 OBSERVATION CARE DISCHARGE: CPT | Performed by: INTERNAL MEDICINE

## 2022-05-08 RX ORDER — ONDANSETRON 4 MG/1
4 TABLET, FILM COATED ORAL EVERY 8 HOURS PRN
Qty: 20 TABLET | Refills: 2 | Status: ON HOLD | OUTPATIENT
Start: 2022-05-08

## 2022-05-08 RX ORDER — HYDROMORPHONE HYDROCHLORIDE 2 MG/1
4 TABLET ORAL
Status: DISCONTINUED | OUTPATIENT
Start: 2022-05-08 | End: 2022-05-08

## 2022-05-08 RX ORDER — HYDROMORPHONE HYDROCHLORIDE 4 MG/1
4 TABLET ORAL
Qty: 30 TABLET | Refills: 0 | Status: ON HOLD | OUTPATIENT
Start: 2022-05-08

## 2022-05-08 RX ORDER — TRAMADOL HYDROCHLORIDE 50 MG/1
50 TABLET ORAL EVERY 6 HOURS PRN
Qty: 30 TABLET | Refills: 0 | Status: ON HOLD | OUTPATIENT
Start: 2022-05-08

## 2022-05-08 RX ORDER — DOCUSATE SODIUM 100 MG/1
100 CAPSULE, LIQUID FILLED ORAL 2 TIMES DAILY
Qty: 30 CAPSULE | Refills: 0 | Status: ON HOLD | OUTPATIENT
Start: 2022-05-08

## 2022-05-08 RX ORDER — HYDROMORPHONE HYDROCHLORIDE 2 MG/1
2 TABLET ORAL
Status: DISCONTINUED | OUTPATIENT
Start: 2022-05-08 | End: 2022-05-08

## 2022-05-08 RX ORDER — DOCUSATE SODIUM 100 MG/1
100 CAPSULE, LIQUID FILLED ORAL 2 TIMES DAILY
Status: DISCONTINUED | OUTPATIENT
Start: 2022-05-08 | End: 2022-05-08

## 2022-05-08 RX ORDER — HYDROMORPHONE HYDROCHLORIDE 2 MG/1
TABLET ORAL
Status: DISCONTINUED | OUTPATIENT
Start: 2022-05-08 | End: 2022-05-08 | Stop reason: SDUPTHER

## 2022-05-08 RX ORDER — POLYETHYLENE GLYCOL 3350 17 G/17G
17 POWDER, FOR SOLUTION ORAL DAILY
Status: DISCONTINUED | OUTPATIENT
Start: 2022-05-08 | End: 2022-05-08

## 2022-05-08 NOTE — PLAN OF CARE
Assumed care of patient at 0700  A/Ox4, RA, VSS  PRN PO dilaudid for pain, patient reports relief with 4mg dose  Patient switched to Xarelto today  Constipation managed with Miralax and Colace. Patient clear to discharge from all standpoints  Heme/Onc to follow up with patient with pathology results and patient to set up an appointment with Dr. Tyshawn Crowe post discharge  Discharge instructions discussed with patient and son at the bedside.  All questions were answered and patient verbalized understanding  Patient was discharged with all belongings by wheelchair to be taken home by son              Problem: Diabetes/Glucose Control  Goal: Glucose maintained within prescribed range  Description: INTERVENTIONS:  - Monitor Blood Glucose as ordered  - Assess for signs and symptoms of hyperglycemia and hypoglycemia  - Administer ordered medications to maintain glucose within target range  - Assess barriers to adequate nutritional intake and initiate nutrition consult as needed  - Instruct patient on self management of diabetes  Outcome: Adequate for Discharge     Problem: Patient/Family Goals  Goal: Patient/Family Long Term Goal  Description: Patient's Long Term Goal:     Interventions:  -   - See additional Care Plan goals for specific interventions  Outcome: Adequate for Discharge  Goal: Patient/Family Short Term Goal  Description: Patient's Short Term Goal:     Interventions:   -   - See additional Care Plan goals for specific interventions  Outcome: Adequate for Discharge     Problem: GASTROINTESTINAL - ADULT  Goal: Minimal or absence of nausea and vomiting  Description: INTERVENTIONS:  - Maintain adequate hydration with IV or PO as ordered and tolerated  - Nasogastric tube to low intermittent suction as ordered  - Evaluate effectiveness of ordered antiemetic medications  - Provide nonpharmacologic comfort measures as appropriate  - Advance diet as tolerated, if ordered  - Obtain nutritional consult as needed  - Evaluate fluid balance  Outcome: Adequate for Discharge  Goal: Maintains or returns to baseline bowel function  Description: INTERVENTIONS:  - Assess bowel function  - Maintain adequate hydration with IV or PO as ordered and tolerated  - Evaluate effectiveness of GI medications  - Encourage mobilization and activity  - Obtain nutritional consult as needed  - Establish a toileting routine/schedule  - Consider collaborating with pharmacy to review patient's medication profile  Outcome: Adequate for Discharge  Goal: Maintains adequate nutritional intake (undernourished)  Description: INTERVENTIONS:  - Monitor percentage of each meal consumed  - Identify factors contributing to decreased intake, treat as appropriate  - Assist with meals as needed  - Monitor I&O, WT and lab values  - Obtain nutritional consult as needed  - Optimize oral hygiene and moisture  - Encourage food from home; allow for food preferences  - Enhance eating environment  Outcome: Adequate for Discharge  Goal: Achieves appropriate nutritional intake (bariatric)  Description: INTERVENTIONS:  - Monitor for over-consumption  - Identify factors contributing to increased intake, treat as appropriate  - Monitor I&O, WT and lab values  - Obtain nutritional consult as needed  - Evaluate psychosocial factors contributing to over-consumption  Outcome: Adequate for Discharge     Problem: MUSCULOSKELETAL - ADULT  Goal: Return mobility to safest level of function  Description: INTERVENTIONS:  - Assess patient stability and activity tolerance for standing, transferring and ambulating w/ or w/o assistive devices  - Assist with transfers and ambulation using safe patient handling equipment as needed  - Ensure adequate protection for wounds/incisions during mobilization  - Obtain PT/OT consults as needed  - Advance activity as appropriate  - Communicate ordered activity level and limitations with patient/family  Outcome: Adequate for Discharge  Goal: Maintain proper alignment of affected body part  Description: INTERVENTIONS:  - Support and protect limb and body alignment per provider's orders  - Instruct and reinforce with patient and family use of appropriate assistive device and precautions (e.g. spinal or hip dislocation precautions)  Outcome: Adequate for Discharge     Problem: PAIN - ADULT  Goal: Verbalizes/displays adequate comfort level or patient's stated pain goal  Description: INTERVENTIONS:  - Encourage pt to monitor pain and request assistance  - Assess pain using appropriate pain scale  - Administer analgesics based on type and severity of pain and evaluate response  - Implement non-pharmacological measures as appropriate and evaluate response  - Consider cultural and social influences on pain and pain management  - Manage/alleviate anxiety  - Utilize distraction and/or relaxation techniques  - Monitor for opioid side effects  - Notify MD/LIP if interventions unsuccessful or patient reports new pain  - Anticipate increased pain with activity and pre-medicate as appropriate  Outcome: Adequate for Discharge     Problem: SAFETY ADULT - FALL  Goal: Free from fall injury  Description: INTERVENTIONS:  - Assess pt frequently for physical needs  - Identify cognitive and physical deficits and behaviors that affect risk of falls.   - [place] fall precautions as indicated by assessment.  - Educate pt/family on patient safety including physical limitations  - Instruct pt to call for assistance with activity based on assessment  - Modify environment to reduce risk of injury  - Provide assistive devices as appropriate  - Consider OT/PT consult to assist with strengthening/mobility  - Encourage toileting schedule  Outcome: Adequate for Discharge

## 2022-05-08 NOTE — PROGRESS NOTES
Assumed care @ 1930. Pt a/o x4, tele SR. HR 90's. -low 110's. Night time Loressor held per pt request.  No acute respiratory distress noted. C/O abd pain radiating to back, relieved with PRN IV Dilaudid. Pt ambulated to the bathroom with walker and 1 person assist.  Pt c/o nausea, reported having hard time eating and taking pills. (-) BM. Voids per toilet. Biopsy site dressing c/d/i. No other complaints made. Will continue to monitor. Addendum  Pt still has pain, requesting for IV Dilaudid. This RN offered PO dilaudid, pt reported nausea still on going,   prefers IV for now. Pain relied with PRN IV Dilaudid. Pt agreeable to PO meds this AM.  PO Dilaudid, Levothyroxine, and Protonix PO given, pt tolerated well.

## 2022-05-09 ENCOUNTER — TELEPHONE (OUTPATIENT)
Dept: INTERNAL MEDICINE CLINIC | Facility: CLINIC | Age: 82
End: 2022-05-09

## 2022-05-09 LAB
GLUCOSE BLD-MCNC: 134 MG/DL (ref 70–99)
GLUCOSE BLD-MCNC: 146 MG/DL (ref 70–99)
GLUCOSE BLD-MCNC: 156 MG/DL (ref 70–99)

## 2022-05-09 NOTE — TELEPHONE ENCOUNTER
Form for prescription benefits placed in Dr. Fredrick sloan for completion ; form signed by Dr. Leatha Retana and faxed to [phone] ; confirmation obtained.

## 2022-05-10 ENCOUNTER — TELEPHONE (OUTPATIENT)
Dept: INTERNAL MEDICINE CLINIC | Facility: CLINIC | Age: 82
End: 2022-05-10

## 2022-05-10 ENCOUNTER — PATIENT OUTREACH (OUTPATIENT)
Dept: CASE MANAGEMENT | Age: 82
End: 2022-05-10

## 2022-05-10 PROCEDURE — 1111F DSCHRG MED/CURRENT MED MERGE: CPT

## 2022-05-10 NOTE — TELEPHONE ENCOUNTER
ROSHAN, Spoke to pt for HFU today. Pt is in a current TCM episode. She states that she will be following up with Dr. Adela Watts and Dr. Gabino Faye at this time. HFU appt recommended by 5/15/22 and no later than 5/22/22 as pt is a high risk for readmission.

## 2022-05-11 ENCOUNTER — APPOINTMENT (OUTPATIENT)
Dept: ULTRASOUND IMAGING | Facility: HOSPITAL | Age: 82
End: 2022-05-11
Attending: EMERGENCY MEDICINE
Payer: MEDICARE

## 2022-05-11 ENCOUNTER — APPOINTMENT (OUTPATIENT)
Dept: CT IMAGING | Facility: HOSPITAL | Age: 82
End: 2022-05-11
Attending: EMERGENCY MEDICINE
Payer: MEDICARE

## 2022-05-11 ENCOUNTER — HOSPITAL ENCOUNTER (INPATIENT)
Facility: HOSPITAL | Age: 82
LOS: 9 days | Discharge: HOME HEALTH | End: 2022-05-20
Attending: EMERGENCY MEDICINE | Admitting: HOSPITALIST
Payer: MEDICARE

## 2022-05-11 DIAGNOSIS — I48.91 ATRIAL FIBRILLATION, UNSPECIFIED TYPE (HCC): ICD-10-CM

## 2022-05-11 DIAGNOSIS — C85.90 LYMPHOMA, UNSPECIFIED BODY REGION, UNSPECIFIED LYMPHOMA TYPE (HCC): ICD-10-CM

## 2022-05-11 DIAGNOSIS — N17.9 AKI (ACUTE KIDNEY INJURY) (HCC): ICD-10-CM

## 2022-05-11 DIAGNOSIS — Z95.828 HISTORY OF REPAIR OF ANEURYSM OF ABDOMINAL AORTA USING ENDOVASCULAR STENT GRAFT: ICD-10-CM

## 2022-05-11 DIAGNOSIS — M79.604 PAIN OF BOTH LOWER EXTREMITIES DUE TO ISCHEMIA: Primary | ICD-10-CM

## 2022-05-11 DIAGNOSIS — I99.8 PAIN OF BOTH LOWER EXTREMITIES DUE TO ISCHEMIA: Primary | ICD-10-CM

## 2022-05-11 DIAGNOSIS — M79.605 PAIN OF BOTH LOWER EXTREMITIES DUE TO ISCHEMIA: Primary | ICD-10-CM

## 2022-05-11 DIAGNOSIS — C83.38 DIFFUSE LARGE B-CELL LYMPHOMA OF LYMPH NODES OF MULTIPLE REGIONS (HCC): ICD-10-CM

## 2022-05-11 DIAGNOSIS — E79.0 HYPERURICEMIA: ICD-10-CM

## 2022-05-11 LAB
ANION GAP SERPL CALC-SCNC: 8 MMOL/L (ref 0–18)
ANTIBODY SCREEN: NEGATIVE
APTT PPP: 50.7 SECONDS (ref 23.3–35.6)
BASOPHILS # BLD AUTO: 0.05 X10(3) UL (ref 0–0.2)
BASOPHILS NFR BLD AUTO: 0.6 %
BUN BLD-MCNC: 17 MG/DL (ref 7–18)
CALCIUM BLD-MCNC: 9.2 MG/DL (ref 8.5–10.1)
CD10 CELLS NFR SPEC: 75 %
CD19 CELLS NFR SPEC: 79 %
CD19/CD10 CELLS: 75 %
CD20 CELLS NFR SPEC: 76 %
CD23 CELLS NFR SPEC: 67 %
CD3 CELLS NFR SPEC: 14 %
CD3+CD4+ CELLS NFR SPEC: 8 %
CD3+CD4+ CELLS/CD3+CD8+ CLL SPEC: 2
CD3+CD8+ CELLS NFR SPEC: 4 %
CD45 CELLS NFR SPEC: 100 %
CD5 CELLS NFR SPEC: 13 %
CD5/CD19 CELLS: 1 %
CD56 CELLS NFR SPEC: 8 %
CD7 CELLS NFR SPEC: 20 %
CELL SURF KAPPA/LAMBDA RATIO: <0.1
CELL SURF LAMBDA LIGHT CHAIN: 74 %
CELL SURFACE KAPPA LIGHT CHAIN: 2 %
CHLORIDE SERPL-SCNC: 103 MMOL/L (ref 98–112)
CO2 SERPL-SCNC: 25 MMOL/L (ref 21–32)
CREAT BLD-MCNC: 1.15 MG/DL
EOSINOPHIL # BLD AUTO: 0.19 X10(3) UL (ref 0–0.7)
EOSINOPHIL NFR BLD AUTO: 2.1 %
ERYTHROCYTE [DISTWIDTH] IN BLOOD BY AUTOMATED COUNT: 16.1 %
FMC7 CELLS NFR SPEC: 76 %
GLUCOSE BLD-MCNC: 182 MG/DL (ref 70–99)
HCT VFR BLD AUTO: 39.2 %
HGB BLD-MCNC: 11.9 G/DL
IMM GRANULOCYTES # BLD AUTO: 0.03 X10(3) UL (ref 0–1)
IMM GRANULOCYTES NFR BLD: 0.3 %
INR BLD: 2.46 (ref 0.8–1.2)
LYMPHOCYTES # BLD AUTO: 0.61 X10(3) UL (ref 1–4)
LYMPHOCYTES NFR BLD AUTO: 6.8 %
MCH RBC QN AUTO: 26.2 PG (ref 26–34)
MCHC RBC AUTO-ENTMCNC: 30.4 G/DL (ref 31–37)
MCV RBC AUTO: 86.2 FL
MONOCYTES # BLD AUTO: 0.72 X10(3) UL (ref 0.1–1)
MONOCYTES NFR BLD AUTO: 8.1 %
NEUTROPHILS # BLD AUTO: 7.31 X10 (3) UL (ref 1.5–7.7)
NEUTROPHILS # BLD AUTO: 7.31 X10(3) UL (ref 1.5–7.7)
NEUTROPHILS NFR BLD AUTO: 82.1 %
OSMOLALITY SERPL CALC.SUM OF ELEC: 288 MOSM/KG (ref 275–295)
PLATELET # BLD AUTO: 346 10(3)UL (ref 150–450)
POTASSIUM SERPL-SCNC: 5.8 MMOL/L (ref 3.5–5.1)
PROTHROMBIN TIME: 26.8 SECONDS (ref 11.6–14.8)
RBC # BLD AUTO: 4.55 X10(6)UL
RH BLOOD TYPE: POSITIVE
SARS-COV-2 RNA RESP QL NAA+PROBE: NOT DETECTED
SODIUM SERPL-SCNC: 136 MMOL/L (ref 136–145)
WBC # BLD AUTO: 8.9 X10(3) UL (ref 4–11)

## 2022-05-11 PROCEDURE — 93970 EXTREMITY STUDY: CPT | Performed by: EMERGENCY MEDICINE

## 2022-05-11 PROCEDURE — 99223 1ST HOSP IP/OBS HIGH 75: CPT | Performed by: INTERNAL MEDICINE

## 2022-05-11 PROCEDURE — 75635 CT ANGIO ABDOMINAL ARTERIES: CPT | Performed by: EMERGENCY MEDICINE

## 2022-05-11 RX ORDER — ONDANSETRON 2 MG/ML
INJECTION INTRAMUSCULAR; INTRAVENOUS
Status: COMPLETED
Start: 2022-05-11 | End: 2022-05-11

## 2022-05-11 RX ORDER — HEPARIN SODIUM 1000 [USP'U]/ML
60 INJECTION, SOLUTION INTRAVENOUS; SUBCUTANEOUS ONCE
Status: COMPLETED | OUTPATIENT
Start: 2022-05-11 | End: 2022-05-11

## 2022-05-11 RX ORDER — POLYETHYLENE GLYCOL 3350 17 G/17G
17 POWDER, FOR SOLUTION ORAL DAILY PRN
Status: DISCONTINUED | OUTPATIENT
Start: 2022-05-11 | End: 2022-05-20

## 2022-05-11 RX ORDER — SENNOSIDES 8.6 MG
17.2 TABLET ORAL NIGHTLY PRN
Status: DISCONTINUED | OUTPATIENT
Start: 2022-05-11 | End: 2022-05-20

## 2022-05-11 RX ORDER — DEXTROSE MONOHYDRATE 25 G/50ML
50 INJECTION, SOLUTION INTRAVENOUS
Status: DISCONTINUED | OUTPATIENT
Start: 2022-05-11 | End: 2022-05-20

## 2022-05-11 RX ORDER — ONDANSETRON 2 MG/ML
4 INJECTION INTRAMUSCULAR; INTRAVENOUS EVERY 6 HOURS PRN
Status: DISCONTINUED | OUTPATIENT
Start: 2022-05-11 | End: 2022-05-20

## 2022-05-11 RX ORDER — BISACODYL 10 MG
10 SUPPOSITORY, RECTAL RECTAL
Status: DISCONTINUED | OUTPATIENT
Start: 2022-05-11 | End: 2022-05-20

## 2022-05-11 RX ORDER — NICOTINE POLACRILEX 4 MG
15 LOZENGE BUCCAL
Status: DISCONTINUED | OUTPATIENT
Start: 2022-05-11 | End: 2022-05-20

## 2022-05-11 RX ORDER — HEPARIN SODIUM AND DEXTROSE 10000; 5 [USP'U]/100ML; G/100ML
INJECTION INTRAVENOUS CONTINUOUS
Status: DISCONTINUED | OUTPATIENT
Start: 2022-05-12 | End: 2022-05-20

## 2022-05-11 RX ORDER — ONDANSETRON 2 MG/ML
4 INJECTION INTRAMUSCULAR; INTRAVENOUS ONCE
Status: COMPLETED | OUTPATIENT
Start: 2022-05-11 | End: 2022-05-11

## 2022-05-11 RX ORDER — ACETAMINOPHEN 325 MG/1
650 TABLET ORAL EVERY 6 HOURS PRN
Status: DISCONTINUED | OUTPATIENT
Start: 2022-05-11 | End: 2022-05-20

## 2022-05-11 RX ORDER — HEPARIN SODIUM AND DEXTROSE 10000; 5 [USP'U]/100ML; G/100ML
12 INJECTION INTRAVENOUS ONCE
Status: COMPLETED | OUTPATIENT
Start: 2022-05-11 | End: 2022-05-12

## 2022-05-11 RX ORDER — IOHEXOL 350 MG/ML
95 INJECTION, SOLUTION INTRAVENOUS
Status: COMPLETED | OUTPATIENT
Start: 2022-05-11 | End: 2022-05-11

## 2022-05-11 RX ORDER — NICOTINE POLACRILEX 4 MG
30 LOZENGE BUCCAL
Status: DISCONTINUED | OUTPATIENT
Start: 2022-05-11 | End: 2022-05-20

## 2022-05-11 RX ORDER — MELATONIN
3 NIGHTLY PRN
Status: DISCONTINUED | OUTPATIENT
Start: 2022-05-11 | End: 2022-05-20

## 2022-05-11 RX ORDER — SODIUM PHOSPHATE, DIBASIC AND SODIUM PHOSPHATE, MONOBASIC 7; 19 G/133ML; G/133ML
1 ENEMA RECTAL ONCE AS NEEDED
Status: COMPLETED | OUTPATIENT
Start: 2022-05-11 | End: 2022-05-17

## 2022-05-11 NOTE — ED INITIAL ASSESSMENT (HPI)
Patient states that lower extremities started swelling today. Patient was on a diuretic at home and her PCP told her to stop taking it about 10 days ago.

## 2022-05-12 ENCOUNTER — APPOINTMENT (OUTPATIENT)
Dept: CV DIAGNOSTICS | Facility: HOSPITAL | Age: 82
End: 2022-05-12
Attending: INTERNAL MEDICINE
Payer: MEDICARE

## 2022-05-12 ENCOUNTER — TELEPHONE (OUTPATIENT)
Dept: INTERNAL MEDICINE CLINIC | Facility: CLINIC | Age: 82
End: 2022-05-12

## 2022-05-12 LAB
ANION GAP SERPL CALC-SCNC: 6 MMOL/L (ref 0–18)
APTT PPP: 47.6 SECONDS (ref 23.3–35.6)
APTT PPP: 58.9 SECONDS (ref 23.3–35.6)
APTT PPP: 72.7 SECONDS (ref 23.3–35.6)
APTT PPP: 76.9 SECONDS (ref 23.3–35.6)
BUN BLD-MCNC: 16 MG/DL (ref 7–18)
CALCIUM BLD-MCNC: 8.8 MG/DL (ref 8.5–10.1)
CHLORIDE SERPL-SCNC: 103 MMOL/L (ref 98–112)
CO2 SERPL-SCNC: 27 MMOL/L (ref 21–32)
CREAT BLD-MCNC: 1.04 MG/DL
D DIMER PPP FEU-MCNC: 2.67 UG/ML FEU (ref ?–0.81)
ERYTHROCYTE [DISTWIDTH] IN BLOOD BY AUTOMATED COUNT: 16 %
GLUCOSE BLD-MCNC: 105 MG/DL (ref 70–99)
GLUCOSE BLD-MCNC: 106 MG/DL (ref 70–99)
GLUCOSE BLD-MCNC: 133 MG/DL (ref 70–99)
GLUCOSE BLD-MCNC: 135 MG/DL (ref 70–99)
GLUCOSE BLD-MCNC: 141 MG/DL (ref 70–99)
GLUCOSE BLD-MCNC: 152 MG/DL (ref 70–99)
HCT VFR BLD AUTO: 32.7 %
HGB BLD-MCNC: 10.3 G/DL
LDH SERPL L TO P-CCNC: 251 U/L
MCH RBC QN AUTO: 26.9 PG (ref 26–34)
MCHC RBC AUTO-ENTMCNC: 31.5 G/DL (ref 31–37)
MCV RBC AUTO: 85.4 FL
OSMOLALITY SERPL CALC.SUM OF ELEC: 286 MOSM/KG (ref 275–295)
PLATELET # BLD AUTO: 268 10(3)UL (ref 150–450)
PLATELET # BLD AUTO: 270 10(3)UL (ref 150–450)
POTASSIUM SERPL-SCNC: 5.5 MMOL/L (ref 3.5–5.1)
RBC # BLD AUTO: 3.83 X10(6)UL
SODIUM SERPL-SCNC: 136 MMOL/L (ref 136–145)
URATE SERPL-MCNC: 7.3 MG/DL
WBC # BLD AUTO: 7.1 X10(3) UL (ref 4–11)

## 2022-05-12 PROCEDURE — 99232 SBSQ HOSP IP/OBS MODERATE 35: CPT | Performed by: HOSPITALIST

## 2022-05-12 PROCEDURE — 93306 TTE W/DOPPLER COMPLETE: CPT | Performed by: INTERNAL MEDICINE

## 2022-05-12 PROCEDURE — 99223 1ST HOSP IP/OBS HIGH 75: CPT | Performed by: NURSE PRACTITIONER

## 2022-05-12 PROCEDURE — 99233 SBSQ HOSP IP/OBS HIGH 50: CPT | Performed by: INTERNAL MEDICINE

## 2022-05-12 RX ORDER — ASPIRIN 81 MG/1
81 TABLET ORAL DAILY
Status: DISCONTINUED | OUTPATIENT
Start: 2022-05-12 | End: 2022-05-20

## 2022-05-12 RX ORDER — HYDROMORPHONE HYDROCHLORIDE 4 MG/1
4 TABLET ORAL
Status: DISCONTINUED | OUTPATIENT
Start: 2022-05-12 | End: 2022-05-20

## 2022-05-12 RX ORDER — LEVOTHYROXINE SODIUM 0.05 MG/1
50 TABLET ORAL
Status: DISCONTINUED | OUTPATIENT
Start: 2022-05-12 | End: 2022-05-20

## 2022-05-12 RX ORDER — ATORVASTATIN CALCIUM 20 MG/1
20 TABLET, FILM COATED ORAL NIGHTLY
Refills: 1 | Status: DISCONTINUED | OUTPATIENT
Start: 2022-05-12 | End: 2022-05-20

## 2022-05-12 RX ORDER — ALBUTEROL SULFATE 90 UG/1
2 AEROSOL, METERED RESPIRATORY (INHALATION) EVERY 4 HOURS PRN
Status: DISCONTINUED | OUTPATIENT
Start: 2022-05-12 | End: 2022-05-20

## 2022-05-12 RX ORDER — PANTOPRAZOLE SODIUM 20 MG/1
20 TABLET, DELAYED RELEASE ORAL
Refills: 1 | Status: DISCONTINUED | OUTPATIENT
Start: 2022-05-12 | End: 2022-05-13

## 2022-05-12 NOTE — ED QUICK NOTES
Orders for admission, patient is aware of plan and ready to go upstairs. Any questions, please call ED RN Ariadna Chavez at extension [phone]. Vaccinated? Yes  Type of COVID test sent: Rapid PCR, negative.   COVID Suspicion level: Low      Titratable drug(s) infusing:  Rate: Heparin 1000unit/hr    LOC at time of transport: A&Ox4    Other pertinent information:     CIWA score=  NIH score=

## 2022-05-12 NOTE — PLAN OF CARE
Assumed care @ 0730  BLE discolored and cool to touch. Pedal and post tib pulses per doppler  Heparin gtt infusing  Echo completed  Po dilaudid given for back pain  Ambulated in the [place] with PT.  Up to the chair    Plan for BLE art doppler

## 2022-05-12 NOTE — PLAN OF CARE
NURSING ADMISSION NOTE    Assumed care of pt this pm.   Pt AOx4. RA. Afib;tele w/ controlled rates 70s-80s  Hep gtt infusing per Afib protocol. BLE edema +2  Pedal pulses/post tibs per doppler  Pain controlled w/ PRN dilaudid   CMG monitor noted, pt aware not to use pump and monitor during stay and to remove if needed. (Agreement in chart)   Admission navigator completed w/ pt at bedside  Pt updated on POC  Needs attended to, will continue to monitor. Patient admitted via [address] to room. Safety precautions initiated. Bed in low position. Call light in reach.

## 2022-05-12 NOTE — CONSULTS
[facility]    PATIENT'S NAME: Assonet Bossier   ATTENDING PHYSICIAN: Krian Padilla D.O.   CONSULTING PHYSICIAN: Ced Mckoy M.D. PATIENT ACCOUNT#:   [de-identified]    LOCATION:  [place] [address] [address] [facility] 1  MEDICAL RECORD #:   [id number]       YOB: 1940  ADMISSION DATE:       05/11/2022      CONSULT DATE:  05/11/2022    REPORT OF CONSULTATION    HISTORY OF PRESENT ILLNESS:  An 70-year-old white female known to me for a history of abdominal aortic aneurysm treated with a stent graft approximately January 2020, who was recently in the hospital with some vague abdominal discomfort, weight loss, night sweats, and was found to have a recurrence of a high-grade follicular lymphoma. She initially was diagnosed 3 years ago with lymphoma in her neck, underwent a biopsy and radiation and chemotherapy. She had been disease-free until just now. I had seen her in 2020 for an enlarging abdominal aortic aneurysm, over 5.1 to 5.2 cm, and she had an endovascular stent graft placed. The initial films 1 month afterwards appeared to show a slight decrease in size of the aneurysm and no evidence for an endoleak. She had a followup CT angiogram done recently and evaluation for this bloating of her abdomen and was found to have slight enlargement of abdominal aortic aneurysm, maybe by 2 to 3 mm, and what appeared to be an endoleak, possibly just type II. These films are being reviewed with Dr. Sancho Newman and will discuss with Dr. Liliya Dixon. The patient's stent graft and her aneurysm apparently are not causing her symptoms at this time. Today when she was waking up after taking a nap, she felt pain in her forefoot bilaterally and some discoloration of the toes, was brought to the emergency room. She was seen by Dr. Kiran Padilla, who notified me that he had a hard time feeling pulses in the feet. She does have some edema in both lower legs. She still has a fullness in her abdomen.   The patient recently had a biopsy done while off Xarelto to confirm the recurrence of the malignancy. The patient had the Xarelto started 2 days after the procedure, and her INR is 2.4 today. The patient has no weakness in the legs, no pain elsewhere in the legs, and had a CT angiogram ordered by Dr. Jessi Leon. He had notified me of these findings while I was in surgery at [facility].  By the time I came here, finally CT angiogram had not only been done but the report was finalized. Imaging shows that the stent graft is about the same. No change in size. It is difficult for me to even see the type II endoleak at this time, and I cannot completely exclude a type III endoleak. But she has flow through the external iliac arteries, common femoral artery, superficial femoral arteries. There is a total knee replacement of the right knee, and it is difficult to make an image by it in that area, but there is a 3-vessel runoff going down the feet on both sides. Chart was reviewed. A CT angiogram was reviewed. The case was also discussed today with Dr. Nayeli Zaldivar, who believes this could still be a very treatable disease. PAST SURGICAL HISTORY:  PTCA of the coronary arteries, esophageal myotomy at [facility], previous cholecystectomy, knee surgery as previously stated, hernia surgery, right lobe thyroidectomy. MEDICATIONS:  She has been resumed on Xarelto. She was given some heparin at this time. She has torsemide 20 mg a day, insulin, pravastatin 80 mg a day, metformin 500 mg b.i.d., esomeprazole, potassium chloride, albuterol inhaler, metoprolol tartrate 25 mg a day, levothyroxine. ALLERGIES:  She has allergies to adhesive tape. SOCIAL HISTORY:  She is retired. She has 4 children, and Dr. Kailyn Arthur was in the room when I was checking on his mom. She works part-time as a musical . FAMILY HISTORY:  There is a family history of aneurysms in the patient's background.       REVIEW OF SYSTEMS:  She currently has no chest pain. She had been receiving Dilaudid and had no abdominal pain when I saw her. She denies shortness of breath. She has no CVA, TIA, visual field defect, or aphasia. The patient has no hematuria, dysuria, hemoptysis, cough, sputum production at this time. PHYSICAL EXAMINATION:    GENERAL:  She is awake, alert. She is appropriate. She is in no acute distress. VITAL SIGNS:  Her vital signs appear to be stable. ABDOMEN:  Soft, slightly distended, but no tenderness. EXTREMITIES:  She is moving all 4 extremities. She has Doppler flow in the right and left posterior tibial and dorsal pedal artery. There is some slight discoloration of her tips of her toes, but there is no calf tenderness. There is some slight swelling bilaterally. LABORATORY DATA:  Reviewed. Creatinine is 1.15, a BUN of 17. As previously stated. INR is 2.46. Her hemoglobin is 11.9, platelet count is 095,424. CT angiogram reviewed. IMPRESSION:  Patient with a stent graft repair of an aortic aneurysm with a possible type II endoleak with minimal enlargement from 2020, who has a recurrence of her lymphoma and being followed and treated by Dr. Tj Bean. He told me the diagnosis came back today high-grade lymphoma and still very treatable. PLAN:  We will have a duplex ultrasound to evaluate deep vein thrombosis. Continue on anticoagulation. Further treatment of the recurrence of the tumor per Dr. Tj Bean. The abdominal aortic aneurysm, I do not think that anything is significantly worsened and can wait for treatment until the other issues are resolved. We will review the films with Dr. Ranulfo Jamison, interventional radiologist at [facility].  All questions answered for the patient and for [facility].     Dictated By Ulysses Aran, M.D.  d: 05/11/2022 21:06:02  t: 05/11/2022 23:02:04  Job [id number]  [facility]/    cc: Ulysses Aran, M.D. Acute suppurative otitis media of left ear without spontaneous rupture of tympanic membrane, recurrence not specified

## 2022-05-13 ENCOUNTER — APPOINTMENT (OUTPATIENT)
Dept: ULTRASOUND IMAGING | Facility: HOSPITAL | Age: 82
End: 2022-05-13
Attending: SURGERY
Payer: MEDICARE

## 2022-05-13 ENCOUNTER — TELEPHONE (OUTPATIENT)
Dept: INTERNAL MEDICINE CLINIC | Facility: CLINIC | Age: 82
End: 2022-05-13

## 2022-05-13 PROBLEM — I99.8 ISCHEMIA OF FOOT: Status: ACTIVE | Noted: 2022-05-13

## 2022-05-13 PROBLEM — C83.38 DIFFUSE LARGE B-CELL LYMPHOMA OF LYMPH NODES OF MULTIPLE REGIONS (HCC): Status: ACTIVE | Noted: 2022-05-13

## 2022-05-13 PROBLEM — E79.0 HYPERURICEMIA: Status: ACTIVE | Noted: 2022-05-13

## 2022-05-13 LAB
ALBUMIN SERPL-MCNC: 2.3 G/DL (ref 3.4–5)
ALBUMIN/GLOB SERPL: 0.6 {RATIO} (ref 1–2)
ALP LIVER SERPL-CCNC: 72 U/L
ALT SERPL-CCNC: 11 U/L
ANION GAP SERPL CALC-SCNC: 5 MMOL/L (ref 0–18)
APTT PPP: 55 SECONDS (ref 23.3–35.6)
AST SERPL-CCNC: 17 U/L (ref 15–37)
ATRIAL RATE: 468 BPM
BASOPHILS # BLD AUTO: 0.09 X10(3) UL (ref 0–0.2)
BASOPHILS NFR BLD AUTO: 1 %
BILIRUB SERPL-MCNC: 0.3 MG/DL (ref 0.1–2)
BUN BLD-MCNC: 20 MG/DL (ref 7–18)
CALCIUM BLD-MCNC: 9.1 MG/DL (ref 8.5–10.1)
CHLORIDE SERPL-SCNC: 99 MMOL/L (ref 98–112)
CO2 SERPL-SCNC: 31 MMOL/L (ref 21–32)
CREAT BLD-MCNC: 1.21 MG/DL
EOSINOPHIL # BLD AUTO: 0.34 X10(3) UL (ref 0–0.7)
EOSINOPHIL NFR BLD AUTO: 3.8 %
ERYTHROCYTE [DISTWIDTH] IN BLOOD BY AUTOMATED COUNT: 16.2 %
GLOBULIN PLAS-MCNC: 3.6 G/DL (ref 2.8–4.4)
GLUCOSE BLD-MCNC: 140 MG/DL (ref 70–99)
GLUCOSE BLD-MCNC: 227 MG/DL (ref 70–99)
GLUCOSE BLD-MCNC: 284 MG/DL (ref 70–99)
GLUCOSE BLD-MCNC: 77 MG/DL (ref 70–99)
GLUCOSE BLD-MCNC: 79 MG/DL (ref 70–99)
GLUCOSE BLD-MCNC: 93 MG/DL (ref 70–99)
HCT VFR BLD AUTO: 36.5 %
HGB BLD-MCNC: 11 G/DL
IMM GRANULOCYTES # BLD AUTO: 0.05 X10(3) UL (ref 0–1)
IMM GRANULOCYTES NFR BLD: 0.6 %
LYMPHOCYTES # BLD AUTO: 0.52 X10(3) UL (ref 1–4)
LYMPHOCYTES NFR BLD AUTO: 5.9 %
MCH RBC QN AUTO: 26.4 PG (ref 26–34)
MCHC RBC AUTO-ENTMCNC: 30.1 G/DL (ref 31–37)
MCV RBC AUTO: 87.5 FL
MONOCYTES # BLD AUTO: 0.71 X10(3) UL (ref 0.1–1)
MONOCYTES NFR BLD AUTO: 8 %
NEUTROPHILS # BLD AUTO: 7.15 X10 (3) UL (ref 1.5–7.7)
NEUTROPHILS # BLD AUTO: 7.15 X10(3) UL (ref 1.5–7.7)
NEUTROPHILS NFR BLD AUTO: 80.7 %
OSMOLALITY SERPL CALC.SUM OF ELEC: 282 MOSM/KG (ref 275–295)
PLATELET # BLD AUTO: 285 10(3)UL (ref 150–450)
PLATELET # BLD AUTO: 308 10(3)UL (ref 150–450)
POTASSIUM SERPL-SCNC: 5.3 MMOL/L (ref 3.5–5.1)
PROT SERPL-MCNC: 5.9 G/DL (ref 6.4–8.2)
Q-T INTERVAL: 370 MS
QRS DURATION: 74 MS
QTC CALCULATION (BEZET): 416 MS
R AXIS: 27 DEGREES
RBC # BLD AUTO: 4.17 X10(6)UL
SODIUM SERPL-SCNC: 135 MMOL/L (ref 136–145)
T AXIS: 12 DEGREES
URATE SERPL-MCNC: 8.3 MG/DL
VENTRICULAR RATE: 76 BPM
WBC # BLD AUTO: 8.9 X10(3) UL (ref 4–11)

## 2022-05-13 PROCEDURE — B548ZZA ULTRASONOGRAPHY OF SUPERIOR VENA CAVA, GUIDANCE: ICD-10-PCS | Performed by: INTERNAL MEDICINE

## 2022-05-13 PROCEDURE — 3E0430M INTRODUCTION OF MONOCLONAL ANTIBODY INTO CENTRAL VEIN, PERCUTANEOUS APPROACH: ICD-10-PCS | Performed by: INTERNAL MEDICINE

## 2022-05-13 PROCEDURE — 93925 LOWER EXTREMITY STUDY: CPT | Performed by: SURGERY

## 2022-05-13 PROCEDURE — 99233 SBSQ HOSP IP/OBS HIGH 50: CPT | Performed by: HOSPITALIST

## 2022-05-13 PROCEDURE — 02HV33Z INSERTION OF INFUSION DEVICE INTO SUPERIOR VENA CAVA, PERCUTANEOUS APPROACH: ICD-10-PCS | Performed by: INTERNAL MEDICINE

## 2022-05-13 RX ORDER — ACETAMINOPHEN 325 MG/1
650 TABLET ORAL EVERY 24 HOURS
Status: COMPLETED | OUTPATIENT
Start: 2022-05-13 | End: 2022-05-13

## 2022-05-13 RX ORDER — PROCHLORPERAZINE MALEATE 10 MG
10 TABLET ORAL EVERY 6 HOURS PRN
Status: DISCONTINUED | OUTPATIENT
Start: 2022-05-13 | End: 2022-05-20

## 2022-05-13 RX ORDER — FOSAPREPITANT 150 MG/5ML
150 INJECTION, POWDER, LYOPHILIZED, FOR SOLUTION INTRAVENOUS EVERY 24 HOURS
Status: DISCONTINUED | OUTPATIENT
Start: 2022-05-14 | End: 2022-05-14

## 2022-05-13 RX ORDER — FLUCONAZOLE 100 MG/1
200 TABLET ORAL
Status: DISCONTINUED | OUTPATIENT
Start: 2022-05-13 | End: 2022-05-15

## 2022-05-13 RX ORDER — SULFAMETHOXAZOLE AND TRIMETHOPRIM 400; 80 MG/1; MG/1
1 TABLET ORAL
Status: DISCONTINUED | OUTPATIENT
Start: 2022-05-13 | End: 2022-05-15

## 2022-05-13 RX ORDER — PANTOPRAZOLE SODIUM 40 MG/1
40 TABLET, DELAYED RELEASE ORAL
Status: DISCONTINUED | OUTPATIENT
Start: 2022-05-14 | End: 2022-05-20

## 2022-05-13 RX ORDER — DIPHENHYDRAMINE HCL 25 MG
50 CAPSULE ORAL EVERY 24 HOURS
Status: COMPLETED | OUTPATIENT
Start: 2022-05-13 | End: 2022-05-13

## 2022-05-13 RX ORDER — PREDNISONE 50 MG/1
100 TABLET ORAL
Status: COMPLETED | OUTPATIENT
Start: 2022-05-13 | End: 2022-05-17

## 2022-05-13 RX ORDER — LIDOCAINE HYDROCHLORIDE 10 MG/ML
5 INJECTION, SOLUTION EPIDURAL; INFILTRATION; INTRACAUDAL; PERINEURAL
Status: COMPLETED | OUTPATIENT
Start: 2022-05-13 | End: 2022-05-13

## 2022-05-13 RX ORDER — ALLOPURINOL 300 MG/1
300 TABLET ORAL DAILY
Status: DISCONTINUED | OUTPATIENT
Start: 2022-05-13 | End: 2022-05-20

## 2022-05-13 RX ORDER — DEXAMETHASONE 4 MG/1
20 TABLET ORAL EVERY 24 HOURS
Status: COMPLETED | OUTPATIENT
Start: 2022-05-13 | End: 2022-05-13

## 2022-05-13 RX ORDER — DOXORUBICIN HYDROCHLORIDE 2 MG/ML
50 INJECTION, SOLUTION INTRAVENOUS EVERY 24 HOURS
Status: DISCONTINUED | OUTPATIENT
Start: 2022-05-14 | End: 2022-05-15

## 2022-05-13 RX ORDER — ACYCLOVIR 400 MG/1
400 TABLET ORAL 2 TIMES DAILY
Status: DISCONTINUED | OUTPATIENT
Start: 2022-05-13 | End: 2022-05-15

## 2022-05-13 NOTE — TELEPHONE ENCOUNTER
Pt is calling to inform AS she is in the hospital and is now being transferred to the [address] [place] [address] for a very aggressive lymphoma.  No call back necessary

## 2022-05-13 NOTE — PROGRESS NOTES
Pharmacy Chemotherapy Clarification    Truxima 770 mg in  mg over 4 hour on day 1; 5/13/22;  (total volume 577 ml)    Cytoxan 1520 mg in  ml over 60 min day 2; 5/14/22    Vincristine 2 mg in NS 25 ml over 10 min on day 2; 5/14/22    Adriamycin 102 mg = 51 ml IV push over 10 min on day 2; 5/14/22      Mike Negrete, PharmD

## 2022-05-13 NOTE — PLAN OF CARE
Assumed care at 0730. A&Ox4, Afib on tele. Heparin infusing. Nausea treated with IV Zofran. Pain controlled. PICC line placed on RUE. BLE arterial doppler completed. Transfer orders placed per MD. Family at bedside, updated on POC. Transferred to room 404 via bed.

## 2022-05-14 LAB
ALBUMIN SERPL-MCNC: 2.4 G/DL (ref 3.4–5)
ALBUMIN/GLOB SERPL: 0.7 {RATIO} (ref 1–2)
ALP LIVER SERPL-CCNC: 67 U/L
ALT SERPL-CCNC: 10 U/L
ANION GAP SERPL CALC-SCNC: 3 MMOL/L (ref 0–18)
ANION GAP SERPL CALC-SCNC: 3 MMOL/L (ref 0–18)
ANION GAP SERPL CALC-SCNC: 5 MMOL/L (ref 0–18)
APTT PPP: 65.3 SECONDS (ref 23.3–35.6)
AST SERPL-CCNC: 16 U/L (ref 15–37)
ATRIAL RATE: 64 BPM
BASOPHILS # BLD AUTO: 0.01 X10(3) UL (ref 0–0.2)
BASOPHILS NFR BLD AUTO: 0.1 %
BILIRUB SERPL-MCNC: 0.3 MG/DL (ref 0.1–2)
BUN BLD-MCNC: 33 MG/DL (ref 7–18)
BUN BLD-MCNC: 33 MG/DL (ref 7–18)
BUN BLD-MCNC: 39 MG/DL (ref 7–18)
CALCIUM BLD-MCNC: 8 MG/DL (ref 8.5–10.1)
CALCIUM BLD-MCNC: 8.1 MG/DL (ref 8.5–10.1)
CALCIUM BLD-MCNC: 8.1 MG/DL (ref 8.5–10.1)
CHLORIDE SERPL-SCNC: 96 MMOL/L (ref 98–112)
CHLORIDE SERPL-SCNC: 96 MMOL/L (ref 98–112)
CHLORIDE SERPL-SCNC: 98 MMOL/L (ref 98–112)
CO2 SERPL-SCNC: 29 MMOL/L (ref 21–32)
CO2 SERPL-SCNC: 31 MMOL/L (ref 21–32)
CO2 SERPL-SCNC: 31 MMOL/L (ref 21–32)
CREAT BLD-MCNC: 1.25 MG/DL
CREAT BLD-MCNC: 1.25 MG/DL
CREAT BLD-MCNC: 1.36 MG/DL
EOSINOPHIL # BLD AUTO: 0 X10(3) UL (ref 0–0.7)
EOSINOPHIL NFR BLD AUTO: 0 %
ERYTHROCYTE [DISTWIDTH] IN BLOOD BY AUTOMATED COUNT: 15.8 %
GLOBULIN PLAS-MCNC: 3.4 G/DL (ref 2.8–4.4)
GLUCOSE BLD-MCNC: 262 MG/DL (ref 70–99)
GLUCOSE BLD-MCNC: 279 MG/DL (ref 70–99)
GLUCOSE BLD-MCNC: 296 MG/DL (ref 70–99)
GLUCOSE BLD-MCNC: 296 MG/DL (ref 70–99)
GLUCOSE BLD-MCNC: 313 MG/DL (ref 70–99)
GLUCOSE BLD-MCNC: 339 MG/DL (ref 70–99)
GLUCOSE BLD-MCNC: 344 MG/DL (ref 70–99)
GLUCOSE BLD-MCNC: 350 MG/DL (ref 70–99)
HCT VFR BLD AUTO: 33.8 %
HGB BLD-MCNC: 10.4 G/DL
IMM GRANULOCYTES # BLD AUTO: 0.03 X10(3) UL (ref 0–1)
IMM GRANULOCYTES NFR BLD: 0.4 %
LDH SERPL L TO P-CCNC: 326 U/L
LDH SERPL L TO P-CCNC: 348 U/L
LYMPHOCYTES # BLD AUTO: 0.13 X10(3) UL (ref 1–4)
LYMPHOCYTES NFR BLD AUTO: 1.7 %
MCH RBC QN AUTO: 26.3 PG (ref 26–34)
MCHC RBC AUTO-ENTMCNC: 30.8 G/DL (ref 31–37)
MCV RBC AUTO: 85.6 FL
MONOCYTES # BLD AUTO: 0.07 X10(3) UL (ref 0.1–1)
MONOCYTES NFR BLD AUTO: 0.9 %
NEUTROPHILS # BLD AUTO: 7.55 X10 (3) UL (ref 1.5–7.7)
NEUTROPHILS # BLD AUTO: 7.55 X10(3) UL (ref 1.5–7.7)
NEUTROPHILS NFR BLD AUTO: 96.9 %
OSMOLALITY SERPL CALC.SUM OF ELEC: 288 MOSM/KG (ref 275–295)
OSMOLALITY SERPL CALC.SUM OF ELEC: 288 MOSM/KG (ref 275–295)
OSMOLALITY SERPL CALC.SUM OF ELEC: 292 MOSM/KG (ref 275–295)
PLATELET # BLD AUTO: 275 10(3)UL (ref 150–450)
PLATELET # BLD AUTO: 275 10(3)UL (ref 150–450)
POTASSIUM SERPL-SCNC: 5.3 MMOL/L (ref 3.5–5.1)
POTASSIUM SERPL-SCNC: 6.5 MMOL/L (ref 3.5–5.1)
POTASSIUM SERPL-SCNC: 6.5 MMOL/L (ref 3.5–5.1)
PROT SERPL-MCNC: 5.8 G/DL (ref 6.4–8.2)
Q-T INTERVAL: 404 MS
QRS DURATION: 84 MS
QTC CALCULATION (BEZET): 420 MS
R AXIS: 51 DEGREES
RBC # BLD AUTO: 3.95 X10(6)UL
SODIUM SERPL-SCNC: 130 MMOL/L (ref 136–145)
SODIUM SERPL-SCNC: 130 MMOL/L (ref 136–145)
SODIUM SERPL-SCNC: 132 MMOL/L (ref 136–145)
T AXIS: 23 DEGREES
URATE SERPL-MCNC: 5.3 MG/DL
URATE SERPL-MCNC: 5.8 MG/DL
VENTRICULAR RATE: 65 BPM
WBC # BLD AUTO: 7.8 X10(3) UL (ref 4–11)

## 2022-05-14 PROCEDURE — 99233 SBSQ HOSP IP/OBS HIGH 50: CPT | Performed by: INTERNAL MEDICINE

## 2022-05-14 PROCEDURE — 99233 SBSQ HOSP IP/OBS HIGH 50: CPT | Performed by: HOSPITALIST

## 2022-05-14 RX ORDER — SODIUM CHLORIDE 9 MG/ML
INJECTION, SOLUTION INTRAVENOUS CONTINUOUS
Status: DISCONTINUED | OUTPATIENT
Start: 2022-05-14 | End: 2022-05-17

## 2022-05-14 RX ORDER — DEXTROSE MONOHYDRATE 25 G/50ML
50 INJECTION, SOLUTION INTRAVENOUS ONCE
Status: DISCONTINUED | OUTPATIENT
Start: 2022-05-14 | End: 2022-05-14

## 2022-05-14 NOTE — PROGRESS NOTES
Pt received from [address] for chemo, aaox4, denies pain, Rituxan started via PICC line, tolerating well.

## 2022-05-14 NOTE — PROGRESS NOTES
rituxan completed with no reactions. Alert and orientated x4. Ambulates to bathroom with walker. heparn drip infusing at 1000units/hr. Breath sounds coarse and sats in higih 80's. Moved to chair in more upright position, placed on 2liters nasal canula. Symptoms resolved, sats in high 90's. Gave dilaudid for pain. 0600-Lab reported potassium of 6.5. Vital signs stable. Heart rate in 80's. Stat EKG done. Paged dr Carmen Merchant calcium rider, iv insulin, and V[place], seen by Dr Bret Ellison.

## 2022-05-15 ENCOUNTER — APPOINTMENT (OUTPATIENT)
Dept: GENERAL RADIOLOGY | Facility: HOSPITAL | Age: 82
End: 2022-05-15
Attending: HOSPITALIST
Payer: MEDICARE

## 2022-05-15 LAB
ANION GAP SERPL CALC-SCNC: 6 MMOL/L (ref 0–18)
ANION GAP SERPL CALC-SCNC: 7 MMOL/L (ref 0–18)
APTT PPP: 65.4 SECONDS (ref 23.3–35.6)
BASOPHILS # BLD AUTO: 0.01 X10(3) UL (ref 0–0.2)
BASOPHILS NFR BLD AUTO: 0.1 %
BUN BLD-MCNC: 50 MG/DL (ref 7–18)
BUN BLD-MCNC: 57 MG/DL (ref 7–18)
CALCIUM BLD-MCNC: 7.5 MG/DL (ref 8.5–10.1)
CALCIUM BLD-MCNC: 7.6 MG/DL (ref 8.5–10.1)
CHLORIDE SERPL-SCNC: 98 MMOL/L (ref 98–112)
CHLORIDE SERPL-SCNC: 98 MMOL/L (ref 98–112)
CO2 SERPL-SCNC: 25 MMOL/L (ref 21–32)
CO2 SERPL-SCNC: 26 MMOL/L (ref 21–32)
CREAT BLD-MCNC: 1.4 MG/DL
CREAT BLD-MCNC: 1.53 MG/DL
EOSINOPHIL # BLD AUTO: 0 X10(3) UL (ref 0–0.7)
EOSINOPHIL NFR BLD AUTO: 0 %
ERYTHROCYTE [DISTWIDTH] IN BLOOD BY AUTOMATED COUNT: 15.7 %
GLUCOSE BLD-MCNC: 244 MG/DL (ref 70–99)
GLUCOSE BLD-MCNC: 263 MG/DL (ref 70–99)
GLUCOSE BLD-MCNC: 268 MG/DL (ref 70–99)
GLUCOSE BLD-MCNC: 277 MG/DL (ref 70–99)
GLUCOSE BLD-MCNC: 281 MG/DL (ref 70–99)
GLUCOSE BLD-MCNC: 305 MG/DL (ref 70–99)
GLUCOSE BLD-MCNC: 315 MG/DL (ref 70–99)
HCT VFR BLD AUTO: 29 %
HGB BLD-MCNC: 8.9 G/DL
IMM GRANULOCYTES # BLD AUTO: 0.07 X10(3) UL (ref 0–1)
IMM GRANULOCYTES NFR BLD: 0.4 %
LDH SERPL L TO P-CCNC: 317 U/L
LDH SERPL L TO P-CCNC: 365 U/L
LYMPHOCYTES # BLD AUTO: 0.09 X10(3) UL (ref 1–4)
LYMPHOCYTES NFR BLD AUTO: 0.5 %
MCH RBC QN AUTO: 26.3 PG (ref 26–34)
MCHC RBC AUTO-ENTMCNC: 30.7 G/DL (ref 31–37)
MCV RBC AUTO: 85.5 FL
MONOCYTES # BLD AUTO: 0.57 X10(3) UL (ref 0.1–1)
MONOCYTES NFR BLD AUTO: 3.4 %
NEUTROPHILS # BLD AUTO: 15.82 X10 (3) UL (ref 1.5–7.7)
NEUTROPHILS # BLD AUTO: 15.82 X10(3) UL (ref 1.5–7.7)
NEUTROPHILS NFR BLD AUTO: 95.6 %
OSMOLALITY SERPL CALC.SUM OF ELEC: 291 MOSM/KG (ref 275–295)
OSMOLALITY SERPL CALC.SUM OF ELEC: 296 MOSM/KG (ref 275–295)
PLATELET # BLD AUTO: 264 10(3)UL (ref 150–450)
POTASSIUM SERPL-SCNC: 5.3 MMOL/L (ref 3.5–5.1)
POTASSIUM SERPL-SCNC: 5.9 MMOL/L (ref 3.5–5.1)
PROCALCITONIN SERPL-MCNC: 0.38 NG/ML (ref ?–0.16)
RBC # BLD AUTO: 3.39 X10(6)UL
SODIUM SERPL-SCNC: 130 MMOL/L (ref 136–145)
SODIUM SERPL-SCNC: 130 MMOL/L (ref 136–145)
URATE SERPL-MCNC: 4.6 MG/DL
URATE SERPL-MCNC: 4.7 MG/DL
WBC # BLD AUTO: 16.6 X10(3) UL (ref 4–11)

## 2022-05-15 PROCEDURE — 99232 SBSQ HOSP IP/OBS MODERATE 35: CPT | Performed by: INTERNAL MEDICINE

## 2022-05-15 PROCEDURE — 99233 SBSQ HOSP IP/OBS HIGH 50: CPT | Performed by: HOSPITALIST

## 2022-05-15 PROCEDURE — 71045 X-RAY EXAM CHEST 1 VIEW: CPT | Performed by: HOSPITALIST

## 2022-05-15 PROCEDURE — 99223 1ST HOSP IP/OBS HIGH 75: CPT | Performed by: INTERNAL MEDICINE

## 2022-05-15 RX ORDER — ACYCLOVIR 400 MG/1
400 TABLET ORAL DAILY
Status: DISCONTINUED | OUTPATIENT
Start: 2022-05-16 | End: 2022-05-15

## 2022-05-15 RX ORDER — FUROSEMIDE 10 MG/ML
20 INJECTION INTRAMUSCULAR; INTRAVENOUS ONCE
Status: COMPLETED | OUTPATIENT
Start: 2022-05-15 | End: 2022-05-15

## 2022-05-15 RX ORDER — CALCIUM GLUCONATE 94 MG/ML
1 INJECTION, SOLUTION INTRAVENOUS ONCE
Status: DISCONTINUED | OUTPATIENT
Start: 2022-05-15 | End: 2022-05-15

## 2022-05-15 RX ORDER — DEXTROSE MONOHYDRATE 25 G/50ML
50 INJECTION, SOLUTION INTRAVENOUS AS NEEDED
Status: DISCONTINUED | OUTPATIENT
Start: 2022-05-15 | End: 2022-05-15

## 2022-05-15 RX ORDER — CALCIUM GLUCONATE 10 MG/ML
1 INJECTION, SOLUTION INTRAVENOUS ONCE
Status: COMPLETED | OUTPATIENT
Start: 2022-05-15 | End: 2022-05-15

## 2022-05-15 NOTE — PLAN OF CARE
Assumed care @ 0730. Patient c/o mild pain on lower back. Respirations non-labored, lungs sound diminished, O2 sat 97-98% on O2 2l/nc. Patient's abdomen distended, form, tender, bowel sounds active. Patient denies nausea. Bilateral lower extremities  with +2 edema, cool to touch. Cytoxan, Vincristine and Doxorubicin given, tolerated well, no adverse reaction noted. Port a cath with good blood return.

## 2022-05-15 NOTE — PROGRESS NOTES
Called to pts room by RN for MDI teaching. After assessing the pt it was decided by RT that a PEP device would help current secretion production better than MDI. The pt received PEP therapy teaching and demonstrated proper usage of pep device. After 5 breaths pt had a productive cough. PEP device left in protective bag at bedside. Pt had no other questions or wants. RN aware of PEP therapy teaching.         05/15/22 0430   Chest Physiotherapy Tx   $ RT Standby Charge (per 15 min) 1   $ PEP Therapy Initial Tx & Equipment Charge Yes   CPT Delivery Source PEP   CPT Chest Site Full range   Breath Sounds Pre-Treatment Bilateral Rhonchi;Diminished   Breath Sounds Post-Treatment Bilateral Rhonchi;Diminished   Post-Treatment Pulse 73   Post-Treatment Respirations 20   Cough Congested;Productive;Strong   Position High fowlers   CPT Treatment Tolerance Tolerated well   Protocol Recommendations Continue present management   Action Follow protocol   Cough Description   Sputum Amount Small   Sputum Color Tan;Other (Comment)  (brown)

## 2022-05-15 NOTE — PLAN OF CARE
Patient alert and oriented X4. Vital signs stable. Afebrile. On 1LO2 at night, with sats 94-96%, desats on room air. Tele AFIB. On Heparin gtt per MAR, no complications noted. Latest PTT therapeutic, next draw in the morning. Abdomen distended. No nausea and vomiting. No complaints of pain. BLE noted with edema, elevated on a pillow. Electrolyte Potassium monitored. Blood glucose checked, Insulin coverage given as ordered. Maintained on Chemo precautions. Monitored for Lysis Syndrome. Ambulatory. Fall precautions in place. Call light in reach. Problem: Diabetes/Glucose Control  Goal: Glucose maintained within prescribed range  Description: INTERVENTIONS:  - Monitor Blood Glucose as ordered  - Assess for signs and symptoms of hyperglycemia and hypoglycemia  - Administer ordered medications to maintain glucose within target range  - Assess barriers to adequate nutritional intake and initiate nutrition consult as needed  - Instruct patient on self management of diabetes  Outcome: Progressing     Problem: CARDIOVASCULAR - ADULT  Goal: Maintains optimal cardiac output and hemodynamic stability  Description: INTERVENTIONS:  - Monitor vital signs, rhythm, and trends  - Monitor for bleeding, hypotension and signs of decreased cardiac output  - Evaluate effectiveness of vasoactive medications to optimize hemodynamic stability  - Monitor arterial and/or venous puncture sites for bleeding and/or hematoma  - Assess quality of pulses, skin color and temperature  - Assess for signs of decreased coronary artery perfusion - ex.  Angina  - Evaluate fluid balance, assess for edema, trend weights  Outcome: Progressing  Goal: Absence of cardiac arrhythmias or at baseline  Description: INTERVENTIONS:  - Continuous cardiac monitoring, monitor vital signs, obtain 12 lead EKG if indicated  - Evaluate effectiveness of antiarrhythmic and heart rate control medications as ordered  - Initiate emergency measures for life threatening arrhythmias  - Monitor electrolytes and administer replacement therapy as ordered  Outcome: Progressing     Problem: RESPIRATORY - ADULT  Goal: Achieves optimal ventilation and oxygenation  Description: INTERVENTIONS:  - Assess for changes in respiratory status  - Assess for changes in mentation and behavior  - Position to facilitate oxygenation and minimize respiratory effort  - Oxygen supplementation based on oxygen saturation or ABGs  - Provide Smoking Cessation handout, if applicable  - Encourage broncho-pulmonary hygiene including cough, deep breathe, Incentive Spirometry  - Assess the need for suctioning and perform as needed  - Assess and instruct to report SOB or any respiratory difficulty  - Respiratory Therapy support as indicated  - Manage/alleviate anxiety  - Monitor for signs/symptoms of CO2 retention  Outcome: Progressing     Problem: GASTROINTESTINAL - ADULT  Goal: Minimal or absence of nausea and vomiting  Description: INTERVENTIONS:  - Maintain adequate hydration with IV or PO as ordered and tolerated  - Nasogastric tube to low intermittent suction as ordered  - Evaluate effectiveness of ordered antiemetic medications  - Provide nonpharmacologic comfort measures as appropriate  - Advance diet as tolerated, if ordered  - Obtain nutritional consult as needed  - Evaluate fluid balance  Outcome: Progressing     Problem: METABOLIC/FLUID AND ELECTROLYTES - ADULT  Goal: Glucose maintained within prescribed range  Description: INTERVENTIONS:  - Monitor Blood Glucose as ordered  - Assess for signs and symptoms of hyperglycemia and hypoglycemia  - Administer ordered medications to maintain glucose within target range  - Assess barriers to adequate nutritional intake and initiate nutrition consult as needed  - Instruct patient on self management of diabetes  Outcome: Progressing  Goal: Electrolytes maintained within normal limits  Description: INTERVENTIONS:  - Monitor labs and rhythm and assess patient for signs and symptoms of electrolyte imbalances  - Administer electrolyte replacement as ordered  - Monitor response to electrolyte replacements, including rhythm and repeat lab results as appropriate  - Fluid restriction as ordered  - Instruct patient on fluid and nutrition restrictions as appropriate  Outcome: Progressing

## 2022-05-15 NOTE — PLAN OF CARE
Patient with occasional non-productive cough, respirations non-labored, rhonchi and expiratory wheezing noted left lung case, expiratory wheezing noted on right lung base, O2 sat 91-93% on room air. Patient's abdomen distended, firm, tender, bowel sounds hypoactive. Patient constipated, Miralax given. Patient denies nausea, voiding good amount of maritza urine. Patient with +2 edema on lower extremities.

## 2022-05-16 LAB
ANION GAP SERPL CALC-SCNC: 6 MMOL/L (ref 0–18)
ANION GAP SERPL CALC-SCNC: 9 MMOL/L (ref 0–18)
APTT PPP: 62.9 SECONDS (ref 23.3–35.6)
APTT PPP: 90.9 SECONDS (ref 23.3–35.6)
BASOPHILS # BLD AUTO: 0 X10(3) UL (ref 0–0.2)
BASOPHILS NFR BLD AUTO: 0 %
BUN BLD-MCNC: 53 MG/DL (ref 7–18)
BUN BLD-MCNC: 61 MG/DL (ref 7–18)
CALCIUM BLD-MCNC: 6.2 MG/DL (ref 8.5–10.1)
CALCIUM BLD-MCNC: 7.5 MG/DL (ref 8.5–10.1)
CHLORIDE SERPL-SCNC: 103 MMOL/L (ref 98–112)
CHLORIDE SERPL-SCNC: 103 MMOL/L (ref 98–112)
CO2 SERPL-SCNC: 22 MMOL/L (ref 21–32)
CO2 SERPL-SCNC: 24 MMOL/L (ref 21–32)
CREAT BLD-MCNC: 1.38 MG/DL
CREAT BLD-MCNC: 1.43 MG/DL
EOSINOPHIL # BLD AUTO: 0 X10(3) UL (ref 0–0.7)
EOSINOPHIL NFR BLD AUTO: 0 %
ERYTHROCYTE [DISTWIDTH] IN BLOOD BY AUTOMATED COUNT: 15.8 %
GLUCOSE BLD-MCNC: 275 MG/DL (ref 70–99)
GLUCOSE BLD-MCNC: 309 MG/DL (ref 70–99)
GLUCOSE BLD-MCNC: 313 MG/DL (ref 70–99)
GLUCOSE BLD-MCNC: 348 MG/DL (ref 70–99)
GLUCOSE BLD-MCNC: 349 MG/DL (ref 70–99)
GLUCOSE BLD-MCNC: 365 MG/DL (ref 70–99)
HCT VFR BLD AUTO: 28.5 %
HGB BLD-MCNC: 8.8 G/DL
IMM GRANULOCYTES # BLD AUTO: 0.04 X10(3) UL (ref 0–1)
IMM GRANULOCYTES NFR BLD: 0.4 %
L PNEUMO AG UR QL: NEGATIVE
LDH SERPL L TO P-CCNC: 261 U/L
LDH SERPL L TO P-CCNC: 303 U/L
LYMPHOCYTES # BLD AUTO: 0.06 X10(3) UL (ref 1–4)
LYMPHOCYTES NFR BLD AUTO: 0.7 %
MCH RBC QN AUTO: 26.3 PG (ref 26–34)
MCHC RBC AUTO-ENTMCNC: 30.9 G/DL (ref 31–37)
MCV RBC AUTO: 85.1 FL
MONOCYTES # BLD AUTO: 0.32 X10(3) UL (ref 0.1–1)
MONOCYTES NFR BLD AUTO: 3.5 %
MRSA DNA SPEC QL NAA+PROBE: NEGATIVE
NEUTROPHILS # BLD AUTO: 8.67 X10 (3) UL (ref 1.5–7.7)
NEUTROPHILS # BLD AUTO: 8.67 X10(3) UL (ref 1.5–7.7)
NEUTROPHILS NFR BLD AUTO: 95.4 %
OSMOLALITY SERPL CALC.SUM OF ELEC: 304 MOSM/KG (ref 275–295)
OSMOLALITY SERPL CALC.SUM OF ELEC: 305 MOSM/KG (ref 275–295)
PLATELET # BLD AUTO: 242 10(3)UL (ref 150–450)
POTASSIUM SERPL-SCNC: 5 MMOL/L (ref 3.5–5.1)
POTASSIUM SERPL-SCNC: 5.7 MMOL/L (ref 3.5–5.1)
RBC # BLD AUTO: 3.35 X10(6)UL
SODIUM SERPL-SCNC: 133 MMOL/L (ref 136–145)
SODIUM SERPL-SCNC: 134 MMOL/L (ref 136–145)
STREP PNEUMO ANTIGEN, URINE: NEGATIVE
URATE SERPL-MCNC: 4.2 MG/DL
URATE SERPL-MCNC: 4.6 MG/DL
WBC # BLD AUTO: 9.1 X10(3) UL (ref 4–11)

## 2022-05-16 PROCEDURE — 99233 SBSQ HOSP IP/OBS HIGH 50: CPT | Performed by: INTERNAL MEDICINE

## 2022-05-16 PROCEDURE — 99233 SBSQ HOSP IP/OBS HIGH 50: CPT | Performed by: HOSPITALIST

## 2022-05-16 PROCEDURE — 99232 SBSQ HOSP IP/OBS MODERATE 35: CPT | Performed by: INTERNAL MEDICINE

## 2022-05-16 NOTE — PLAN OF CARE
Assumed patient care at 0730. AOx4. Calm, cooperative. Up with SBA and a walker. A. fib on cardiac monitor. Heparin drip infusing per ACS/A. fib protocol. Adequate saturation on RA. Lung sounds diminished bilaterally. Denies SOB, chest discomfort, N/V. Denies pain. Voiding without difficulty. LBM \"a week ago\" per patient report. Zosyn being administered Q8. Blood cultures, sputum cultures, and urine samples to be collected. Hourly and PRN rounding in place. Call light within reach. All needs met at this time. Will continue with the current plan of care. Problem: Diabetes/Glucose Control  Goal: Glucose maintained within prescribed range  Description: INTERVENTIONS:  - Monitor Blood Glucose as ordered  - Assess for signs and symptoms of hyperglycemia and hypoglycemia  - Administer ordered medications to maintain glucose within target range  - Assess barriers to adequate nutritional intake and initiate nutrition consult as needed  - Instruct patient on self management of diabetes  Outcome: Progressing     Problem: CARDIOVASCULAR - ADULT  Goal: Maintains optimal cardiac output and hemodynamic stability  Description: INTERVENTIONS:  - Monitor vital signs, rhythm, and trends  - Monitor for bleeding, hypotension and signs of decreased cardiac output  - Evaluate effectiveness of vasoactive medications to optimize hemodynamic stability  - Monitor arterial and/or venous puncture sites for bleeding and/or hematoma  - Assess quality of pulses, skin color and temperature  - Assess for signs of decreased coronary artery perfusion - ex.  Angina  - Evaluate fluid balance, assess for edema, trend weights  Outcome: Progressing  Goal: Absence of cardiac arrhythmias or at baseline  Description: INTERVENTIONS:  - Continuous cardiac monitoring, monitor vital signs, obtain 12 lead EKG if indicated  - Evaluate effectiveness of antiarrhythmic and heart rate control medications as ordered  - Initiate emergency measures for life threatening arrhythmias  - Monitor electrolytes and administer replacement therapy as ordered  Outcome: Progressing     Problem: RESPIRATORY - ADULT  Goal: Achieves optimal ventilation and oxygenation  Description: INTERVENTIONS:  - Assess for changes in respiratory status  - Assess for changes in mentation and behavior  - Position to facilitate oxygenation and minimize respiratory effort  - Oxygen supplementation based on oxygen saturation or ABGs  - Provide Smoking Cessation handout, if applicable  - Encourage broncho-pulmonary hygiene including cough, deep breathe, Incentive Spirometry  - Assess the need for suctioning and perform as needed  - Assess and instruct to report SOB or any respiratory difficulty  - Respiratory Therapy support as indicated  - Manage/alleviate anxiety  - Monitor for signs/symptoms of CO2 retention  Outcome: Progressing     Problem: GASTROINTESTINAL - ADULT  Goal: Minimal or absence of nausea and vomiting  Description: INTERVENTIONS:  - Maintain adequate hydration with IV or PO as ordered and tolerated  - Nasogastric tube to low intermittent suction as ordered  - Evaluate effectiveness of ordered antiemetic medications  - Provide nonpharmacologic comfort measures as appropriate  - Advance diet as tolerated, if ordered  - Obtain nutritional consult as needed  - Evaluate fluid balance  Outcome: Progressing     Problem: METABOLIC/FLUID AND ELECTROLYTES - ADULT  Goal: Glucose maintained within prescribed range  Description: INTERVENTIONS:  - Monitor Blood Glucose as ordered  - Assess for signs and symptoms of hyperglycemia and hypoglycemia  - Administer ordered medications to maintain glucose within target range  - Assess barriers to adequate nutritional intake and initiate nutrition consult as needed  - Instruct patient on self management of diabetes  Outcome: Progressing  Goal: Electrolytes maintained within normal limits  Description: INTERVENTIONS:  - Monitor labs and rhythm and assess patient for signs and symptoms of electrolyte imbalances  - Administer electrolyte replacement as ordered  - Monitor response to electrolyte replacements, including rhythm and repeat lab results as appropriate  - Fluid restriction as ordered  - Instruct patient on fluid and nutrition restrictions as appropriate  Outcome: Progressing  Goal: Hemodynamic stability and optimal renal function maintained  Description: INTERVENTIONS:  - Monitor labs and assess for signs and symptoms of volume excess or deficit  - Monitor intake, output and patient weight  - Monitor urine specific gravity, serum osmolarity and serum sodium as indicated or ordered  - Monitor response to interventions for patient's volume status, including labs, urine output, blood pressure (other measures as available)  - Encourage oral intake as appropriate  - Instruct patient on fluid and nutrition restrictions as appropriate  Outcome: Progressing     Problem: HEMATOLOGIC - ADULT  Goal: Maintains hematologic stability  Description: INTERVENTIONS  - Assess for signs and symptoms of bleeding or hemorrhage  - Monitor labs and vital signs for trends  - Administer supportive blood products/factors, fluids and medications as ordered and appropriate  - Administer supportive blood products/factors as ordered and appropriate  Outcome: Progressing     Problem: PAIN - ADULT  Goal: Verbalizes/displays adequate comfort level or patient's stated pain goal  Description: INTERVENTIONS:  - Encourage pt to monitor pain and request assistance  - Assess pain using appropriate pain scale  - Administer analgesics based on type and severity of pain and evaluate response  - Implement non-pharmacological measures as appropriate and evaluate response  - Consider cultural and social influences on pain and pain management  - Manage/alleviate anxiety  - Utilize distraction and/or relaxation techniques  - Monitor for opioid side effects  - Notify MD/LIP if interventions unsuccessful or patient reports new pain  - Anticipate increased pain with activity and pre-medicate as appropriate  Outcome: Progressing

## 2022-05-16 NOTE — PLAN OF CARE
Patient alert and oriented x4. Vital signs stable. Afebrile. On 2LO2 with sats 94-96%. Desats on room air. Patient complained of worsening bilateral feet pain, still noted with swelling, cold to touch with Ischemia worst on right foot than Left. Dr. Coretta Mcghee notified. Patient on Heparin gtt per MAR, tolerated well. No complications observed. PTT within Therapeutic level, next blood draw scheduled in AM. Maintained on IV antibiotics for Pneumonia. PRN PO Dilaudid given for bilateral  feet pain with good relief. Blood glucose monitored, Insulin coverage given per MAR. Up to the bathroom with assist. Safety measures provided. Chemo precautions in place. Call light in reach. Problem: Diabetes/Glucose Control  Goal: Glucose maintained within prescribed range  Description: INTERVENTIONS:  - Monitor Blood Glucose as ordered  - Assess for signs and symptoms of hyperglycemia and hypoglycemia  - Administer ordered medications to maintain glucose within target range  - Assess barriers to adequate nutritional intake and initiate nutrition consult as needed  - Instruct patient on self management of diabetes  5/16/2022 0053 by Fernanda Hightower RN  Outcome: Progressing  5/16/2022 0052 by Fernanda Hightower RN  Outcome: Progressing     Problem: CARDIOVASCULAR - ADULT  Goal: Maintains optimal cardiac output and hemodynamic stability  Description: INTERVENTIONS:  - Monitor vital signs, rhythm, and trends  - Monitor for bleeding, hypotension and signs of decreased cardiac output  - Evaluate effectiveness of vasoactive medications to optimize hemodynamic stability  - Monitor arterial and/or venous puncture sites for bleeding and/or hematoma  - Assess quality of pulses, skin color and temperature  - Assess for signs of decreased coronary artery perfusion - ex.  Angina  - Evaluate fluid balance, assess for edema, trend weights  Outcome: Progressing  Goal: Absence of cardiac arrhythmias or at baseline  Description: INTERVENTIONS:  - Continuous cardiac monitoring, monitor vital signs, obtain 12 lead EKG if indicated  - Evaluate effectiveness of antiarrhythmic and heart rate control medications as ordered  - Initiate emergency measures for life threatening arrhythmias  - Monitor electrolytes and administer replacement therapy as ordered  Outcome: Progressing     Problem: RESPIRATORY - ADULT  Goal: Achieves optimal ventilation and oxygenation  Description: INTERVENTIONS:  - Assess for changes in respiratory status  - Assess for changes in mentation and behavior  - Position to facilitate oxygenation and minimize respiratory effort  - Oxygen supplementation based on oxygen saturation or ABGs  - Provide Smoking Cessation handout, if applicable  - Encourage broncho-pulmonary hygiene including cough, deep breathe, Incentive Spirometry  - Assess the need for suctioning and perform as needed  - Assess and instruct to report SOB or any respiratory difficulty  - Respiratory Therapy support as indicated  - Manage/alleviate anxiety  - Monitor for signs/symptoms of CO2 retention  5/16/2022 0053 by Jeramie Blackmon RN  Outcome: Progressing  5/16/2022 0052 by Jeramie Blacmkon RN  Outcome: Progressing     Problem: METABOLIC/FLUID AND ELECTROLYTES - ADULT  Goal: Glucose maintained within prescribed range  Description: INTERVENTIONS:  - Monitor Blood Glucose as ordered  - Assess for signs and symptoms of hyperglycemia and hypoglycemia  - Administer ordered medications to maintain glucose within target range  - Assess barriers to adequate nutritional intake and initiate nutrition consult as needed  - Instruct patient on self management of diabetes  5/16/2022 0053 by Jeramie Blackmon RN  Outcome: Progressing  5/16/2022 0052 by Jeramie Blackmon RN  Outcome: Progressing  Goal: Hemodynamic stability and optimal renal function maintained  Description: INTERVENTIONS:  - Monitor labs and assess for signs and symptoms of volume excess or deficit  - Monitor intake, output and patient weight  - Monitor urine specific gravity, serum osmolarity and serum sodium as indicated or ordered  - Monitor response to interventions for patient's volume status, including labs, urine output, blood pressure (other measures as available)  - Encourage oral intake as appropriate  - Instruct patient on fluid and nutrition restrictions as appropriate  5/16/2022 0053 by Jose Koch RN  Outcome: Progressing  5/16/2022 0052 by Jose Koch RN  Outcome: Progressing

## 2022-05-16 NOTE — TELEPHONE ENCOUNTER
TC to patient who is back in the hospital to call when she is discharged so we can schedule Hosp fu.

## 2022-05-16 NOTE — CM/SW NOTE
[facility] is arranged for patient for DC follow up. SW will continue to remain available for any additional DC needs or concerns.      Jayde Holt MSW, LSW  Discharge Planner   [phone]

## 2022-05-17 LAB
ALBUMIN SERPL-MCNC: 2.1 G/DL (ref 3.4–5)
ALBUMIN/GLOB SERPL: 0.8 {RATIO} (ref 1–2)
ALP LIVER SERPL-CCNC: 53 U/L
ALT SERPL-CCNC: 12 U/L
ANION GAP SERPL CALC-SCNC: 6 MMOL/L (ref 0–18)
ANION GAP SERPL CALC-SCNC: 6 MMOL/L (ref 0–18)
APTT PPP: 72 SECONDS (ref 23.3–35.6)
AST SERPL-CCNC: 10 U/L (ref 15–37)
BASOPHILS # BLD AUTO: 0 X10(3) UL (ref 0–0.2)
BASOPHILS NFR BLD AUTO: 0 %
BILIRUB SERPL-MCNC: 0.2 MG/DL (ref 0.1–2)
BLYM RESULT SUMMARY: NEGATIVE
BUN BLD-MCNC: 56 MG/DL (ref 7–18)
BUN BLD-MCNC: 56 MG/DL (ref 7–18)
CALCIUM BLD-MCNC: 7.5 MG/DL (ref 8.5–10.1)
CALCIUM BLD-MCNC: 7.5 MG/DL (ref 8.5–10.1)
CHLORIDE SERPL-SCNC: 107 MMOL/L (ref 98–112)
CHLORIDE SERPL-SCNC: 107 MMOL/L (ref 98–112)
CO2 SERPL-SCNC: 26 MMOL/L (ref 21–32)
CO2 SERPL-SCNC: 26 MMOL/L (ref 21–32)
CREAT BLD-MCNC: 1.34 MG/DL
CREAT BLD-MCNC: 1.34 MG/DL
EOSINOPHIL # BLD AUTO: 0 X10(3) UL (ref 0–0.7)
EOSINOPHIL NFR BLD AUTO: 0 %
ERYTHROCYTE [DISTWIDTH] IN BLOOD BY AUTOMATED COUNT: 16 %
GLOBULIN PLAS-MCNC: 2.7 G/DL (ref 2.8–4.4)
GLUCOSE BLD-MCNC: 234 MG/DL (ref 70–99)
GLUCOSE BLD-MCNC: 244 MG/DL (ref 70–99)
GLUCOSE BLD-MCNC: 244 MG/DL (ref 70–99)
GLUCOSE BLD-MCNC: 316 MG/DL (ref 70–99)
GLUCOSE BLD-MCNC: 388 MG/DL (ref 70–99)
GLUCOSE BLD-MCNC: 397 MG/DL (ref 70–99)
HCT VFR BLD AUTO: 26.4 %
HGB BLD-MCNC: 8.5 G/DL
IMM GRANULOCYTES # BLD AUTO: 0.03 X10(3) UL (ref 0–1)
IMM GRANULOCYTES NFR BLD: 0.5 %
LDH SERPL L TO P-CCNC: 251 U/L
LYMPHOCYTES # BLD AUTO: 0.03 X10(3) UL (ref 1–4)
LYMPHOCYTES NFR BLD AUTO: 0.5 %
MCH RBC QN AUTO: 27 PG (ref 26–34)
MCHC RBC AUTO-ENTMCNC: 32.2 G/DL (ref 31–37)
MCV RBC AUTO: 83.8 FL
MONOCYTES # BLD AUTO: 0.23 X10(3) UL (ref 0.1–1)
MONOCYTES NFR BLD AUTO: 3.8 %
NEUTROPHILS # BLD AUTO: 5.73 X10 (3) UL (ref 1.5–7.7)
NEUTROPHILS # BLD AUTO: 5.73 X10(3) UL (ref 1.5–7.7)
NEUTROPHILS NFR BLD AUTO: 95.2 %
OSMOLALITY SERPL CALC.SUM OF ELEC: 312 MOSM/KG (ref 275–295)
OSMOLALITY SERPL CALC.SUM OF ELEC: 312 MOSM/KG (ref 275–295)
PHOSPHATE SERPL-MCNC: 5.6 MG/DL (ref 2.5–4.9)
PLATELET # BLD AUTO: 210 10(3)UL (ref 150–450)
POTASSIUM SERPL-SCNC: 5.5 MMOL/L (ref 3.5–5.1)
POTASSIUM SERPL-SCNC: 5.5 MMOL/L (ref 3.5–5.1)
PROT SERPL-MCNC: 4.8 G/DL (ref 6.4–8.2)
RBC # BLD AUTO: 3.15 X10(6)UL
SODIUM SERPL-SCNC: 139 MMOL/L (ref 136–145)
SODIUM SERPL-SCNC: 139 MMOL/L (ref 136–145)
URATE SERPL-MCNC: 4.1 MG/DL
WBC # BLD AUTO: 6 X10(3) UL (ref 4–11)

## 2022-05-17 PROCEDURE — 99233 SBSQ HOSP IP/OBS HIGH 50: CPT | Performed by: HOSPITALIST

## 2022-05-17 PROCEDURE — 99233 SBSQ HOSP IP/OBS HIGH 50: CPT | Performed by: INTERNAL MEDICINE

## 2022-05-17 RX ORDER — MULTIPLE VITAMINS W/ MINERALS TAB 9MG-400MCG
1 TAB ORAL DAILY
Status: DISCONTINUED | OUTPATIENT
Start: 2022-05-17 | End: 2022-05-20

## 2022-05-17 RX ORDER — FUROSEMIDE 10 MG/ML
40 INJECTION INTRAMUSCULAR; INTRAVENOUS
Status: DISCONTINUED | OUTPATIENT
Start: 2022-05-17 | End: 2022-05-19

## 2022-05-17 NOTE — PLAN OF CARE
A/o x4. Oxygen saturation  % on 2 LNC/ non- productive cough. Unable to collect sputum culture. Encouraged IS. Afebrile. Denies pain/ On heparin gtt. No bleeding observed. Constipation, prn stool softener given at bedtime. Encouraged po fluid, small frequent meals. Accucheck 365 at bedtime. MD paged with order for insulin given. Patient very weak, ambulates to the bathroom with 1 person standby assist with a walker. Fall precaution maintained. Problem: CARDIOVASCULAR - ADULT  Goal: Maintains optimal cardiac output and hemodynamic stability  Description: INTERVENTIONS:  - Monitor vital signs, rhythm, and trends  - Monitor for bleeding, hypotension and signs of decreased cardiac output  - Evaluate effectiveness of vasoactive medications to optimize hemodynamic stability  - Monitor arterial and/or venous puncture sites for bleeding and/or hematoma  - Assess quality of pulses, skin color and temperature  - Assess for signs of decreased coronary artery perfusion - ex.  Angina  - Evaluate fluid balance, assess for edema, trend weights  Outcome: Progressing

## 2022-05-17 NOTE — PLAN OF CARE
Patient A&Ox4, reported no pain. BLE swollen, EZRA garza ordered and on BLE, Lasix given per MAR. IVF discontinued. Sputum culture sent to lab. APTT drawn, within therapeutic range, Heparin running 8.5ml/hr per [facility]. K=5.5, Veltessa ordered and given per [facility] Patient stated she felt constipated, Miralax given per [facility], educated patient on increasing fiber and fluids. Right PICC in place& in tact, right arm precaution. IV abx given per MAR. Educated patient on diabetes and insulin. 66 91 21-- Patient reported still feeling constipated, suppository given per [facility]  1750- HT=905, Dr. Rizwana silvestre, orders to follow. 1840-- Fleet enema given, patient reported still feeling constipated & having pain when trying to go to bathroom. Problem: Diabetes/Glucose Control  Goal: Glucose maintained within prescribed range  Description: INTERVENTIONS:  - Monitor Blood Glucose as ordered  - Assess for signs and symptoms of hyperglycemia and hypoglycemia  - Administer ordered medications to maintain glucose within target range  - Assess barriers to adequate nutritional intake and initiate nutrition consult as needed  - Instruct patient on self management of diabetes  Outcome: Progressing     Problem: CARDIOVASCULAR - ADULT  Goal: Maintains optimal cardiac output and hemodynamic stability  Description: INTERVENTIONS:  - Monitor vital signs, rhythm, and trends  - Monitor for bleeding, hypotension and signs of decreased cardiac output  - Evaluate effectiveness of vasoactive medications to optimize hemodynamic stability  - Monitor arterial and/or venous puncture sites for bleeding and/or hematoma  - Assess quality of pulses, skin color and temperature  - Assess for signs of decreased coronary artery perfusion - ex.  Angina  - Evaluate fluid balance, assess for edema, trend weights  Outcome: Progressing  Goal: Absence of cardiac arrhythmias or at baseline  Description: INTERVENTIONS:  - Continuous cardiac monitoring, monitor vital signs, obtain 12 lead EKG if indicated  - Evaluate effectiveness of antiarrhythmic and heart rate control medications as ordered  - Initiate emergency measures for life threatening arrhythmias  - Monitor electrolytes and administer replacement therapy as ordered  Outcome: Progressing     Problem: RESPIRATORY - ADULT  Goal: Achieves optimal ventilation and oxygenation  Description: INTERVENTIONS:  - Assess for changes in respiratory status  - Assess for changes in mentation and behavior  - Position to facilitate oxygenation and minimize respiratory effort  - Oxygen supplementation based on oxygen saturation or ABGs  - Provide Smoking Cessation handout, if applicable  - Encourage broncho-pulmonary hygiene including cough, deep breathe, Incentive Spirometry  - Assess the need for suctioning and perform as needed  - Assess and instruct to report SOB or any respiratory difficulty  - Respiratory Therapy support as indicated  - Manage/alleviate anxiety  - Monitor for signs/symptoms of CO2 retention  Outcome: Progressing     Problem: HEMATOLOGIC - ADULT  Goal: Maintains hematologic stability  Description: INTERVENTIONS  - Assess for signs and symptoms of bleeding or hemorrhage  - Monitor labs and vital signs for trends  - Administer supportive blood products/factors, fluids and medications as ordered and appropriate  - Administer supportive blood products/factors as ordered and appropriate  Outcome: Progressing

## 2022-05-18 LAB
ALBUMIN SERPL-MCNC: 2.2 G/DL (ref 3.4–5)
ALBUMIN/GLOB SERPL: 0.8 {RATIO} (ref 1–2)
ALP LIVER SERPL-CCNC: 51 U/L
ALT SERPL-CCNC: 12 U/L
ANION GAP SERPL CALC-SCNC: 4 MMOL/L (ref 0–18)
APTT PPP: 70.7 SECONDS (ref 23.3–35.6)
AST SERPL-CCNC: 8 U/L (ref 15–37)
BASOPHILS # BLD AUTO: 0.01 X10(3) UL (ref 0–0.2)
BASOPHILS NFR BLD AUTO: 0.1 %
BILIRUB SERPL-MCNC: 0.4 MG/DL (ref 0.1–2)
BUN BLD-MCNC: 49 MG/DL (ref 7–18)
CALCIUM BLD-MCNC: 7.9 MG/DL (ref 8.5–10.1)
CHLORIDE SERPL-SCNC: 106 MMOL/L (ref 98–112)
CO2 SERPL-SCNC: 29 MMOL/L (ref 21–32)
CREAT BLD-MCNC: 1.27 MG/DL
EOSINOPHIL # BLD AUTO: 0 X10(3) UL (ref 0–0.7)
EOSINOPHIL NFR BLD AUTO: 0 %
ERYTHROCYTE [DISTWIDTH] IN BLOOD BY AUTOMATED COUNT: 15.9 %
GLOBULIN PLAS-MCNC: 2.7 G/DL (ref 2.8–4.4)
GLUCOSE BLD-MCNC: 149 MG/DL (ref 70–99)
GLUCOSE BLD-MCNC: 161 MG/DL (ref 70–99)
GLUCOSE BLD-MCNC: 224 MG/DL (ref 70–99)
GLUCOSE BLD-MCNC: 224 MG/DL (ref 70–99)
GLUCOSE BLD-MCNC: 236 MG/DL (ref 70–99)
HCT VFR BLD AUTO: 28.3 %
HGB BLD-MCNC: 8.9 G/DL
IMM GRANULOCYTES # BLD AUTO: 0.04 X10(3) UL (ref 0–1)
IMM GRANULOCYTES NFR BLD: 0.4 %
LDH SERPL L TO P-CCNC: 225 U/L
LYMPHOCYTES # BLD AUTO: 0.07 X10(3) UL (ref 1–4)
LYMPHOCYTES NFR BLD AUTO: 0.6 %
MCH RBC QN AUTO: 26.6 PG (ref 26–34)
MCHC RBC AUTO-ENTMCNC: 31.4 G/DL (ref 31–37)
MCV RBC AUTO: 84.7 FL
MONOCYTES # BLD AUTO: 0.15 X10(3) UL (ref 0.1–1)
MONOCYTES NFR BLD AUTO: 1.4 %
NEUTROPHILS # BLD AUTO: 10.76 X10 (3) UL (ref 1.5–7.7)
NEUTROPHILS # BLD AUTO: 10.76 X10(3) UL (ref 1.5–7.7)
NEUTROPHILS NFR BLD AUTO: 97.5 %
OSMOLALITY SERPL CALC.SUM OF ELEC: 308 MOSM/KG (ref 275–295)
PLATELET # BLD AUTO: 187 10(3)UL (ref 150–450)
POTASSIUM SERPL-SCNC: 4.4 MMOL/L (ref 3.5–5.1)
PROT SERPL-MCNC: 4.9 G/DL (ref 6.4–8.2)
RBC # BLD AUTO: 3.34 X10(6)UL
SODIUM SERPL-SCNC: 139 MMOL/L (ref 136–145)
URATE SERPL-MCNC: 4.1 MG/DL
WBC # BLD AUTO: 11 X10(3) UL (ref 4–11)

## 2022-05-18 PROCEDURE — 99233 SBSQ HOSP IP/OBS HIGH 50: CPT | Performed by: INTERNAL MEDICINE

## 2022-05-18 PROCEDURE — 99231 SBSQ HOSP IP/OBS SF/LOW 25: CPT | Performed by: NURSE PRACTITIONER

## 2022-05-18 NOTE — PLAN OF CARE
Remains /o x4. Very weak tonight. Constipated. Stimulant laxatives, enema given. Several trips to the bathroom with small and moderate BMs overnight. Prn senna given at bedtime. Poor oral intake. Encouraged adequate fluid intake. Blood sugar elevated,MD notified with insulin given. Denies pain. Heparin gtt infusing, tolerated well. No bleeding noted. Fall precaution maintained. Slept intermittently.    Problem: GASTROINTESTINAL - ADULT  Goal: Minimal or absence of nausea and vomiting  Description: INTERVENTIONS:  - Maintain adequate hydration with IV or PO as ordered and tolerated  - Nasogastric tube to low intermittent suction as ordered  - Evaluate effectiveness of ordered antiemetic medications  - Provide nonpharmacologic comfort measures as appropriate  - Advance diet as tolerated, if ordered  - Obtain nutritional consult as needed  - Evaluate fluid balance  Outcome: Progressing

## 2022-05-18 NOTE — PROGRESS NOTES
Patient alert oriented times four,on room air, on telemetry a-FIB rate 75  Denies pain, gets up with assistance of one with walker, and tolerates well. Has been sitting up in chair since am and tolerating well.

## 2022-05-19 LAB
ALBUMIN SERPL-MCNC: 2.1 G/DL (ref 3.4–5)
ALBUMIN/GLOB SERPL: 0.8 {RATIO} (ref 1–2)
ALP LIVER SERPL-CCNC: 48 U/L
ALT SERPL-CCNC: 10 U/L
ANION GAP SERPL CALC-SCNC: 4 MMOL/L (ref 0–18)
APTT PPP: 74.5 SECONDS (ref 23.3–35.6)
AST SERPL-CCNC: 9 U/L (ref 15–37)
BASOPHILS # BLD AUTO: 0 X10(3) UL (ref 0–0.2)
BASOPHILS # BLD AUTO: 0 X10(3) UL (ref 0–0.2)
BASOPHILS NFR BLD AUTO: 0 %
BASOPHILS NFR BLD AUTO: 0 %
BILIRUB SERPL-MCNC: 0.4 MG/DL (ref 0.1–2)
BUN BLD-MCNC: 38 MG/DL (ref 7–18)
CALCIUM BLD-MCNC: 7.7 MG/DL (ref 8.5–10.1)
CHLORIDE SERPL-SCNC: 102 MMOL/L (ref 98–112)
CO2 SERPL-SCNC: 31 MMOL/L (ref 21–32)
CREAT BLD-MCNC: 1.13 MG/DL
EOSINOPHIL # BLD AUTO: 0.01 X10(3) UL (ref 0–0.7)
EOSINOPHIL # BLD AUTO: 0.02 X10(3) UL (ref 0–0.7)
EOSINOPHIL NFR BLD AUTO: 0.1 %
EOSINOPHIL NFR BLD AUTO: 0.3 %
ERYTHROCYTE [DISTWIDTH] IN BLOOD BY AUTOMATED COUNT: 16 %
ERYTHROCYTE [DISTWIDTH] IN BLOOD BY AUTOMATED COUNT: 16.1 %
GLOBULIN PLAS-MCNC: 2.6 G/DL (ref 2.8–4.4)
GLUCOSE BLD-MCNC: 112 MG/DL (ref 70–99)
GLUCOSE BLD-MCNC: 161 MG/DL (ref 70–99)
GLUCOSE BLD-MCNC: 179 MG/DL (ref 70–99)
GLUCOSE BLD-MCNC: 201 MG/DL (ref 70–99)
GLUCOSE BLD-MCNC: 238 MG/DL (ref 70–99)
HCT VFR BLD AUTO: 27.7 %
HCT VFR BLD AUTO: 30 %
HGB BLD-MCNC: 8.7 G/DL
HGB BLD-MCNC: 9 G/DL
IMM GRANULOCYTES # BLD AUTO: 0.03 X10(3) UL (ref 0–1)
IMM GRANULOCYTES # BLD AUTO: 0.04 X10(3) UL (ref 0–1)
IMM GRANULOCYTES NFR BLD: 0.4 %
IMM GRANULOCYTES NFR BLD: 0.6 %
LDH SERPL L TO P-CCNC: 267 U/L
LYMPHOCYTES # BLD AUTO: 0.14 X10(3) UL (ref 1–4)
LYMPHOCYTES # BLD AUTO: 0.17 X10(3) UL (ref 1–4)
LYMPHOCYTES NFR BLD AUTO: 2.1 %
LYMPHOCYTES NFR BLD AUTO: 2.2 %
MCH RBC QN AUTO: 25.5 PG (ref 26–34)
MCH RBC QN AUTO: 26.2 PG (ref 26–34)
MCHC RBC AUTO-ENTMCNC: 30 G/DL (ref 31–37)
MCHC RBC AUTO-ENTMCNC: 31.4 G/DL (ref 31–37)
MCV RBC AUTO: 83.4 FL
MCV RBC AUTO: 85 FL
MONOCYTES # BLD AUTO: 0.03 X10(3) UL (ref 0.1–1)
MONOCYTES # BLD AUTO: 0.05 X10(3) UL (ref 0.1–1)
MONOCYTES NFR BLD AUTO: 0.4 %
MONOCYTES NFR BLD AUTO: 0.7 %
NEUTROPHILS # BLD AUTO: 6.47 X10 (3) UL (ref 1.5–7.7)
NEUTROPHILS # BLD AUTO: 6.47 X10(3) UL (ref 1.5–7.7)
NEUTROPHILS # BLD AUTO: 7.33 X10 (3) UL (ref 1.5–7.7)
NEUTROPHILS # BLD AUTO: 7.33 X10(3) UL (ref 1.5–7.7)
NEUTROPHILS NFR BLD AUTO: 96.6 %
NEUTROPHILS NFR BLD AUTO: 96.6 %
OSMOLALITY SERPL CALC.SUM OF ELEC: 299 MOSM/KG (ref 275–295)
PLATELET # BLD AUTO: 145 10(3)UL (ref 150–450)
PLATELET # BLD AUTO: 172 10(3)UL (ref 150–450)
POTASSIUM SERPL-SCNC: 3.7 MMOL/L (ref 3.5–5.1)
PROT SERPL-MCNC: 4.7 G/DL (ref 6.4–8.2)
RBC # BLD AUTO: 3.32 X10(6)UL
RBC # BLD AUTO: 3.53 X10(6)UL
SODIUM SERPL-SCNC: 137 MMOL/L (ref 136–145)
URATE SERPL-MCNC: 3.9 MG/DL
WBC # BLD AUTO: 6.7 X10(3) UL (ref 4–11)
WBC # BLD AUTO: 7.6 X10(3) UL (ref 4–11)

## 2022-05-19 PROCEDURE — 99233 SBSQ HOSP IP/OBS HIGH 50: CPT | Performed by: INTERNAL MEDICINE

## 2022-05-19 PROCEDURE — 99232 SBSQ HOSP IP/OBS MODERATE 35: CPT | Performed by: INTERNAL MEDICINE

## 2022-05-19 RX ORDER — POTASSIUM CHLORIDE 20 MEQ/1
40 TABLET, EXTENDED RELEASE ORAL ONCE
Status: COMPLETED | OUTPATIENT
Start: 2022-05-19 | End: 2022-05-19

## 2022-05-19 RX ORDER — POTASSIUM CHLORIDE 20 MEQ/1
40 TABLET, EXTENDED RELEASE ORAL ONCE
Status: DISCONTINUED | OUTPATIENT
Start: 2022-05-19 | End: 2022-05-20

## 2022-05-19 RX ORDER — FUROSEMIDE 10 MG/ML
40 INJECTION INTRAMUSCULAR; INTRAVENOUS 3 TIMES DAILY
Status: DISCONTINUED | OUTPATIENT
Start: 2022-05-19 | End: 2022-05-20

## 2022-05-19 NOTE — PLAN OF CARE
Problem: Diabetes/Glucose Control  Goal: Glucose maintained within prescribed range  Description: INTERVENTIONS:  - Monitor Blood Glucose as ordered  - Assess for signs and symptoms of hyperglycemia and hypoglycemia  - Administer ordered medications to maintain glucose within target range  - Assess barriers to adequate nutritional intake and initiate nutrition consult as needed  - Instruct patient on self management of diabetes  5/19/2022 0028 by Janie Marmolejo RN  Outcome: Progressing  5/19/2022 0024 by Janie Marmolejo RN  Outcome: Progressing     Problem: GASTROINTESTINAL - ADULT  Goal: Minimal or absence of nausea and vomiting  Description: INTERVENTIONS:  - Maintain adequate hydration with IV or PO as ordered and tolerated  -  Problem: RESPIRATORY - ADULT  Goal: Achieves optimal ventilation and oxygenation  Description: INTERVENTIONS:  - Assess for changes in respiratory status  - Assess for changes in mentation and behavior  - Position to facilitate oxygenation and minimize respiratory effort  - Oxygen supplementation based on oxygen saturation or ABGs  - Provide Smoking Cessation handout, if applicable  - Encourage broncho-pulmonary hygiene including cough, deep breathe, Incentive Spirometry  - Assess the need for suctioning and perform as needed  - Assess and instruct to report SOB or any respiratory difficulty  - Respiratory Therapy support as indicated  - Manage/alleviate anxiety  - Monitor for signs/symptoms of CO2 retention  Outcome: Progressing   - Provide nonpharmacologic comfort measures as appropriate  - Advance diet as tolerated, if ordered  - Obtain nutritional consult as needed  - Evaluate fluid balance  Outcome: Progressing     Still on heparin drip, afib protocol, this am PTT was therapeutic. A/o x4. Small amount of blood around the piccline dressing  noted, dressing changed, blood return noted. Dressing secured. Up to the bathroom with 1 assist. Ambulate with walker.  Scattered bruises mary to arms. Edema to BLE subsiding per pt. ABT cont. Currently denies pain. Resting. Wants to go home soon. 0630: PTT is therapeutic , no change to current heparin infusion.  PTT in am.

## 2022-05-19 NOTE — PROGRESS NOTES
Patient continues to be on heparin drip, no changed to rate. Patient states,\"I feel better compared to yesterday. No evidance of any bleeding, patient ambulated twice around nursing station,,and tolerated well,fall precautions maintained.

## 2022-05-20 ENCOUNTER — TELEPHONE (OUTPATIENT)
Dept: ENDOCRINOLOGY CLINIC | Facility: CLINIC | Age: 82
End: 2022-05-20

## 2022-05-20 VITALS
OXYGEN SATURATION: 100 % | BODY MASS INDEX: 27.66 KG/M2 | HEIGHT: 70 IN | TEMPERATURE: 98 F | SYSTOLIC BLOOD PRESSURE: 109 MMHG | HEART RATE: 93 BPM | WEIGHT: 193.19 LBS | RESPIRATION RATE: 18 BRPM | DIASTOLIC BLOOD PRESSURE: 73 MMHG

## 2022-05-20 DIAGNOSIS — C85.90 LYMPHOMA, UNSPECIFIED BODY REGION, UNSPECIFIED LYMPHOMA TYPE (HCC): ICD-10-CM

## 2022-05-20 LAB
ALBUMIN SERPL-MCNC: 2.1 G/DL (ref 3.4–5)
ALBUMIN/GLOB SERPL: 0.8 {RATIO} (ref 1–2)
ALP LIVER SERPL-CCNC: 46 U/L
ALT SERPL-CCNC: 10 U/L
ANION GAP SERPL CALC-SCNC: 4 MMOL/L (ref 0–18)
APTT PPP: 32.9 SECONDS (ref 23.3–35.6)
AST SERPL-CCNC: 7 U/L (ref 15–37)
BASOPHILS # BLD AUTO: 0 X10(3) UL (ref 0–0.2)
BASOPHILS NFR BLD AUTO: 0 %
BILIRUB SERPL-MCNC: 0.4 MG/DL (ref 0.1–2)
BUN BLD-MCNC: 29 MG/DL (ref 7–18)
CALCIUM BLD-MCNC: 8.1 MG/DL (ref 8.5–10.1)
CHLORIDE SERPL-SCNC: 104 MMOL/L (ref 98–112)
CO2 SERPL-SCNC: 31 MMOL/L (ref 21–32)
CREAT BLD-MCNC: 0.93 MG/DL
EOSINOPHIL # BLD AUTO: 0.04 X10(3) UL (ref 0–0.7)
EOSINOPHIL NFR BLD AUTO: 0.7 %
ERYTHROCYTE [DISTWIDTH] IN BLOOD BY AUTOMATED COUNT: 16.3 %
GLOBULIN PLAS-MCNC: 2.7 G/DL (ref 2.8–4.4)
GLUCOSE BLD-MCNC: 148 MG/DL (ref 70–99)
GLUCOSE BLD-MCNC: 153 MG/DL (ref 70–99)
HCT VFR BLD AUTO: 28.7 %
HGB BLD-MCNC: 9.1 G/DL
IMM GRANULOCYTES # BLD AUTO: 0.06 X10(3) UL (ref 0–1)
IMM GRANULOCYTES NFR BLD: 1.1 %
LDH SERPL L TO P-CCNC: 197 U/L
LYMPHOCYTES # BLD AUTO: 0.11 X10(3) UL (ref 1–4)
LYMPHOCYTES NFR BLD AUTO: 1.9 %
MCH RBC QN AUTO: 26.6 PG (ref 26–34)
MCHC RBC AUTO-ENTMCNC: 31.7 G/DL (ref 31–37)
MCV RBC AUTO: 83.9 FL
MONOCYTES # BLD AUTO: 0.05 X10(3) UL (ref 0.1–1)
MONOCYTES NFR BLD AUTO: 0.9 %
NEUTROPHILS # BLD AUTO: 5.39 X10 (3) UL (ref 1.5–7.7)
NEUTROPHILS # BLD AUTO: 5.39 X10(3) UL (ref 1.5–7.7)
NEUTROPHILS NFR BLD AUTO: 95.4 %
OSMOLALITY SERPL CALC.SUM OF ELEC: 297 MOSM/KG (ref 275–295)
PLATELET # BLD AUTO: 134 10(3)UL (ref 150–450)
POTASSIUM SERPL-SCNC: 3.5 MMOL/L (ref 3.5–5.1)
PROT SERPL-MCNC: 4.8 G/DL (ref 6.4–8.2)
RBC # BLD AUTO: 3.42 X10(6)UL
SODIUM SERPL-SCNC: 139 MMOL/L (ref 136–145)
URATE SERPL-MCNC: 3.7 MG/DL
WBC # BLD AUTO: 5.7 X10(3) UL (ref 4–11)

## 2022-05-20 PROCEDURE — 99233 SBSQ HOSP IP/OBS HIGH 50: CPT | Performed by: INTERNAL MEDICINE

## 2022-05-20 PROCEDURE — 99232 SBSQ HOSP IP/OBS MODERATE 35: CPT | Performed by: INTERNAL MEDICINE

## 2022-05-20 PROCEDURE — 99239 HOSP IP/OBS DSCHRG MGMT >30: CPT | Performed by: INTERNAL MEDICINE

## 2022-05-20 RX ORDER — ALLOPURINOL 300 MG/1
300 TABLET ORAL DAILY
Qty: 30 TABLET | Refills: 0 | Status: ON HOLD | OUTPATIENT
Start: 2022-05-21

## 2022-05-20 RX ORDER — PROCHLORPERAZINE MALEATE 10 MG
10 TABLET ORAL EVERY 6 HOURS PRN
Qty: 30 TABLET | Refills: 5 | Status: ON HOLD | OUTPATIENT
Start: 2022-05-20

## 2022-05-20 RX ORDER — AMOXICILLIN AND CLAVULANATE POTASSIUM 875; 125 MG/1; MG/1
1 TABLET, FILM COATED ORAL 2 TIMES DAILY
Qty: 6 TABLET | Refills: 0 | Status: ON HOLD | OUTPATIENT
Start: 2022-05-20 | End: 2022-05-23

## 2022-05-20 RX ORDER — POTASSIUM CHLORIDE 20 MEQ/1
40 TABLET, EXTENDED RELEASE ORAL ONCE
Status: COMPLETED | OUTPATIENT
Start: 2022-05-20 | End: 2022-05-20

## 2022-05-20 RX ORDER — INSULIN DETEMIR 100 [IU]/ML
20 INJECTION, SOLUTION SUBCUTANEOUS DAILY
Qty: 5 EACH | Refills: 0 | Status: ON HOLD | COMMUNITY
Start: 2022-05-20

## 2022-05-20 RX ORDER — ONDANSETRON 4 MG/1
4 TABLET, FILM COATED ORAL EVERY 8 HOURS PRN
Qty: 30 TABLET | Refills: 2 | Status: ON HOLD | OUTPATIENT
Start: 2022-05-20

## 2022-05-20 RX ORDER — ALLOPURINOL 300 MG/1
TABLET ORAL
Qty: 90 TABLET | Refills: 0 | Status: CANCELLED | OUTPATIENT
Start: 2022-05-20

## 2022-05-20 NOTE — PROGRESS NOTES
NURSING DISCHARGE NOTE    Discharged Home via Wheelchair. Accompanied by Family member  Belongings Taken by patient/family. Patient A&Ox4, reported no pain. AVS, home medications, and follow-up appts gone over w/patient, all questions answered. PIV taken out, patient tolerated well.

## 2022-05-20 NOTE — TELEPHONE ENCOUNTER
Patient ws in hospital. Was released on 5/20 that is why she missed her appointment was diagnosed with lymphoma cancer. If you could call her so she could talk about it. She did not have your number.

## 2022-05-20 NOTE — TELEPHONE ENCOUNTER
Call with patient. Was discharged and instructed to take at 20 units once daily. Fasting BG was 160. Had first round of chemo per patient. Recommended she keep a close eye on her BG as chemo/steroids are going to cause fluctuations. Pt has numerous upcoming appts etc. She will reach out when she has more info and plan from oncology. Pt aware to contact office with BG concerns.

## 2022-05-20 NOTE — PROGRESS NOTES
Pt's right arm location of picc line dressing changed at change of shift by prior rn and dressing saturated with blood in half hour. md call and ordered heparin to be stopped. When examining picc line it appeared to be pulled out. Dressing applied at this time and picc line removed.  Monitor bleeding

## 2022-05-23 ENCOUNTER — TELEPHONE (OUTPATIENT)
Dept: INTERNAL MEDICINE CLINIC | Facility: CLINIC | Age: 82
End: 2022-05-23

## 2022-05-23 ENCOUNTER — OFFICE VISIT (OUTPATIENT)
Dept: HEMATOLOGY/ONCOLOGY | Age: 82
End: 2022-05-23
Attending: INTERNAL MEDICINE
Payer: MEDICARE

## 2022-05-23 ENCOUNTER — TELEPHONE (OUTPATIENT)
Dept: HEMATOLOGY/ONCOLOGY | Facility: HOSPITAL | Age: 82
End: 2022-05-23

## 2022-05-23 ENCOUNTER — PATIENT OUTREACH (OUTPATIENT)
Dept: CASE MANAGEMENT | Age: 82
End: 2022-05-23

## 2022-05-23 VITALS
RESPIRATION RATE: 16 BRPM | DIASTOLIC BLOOD PRESSURE: 76 MMHG | HEART RATE: 67 BPM | SYSTOLIC BLOOD PRESSURE: 118 MMHG | TEMPERATURE: 99 F | OXYGEN SATURATION: 97 %

## 2022-05-23 DIAGNOSIS — Z02.9 ENCOUNTERS FOR ADMINISTRATIVE PURPOSE: ICD-10-CM

## 2022-05-23 DIAGNOSIS — C83.33 DIFFUSE LARGE B-CELL LYMPHOMA OF INTRA-ABDOMINAL LYMPH NODES (HCC): Primary | ICD-10-CM

## 2022-05-23 DIAGNOSIS — D70.1 AGRANULOCYTOSIS SECONDARY TO CANCER CHEMOTHERAPY (HCC): ICD-10-CM

## 2022-05-23 DIAGNOSIS — T45.1X5A AGRANULOCYTOSIS SECONDARY TO CANCER CHEMOTHERAPY (HCC): ICD-10-CM

## 2022-05-23 DIAGNOSIS — C83.38 DIFFUSE LARGE B-CELL LYMPHOMA OF LYMPH NODES OF MULTIPLE REGIONS (HCC): ICD-10-CM

## 2022-05-23 LAB
BASOPHILS # BLD AUTO: 0 X10(3) UL (ref 0–0.2)
BASOPHILS NFR BLD AUTO: 0 %
EOSINOPHIL # BLD AUTO: 0.05 X10(3) UL (ref 0–0.7)
EOSINOPHIL NFR BLD AUTO: 17.2 %
ERYTHROCYTE [DISTWIDTH] IN BLOOD BY AUTOMATED COUNT: 15.9 %
HCT VFR BLD AUTO: 29.4 %
HGB BLD-MCNC: 9 G/DL
IMM GRANULOCYTES # BLD AUTO: 0 X10(3) UL (ref 0–1)
IMM GRANULOCYTES NFR BLD: 0 %
LYMPHOCYTES # BLD AUTO: 0.13 X10(3) UL (ref 1–4)
LYMPHOCYTES NFR BLD AUTO: 44.8 %
MCH RBC QN AUTO: 25.9 PG (ref 26–34)
MCHC RBC AUTO-ENTMCNC: 30.6 G/DL (ref 31–37)
MCV RBC AUTO: 84.7 FL
MONOCYTES # BLD AUTO: 0.01 X10(3) UL (ref 0.1–1)
MONOCYTES NFR BLD AUTO: 3.4 %
NEUTROPHILS # BLD AUTO: 0.1 X10 (3) UL (ref 1.5–7.7)
NEUTROPHILS # BLD AUTO: 0.1 X10(3) UL (ref 1.5–7.7)
NEUTROPHILS NFR BLD AUTO: 34.6 %
PLATELET # BLD AUTO: 96 10(3)UL (ref 150–450)
RBC # BLD AUTO: 3.47 X10(6)UL
WBC # BLD AUTO: 0.3 X10(3) UL (ref 4–11)

## 2022-05-23 PROCEDURE — 85025 COMPLETE CBC W/AUTO DIFF WBC: CPT

## 2022-05-23 PROCEDURE — 36415 COLL VENOUS BLD VENIPUNCTURE: CPT

## 2022-05-23 PROCEDURE — 96372 THER/PROPH/DIAG INJ SC/IM: CPT

## 2022-05-23 RX ORDER — LEVOFLOXACIN 500 MG/1
500 TABLET, FILM COATED ORAL DAILY
Qty: 5 TABLET | Refills: 0 | Status: SHIPPED | OUTPATIENT
Start: 2022-05-23 | End: 2022-05-28

## 2022-05-23 NOTE — PROGRESS NOTES
Education Record    Learner:  Patient    Disease / Diagnosis: risk for infection due to chemotherapy induced neutropenia    Barriers / Limitations:  None   Comments:    Method:  Discussion, Printed material and Reinforcement   Comments:    General Topics:  Medication, Precautions, Side effects and symptom management, Plan of care reviewed and Fall risk and prevention   Comments:    Outcome:  Shows understanding   Comments:    Pt aware of low WBC and ANC. Give Neulasta inj as ordered per LARRY Davis. Pt continues to some difficulty eating and drinking but trying at home. Pt denies any fevers or chills. A bit tired from hospital stay but glad to be home. Reviewed possible side effects from injection. Pt verbalized understanding and departed stable with friend.

## 2022-05-23 NOTE — TELEPHONE ENCOUNTER
ROSHAN, Spoke to pt for TCM today. Pt has an appt with PCP on 7/1/22 and declined to schedule a sooner one stating that she is following up with Oncologist Dr. Mandi Gray at this time. TCM/HFU appt recommended by 5/27/2022 as pt is a high risk for readmission.       BOOK BY DATE (last date for TCM): 6/3/2022

## 2022-05-23 NOTE — TELEPHONE ENCOUNTER
Pt not scheduled with [facility] until 7/1/22. Pt stated she would only like to f/u with onc right now. AS, would you like pt seen sooner?

## 2022-05-23 NOTE — TELEPHONE ENCOUNTER
Patient calling. Stated she received a call from Shelly Kerr-  That He received a message that she needs to have labs done today in [place]    Please confirm this is correct.     Thank you

## 2022-05-24 ENCOUNTER — TELEPHONE (OUTPATIENT)
Dept: HEMATOLOGY/ONCOLOGY | Facility: HOSPITAL | Age: 82
End: 2022-05-24

## 2022-05-24 NOTE — TELEPHONE ENCOUNTER
Per Dr Lo Nolasco, no additional labs needed today through home health. Patient will have labs drawn on Friday at her MD follow up visit. Notified Suzy Kerr with [facility]. Left son a voicemail message with an update.

## 2022-05-24 NOTE — TELEPHONE ENCOUNTER
[facility] with [facility] is asking if Dr Kenneth Riojas wants any other labs drawn on Thursday other than the BMP.  Family suggested a Richad Show

## 2022-05-25 ENCOUNTER — APPOINTMENT (OUTPATIENT)
Dept: CT IMAGING | Facility: HOSPITAL | Age: 82
End: 2022-05-25
Attending: STUDENT IN AN ORGANIZED HEALTH CARE EDUCATION/TRAINING PROGRAM
Payer: MEDICARE

## 2022-05-25 ENCOUNTER — TELEPHONE (OUTPATIENT)
Dept: INTERNAL MEDICINE CLINIC | Facility: CLINIC | Age: 82
End: 2022-05-25

## 2022-05-25 ENCOUNTER — TELEPHONE (OUTPATIENT)
Dept: HEMATOLOGY/ONCOLOGY | Facility: HOSPITAL | Age: 82
End: 2022-05-25

## 2022-05-25 ENCOUNTER — APPOINTMENT (OUTPATIENT)
Dept: GENERAL RADIOLOGY | Facility: HOSPITAL | Age: 82
End: 2022-05-25
Attending: EMERGENCY MEDICINE
Payer: MEDICARE

## 2022-05-25 ENCOUNTER — HOSPITAL ENCOUNTER (INPATIENT)
Facility: HOSPITAL | Age: 82
LOS: 10 days | Discharge: HOME HEALTH | End: 2022-06-04
Attending: EMERGENCY MEDICINE | Admitting: HOSPITALIST
Payer: MEDICARE

## 2022-05-25 DIAGNOSIS — E86.0 DEHYDRATION: Primary | ICD-10-CM

## 2022-05-25 DIAGNOSIS — J18.9 PNEUMONIA DUE TO INFECTIOUS ORGANISM, UNSPECIFIED LATERALITY, UNSPECIFIED PART OF LUNG: ICD-10-CM

## 2022-05-25 DIAGNOSIS — R63.30 FEEDING DIFFICULTIES: ICD-10-CM

## 2022-05-25 DIAGNOSIS — I25.2 HISTORY OF MI (MYOCARDIAL INFARCTION): ICD-10-CM

## 2022-05-25 DIAGNOSIS — R13.10 DYSPHAGIA: ICD-10-CM

## 2022-05-25 DIAGNOSIS — I48.91 ATRIAL FIBRILLATION, UNSPECIFIED TYPE (HCC): ICD-10-CM

## 2022-05-25 DIAGNOSIS — D70.9 NEUTROPENIA, UNSPECIFIED TYPE (HCC): ICD-10-CM

## 2022-05-25 DIAGNOSIS — I25.10 CORONARY ARTERY DISEASE INVOLVING NATIVE CORONARY ARTERY OF NATIVE HEART WITHOUT ANGINA PECTORIS: ICD-10-CM

## 2022-05-25 DIAGNOSIS — B37.0 ORAL CANDIDA: ICD-10-CM

## 2022-05-25 PROBLEM — D64.9 ANEMIA: Status: ACTIVE | Noted: 2022-05-25

## 2022-05-25 LAB
ALBUMIN SERPL-MCNC: 2.6 G/DL (ref 3.4–5)
ALBUMIN/GLOB SERPL: 0.9 {RATIO} (ref 1–2)
ALP LIVER SERPL-CCNC: 65 U/L
ALT SERPL-CCNC: 18 U/L
ANION GAP SERPL CALC-SCNC: 9 MMOL/L (ref 0–18)
AST SERPL-CCNC: 3 U/L (ref 15–37)
ATRIAL RATE: 174 BPM
BASOPHILS # BLD AUTO: 0.01 X10(3) UL (ref 0–0.2)
BASOPHILS NFR BLD AUTO: 4 %
BILIRUB SERPL-MCNC: 0.8 MG/DL (ref 0.1–2)
BILIRUB UR QL STRIP.AUTO: NEGATIVE
BUN BLD-MCNC: 11 MG/DL (ref 7–18)
CALCIUM BLD-MCNC: 8.2 MG/DL (ref 8.5–10.1)
CHLORIDE SERPL-SCNC: 106 MMOL/L (ref 98–112)
CO2 SERPL-SCNC: 26 MMOL/L (ref 21–32)
CREAT BLD-MCNC: 0.74 MG/DL
EOSINOPHIL # BLD AUTO: 0.04 X10(3) UL (ref 0–0.7)
EOSINOPHIL NFR BLD AUTO: 16 %
ERYTHROCYTE [DISTWIDTH] IN BLOOD BY AUTOMATED COUNT: 15.8 %
GLOBULIN PLAS-MCNC: 2.9 G/DL (ref 2.8–4.4)
GLUCOSE BLD-MCNC: 127 MG/DL (ref 70–99)
GLUCOSE BLD-MCNC: 151 MG/DL (ref 70–99)
GLUCOSE BLD-MCNC: 173 MG/DL (ref 70–99)
GLUCOSE UR STRIP.AUTO-MCNC: 150 MG/DL
HCT VFR BLD AUTO: 28.3 %
HGB BLD-MCNC: 8.7 G/DL
HYALINE CASTS #/AREA URNS AUTO: PRESENT /LPF
IMM GRANULOCYTES # BLD AUTO: 0 X10(3) UL (ref 0–1)
IMM GRANULOCYTES NFR BLD: 0 %
KETONES UR STRIP.AUTO-MCNC: 80 MG/DL
LEUKOCYTE ESTERASE UR QL STRIP.AUTO: NEGATIVE
LYMPHOCYTES # BLD AUTO: 0.14 X10(3) UL (ref 1–4)
LYMPHOCYTES NFR BLD AUTO: 56 %
MCH RBC QN AUTO: 26.2 PG (ref 26–34)
MCHC RBC AUTO-ENTMCNC: 30.7 G/DL (ref 31–37)
MCV RBC AUTO: 85.2 FL
MONOCYTES # BLD AUTO: 0.04 X10(3) UL (ref 0.1–1)
MONOCYTES NFR BLD AUTO: 16 %
NEUTROPHILS # BLD AUTO: 0.02 X10 (3) UL (ref 1.5–7.7)
NEUTROPHILS # BLD AUTO: 0.02 X10(3) UL (ref 1.5–7.7)
NEUTROPHILS NFR BLD AUTO: 8 %
NITRITE UR QL STRIP.AUTO: NEGATIVE
NT-PROBNP SERPL-MCNC: 6267 PG/ML (ref ?–450)
OSMOLALITY SERPL CALC.SUM OF ELEC: 296 MOSM/KG (ref 275–295)
PH UR STRIP.AUTO: 7 [PH] (ref 5–8)
PLATELET # BLD AUTO: 57 10(3)UL (ref 150–450)
POTASSIUM SERPL-SCNC: 3.9 MMOL/L (ref 3.5–5.1)
PROT SERPL-MCNC: 5.5 G/DL (ref 6.4–8.2)
PROT UR STRIP.AUTO-MCNC: 100 MG/DL
Q-T INTERVAL: 352 MS
QRS DURATION: 80 MS
QTC CALCULATION (BEZET): 380 MS
R AXIS: 35 DEGREES
RBC # BLD AUTO: 3.32 X10(6)UL
RBC UR QL AUTO: NEGATIVE
SARS-COV-2 RNA RESP QL NAA+PROBE: NOT DETECTED
SODIUM SERPL-SCNC: 141 MMOL/L (ref 136–145)
SP GR UR STRIP.AUTO: 1.02 (ref 1–1.03)
T AXIS: -17 DEGREES
TROPONIN I HIGH SENSITIVITY: 9 NG/L
UROBILINOGEN UR STRIP.AUTO-MCNC: 4 MG/DL
VENTRICULAR RATE: 70 BPM
WBC # BLD AUTO: 0.3 X10(3) UL (ref 4–11)

## 2022-05-25 PROCEDURE — 71045 X-RAY EXAM CHEST 1 VIEW: CPT | Performed by: EMERGENCY MEDICINE

## 2022-05-25 PROCEDURE — 99223 1ST HOSP IP/OBS HIGH 75: CPT | Performed by: HOSPITALIST

## 2022-05-25 PROCEDURE — 74160 CT ABDOMEN W/CONTRAST: CPT | Performed by: STUDENT IN AN ORGANIZED HEALTH CARE EDUCATION/TRAINING PROGRAM

## 2022-05-25 PROCEDURE — 99222 1ST HOSP IP/OBS MODERATE 55: CPT | Performed by: NURSE PRACTITIONER

## 2022-05-25 RX ORDER — MORPHINE SULFATE 4 MG/ML
4 INJECTION, SOLUTION INTRAMUSCULAR; INTRAVENOUS EVERY 2 HOUR PRN
Status: DISCONTINUED | OUTPATIENT
Start: 2022-05-25 | End: 2022-06-04

## 2022-05-25 RX ORDER — ONDANSETRON 2 MG/ML
4 INJECTION INTRAMUSCULAR; INTRAVENOUS EVERY 6 HOURS PRN
Status: DISCONTINUED | OUTPATIENT
Start: 2022-05-25 | End: 2022-06-04

## 2022-05-25 RX ORDER — TRAMADOL HYDROCHLORIDE 50 MG/1
50 TABLET ORAL EVERY 6 HOURS PRN
Status: DISCONTINUED | OUTPATIENT
Start: 2022-05-25 | End: 2022-06-04

## 2022-05-25 RX ORDER — LEVOFLOXACIN 500 MG/1
500 TABLET, FILM COATED ORAL DAILY
Status: DISCONTINUED | OUTPATIENT
Start: 2022-05-25 | End: 2022-05-25

## 2022-05-25 RX ORDER — ALBUTEROL SULFATE 90 UG/1
2 AEROSOL, METERED RESPIRATORY (INHALATION) EVERY 4 HOURS PRN
Status: DISCONTINUED | OUTPATIENT
Start: 2022-05-25 | End: 2022-06-04

## 2022-05-25 RX ORDER — ACETAMINOPHEN 325 MG/1
650 TABLET ORAL EVERY 6 HOURS PRN
Status: DISCONTINUED | OUTPATIENT
Start: 2022-05-25 | End: 2022-06-04

## 2022-05-25 RX ORDER — PANTOPRAZOLE SODIUM 40 MG/1
40 TABLET, DELAYED RELEASE ORAL
Refills: 1 | Status: DISCONTINUED | OUTPATIENT
Start: 2022-05-26 | End: 2022-05-25

## 2022-05-25 RX ORDER — ALLOPURINOL 300 MG/1
300 TABLET ORAL DAILY
Status: DISCONTINUED | OUTPATIENT
Start: 2022-05-25 | End: 2022-06-04

## 2022-05-25 RX ORDER — SODIUM CHLORIDE 9 MG/ML
INJECTION, SOLUTION INTRAVENOUS CONTINUOUS
Status: DISCONTINUED | OUTPATIENT
Start: 2022-05-25 | End: 2022-06-01

## 2022-05-25 RX ORDER — ASPIRIN 81 MG/1
81 TABLET ORAL DAILY
Status: DISCONTINUED | OUTPATIENT
Start: 2022-05-25 | End: 2022-06-04

## 2022-05-25 RX ORDER — DEXTROSE MONOHYDRATE 25 G/50ML
50 INJECTION, SOLUTION INTRAVENOUS
Status: DISCONTINUED | OUTPATIENT
Start: 2022-05-25 | End: 2022-06-04

## 2022-05-25 RX ORDER — METOCLOPRAMIDE HYDROCHLORIDE 5 MG/ML
10 INJECTION INTRAMUSCULAR; INTRAVENOUS EVERY 8 HOURS PRN
Status: DISCONTINUED | OUTPATIENT
Start: 2022-05-25 | End: 2022-06-04

## 2022-05-25 RX ORDER — FLUCONAZOLE 2 MG/ML
200 INJECTION, SOLUTION INTRAVENOUS ONCE
Status: COMPLETED | OUTPATIENT
Start: 2022-05-25 | End: 2022-05-25

## 2022-05-25 RX ORDER — ATORVASTATIN CALCIUM 20 MG/1
20 TABLET, FILM COATED ORAL NIGHTLY
Refills: 1 | Status: DISCONTINUED | OUTPATIENT
Start: 2022-05-25 | End: 2022-06-04

## 2022-05-25 RX ORDER — NICOTINE POLACRILEX 4 MG
30 LOZENGE BUCCAL
Status: DISCONTINUED | OUTPATIENT
Start: 2022-05-25 | End: 2022-06-04

## 2022-05-25 RX ORDER — NICOTINE POLACRILEX 4 MG
15 LOZENGE BUCCAL
Status: DISCONTINUED | OUTPATIENT
Start: 2022-05-25 | End: 2022-06-04

## 2022-05-25 RX ORDER — HYDROMORPHONE HYDROCHLORIDE 4 MG/1
4 TABLET ORAL
Status: DISCONTINUED | OUTPATIENT
Start: 2022-05-25 | End: 2022-06-04

## 2022-05-25 RX ORDER — IOHEXOL 350 MG/ML
100 INJECTION, SOLUTION INTRAVENOUS
Status: COMPLETED | OUTPATIENT
Start: 2022-05-25 | End: 2022-05-25

## 2022-05-25 RX ORDER — LEVOTHYROXINE SODIUM 0.05 MG/1
50 TABLET ORAL
Status: DISCONTINUED | OUTPATIENT
Start: 2022-05-25 | End: 2022-06-04

## 2022-05-25 RX ORDER — MORPHINE SULFATE 2 MG/ML
1 INJECTION, SOLUTION INTRAMUSCULAR; INTRAVENOUS EVERY 2 HOUR PRN
Status: DISCONTINUED | OUTPATIENT
Start: 2022-05-25 | End: 2022-06-04

## 2022-05-25 RX ORDER — DOCUSATE SODIUM 100 MG/1
100 CAPSULE, LIQUID FILLED ORAL 2 TIMES DAILY
Status: DISCONTINUED | OUTPATIENT
Start: 2022-05-25 | End: 2022-06-04

## 2022-05-25 RX ORDER — MELATONIN
3 NIGHTLY PRN
Status: DISCONTINUED | OUTPATIENT
Start: 2022-05-25 | End: 2022-06-04

## 2022-05-25 RX ORDER — MORPHINE SULFATE 2 MG/ML
2 INJECTION, SOLUTION INTRAMUSCULAR; INTRAVENOUS EVERY 2 HOUR PRN
Status: DISCONTINUED | OUTPATIENT
Start: 2022-05-25 | End: 2022-06-04

## 2022-05-25 NOTE — TELEPHONE ENCOUNTER
Received call from patient's home health physical therapist. Pt is having nausea, vomiting, worsening of achalasia. I called Aparna Mack and she tells me that she has pin point red dots all over her body, has not been able to eat or drink anything. I advised her she needs to go to the emergency room. Her son is Dr. Vanita Kirk MD here at [facility], and he is coming to see her. I attempted to call Dr. Solis Horvath, but was unable to reach him via phone. Will notify Dr. Maylene Kocher as patient will likely present to the ER.

## 2022-05-25 NOTE — TELEPHONE ENCOUNTER
Spoke to  from [facility]. States patient has nausea and vomiting. States doesn't feel like she is tolerating the chemo any longer. She wants to be on Hospice. The son is an ED doctor and is going to her house to see how she's doing.  also paged pt's Oncologist. She's unsure of next steps at this time. Awaiting son and Oncology recs. She will call back to let us know. Holding for call back.

## 2022-05-25 NOTE — TELEPHONE ENCOUNTER
Lizz Allan from [facility] calling  Patient is canceling PT  Patient is very nauseous. Wants to cancel PT  Becaruse of change status she would like to go into hospice. Son is coming to talk to her. Lizz Allna offered for a nurse to come out and talk to her.    Whatever Dr Joyce White would pramod to do Lizz Allan [facility]) would be happy to do

## 2022-05-25 NOTE — TELEPHONE ENCOUNTER
I spoke with Dr Lupe Tillman. He is there now with Shantel. I updated him that Dr Bandar Fonseca agrees with [facility] that Hill Nunn needs to be evaluated in the ER. He feels she may be cytopenic and will likely need to be admitted. Dr Lupe Tillman said he will bring her to the ER.

## 2022-05-25 NOTE — PLAN OF CARE
Transfer received from PMU at [address] [facility]. Alert and oriented x4, denies pain. No SOB or n/v. IVF/IV abx infusing. Medications placed in pt bin. Fall precautions in place. Call light within reach.

## 2022-05-25 NOTE — ED INITIAL ASSESSMENT (HPI)
Presents with son, who is a physician, stating that is unable to swallow even water. Currently being treated for pneumonia and lymphoma. Has a hx of achalasia.

## 2022-05-25 NOTE — TELEPHONE ENCOUNTER
Queenie Rand called patient is having a very hard time swallowing after her chemo. Please call when able. Thank you.

## 2022-05-25 NOTE — PROGRESS NOTES
Brief GI Note, full consult to follow    Reviewed EGD by Dr Jay Mota on 4/22/2022. The GE junction where prior POEM surgery was performed was patent and patulous, findings not definitive for recurrent achalasia, repeat manometry recommended (not yet done). Empiric balloon dilation performed. Of note, the esophagus itself was tortuous. Reviewed CT scan from 5/5/2022, and there is an enlarging mass near the gastrohepatic ligament, and on review of imaging, there is a mass effect compressing the mid and proximal stomach. The antrum appears distally beyond compression by the mass and is near the peritoneum. 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 Pt reports worsening symptoms, with lack of appetite, regurgitation of food, increasing fatigue. She is on chemotherapy. Current ANC is 0.02. She is on Eliquis for history of atrial fibrillation. Did not take dose this AM.  Last dose was Tues. Assessment  Unclear if symptoms are truly from recurrent achalasia, could very well be from compressive effect from lymphoma within the upper abdomen near the gastro-hepatic ligament. In either case, her nutrition has been suffering due to symptoms, needs enteral feeding. Whether she needs EGD with dilation or EGD with PEG, she would benefit from being off anticoagulation for procedure and having improved neutrophil counts to minimize risk of infection. Any procedure is likely to occur on Friday, based on these considerations.     Plan   - Hold Eliquis  - Will defer decision regarding heparin gtt to hematology/oncology; it appears she was on a hep gtt during last admission while her Eliquis was held  - Updated CT abdomen to re-evaluate size and position of mass lesion, as this would affect decision about PEG vs possibly PEJ for feedings  - If tumor has decreased, less likely to be contributing to symptoms, and we may need focus on more definitive treatment of achalasia.   May get X-ray esophagram prior to EGD    Ulises Eddy MD

## 2022-05-25 NOTE — PROGRESS NOTES
NURSING ADMISSION NOTE      Patient admitted via Cart  Oriented to room. Safety precautions initiated. Bed in low position. Call light in reach. Admission rec completed. Md paged. Pt A&Ox4. Glasses at the bed side. Sats > 90% on RA. Afib on tele. VSS. Pt voids per the bathroom. R/o cdiff. IVF. Pt is able to ambulate around the room 1x SB.  Pt

## 2022-05-25 NOTE — ED QUICK NOTES
Orders for admission, patient is aware of plan and ready to go upstairs. Any questions, please call ED RN Ashley Segura  at extension [phone]. Vaccinated? Yes  Type of COVID test sent:Rapid  COVID Suspicion level: Low      Titratable drug(s) infusing:  Rate:    LOC at time of transport:Alert and oriented    Other pertinent information:    CIWA score=  NIH score=

## 2022-05-26 LAB
ANION GAP SERPL CALC-SCNC: 8 MMOL/L (ref 0–18)
BASOPHILS # BLD: 0.02 X10(3) UL (ref 0–0.2)
BASOPHILS NFR BLD: 4 %
BUN BLD-MCNC: 8 MG/DL (ref 7–18)
CALCIUM BLD-MCNC: 7.7 MG/DL (ref 8.5–10.1)
CHLORIDE SERPL-SCNC: 111 MMOL/L (ref 98–112)
CO2 SERPL-SCNC: 25 MMOL/L (ref 21–32)
CREAT BLD-MCNC: 0.62 MG/DL
EOSINOPHIL # BLD: 0.08 X10(3) UL (ref 0–0.7)
EOSINOPHIL NFR BLD: 21 %
ERYTHROCYTE [DISTWIDTH] IN BLOOD BY AUTOMATED COUNT: 16.4 %
GLUCOSE BLD-MCNC: 113 MG/DL (ref 70–99)
GLUCOSE BLD-MCNC: 120 MG/DL (ref 70–99)
GLUCOSE BLD-MCNC: 125 MG/DL (ref 70–99)
GLUCOSE BLD-MCNC: 126 MG/DL (ref 70–99)
GLUCOSE BLD-MCNC: 89 MG/DL (ref 70–99)
GLUCOSE BLD-MCNC: 98 MG/DL (ref 70–99)
HCT VFR BLD AUTO: 26.9 %
HGB BLD-MCNC: 8.2 G/DL
LDH SERPL L TO P-CCNC: 142 U/L
LYMPHOCYTES NFR BLD: 0.17 X10(3) UL (ref 1–4)
LYMPHOCYTES NFR BLD: 42 %
MCH RBC QN AUTO: 26.4 PG (ref 26–34)
MCHC RBC AUTO-ENTMCNC: 30.5 G/DL (ref 31–37)
MCV RBC AUTO: 86.5 FL
MONOCYTES # BLD: 0.02 X10(3) UL (ref 0.1–1)
MONOCYTES NFR BLD: 4 %
MORPHOLOGY: NORMAL
NEUTROPHILS # BLD AUTO: 0.02 X10 (3) UL (ref 1.5–7.7)
NEUTROPHILS NFR BLD: 21 %
NEUTS BAND NFR BLD: 8 %
NEUTS HYPERSEG # BLD: 0.12 X10(3) UL (ref 1.5–7.7)
NRBC BLD MANUAL-RTO: 4 %
OSMOLALITY SERPL CALC.SUM OF ELEC: 296 MOSM/KG (ref 275–295)
PLATELET # BLD AUTO: 54 10(3)UL (ref 150–450)
PLATELET MORPHOLOGY: NORMAL
POTASSIUM SERPL-SCNC: 3.3 MMOL/L (ref 3.5–5.1)
RBC # BLD AUTO: 3.11 X10(6)UL
SODIUM SERPL-SCNC: 144 MMOL/L (ref 136–145)
TOTAL CELLS COUNTED BLD: 24
WBC # BLD AUTO: 0.4 X10(3) UL (ref 4–11)

## 2022-05-26 PROCEDURE — 99233 SBSQ HOSP IP/OBS HIGH 50: CPT | Performed by: HOSPITALIST

## 2022-05-26 PROCEDURE — 99232 SBSQ HOSP IP/OBS MODERATE 35: CPT | Performed by: INTERNAL MEDICINE

## 2022-05-26 RX ORDER — MULTIPLE VITAMINS W/ MINERALS TAB 9MG-400MCG
1 TAB ORAL DAILY
Status: DISCONTINUED | OUTPATIENT
Start: 2022-05-26 | End: 2022-06-04

## 2022-05-26 RX ORDER — MELATONIN
100 DAILY
Status: DISCONTINUED | OUTPATIENT
Start: 2022-05-26 | End: 2022-06-04

## 2022-05-26 RX ORDER — METOPROLOL TARTRATE 5 MG/5ML
5 INJECTION INTRAVENOUS EVERY 6 HOURS PRN
Status: DISCONTINUED | OUTPATIENT
Start: 2022-05-26 | End: 2022-06-04

## 2022-05-26 NOTE — PLAN OF CARE
Patient received alert and oriented x 4, lungs clear, diminished on room air, abdomen distended, bowel sounds present, passing flatus, isolation to r/o c-diff, stool sample needed, voiding adequate amounts, asymptomatic, monitoring output, IVF infusing per [facility] into left hand, unable to swallow, complaints of \"band like pain\" around upper abdomen, MD contacted and morphine ordered, administered x 1 with positive effect, patient unable to tolerate any oral pills at this time, VSS, continuous [id number], 1+ lower extremity pitting edema, able to ambulate to bathroom with stand-by assist. CT of abdomen completed with IV contrast only.

## 2022-05-26 NOTE — CM/SW NOTE
05/26/22 1100   CM/SW Referral Data   Referral Source Family   Reason for Referral Discharge planning;Readmission   Informant Other  (MICHELLE Caldwell)   Readmission Assessment   Factors that patient feels contributed to this readmission Acute/Chronic Clinical Presentation   Pertinent Medical Hx   Does patient have an established PCP? Yes   Discharge Needs   Anticipated D/C needs Home health care;Medical equipment   Choice of Post-Acute Provider   Informed patient of right to choose their preferred provider Yes   List of appropriate post-acute services provided to patient/family with quality data No - Declined list     Spoke with Jemima Toney, requesting hospital bed,  Transport WC w/ leg rests, over bed table (will pay privately) and [facility], will return home with them (cindy brown and michelle ortiz), they will provide 24 hour care:    [address], [address]. To call Dorothy Caldwell for delivery/setup [phone]. Orders placed, page to Dr Aiden Sauceda and referral sent to [facility] via aidin. [facility] liaison made aware.      Ally Henderson, RN,   [id number]

## 2022-05-26 NOTE — HOME CARE LIAISON
Patient is current with [facility]. Please enter LUANNE order upon discharge.  RN PT OT

## 2022-05-26 NOTE — CONSULTS
[facility]    PATIENT'S NAME: Aidan Alvarez   ATTENDING PHYSICIAN: KALIA Barnes: Sam Meléndez M.D. PATIENT ACCOUNT#:   [de-identified]    LOCATION:  [address] [place] [address]  MEDICAL RECORD #:   [id number]       YOB: 1940  ADMISSION DATE:       05/25/2022      CONSULT DATE:  05/26/2022    REPORT OF CONSULTATION    HISTORY OF PRESENT ILLNESS:  An 80-year-old white female known to me for history of an abdominal aortic aneurysm that was placed back in 2019 for a 5.3 to 5.4 cm aortic aneurysm. The patient has done well from that procedure. Recently in the hospital with complaints of some back pain, abdominal discomfort, fever, chills, and was found to have a recurrent tumor of a lymphoma which had been treated with radiation therapy several years ago. The patient is being followed by Dr. Adela Watts, has been placed on chemotherapy medication to try to improve the results and has gotten some positive reactions to the treatment. A repeat CAT scan done to evaluate the treatment for the recurrent lymphoma of her abdomen revealed that there might have been a millimeter increase in the size of the abdominal aortic aneurysm from the study that was done several weeks ago. The patient has no abdominal complaints at this time but does complain of some nausea, vomiting and difficulty eating which occurred after her medications. She had a large aortic aneurysm treated on January 8, 2020 with a Perclose closure device on both sides with percutaneous access and then placement of a [place] Excluder stent graft, main body right of 35 mm x 14.5 mm x 16 cm with a contralateral limb of 14.5 mm x 12 cm x 14.5 cm. 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 It does not appear to be a type 1A or a type 1B endoleak. We cannot exclude a type 2, type 3, or type 4.   I initially had her scheduled to see  Bobby Ruiz at [facility], [address], for treatment of a type 2 endoleak. The patient has been followed for lymphoma as previously stated that was diagnosed back in 2019. She denies any CVA, TIA, visual field defect, or aphasia. The patient was seen back on May 11 in the emergency room where she came in with leg discomfort. She had some slight discoloration, but otherwise she was fine, and the flow down her legs appeared to be normal but her aneurysm was slightly enlarging. The patient had been on Xarelto as an outpatient and being treated for this malignancy. She currently is resting. The case was discussed with Dr. Ksenia Chicas also regarding this recurrence of her tumor and he recommended at that time that we watch the aneurysm and start chemotherapy. PAST MEDICAL HISTORY:  She has had a past history of coronary artery disease, underwent a PTCA of the coronaries by Dr. Chely Kasper, being followed by Dr. Tiffany Clayton back in 2019. She has a history of diabetes, paroxysmal atrial fibrillation, hypertension, dyslipidemia. PAST SURGICAL HISTORY:  PTCA of coronary arteries, esophageal myotomy done at [facility], cholecystectomy, knee surgery, thyroidectomy of the right lobe, stent graft repair. ALLERGIES:  She has some allergies to adhesive tape. SOCIAL HISTORY:  She also has no history of any EtOH abuse, drug abuse, tobacco abuse. She lives with 1 of her sons. She has 4 kids. She is still active, playing music. FAMILY HISTORY:  Diabetes and CVA. PHYSICAL EXAMINATION:    GENERAL:  She currently is awake and alert. She is appropriate. She did not seem to be in any acute distress. All of her GI symptoms were yesterday. VITAL SIGNS:  Her blood pressure has been 125/69, although she did have a diastolic blood pressure of 102 yesterday. She is afebrile. Pulse is about 60 to 70. NEUROLOGIC:  Clinically she is neurologically intact, moving all 4 extremities.   Resting comfortably, sitting upright. I reviewed her CT angiogram, which suggested a possible 1 to 2 mm increase in size transversely, but difficult for me to tell if there is any change in size but I still see the endoleak. Her labs were also reviewed, and her creatinine is 0.62 with a BUN of 8. Unfortunately, her white count has gone down to 0.4, platelet count has also gone down to  54,000. She will need further evaluation, but will have to work this out with Dr. Madhav Solorio. Might not be able to do any intervention until the side effects of the medications she was receiving resolve. We will tentatively place her on the schedule for next week. Address to make sure there is no type 1, type 3, or type 4 endoleak. Address a type 2 endoleak with Dr. Guillaume Fatima. Dictated By Ar Haile M.D.  d: 05/26/2022 12:31:35  t: 05/26/2022 13:07:55  [facility] [id number]  JJW/    cc: KALIA Jacinto M.D. Genella Coppersmith, M.D.

## 2022-05-26 NOTE — CM/SW NOTE
Patient requires a semi-electric hospital bed at home due to Diffuse Large B-cell lymphoma and physical condition in which they are not able to turn self and other. Positioning in an ordinary bed will be insufficient and requires frequent and/or immediate changes in body positions. Patient requires a wheelchair to complete mobility related ADL's (MRADL) within the home. Patient is unable to complete the majority of  MRADL's with a cane or walker. Patient has a caregiver to propel him in a transport wheel chair with leg rests. Patient has adequate space within their home to safely use the wheelchair.

## 2022-05-26 NOTE — PLAN OF CARE
Pt received alert and oriented x4, no c/o pain. Pt feels better today, able to swallow some of her oral pills with water. Tolerated some yogurt. Vascular surgery consulted for abdominal aneurysm. VSS, afebrile. IV merrem and diflucan per MAR. Ambulating to bathroom and in hallway. Up in chair during the day. Daughter in law updated on POC. Fall precautions in place. Call light within reach.      Problem: PAIN - ADULT  Goal: Verbalizes/displays adequate comfort level or patient's stated pain goal  Description: INTERVENTIONS:  - Encourage pt to monitor pain and request assistance  - Assess pain using appropriate pain scale  - Administer analgesics based on type and severity of pain and evaluate response  - Implement non-pharmacological measures as appropriate and evaluate response  - Consider cultural and social influences on pain and pain management  - Manage/alleviate anxiety  - Utilize distraction and/or relaxation techniques  - Monitor for opioid side effects  - Notify MD/LIP if interventions unsuccessful or patient reports new pain  - Anticipate increased pain with activity and pre-medicate as appropriate  Outcome: Progressing     Problem: RISK FOR INFECTION - ADULT  Goal: Absence of fever/infection during anticipated neutropenic period  Description: INTERVENTIONS  - Monitor WBC  - Administer growth factors as ordered  - Implement neutropenic guidelines  Outcome: Progressing     Problem: Impaired Swallowing  Goal: Minimize aspiration risk  Description: Interventions:  - Patient should be alert and upright for all feedings (90 degrees preferred)  - Offer food and liquids at a slow rate  - No straws  - Encourage small bites of food and small sips of liquid  - Offer pills one at a time, crush or deliver with applesauce as needed  - Discontinue feeding and notify MD (or speech pathologist) if coughing or persistent throat clearing or wet/gurgly vocal quality is noted  Outcome: Progressing

## 2022-05-27 ENCOUNTER — PATIENT OUTREACH (OUTPATIENT)
Dept: CASE MANAGEMENT | Age: 82
End: 2022-05-27

## 2022-05-27 ENCOUNTER — APPOINTMENT (OUTPATIENT)
Dept: HEMATOLOGY/ONCOLOGY | Facility: HOSPITAL | Age: 82
End: 2022-05-27
Attending: INTERNAL MEDICINE
Payer: MEDICARE

## 2022-05-27 LAB
ANION GAP SERPL CALC-SCNC: 9 MMOL/L (ref 0–18)
BASOPHILS # BLD AUTO: 0.02 X10(3) UL (ref 0–0.2)
BASOPHILS NFR BLD AUTO: 1.5 %
BUN BLD-MCNC: 8 MG/DL (ref 7–18)
CALCIUM BLD-MCNC: 7.5 MG/DL (ref 8.5–10.1)
CHLORIDE SERPL-SCNC: 114 MMOL/L (ref 98–112)
CO2 SERPL-SCNC: 21 MMOL/L (ref 21–32)
CREAT BLD-MCNC: 0.61 MG/DL
EOSINOPHIL # BLD AUTO: 0.03 X10(3) UL (ref 0–0.7)
EOSINOPHIL NFR BLD AUTO: 2.3 %
ERYTHROCYTE [DISTWIDTH] IN BLOOD BY AUTOMATED COUNT: 17.6 %
GLUCOSE BLD-MCNC: 125 MG/DL (ref 70–99)
GLUCOSE BLD-MCNC: 131 MG/DL (ref 70–99)
GLUCOSE BLD-MCNC: 143 MG/DL (ref 70–99)
GLUCOSE BLD-MCNC: 144 MG/DL (ref 70–99)
GLUCOSE BLD-MCNC: 147 MG/DL (ref 70–99)
HCT VFR BLD AUTO: 29.6 %
HGB BLD-MCNC: 9 G/DL
IMM GRANULOCYTES # BLD AUTO: 0.02 X10(3) UL (ref 0–1)
IMM GRANULOCYTES NFR BLD: 1.5 %
LDH SERPL L TO P-CCNC: 181 U/L
LYMPHOCYTES # BLD AUTO: 0.17 X10(3) UL (ref 1–4)
LYMPHOCYTES NFR BLD AUTO: 12.8 %
MCH RBC QN AUTO: 26.2 PG (ref 26–34)
MCHC RBC AUTO-ENTMCNC: 30.4 G/DL (ref 31–37)
MCV RBC AUTO: 86 FL
MONOCYTES # BLD AUTO: 0.34 X10(3) UL (ref 0.1–1)
MONOCYTES NFR BLD AUTO: 25.6 %
NEUTROPHILS # BLD AUTO: 0.75 X10 (3) UL (ref 1.5–7.7)
NEUTROPHILS # BLD AUTO: 0.75 X10(3) UL (ref 1.5–7.7)
NEUTROPHILS NFR BLD AUTO: 56.3 %
OSMOLALITY SERPL CALC.SUM OF ELEC: 299 MOSM/KG (ref 275–295)
PLATELET # BLD AUTO: 76 10(3)UL (ref 150–450)
POTASSIUM SERPL-SCNC: 3.6 MMOL/L (ref 3.5–5.1)
RBC # BLD AUTO: 3.44 X10(6)UL
SODIUM SERPL-SCNC: 144 MMOL/L (ref 136–145)
WBC # BLD AUTO: 1.3 X10(3) UL (ref 4–11)

## 2022-05-27 PROCEDURE — 99233 SBSQ HOSP IP/OBS HIGH 50: CPT | Performed by: HOSPITALIST

## 2022-05-27 PROCEDURE — 99232 SBSQ HOSP IP/OBS MODERATE 35: CPT | Performed by: INTERNAL MEDICINE

## 2022-05-27 RX ORDER — POTASSIUM CHLORIDE 1.5 G/1.77G
40 POWDER, FOR SOLUTION ORAL EVERY 4 HOURS
Status: COMPLETED | OUTPATIENT
Start: 2022-05-27 | End: 2022-05-27

## 2022-05-27 NOTE — PLAN OF CARE
Sepsis alert massiel Batista/ ID informed, she has seen the patient & reviewed the chart. No other interventions at this time since patient is covered w/ IV antibiotics.

## 2022-05-27 NOTE — PLAN OF CARE
Up sitting in the chair. Denies having pain, no SOB. Tolerating diet,no nausea & vomiting,still c/o  painful swallowing during meals. No nausea,vomiting. IV fluids maintained. Due IV Merrem administered w/ no adverse reactions noted. Platelet, ANC & other labs continues to improve. Possible PEG in am when lab works better per GI. Will continue to monitor. Call light placed in reach.      Problem: PAIN - ADULT  Goal: Verbalizes/displays adequate comfort level or patient's stated pain goal  Description: INTERVENTIONS:  - Encourage pt to monitor pain and request assistance  - Assess pain using appropriate pain scale  - Administer analgesics based on type and severity of pain and evaluate response  - Implement non-pharmacological measures as appropriate and evaluate response  - Consider cultural and social influences on pain and pain management  - Manage/alleviate anxiety  - Utilize distraction and/or relaxation techniques  - Monitor for opioid side effects  - Notify MD/LIP if interventions unsuccessful or patient reports new pain  - Anticipate increased pain with activity and pre-medicate as appropriate  Outcome: Progressing     Problem: Impaired Swallowing  Goal: Minimize aspiration risk  Description: Interventions:  - Patient should be alert and upright for all feedings (90 degrees preferred)  - Offer food and liquids at a slow rate  - No straws  - Encourage small bites of food and small sips of liquid  - Offer pills one at a time, crush or deliver with applesauce as needed  - Discontinue feeding and notify MD (or speech pathologist) if coughing or persistent throat clearing or wet/gurgly vocal quality is noted  Outcome: Progressing     Problem: RISK FOR INFECTION - ADULT  Goal: Absence of fever/infection during anticipated neutropenic period  Description: INTERVENTIONS  - Monitor WBC  - Administer growth factors as ordered  - Implement neutropenic guidelines  Outcome: Progressing

## 2022-05-27 NOTE — CM/SW NOTE
DME is still pending with HME. MSW extended the deadline in [place] for response. New referrals sent for home infusion for tube feedings as the patient may have PEG tube paced tomorrow. [facility]  P:[phone]  F:[phone] reserved for [facility]. SW will continue to follow.     Lyubov Morataya LCSW

## 2022-05-27 NOTE — PROGRESS NOTES
1st attempt DM / Nephrology apt requests (dc 05/20)    Dr Virginia Thompson  [address] [facility]  [phone]  Follow up 1 week    Dr Petty Jo  [facility]  [address] [place] [facility] [phone]  Follow up 2 weeks  Pt back in the hospital  Closing encounter

## 2022-05-27 NOTE — PLAN OF CARE
Confirmed w/ Dr Sha Colvin that patient will not have the PEG placement due to the labs. Diet will resume  As ordered previously & depending on the labs,she might have PEG placement tomorrow. Will update patient & family.

## 2022-05-27 NOTE — PLAN OF CARE
Problem: PAIN - ADULT  Goal: Verbalizes/displays adequate comfort level or patient's stated pain goal  Description: INTERVENTIONS:  - Encourage pt to monitor pain and request assistance  - Assess pain using appropriate pain scale  - Administer analgesics based on type and severity of pain and evaluate response  - Implement non-pharmacological measures as appropriate and evaluate response  - Consider cultural and social influences on pain and pain management  - Manage/alleviate anxiety  - Utilize distraction and/or relaxation techniques  - Monitor for opioid side effects  - Notify MD/LIP if interventions unsuccessful or patient reports new pain  - Anticipate increased pain with activity and pre-medicate as appropriate  Outcome: Progressing     Problem: RISK FOR INFECTION - ADULT  Goal: Absence of fever/infection during anticipated neutropenic period  Description: INTERVENTIONS  - Monitor WBC  - Administer growth factors as ordered  - Implement neutropenic guidelines  Outcome: Progressing     Problem: Impaired Swallowing  Goal: Minimize aspiration risk  Description: Interventions:  - Patient should be alert and upright for all feedings (90 degrees preferred)  - Offer food and liquids at a slow rate  - No straws  - Encourage small bites of food and small sips of liquid  - Offer pills one at a time, crush or deliver with applesauce as needed  - Discontinue feeding and notify MD (or speech pathologist) if coughing or persistent throat clearing or wet/gurgly vocal quality is noted  Outcome: Progressing     Received pt a/o x4. VSS. Afebrile. Pt on RA sating at 98%. Afib on tele. Xarelto held. NPO for PEG tube placement 5/27/22. IVF infusing. Pt c/o moderate pain and given prn morphine per MAR with relief. Pt offers no other complaints at this time. Safety precautions in place and call light within reach.

## 2022-05-28 ENCOUNTER — ANESTHESIA EVENT (OUTPATIENT)
Dept: ENDOSCOPY | Facility: HOSPITAL | Age: 82
End: 2022-05-28
Payer: MEDICARE

## 2022-05-28 ENCOUNTER — ANESTHESIA (OUTPATIENT)
Dept: ENDOSCOPY | Facility: HOSPITAL | Age: 82
End: 2022-05-28
Payer: MEDICARE

## 2022-05-28 LAB
ANION GAP SERPL CALC-SCNC: 6 MMOL/L (ref 0–18)
BASOPHILS # BLD: 0 X10(3) UL (ref 0–0.2)
BASOPHILS NFR BLD: 0 %
BUN BLD-MCNC: 10 MG/DL (ref 7–18)
CALCIUM BLD-MCNC: 7.5 MG/DL (ref 8.5–10.1)
CHLORIDE SERPL-SCNC: 115 MMOL/L (ref 98–112)
CO2 SERPL-SCNC: 22 MMOL/L (ref 21–32)
CREAT BLD-MCNC: 0.62 MG/DL
EOSINOPHIL # BLD: 0 X10(3) UL (ref 0–0.7)
EOSINOPHIL NFR BLD: 0 %
ERYTHROCYTE [DISTWIDTH] IN BLOOD BY AUTOMATED COUNT: 18.6 %
GLUCOSE BLD-MCNC: 105 MG/DL (ref 70–99)
GLUCOSE BLD-MCNC: 105 MG/DL (ref 70–99)
GLUCOSE BLD-MCNC: 109 MG/DL (ref 70–99)
GLUCOSE BLD-MCNC: 111 MG/DL (ref 70–99)
GLUCOSE BLD-MCNC: 112 MG/DL (ref 70–99)
HCT VFR BLD AUTO: 29.8 %
HGB BLD-MCNC: 8.9 G/DL
LYMPHOCYTES NFR BLD: 0.41 X10(3) UL (ref 1–4)
LYMPHOCYTES NFR BLD: 5 %
MCH RBC QN AUTO: 26.9 PG (ref 26–34)
MCHC RBC AUTO-ENTMCNC: 29.9 G/DL (ref 31–37)
MCV RBC AUTO: 90 FL
MONOCYTES # BLD: 0.16 X10(3) UL (ref 0.1–1)
MONOCYTES NFR BLD: 2 %
NEUTROPHILS # BLD AUTO: 6.69 X10 (3) UL (ref 1.5–7.7)
NEUTROPHILS NFR BLD: 75 %
NEUTS BAND NFR BLD: 18 %
NEUTS HYPERSEG # BLD: 7.53 X10(3) UL (ref 1.5–7.7)
NRBC BLD MANUAL-RTO: 1 %
OSMOLALITY SERPL CALC.SUM OF ELEC: 296 MOSM/KG (ref 275–295)
PLATELET # BLD AUTO: 91 10(3)UL (ref 150–450)
PLATELET MORPHOLOGY: NORMAL
POTASSIUM SERPL-SCNC: 4.4 MMOL/L (ref 3.5–5.1)
POTASSIUM SERPL-SCNC: 4.4 MMOL/L (ref 3.5–5.1)
RBC # BLD AUTO: 3.31 X10(6)UL
SODIUM SERPL-SCNC: 143 MMOL/L (ref 136–145)
TOTAL CELLS COUNTED BLD: 100
WBC # BLD AUTO: 8.1 X10(3) UL (ref 4–11)

## 2022-05-28 PROCEDURE — 0DB28ZX EXCISION OF MIDDLE ESOPHAGUS, VIA NATURAL OR ARTIFICIAL OPENING ENDOSCOPIC, DIAGNOSTIC: ICD-10-PCS | Performed by: STUDENT IN AN ORGANIZED HEALTH CARE EDUCATION/TRAINING PROGRAM

## 2022-05-28 PROCEDURE — 99233 SBSQ HOSP IP/OBS HIGH 50: CPT | Performed by: HOSPITALIST

## 2022-05-28 PROCEDURE — 0DB98ZX EXCISION OF DUODENUM, VIA NATURAL OR ARTIFICIAL OPENING ENDOSCOPIC, DIAGNOSTIC: ICD-10-PCS | Performed by: STUDENT IN AN ORGANIZED HEALTH CARE EDUCATION/TRAINING PROGRAM

## 2022-05-28 PROCEDURE — 99233 SBSQ HOSP IP/OBS HIGH 50: CPT | Performed by: SPECIALIST

## 2022-05-28 PROCEDURE — 0DB68ZX EXCISION OF STOMACH, VIA NATURAL OR ARTIFICIAL OPENING ENDOSCOPIC, DIAGNOSTIC: ICD-10-PCS | Performed by: STUDENT IN AN ORGANIZED HEALTH CARE EDUCATION/TRAINING PROGRAM

## 2022-05-28 PROCEDURE — 0DB38ZX EXCISION OF LOWER ESOPHAGUS, VIA NATURAL OR ARTIFICIAL OPENING ENDOSCOPIC, DIAGNOSTIC: ICD-10-PCS | Performed by: STUDENT IN AN ORGANIZED HEALTH CARE EDUCATION/TRAINING PROGRAM

## 2022-05-28 RX ORDER — SODIUM CHLORIDE, SODIUM LACTATE, POTASSIUM CHLORIDE, CALCIUM CHLORIDE 600; 310; 30; 20 MG/100ML; MG/100ML; MG/100ML; MG/100ML
INJECTION, SOLUTION INTRAVENOUS CONTINUOUS PRN
Status: DISCONTINUED | OUTPATIENT
Start: 2022-05-28 | End: 2022-05-28 | Stop reason: SURG

## 2022-05-28 RX ADMIN — SODIUM CHLORIDE, SODIUM LACTATE, POTASSIUM CHLORIDE, CALCIUM CHLORIDE: 600; 310; 30; 20 INJECTION, SOLUTION INTRAVENOUS at 11:26:00

## 2022-05-28 NOTE — PROGRESS NOTES
GI Update Note    I spoke to patient prior to EGD this morning. We reviewed indication for EGD, including dysphagia and nausea. We also discussed no plans for PEG placement today, given prior discussions with GI team and oncology service, and patient. AOx3. Pt in no acute distress    Proceed with EGD +/- endotherapy +/- dilation today  Keep NPO  Informed consent as below  Asa class per anesthesia       Informed Consent:   The planned procedure(s), the explanation of the procedure, its expected benefits, the potential complications and risks and possible alternatives and their benefits and risks were discussed with the patient or the patient's surrogate. The discussion of risks, not limited to but including bleeding, infection, perforation, adverse effects from anesthesia, and possible prolonged hospitalization/emergency surgery, and risk of missed lesion(s) were discussed with patient. We discussed increased risk of tear, perforation with possible dilation, Patient understood the proposed procedure(s), its risks, benefits and alternatives and wish to proceed with procedure(s). All questions answered in full.       Rody Waddell MD  [facility]

## 2022-05-28 NOTE — PLAN OF CARE
Patient is alert and oriented x4. Denies pain. Patient went for EGD today. Findings are esophagitis. Received report from endo RN and patient was sent up on 2L of oxygen. Weaning patient off of the oxygen as O2 saturations are better. Patient is on a clear liquid diet.       Problem: PAIN - ADULT  Goal: Verbalizes/displays adequate comfort level or patient's stated pain goal  Description: INTERVENTIONS:  - Encourage pt to monitor pain and request assistance  - Assess pain using appropriate pain scale  - Administer analgesics based on type and severity of pain and evaluate response  - Implement non-pharmacological measures as appropriate and evaluate response  - Consider cultural and social influences on pain and pain management  - Manage/alleviate anxiety  - Utilize distraction and/or relaxation techniques  - Monitor for opioid side effects  - Notify MD/LIP if interventions unsuccessful or patient reports new pain  - Anticipate increased pain with activity and pre-medicate as appropriate  Outcome: Progressing     Problem: RISK FOR INFECTION - ADULT  Goal: Absence of fever/infection during anticipated neutropenic period  Description: INTERVENTIONS  - Monitor WBC  - Administer growth factors as ordered  - Implement neutropenic guidelines  Outcome: Progressing     Problem: Impaired Swallowing  Goal: Minimize aspiration risk  Description: Interventions:  - Patient should be alert and upright for all feedings (90 degrees preferred)  - Offer food and liquids at a slow rate  - No straws  - Encourage small bites of food and small sips of liquid  - Offer pills one at a time, crush or deliver with applesauce as needed  - Discontinue feeding and notify MD (or speech pathologist) if coughing or persistent throat clearing or wet/gurgly vocal quality is noted  Outcome: Progressing

## 2022-05-28 NOTE — ANESTHESIA POSTPROCEDURE EVALUATION
[facility]    [facility]:  [facility]   Age/Gender 80year old female MRN [id number]   Location [address] [address] Attending Ace Casas MD   [facility] Day # 3 PCP Becky Duke MD       Anesthesia Post-op Note    ESOPHAGOGASTRODUODENOSCOPY (EGD) with Biopsy    Procedure Summary     Date: 05/28/22 Room / Location: [facility] ENDOSCOPY 03 / [facility] ENDOSCOPY    Anesthesia Start: 6205 Anesthesia Stop: 7309    Procedure: ESOPHAGOGASTRODUODENOSCOPY (EGD) with Biopsy (N/A ) Diagnosis:       Dysphagia      (Dysphagia [R13.10])    Surgeons: Shelly Stuart MD Anesthesiologist: Tulio Akhtar MD    Anesthesia Type: MAC ASA Status: 3          Anesthesia Type: MAC    Vitals Value Taken Time   /56 05/28/22 1146   Temp  05/28/22 1146   Pulse 86 05/28/22 1146   Resp 21 05/28/22 1146   SpO2 94 05/28/22 1146       Patient Location: Endoscopy    Anesthesia Type: MAC    Airway Patency: patent    Postop Pain Control: adequate    Mental Status: mildly sedated but able to meaningfully participate in the post-anesthesia evaluation    Nausea/Vomiting: none    Cardiopulmonary/Hydration status: stable euvolemic    Complications: no apparent anesthesia related complications    Postop vital signs: stable    Dental Exam: Unchanged from Preop    Patient to be discharged from PACU when criteria met.

## 2022-05-28 NOTE — PLAN OF CARE
Received pt alert and oriented x4. VSS. Afebrile. Pt c/o pain and nausea given prn meds with relief. Pt ambulates to bathroom via walker with standby assist. IVF infusing at 100ml/hr. IV ABT given per MAR orders. Pt remains NPO after midnight. Safety precautions in place and call light within reach.

## 2022-05-28 NOTE — CM/SW NOTE
Per GI, plan for peg on hold for now. Sw sent signed dme order to e via aidin. SW to follow and finalize dc plans once closer to dc.     Aarti Mcnally LCSW  /Discharge Planner  [phone];K

## 2022-05-29 LAB
ALBUMIN SERPL-MCNC: 3.3 G/DL (ref 3.4–5)
ALBUMIN/GLOB SERPL: 1.3 {RATIO} (ref 1–2)
ALP LIVER SERPL-CCNC: 474 U/L
ALT SERPL-CCNC: 122 U/L
ANION GAP SERPL CALC-SCNC: 5 MMOL/L (ref 0–18)
AST SERPL-CCNC: 80 U/L (ref 15–37)
BASOPHILS # BLD AUTO: 0.06 X10(3) UL (ref 0–0.2)
BASOPHILS NFR BLD AUTO: 0.6 %
BILIRUB SERPL-MCNC: 0.5 MG/DL (ref 0.1–2)
BUN BLD-MCNC: 13 MG/DL (ref 7–18)
CALCIUM BLD-MCNC: 8.6 MG/DL (ref 8.5–10.1)
CHLORIDE SERPL-SCNC: 99 MMOL/L (ref 98–112)
CO2 SERPL-SCNC: 31 MMOL/L (ref 21–32)
CREAT BLD-MCNC: 0.92 MG/DL
DEPRECATED HBV CORE AB SER IA-ACNC: 335.9 NG/ML
EOSINOPHIL # BLD AUTO: 0.02 X10(3) UL (ref 0–0.7)
EOSINOPHIL NFR BLD AUTO: 0.2 %
ERYTHROCYTE [DISTWIDTH] IN BLOOD BY AUTOMATED COUNT: 17.3 %
GLOBULIN PLAS-MCNC: 2.5 G/DL (ref 2.8–4.4)
GLUCOSE BLD-MCNC: 102 MG/DL (ref 70–99)
GLUCOSE BLD-MCNC: 111 MG/DL (ref 70–99)
GLUCOSE BLD-MCNC: 143 MG/DL (ref 70–99)
GLUCOSE BLD-MCNC: 170 MG/DL (ref 70–99)
GLUCOSE BLD-MCNC: 175 MG/DL (ref 70–99)
HAV IGM SER QL: NONREACTIVE
HBV CORE IGM SER QL: NONREACTIVE
HBV SURFACE AG SERPL QL IA: NONREACTIVE
HCT VFR BLD AUTO: 33.6 %
HCV AB SERPL QL IA: NONREACTIVE
HGB BLD-MCNC: 11.1 G/DL
IMM GRANULOCYTES # BLD AUTO: 0.41 X10(3) UL (ref 0–1)
IMM GRANULOCYTES NFR BLD: 4.4 %
IMMUNOGLOBULIN PNL SER-MCNC: 383 MG/DL (ref 791–1643)
LYMPHOCYTES # BLD AUTO: 1.03 X10(3) UL (ref 1–4)
LYMPHOCYTES NFR BLD AUTO: 11.1 %
MCH RBC QN AUTO: 32.1 PG (ref 26–34)
MCHC RBC AUTO-ENTMCNC: 33 G/DL (ref 31–37)
MCV RBC AUTO: 97.1 FL
MONOCYTES # BLD AUTO: 1.25 X10(3) UL (ref 0.1–1)
MONOCYTES NFR BLD AUTO: 13.5 %
NEUTROPHILS # BLD AUTO: 6.47 X10 (3) UL (ref 1.5–7.7)
NEUTROPHILS # BLD AUTO: 6.47 X10(3) UL (ref 1.5–7.7)
NEUTROPHILS NFR BLD AUTO: 70.2 %
OSMOLALITY SERPL CALC.SUM OF ELEC: 280 MOSM/KG (ref 275–295)
PLATELET # BLD AUTO: 202 10(3)UL (ref 150–450)
POTASSIUM SERPL-SCNC: 4.3 MMOL/L (ref 3.5–5.1)
PROT SERPL-MCNC: 5.8 G/DL (ref 6.4–8.2)
RBC # BLD AUTO: 3.46 X10(6)UL
SODIUM SERPL-SCNC: 135 MMOL/L (ref 136–145)
WBC # BLD AUTO: 9.2 X10(3) UL (ref 4–11)

## 2022-05-29 PROCEDURE — 99233 SBSQ HOSP IP/OBS HIGH 50: CPT | Performed by: HOSPITALIST

## 2022-05-29 PROCEDURE — 99233 SBSQ HOSP IP/OBS HIGH 50: CPT | Performed by: SPECIALIST

## 2022-05-29 NOTE — PLAN OF CARE
Patient is alert and oriented x4. Patient denies pain. Diet was advanced from clear liquids to low fiber soft diet. Patient tolerating well. Plan is for patient to have US of abdomen tomorrow. NPO starting at midnight. Medications given per MAR. Safety precautions in place.       Problem: PAIN - ADULT  Goal: Verbalizes/displays adequate comfort level or patient's stated pain goal  Description: INTERVENTIONS:  - Encourage pt to monitor pain and request assistance  - Assess pain using appropriate pain scale  - Administer analgesics based on type and severity of pain and evaluate response  - Implement non-pharmacological measures as appropriate and evaluate response  - Consider cultural and social influences on pain and pain management  - Manage/alleviate anxiety  - Utilize distraction and/or relaxation techniques  - Monitor for opioid side effects  - Notify MD/LIP if interventions unsuccessful or patient reports new pain  - Anticipate increased pain with activity and pre-medicate as appropriate  Outcome: Progressing     Problem: RISK FOR INFECTION - ADULT  Goal: Absence of fever/infection during anticipated neutropenic period  Description: INTERVENTIONS  - Monitor WBC  - Administer growth factors as ordered  - Implement neutropenic guidelines  Outcome: Progressing     Problem: Impaired Swallowing  Goal: Minimize aspiration risk  Description: Interventions:  - Patient should be alert and upright for all feedings (90 degrees preferred)  - Offer food and liquids at a slow rate  - No straws  - Encourage small bites of food and small sips of liquid  - Offer pills one at a time, crush or deliver with applesauce as needed  - Discontinue feeding and notify MD (or speech pathologist) if coughing or persistent throat clearing or wet/gurgly vocal quality is noted  Outcome: Progressing

## 2022-05-29 NOTE — PLAN OF CARE
Problem: PAIN - ADULT  Goal: Verbalizes/displays adequate comfort level or patient's stated pain goal  Description: INTERVENTIONS:  - Encourage pt to monitor pain and request assistance  - Assess pain using appropriate pain scale  - Administer analgesics based on type and severity of pain and evaluate response  - Implement non-pharmacological measures as appropriate and evaluate response  - Consider cultural and social influences on pain and pain management  - Manage/alleviate anxiety  - Utilize distraction and/or relaxation techniques  - Monitor for opioid side effects  - Notify MD/LIP if interventions unsuccessful or patient reports new pain  - Anticipate increased pain with activity and pre-medicate as appropriate  Outcome: Progressing     Problem: RISK FOR INFECTION - ADULT  Goal: Absence of fever/infection during anticipated neutropenic period  Description: INTERVENTIONS  - Monitor WBC  - Administer growth factors as ordered  - Implement neutropenic guidelines  Outcome: Progressing     Received pt alert and oriented x4. VSS. Afebrile. Pt weaned off to RA. Pt c/o mild abdominal pain and given PRN pain medication with relief. IVF infusing. IV ABT given per MAR orders. Safety precautions in place and call light within reach.

## 2022-05-30 ENCOUNTER — APPOINTMENT (OUTPATIENT)
Dept: ULTRASOUND IMAGING | Facility: HOSPITAL | Age: 82
End: 2022-05-30
Attending: STUDENT IN AN ORGANIZED HEALTH CARE EDUCATION/TRAINING PROGRAM
Payer: MEDICARE

## 2022-05-30 LAB
ALBUMIN SERPL-MCNC: 2.4 G/DL (ref 3.4–5)
ALBUMIN/GLOB SERPL: 0.9 {RATIO} (ref 1–2)
ALP LIVER SERPL-CCNC: 186 U/L
ALT SERPL-CCNC: 13 U/L
ANION GAP SERPL CALC-SCNC: 9 MMOL/L (ref 0–18)
AST SERPL-CCNC: 13 U/L (ref 15–37)
BASOPHILS # BLD: 0 X10(3) UL (ref 0–0.2)
BASOPHILS NFR BLD: 0 %
BILIRUB SERPL-MCNC: 0.5 MG/DL (ref 0.1–2)
BUN BLD-MCNC: 14 MG/DL (ref 7–18)
CALCIUM BLD-MCNC: 8.1 MG/DL (ref 8.5–10.1)
CHLORIDE SERPL-SCNC: 114 MMOL/L (ref 98–112)
CO2 SERPL-SCNC: 20 MMOL/L (ref 21–32)
CREAT BLD-MCNC: 0.79 MG/DL
EOSINOPHIL # BLD: 0 X10(3) UL (ref 0–0.7)
EOSINOPHIL NFR BLD: 0 %
ERYTHROCYTE [DISTWIDTH] IN BLOOD BY AUTOMATED COUNT: 19.9 %
GLOBULIN PLAS-MCNC: 2.6 G/DL (ref 2.8–4.4)
GLUCOSE BLD-MCNC: 134 MG/DL (ref 70–99)
GLUCOSE BLD-MCNC: 137 MG/DL (ref 70–99)
GLUCOSE BLD-MCNC: 141 MG/DL (ref 70–99)
GLUCOSE BLD-MCNC: 150 MG/DL (ref 70–99)
GLUCOSE BLD-MCNC: 158 MG/DL (ref 70–99)
HCT VFR BLD AUTO: 30.4 %
HGB BLD-MCNC: 9.3 G/DL
LYMPHOCYTES NFR BLD: 0.89 X10(3) UL (ref 1–4)
LYMPHOCYTES NFR BLD: 3 %
MCH RBC QN AUTO: 27.3 PG (ref 26–34)
MCHC RBC AUTO-ENTMCNC: 30.6 G/DL (ref 31–37)
MCV RBC AUTO: 89.1 FL
METAMYELOCYTES # BLD: 0.59 X10(3) UL
METAMYELOCYTES NFR BLD: 2 %
MONOCYTES # BLD: 0.59 X10(3) UL (ref 0.1–1)
MONOCYTES NFR BLD: 2 %
NEUTROPHILS # BLD AUTO: 24.84 X10 (3) UL (ref 1.5–7.7)
NEUTROPHILS NFR BLD: 82 %
NEUTS BAND NFR BLD: 11 %
NEUTS HYPERSEG # BLD: 27.53 X10(3) UL (ref 1.5–7.7)
NRBC BLD MANUAL-RTO: 1 %
OSMOLALITY SERPL CALC.SUM OF ELEC: 299 MOSM/KG (ref 275–295)
PLATELET # BLD AUTO: 115 10(3)UL (ref 150–450)
PLATELET MORPHOLOGY: NORMAL
POTASSIUM SERPL-SCNC: 4.5 MMOL/L (ref 3.5–5.1)
PROT SERPL-MCNC: 5 G/DL (ref 6.4–8.2)
RBC # BLD AUTO: 3.41 X10(6)UL
SODIUM SERPL-SCNC: 143 MMOL/L (ref 136–145)
TOTAL CELLS COUNTED BLD: 100
TSI SER-ACNC: 3.43 MIU/ML (ref 0.36–3.74)
WBC # BLD AUTO: 29.6 X10(3) UL (ref 4–11)

## 2022-05-30 PROCEDURE — 99233 SBSQ HOSP IP/OBS HIGH 50: CPT | Performed by: SPECIALIST

## 2022-05-30 PROCEDURE — 76700 US EXAM ABDOM COMPLETE: CPT | Performed by: STUDENT IN AN ORGANIZED HEALTH CARE EDUCATION/TRAINING PROGRAM

## 2022-05-30 PROCEDURE — 93975 VASCULAR STUDY: CPT | Performed by: STUDENT IN AN ORGANIZED HEALTH CARE EDUCATION/TRAINING PROGRAM

## 2022-05-30 PROCEDURE — 99232 SBSQ HOSP IP/OBS MODERATE 35: CPT | Performed by: HOSPITALIST

## 2022-05-30 NOTE — PLAN OF CARE
Afebrile. Room air with moderate dyspnea on exertion. Desats to 87% room air after walking back from the bathroom. 2 LNC applied for comfort. Sating 93% on 2 LNC. Denies pain. Denies nausea/ vomiting. Poor appetite. NPO at midnight for US abdomen. IV abx given. Fall precaution maintained. Slept.   0702- spoke to radiology.  US will be done this am.   Problem: RISK FOR INFECTION - ADULT  Goal: Absence of fever/infection during anticipated neutropenic period  Description: INTERVENTIONS  - Monitor WBC  - Administer growth factors as ordered  - Implement neutropenic guidelines  Outcome: Progressing

## 2022-05-30 NOTE — PROGRESS NOTES
Patient sitting up in chair since early am, denies any pain, just complaining  of feeling fatigued, back to bed with assistance of 1 and tolerated well. With walker, on room air,tolerating diet.

## 2022-05-31 LAB
ALBUMIN SERPL-MCNC: 2.3 G/DL (ref 3.4–5)
ALBUMIN/GLOB SERPL: 0.9 {RATIO} (ref 1–2)
ALP LIVER SERPL-CCNC: 167 U/L
ALT SERPL-CCNC: 13 U/L
ANION GAP SERPL CALC-SCNC: 7 MMOL/L (ref 0–18)
AST SERPL-CCNC: 5 U/L (ref 15–37)
BASOPHILS # BLD: 0 X10(3) UL (ref 0–0.2)
BASOPHILS NFR BLD: 0 %
BILIRUB SERPL-MCNC: 0.5 MG/DL (ref 0.1–2)
BUN BLD-MCNC: 12 MG/DL (ref 7–18)
CALCIUM BLD-MCNC: 8.2 MG/DL (ref 8.5–10.1)
CHLORIDE SERPL-SCNC: 114 MMOL/L (ref 98–112)
CMV ANTIBODY IGG: <0.2 U/ML
CMV ANTIBODY IGM: <8 AU/ML
CO2 SERPL-SCNC: 24 MMOL/L (ref 21–32)
CREAT BLD-MCNC: 0.69 MG/DL
EBV NA IGG SER QL IA: NEGATIVE
EBV VCA IGG SER QL IA: POSITIVE
EBV VCA IGM SER QL IA: NEGATIVE
EOSINOPHIL # BLD: 0 X10(3) UL (ref 0–0.7)
EOSINOPHIL NFR BLD: 0 %
ERYTHROCYTE [DISTWIDTH] IN BLOOD BY AUTOMATED COUNT: 20.3 %
F-ACTIN (SMOOTH MUSCLE) AB: 11 UNITS
GLOBULIN PLAS-MCNC: 2.6 G/DL (ref 2.8–4.4)
GLUCOSE BLD-MCNC: 122 MG/DL (ref 70–99)
GLUCOSE BLD-MCNC: 133 MG/DL (ref 70–99)
GLUCOSE BLD-MCNC: 140 MG/DL (ref 70–99)
GLUCOSE BLD-MCNC: 169 MG/DL (ref 70–99)
GLUCOSE BLD-MCNC: 212 MG/DL (ref 70–99)
GLUCOSE BLD-MCNC: 228 MG/DL (ref 70–99)
HCT VFR BLD AUTO: 30.1 %
HGB BLD-MCNC: 9.1 G/DL
LYMPHOCYTES NFR BLD: 0.22 X10(3) UL (ref 1–4)
LYMPHOCYTES NFR BLD: 1 %
MCH RBC QN AUTO: 26.6 PG (ref 26–34)
MCHC RBC AUTO-ENTMCNC: 30.2 G/DL (ref 31–37)
MCV RBC AUTO: 88 FL
MITOCHONDRIAL M2 AB, IGG: 3 UNITS
MONOCYTES # BLD: 0.86 X10(3) UL (ref 0.1–1)
MONOCYTES NFR BLD: 4 %
MORPHOLOGY: NORMAL
MYELOCYTES # BLD: 0.22 X10(3) UL
MYELOCYTES NFR BLD: 1 %
NEUTROPHILS # BLD AUTO: 18.22 X10 (3) UL (ref 1.5–7.7)
NEUTROPHILS NFR BLD: 87 %
NEUTS BAND NFR BLD: 7 %
NEUTS HYPERSEG # BLD: 20.3 X10(3) UL (ref 1.5–7.7)
OSMOLALITY SERPL CALC.SUM OF ELEC: 302 MOSM/KG (ref 275–295)
PLATELET # BLD AUTO: 132 10(3)UL (ref 150–450)
PLATELET MORPHOLOGY: NORMAL
POTASSIUM SERPL-SCNC: 3.9 MMOL/L (ref 3.5–5.1)
PROT SERPL-MCNC: 4.9 G/DL (ref 6.4–8.2)
RBC # BLD AUTO: 3.42 X10(6)UL
SODIUM SERPL-SCNC: 145 MMOL/L (ref 136–145)
TOTAL CELLS COUNTED BLD: 100
TOXIC GRANULES BLD QL SMEAR: PRESENT
WBC # BLD AUTO: 21.6 X10(3) UL (ref 4–11)

## 2022-05-31 PROCEDURE — 99232 SBSQ HOSP IP/OBS MODERATE 35: CPT | Performed by: INTERNAL MEDICINE

## 2022-05-31 PROCEDURE — 99233 SBSQ HOSP IP/OBS HIGH 50: CPT | Performed by: HOSPITALIST

## 2022-05-31 RX ORDER — FUROSEMIDE 10 MG/ML
20 INJECTION INTRAMUSCULAR; INTRAVENOUS ONCE
Status: COMPLETED | OUTPATIENT
Start: 2022-05-31 | End: 2022-05-31

## 2022-05-31 RX ORDER — FUROSEMIDE 10 MG/ML
40 INJECTION INTRAMUSCULAR; INTRAVENOUS DAILY
Status: COMPLETED | OUTPATIENT
Start: 2022-05-31 | End: 2022-06-01

## 2022-05-31 NOTE — PLAN OF CARE
Problem: PAIN - ADULT  Goal: Verbalizes/displays adequate comfort level or patient's stated pain goal  Description: INTERVENTIONS:  - Encourage pt to monitor pain and request assistance  - Assess pain using appropriate pain scale  - Administer analgesics based on type and severity of pain and evaluate response  - Implement non-pharmacological measures as appropriate and evaluate response  - Consider cultural and social influences on pain and pain management  - Manage/alleviate anxiety  - Utilize distraction and/or relaxation techniques  - Monitor for opioid side effects  - Notify MD/LIP if interventions unsuccessful or patient reports new pain  - Anticipate increased pain with activity and pre-medicate as appropriate  5/30/2022 2357 by Estefanía Scanlon, RN  Outcome: Progressing  5/30/2022 2357 by Estefanía Scanlon, RN  Outcome: Progressing     Assumed care and patient denies any types of discomfort. Observed to desat on moderate exertion and able to maintain stable O2 sat at rest with 2L/NC at 91%. VSS. Call light within reach. MD notified for low O2 sat on exertion and at rest with BLE edema +3;  ordered lasix IV and labs. Medication administered.

## 2022-05-31 NOTE — PLAN OF CARE
Patient is alert and oriented x4. Patient denies pain. Medications given per MAR. Patient received another dose of lasix due to swelling. Dietician came to talk to the patient. PT also came to see the patient. Patient went on O2 walk and oxygen dropped to high 70's. Patient will most likely need home oxygen.       Problem: PAIN - ADULT  Goal: Verbalizes/displays adequate comfort level or patient's stated pain goal  Description: INTERVENTIONS:  - Encourage pt to monitor pain and request assistance  - Assess pain using appropriate pain scale  - Administer analgesics based on type and severity of pain and evaluate response  - Implement non-pharmacological measures as appropriate and evaluate response  - Consider cultural and social influences on pain and pain management  - Manage/alleviate anxiety  - Utilize distraction and/or relaxation techniques  - Monitor for opioid side effects  - Notify MD/LIP if interventions unsuccessful or patient reports new pain  - Anticipate increased pain with activity and pre-medicate as appropriate  Outcome: Progressing     Problem: RISK FOR INFECTION - ADULT  Goal: Absence of fever/infection during anticipated neutropenic period  Description: INTERVENTIONS  - Monitor WBC  - Administer growth factors as ordered  - Implement neutropenic guidelines  Outcome: Progressing     Problem: Impaired Swallowing  Goal: Minimize aspiration risk  Description: Interventions:  - Patient should be alert and upright for all feedings (90 degrees preferred)  - Offer food and liquids at a slow rate  - No straws  - Encourage small bites of food and small sips of liquid  - Offer pills one at a time, crush or deliver with applesauce as needed  - Discontinue feeding and notify MD (or speech pathologist) if coughing or persistent throat clearing or wet/gurgly vocal quality is noted  Outcome: Progressing

## 2022-06-01 ENCOUNTER — APPOINTMENT (OUTPATIENT)
Dept: GENERAL RADIOLOGY | Facility: HOSPITAL | Age: 82
End: 2022-06-01
Attending: INTERNAL MEDICINE
Payer: MEDICARE

## 2022-06-01 LAB
ALBUMIN SERPL-MCNC: 2.3 G/DL (ref 3.4–5)
ALBUMIN/GLOB SERPL: 1 {RATIO} (ref 1–2)
ALP LIVER SERPL-CCNC: 152 U/L
ALT SERPL-CCNC: 12 U/L
ANION GAP SERPL CALC-SCNC: 7 MMOL/L (ref 0–18)
AST SERPL-CCNC: 15 U/L (ref 15–37)
BASOPHILS # BLD: 0 X10(3) UL (ref 0–0.2)
BASOPHILS NFR BLD: 0 %
BILIRUB SERPL-MCNC: 0.4 MG/DL (ref 0.1–2)
BUN BLD-MCNC: 9 MG/DL (ref 7–18)
CALCIUM BLD-MCNC: 7.9 MG/DL (ref 8.5–10.1)
CHLORIDE SERPL-SCNC: 110 MMOL/L (ref 98–112)
CO2 SERPL-SCNC: 27 MMOL/L (ref 21–32)
CREAT BLD-MCNC: 0.72 MG/DL
EOSINOPHIL # BLD: 0.16 X10(3) UL (ref 0–0.7)
EOSINOPHIL NFR BLD: 1 %
ERYTHROCYTE [DISTWIDTH] IN BLOOD BY AUTOMATED COUNT: 20.8 %
GLOBULIN PLAS-MCNC: 2.4 G/DL (ref 2.8–4.4)
GLUCOSE BLD-MCNC: 152 MG/DL (ref 70–99)
GLUCOSE BLD-MCNC: 156 MG/DL (ref 70–99)
GLUCOSE BLD-MCNC: 164 MG/DL (ref 70–99)
GLUCOSE BLD-MCNC: 185 MG/DL (ref 70–99)
GLUCOSE BLD-MCNC: 218 MG/DL (ref 70–99)
HCT VFR BLD AUTO: 28.6 %
HGB BLD-MCNC: 8.7 G/DL
LYMPHOCYTES NFR BLD: 0.32 X10(3) UL (ref 1–4)
LYMPHOCYTES NFR BLD: 2 %
MCH RBC QN AUTO: 26.9 PG (ref 26–34)
MCHC RBC AUTO-ENTMCNC: 30.4 G/DL (ref 31–37)
MCV RBC AUTO: 88.3 FL
METAMYELOCYTES # BLD: 0.16 X10(3) UL
METAMYELOCYTES NFR BLD: 1 %
MONOCYTES # BLD: 0.81 X10(3) UL (ref 0.1–1)
MONOCYTES NFR BLD: 5 %
MORPHOLOGY: NORMAL
MYELOCYTES # BLD: 0.64 X10(3) UL
MYELOCYTES NFR BLD: 4 %
NEUTROPHILS # BLD AUTO: 13.13 X10 (3) UL (ref 1.5–7.7)
NEUTROPHILS NFR BLD: 78 %
NEUTS BAND NFR BLD: 9 %
NEUTS HYPERSEG # BLD: 14.01 X10(3) UL (ref 1.5–7.7)
OSMOLALITY SERPL CALC.SUM OF ELEC: 300 MOSM/KG (ref 275–295)
PLATELET # BLD AUTO: 132 10(3)UL (ref 150–450)
PLATELET MORPHOLOGY: NORMAL
POTASSIUM SERPL-SCNC: 3.5 MMOL/L (ref 3.5–5.1)
PROCALCITONIN SERPL-MCNC: 0.37 NG/ML (ref ?–0.16)
PROT SERPL-MCNC: 4.7 G/DL (ref 6.4–8.2)
RBC # BLD AUTO: 3.24 X10(6)UL
SODIUM SERPL-SCNC: 144 MMOL/L (ref 136–145)
TOTAL CELLS COUNTED BLD: 100
WBC # BLD AUTO: 16.1 X10(3) UL (ref 4–11)

## 2022-06-01 PROCEDURE — 99232 SBSQ HOSP IP/OBS MODERATE 35: CPT | Performed by: INTERNAL MEDICINE

## 2022-06-01 PROCEDURE — 71045 X-RAY EXAM CHEST 1 VIEW: CPT | Performed by: INTERNAL MEDICINE

## 2022-06-01 RX ORDER — SODIUM CHLORIDE, SODIUM LACTATE, POTASSIUM CHLORIDE, CALCIUM CHLORIDE 600; 310; 30; 20 MG/100ML; MG/100ML; MG/100ML; MG/100ML
INJECTION, SOLUTION INTRAVENOUS CONTINUOUS
Status: DISCONTINUED | OUTPATIENT
Start: 2022-06-01 | End: 2022-06-01

## 2022-06-01 RX ORDER — POTASSIUM CHLORIDE 1.5 G/1.77G
40 POWDER, FOR SOLUTION ORAL EVERY 4 HOURS
Status: COMPLETED | OUTPATIENT
Start: 2022-06-01 | End: 2022-06-01

## 2022-06-01 RX ORDER — FUROSEMIDE 10 MG/ML
40 INJECTION INTRAMUSCULAR; INTRAVENOUS ONCE
Status: COMPLETED | OUTPATIENT
Start: 2022-06-02 | End: 2022-06-02

## 2022-06-01 RX ORDER — NALOXONE HYDROCHLORIDE 0.4 MG/ML
80 INJECTION, SOLUTION INTRAMUSCULAR; INTRAVENOUS; SUBCUTANEOUS AS NEEDED
Status: ACTIVE | OUTPATIENT
Start: 2022-06-01 | End: 2022-06-01

## 2022-06-01 NOTE — PROGRESS NOTES
Patient currently Inpatient at [address] [place] [address], communicated with inpatient GI consult team    ---    Rodolfo Levine Ada recently had an upper endoscopy (EGD) procedure completed with biopsies of the  esophagus, stomach and small intestine. The biopsies of the esophagus, show signs of inflammation as seen during your EGD procedure. In addition, there are non-specific changes in the lining of your stomach, but there is NO signs of an infection. Please call the office if you have any questions or concerns about these results.      Sincerely,    Malaika Arauz MD  [facility]  [phone]

## 2022-06-01 NOTE — PROGRESS NOTES
Pt is alert and oriented and has no complaints of pain. Pt up to bathroom with walker. Pt has oxygen sat 97% 2 liters nasal cannula.

## 2022-06-01 NOTE — PLAN OF CARE
Patient c/o shortness of breath with exertion, lungs sound diminished, O2 sat 93-97% on room air @ rest, 81% on room air with ambulation. Patient with +2 edema on lower extremities, Lasix given. Patient afebrile. No signs of sapiration noted. O2 sat monitored.

## 2022-06-01 NOTE — CM/SW NOTE
Plan is for patient to [facility] with son and [facility]  P:[phone]  C:[phone] with DME being provided by [facility]. If the patient requires 02 upon dc, family is requesting Vi. Shaniqua Dexter LCSW    ADDENDUM:  MSW spoke with Dr. Josue Bonds who stated patient will need home 02. MSW requested home 02 walk and will initiate referral to [facility] by sending basic clinical information. Will need order, 02 walk, and F2F added to referral once available.     Shaniqua Dexter LCSW

## 2022-06-01 NOTE — PROGRESS NOTES
Notified by Dr. Patience Thomas- patient has a 2 week window to assess her stent graft- will review with Dr. Gerardine Cowden- possible type 3 leak- but appears more likely type2. Will do angiogram evaluation - but probably next week.

## 2022-06-02 LAB
ALK-PHOSPHATASE BONE CALC: 80 U/L
ALK-PHOSPHATASE LIVER CALC: 93 U/L
ALK-PHOSPHATASE OTHER CALC: 0 U/L
ALKALINE PHOSPHATASE: 173 U/L
ANION GAP SERPL CALC-SCNC: 2 MMOL/L (ref 0–18)
BASOPHILS # BLD: 0 X10(3) UL (ref 0–0.2)
BASOPHILS NFR BLD: 0 %
BUN BLD-MCNC: 11 MG/DL (ref 7–18)
CALCIUM BLD-MCNC: 8.1 MG/DL (ref 8.5–10.1)
CHLORIDE SERPL-SCNC: 107 MMOL/L (ref 98–112)
CO2 SERPL-SCNC: 34 MMOL/L (ref 21–32)
CREAT BLD-MCNC: 0.55 MG/DL
EOSINOPHIL # BLD: 0.14 X10(3) UL (ref 0–0.7)
EOSINOPHIL NFR BLD: 1 %
ERYTHROCYTE [DISTWIDTH] IN BLOOD BY AUTOMATED COUNT: 20.7 %
GLUCOSE BLD-MCNC: 117 MG/DL (ref 70–99)
GLUCOSE BLD-MCNC: 141 MG/DL (ref 70–99)
GLUCOSE BLD-MCNC: 198 MG/DL (ref 70–99)
GLUCOSE BLD-MCNC: 219 MG/DL (ref 70–99)
GLUCOSE BLD-MCNC: 234 MG/DL (ref 70–99)
HCT VFR BLD AUTO: 27.8 %
HGB BLD-MCNC: 8.5 G/DL
HHV6 DNA, QUANT INTERP: NOT DETECTED
HHV6 QUANT (COPY/ML): <1000 CPY/ML
HHV6 QUANT (LOG COPY/ML): <3 LOG
LYMPHOCYTES NFR BLD: 0.57 X10(3) UL (ref 1–4)
LYMPHOCYTES NFR BLD: 4 %
MCH RBC QN AUTO: 26.6 PG (ref 26–34)
MCHC RBC AUTO-ENTMCNC: 30.6 G/DL (ref 31–37)
MCV RBC AUTO: 86.9 FL
MONOCYTES # BLD: 0 X10(3) UL (ref 0.1–1)
MONOCYTES NFR BLD: 0 %
NEUTROPHILS # BLD AUTO: 11.58 X10 (3) UL (ref 1.5–7.7)
NEUTROPHILS NFR BLD: 90 %
NEUTS BAND NFR BLD: 5 %
NEUTS HYPERSEG # BLD: 13.49 X10(3) UL (ref 1.5–7.7)
OSMOLALITY SERPL CALC.SUM OF ELEC: 298 MOSM/KG (ref 275–295)
PLATELET # BLD AUTO: 166 10(3)UL (ref 150–450)
PLATELET MORPHOLOGY: NORMAL
POTASSIUM SERPL-SCNC: 3.8 MMOL/L (ref 3.5–5.1)
POTASSIUM SERPL-SCNC: 3.8 MMOL/L (ref 3.5–5.1)
RBC # BLD AUTO: 3.2 X10(6)UL
SODIUM SERPL-SCNC: 143 MMOL/L (ref 136–145)
TOTAL CELLS COUNTED BLD: 100
WBC # BLD AUTO: 14.2 X10(3) UL (ref 4–11)

## 2022-06-02 PROCEDURE — 99232 SBSQ HOSP IP/OBS MODERATE 35: CPT | Performed by: INTERNAL MEDICINE

## 2022-06-02 RX ORDER — INSULIN DETEMIR 100 [IU]/ML
10 INJECTION, SOLUTION SUBCUTANEOUS DAILY
Qty: 5 EACH | Refills: 0 | Status: SHIPPED | COMMUNITY
Start: 2022-06-02

## 2022-06-02 RX ORDER — FUROSEMIDE 10 MG/ML
40 INJECTION INTRAMUSCULAR; INTRAVENOUS
Status: DISCONTINUED | OUTPATIENT
Start: 2022-06-02 | End: 2022-06-03

## 2022-06-02 NOTE — CM/SW NOTE
Home o2 order received. Sent to [facility] via aidin, await approval. Per DIL Marvetta Essex, to call her to arrange delivery. E to deliver bed, wheelchair and overbed table this afternoon. Will add order for o2 tank person, page to Dr Katey Carcamo. Will send to E when cosigned. 1147: home o2 approved, call to RT to deliver portable tank to room, [facility] to call DIL to arrange home delivery.     Frankey Buggy, RN,   [id number]

## 2022-06-02 NOTE — PROGRESS NOTES
Patient had slight hoarseness yesterday- afebrile.  But notes read from Dr. Jang- will not be able to do angiogram/ relining of graft this week- but do to possible developing pneumonia- would still have to wait- CPM

## 2022-06-02 NOTE — PLAN OF CARE
Patient is alert and oriented x4. Patient denies pain. Medications given per MAR. Patient receiving lasix. Patient went on O2 walk. Patient needed 4L while walking.  Patient sating well at rest.       Problem: PAIN - ADULT  Goal: Verbalizes/displays adequate comfort level or patient's stated pain goal  Description: INTERVENTIONS:  - Encourage pt to monitor pain and request assistance  - Assess pain using appropriate pain scale  - Administer analgesics based on type and severity of pain and evaluate response  - Implement non-pharmacological measures as appropriate and evaluate response  - Consider cultural and social influences on pain and pain management  - Manage/alleviate anxiety  - Utilize distraction and/or relaxation techniques  - Monitor for opioid side effects  - Notify MD/LIP if interventions unsuccessful or patient reports new pain  - Anticipate increased pain with activity and pre-medicate as appropriate  Outcome: Progressing     Problem: RISK FOR INFECTION - ADULT  Goal: Absence of fever/infection during anticipated neutropenic period  Description: INTERVENTIONS  - Monitor WBC  - Administer growth factors as ordered  - Implement neutropenic guidelines  Outcome: Progressing     Problem: Impaired Swallowing  Goal: Minimize aspiration risk  Description: Interventions:  - Patient should be alert and upright for all feedings (90 degrees preferred)  - Offer food and liquids at a slow rate  - No straws  - Encourage small bites of food and small sips of liquid  - Offer pills one at a time, crush or deliver with applesauce as needed  - Discontinue feeding and notify MD (or speech pathologist) if coughing or persistent throat clearing or wet/gurgly vocal quality is noted  Outcome: Progressing

## 2022-06-02 NOTE — PROGRESS NOTES
06/02/22 1045   Mobility   O2 walk?  Yes   SPO2% on Room Air at Rest 98   SPO2% on Oxygen at Rest 98   SPO2% Ambulation on Room Air 84   SPO2% Ambulation on Oxygen 92   Ambulation oxygen flow (liters per minute) 4

## 2022-06-02 NOTE — PROGRESS NOTES
Pt is alert and oriented and has heplock. Pt has oxygen 2 liters at night. Pt has diminished lungs and bowel sounds. Pt has no complaints of pain.

## 2022-06-02 NOTE — PHYSICAL THERAPY NOTE
IP PT attempted x 2, pt occupied on phone with a family member, requesting PT to return later. PM : returned to room, pt just t/f to bed c PCT to take a nap. Will re-attempt as schedule permits.

## 2022-06-03 ENCOUNTER — TELEPHONE (OUTPATIENT)
Dept: INTERNAL MEDICINE CLINIC | Facility: CLINIC | Age: 82
End: 2022-06-03

## 2022-06-03 DIAGNOSIS — K22.4 CORKSCREW ESOPHAGUS: ICD-10-CM

## 2022-06-03 DIAGNOSIS — J44.9 CHRONIC OBSTRUCTIVE PULMONARY DISEASE, UNSPECIFIED COPD TYPE (HCC): Primary | ICD-10-CM

## 2022-06-03 DIAGNOSIS — R13.10 DYSPHAGIA, UNSPECIFIED TYPE: ICD-10-CM

## 2022-06-03 LAB
ANA SER QL: NEGATIVE
ANION GAP SERPL CALC-SCNC: 4 MMOL/L (ref 0–18)
BUN BLD-MCNC: 9 MG/DL (ref 7–18)
CALCIUM BLD-MCNC: 7.9 MG/DL (ref 8.5–10.1)
CHLORIDE SERPL-SCNC: 101 MMOL/L (ref 98–112)
CO2 SERPL-SCNC: 37 MMOL/L (ref 21–32)
CREAT BLD-MCNC: 0.67 MG/DL
GLUCOSE BLD-MCNC: 147 MG/DL (ref 70–99)
GLUCOSE BLD-MCNC: 152 MG/DL (ref 70–99)
GLUCOSE BLD-MCNC: 169 MG/DL (ref 70–99)
GLUCOSE BLD-MCNC: 214 MG/DL (ref 70–99)
GLUCOSE BLD-MCNC: 228 MG/DL (ref 70–99)
OSMOLALITY SERPL CALC.SUM OF ELEC: 296 MOSM/KG (ref 275–295)
POTASSIUM SERPL-SCNC: 3.5 MMOL/L (ref 3.5–5.1)
SODIUM SERPL-SCNC: 142 MMOL/L (ref 136–145)

## 2022-06-03 PROCEDURE — 99231 SBSQ HOSP IP/OBS SF/LOW 25: CPT | Performed by: NURSE PRACTITIONER

## 2022-06-03 PROCEDURE — 99232 SBSQ HOSP IP/OBS MODERATE 35: CPT | Performed by: INTERNAL MEDICINE

## 2022-06-03 RX ORDER — PANTOPRAZOLE SODIUM 40 MG/1
40 TABLET, DELAYED RELEASE ORAL
Status: DISCONTINUED | OUTPATIENT
Start: 2022-06-03 | End: 2022-06-04

## 2022-06-03 RX ORDER — FUROSEMIDE 10 MG/ML
40 INJECTION INTRAMUSCULAR; INTRAVENOUS 3 TIMES DAILY
Status: DISCONTINUED | OUTPATIENT
Start: 2022-06-03 | End: 2022-06-04

## 2022-06-03 NOTE — PLAN OF CARE
Patient alert and oriented x4. Patient denies pain. Patients O2 drops with exertion. No issues with O2 sats at rest. Medications given per [facility]. Patient still receiving IV lasix to pull off fluid. PT worked with the patient. Plans for discharge tomorrow.       Problem: PAIN - ADULT  Goal: Verbalizes/displays adequate comfort level or patient's stated pain goal  Description: INTERVENTIONS:  - Encourage pt to monitor pain and request assistance  - Assess pain using appropriate pain scale  - Administer analgesics based on type and severity of pain and evaluate response  - Implement non-pharmacological measures as appropriate and evaluate response  - Consider cultural and social influences on pain and pain management  - Manage/alleviate anxiety  - Utilize distraction and/or relaxation techniques  - Monitor for opioid side effects  - Notify MD/LIP if interventions unsuccessful or patient reports new pain  - Anticipate increased pain with activity and pre-medicate as appropriate  Outcome: Progressing     Problem: RISK FOR INFECTION - ADULT  Goal: Absence of fever/infection during anticipated neutropenic period  Description: INTERVENTIONS  - Monitor WBC  - Administer growth factors as ordered  - Implement neutropenic guidelines  Outcome: Progressing     Problem: Impaired Swallowing  Goal: Minimize aspiration risk  Description: Interventions:  - Patient should be alert and upright for all feedings (90 degrees preferred)  - Offer food and liquids at a slow rate  - No straws  - Encourage small bites of food and small sips of liquid  - Offer pills one at a time, crush or deliver with applesauce as needed  - Discontinue feeding and notify MD (or speech pathologist) if coughing or persistent throat clearing or wet/gurgly vocal quality is noted  Outcome: Progressing

## 2022-06-03 NOTE — TELEPHONE ENCOUNTER
Fax received from [address] [place] [address] requesting DME order for:    [address] [place] [address] 1 X's 12   - Portable   - Portable Oxygen Concentrator - 1 X's 12    Referral pended and routed to Dr. Rosa Lombardo for approval.

## 2022-06-03 NOTE — PROGRESS NOTES
[facility] Pharmacy Note: Route Optimization for Pantoprazole (PROTONIX)    Patient is currently on Pantoprazole (PROTONIX) 40 mg IV every 12 hours. The patient meets the criteria to convert to the oral equivalent as established by the IV to Oral conversion protocol approved by the P&T committee. Medication was changed from IV formulation to Pantoprazole (PROTONIX) 40 mg PO every 12 hours per protocol.       Gladys Hanson [facility]  6/3/2022,  11:10 AM

## 2022-06-03 NOTE — PHYSICAL THERAPY NOTE
PHYSICAL THERAPY TREATMENT NOTE - INPATIENT    Room Number: 407/407-A     Session: 1     Number of Visits to Meet Established Goals: 3    History related to current admission: Patient is a 80year old female admitted on 5/25/2022 from home for decreased PO intake and dysphagia. Pt diagnosed with possible LLL pneumonia. Pt s/p EGD with biopsy 5/28/22 revealing for esophagitis, gastritis, and possible gastroparesis. Possible repeat EGD with PEG/J placement pending biopsy results. Recent admissions:  5/11-5/20/22: B LE ischemia  4/19-4/20/22: UTI        Presenting Problem: pneumonia, esophagitis   Co-Morbidities : lymphoma, achalasia, HTN, Afib, AAA s/p endograft  ASSESSMENT     Pt able to progress mobility this session , however , limited by L hip pain from history of fracture. Pt is motivated to participate in skilled therapy and presents with decreased endurance below PLOF and will continue to benefit from ongoing IP PT to maximize functional independence. The AM-PAC '6-Clicks' Inpatient Basic Mobility Short Form was completed and this patient is demonstrating a 35.83% degree of impairment in mobility. Research supports that patients with this level of impairment may benefit from home c HHPT. Pt reports she will have adequate family support upon d/c.       DISCHARGE RECOMMENDATIONS  PT Discharge Recommendations: Home with home health PT     PLAN  PT Treatment Plan: Bed mobility; Endurance; Energy conservation;Patient education;Gait training;Strengthening;Transfer training;Balance training  Rehab Potential : Good  Frequency (Obs): 3-5x/week    CURRENT GOALS        Goal #1 Patient is able to demonstrate supine - sit EOB @ level: supervision- MET      Goal #2 Patient is able to demonstrate transfers EOB to/from [facility] at assistance level: supervision  MET   Goal #3 Patient is able to ambulate 150 feet with assist device: walker - rolling at assistance level: supervision      Goal #4     Goal #5     Goal #6 Goal Comments: Goals established on 5/31/2022         6/3/2022 all goals ongoing      SUBJECTIVE  \"This box and this belt are heavy\"     OBJECTIVE  Precautions: None    WEIGHT BEARING RESTRICTION  Weight Bearing Restriction: None                PAIN ASSESSMENT   Rating: Unable to rate  Location: L hip c ambulation  Management Techniques: Body mechanics;Breathing techniques;Repositioning    BALANCE                                                                                                                       Static Sitting: Fair +  Dynamic Sitting: Fair +           Static Standing: Fair -  Dynamic Standing: Fair -    ACTIVITY TOLERANCE                         O2 WALK         AM-PAC '6-Clicks' INPATIENT SHORT FORM - BASIC MOBILITY  How much difficulty does the patient currently have. .. Patient Difficulty: Turning over in bed (including adjusting bedclothes, sheets and blankets)?: None   Patient Difficulty: Sitting down on and standing up from a chair with arms (e.g., wheelchair, bedside commode, etc.): None   Patient Difficulty: Moving from lying on back to sitting on the side of the bed?: A Little   How much help from another person does the patient currently need. .. Help from Another: Moving to and from a bed to a chair (including a wheelchair)?: A Little   Help from Another: Need to walk in hospital room?: A Little   Help from Another: Climbing 3-5 steps with a railing?: A Little       AM-PAC Score:  Raw Score: 20   Approx Degree of Impairment: 35.83%   Standardized Score (AM-PAC Scale): 47.67   CMS Modifier (G-Code): CJ    FUNCTIONAL ABILITY STATUS  Gait Assessment   Functional Mobility/Gait Assessment  Gait Assistance: Supervision  Distance (ft): 80  Assistive Device: Rolling walker  Pattern: Shuffle;L Decreased stance time    Skilled Therapy Provided  Pt presents seated in BS chair on RA with sats in mid to high 90's. Pt gait trained c RW CGA progressing to supervision.  Pt able to step up on scale c supervision in [place] per PCT request.  Pt reports some SOB and self limits distance. One standing rest break. Pt on RA with sats in 90's, unable to obtain accurate reading when pt reports SOB, provided pt with 2l O2 nc for comfort c cues for PLB. Upon return to room, obtained reading of % while seated. Pt placed back on RA and performed seated therex for BLE with sats in high 90's . Pt left in chair, needs met, BLE elevated 2/2 edema. Pt educated on AP. Bed Mobility:  Rolling right: nt   Rolling left: nt    Supine<>Sit: nt   Sit<>Supine: nt     Transfer Mobility:  Sit<>Stand: mod I   Stand<>Sit: supervision    Gait: CGA  To supervision     Therapist's Comments: pt requesting therapist to write her weight on white board 191.5 lb, RN made aware. THERAPEUTIC EXERCISES  Lower Extremity Alternating marching  Ankle pumps  Hip adduction squeezes  Knee extension  LAQ     Upper Extremity      Position Sitting     Repetitions   10-20   Sets   1     Patient End of Session: Up in chair;Needs met;Call light within reach;RN aware of session/findings; All patient questions and concerns addressed

## 2022-06-03 NOTE — PROGRESS NOTES
Pt is alert and oriented and has no complaints of pain or no sob noted. Pt on room air when no movement.

## 2022-06-04 VITALS
SYSTOLIC BLOOD PRESSURE: 112 MMHG | TEMPERATURE: 98 F | HEART RATE: 84 BPM | BODY MASS INDEX: 27.41 KG/M2 | DIASTOLIC BLOOD PRESSURE: 62 MMHG | RESPIRATION RATE: 18 BRPM | OXYGEN SATURATION: 93 % | HEIGHT: 70 IN | WEIGHT: 191.5 LBS

## 2022-06-04 LAB
ANION GAP SERPL CALC-SCNC: 5 MMOL/L (ref 0–18)
BASOPHILS # BLD AUTO: 0.07 X10(3) UL (ref 0–0.2)
BASOPHILS NFR BLD AUTO: 0.6 %
BUN BLD-MCNC: 10 MG/DL (ref 7–18)
CALCIUM BLD-MCNC: 7.7 MG/DL (ref 8.5–10.1)
CHLORIDE SERPL-SCNC: 97 MMOL/L (ref 98–112)
CO2 SERPL-SCNC: 40 MMOL/L (ref 21–32)
CREAT BLD-MCNC: 0.62 MG/DL
EOSINOPHIL # BLD AUTO: 0.07 X10(3) UL (ref 0–0.7)
EOSINOPHIL NFR BLD AUTO: 0.6 %
ERYTHROCYTE [DISTWIDTH] IN BLOOD BY AUTOMATED COUNT: 21.6 %
GLUCOSE BLD-MCNC: 145 MG/DL (ref 70–99)
GLUCOSE BLD-MCNC: 156 MG/DL (ref 70–99)
GLUCOSE BLD-MCNC: 238 MG/DL (ref 70–99)
HCT VFR BLD AUTO: 28.6 %
HGB BLD-MCNC: 8.7 G/DL
IMM GRANULOCYTES # BLD AUTO: 0.44 X10(3) UL (ref 0–1)
IMM GRANULOCYTES NFR BLD: 4 %
LYMPHOCYTES # BLD AUTO: 0.44 X10(3) UL (ref 1–4)
LYMPHOCYTES NFR BLD AUTO: 4 %
MCH RBC QN AUTO: 26.8 PG (ref 26–34)
MCHC RBC AUTO-ENTMCNC: 30.4 G/DL (ref 31–37)
MCV RBC AUTO: 88 FL
MONOCYTES # BLD AUTO: 1.09 X10(3) UL (ref 0.1–1)
MONOCYTES NFR BLD AUTO: 9.8 %
NEUTROPHILS # BLD AUTO: 9.02 X10 (3) UL (ref 1.5–7.7)
NEUTROPHILS # BLD AUTO: 9.02 X10(3) UL (ref 1.5–7.7)
NEUTROPHILS NFR BLD AUTO: 81 %
OSMOLALITY SERPL CALC.SUM OF ELEC: 296 MOSM/KG (ref 275–295)
PLATELET # BLD AUTO: 221 10(3)UL (ref 150–450)
POTASSIUM SERPL-SCNC: 3 MMOL/L (ref 3.5–5.1)
RBC # BLD AUTO: 3.25 X10(6)UL
SODIUM SERPL-SCNC: 142 MMOL/L (ref 136–145)
WBC # BLD AUTO: 11.1 X10(3) UL (ref 4–11)

## 2022-06-04 PROCEDURE — 99238 HOSP IP/OBS DSCHRG MGMT 30/<: CPT | Performed by: HOSPITALIST

## 2022-06-04 RX ORDER — POTASSIUM CHLORIDE 1.5 G/1.77G
40 POWDER, FOR SOLUTION ORAL EVERY 4 HOURS
Status: COMPLETED | OUTPATIENT
Start: 2022-06-04 | End: 2022-06-04

## 2022-06-04 RX ORDER — PANTOPRAZOLE SODIUM 40 MG/1
40 TABLET, DELAYED RELEASE ORAL
Qty: 60 TABLET | Refills: 2 | Status: SHIPPED | OUTPATIENT
Start: 2022-06-04

## 2022-06-04 NOTE — PROGRESS NOTES
Patient seen and examined. Discharge if ok with consultants. Hospitalist portion of med rec completed.     Eunice Terrazas MD  [facility]  Internal Medicine Hospitalist  Cell [phone]

## 2022-06-04 NOTE — PLAN OF CARE
Pt a/o x4. VSS on RA. BP on low side. Received pt on RA. While sleeping desats to 80s- O2 2L NC. No c/o pain. Up to bathroom w/ walker and 1x assist. Meds given per [facility] and tolerated with sips of water. Fall risk protocol followed, call light within reach.      Problem: PAIN - ADULT  Goal: Verbalizes/displays adequate comfort level or patient's stated pain goal  Description: INTERVENTIONS:  - Encourage pt to monitor pain and request assistance  - Assess pain using appropriate pain scale  - Administer analgesics based on type and severity of pain and evaluate response  - Implement non-pharmacological measures as appropriate and evaluate response  - Consider cultural and social influences on pain and pain management  - Manage/alleviate anxiety  - Utilize distraction and/or relaxation techniques  - Monitor for opioid side effects  - Notify MD/LIP if interventions unsuccessful or patient reports new pain  - Anticipate increased pain with activity and pre-medicate as appropriate  Outcome: Progressing     Problem: Impaired Swallowing  Goal: Minimize aspiration risk  Description: Interventions:  - Patient should be alert and upright for all feedings (90 degrees preferred)  - Offer food and liquids at a slow rate  - No straws  - Encourage small bites of food and small sips of liquid  - Offer pills one at a time, crush or deliver with applesauce as needed  - Discontinue feeding and notify MD (or speech pathologist) if coughing or persistent throat clearing or wet/gurgly vocal quality is noted  Outcome: Progressing     Problem: RISK FOR INFECTION - ADULT  Goal: Absence of fever/infection during anticipated neutropenic period  Description: INTERVENTIONS  - Monitor WBC  - Administer growth factors as ordered  - Implement neutropenic guidelines  Outcome: Progressing

## 2022-06-06 ENCOUNTER — PATIENT OUTREACH (OUTPATIENT)
Dept: CASE MANAGEMENT | Age: 82
End: 2022-06-06

## 2022-06-06 NOTE — PROGRESS NOTES
LM for pt to call [facility] for TCM since discharge. NCM phone number was provided for pt to call back. [facility] contact information was provided for pt to call back as well.

## 2022-06-06 NOTE — PAYOR COMM NOTE
Discharge Notification    Patient Name: Kalen Edouard  Fina Baltazar MA [facility]  Subscriber #: [id number]  Authorization Number: [id number]  Admit Date/Time: 5/25/2022 11:25 AM  Discharge Date/Time: 6/4/2022 2:23 PM

## 2022-06-07 ENCOUNTER — TELEPHONE (OUTPATIENT)
Dept: INTERNAL MEDICINE CLINIC | Facility: CLINIC | Age: 82
End: 2022-06-07

## 2022-06-07 NOTE — TELEPHONE ENCOUNTER
FYI     Pts family states that they do not feel that pt needs HH services, Pts son is an ER doctor and his wife is a nurse and they are staying with her. No call back needed unless AS has any questions or concerns.

## 2022-06-07 NOTE — PROGRESS NOTES
VAIBHAVMEG for post hospital follow up. [facility] contact information provided as well as [facility] office number, [phone].

## 2022-06-08 NOTE — TELEPHONE ENCOUNTER
VAIBHAV Cronin RN [facility] at [phone]  AS received the message and indicates to please let the family know if they need anything to let us know.

## 2022-06-13 ENCOUNTER — APPOINTMENT (OUTPATIENT)
Dept: HEMATOLOGY/ONCOLOGY | Age: 82
End: 2022-06-13
Attending: INTERNAL MEDICINE
Payer: MEDICARE

## 2022-06-13 ENCOUNTER — TELEPHONE (OUTPATIENT)
Dept: INTERNAL MEDICINE CLINIC | Facility: CLINIC | Age: 82
End: 2022-06-13

## 2022-06-14 ENCOUNTER — OFFICE VISIT (OUTPATIENT)
Dept: HEMATOLOGY/ONCOLOGY | Facility: HOSPITAL | Age: 82
End: 2022-06-14
Attending: INTERNAL MEDICINE
Payer: MEDICARE

## 2022-06-14 VITALS
TEMPERATURE: 97 F | HEART RATE: 80 BPM | DIASTOLIC BLOOD PRESSURE: 50 MMHG | SYSTOLIC BLOOD PRESSURE: 82 MMHG | HEIGHT: 70 IN | RESPIRATION RATE: 18 BRPM | OXYGEN SATURATION: 96 % | WEIGHT: 178 LBS | BODY MASS INDEX: 25.48 KG/M2

## 2022-06-14 DIAGNOSIS — C83.38 DIFFUSE LARGE B-CELL LYMPHOMA OF LYMPH NODES OF MULTIPLE REGIONS (HCC): Primary | ICD-10-CM

## 2022-06-14 DIAGNOSIS — E86.1 HYPOTENSION DUE TO HYPOVOLEMIA: ICD-10-CM

## 2022-06-14 DIAGNOSIS — E11.9 TYPE 2 DIABETES MELLITUS WITHOUT COMPLICATION, WITH LONG-TERM CURRENT USE OF INSULIN (HCC): ICD-10-CM

## 2022-06-14 DIAGNOSIS — S90.812A ABRASION OF LEFT FOOT, INITIAL ENCOUNTER: ICD-10-CM

## 2022-06-14 DIAGNOSIS — Z51.11 ENCOUNTER FOR CHEMOTHERAPY MANAGEMENT: ICD-10-CM

## 2022-06-14 DIAGNOSIS — I87.8 POOR VENOUS ACCESS: ICD-10-CM

## 2022-06-14 DIAGNOSIS — I95.89 HYPOTENSION DUE TO HYPOVOLEMIA: ICD-10-CM

## 2022-06-14 DIAGNOSIS — Z79.4 TYPE 2 DIABETES MELLITUS WITHOUT COMPLICATION, WITH LONG-TERM CURRENT USE OF INSULIN (HCC): ICD-10-CM

## 2022-06-14 LAB
ALBUMIN SERPL-MCNC: 2.5 G/DL (ref 3.4–5)
ALBUMIN/GLOB SERPL: 0.9 {RATIO} (ref 1–2)
ALP LIVER SERPL-CCNC: 96 U/L
ALT SERPL-CCNC: 13 U/L
ANION GAP SERPL CALC-SCNC: 4 MMOL/L (ref 0–18)
AST SERPL-CCNC: 8 U/L (ref 15–37)
BASOPHILS # BLD AUTO: 0.13 X10(3) UL (ref 0–0.2)
BASOPHILS NFR BLD AUTO: 1.2 %
BILIRUB SERPL-MCNC: 0.4 MG/DL (ref 0.1–2)
BUN BLD-MCNC: 9 MG/DL (ref 7–18)
CALCIUM BLD-MCNC: 8.8 MG/DL (ref 8.5–10.1)
CHLORIDE SERPL-SCNC: 101 MMOL/L (ref 98–112)
CO2 SERPL-SCNC: 36 MMOL/L (ref 21–32)
CREAT BLD-MCNC: 0.83 MG/DL
EOSINOPHIL # BLD AUTO: 0.1 X10(3) UL (ref 0–0.7)
EOSINOPHIL NFR BLD AUTO: 0.9 %
ERYTHROCYTE [DISTWIDTH] IN BLOOD BY AUTOMATED COUNT: 21.9 %
FASTING STATUS PATIENT QL REPORTED: NO
GLOBULIN PLAS-MCNC: 2.9 G/DL (ref 2.8–4.4)
GLUCOSE BLD-MCNC: 204 MG/DL (ref 70–99)
HCT VFR BLD AUTO: 31.7 %
HGB BLD-MCNC: 9.4 G/DL
IMM GRANULOCYTES # BLD AUTO: 0.05 X10(3) UL (ref 0–1)
IMM GRANULOCYTES NFR BLD: 0.5 %
LDH SERPL L TO P-CCNC: 153 U/L
LYMPHOCYTES # BLD AUTO: 0.43 X10(3) UL (ref 1–4)
LYMPHOCYTES NFR BLD AUTO: 3.9 %
MCH RBC QN AUTO: 27.2 PG (ref 26–34)
MCHC RBC AUTO-ENTMCNC: 29.7 G/DL (ref 31–37)
MCV RBC AUTO: 91.9 FL
MONOCYTES # BLD AUTO: 0.76 X10(3) UL (ref 0.1–1)
MONOCYTES NFR BLD AUTO: 7 %
NEUTROPHILS # BLD AUTO: 9.42 X10 (3) UL (ref 1.5–7.7)
NEUTROPHILS # BLD AUTO: 9.42 X10(3) UL (ref 1.5–7.7)
NEUTROPHILS NFR BLD AUTO: 86.5 %
OSMOLALITY SERPL CALC.SUM OF ELEC: 297 MOSM/KG (ref 275–295)
PLATELET # BLD AUTO: 318 10(3)UL (ref 150–450)
POTASSIUM SERPL-SCNC: 4.6 MMOL/L (ref 3.5–5.1)
PROT SERPL-MCNC: 5.4 G/DL (ref 6.4–8.2)
RBC # BLD AUTO: 3.45 X10(6)UL
SODIUM SERPL-SCNC: 141 MMOL/L (ref 136–145)
WBC # BLD AUTO: 10.9 X10(3) UL (ref 4–11)

## 2022-06-14 PROCEDURE — 96367 TX/PROPH/DG ADDL SEQ IV INF: CPT

## 2022-06-14 PROCEDURE — 80053 COMPREHEN METABOLIC PANEL: CPT

## 2022-06-14 PROCEDURE — 96377 APPLICATON ON-BODY INJECTOR: CPT

## 2022-06-14 PROCEDURE — G2212 PROLONG OUTPT/OFFICE VIS: HCPCS | Performed by: NURSE PRACTITIONER

## 2022-06-14 PROCEDURE — 96417 CHEMO IV INFUS EACH ADDL SEQ: CPT

## 2022-06-14 PROCEDURE — 96413 CHEMO IV INFUSION 1 HR: CPT

## 2022-06-14 PROCEDURE — 83615 LACTATE (LD) (LDH) ENZYME: CPT

## 2022-06-14 PROCEDURE — 85025 COMPLETE CBC W/AUTO DIFF WBC: CPT

## 2022-06-14 PROCEDURE — 96376 TX/PRO/DX INJ SAME DRUG ADON: CPT

## 2022-06-14 PROCEDURE — 96375 TX/PRO/DX INJ NEW DRUG ADDON: CPT

## 2022-06-14 PROCEDURE — 96411 CHEMO IV PUSH ADDL DRUG: CPT

## 2022-06-14 PROCEDURE — 99215 OFFICE O/P EST HI 40 MIN: CPT | Performed by: NURSE PRACTITIONER

## 2022-06-14 PROCEDURE — 96415 CHEMO IV INFUSION ADDL HR: CPT

## 2022-06-14 RX ORDER — DIPHENHYDRAMINE HCL 25 MG
50 CAPSULE ORAL ONCE
Status: CANCELLED | OUTPATIENT
Start: 2022-06-14

## 2022-06-14 RX ORDER — ACETAMINOPHEN 325 MG/1
650 TABLET ORAL ONCE
Status: CANCELLED | OUTPATIENT
Start: 2022-06-14

## 2022-06-14 RX ORDER — DOXORUBICIN HYDROCHLORIDE 2 MG/ML
50 INJECTION, SOLUTION INTRAVENOUS ONCE
Status: CANCELLED | OUTPATIENT
Start: 2022-06-14

## 2022-06-14 RX ORDER — DOXORUBICIN HYDROCHLORIDE 2 MG/ML
50 INJECTION, SOLUTION INTRAVENOUS ONCE
Status: COMPLETED | OUTPATIENT
Start: 2022-06-14 | End: 2022-06-14

## 2022-06-14 RX ORDER — DIPHENHYDRAMINE HCL 25 MG
50 CAPSULE ORAL ONCE
Status: COMPLETED | OUTPATIENT
Start: 2022-06-14 | End: 2022-06-14

## 2022-06-14 RX ORDER — SULFAMETHOXAZOLE AND TRIMETHOPRIM 800; 160 MG/1; MG/1
1 TABLET ORAL
Qty: 12 TABLET | Refills: 3 | Status: SHIPPED | OUTPATIENT
Start: 2022-06-15

## 2022-06-14 RX ORDER — FLUCONAZOLE 200 MG/1
200 TABLET ORAL
Qty: 12 TABLET | Refills: 3 | Status: SHIPPED | OUTPATIENT
Start: 2022-06-14

## 2022-06-14 RX ORDER — ACYCLOVIR 400 MG/1
400 TABLET ORAL 2 TIMES DAILY
Qty: 60 TABLET | Refills: 3 | Status: SHIPPED | OUTPATIENT
Start: 2022-06-14

## 2022-06-14 RX ORDER — PREDNISONE 50 MG/1
TABLET ORAL
Qty: 10 TABLET | Refills: 3 | Status: SHIPPED | OUTPATIENT
Start: 2022-06-14

## 2022-06-14 RX ORDER — ACETAMINOPHEN 325 MG/1
650 TABLET ORAL ONCE
Status: COMPLETED | OUTPATIENT
Start: 2022-06-14 | End: 2022-06-14

## 2022-06-14 RX ORDER — TORSEMIDE 20 MG/1
20 TABLET ORAL DAILY
Refills: 0 | COMMUNITY
Start: 2022-06-14

## 2022-06-14 RX ADMIN — DIPHENHYDRAMINE HCL 50 MG: 25 MG CAPSULE ORAL at 11:17:00

## 2022-06-14 RX ADMIN — DOXORUBICIN HYDROCHLORIDE 100 MG: 2 INJECTION, SOLUTION INTRAVENOUS at 12:41:00

## 2022-06-14 RX ADMIN — ACETAMINOPHEN 650 MG: 325 TABLET ORAL at 11:18:00

## 2022-06-14 NOTE — PROGRESS NOTES
Pt here for C2D1 R-CHOP.   Arrives Via wheelchair, accompanied by Family member           Pregnancy screening: Not applicable    Modifications in dose or schedule: no    Drugs/infusions dual verified for appearance and physical integrity: yes     Frequency of blood return and site check throughout administration: Prior to administration, Every 2-3 ml IVP and At completion of therapy   Discharged to Home, Via wheelchair, accompanied by:Family member    Outpatient Oncology Care Plan  Problem list:  hair loss  neutropenia  fatigue  knowledge deficit  nausea and vomiting  Problems related to:  chemotherapy  disease/disease progression  side effect of treatment  Interventions:  nausea/vomiting teaching  chemotherapy teaching  encourage activity as tolerated  maintain skin integrity  monitor lab values  Expected outcomes:  adequate sleep/rest  optimal lab values  patient supported/coping well  safe in environment  symptoms relieved/minimized  understands plan of care  Progress towards outcome:  making progress    Education Record    Learner:  Patient and Family Member  Barriers / Limitations:  None  Method:  Brief focused, Discussion, Printed material and Reinforcement  Outcome:  Shows understanding  Comments:

## 2022-06-14 NOTE — PATIENT INSTRUCTIONS
Decrease torsemide (diuretic) to 20 mg daily due to low blood pressure. Start prednisone 100 mg (2 - 50 mg tabs) by mouth in the mornings for the next 5 days. This will cause your blood sugars to be elevated. Start the following medications to help prevent any infections while you're on chemotherapy:   ~ Acyclovir 1 tab twice a day    ~ Bactrim DS 1 tab every Monday, Wednesday, and Friday   ~ Fluconazole 1 tab every Monday, Wednesday, and Friday                                                          On-body Injector for Neulasta Patient Instructions       Your On-Body Injection device was applied on this day:  6/14 at this time 4pm.    Your dose of medication will start on this day: 6/15 at this time 7pm.    Safe time to remove this device will be on this day: 6/15 at this time 9pm.       Important information to remember about the Neulasta Injector:     1. The On-Body Neulasta Injector begins to deliver the dose of Neulasta 27 hours after it is activated at the [facility] center. Beeping at that time indicates  that the dose will be delivered in 2 minutes. It takes 45 minutes for the dose to  be completely delivered. DO NOT REMOVE during this time. 2. Check the light periodically for a slow flashing GREEN light, this is normal.     3. If the light is flashing RED or comes off prior to the delivery time, during regular business hours 8-4:30, please call [phone] and ask for the charge nurse. If this is  after hours or a holiday, please contact the [email or web address] @ [phone], a support person is available 24/7.      4. Please keep the device at least 4 inches away from electrical equipment, such as CELL PHONES, microwaves, cordless phones. Do NOT have Xrays, MRI,  CT without informing the technician. 5. If you are traveling, needing to go through airport security, please notify the TSA. You may still travel, just avoid the xray security.      6. Avoid bumping or sleeping directly on the injector. 7. Avoid hot tubs, saunas and direct sunlight. Keep the injector dry 3 hours prior to the dose delivery time. 8. Remove the injector device at the directed time above and place in a hard container for disposal and place in trash. ANTI-NAUSEA MEDS:    Zofran (Ondansetron) every 8 hours as needed. Compazine (Prochlorperazine) every 6 hours as needed. Alternate between Zofran and Compazine every 4 hours for the first 48-72 hours. Wean off slowly, and continue taking as needed. Take compazine if needed when you arrive home.  Then as follows:    9/10pm - zofran  (Compazine during the night if needed)  6am - zofran  10am - compazine  2pm - zofran  6pm - compazine  10pm - zofran

## 2022-06-14 NOTE — PROGRESS NOTES
Patient presents with: Follow - Up: APN assessment prior to possible treatment  Hospital F/U  Chemotherapy    Pt is here for post hospital follow up and possible treatment. She was admitted at [facility] from 05/25-06/04 for hypoxic respiratory failure/pneumonia/gastroparesis. States that she feels better; eating well at home; still some difficulty with drinking at times. Denies N,V,D,C. Breathing difficulty with minimal exertion; wears O2 at night via NC. Denies pain.     Education Record    Learner:  Patient and Family Member    Disease / Diagnosis: DLBCL    Barriers / Limitations:  None   Comments:    Method:  Brief focused   Comments:    General Topics:  Diet, Medication, Pain, Side effects and symptom management and Plan of care reviewed   Comments:    Outcome:  Shows understanding   Comments:

## 2022-06-15 ENCOUNTER — TELEPHONE (OUTPATIENT)
Dept: INTERNAL MEDICINE CLINIC | Facility: CLINIC | Age: 82
End: 2022-06-15

## 2022-06-15 NOTE — TELEPHONE ENCOUNTER
Patient called and said she would like to cancel appointment with Dr Lyudmila Ramsey because she is seeing so many doctors and going through chemo.   I did not cancel yet wanted nurse to see if the paperwork that patient needs would need an appointment  Please let me know to  call patient to cancel if that is ok

## 2022-06-16 NOTE — TELEPHONE ENCOUNTER
Paperwork has been placed in Dr. Jae sloan for completion ; will await to see if OV required for completion.

## 2022-06-20 ENCOUNTER — OFFICE VISIT (OUTPATIENT)
Dept: HEMATOLOGY/ONCOLOGY | Facility: HOSPITAL | Age: 82
End: 2022-06-20
Attending: INTERNAL MEDICINE
Payer: MEDICARE

## 2022-06-20 VITALS
DIASTOLIC BLOOD PRESSURE: 63 MMHG | OXYGEN SATURATION: 95 % | BODY MASS INDEX: 25.2 KG/M2 | HEIGHT: 70 IN | HEART RATE: 84 BPM | WEIGHT: 176 LBS | TEMPERATURE: 98 F | SYSTOLIC BLOOD PRESSURE: 98 MMHG | RESPIRATION RATE: 18 BRPM

## 2022-06-20 DIAGNOSIS — T45.1X5A AGRANULOCYTOSIS SECONDARY TO CANCER CHEMOTHERAPY (HCC): ICD-10-CM

## 2022-06-20 DIAGNOSIS — E86.1 HYPOTENSION DUE TO HYPOVOLEMIA: ICD-10-CM

## 2022-06-20 DIAGNOSIS — T45.1X5A CHEMOTHERAPY INDUCED NAUSEA AND VOMITING: ICD-10-CM

## 2022-06-20 DIAGNOSIS — R11.2 CHEMOTHERAPY INDUCED NAUSEA AND VOMITING: ICD-10-CM

## 2022-06-20 DIAGNOSIS — R11.2 CHEMOTHERAPY INDUCED NAUSEA AND VOMITING: Primary | ICD-10-CM

## 2022-06-20 DIAGNOSIS — D70.1 AGRANULOCYTOSIS SECONDARY TO CANCER CHEMOTHERAPY (HCC): ICD-10-CM

## 2022-06-20 DIAGNOSIS — C83.38 DIFFUSE LARGE B-CELL LYMPHOMA OF LYMPH NODES OF MULTIPLE REGIONS (HCC): Primary | ICD-10-CM

## 2022-06-20 DIAGNOSIS — I95.89 HYPOTENSION DUE TO HYPOVOLEMIA: ICD-10-CM

## 2022-06-20 DIAGNOSIS — T45.1X5A CHEMOTHERAPY INDUCED NAUSEA AND VOMITING: Primary | ICD-10-CM

## 2022-06-20 DIAGNOSIS — C83.38 DIFFUSE LARGE B-CELL LYMPHOMA OF LYMPH NODES OF MULTIPLE REGIONS (HCC): ICD-10-CM

## 2022-06-20 LAB
ANION GAP SERPL CALC-SCNC: 4 MMOL/L (ref 0–18)
BASOPHILS # BLD: 0 X10(3) UL (ref 0–0.2)
BASOPHILS NFR BLD: 0 %
BUN BLD-MCNC: 30 MG/DL (ref 7–18)
CALCIUM BLD-MCNC: 8.9 MG/DL (ref 8.5–10.1)
CHLORIDE SERPL-SCNC: 101 MMOL/L (ref 98–112)
CO2 SERPL-SCNC: 35 MMOL/L (ref 21–32)
CREAT BLD-MCNC: 0.82 MG/DL
EOSINOPHIL # BLD: 0 X10(3) UL (ref 0–0.7)
EOSINOPHIL NFR BLD: 0 %
ERYTHROCYTE [DISTWIDTH] IN BLOOD BY AUTOMATED COUNT: 21 %
FASTING STATUS PATIENT QL REPORTED: NO
GLUCOSE BLD-MCNC: 234 MG/DL (ref 70–99)
HCT VFR BLD AUTO: 26.5 %
HGB BLD-MCNC: 8.2 G/DL
LYMPHOCYTES NFR BLD: 0.1 X10(3) UL (ref 1–4)
LYMPHOCYTES NFR BLD: 3 %
MCH RBC QN AUTO: 28.6 PG (ref 26–34)
MCHC RBC AUTO-ENTMCNC: 30.9 G/DL (ref 31–37)
MCV RBC AUTO: 92.3 FL
MONOCYTES # BLD: 0 X10(3) UL (ref 0.1–1)
MONOCYTES NFR BLD: 0 %
NEUTROPHILS # BLD AUTO: 2.76 X10 (3) UL (ref 1.5–7.7)
NEUTROPHILS NFR BLD: 96 %
NEUTS BAND NFR BLD: 1 %
NEUTS HYPERSEG # BLD: 3.2 X10(3) UL (ref 1.5–7.7)
OSMOLALITY SERPL CALC.SUM OF ELEC: 304 MOSM/KG (ref 275–295)
PLATELET # BLD AUTO: 34 10(3)UL (ref 150–450)
PLATELET MORPHOLOGY: NORMAL
POTASSIUM SERPL-SCNC: 4.1 MMOL/L (ref 3.5–5.1)
RBC # BLD AUTO: 2.87 X10(6)UL
SODIUM SERPL-SCNC: 140 MMOL/L (ref 136–145)
TOTAL CELLS COUNTED BLD: 100
WBC # BLD AUTO: 3.3 X10(3) UL (ref 4–11)

## 2022-06-20 PROCEDURE — 99215 OFFICE O/P EST HI 40 MIN: CPT | Performed by: NURSE PRACTITIONER

## 2022-06-20 PROCEDURE — 96361 HYDRATE IV INFUSION ADD-ON: CPT

## 2022-06-20 PROCEDURE — 80048 BASIC METABOLIC PNL TOTAL CA: CPT

## 2022-06-20 PROCEDURE — 96374 THER/PROPH/DIAG INJ IV PUSH: CPT

## 2022-06-20 RX ORDER — HYDROCODONE BITARTRATE AND ACETAMINOPHEN 5; 325 MG/1; MG/1
1-2 TABLET ORAL EVERY 4 HOURS PRN
Qty: 5 TABLET | Refills: 0 | Status: CANCELLED | OUTPATIENT
Start: 2022-06-20

## 2022-06-20 NOTE — PROGRESS NOTES
Education Record    Learner:  Patient and Family Member    Disease / Diagnosis: Lymphoma    Barriers / Limitations:  None   Comments:    Method:  Brief focused and Reinforcement   Comments:    General Topics:  Medication, Side effects and symptom management, Plan of care reviewed and Fall risk and prevention   Comments:    Outcome:  Shows understanding   Comments:

## 2022-06-21 ENCOUNTER — APPOINTMENT (OUTPATIENT)
Dept: HEMATOLOGY/ONCOLOGY | Facility: HOSPITAL | Age: 82
End: 2022-06-21
Attending: INTERNAL MEDICINE
Payer: MEDICARE

## 2022-06-26 DIAGNOSIS — C85.90 LYMPHOMA, UNSPECIFIED BODY REGION, UNSPECIFIED LYMPHOMA TYPE (HCC): ICD-10-CM

## 2022-06-27 DIAGNOSIS — C85.90 LYMPHOMA, UNSPECIFIED BODY REGION, UNSPECIFIED LYMPHOMA TYPE (HCC): ICD-10-CM

## 2022-06-27 RX ORDER — ALLOPURINOL 300 MG/1
TABLET ORAL
Qty: 30 TABLET | Refills: 0 | Status: SHIPPED | OUTPATIENT
Start: 2022-06-27 | End: 2022-07-01 | Stop reason: ALTCHOICE

## 2022-06-27 RX ORDER — ALLOPURINOL 300 MG/1
TABLET ORAL
Qty: 90 TABLET | Refills: 0 | Status: CANCELLED | OUTPATIENT
Start: 2022-06-27

## 2022-06-28 ENCOUNTER — OFFICE VISIT (OUTPATIENT)
Dept: HEMATOLOGY/ONCOLOGY | Facility: HOSPITAL | Age: 82
End: 2022-06-28
Attending: INTERNAL MEDICINE
Payer: MEDICARE

## 2022-06-28 ENCOUNTER — HOSPITAL ENCOUNTER (OUTPATIENT)
Dept: GENERAL RADIOLOGY | Facility: HOSPITAL | Age: 82
Discharge: HOME | End: 2022-06-28
Attending: INTERNAL MEDICINE
Payer: MEDICARE

## 2022-06-28 ENCOUNTER — HOSPITAL ENCOUNTER (OUTPATIENT)
Dept: PERIOP | Facility: HOSPITAL | Age: 82
Discharge: HOME | End: 2022-06-28
Attending: INTERNAL MEDICINE
Payer: MEDICARE

## 2022-06-28 ENCOUNTER — TELEPHONE (OUTPATIENT)
Dept: HEMATOLOGY/ONCOLOGY | Facility: HOSPITAL | Age: 82
End: 2022-06-28

## 2022-06-28 VITALS
HEIGHT: 70 IN | RESPIRATION RATE: 16 BRPM | WEIGHT: 173 LBS | OXYGEN SATURATION: 96 % | HEART RATE: 80 BPM | SYSTOLIC BLOOD PRESSURE: 91 MMHG | DIASTOLIC BLOOD PRESSURE: 55 MMHG | TEMPERATURE: 97 F | BODY MASS INDEX: 24.77 KG/M2

## 2022-06-28 DIAGNOSIS — E83.42 HYPOMAGNESEMIA: ICD-10-CM

## 2022-06-28 DIAGNOSIS — T45.1X5A CHEMOTHERAPY INDUCED NAUSEA AND VOMITING: Primary | ICD-10-CM

## 2022-06-28 DIAGNOSIS — C83.38 DIFFUSE LARGE B-CELL LYMPHOMA OF LYMPH NODES OF MULTIPLE REGIONS (HCC): ICD-10-CM

## 2022-06-28 DIAGNOSIS — D70.1 AGRANULOCYTOSIS SECONDARY TO CANCER CHEMOTHERAPY (HCC): ICD-10-CM

## 2022-06-28 DIAGNOSIS — C85.90 LYMPHOMA, UNSPECIFIED BODY REGION, UNSPECIFIED LYMPHOMA TYPE (HCC): Primary | ICD-10-CM

## 2022-06-28 DIAGNOSIS — R11.2 CHEMOTHERAPY INDUCED NAUSEA AND VOMITING: Primary | ICD-10-CM

## 2022-06-28 DIAGNOSIS — K59.1 FUNCTIONAL DIARRHEA: ICD-10-CM

## 2022-06-28 DIAGNOSIS — I87.8 POOR VENOUS ACCESS: ICD-10-CM

## 2022-06-28 DIAGNOSIS — T45.1X5A AGRANULOCYTOSIS SECONDARY TO CANCER CHEMOTHERAPY (HCC): ICD-10-CM

## 2022-06-28 DIAGNOSIS — R11.2 CHEMOTHERAPY INDUCED NAUSEA AND VOMITING: ICD-10-CM

## 2022-06-28 DIAGNOSIS — E86.0 DEHYDRATION: ICD-10-CM

## 2022-06-28 DIAGNOSIS — T45.1X5A CHEMOTHERAPY INDUCED NAUSEA AND VOMITING: ICD-10-CM

## 2022-06-28 LAB
ANION GAP SERPL CALC-SCNC: 3 MMOL/L (ref 0–18)
BASOPHILS # BLD AUTO: 0.04 X10(3) UL (ref 0–0.2)
BASOPHILS NFR BLD AUTO: 0.9 %
BUN BLD-MCNC: 10 MG/DL (ref 7–18)
CALCIUM BLD-MCNC: 8.2 MG/DL (ref 8.5–10.1)
CHLORIDE SERPL-SCNC: 106 MMOL/L (ref 98–112)
CO2 SERPL-SCNC: 31 MMOL/L (ref 21–32)
CREAT BLD-MCNC: 0.94 MG/DL
EOSINOPHIL # BLD AUTO: 0.03 X10(3) UL (ref 0–0.7)
EOSINOPHIL NFR BLD AUTO: 0.7 %
ERYTHROCYTE [DISTWIDTH] IN BLOOD BY AUTOMATED COUNT: 22.6 %
GLUCOSE BLD-MCNC: 172 MG/DL (ref 70–99)
HCT VFR BLD AUTO: 27.7 %
HGB BLD-MCNC: 8.3 G/DL
IMM GRANULOCYTES # BLD AUTO: 0.04 X10(3) UL (ref 0–1)
IMM GRANULOCYTES NFR BLD: 0.9 %
LYMPHOCYTES # BLD AUTO: 0.32 X10(3) UL (ref 1–4)
LYMPHOCYTES NFR BLD AUTO: 7 %
MAGNESIUM SERPL-MCNC: 1.3 MG/DL (ref 1.6–2.6)
MCH RBC QN AUTO: 27.9 PG (ref 26–34)
MCHC RBC AUTO-ENTMCNC: 30 G/DL (ref 31–37)
MCV RBC AUTO: 93.3 FL
MONOCYTES # BLD AUTO: 0.43 X10(3) UL (ref 0.1–1)
MONOCYTES NFR BLD AUTO: 9.5 %
NEUTROPHILS # BLD AUTO: 3.68 X10 (3) UL (ref 1.5–7.7)
NEUTROPHILS # BLD AUTO: 3.68 X10(3) UL (ref 1.5–7.7)
NEUTROPHILS NFR BLD AUTO: 81 %
OSMOLALITY SERPL CALC.SUM OF ELEC: 293 MOSM/KG (ref 275–295)
PLATELET # BLD AUTO: 189 10(3)UL (ref 150–450)
PLATELET MORPHOLOGY: NORMAL
POTASSIUM SERPL-SCNC: 4 MMOL/L (ref 3.5–5.1)
RBC # BLD AUTO: 2.97 X10(6)UL
SODIUM SERPL-SCNC: 140 MMOL/L (ref 136–145)
WBC # BLD AUTO: 4.5 X10(3) UL (ref 4–11)

## 2022-06-28 PROCEDURE — 96365 THER/PROPH/DIAG IV INF INIT: CPT

## 2022-06-28 PROCEDURE — 99215 OFFICE O/P EST HI 40 MIN: CPT | Performed by: NURSE PRACTITIONER

## 2022-06-28 PROCEDURE — 36569 INSJ PICC 5 YR+ W/O IMAGING: CPT

## 2022-06-28 PROCEDURE — 96366 THER/PROPH/DIAG IV INF ADDON: CPT

## 2022-06-28 PROCEDURE — 96375 TX/PRO/DX INJ NEW DRUG ADDON: CPT

## 2022-06-28 PROCEDURE — 71045 X-RAY EXAM CHEST 1 VIEW: CPT | Performed by: INTERNAL MEDICINE

## 2022-06-28 RX ORDER — LIDOCAINE HYDROCHLORIDE 10 MG/ML
5 INJECTION, SOLUTION EPIDURAL; INFILTRATION; INTRACAUDAL; PERINEURAL
Status: COMPLETED | OUTPATIENT
Start: 2022-06-28 | End: 2022-06-28

## 2022-06-28 RX ADMIN — LIDOCAINE HYDROCHLORIDE 5 ML: 10 INJECTION, SOLUTION EPIDURAL; INFILTRATION; INTRACAUDAL; PERINEURAL at 14:45:00

## 2022-06-28 NOTE — TELEPHONE ENCOUNTER
6/14/22  C2D1 - R-CHOP    Patient called she has been \"sick since last night and needs to come in\" she states she has thought she had a fever feeling hot but did not take her temp, no chills, has been up having large amounts of diarrhea x3 from evening to 1am, color yellow brown, had vomited into tissue and dry heaves while on toilet, had taken a Compazine at 6 pm when she started to feel bad but it did not help or she vomited it up. She had ate lunch with shake, for dinner only took Ensure. She feels weak and will be dizzy when ambulating. She denies sob while sitting but will have sob with exertion-not new. Denies chest pain, does wear oxygen at night, current PO 96% on RA. Has hx of A-fib. Denies known Covid exposure, Vaccinated x 3.     ACV in [place] 0930

## 2022-06-28 NOTE — PROGRESS NOTES
Patient presents with:  Nausea/vomiting: APN assessment - sick call  Diarrhea    Pt is here for a sick call - N/V/D. She was last treated on 06/14/2022 C2 D1 RCHOP. Pt states that yesterday was a good day until the evening; had gone out to [place] for lunch but by evening she was not feeling well. Overnight had 4-5 large volume diarrhea with dry heaves/vomiting. Not eating much since lunch yesterday. Felt weak and dizzy at times.      Education Record    Learner:  Patient and Family Member    Disease / Diagnosis: DLBCL    Barriers / Limitations:  None   Comments:    Method:  Brief focused   Comments:    General Topics:  Diet, Side effects and symptom management and Plan of care reviewed   Comments:    Outcome:  Shows understanding   Comments:

## 2022-06-28 NOTE — PROGRESS NOTES
Education Record    Learner:  Patient and Family Member    Disease / Diagnosis: Diffuse large B-cell lymphoma    Barriers / Limitations:  None   Comments:    Method:  Brief focused and Discussion   Comments:    General Topics:  Medication, Side effects and symptom management, Plan of care reviewed and Fall risk and prevention   Comments:    Outcome:  Shows understanding   Comments:    Patient to have PICC line placed after infusion today. Will return in 1 week for labs, md, and chemo.

## 2022-07-01 ENCOUNTER — OFFICE VISIT (OUTPATIENT)
Dept: INTERNAL MEDICINE CLINIC | Facility: CLINIC | Age: 82
End: 2022-07-01
Payer: MEDICARE

## 2022-07-01 VITALS
DIASTOLIC BLOOD PRESSURE: 52 MMHG | SYSTOLIC BLOOD PRESSURE: 90 MMHG | HEIGHT: 70 IN | OXYGEN SATURATION: 95 % | BODY MASS INDEX: 25 KG/M2 | HEART RATE: 74 BPM

## 2022-07-01 DIAGNOSIS — C83.38 DIFFUSE LARGE B-CELL LYMPHOMA OF LYMPH NODES OF MULTIPLE REGIONS (HCC): ICD-10-CM

## 2022-07-01 DIAGNOSIS — L97.511 ULCER OF RIGHT FOOT, LIMITED TO BREAKDOWN OF SKIN (HCC): ICD-10-CM

## 2022-07-01 DIAGNOSIS — E11.9 TYPE 2 DIABETES MELLITUS WITHOUT COMPLICATION, WITH LONG-TERM CURRENT USE OF INSULIN (HCC): Primary | ICD-10-CM

## 2022-07-01 DIAGNOSIS — Z79.4 TYPE 2 DIABETES MELLITUS WITHOUT COMPLICATION, WITH LONG-TERM CURRENT USE OF INSULIN (HCC): Primary | ICD-10-CM

## 2022-07-01 PROBLEM — E11.22 TYPE 2 DIABETES MELLITUS WITH DIABETIC CHRONIC KIDNEY DISEASE (HCC): Status: ACTIVE | Noted: 2022-07-01

## 2022-07-01 LAB
CARTRIDGE LOT#: 962 NUMERIC
HEMOGLOBIN A1C: 6.4 % (ref 4.3–5.6)

## 2022-07-01 NOTE — PROGRESS NOTES
Eye Exam - Due Referral Pended  Foot Exam - Due Today  Micro - Completed 2/21/2022  A1C - Last 2/21/2022 was 8.8    A1C rechecked in office today -     Patient is due for yearly foot examination. Advised patient to remove his shoes. Diabetic flow sheet updated.

## 2022-07-05 ENCOUNTER — APPOINTMENT (OUTPATIENT)
Dept: GENERAL RADIOLOGY | Facility: HOSPITAL | Age: 82
End: 2022-07-05
Attending: EMERGENCY MEDICINE
Payer: MEDICARE

## 2022-07-05 ENCOUNTER — HOSPITAL ENCOUNTER (OUTPATIENT)
Facility: HOSPITAL | Age: 82
Setting detail: OBSERVATION
Discharge: HOME | End: 2022-07-07
Attending: EMERGENCY MEDICINE | Admitting: HOSPITALIST
Payer: MEDICARE

## 2022-07-05 ENCOUNTER — OFFICE VISIT (OUTPATIENT)
Dept: HEMATOLOGY/ONCOLOGY | Facility: HOSPITAL | Age: 82
End: 2022-07-05
Attending: INTERNAL MEDICINE
Payer: MEDICARE

## 2022-07-05 ENCOUNTER — APPOINTMENT (OUTPATIENT)
Dept: CT IMAGING | Facility: HOSPITAL | Age: 82
End: 2022-07-05
Attending: EMERGENCY MEDICINE
Payer: MEDICARE

## 2022-07-05 VITALS
WEIGHT: 167.81 LBS | OXYGEN SATURATION: 98 % | BODY MASS INDEX: 24.02 KG/M2 | RESPIRATION RATE: 16 BRPM | TEMPERATURE: 97 F | HEIGHT: 70 IN | SYSTOLIC BLOOD PRESSURE: 96 MMHG | HEART RATE: 81 BPM | DIASTOLIC BLOOD PRESSURE: 53 MMHG

## 2022-07-05 DIAGNOSIS — C83.38 DIFFUSE LARGE B-CELL LYMPHOMA OF LYMPH NODES OF MULTIPLE REGIONS (HCC): Primary | ICD-10-CM

## 2022-07-05 DIAGNOSIS — E83.42 HYPOMAGNESEMIA: ICD-10-CM

## 2022-07-05 DIAGNOSIS — E87.6 HYPOKALEMIA: ICD-10-CM

## 2022-07-05 DIAGNOSIS — R11.2 NAUSEA AND VOMITING, UNSPECIFIED VOMITING TYPE: Primary | ICD-10-CM

## 2022-07-05 DIAGNOSIS — C85.90 LYMPHOMA, UNSPECIFIED BODY REGION, UNSPECIFIED LYMPHOMA TYPE (HCC): ICD-10-CM

## 2022-07-05 DIAGNOSIS — E86.0 DEHYDRATION: ICD-10-CM

## 2022-07-05 DIAGNOSIS — Z51.11 ENCOUNTER FOR CHEMOTHERAPY MANAGEMENT: ICD-10-CM

## 2022-07-05 LAB
ALBUMIN SERPL-MCNC: 2.3 G/DL (ref 3.4–5)
ALBUMIN SERPL-MCNC: 2.6 G/DL (ref 3.4–5)
ALBUMIN/GLOB SERPL: 0.8 {RATIO} (ref 1–2)
ALBUMIN/GLOB SERPL: 0.9 {RATIO} (ref 1–2)
ALP LIVER SERPL-CCNC: 73 U/L
ALP LIVER SERPL-CCNC: 77 U/L
ALT SERPL-CCNC: 10 U/L
ALT SERPL-CCNC: 9 U/L
ANION GAP SERPL CALC-SCNC: 5 MMOL/L (ref 0–18)
ANION GAP SERPL CALC-SCNC: 7 MMOL/L (ref 0–18)
AST SERPL-CCNC: 6 U/L (ref 15–37)
AST SERPL-CCNC: 8 U/L (ref 15–37)
BASOPHILS # BLD AUTO: 0.04 X10(3) UL (ref 0–0.2)
BASOPHILS # BLD AUTO: 0.05 X10(3) UL (ref 0–0.2)
BASOPHILS NFR BLD AUTO: 0.5 %
BASOPHILS NFR BLD AUTO: 0.5 %
BILIRUB SERPL-MCNC: 0.4 MG/DL (ref 0.1–2)
BILIRUB SERPL-MCNC: 0.4 MG/DL (ref 0.1–2)
BILIRUB UR QL STRIP.AUTO: NEGATIVE
BUN BLD-MCNC: 13 MG/DL (ref 7–18)
BUN BLD-MCNC: 14 MG/DL (ref 7–18)
CALCIUM BLD-MCNC: 8.5 MG/DL (ref 8.5–10.1)
CALCIUM BLD-MCNC: 8.7 MG/DL (ref 8.5–10.1)
CHLORIDE SERPL-SCNC: 102 MMOL/L (ref 98–112)
CHLORIDE SERPL-SCNC: 99 MMOL/L (ref 98–112)
CLARITY UR REFRACT.AUTO: CLEAR
CO2 SERPL-SCNC: 33 MMOL/L (ref 21–32)
CO2 SERPL-SCNC: 36 MMOL/L (ref 21–32)
COLOR UR AUTO: YELLOW
CREAT BLD-MCNC: 1.03 MG/DL
CREAT BLD-MCNC: 1.06 MG/DL
EOSINOPHIL # BLD AUTO: 0 X10(3) UL (ref 0–0.7)
EOSINOPHIL # BLD AUTO: 0.01 X10(3) UL (ref 0–0.7)
EOSINOPHIL NFR BLD AUTO: 0 %
EOSINOPHIL NFR BLD AUTO: 0.1 %
ERYTHROCYTE [DISTWIDTH] IN BLOOD BY AUTOMATED COUNT: 22.5 %
ERYTHROCYTE [DISTWIDTH] IN BLOOD BY AUTOMATED COUNT: 22.6 %
GLOBULIN PLAS-MCNC: 2.9 G/DL (ref 2.8–4.4)
GLOBULIN PLAS-MCNC: 2.9 G/DL (ref 2.8–4.4)
GLUCOSE BLD-MCNC: 200 MG/DL (ref 70–99)
GLUCOSE BLD-MCNC: 95 MG/DL (ref 70–99)
GLUCOSE BLD-MCNC: 99 MG/DL (ref 70–99)
GLUCOSE UR STRIP.AUTO-MCNC: NEGATIVE MG/DL
HCT VFR BLD AUTO: 27.3 %
HCT VFR BLD AUTO: 29.2 %
HGB BLD-MCNC: 8.3 G/DL
HGB BLD-MCNC: 8.8 G/DL
HYALINE CASTS #/AREA URNS AUTO: PRESENT /LPF
IMM GRANULOCYTES # BLD AUTO: 0.02 X10(3) UL (ref 0–1)
IMM GRANULOCYTES # BLD AUTO: 0.04 X10(3) UL (ref 0–1)
IMM GRANULOCYTES NFR BLD: 0.3 %
IMM GRANULOCYTES NFR BLD: 0.4 %
KETONES UR STRIP.AUTO-MCNC: NEGATIVE MG/DL
LACTATE SERPL-SCNC: 3.4 MMOL/L (ref 0.4–2)
LACTATE SERPL-SCNC: 4.6 MMOL/L (ref 0.4–2)
LDH SERPL L TO P-CCNC: 142 U/L
LEUKOCYTE ESTERASE UR QL STRIP.AUTO: NEGATIVE
LYMPHOCYTES # BLD AUTO: 0.34 X10(3) UL (ref 1–4)
LYMPHOCYTES # BLD AUTO: 0.35 X10(3) UL (ref 1–4)
LYMPHOCYTES NFR BLD AUTO: 3.8 %
LYMPHOCYTES NFR BLD AUTO: 4.4 %
MAGNESIUM SERPL-MCNC: 1.3 MG/DL (ref 1.6–2.6)
MCH RBC QN AUTO: 28.3 PG (ref 26–34)
MCH RBC QN AUTO: 28.4 PG (ref 26–34)
MCHC RBC AUTO-ENTMCNC: 30.1 G/DL (ref 31–37)
MCHC RBC AUTO-ENTMCNC: 30.4 G/DL (ref 31–37)
MCV RBC AUTO: 93.2 FL
MCV RBC AUTO: 94.2 FL
MONOCYTES # BLD AUTO: 0.52 X10(3) UL (ref 0.1–1)
MONOCYTES # BLD AUTO: 0.74 X10(3) UL (ref 0.1–1)
MONOCYTES NFR BLD AUTO: 6.7 %
MONOCYTES NFR BLD AUTO: 8.1 %
NEUTROPHILS # BLD AUTO: 6.83 X10 (3) UL (ref 1.5–7.7)
NEUTROPHILS # BLD AUTO: 6.83 X10(3) UL (ref 1.5–7.7)
NEUTROPHILS # BLD AUTO: 8 X10 (3) UL (ref 1.5–7.7)
NEUTROPHILS # BLD AUTO: 8 X10(3) UL (ref 1.5–7.7)
NEUTROPHILS NFR BLD AUTO: 87.1 %
NEUTROPHILS NFR BLD AUTO: 88.1 %
NITRITE UR QL STRIP.AUTO: NEGATIVE
OSMOLALITY SERPL CALC.SUM OF ELEC: 295 MOSM/KG (ref 275–295)
OSMOLALITY SERPL CALC.SUM OF ELEC: 296 MOSM/KG (ref 275–295)
PH UR STRIP.AUTO: 7 [PH] (ref 5–8)
PLATELET # BLD AUTO: 384 10(3)UL (ref 150–450)
PLATELET # BLD AUTO: 392 10(3)UL (ref 150–450)
PLATELET MORPHOLOGY: NORMAL
POTASSIUM SERPL-SCNC: 3.3 MMOL/L (ref 3.5–5.1)
POTASSIUM SERPL-SCNC: 4.2 MMOL/L (ref 3.5–5.1)
PROT SERPL-MCNC: 5.2 G/DL (ref 6.4–8.2)
PROT SERPL-MCNC: 5.5 G/DL (ref 6.4–8.2)
PROT UR STRIP.AUTO-MCNC: NEGATIVE MG/DL
RBC # BLD AUTO: 2.93 X10(6)UL
RBC # BLD AUTO: 3.1 X10(6)UL
RBC UR QL AUTO: NEGATIVE
SARS-COV-2 RNA RESP QL NAA+PROBE: NOT DETECTED
SODIUM SERPL-SCNC: 140 MMOL/L (ref 136–145)
SODIUM SERPL-SCNC: 142 MMOL/L (ref 136–145)
SP GR UR STRIP.AUTO: 1.01 (ref 1–1.03)
UROBILINOGEN UR STRIP.AUTO-MCNC: <2 MG/DL
WBC # BLD AUTO: 7.8 X10(3) UL (ref 4–11)
WBC # BLD AUTO: 9.2 X10(3) UL (ref 4–11)

## 2022-07-05 PROCEDURE — 83615 LACTATE (LD) (LDH) ENZYME: CPT

## 2022-07-05 PROCEDURE — 99220 INITIAL OBSERVATION CARE,LEVL III: CPT | Performed by: HOSPITALIST

## 2022-07-05 PROCEDURE — 99214 OFFICE O/P EST MOD 30 MIN: CPT | Performed by: INTERNAL MEDICINE

## 2022-07-05 PROCEDURE — 71045 X-RAY EXAM CHEST 1 VIEW: CPT | Performed by: EMERGENCY MEDICINE

## 2022-07-05 PROCEDURE — 96415 CHEMO IV INFUSION ADDL HR: CPT

## 2022-07-05 PROCEDURE — 96411 CHEMO IV PUSH ADDL DRUG: CPT

## 2022-07-05 PROCEDURE — 96367 TX/PROPH/DG ADDL SEQ IV INF: CPT

## 2022-07-05 PROCEDURE — 85025 COMPLETE CBC W/AUTO DIFF WBC: CPT

## 2022-07-05 PROCEDURE — 96413 CHEMO IV INFUSION 1 HR: CPT

## 2022-07-05 PROCEDURE — 70450 CT HEAD/BRAIN W/O DYE: CPT | Performed by: EMERGENCY MEDICINE

## 2022-07-05 PROCEDURE — 74177 CT ABD & PELVIS W/CONTRAST: CPT | Performed by: EMERGENCY MEDICINE

## 2022-07-05 PROCEDURE — 96417 CHEMO IV INFUS EACH ADDL SEQ: CPT

## 2022-07-05 PROCEDURE — 96377 APPLICATON ON-BODY INJECTOR: CPT

## 2022-07-05 PROCEDURE — 80053 COMPREHEN METABOLIC PANEL: CPT

## 2022-07-05 PROCEDURE — 96375 TX/PRO/DX INJ NEW DRUG ADDON: CPT

## 2022-07-05 RX ORDER — LEVOTHYROXINE SODIUM 0.05 MG/1
50 TABLET ORAL
Status: DISCONTINUED | OUTPATIENT
Start: 2022-07-06 | End: 2022-07-07

## 2022-07-05 RX ORDER — ONDANSETRON 2 MG/ML
8 INJECTION INTRAMUSCULAR; INTRAVENOUS EVERY 6 HOURS PRN
Status: DISCONTINUED | OUTPATIENT
Start: 2022-07-05 | End: 2022-07-07

## 2022-07-05 RX ORDER — DIPHENHYDRAMINE HCL 25 MG
50 CAPSULE ORAL ONCE
Status: CANCELLED | OUTPATIENT
Start: 2022-07-05

## 2022-07-05 RX ORDER — SODIUM CHLORIDE, SODIUM LACTATE, POTASSIUM CHLORIDE, CALCIUM CHLORIDE 600; 310; 30; 20 MG/100ML; MG/100ML; MG/100ML; MG/100ML
INJECTION, SOLUTION INTRAVENOUS CONTINUOUS
Status: DISCONTINUED | OUTPATIENT
Start: 2022-07-05 | End: 2022-07-07

## 2022-07-05 RX ORDER — DIPHENHYDRAMINE HCL 25 MG
50 CAPSULE ORAL ONCE
Status: COMPLETED | OUTPATIENT
Start: 2022-07-05 | End: 2022-07-05

## 2022-07-05 RX ORDER — PANTOPRAZOLE SODIUM 20 MG/1
20 TABLET, DELAYED RELEASE ORAL
Status: DISCONTINUED | OUTPATIENT
Start: 2022-07-06 | End: 2022-07-07

## 2022-07-05 RX ORDER — SENNOSIDES 8.6 MG
17.2 TABLET ORAL NIGHTLY PRN
Status: DISCONTINUED | OUTPATIENT
Start: 2022-07-05 | End: 2022-07-07

## 2022-07-05 RX ORDER — SULFAMETHOXAZOLE AND TRIMETHOPRIM 800; 160 MG/1; MG/1
1 TABLET ORAL
Status: DISCONTINUED | OUTPATIENT
Start: 2022-07-06 | End: 2022-07-07

## 2022-07-05 RX ORDER — MAGNESIUM SULFATE HEPTAHYDRATE 40 MG/ML
2 INJECTION, SOLUTION INTRAVENOUS ONCE
Status: COMPLETED | OUTPATIENT
Start: 2022-07-05 | End: 2022-07-05

## 2022-07-05 RX ORDER — ENOXAPARIN SODIUM 100 MG/ML
40 INJECTION SUBCUTANEOUS DAILY
Status: CANCELLED | OUTPATIENT
Start: 2022-07-06

## 2022-07-05 RX ORDER — ACETAMINOPHEN 500 MG
500 TABLET ORAL EVERY 4 HOURS PRN
Status: DISCONTINUED | OUTPATIENT
Start: 2022-07-05 | End: 2022-07-07

## 2022-07-05 RX ORDER — ASPIRIN 81 MG/1
81 TABLET ORAL DAILY
Status: DISCONTINUED | OUTPATIENT
Start: 2022-07-05 | End: 2022-07-07

## 2022-07-05 RX ORDER — ECHINACEA PURPUREA EXTRACT 125 MG
1 TABLET ORAL
Status: DISCONTINUED | OUTPATIENT
Start: 2022-07-05 | End: 2022-07-07

## 2022-07-05 RX ORDER — POTASSIUM CHLORIDE 20 MEQ/1
40 TABLET, EXTENDED RELEASE ORAL ONCE
Status: DISCONTINUED | OUTPATIENT
Start: 2022-07-05 | End: 2022-07-06 | Stop reason: ALTCHOICE

## 2022-07-05 RX ORDER — MELATONIN
3 NIGHTLY PRN
Status: DISCONTINUED | OUTPATIENT
Start: 2022-07-05 | End: 2022-07-07

## 2022-07-05 RX ORDER — DOXORUBICIN HYDROCHLORIDE 2 MG/ML
50 INJECTION, SOLUTION INTRAVENOUS ONCE
Status: COMPLETED | OUTPATIENT
Start: 2022-07-05 | End: 2022-07-05

## 2022-07-05 RX ORDER — BISACODYL 10 MG
10 SUPPOSITORY, RECTAL RECTAL
Status: DISCONTINUED | OUTPATIENT
Start: 2022-07-05 | End: 2022-07-07

## 2022-07-05 RX ORDER — NICOTINE POLACRILEX 4 MG
30 LOZENGE BUCCAL
Status: DISCONTINUED | OUTPATIENT
Start: 2022-07-05 | End: 2022-07-07

## 2022-07-05 RX ORDER — FLUCONAZOLE 100 MG/1
200 TABLET ORAL
Status: DISCONTINUED | OUTPATIENT
Start: 2022-07-06 | End: 2022-07-07

## 2022-07-05 RX ORDER — ACETAMINOPHEN 325 MG/1
650 TABLET ORAL ONCE
Status: COMPLETED | OUTPATIENT
Start: 2022-07-05 | End: 2022-07-05

## 2022-07-05 RX ORDER — DEXTROSE AND SODIUM CHLORIDE 5; .45 G/100ML; G/100ML
INJECTION, SOLUTION INTRAVENOUS CONTINUOUS
Status: ACTIVE | OUTPATIENT
Start: 2022-07-05 | End: 2022-07-05

## 2022-07-05 RX ORDER — BENZONATATE 100 MG/1
200 CAPSULE ORAL 3 TIMES DAILY PRN
Status: DISCONTINUED | OUTPATIENT
Start: 2022-07-05 | End: 2022-07-07

## 2022-07-05 RX ORDER — DOXORUBICIN HYDROCHLORIDE 2 MG/ML
50 INJECTION, SOLUTION INTRAVENOUS ONCE
Status: CANCELLED | OUTPATIENT
Start: 2022-07-05

## 2022-07-05 RX ORDER — DEXTROSE MONOHYDRATE 25 G/50ML
50 INJECTION, SOLUTION INTRAVENOUS
Status: DISCONTINUED | OUTPATIENT
Start: 2022-07-05 | End: 2022-07-07

## 2022-07-05 RX ORDER — NICOTINE POLACRILEX 4 MG
15 LOZENGE BUCCAL
Status: DISCONTINUED | OUTPATIENT
Start: 2022-07-05 | End: 2022-07-07

## 2022-07-05 RX ORDER — POLYETHYLENE GLYCOL 3350 17 G/17G
17 POWDER, FOR SOLUTION ORAL DAILY PRN
Status: DISCONTINUED | OUTPATIENT
Start: 2022-07-05 | End: 2022-07-07

## 2022-07-05 RX ORDER — ONDANSETRON 2 MG/ML
4 INJECTION INTRAMUSCULAR; INTRAVENOUS ONCE
Status: COMPLETED | OUTPATIENT
Start: 2022-07-05 | End: 2022-07-05

## 2022-07-05 RX ORDER — ACETAMINOPHEN 325 MG/1
650 TABLET ORAL ONCE
Status: CANCELLED | OUTPATIENT
Start: 2022-07-05

## 2022-07-05 RX ORDER — ONDANSETRON 2 MG/ML
4 INJECTION INTRAMUSCULAR; INTRAVENOUS EVERY 4 HOURS PRN
Status: DISCONTINUED | OUTPATIENT
Start: 2022-07-05 | End: 2022-07-05

## 2022-07-05 RX ADMIN — DOXORUBICIN HYDROCHLORIDE 100 MG: 2 INJECTION, SOLUTION INTRAVENOUS at 10:26:00

## 2022-07-05 RX ADMIN — DIPHENHYDRAMINE HCL 50 MG: 25 MG CAPSULE ORAL at 09:47:00

## 2022-07-05 RX ADMIN — ACETAMINOPHEN 650 MG: 325 TABLET ORAL at 09:47:00

## 2022-07-05 NOTE — PROGRESS NOTES
Pt here for follow up and treatment. Pt complains of dizziness that started this morning. Nausea and vomiting since last night. She states she has not had a bowel movement x1 week. She is passing gas. She fell about a week ago in the bathroom trying to reach something on the floor.

## 2022-07-05 NOTE — PROGRESS NOTES
Pt here for C3D1 RCHOP. Arrives Via wheelchair, accompanied by Family member           Pregnancy screening: Denies possibility of pregnancy    Modifications in dose or schedule: No    Drugs/infusions dual verified for appearance and physical integrity: yes     Frequency of blood return and site check throughout administration: Prior to administration, Prior to each drug, Every 2-3 ml IVP and At completion of therapy   Discharged to Home, Via wheelchair, accompanied by:Family member    Outpatient Oncology Care Plan  Problem list:  loss of appetite  fatigue  nausea and vomiting  Problems related to:  chemotherapy  Interventions:  promoted rest  provided general teaching  Expected outcomes:  patient supported/coping well  safe in environment  Progress towards outcome:  making progress    Education Record    Learner:  Patient and Family Member  Barriers / Limitations:  None  Method:  Discussion  Outcome:  Shows understanding  Comments:  Set patient up for weekly picc dressing changes. Per patient, she is waiting for calls from home health. She would prefer to have them do her PICC dressing changes. Informed her that we will set up her appointments here just in case, patient indicated she will cancel if the home health gets coordinated.

## 2022-07-05 NOTE — PATIENT INSTRUCTIONS
On-body Injector for Neulasta Patient Instructions       Your On-Body Injection device was applied on this day:  7/5at this time 2pm    Your dose of medication will start on this day: 7/6 at this time 5pm    Safe time to remove this device will be on this day: 7/6 at this time 7pm       Important information to remember about the Neulasta Injector:     1. The On-Body Neulasta Injector begins to deliver the dose of Neulasta 27 hours after it is activated at the cancer center. Beeping at that time indicates  that the dose will be delivered in 2 minutes. It takes 45 minutes for the dose to  be completely delivered. DO NOT REMOVE during this time. 2. Check the light periodically for a slow flashing GREEN light, this is normal.     3. If the light is flashing RED or comes off prior to the delivery time, during regular business hours please call [phone] and ask for the charge nurse. If this is  after hours or a holiday, please contact the [employer] @ [phone], a support person is available 24/7.      4. Please keep the device at least 4 inches away from electrical equipment, such as CELL PHONES, microwaves, cordless phones. Do NOT have Xrays, MRI,  CT without informing the technician. 5. If you are traveling, needing to go through airport security, please notify the TSA. You may still travel, just avoid the xray security. 6. Avoid bumping or sleeping directly on the injector. 7. Avoid hot tubs, saunas and direct sunlight. Keep the injector dry 3 hours prior to the dose delivery time. 8. Remove the injector device at the directed time above and place in a hard container for disposal and place in trash.

## 2022-07-05 NOTE — ED INITIAL ASSESSMENT (HPI)
Patient just finished round of chemo today and had a syncopal episode when she got home. Syncopal episode was witnessed and patient was lowered to the ground. Patient was hypotensive, SBP in the 80s for EMS.

## 2022-07-06 LAB
ANION GAP SERPL CALC-SCNC: 5 MMOL/L (ref 0–18)
ANTIBODY SCREEN: NEGATIVE
ATRIAL RATE: 90 BPM
BUN BLD-MCNC: 14 MG/DL (ref 7–18)
CALCIUM BLD-MCNC: 8.1 MG/DL (ref 8.5–10.1)
CHLORIDE SERPL-SCNC: 103 MMOL/L (ref 98–112)
CO2 SERPL-SCNC: 34 MMOL/L (ref 21–32)
CREAT BLD-MCNC: 0.81 MG/DL
ERYTHROCYTE [DISTWIDTH] IN BLOOD BY AUTOMATED COUNT: 22.4 %
GLUCOSE BLD-MCNC: 113 MG/DL (ref 70–99)
GLUCOSE BLD-MCNC: 123 MG/DL (ref 70–99)
GLUCOSE BLD-MCNC: 186 MG/DL (ref 70–99)
GLUCOSE BLD-MCNC: 218 MG/DL (ref 70–99)
GLUCOSE BLD-MCNC: 227 MG/DL (ref 70–99)
HCT VFR BLD AUTO: 24.4 %
HGB BLD-MCNC: 7.3 G/DL
LACTATE SERPL-SCNC: 2.1 MMOL/L (ref 0.4–2)
MAGNESIUM SERPL-MCNC: 1.8 MG/DL (ref 1.6–2.6)
MCH RBC QN AUTO: 28.5 PG (ref 26–34)
MCHC RBC AUTO-ENTMCNC: 29.9 G/DL (ref 31–37)
MCV RBC AUTO: 95.3 FL
OSMOLALITY SERPL CALC.SUM OF ELEC: 295 MOSM/KG (ref 275–295)
PLATELET # BLD AUTO: 302 10(3)UL (ref 150–450)
POTASSIUM SERPL-SCNC: 3.6 MMOL/L (ref 3.5–5.1)
Q-T INTERVAL: 388 MS
QRS DURATION: 86 MS
QTC CALCULATION (BEZET): 458 MS
R AXIS: 18 DEGREES
RBC # BLD AUTO: 2.56 X10(6)UL
RH BLOOD TYPE: POSITIVE
SODIUM SERPL-SCNC: 142 MMOL/L (ref 136–145)
T AXIS: -7 DEGREES
VENTRICULAR RATE: 84 BPM
WBC # BLD AUTO: 6.2 X10(3) UL (ref 4–11)

## 2022-07-06 PROCEDURE — 30233N1 TRANSFUSION OF NONAUTOLOGOUS RED BLOOD CELLS INTO PERIPHERAL VEIN, PERCUTANEOUS APPROACH: ICD-10-PCS | Performed by: NURSE PRACTITIONER

## 2022-07-06 PROCEDURE — 99232 SBSQ HOSP IP/OBS MODERATE 35: CPT | Performed by: STUDENT IN AN ORGANIZED HEALTH CARE EDUCATION/TRAINING PROGRAM

## 2022-07-06 PROCEDURE — 99214 OFFICE O/P EST MOD 30 MIN: CPT | Performed by: INTERNAL MEDICINE

## 2022-07-06 RX ORDER — DIPHENHYDRAMINE HCL 25 MG
25 CAPSULE ORAL ONCE
Status: COMPLETED | OUTPATIENT
Start: 2022-07-06 | End: 2022-07-06

## 2022-07-06 RX ORDER — SODIUM CHLORIDE 9 MG/ML
INJECTION, SOLUTION INTRAVENOUS ONCE
Status: COMPLETED | OUTPATIENT
Start: 2022-07-06 | End: 2022-07-06

## 2022-07-06 RX ORDER — ACETAMINOPHEN 325 MG/1
650 TABLET ORAL ONCE
Status: COMPLETED | OUTPATIENT
Start: 2022-07-06 | End: 2022-07-06

## 2022-07-06 RX ORDER — POTASSIUM CHLORIDE 20 MEQ/1
40 TABLET, EXTENDED RELEASE ORAL EVERY 4 HOURS
Status: COMPLETED | OUTPATIENT
Start: 2022-07-06 | End: 2022-07-06

## 2022-07-06 RX ORDER — ACYCLOVIR 400 MG/1
400 TABLET ORAL 2 TIMES DAILY
Status: DISCONTINUED | OUTPATIENT
Start: 2022-07-06 | End: 2022-07-07

## 2022-07-06 RX ORDER — MAGNESIUM OXIDE 400 MG/1
800 TABLET ORAL ONCE
Status: COMPLETED | OUTPATIENT
Start: 2022-07-06 | End: 2022-07-06

## 2022-07-06 RX ORDER — PREDNISONE 50 MG/1
100 TABLET ORAL DAILY
Status: DISCONTINUED | OUTPATIENT
Start: 2022-07-06 | End: 2022-07-07

## 2022-07-06 NOTE — DISCHARGE SUMMARY
[facility] H[facility]PITAL[facility]  DISCHARGE SUMMARY     Mendy Lockwood Patient Status:  Inpatient    1940 MRN [id number]   [facility] 4NW-A Attending Jenn uRth MD   Hosp Day # 2 PCP Alex Briones MD     Date of Admission: 2022  Date of Discharge: 2022   Discharge Disposition: Home w/  Mariaelena 78    Discharge Diagnosis:   #Syncope suspected dehydration after chemo, improved w/ IVF  #Lactic acidosis- not sepsis  #Fever, resolved suspect due to transfusion  #Leukocytosis partly due to steroids  #Anemia, stable s/p PRBC  #Diffuse Large B Cell lymphoma   #DM type 2, stable  #CAD  #JESSE, resolving  #Hypothyroidism  #Afib, chronic on xarelto   #Constipation, resolved  #Aneurysm    History of Present Illness: Mendy Lockwood is a 80year old female with syncope today. Said she doesn't remember much but was apparently lowered to the ground without hitting her head. She has had vomiting yesterday and again today after chemotherapy. She thinks yesterday may have been her achalasia causing problems. She denies any fevers or chills or cough or diarrhea. Feels and looks much better now in ER after hydration. Brief Synopsis: Patient is a 49-year-old female undergoing chemo for lymphoma. She had syncopal episode, nausea vomiting. She was admitted for further work-up and started on IV fluids. Oncology evaluated. We continued to monitor labs, orthostatics. Therapy recommended home health PT/OT. Patient received PRBCs per oncology. She had fever after transfusion, no further temps and BP remains stable. WBC is 25 today partly due prednisone 100mg given yesterday. Oncology will continue to follow labs closely as outpatient. Keep off torsemide for now. Patient discharged once cleared by consultant. She will follow-up with oncology as recommended. Lace+ Score: 79  59-90 High Risk  29-58 Medium Risk  0-28   Low Risk         TCM Follow-Up Recommendation:  LACE > 58:  High Risk of readmission after discharge from the hospital.     Procedures during hospitalization: none    Consultants: oncology, wound care         Discharge Medications        START taking these medications        Instructions Prescription details   acyclovir 400 MG Tabs  Commonly known as: Zovirax      Take 1 tablet (400 mg total) by mouth 2 (two) times daily. Quantity: 60 tablet  Refills: 0     predniSONE 50 MG Tabs  Commonly known as: Deltasone  Start taking on: July 8, 2022      Take 2 tablets (100 mg total) by mouth daily. Quantity: 6 tablet  Refills: 0            CONTINUE taking these medications        Instructions Prescription details   albuterol 108 (90 Base) MCG/ACT Aers  Commonly known as: Ventolin HFA      Inhale 2 puffs into the lungs every 4 (four) hours as needed for Wheezing or Shortness of Breath. Quantity: 1 each  Refills: 0     aspirin 81 MG Tbec      Take 1 tablet (81 mg total) by mouth daily. Quantity: 30 tablet  Refills: 1     BD Pen Needle Karis U/F 32G X 4 MM Misc  Generic drug: Insulin Pen Needle      Use a new pen needle with each injection   Quantity: 100 each  Refills: 6     Esomeprazole Magnesium 20 MG Cpdr  Commonly known as: NEXIUM      Take 20 mg by mouth every morning before breakfast.   Refills: 0     fluconazole 200 MG Tabs  Commonly known as: Diflucan      Take 1 tablet (200 mg total) by mouth Every Monday, Wednesday, and Friday. Quantity: 12 tablet  Refills: 3     Levemir FlexTouch 100 UNIT/ML Sopn  Generic drug: insulin detemir      Inject 10 Units into the skin daily. Quantity: 5 each  Refills: 0     levothyroxine 50 MCG Tabs  Commonly known as: Synthroid      Take 1 tablet (50 mcg total) by mouth before breakfast.   Refills: 0     metFORMIN 500 MG Tabs  Commonly known as: Glucophage      TAKE 1 TABLET TWICE DAILY WITH MEALS   Quantity: 180 tablet  Refills: 1     metoprolol tartrate 25 MG Tabs  Commonly known as: Lopressor      Take 0.5 tablets (12.5 mg total) by mouth 2 (two) times daily.    Quantity: 45 tablet  Refills: 3     ondansetron 4 mg tablet  Commonly known as: Zofran      Take 1 tablet (4 mg total) by mouth every 8 (eight) hours as needed for Nausea. Quantity: 30 tablet  Refills: 2     potassium chloride 10 MEQ Tbcr  Commonly known as: K-Dur      Take 1 tablet (10 mEq total) by mouth 2 (two) times daily. Quantity: 180 tablet  Refills: 1     Pravastatin Sodium 80 MG Tabs  Commonly known as: PRAVACHOL      TAKE 1 TABLET EVERY DAY   Quantity: 90 tablet  Refills: 1     PreserVision AREDS 2 Caps      Take 1 capsule by mouth 2 (two) times a day. Refills: 0     prochlorperazine 10 mg tablet  Commonly known as: Compazine      Take 1 tablet (10 mg total) by mouth every 6 (six) hours as needed for Nausea. Quantity: 30 tablet  Refills: 5     rivaroxaban 20 MG Tabs  Commonly known as: Xarelto      Take 1 tablet (20 mg total) by mouth daily with food. Quantity: 90 tablet  Refills: 3     sulfamethoxazole-trimethoprim -160 MG Tabs per tablet  Commonly known as: Bactrim DS      Take 1 tablet by mouth Every Monday, Wednesday, and Friday. Quantity: 12 tablet  Refills: 3     Vitamin D 1000 units Tabs      Take 1,000 Units by mouth daily.    Refills: 0            STOP taking these medications      torsemide 20 MG Tabs  Commonly known as: Demadex                  Where to Get Your Medications        These medications were sent to [address], [address] [place] [address], [phone], [phone]  [facility], [address] [facility] [address]      Hours: 24-hours Phone: [phone]   acyclovir 400 MG Tabs  predniSONE 50 MG Tabs         [place]    Follow-up appointment:   [address] [address] [phone]  On 7/8/2022  Neulasta injection, repeat CBC prior to injection, office will call to coordinate time with your transportation    [facility] [address]  [phone]    On 7/12/2022  1:00 pm for blood work and PICC line dressing change followed by appointment with APN    Future Appointments   Date Time Provider Nahid Melloi   7/8/2022  2:00 PM [address] [place] [address]   7/12/2022  1:00 PM Sina Reyna Jhoan [address] [place] [address]   7/12/2022  1:30 PM WILLY Luna [facility] [employer]   7/19/2022  1:00 PM Sina Staples [address] [place] [address]   7/26/2022  9:30 AM Cy Sweet [address] [place] [address]   7/26/2022 10:00 AM Radha Benedict MD [address] [employer]       -----------------------------------------------------------------------------------------------  PATIENT DISCHARGE INSTRUCTIONS: See electronic chart    JAKE Issa    Time spent:  > 30 minutes    Pt was admitted- given IVF and PRBC transfusion and clinically improved.  DC home with follow up with oncology  GA NAd, AO x3  CV RRR  Pulm CTA  GI soft, NT, +BS  30 mins Armon Osgood, MD

## 2022-07-06 NOTE — PLAN OF CARE
PT A/O, 100% ON 2L, AFIB, TEMP 99.5, DENIES DIZZINESS OR LIGHTHEADEDNESS, REFUSES SCDS, NEULASTA ON LT UPPER ARM, LACTIC ACID DRAWN FROM PICC - 2.1, UP TO COMMODE, HAD LARGE SOFT BM, LABS IN AM, INSTRUCTED PT ON POC    Problem: GASTROINTESTINAL - ADULT  Goal: Minimal or absence of nausea and vomiting  Description: INTERVENTIONS:  - Maintain adequate hydration with IV or PO as ordered and tolerated  - Nasogastric tube to low intermittent suction as ordered  - Evaluate effectiveness of ordered antiemetic medications  - Provide nonpharmacologic comfort measures as appropriate  - Advance diet as tolerated, if ordered  - Obtain nutritional consult as needed  - Evaluate fluid balance  Outcome: Progressing     Problem: NEUROLOGICAL - ADULT  Goal: Achieves stable or improved neurological status  Description: INTERVENTIONS  - Assess for and report changes in neurological status  - Initiate measures to prevent increased intracranial pressure  - Maintain blood pressure and fluid volume within ordered parameters to optimize cerebral perfusion and minimize risk of hemorrhage  - Monitor temperature, glucose, and sodium.  Initiate appropriate interventions as ordered  Outcome: Progressing

## 2022-07-06 NOTE — ED QUICK NOTES
Discussed POC with Dr Tesfaye Savage. Bed remains the same. Pt is alert, awake and orientated. No distress.

## 2022-07-06 NOTE — PLAN OF CARE
Patient c/o dizziness when sitting and standing, orthostatic vital signs stable, Dr. Taran Bello notified. Mic Puff Respirations non-labored, lungs sound diminished, O2 sat 92-93% on room air. Patient's vital signs monitored. 1854- 1 unit of PRBC transfused, tolerated well.

## 2022-07-06 NOTE — CM/SW NOTE
CAT was notified by [facility] that patient was pending acceptance for them prior to admission. [facility] requested update Face to Face order. CAT placed order for cosign from attending MD.     Vicky Hernández will continue to remain available for any additional DC needs or concerns.      Jodie Asencio MSW, LSW  Discharge Planner   [phone]

## 2022-07-06 NOTE — PROGRESS NOTES
[facility]    PATIENT'S NAME: Lily Nicholson JAYESH   ATTENDING PHYSICIAN: Nic Carlton M.D. PATIENT ACCOUNT #: [de-identified] LOCATION: [address] [place] [address] RECORD #: [id number] YOB: 1940   DATE OF SERVICE: 07/05/2022       [facility] CENTER PROGRESS NOTE    CHIEF COMPLAINT:  Followup for history of diffuse large B-cell lymphoma. HISTORY OF PRESENT ILLNESS:  The patient is an 45-year-old female. She has been found to have a diffuse large B-cell lymphoma with extensive disease in the upper abdomen. 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 She has received 2 cycles of chemotherapy with the first being given as an inpatient on May 14. She ended up with neutropenic fever and then another hospitalization about 1 week later. She improved. She was then re-treated 1 week late, on June 14, as it took her time to recover. She did get growth factor and has required a couple of visits in order to receive IV fluids and supportive measures. She returns today still not feeling particularly well. She has lost additional weight. The upper abdominal pain that she had before has completely resolved. She denies any fevers or chills. She did have some dizziness. She had a little bit of increasing nausea and vomiting since last night, but she has not had a bowel movement in about 1 week. She is passing gas. She fell about 1 week ago in the bathroom trying to reach something on the floor and bruised her back, but it bothers her mainly when she is changing position.     MEDICATIONS:  Her current medications include albuterol, which she is not using routinely; aspirin 81 mg daily; vitamin D 1000 units daily; esomeprazole 20 mg daily; fluconazole 200 mg Monday, Wednesday, Friday; Levemir insulin 10 units daily; levothyroxine 50 mcg daily; metformin 500 mg twice daily; metoprolol tartrate 12.5 mg twice daily; multivitamin daily; ondansetron 4 mg q.8 h. p.r.n. nausea; potassium chloride 10 mEq twice daily; pravastatin 80 mg daily; prochlorperazine 10 mg q.6 h. p.r.n.; rivaroxaban 20 mg daily; Bactrim DS 1 tablet Monday, Wednesday, Friday; torsemide 20 mg 2 tablets daily. PHYSICAL EXAMINATION:  GENERAL:  She is an elderly female, in no acute distress. VITAL SIGNS:  Her performance status is 1 to 2. Her weight is 167 pounds. Blood pressure is 96/53, pulse 81, respiratory rate is 20, temperature is 97.2. HEENT:  Remarkable for alopecia. She has pink conjunctivae, anicteric sclerae. LYMPHATICS:  She has no cervical, supraclavicular, or axillary adenopathy. LUNGS:  Resonant to percussion and clear to auscultation with no wheezing, rales, or rhonchi. HEART:  Normal.  ABDOMEN:  No hepatosplenomegaly or tenderness. EXTREMITIES:  She has no clubbing, cyanosis, or edema. LABORATORY DATA:  Her white count is 7.8, hemoglobin 8.8, platelets are 979. Her hemoglobin has gone up about 0.5. Her LDH is normal.  Her BUN, creatinine, and potassium are all normal.    IMPRESSION:  Diffuse large B-cell lymphoma. 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 She is high risk. She is tolerating RCHOP, but just barely. I will proceed with her third cycle, but she is going to have to come back on a weekly basis to have her PICC line re-dressed and flushed and then also to be seen for supportive measures. She will be back in 3 weeks for cycle 4. After her fourth cycle, I am planning on a PET scan. Between cycle 3 and cycle 4, Dr. Elizabeth Villegas is planning revision of her aneurysmal stent. Dictated By Darrius Madrid M.D.  d: 07/05/2022 12:55:28  t: 07/06/2022 02:19:34  Job [id number]  /    cc: Romayne Doctor, M.D. Marlane Jo, M.D.    Cyndi Mcclelland M.D.

## 2022-07-07 VITALS
OXYGEN SATURATION: 97 % | SYSTOLIC BLOOD PRESSURE: 132 MMHG | DIASTOLIC BLOOD PRESSURE: 68 MMHG | BODY MASS INDEX: 25 KG/M2 | HEART RATE: 99 BPM | TEMPERATURE: 98 F | WEIGHT: 176.19 LBS | RESPIRATION RATE: 18 BRPM

## 2022-07-07 LAB
BASOPHILS # BLD AUTO: 0.03 X10(3) UL (ref 0–0.2)
BASOPHILS NFR BLD AUTO: 0.1 %
BLOOD TYPE BARCODE: 5100
BLOOD TYPE BARCODE: 5100
EOSINOPHIL # BLD AUTO: 0 X10(3) UL (ref 0–0.7)
EOSINOPHIL NFR BLD AUTO: 0 %
ERYTHROCYTE [DISTWIDTH] IN BLOOD BY AUTOMATED COUNT: 21.8 %
GLUCOSE BLD-MCNC: 160 MG/DL (ref 70–99)
GLUCOSE BLD-MCNC: 242 MG/DL (ref 70–99)
HCT VFR BLD AUTO: 28.2 %
HGB BLD-MCNC: 8.7 G/DL
HGB BLD-MCNC: 8.7 G/DL
IMM GRANULOCYTES # BLD AUTO: 0.44 X10(3) UL (ref 0–1)
IMM GRANULOCYTES NFR BLD: 1.7 %
LYMPHOCYTES # BLD AUTO: 0.28 X10(3) UL (ref 1–4)
LYMPHOCYTES NFR BLD AUTO: 1.1 %
MAGNESIUM SERPL-MCNC: 2.2 MG/DL (ref 1.6–2.6)
MCH RBC QN AUTO: 28.8 PG (ref 26–34)
MCHC RBC AUTO-ENTMCNC: 30.9 G/DL (ref 31–37)
MCV RBC AUTO: 93.4 FL
MONOCYTES # BLD AUTO: 0.96 X10(3) UL (ref 0.1–1)
MONOCYTES NFR BLD AUTO: 3.7 %
NEUTROPHILS # BLD AUTO: 24.15 X10 (3) UL (ref 1.5–7.7)
NEUTROPHILS # BLD AUTO: 24.15 X10(3) UL (ref 1.5–7.7)
NEUTROPHILS NFR BLD AUTO: 93.4 %
PLATELET # BLD AUTO: 310 10(3)UL (ref 150–450)
POTASSIUM SERPL-SCNC: 4.9 MMOL/L (ref 3.5–5.1)
RBC # BLD AUTO: 3.02 X10(6)UL
WBC # BLD AUTO: 25.9 X10(3) UL (ref 4–11)

## 2022-07-07 PROCEDURE — 99213 OFFICE O/P EST LOW 20 MIN: CPT | Performed by: INTERNAL MEDICINE

## 2022-07-07 PROCEDURE — 99217 OBSERVATION CARE DISCHARGE: CPT | Performed by: STUDENT IN AN ORGANIZED HEALTH CARE EDUCATION/TRAINING PROGRAM

## 2022-07-07 RX ORDER — ACYCLOVIR 400 MG/1
400 TABLET ORAL 2 TIMES DAILY
Qty: 60 TABLET | Refills: 0 | Status: SHIPPED | OUTPATIENT
Start: 2022-07-07

## 2022-07-07 RX ORDER — PREDNISONE 50 MG/1
100 TABLET ORAL DAILY
Qty: 6 TABLET | Refills: 0 | Status: SHIPPED | OUTPATIENT
Start: 2022-07-08

## 2022-07-07 NOTE — PLAN OF CARE
Problem: GASTROINTESTINAL - ADULT  Goal: Minimal or absence of nausea and vomiting  Description: INTERVENTIONS:  - Maintain adequate hydration with IV or PO as ordered and tolerated  - Nasogastric tube to low intermittent suction as ordered  - Evaluate effectiveness of ordered antiemetic medications  - Provide nonpharmacologic comfort measures as appropriate  - Advance diet as tolerated, if ordered  - Obtain nutritional consult as needed  - Evaluate fluid balance  Outcome: Progressing     Problem: NEUROLOGICAL - ADULT  Goal: Achieves stable or improved neurological status  Description: INTERVENTIONS  - Assess for and report changes in neurological status  - Initiate measures to prevent increased intracranial pressure  - Maintain blood pressure and fluid volume within ordered parameters to optimize cerebral perfusion and minimize risk of hemorrhage  - Monitor temperature, glucose, and sodium.  Initiate appropriate interventions as ordered  Outcome: Progressing     Problem: RESPIRATORY - ADULT  Goal: Achieves optimal ventilation and oxygenation  Description: INTERVENTIONS:  - Assess for changes in respiratory status  - Assess for changes in mentation and behavior  - Position to facilitate oxygenation and minimize respiratory effort  - Oxygen supplementation based on oxygen saturation or ABGs  - Provide Smoking Cessation handout, if applicable  - Encourage broncho-pulmonary hygiene including cough, deep breathe, Incentive Spirometry  - Assess the need for suctioning and perform as needed  - Assess and instruct to report SOB or any respiratory difficulty  - Respiratory Therapy support as indicated  - Manage/alleviate anxiety  - Monitor for signs/symptoms of CO2 retention  Outcome: Progressing     Problem: METABOLIC/FLUID AND ELECTROLYTES - ADULT  Goal: Electrolytes maintained within normal limits  Description: INTERVENTIONS:  - Monitor labs and rhythm and assess patient for signs and symptoms of electrolyte imbalances  - Administer electrolyte replacement as ordered  - Monitor response to electrolyte replacements, including rhythm and repeat lab results as appropriate  - Fluid restriction as ordered  - Instruct patient on fluid and nutrition restrictions as appropriate  Outcome: Progressing  Goal: Hemodynamic stability and optimal renal function maintained  Description: INTERVENTIONS:  - Monitor labs and assess for signs and symptoms of volume excess or deficit  - Monitor intake, output and patient weight  - Monitor urine specific gravity, serum osmolarity and serum sodium as indicated or ordered  - Monitor response to interventions for patient's volume status, including labs, urine output, blood pressure (other measures as available)  - Encourage oral intake as appropriate  - Instruct patient on fluid and nutrition restrictions as appropriate  Outcome: Progressing     Problem: GASTROINTESTINAL - ADULT  Goal: Minimal or absence of nausea and vomiting  Description: INTERVENTIONS:  - Maintain adequate hydration with IV or PO as ordered and tolerated  - Nasogastric tube to low intermittent suction as ordered  - Evaluate effectiveness of ordered antiemetic medications  - Provide nonpharmacologic comfort measures as appropriate  - Advance diet as tolerated, if ordered  - Obtain nutritional consult as needed  - Evaluate fluid balance  Outcome: Progressing     Problem: NEUROLOGICAL - ADULT  Goal: Achieves stable or improved neurological status  Description: INTERVENTIONS  - Assess for and report changes in neurological status  - Initiate measures to prevent increased intracranial pressure  - Maintain blood pressure and fluid volume within ordered parameters to optimize cerebral perfusion and minimize risk of hemorrhage  - Monitor temperature, glucose, and sodium.  Initiate appropriate interventions as ordered  Outcome: Progressing     Problem: RESPIRATORY - ADULT  Goal: Achieves optimal ventilation and oxygenation  Description: INTERVENTIONS:  - Assess for changes in respiratory status  - Assess for changes in mentation and behavior  - Position to facilitate oxygenation and minimize respiratory effort  - Oxygen supplementation based on oxygen saturation or ABGs  - Provide Smoking Cessation handout, if applicable  - Encourage broncho-pulmonary hygiene including cough, deep breathe, Incentive Spirometry  - Assess the need for suctioning and perform as needed  - Assess and instruct to report SOB or any respiratory difficulty  - Respiratory Therapy support as indicated  - Manage/alleviate anxiety  - Monitor for signs/symptoms of CO2 retention  Outcome: Progressing     Problem: METABOLIC/FLUID AND ELECTROLYTES - ADULT  Goal: Electrolytes maintained within normal limits  Description: INTERVENTIONS:  - Monitor labs and rhythm and assess patient for signs and symptoms of electrolyte imbalances  - Administer electrolyte replacement as ordered  - Monitor response to electrolyte replacements, including rhythm and repeat lab results as appropriate  - Fluid restriction as ordered  - Instruct patient on fluid and nutrition restrictions as appropriate  Outcome: Progressing  Goal: Hemodynamic stability and optimal renal function maintained  Description: INTERVENTIONS:  - Monitor labs and assess for signs and symptoms of volume excess or deficit  - Monitor intake, output and patient weight  - Monitor urine specific gravity, serum osmolarity and serum sodium as indicated or ordered  - Monitor response to interventions for patient's volume status, including labs, urine output, blood pressure (other measures as available)  - Encourage oral intake as appropriate  - Instruct patient on fluid and nutrition restrictions as appropriate  Outcome: Progressing

## 2022-07-07 NOTE — PLAN OF CARE
Home Care Instructions                                                                                                                Diana Hernandez   MRN: [id number]    Department:  [facility], Emergency Dept   Date of Visit:  4/24/2017            [facility], Emergency Dept    [address] [facility] [address]    Phone:  [phone]      You were seen by     Rey Grimes D.O.      Your Diagnosis Was     Non-intractable cyclical vomiting with nausea     G43.A0       These are the medications you received during your hospitalization from 04/24/2017 0640 to 04/24/2017 1120     Date/Time Order Dose Route Action    04/24/2017 0927 lorazepam (ATIVAN) injection 1 mg 1 mg Intramuscular Given    04/24/2017 0930 ondansetron (ZOFRAN) syringe/vial injection 4 mg 4 mg Intramuscular Given    04/24/2017 0933 ketorolac (TORADOL) injection 60 mg Intramuscular Given    04/24/2017 1110 ondansetron (ZOFRAN ODT) dispertab 4 mg 4 mg Oral Given      Follow-up Information     1. Schedule an appointment as soon as possible for a visit with Blayne Blackburn M.D..    Specialty:  Gastroenterology    Contact information    [address]  [facility] [address]  [phone]          2. Schedule an appointment as soon as possible for a visit with Lavell Monte M.D..    Specialty:  Internal Medicine    Contact information    [address] [facility] [address]  [facility] [address] [phone]        Medication Information     Review all of your home medications and newly ordered medications with your primary doctor and/or pharmacist as soon as possible. Follow medication instructions as directed by your doctor and/or pharmacist.     Please keep your complete medication list with you and share with your physician. Update the information when medications are discontinued, doses are changed, or new medications (including over-the-counter products) are added; and carry medication information at all times in  Pt a/o x4. Received on RA, O2 drops to 87-88% when sleeping, 1-2L NC on. Tmax 101- prn tylenol given. Other VSS. No c/o pain at the moment. Meds taken per [facility]. IVF infusing. Pt up to bathroom w/ walker & 1x assist. CGM removed. Fall risk protocol followed, call light within reach. Problem: GASTROINTESTINAL - ADULT  Goal: Minimal or absence of nausea and vomiting  Description: INTERVENTIONS:  - Maintain adequate hydration with IV or PO as ordered and tolerated  - Nasogastric tube to low intermittent suction as ordered  - Evaluate effectiveness of ordered antiemetic medications  - Provide nonpharmacologic comfort measures as appropriate  - Advance diet as tolerated, if ordered  - Obtain nutritional consult as needed  - Evaluate fluid balance  Outcome: Progressing     Problem: NEUROLOGICAL - ADULT  Goal: Achieves stable or improved neurological status  Description: INTERVENTIONS  - Assess for and report changes in neurological status  - Initiate measures to prevent increased intracranial pressure  - Maintain blood pressure and fluid volume within ordered parameters to optimize cerebral perfusion and minimize risk of hemorrhage  - Monitor temperature, glucose, and sodium.  Initiate appropriate interventions as ordered  Outcome: Progressing     Problem: RESPIRATORY - ADULT  Goal: Achieves optimal ventilation and oxygenation  Description: INTERVENTIONS:  - Assess for changes in respiratory status  - Assess for changes in mentation and behavior  - Position to facilitate oxygenation and minimize respiratory effort  - Oxygen supplementation based on oxygen saturation or ABGs  - Provide Smoking Cessation handout, if applicable  - Encourage broncho-pulmonary hygiene including cough, deep breathe, Incentive Spirometry  - Assess the need for suctioning and perform as needed  - Assess and instruct to report SOB or any respiratory difficulty  - Respiratory Therapy support as indicated  - Manage/alleviate anxiety  - Monitor for signs/symptoms of CO2 retention  Outcome: Progressing     Problem: METABOLIC/FLUID AND ELECTROLYTES - ADULT  Goal: Electrolytes maintained within normal limits  Description: INTERVENTIONS:  - Monitor labs and rhythm and assess patient for signs and symptoms of electrolyte imbalances  - Administer electrolyte replacement as ordered  - Monitor response to electrolyte replacements, including rhythm and repeat lab results as appropriate  - Fluid restriction as ordered  - Instruct patient on fluid and nutrition restrictions as appropriate  Outcome: Progressing     Problem: HEMATOLOGIC - ADULT  Goal: Maintains hematologic stability  Description: INTERVENTIONS  - Assess for signs and symptoms of bleeding or hemorrhage  - Monitor labs and vital signs for trends  - Administer supportive blood products/factors, fluids and medications as ordered and appropriate  - Administer supportive blood products/factors as ordered and appropriate  Outcome: Progressing     Problem: MUSCULOSKELETAL - ADULT  Goal: Return mobility to safest level of function  Description: INTERVENTIONS:  - Assess patient stability and activity tolerance for standing, transferring and ambulating w/ or w/o assistive devices  - Assist with transfers and ambulation using safe patient handling equipment as needed  - Ensure adequate protection for wounds/incisions during mobilization  - Obtain PT/OT consults as needed  - Advance activity as appropriate  - Communicate ordered activity level and limitations with patient/family  Outcome: Progressing the event of emergency situations.               Medication List      START taking these medications        Instructions    Morning Afternoon Evening Bedtime    hydrOXYzine 50 MG Tabs   Commonly known as:  ATARAX        Take 1 Tab by mouth 3 times a day as needed for Anxiety.   Dose:  50 mg                          ASK your doctor about these medications        Instructions    Morning Afternoon Evening Bedtime    multivitamin Tabs        Take 1 Tab by mouth every day.   Dose:  1 Tab                        ondansetron 4 MG Tbdp   Last time this was given:  4 mg on 4/24/2017 11:10 AM   Commonly known as:  ZOFRAN ODT        Take 1 Tab by mouth every 8 hours as needed.   Dose:  4 mg                        * promethazine 25 MG Supp   What changed:  Another medication with the same name was added. Make sure you understand how and when to take each.   Commonly known as:  PHENERGAN   Ask about: Which instructions should I use?        Insert 1 Suppository in rectum every 6 hours as needed for Nausea/Vomiting.   Dose:  25 mg                        * promethazine 25 MG Tabs   What changed:  Another medication with the same name was added. Make sure you understand how and when to take each.   Commonly known as:  PHENERGAN   Ask about: Which instructions should I use?        Take 1 Tab by mouth every 6 hours as needed for Nausea/Vomiting.   Dose:  25 mg                        * promethazine 25 MG Tabs   What changed:  You were already taking a medication with the same name, and this prescription was added. Make sure you understand how and when to take each.   Commonly known as:  PHENERGAN   Ask about: Which instructions should I use?        Take 1 Tab by mouth every 6 hours as needed for Nausea/Vomiting.   Dose:  25 mg                        * promethazine 25 MG Tabs   What changed:  You were already taking a medication with the same name, and this prescription was added. Make sure you understand how and when to take each.      Commonly known as:  PHENERGAN   Ask about: Which instructions should I use?        Take 1 Tab by mouth every 6 hours as needed for Nausea/Vomiting.   Dose:  25 mg                        PROZAC 20 MG Caps   Generic drug:  fluoxetine        Take 20 mg by mouth every day.   Dose:  20 mg                        zolpidem 5 MG Tabs   Commonly known as:  AMBIEN        Take 1 Tab by mouth at bedtime as needed for Sleep.   Dose:  5 mg                        * Notice:  This list has 4 medication(s) that are the same as other medications prescribed for you. Read the directions carefully, and ask your doctor or other care provider to review them with you.         Where to Get Your Medications      These medications were sent to [id number] - [place], [place] - [address] [facility] [address]  [address] [address] [place] [address]     Phone:  [phone]    - promethazine 25 MG Tabs      You can get these medications from any pharmacy     Bring a paper prescription for each of these medications    - hydrOXYzine 50 MG Tabs  - promethazine 25 MG Tabs            Procedures and tests performed during your visit     CBC WITH DIFFERENTIAL    COMP METABOLIC PANEL    ESTIMATED GFR    LACTIC ACID    MAGNESIUM    POC URINE PREGNANCY    PROTHROMBIN TIME    URINALYSIS    URINE DRUG SCREEN    URINE MICROSCOPIC (W/UA)        Discharge Instructions       Gastritis, Adult  Gastritis is soreness and puffiness (inflammation) of the lining of the stomach. If you do not get help, gastritis can cause bleeding and sores (ulcers) in the stomach.  HOME CARE   · Only take medicine as told by your doctor.  · If you were given antibiotic medicines, take them as told. Finish the medicines even if you start to feel better.  · Drink enough fluids to keep your pee (urine) clear or pale yellow.  · Avoid foods and drinks that make your problems worse. Foods you may want to avoid include:  ¨ Caffeine or  alcohol.  ¨ Chocolate.  ¨ Mint.  ¨ Garlic and onions.  ¨ Spicy foods.  ¨ Citrus fruits, including oranges, taisha, or limes.  ¨ Food containing tomatoes, including sauce, chili, salsa, and pizza.  ¨ Fried and fatty foods.  · Eat small meals throughout the day instead of large meals.  GET HELP RIGHT AWAY IF:   · You have black or dark red poop (stools).  · You throw up (vomit) blood. It may look like coffee grounds.  · You cannot keep fluids down.  · Your belly (abdominal) pain gets worse.  · You have a fever.  · You do not feel better after 1 week.  · You have any other questions or concerns.  MAKE SURE YOU:   · Understand these instructions.  · Will watch your condition.  · Will get help right away if you are not doing well or get worse.     This information is not intended to replace advice given to you by your health care provider. Make sure you discuss any questions you have with your health care provider.     Document Released: 06/05/2009 Document Revised: 03/11/2013 Document Reviewed: 01/30/2013  [employer] Patient Education ©2016 [employer]            Patient Information     Patient Information    Following emergency treatment: all patient requiring follow-up care must return either to a private physician or a clinic if your condition worsens before you are able to obtain further medical attention, please return to the emergency room.     Billing Information    At [facility], we work to make the billing process streamlined for our patients.  Our Representatives are here to answer any questions you may have regarding your hospital bill.  If you have insurance coverage and have supplied your insurance information to us, we will submit a claim to your insurer on your behalf.  Should you have any questions regarding your bill, we can be reached online or by phone as follows:  Online: You are able pay your bills online or live chat with our representatives about any billing questions you may have.  We are here to help Monday - Friday from 8:00am to 7:30pm and 9:00am - 12:00pm on Saturdays.  Please visit [email or web address]  for more information.   Phone:  [phone] or [phone]    Please note that your emergency physician, surgeon, pathologist, radiologist, anesthesiologist, and other specialists are not employed by [facility] and will therefore bill separately for their services.  Please contact them directly for any questions concerning their bills at the numbers below:     Emergency Physician Services:  [phone]  [facility]:  [phone]  [facility]:  [phone]  [facility]:  [phone]    1. Your final bill may vary from the amount quoted upon discharge if all procedures are not complete at that time, or if your doctor has additional procedures of which we are not aware. You will receive an additional bill if you return to the Emergency Department at [facility] for suture removal regardless of the facility of which the sutures were placed.     2. Please arrange for settlement of this account at the emergency registration.    3. All self-pay accounts are due in full at the time of treatment.  If you are unable to meet this obligation then payment is expected within 4-5 days.     4. If you have had radiology studies (CT, X-ray, Ultrasound, MRI), you have received a preliminary result during your emergency department visit. Please contact the radiology department [phone] to receive a copy of your final result. Please discuss the Final result with your primary physician or with the follow up physician provided.     Crisis Hotline:  [place] Crisis Hotline:  4-650-QFDHJUJ or 1-214.575.6350  [place] Crisis Hotline:    1-484.577.1137 or 286-450-7144         ED Discharge Follow Up Questions    1. In order to provide you with very good care, we would like to follow up with a phone call in the next few days.   May we have your permission to contact you?     YES /  NO    2. What is the best phone number to call you? (       )_____-__________    3. What is the best time to call you?      Morning  /  Afternoon  /  Evening                   Patient Signature:  ____________________________________________________________    Date:  ____________________________________________________________      Your appointments     Apr 26, 2017  8:30 AM   Established Patient with Kenton Norwood M.D.   [facility] Med - Internal Medicine (--)    [facility] [address] [place] [id number] [phone]           You will be receiving a confirmation call a few days before your appointment from our automated call confirmation system.

## 2022-07-07 NOTE — PATIENT INSTRUCTIONS
POST-RADIATION INSTRUCTIONS:   - CALL [phone] FOR A FOLLOW-UP WITH DR. Alejandra Cockayne 3 MONTHS AFTER RADIATION COMPLETION  - FOLLOW-UP WITH DR. SHARPE 1-2 MONTHS AFTER RADIATION COMPLETION  - SIDE EFFECTS OF RADIATION WILL GRADUALLY SUBSIDE.  IT MAY TAKE Breath sounds clear and equal bilaterally.

## 2022-07-08 ENCOUNTER — APPOINTMENT (OUTPATIENT)
Dept: HEMATOLOGY/ONCOLOGY | Facility: HOSPITAL | Age: 82
End: 2022-07-08
Attending: INTERNAL MEDICINE
Payer: MEDICARE

## 2022-07-08 ENCOUNTER — TELEPHONE (OUTPATIENT)
Dept: HEMATOLOGY/ONCOLOGY | Facility: HOSPITAL | Age: 82
End: 2022-07-08

## 2022-07-08 NOTE — TELEPHONE ENCOUNTER
[facility] called. They said if they are going to be taking care of the pic line, they will need an order for supplies. Please saurav when able. TThank you.

## 2022-07-08 NOTE — TELEPHONE ENCOUNTER
Informed cameron Lambert [facility] AT [facility] RN- pt has appt at cancer center this coming Tuesday, 7/12. PICC dressing will be changed at that time. In future if pt needs [facility] to care for PICC/dressing, will send orders.

## 2022-07-11 ENCOUNTER — TELEPHONE (OUTPATIENT)
Dept: INTERNAL MEDICINE CLINIC | Facility: CLINIC | Age: 82
End: 2022-07-11

## 2022-07-11 NOTE — TELEPHONE ENCOUNTER
LOV 7/1/22    From other related TE:     Cheyenne Spencer 7/11/22 1:41 PM  Note  Pedro from [facility] stated there is a 3cm skin tear on left lower exterminity and it is weaping and there is swelling. Pedro would like a wound care order for calcium alginate and ABD pad.  Ok to call with verbal ok

## 2022-07-11 NOTE — TELEPHONE ENCOUNTER
Pedro from [facility] stated there is a 3cm skin tear on left lower exterminity and it is weaping and there is swelling. Pedro would like a wound care order for calcium alginate and ABD pad.  Ok to call with verbal ok

## 2022-07-11 NOTE — TELEPHONE ENCOUNTER
Geneva Grant calling to let AS know that pt started wound care 7-8 for a chronic wound and they will also be monitoring her oxygen since she has a tank. Mali Pierson also asking if dysphagia DX is related to pt's TIA. Coding needing to know where the DX came from.

## 2022-07-11 NOTE — TELEPHONE ENCOUNTER
Saul Polanco from [facility] aware wound care okay. AS, for coding, [facility] needs to know if dysphagia related to stroke. Has had dysphagia since 2013 per problem list. Stroke was more recent.

## 2022-07-12 ENCOUNTER — APPOINTMENT (OUTPATIENT)
Dept: GENERAL RADIOLOGY | Facility: HOSPITAL | Age: 82
End: 2022-07-12
Attending: EMERGENCY MEDICINE
Payer: MEDICARE

## 2022-07-12 ENCOUNTER — HOSPITAL ENCOUNTER (INPATIENT)
Facility: HOSPITAL | Age: 82
LOS: 1 days | Discharge: HOME | End: 2022-07-13
Attending: EMERGENCY MEDICINE | Admitting: HOSPITALIST
Payer: MEDICARE

## 2022-07-12 ENCOUNTER — OFFICE VISIT (OUTPATIENT)
Dept: HEMATOLOGY/ONCOLOGY | Facility: HOSPITAL | Age: 82
End: 2022-07-12
Attending: INTERNAL MEDICINE
Payer: MEDICARE

## 2022-07-12 ENCOUNTER — HOSPITAL ENCOUNTER (OUTPATIENT)
Facility: HOSPITAL | Age: 82
Setting detail: OBSERVATION
Discharge: HOME | End: 2022-07-13
Attending: EMERGENCY MEDICINE | Admitting: HOSPITALIST
Payer: MEDICARE

## 2022-07-12 VITALS
DIASTOLIC BLOOD PRESSURE: 55 MMHG | HEART RATE: 66 BPM | RESPIRATION RATE: 16 BRPM | OXYGEN SATURATION: 94 % | SYSTOLIC BLOOD PRESSURE: 115 MMHG | TEMPERATURE: 98 F

## 2022-07-12 DIAGNOSIS — T45.1X5A CHEMOTHERAPY-INDUCED THROMBOCYTOPENIA: ICD-10-CM

## 2022-07-12 DIAGNOSIS — C83.38 DIFFUSE LARGE B-CELL LYMPHOMA OF LYMPH NODES OF MULTIPLE REGIONS (HCC): ICD-10-CM

## 2022-07-12 DIAGNOSIS — C83.38 DIFFUSE LARGE B-CELL LYMPHOMA OF LYMPH NODES OF MULTIPLE REGIONS (HCC): Primary | ICD-10-CM

## 2022-07-12 DIAGNOSIS — D70.1 AGRANULOCYTOSIS SECONDARY TO CANCER CHEMOTHERAPY (HCC): ICD-10-CM

## 2022-07-12 DIAGNOSIS — D70.9 NEUTROPENIA, UNSPECIFIED TYPE (HCC): ICD-10-CM

## 2022-07-12 DIAGNOSIS — E87.71 TRANSFUSION ASSOCIATED CIRCULATORY OVERLOAD: Primary | ICD-10-CM

## 2022-07-12 DIAGNOSIS — R06.00 DYSPNEA, UNSPECIFIED TYPE: ICD-10-CM

## 2022-07-12 DIAGNOSIS — D69.59 CHEMOTHERAPY-INDUCED THROMBOCYTOPENIA: ICD-10-CM

## 2022-07-12 DIAGNOSIS — T45.1X5A AGRANULOCYTOSIS SECONDARY TO CANCER CHEMOTHERAPY (HCC): ICD-10-CM

## 2022-07-12 DIAGNOSIS — I95.89 HYPOTENSION DUE TO HYPOVOLEMIA: ICD-10-CM

## 2022-07-12 DIAGNOSIS — D69.6 THROMBOCYTOPENIA (HCC): ICD-10-CM

## 2022-07-12 DIAGNOSIS — R53.1 WEAKNESS: ICD-10-CM

## 2022-07-12 DIAGNOSIS — E86.1 HYPOTENSION DUE TO HYPOVOLEMIA: ICD-10-CM

## 2022-07-12 DIAGNOSIS — C85.90 LYMPHOMA, UNSPECIFIED BODY REGION, UNSPECIFIED LYMPHOMA TYPE (HCC): Primary | ICD-10-CM

## 2022-07-12 DIAGNOSIS — E86.0 DEHYDRATION: ICD-10-CM

## 2022-07-12 PROBLEM — E87.70 HYPERVOLEMIA: Status: ACTIVE | Noted: 2022-07-12

## 2022-07-12 PROBLEM — E87.3 METABOLIC ALKALOSIS: Status: ACTIVE | Noted: 2022-07-12

## 2022-07-12 PROBLEM — R79.89 AZOTEMIA: Status: ACTIVE | Noted: 2022-07-12

## 2022-07-12 LAB
ALBUMIN SERPL-MCNC: 2.6 G/DL (ref 3.4–5)
ALBUMIN/GLOB SERPL: 1.1 {RATIO} (ref 1–2)
ALP LIVER SERPL-CCNC: 76 U/L
ALT SERPL-CCNC: 44 U/L
ANION GAP SERPL CALC-SCNC: 6 MMOL/L (ref 0–18)
ANTIBODY SCREEN: NEGATIVE
AST SERPL-CCNC: 15 U/L (ref 15–37)
BASOPHILS # BLD AUTO: 0 X10(3) UL (ref 0–0.2)
BASOPHILS # BLD AUTO: 0.01 X10(3) UL (ref 0–0.2)
BASOPHILS NFR BLD AUTO: 0 %
BASOPHILS NFR BLD AUTO: 3.8 %
BILIRUB SERPL-MCNC: 0.9 MG/DL (ref 0.1–2)
BILIRUB UR QL STRIP.AUTO: NEGATIVE
BUN BLD-MCNC: 28 MG/DL (ref 7–18)
CALCIUM BLD-MCNC: 8.7 MG/DL (ref 8.5–10.1)
CHLORIDE SERPL-SCNC: 98 MMOL/L (ref 98–112)
CLARITY UR REFRACT.AUTO: CLEAR
CO2 SERPL-SCNC: 34 MMOL/L (ref 21–32)
COLOR UR AUTO: YELLOW
CREAT BLD-MCNC: 0.8 MG/DL
EOSINOPHIL # BLD AUTO: 0 X10(3) UL (ref 0–0.7)
EOSINOPHIL # BLD AUTO: 0 X10(3) UL (ref 0–0.7)
EOSINOPHIL NFR BLD AUTO: 0 %
EOSINOPHIL NFR BLD AUTO: 0 %
ERYTHROCYTE [DISTWIDTH] IN BLOOD BY AUTOMATED COUNT: 19.7 %
ERYTHROCYTE [DISTWIDTH] IN BLOOD BY AUTOMATED COUNT: 19.9 %
GLOBULIN PLAS-MCNC: 2.4 G/DL (ref 2.8–4.4)
GLUCOSE BLD-MCNC: 258 MG/DL (ref 70–99)
GLUCOSE UR STRIP.AUTO-MCNC: 50 MG/DL
HCT VFR BLD AUTO: 25.2 %
HCT VFR BLD AUTO: 28.2 %
HGB BLD-MCNC: 7.8 G/DL
HGB BLD-MCNC: 8.9 G/DL
IMM GRANULOCYTES # BLD AUTO: 0.01 X10(3) UL (ref 0–1)
IMM GRANULOCYTES # BLD AUTO: 0.01 X10(3) UL (ref 0–1)
IMM GRANULOCYTES NFR BLD: 3.4 %
IMM GRANULOCYTES NFR BLD: 3.8 %
KETONES UR STRIP.AUTO-MCNC: NEGATIVE MG/DL
LYMPHOCYTES # BLD AUTO: 0.06 X10(3) UL (ref 1–4)
LYMPHOCYTES # BLD AUTO: 0.07 X10(3) UL (ref 1–4)
LYMPHOCYTES NFR BLD AUTO: 23.1 %
LYMPHOCYTES NFR BLD AUTO: 24.1 %
MCH RBC QN AUTO: 28.7 PG (ref 26–34)
MCH RBC QN AUTO: 29.5 PG (ref 26–34)
MCHC RBC AUTO-ENTMCNC: 31 G/DL (ref 31–37)
MCHC RBC AUTO-ENTMCNC: 31.6 G/DL (ref 31–37)
MCV RBC AUTO: 92.6 FL
MCV RBC AUTO: 93.4 FL
MONOCYTES # BLD AUTO: 0.08 X10(3) UL (ref 0.1–1)
MONOCYTES # BLD AUTO: 0.08 X10(3) UL (ref 0.1–1)
MONOCYTES NFR BLD AUTO: 27.6 %
MONOCYTES NFR BLD AUTO: 30.8 %
NEUTROPHILS # BLD AUTO: 0.1 X10 (3) UL (ref 1.5–7.7)
NEUTROPHILS # BLD AUTO: 0.1 X10(3) UL (ref 1.5–7.7)
NEUTROPHILS # BLD AUTO: 0.13 X10 (3) UL (ref 1.5–7.7)
NEUTROPHILS # BLD AUTO: 0.13 X10(3) UL (ref 1.5–7.7)
NEUTROPHILS NFR BLD AUTO: 38.5 %
NEUTROPHILS NFR BLD AUTO: 44.9 %
NITRITE UR QL STRIP.AUTO: NEGATIVE
NT-PROBNP SERPL-MCNC: 4530 PG/ML (ref ?–450)
OSMOLALITY SERPL CALC.SUM OF ELEC: 300 MOSM/KG (ref 275–295)
PH UR STRIP.AUTO: 7 [PH] (ref 5–8)
PLATELET # BLD AUTO: 20 10(3)UL (ref 150–450)
PLATELET # BLD AUTO: 49 10(3)UL (ref 150–450)
POTASSIUM SERPL-SCNC: 4 MMOL/L (ref 3.5–5.1)
PROCALCITONIN SERPL-MCNC: <0.05 NG/ML (ref ?–0.16)
PROT SERPL-MCNC: 5 G/DL (ref 6.4–8.2)
PROT UR STRIP.AUTO-MCNC: NEGATIVE MG/DL
RBC # BLD AUTO: 2.72 X10(6)UL
RBC # BLD AUTO: 3.02 X10(6)UL
RBC UR QL AUTO: NEGATIVE
RH BLOOD TYPE: POSITIVE
SARS-COV-2 RNA RESP QL NAA+PROBE: NOT DETECTED
SODIUM SERPL-SCNC: 138 MMOL/L (ref 136–145)
SP GR UR STRIP.AUTO: 1.01 (ref 1–1.03)
UROBILINOGEN UR STRIP.AUTO-MCNC: <2 MG/DL
WBC # BLD AUTO: 0.3 X10(3) UL (ref 4–11)
WBC # BLD AUTO: 0.3 X10(3) UL (ref 4–11)

## 2022-07-12 PROCEDURE — 86850 RBC ANTIBODY SCREEN: CPT

## 2022-07-12 PROCEDURE — 99215 OFFICE O/P EST HI 40 MIN: CPT | Performed by: NURSE PRACTITIONER

## 2022-07-12 PROCEDURE — 85025 COMPLETE CBC W/AUTO DIFF WBC: CPT

## 2022-07-12 PROCEDURE — 96361 HYDRATE IV INFUSION ADD-ON: CPT

## 2022-07-12 PROCEDURE — 86900 BLOOD TYPING SEROLOGIC ABO: CPT

## 2022-07-12 PROCEDURE — 80053 COMPREHEN METABOLIC PANEL: CPT

## 2022-07-12 PROCEDURE — 71045 X-RAY EXAM CHEST 1 VIEW: CPT | Performed by: EMERGENCY MEDICINE

## 2022-07-12 PROCEDURE — 86901 BLOOD TYPING SEROLOGIC RH(D): CPT

## 2022-07-12 PROCEDURE — 36430 TRANSFUSION BLD/BLD COMPNT: CPT

## 2022-07-12 PROCEDURE — 99220 INITIAL OBSERVATION CARE,LEVL III: CPT | Performed by: HOSPITALIST

## 2022-07-12 PROCEDURE — 96360 HYDRATION IV INFUSION INIT: CPT

## 2022-07-12 RX ORDER — NICOTINE POLACRILEX 4 MG
15 LOZENGE BUCCAL
Status: DISCONTINUED | OUTPATIENT
Start: 2022-07-12 | End: 2022-07-13

## 2022-07-12 RX ORDER — ONDANSETRON 2 MG/ML
4 INJECTION INTRAMUSCULAR; INTRAVENOUS EVERY 6 HOURS PRN
Status: DISCONTINUED | OUTPATIENT
Start: 2022-07-12 | End: 2022-07-13

## 2022-07-12 RX ORDER — METOCLOPRAMIDE HYDROCHLORIDE 5 MG/ML
10 INJECTION INTRAMUSCULAR; INTRAVENOUS EVERY 8 HOURS PRN
Status: DISCONTINUED | OUTPATIENT
Start: 2022-07-12 | End: 2022-07-13

## 2022-07-12 RX ORDER — ACYCLOVIR 400 MG/1
400 TABLET ORAL 2 TIMES DAILY
Status: DISCONTINUED | OUTPATIENT
Start: 2022-07-12 | End: 2022-07-13

## 2022-07-12 RX ORDER — SULFAMETHOXAZOLE AND TRIMETHOPRIM 800; 160 MG/1; MG/1
1 TABLET ORAL
Status: DISCONTINUED | OUTPATIENT
Start: 2022-07-13 | End: 2022-07-13

## 2022-07-12 RX ORDER — ACETAMINOPHEN 500 MG
500 TABLET ORAL EVERY 4 HOURS PRN
Status: DISCONTINUED | OUTPATIENT
Start: 2022-07-12 | End: 2022-07-13

## 2022-07-12 RX ORDER — MELATONIN
3 NIGHTLY PRN
Status: DISCONTINUED | OUTPATIENT
Start: 2022-07-12 | End: 2022-07-13

## 2022-07-12 RX ORDER — ASPIRIN 81 MG/1
81 TABLET ORAL DAILY
Status: DISCONTINUED | OUTPATIENT
Start: 2022-07-13 | End: 2022-07-13

## 2022-07-12 RX ORDER — ALBUTEROL SULFATE 90 UG/1
2 AEROSOL, METERED RESPIRATORY (INHALATION) EVERY 4 HOURS PRN
Status: DISCONTINUED | OUTPATIENT
Start: 2022-07-12 | End: 2022-07-13

## 2022-07-12 RX ORDER — LEVOTHYROXINE SODIUM 0.05 MG/1
50 TABLET ORAL
Status: DISCONTINUED | OUTPATIENT
Start: 2022-07-13 | End: 2022-07-13

## 2022-07-12 RX ORDER — ACETAMINOPHEN 325 MG/1
650 TABLET ORAL ONCE
Status: COMPLETED | OUTPATIENT
Start: 2022-07-12 | End: 2022-07-12

## 2022-07-12 RX ORDER — SODIUM PHOSPHATE, DIBASIC AND SODIUM PHOSPHATE, MONOBASIC 7; 19 G/133ML; G/133ML
1 ENEMA RECTAL ONCE AS NEEDED
Status: DISCONTINUED | OUTPATIENT
Start: 2022-07-12 | End: 2022-07-13

## 2022-07-12 RX ORDER — PREDNISONE 50 MG/1
100 TABLET ORAL DAILY
Status: DISCONTINUED | OUTPATIENT
Start: 2022-07-13 | End: 2022-07-13

## 2022-07-12 RX ORDER — PANTOPRAZOLE SODIUM 20 MG/1
20 TABLET, DELAYED RELEASE ORAL
Status: DISCONTINUED | OUTPATIENT
Start: 2022-07-13 | End: 2022-07-13

## 2022-07-12 RX ORDER — FLUCONAZOLE 100 MG/1
200 TABLET ORAL
Status: DISCONTINUED | OUTPATIENT
Start: 2022-07-13 | End: 2022-07-13

## 2022-07-12 RX ORDER — FUROSEMIDE 10 MG/ML
20 INJECTION INTRAMUSCULAR; INTRAVENOUS ONCE
Status: COMPLETED | OUTPATIENT
Start: 2022-07-12 | End: 2022-07-12

## 2022-07-12 RX ORDER — ATORVASTATIN CALCIUM 20 MG/1
20 TABLET, FILM COATED ORAL NIGHTLY
Refills: 1 | Status: DISCONTINUED | OUTPATIENT
Start: 2022-07-12 | End: 2022-07-13

## 2022-07-12 RX ORDER — POLYETHYLENE GLYCOL 3350 17 G/17G
17 POWDER, FOR SOLUTION ORAL DAILY PRN
Status: DISCONTINUED | OUTPATIENT
Start: 2022-07-12 | End: 2022-07-13

## 2022-07-12 RX ORDER — SENNOSIDES 8.6 MG
17.2 TABLET ORAL NIGHTLY PRN
Status: DISCONTINUED | OUTPATIENT
Start: 2022-07-12 | End: 2022-07-13

## 2022-07-12 RX ORDER — ACETAMINOPHEN 325 MG/1
650 TABLET ORAL ONCE
Status: CANCELLED | OUTPATIENT
Start: 2022-07-12

## 2022-07-12 RX ORDER — SODIUM CHLORIDE 9 MG/ML
500 INJECTION, SOLUTION INTRAVENOUS ONCE
Status: COMPLETED | OUTPATIENT
Start: 2022-07-12 | End: 2022-07-12

## 2022-07-12 RX ORDER — NICOTINE POLACRILEX 4 MG
30 LOZENGE BUCCAL
Status: DISCONTINUED | OUTPATIENT
Start: 2022-07-12 | End: 2022-07-13

## 2022-07-12 RX ORDER — FUROSEMIDE 10 MG/ML
40 INJECTION INTRAMUSCULAR; INTRAVENOUS ONCE
Status: DISCONTINUED | OUTPATIENT
Start: 2022-07-12 | End: 2022-07-12

## 2022-07-12 RX ORDER — DEXTROSE MONOHYDRATE 25 G/50ML
50 INJECTION, SOLUTION INTRAVENOUS
Status: DISCONTINUED | OUTPATIENT
Start: 2022-07-12 | End: 2022-07-13

## 2022-07-12 RX ORDER — LEVOFLOXACIN 500 MG/1
500 TABLET, FILM COATED ORAL DAILY
Qty: 5 TABLET | Refills: 0 | Status: SHIPPED | OUTPATIENT
Start: 2022-07-12 | End: 2022-07-19

## 2022-07-12 RX ORDER — ONDANSETRON 2 MG/ML
4 INJECTION INTRAMUSCULAR; INTRAVENOUS EVERY 4 HOURS PRN
Status: ACTIVE | OUTPATIENT
Start: 2022-07-12 | End: 2022-07-12

## 2022-07-12 RX ORDER — BISACODYL 10 MG
10 SUPPOSITORY, RECTAL RECTAL
Status: DISCONTINUED | OUTPATIENT
Start: 2022-07-12 | End: 2022-07-13

## 2022-07-12 RX ADMIN — SODIUM CHLORIDE 500 ML: 9 INJECTION, SOLUTION INTRAVENOUS at 13:25:00

## 2022-07-12 RX ADMIN — ACETAMINOPHEN 650 MG: 325 TABLET ORAL at 15:18:00

## 2022-07-12 NOTE — PROGRESS NOTES
Patient presents with:  Hospital F/U: APN assessment    Pt is here for post hospital follow up. She was last treated on 07/05/2022 C3 D1 RCHOP; was admitted following last chemo for syncope and dehydration. She does not feel very steady on her feet; will start home OT/PT tomorrow. Feels that she is eating better; trying to stay hydrated. BP was low in office - APN notified and gentle hydration was initiated. Denies N,V,D,C, or pain. Has multiple wounds that [place] is managing. Does feel fatigued with weakness.     Education Record    Learner:  Patient    Disease / Diagnosis: DLBCL    Barriers / Limitations:  None   Comments:    Method:  Brief focused   Comments:    General Topics:  Diet, Medication, Pain, Side effects and symptom management and Plan of care reviewed   Comments:    Outcome:  Shows understanding   Comments:

## 2022-07-12 NOTE — PROGRESS NOTES
Education Record    Learner:  Patient    Disease / Diagnosis: hypotension/thrombocytopenia    Barriers / Limitations:  None   Comments:    Method:  Brief focused   Comments:    General Topics:  Plan of care reviewed   Comments:    Outcome:  Shows understanding   Comments:    Received 500ml/2hours today, along with 1 unit of platelets. Patient encouraged to push more fluids. Will be stopping her xarelto at this time due to low platelets. levaquin to start for the next 5 days due to neutropenia. Will be returning in 2 days for recheck of labs and possible fluids.

## 2022-07-12 NOTE — PROGRESS NOTES
Platelets were completed. Upon discharge, vitals were taken. Pulse ox reading 82% on RA. Patient stated she was experiencing SOB. Was not sure when it started. Developed slight dry cough. Denies any chest/back pain, itching or rash. Armando JUAREZ to treatment area.  MD aware; Patient brought to ER via wheelchair with 4L O2 via NC.

## 2022-07-12 NOTE — ED INITIAL ASSESSMENT (HPI)
Patient was at the Cancer center receiving platelet infusion. She became hypoxic during infusion- 82% RA, patient does have shortness of breath, crackles, and wheezing. Patient has been receiving chemo, last infusion was 7/5. Patient was also recently hospitalized for hypotension and released on 7/8.

## 2022-07-12 NOTE — PATIENT INSTRUCTIONS
Stop: xarelto (rivaroxaban) due to low platelets    Start: levofloxacin 500 mg tab: 1 tab daily for the next 5 days to protect you given low white blood cells    Increase: fluid intake to at least 4 bottles of water (as close to 60 oz of fluid as you can get)    Recheck labs in 2 days with possible IV fluids. Call our office right away if you develop any shaking chills you cannot control or a fever of 100.5.

## 2022-07-12 NOTE — ED QUICK NOTES
Orders for admission, patient is aware of plan and ready to go upstairs. Any questions, please call ED RN Latia Nova  at extension [phone]. Vaccinated? Yes  Type of COVID test sent: Rapid PCR, negative. COVID Suspicion level: Low      Titratable drug(s) infusing:  Rate: None    LOC at time of transport: A&Ox3    Other pertinent information: Hypoxic at [facility] after Platelet infusion. Recently hospitalized for hypotension and discharged on 7/8.     CIWA score=  NIH score=

## 2022-07-13 ENCOUNTER — APPOINTMENT (OUTPATIENT)
Dept: CV DIAGNOSTICS | Facility: HOSPITAL | Age: 82
End: 2022-07-13
Attending: INTERNAL MEDICINE
Payer: MEDICARE

## 2022-07-13 VITALS
TEMPERATURE: 98 F | OXYGEN SATURATION: 100 % | BODY MASS INDEX: 25 KG/M2 | WEIGHT: 176.81 LBS | DIASTOLIC BLOOD PRESSURE: 47 MMHG | SYSTOLIC BLOOD PRESSURE: 104 MMHG | HEART RATE: 62 BPM | RESPIRATION RATE: 28 BRPM

## 2022-07-13 LAB
ANION GAP SERPL CALC-SCNC: 1 MMOL/L (ref 0–18)
ATRIAL RATE: 94 BPM
BASOPHILS # BLD AUTO: 0 X10(3) UL (ref 0–0.2)
BASOPHILS NFR BLD AUTO: 0 %
BLOOD TYPE BARCODE: 5100
BUN BLD-MCNC: 17 MG/DL (ref 7–18)
CALCIUM BLD-MCNC: 8.4 MG/DL (ref 8.5–10.1)
CHLORIDE SERPL-SCNC: 99 MMOL/L (ref 98–112)
CO2 SERPL-SCNC: 38 MMOL/L (ref 21–32)
CREAT BLD-MCNC: 0.52 MG/DL
EOSINOPHIL # BLD AUTO: 0.01 X10(3) UL (ref 0–0.7)
EOSINOPHIL NFR BLD AUTO: 3.7 %
ERYTHROCYTE [DISTWIDTH] IN BLOOD BY AUTOMATED COUNT: 19.6 %
GLUCOSE BLD-MCNC: 185 MG/DL (ref 70–99)
GLUCOSE BLD-MCNC: 186 MG/DL (ref 70–99)
GLUCOSE BLD-MCNC: 192 MG/DL (ref 70–99)
GLUCOSE BLD-MCNC: 194 MG/DL (ref 70–99)
HCT VFR BLD AUTO: 23.4 %
HGB BLD-MCNC: 7.3 G/DL
IMM GRANULOCYTES # BLD AUTO: 0 X10(3) UL (ref 0–1)
IMM GRANULOCYTES NFR BLD: 0 %
LYMPHOCYTES # BLD AUTO: 0.07 X10(3) UL (ref 1–4)
LYMPHOCYTES NFR BLD AUTO: 25.9 %
MCH RBC QN AUTO: 29.1 PG (ref 26–34)
MCHC RBC AUTO-ENTMCNC: 31.2 G/DL (ref 31–37)
MCV RBC AUTO: 93.2 FL
MONOCYTES # BLD AUTO: 0.1 X10(3) UL (ref 0.1–1)
MONOCYTES NFR BLD AUTO: 37 %
NEUTROPHILS # BLD AUTO: 0.09 X10 (3) UL (ref 1.5–7.7)
NEUTROPHILS # BLD AUTO: 0.09 X10(3) UL (ref 1.5–7.7)
NEUTROPHILS NFR BLD AUTO: 33.4 %
OSMOLALITY SERPL CALC.SUM OF ELEC: 292 MOSM/KG (ref 275–295)
PLATELET # BLD AUTO: 33 10(3)UL (ref 150–450)
POTASSIUM SERPL-SCNC: 3.9 MMOL/L (ref 3.5–5.1)
Q-T INTERVAL: 352 MS
QRS DURATION: 80 MS
QTC CALCULATION (BEZET): 428 MS
R AXIS: 48 DEGREES
RBC # BLD AUTO: 2.51 X10(6)UL
SODIUM SERPL-SCNC: 138 MMOL/L (ref 136–145)
T AXIS: 12 DEGREES
VENTRICULAR RATE: 89 BPM
WBC # BLD AUTO: 0.3 X10(3) UL (ref 4–11)

## 2022-07-13 PROCEDURE — 99239 HOSP IP/OBS DSCHRG MGMT >30: CPT | Performed by: HOSPITALIST

## 2022-07-13 PROCEDURE — 99222 1ST HOSP IP/OBS MODERATE 55: CPT | Performed by: INTERNAL MEDICINE

## 2022-07-13 PROCEDURE — 93306 TTE W/DOPPLER COMPLETE: CPT | Performed by: INTERNAL MEDICINE

## 2022-07-13 NOTE — PLAN OF CARE
Patient alert and oriented x 4, lungs diminished on 2 liters nasal cannula, abdomen soft, rounded, had BM today, voiding adequate amounts, continent of bowel and bladder, denies pain, dual lumen PICC dressing CDI, changed today at [facility], saline locked, right elbow skin tear dressed with mepilex, new dressing applied to right toe and left shin, bilateral lower extremity edema 2+, continuous telemetry and POX, QID accu checks ordered, admission navigator completed and home meds ordered per MD.

## 2022-07-13 NOTE — CM/SW NOTE
07/13/22 1000   CM/SW Referral Data   Referral Source Social Work (self-referral)   Reason for Referral Discharge planning   Informant Patient   Patient [address]   Patient lives with Son   Discharge Needs   Anticipated D/C needs Home health care     SW met with patient to discuss DC planning needs and home assessment. Patient reported that she lives with her son, Odessa Mendez, and that she is independent with ADL's. If she does need assistance with something Odessa Mendez is able to assist. Patient stated that she has a walker, wheelchair for transports, a shower chair and bedside commode. Patient also reported working with [facility] for RN and PT services and would like to continue with them at ID. SW confirmed with [facility] that patient is current with them. SW placed resume of care orders for [facility] RN and PT services and notified [facility] liaison. SW will continue to follow for plan of care changes and remain available for any additional DC needs or concerns.      Rubina Bolanos MSW, LSW  Discharge Planner   [phone]

## 2022-07-13 NOTE — PLAN OF CARE
NURSING DISCHARGE NOTE    Discharged Home via Wheelchair. Accompanied by Support staff  Belongings Taken by patient/family. Discharge instructions given and explained, verbalized understanding. No changes made to medications. PICC to R arm flushed per protocol. VSS, afebrile. 94% on room air. All questions answered.

## 2022-07-14 ENCOUNTER — TELEPHONE (OUTPATIENT)
Dept: INTERNAL MEDICINE CLINIC | Facility: CLINIC | Age: 82
End: 2022-07-14

## 2022-07-14 ENCOUNTER — APPOINTMENT (OUTPATIENT)
Dept: HEMATOLOGY/ONCOLOGY | Facility: HOSPITAL | Age: 82
End: 2022-07-14
Attending: INTERNAL MEDICINE
Payer: MEDICARE

## 2022-07-19 ENCOUNTER — NURSE ONLY (OUTPATIENT)
Dept: HEMATOLOGY/ONCOLOGY | Facility: HOSPITAL | Age: 82
End: 2022-07-19
Attending: INTERNAL MEDICINE
Payer: MEDICARE

## 2022-07-19 VITALS
TEMPERATURE: 98 F | SYSTOLIC BLOOD PRESSURE: 106 MMHG | RESPIRATION RATE: 16 BRPM | HEART RATE: 90 BPM | DIASTOLIC BLOOD PRESSURE: 70 MMHG | OXYGEN SATURATION: 98 %

## 2022-07-19 PROCEDURE — 99211 OFF/OP EST MAY X REQ PHY/QHP: CPT

## 2022-07-20 ENCOUNTER — PATIENT MESSAGE (OUTPATIENT)
Dept: HEMATOLOGY/ONCOLOGY | Facility: HOSPITAL | Age: 82
End: 2022-07-20

## 2022-07-20 ENCOUNTER — TELEPHONE (OUTPATIENT)
Dept: HEMATOLOGY/ONCOLOGY | Facility: HOSPITAL | Age: 82
End: 2022-07-20

## 2022-07-20 ENCOUNTER — LAB REQUISITION (OUTPATIENT)
Dept: LAB | Facility: HOSPITAL | Age: 82
End: 2022-07-20
Payer: MEDICARE

## 2022-07-20 DIAGNOSIS — D70.0 CONGENITAL AGRANULOCYTOSIS (HCC): ICD-10-CM

## 2022-07-20 DIAGNOSIS — C83.38 DIFFUSE LARGE B-CELL LYMPHOMA OF LYMPH NODES OF MULTIPLE REGIONS (HCC): Primary | ICD-10-CM

## 2022-07-20 DIAGNOSIS — R53.1 WEAKNESS: ICD-10-CM

## 2022-07-20 LAB
ALBUMIN SERPL-MCNC: 2.6 G/DL (ref 3.4–5)
ALBUMIN/GLOB SERPL: 1.1 {RATIO} (ref 1–2)
ALP LIVER SERPL-CCNC: 72 U/L
ALT SERPL-CCNC: 14 U/L
ANION GAP SERPL CALC-SCNC: 4 MMOL/L (ref 0–18)
AST SERPL-CCNC: 6 U/L (ref 15–37)
BASOPHILS # BLD AUTO: 0.02 X10(3) UL (ref 0–0.2)
BASOPHILS NFR BLD AUTO: 0.3 %
BILIRUB SERPL-MCNC: 0.3 MG/DL (ref 0.1–2)
BUN BLD-MCNC: 9 MG/DL (ref 7–18)
CALCIUM BLD-MCNC: 8.1 MG/DL (ref 8.5–10.1)
CHLORIDE SERPL-SCNC: 108 MMOL/L (ref 98–112)
CO2 SERPL-SCNC: 28 MMOL/L (ref 21–32)
CREAT BLD-MCNC: 0.66 MG/DL
EOSINOPHIL # BLD AUTO: 0.01 X10(3) UL (ref 0–0.7)
EOSINOPHIL NFR BLD AUTO: 0.2 %
ERYTHROCYTE [DISTWIDTH] IN BLOOD BY AUTOMATED COUNT: 22.5 %
GLOBULIN PLAS-MCNC: 2.4 G/DL (ref 2.8–4.4)
GLUCOSE BLD-MCNC: 125 MG/DL (ref 70–99)
HCT VFR BLD AUTO: 27.1 %
HGB BLD-MCNC: 8.2 G/DL
IMM GRANULOCYTES # BLD AUTO: 0.1 X10(3) UL (ref 0–1)
IMM GRANULOCYTES NFR BLD: 1.5 %
LYMPHOCYTES # BLD AUTO: 0.18 X10(3) UL (ref 1–4)
LYMPHOCYTES NFR BLD AUTO: 2.7 %
MCH RBC QN AUTO: 29.6 PG (ref 26–34)
MCHC RBC AUTO-ENTMCNC: 30.3 G/DL (ref 31–37)
MCV RBC AUTO: 97.8 FL
MONOCYTES # BLD AUTO: 0.48 X10(3) UL (ref 0.1–1)
MONOCYTES NFR BLD AUTO: 7.3 %
NEUTROPHILS # BLD AUTO: 5.78 X10 (3) UL (ref 1.5–7.7)
NEUTROPHILS # BLD AUTO: 5.78 X10(3) UL (ref 1.5–7.7)
NEUTROPHILS NFR BLD AUTO: 88 %
OSMOLALITY SERPL CALC.SUM OF ELEC: 290 MOSM/KG (ref 275–295)
PLATELET # BLD AUTO: 129 10(3)UL (ref 150–450)
PLATELET MORPHOLOGY: NORMAL
POTASSIUM SERPL-SCNC: 4.2 MMOL/L (ref 3.5–5.1)
PROT SERPL-MCNC: 5 G/DL (ref 6.4–8.2)
RBC # BLD AUTO: 2.77 X10(6)UL
SODIUM SERPL-SCNC: 140 MMOL/L (ref 136–145)
WBC # BLD AUTO: 6.6 X10(3) UL (ref 4–11)

## 2022-07-20 PROCEDURE — 80053 COMPREHEN METABOLIC PANEL: CPT | Performed by: INTERNAL MEDICINE

## 2022-07-20 PROCEDURE — 85025 COMPLETE CBC W/AUTO DIFF WBC: CPT | Performed by: INTERNAL MEDICINE

## 2022-07-20 NOTE — TELEPHONE ENCOUNTER
From: Braulio Bernstein  To: Brian Bender MD  Sent: 7/20/2022 9:43 AM CDT  Subject: Blood work    This is [place], I was wondering if you would want repeat blood work this week with all of her counts low on the last cbc?  Thanks

## 2022-07-20 NOTE — TELEPHONE ENCOUNTER
Patient reports she is getting driveway seal coated. She is unable to leave the house. Her car is parked down the street. Patient will call back if she is able to come in this week for labs. Otherwise she will wait until Tuesday when she is here for MD follow up.

## 2022-07-21 ENCOUNTER — TELEPHONE (OUTPATIENT)
Dept: INTERNAL MEDICINE CLINIC | Facility: CLINIC | Age: 82
End: 2022-07-21

## 2022-07-21 NOTE — TELEPHONE ENCOUNTER
[facility] needs a new order for wound care for an open area on patient's R 3rd toe.

## 2022-07-21 NOTE — TELEPHONE ENCOUNTER
LOV 7/1/22 with KATHIA Goldstein RN [facility] saw pt today, pt had a small scab on the top of her 3rd toe right foot, got out of the shower today, noticed bleeding, scab had come off. Simonaa Sheets indicates the size is .5 x .7 superficial area, no signs of infection, looking for verbal orders to use medihoney and gauze for the toe, monitor for signs of infection and call. On call CS, ok for orders?

## 2022-07-21 NOTE — TELEPHONE ENCOUNTER
Left detailed message for Angelita SCHREIBER ([facility]) giving verbal ok for orders. Advised to monitor for any signs of infection and call with any changes. Advised to call back with any further questions or concerns.

## 2022-07-25 ENCOUNTER — DOCUMENTATION ONLY (OUTPATIENT)
Dept: HEMATOLOGY/ONCOLOGY | Facility: HOSPITAL | Age: 82
End: 2022-07-25

## 2022-07-26 ENCOUNTER — APPOINTMENT (OUTPATIENT)
Dept: HEMATOLOGY/ONCOLOGY | Facility: HOSPITAL | Age: 82
End: 2022-07-26
Attending: INTERNAL MEDICINE
Payer: MEDICARE

## 2022-07-26 VITALS
OXYGEN SATURATION: 92 % | WEIGHT: 174.19 LBS | HEIGHT: 70 IN | BODY MASS INDEX: 24.94 KG/M2 | HEART RATE: 76 BPM | DIASTOLIC BLOOD PRESSURE: 67 MMHG | RESPIRATION RATE: 18 BRPM | TEMPERATURE: 97 F | SYSTOLIC BLOOD PRESSURE: 103 MMHG

## 2022-07-26 DIAGNOSIS — T45.1X5A ANEMIA ASSOCIATED WITH CHEMOTHERAPY: ICD-10-CM

## 2022-07-26 DIAGNOSIS — R53.1 WEAKNESS: ICD-10-CM

## 2022-07-26 DIAGNOSIS — C83.38 DIFFUSE LARGE B-CELL LYMPHOMA OF LYMPH NODES OF MULTIPLE REGIONS (HCC): Primary | ICD-10-CM

## 2022-07-26 DIAGNOSIS — D64.81 ANEMIA ASSOCIATED WITH CHEMOTHERAPY: ICD-10-CM

## 2022-07-26 LAB
ALBUMIN SERPL-MCNC: 2.4 G/DL (ref 3.4–5)
ALBUMIN/GLOB SERPL: 0.9 {RATIO} (ref 1–2)
ALP LIVER SERPL-CCNC: 72 U/L
ALT SERPL-CCNC: 8 U/L
ANION GAP SERPL CALC-SCNC: 1 MMOL/L (ref 0–18)
AST SERPL-CCNC: 4 U/L (ref 15–37)
BASOPHILS # BLD AUTO: 0.03 X10(3) UL (ref 0–0.2)
BASOPHILS NFR BLD AUTO: 0.4 %
BILIRUB SERPL-MCNC: 0.3 MG/DL (ref 0.1–2)
BUN BLD-MCNC: 11 MG/DL (ref 7–18)
CALCIUM BLD-MCNC: 8.5 MG/DL (ref 8.5–10.1)
CHLORIDE SERPL-SCNC: 108 MMOL/L (ref 98–112)
CO2 SERPL-SCNC: 31 MMOL/L (ref 21–32)
CREAT BLD-MCNC: 0.67 MG/DL
EOSINOPHIL # BLD AUTO: 0 X10(3) UL (ref 0–0.7)
EOSINOPHIL NFR BLD AUTO: 0 %
ERYTHROCYTE [DISTWIDTH] IN BLOOD BY AUTOMATED COUNT: 23 %
FASTING STATUS PATIENT QL REPORTED: NO
GLOBULIN PLAS-MCNC: 2.7 G/DL (ref 2.8–4.4)
GLUCOSE BLD-MCNC: 182 MG/DL (ref 70–99)
HCT VFR BLD AUTO: 25.8 %
HGB BLD-MCNC: 7.8 G/DL
IMM GRANULOCYTES # BLD AUTO: 0.04 X10(3) UL (ref 0–1)
IMM GRANULOCYTES NFR BLD: 0.6 %
LDH SERPL L TO P-CCNC: 139 U/L
LYMPHOCYTES # BLD AUTO: 0.12 X10(3) UL (ref 1–4)
LYMPHOCYTES NFR BLD AUTO: 1.7 %
MCH RBC QN AUTO: 29.5 PG (ref 26–34)
MCHC RBC AUTO-ENTMCNC: 30.2 G/DL (ref 31–37)
MCV RBC AUTO: 97.7 FL
MONOCYTES # BLD AUTO: 0.62 X10(3) UL (ref 0.1–1)
MONOCYTES NFR BLD AUTO: 8.9 %
NEUTROPHILS # BLD AUTO: 6.14 X10 (3) UL (ref 1.5–7.7)
NEUTROPHILS # BLD AUTO: 6.14 X10(3) UL (ref 1.5–7.7)
NEUTROPHILS NFR BLD AUTO: 88.4 %
OSMOLALITY SERPL CALC.SUM OF ELEC: 294 MOSM/KG (ref 275–295)
PLATELET # BLD AUTO: 208 10(3)UL (ref 150–450)
PLATELET MORPHOLOGY: NORMAL
POTASSIUM SERPL-SCNC: 4.7 MMOL/L (ref 3.5–5.1)
PROT SERPL-MCNC: 5.1 G/DL (ref 6.4–8.2)
RBC # BLD AUTO: 2.64 X10(6)UL
SODIUM SERPL-SCNC: 140 MMOL/L (ref 136–145)
WBC # BLD AUTO: 7 X10(3) UL (ref 4–11)

## 2022-07-26 PROCEDURE — 80053 COMPREHEN METABOLIC PANEL: CPT

## 2022-07-26 PROCEDURE — 85025 COMPLETE CBC W/AUTO DIFF WBC: CPT

## 2022-07-26 PROCEDURE — 96367 TX/PROPH/DG ADDL SEQ IV INF: CPT

## 2022-07-26 PROCEDURE — 96417 CHEMO IV INFUS EACH ADDL SEQ: CPT

## 2022-07-26 PROCEDURE — 96415 CHEMO IV INFUSION ADDL HR: CPT

## 2022-07-26 PROCEDURE — 83615 LACTATE (LD) (LDH) ENZYME: CPT

## 2022-07-26 PROCEDURE — 96377 APPLICATON ON-BODY INJECTOR: CPT

## 2022-07-26 PROCEDURE — 99214 OFFICE O/P EST MOD 30 MIN: CPT | Performed by: INTERNAL MEDICINE

## 2022-07-26 PROCEDURE — 96413 CHEMO IV INFUSION 1 HR: CPT

## 2022-07-26 PROCEDURE — 96375 TX/PRO/DX INJ NEW DRUG ADDON: CPT

## 2022-07-26 PROCEDURE — 96411 CHEMO IV PUSH ADDL DRUG: CPT

## 2022-07-26 RX ORDER — MEDICAL SUPPLY, MISCELLANEOUS
EACH MISCELLANEOUS
Status: ON HOLD | COMMUNITY
Start: 2021-07-01 | End: 2022-08-01

## 2022-07-26 RX ORDER — DOXORUBICIN HYDROCHLORIDE 2 MG/ML
50 INJECTION, SOLUTION INTRAVENOUS ONCE
Status: CANCELLED | OUTPATIENT
Start: 2022-07-26

## 2022-07-26 RX ORDER — DIPHENHYDRAMINE HCL 25 MG
50 CAPSULE ORAL ONCE
Status: CANCELLED | OUTPATIENT
Start: 2022-07-26

## 2022-07-26 RX ORDER — ACETAMINOPHEN 325 MG/1
650 TABLET ORAL ONCE
Status: CANCELLED | OUTPATIENT
Start: 2022-07-26

## 2022-07-26 RX ORDER — DOXORUBICIN HYDROCHLORIDE 2 MG/ML
50 INJECTION, SOLUTION INTRAVENOUS ONCE
Status: COMPLETED | OUTPATIENT
Start: 2022-07-26 | End: 2022-07-26

## 2022-07-26 RX ORDER — DIPHENHYDRAMINE HCL 25 MG
50 CAPSULE ORAL ONCE
Status: COMPLETED | OUTPATIENT
Start: 2022-07-26 | End: 2022-07-26

## 2022-07-26 RX ORDER — ACETAMINOPHEN 325 MG/1
650 TABLET ORAL ONCE
Status: COMPLETED | OUTPATIENT
Start: 2022-07-26 | End: 2022-07-26

## 2022-07-26 RX ADMIN — ACETAMINOPHEN 650 MG: 325 TABLET ORAL at 11:34:00

## 2022-07-26 RX ADMIN — DIPHENHYDRAMINE HCL 50 MG: 25 MG CAPSULE ORAL at 11:34:00

## 2022-07-26 RX ADMIN — DOXORUBICIN HYDROCHLORIDE 100 MG: 2 INJECTION, SOLUTION INTRAVENOUS at 12:31:00

## 2022-07-26 NOTE — PROGRESS NOTES
Patient is here for MD franks/adelso and cycle 4 of R-Chop. She was admitted to the hospital twice this month, once for dehydration and the other time for fluids overload after a blood transfusion. Patient reports fatigue and SOB with exertion. She has home PT once weekly. Denies pain. Appetite is good. Aortic stent procedure was cancelled last week due to low blood counts. She has not been rescheduled yet. Patient is back on Xarelto as of last week. Patient is on oxygen 2 liters NC at night only.       Education Record    Learner:  Patient and Family Member    Disease / Diagnosis:  Lymphoma     Barriers / Limitations:  None   Comments:    Method:  Discussion   Comments:    General Topics:  Plan of care reviewed   Comments:    Outcome:  Shows understanding   Comments:

## 2022-07-26 NOTE — PROGRESS NOTES
Pt here for C4D1 RCHOP. Arrives Via wheelchair, accompanied by Family member           Pregnancy screening: Denies possibility of pregnancy    Modifications in dose or schedule: No    Drugs/infusions dual verified for appearance and physical integrity. IV pump settings were dual verified: yes     Frequency of blood return and site check throughout administration: Prior to administration, Prior to each drug, Every 2-3 ml IVP and At completion of therapy   Discharged to Home, Via wheelchair, accompanied by:Family member    Outpatient Oncology Care Plan  Problem list:  fatigue  knowledge deficit  Problems related to:  chemotherapy  disease/disease progression  side effect of treatment  Interventions:  maintain safe environment  encourage activity as tolerated  promoted rest  provided general teaching  Expected outcomes:  adequate sleep/rest  safe in environment  symptoms relieved/minimized  understands plan of care  Progress towards outcome:  making progress    Education Record    Learner:  Patient  Barriers / Limitations:  None  Method:  Brief focused, Printed material and Reinforcement  Outcome:  Shows understanding  Comments: Patient tolerated chemotherapy and discharged in stable condition.

## 2022-07-26 NOTE — PATIENT INSTRUCTIONS
On-body Injector for Neulasta Patient Instructions       Your On-Body Injection device was applied on this day:  07/26/22 at this time 4:10 pm    Your dose of medication will start on this day: 07/27/22 at this time 7:10 pm    Safe time to remove this device will be on this day: 07/27/22 at this time 09:10 pm       Important information to remember about the Neulasta Injector:     1. The On-Body Neulasta Injector begins to deliver the dose of Neulasta 27 hours after it is activated at the cancer center. Beeping at that time indicates  that the dose will be delivered in 2 minutes. It takes 45 minutes for the dose to  be completely delivered. DO NOT REMOVE during this time. 2. Check the light periodically for a slow flashing GREEN light, this is normal.     3. If the light is flashing RED or comes off prior to the delivery time, during regular business hours 8am - 4:30pm, please call [phone] and ask for the charge nurse. If this is  after hours or a holiday, please contact the [employer] @ [phone], a support person is available 24/7.      4. Please keep the device at least 4 inches away from electrical equipment, such as CELL PHONES, microwaves, cordless phones. Do NOT have Xrays, MRI,  CT without informing the technician. 5. If you are traveling, needing to go through airport security, please notify the TSA. You may still travel, just avoid the xray security. 6. Avoid bumping or sleeping directly on the injector. 7. Avoid hot tubs, saunas and direct sunlight. Keep the injector dry 3 hours prior to the dose delivery time. 8. Remove the injector device at the directed time above and place in a hard container for disposal and place in trash.

## 2022-07-27 ENCOUNTER — TELEPHONE (OUTPATIENT)
Dept: INTERNAL MEDICINE CLINIC | Facility: CLINIC | Age: 82
End: 2022-07-27

## 2022-07-27 NOTE — TELEPHONE ENCOUNTER
Needs verbal order to extend weekly nursing visits for wound care for 1x a week for the next 4 weeks     Please advise

## 2022-07-27 NOTE — PROGRESS NOTES
[facility]    PATIENT'S NAME: Lucio Gill JAYESH   ATTENDING PHYSICIAN: Tom Zheng M.D. PATIENT ACCOUNT #: [de-identified] LOCATION: [address] [place] [address] RECORD #: [id number] YOB: 1940   DATE OF SERVICE: 07/26/2022       CANCER CENTER PROGRESS NOTE    CHIEF COMPLAINT:  Followup and treatment of diffuse large B-cell lymphoma. HISTORY OF PRESENT ILLNESS:  The patient is an 66-year-old female. She presented with failure to thrive and a large mass in her gastrohepatic ligament area. It was greater than 5 cm. She had an elevated LVH. Biopsy demonstrated evidence of a diffuse large B-cell lymphoma. It was positive by IHC for BCL2, BCL6, and C-MYC; however, it was negative for MYC fusion or rearrangement. The patient is elderly and relatively frail, and after much discussion, we ultimately started her on R-CHOP. She did not have a pretreatment PET scan, as she was in the hospital and needed to get going on treatment. She has now had a total of 3 cycles. It has been a struggle to get her through this. She has had multiple hospitalizations. In the midst of all this, she has also been discovered to have an enlarging aneurysm where she has already had a sleeve graft placed before. This area has gotten gradually bigger over time, and Dr. Cely Vo is looking for an opportunity to repair it. It was tentatively scheduled for last week. He was concerned about her counts and her frailty. Her counts, in fact, improved last week, and despite this, after multiple discussions, he opted to delay it for a total of 3 weeks. It should be done in a little over 2 weeks from now. Her last hospitalization occurred when she was weak and was neutropenic. She did not have a fever. She was able to be discharged, and she has been home now for nearly 1-1/2 weeks. She does have home physical therapy and home nursing coming. She has a little bit of neuropathy in her feet, not much in her hands.   She is eating better. She did have blood transfusion, and she had fluid overload with shortness of breath at 1 point. Her aneurysm procedure was canceled last week, and she is tentatively ready to go a little over 2 weeks from now. She is back on her Xarelto as of last week, as her counts came back up again. She is using oxygen at night only. She did have an echo done in the hospital, and her EF was well preserved. MEDICATIONS:  Current medications include acyclovir 400 b.i.d.; albuterol inhaler 2 puffs q.4 h. p.r.n.; aspirin 81 mg daily; vitamin D 1000 units daily; esomeprazole 20 mg daily; fluconazole 200 mg Monday, Wednesday, Friday; Levemir insulin 10 units daily; levofloxacin during neutropenic periods; levothyroxine 50 mcg daily; metformin 500 mg b.i.d.; metoprolol tartrate 12.5 mg b.i.d.; PreserVision multivitamin daily; ondansetron 4 mg q.4 h. p.r.n.; potassium chloride 10 mEq twice daily; pravastatin 80 mg nightly; prednisone 100 mg daily for 5 days; prochlorperazine 10 mg q.6 h. p.r.n.; rivaroxaban 20 mg daily; and Bactrim DS 1 tablet Monday, Wednesday, Friday. PHYSICAL EXAMINATION:    GENERAL:  She is a well-appearing elderly female. She is much more comfortable. VITAL SIGNS:  Her performance status is 1 to 2. Her weight is 174 pounds. Blood pressure is 103/67, pulse 76, respiratory rate is 20, temperature is 97.3. HEENT:  Unremarkable. LYMPHATICS:  She has no adenopathy. LUNGS:  Clear. HEART:  Normal.  ABDOMEN:  A soft abdomen with no hepatosplenomegaly or tenderness. EXTREMITIES:  She has no clubbing or cyanosis. She has 1+ edema to the mid calf. LABORATORY DATA:  White count is 7, hemoglobin 7.8, platelets are 271. Her LDH is 139. BUN and creatinine are 11 and 0.67. Potassium is 4.7. IMPRESSION:  Diffuse large B-cell lymphoma. She is proceeding with her fourth cycle. I am ordering a PET scan after this cycle.   If she is in a complete metabolic response, her prognosis should be good, though we would like to try to get 2 more cycles in. She should have recovery of her counts by 2 weeks from now and be prepared to proceed with the aneurysm repair. If they need an extra week in between cycles instead of having her next chemotherapy 3 weeks from now, we could easily do it 4 weeks from now assuming that she will be in a complete metabolic response. She has had immediate relief of her upper abdominal pain and normalization of her LDH right after the first dose of chemotherapy. Dictated By Humberto Elmore M.D.  d: 07/26/2022 12:42:15  t: 07/26/2022 23:34:42  Job [id number]  ZY/    cc: Shannon Thompson M.D.    KALIA Lyon M.D. Eugenie Shy, M.D.

## 2022-07-28 DIAGNOSIS — C85.90 LYMPHOMA, UNSPECIFIED BODY REGION, UNSPECIFIED LYMPHOMA TYPE (HCC): ICD-10-CM

## 2022-07-28 RX ORDER — ALLOPURINOL 300 MG/1
TABLET ORAL
Qty: 30 TABLET | Refills: 0 | Status: SHIPPED | OUTPATIENT
Start: 2022-07-28 | End: 2022-07-28

## 2022-07-28 RX ORDER — ALLOPURINOL 300 MG/1
TABLET ORAL
Qty: 90 TABLET | Refills: 0 | Status: ON HOLD | OUTPATIENT
Start: 2022-07-28 | End: 2022-08-01

## 2022-07-28 NOTE — TELEPHONE ENCOUNTER
Spoke to Primo Clemons. Informed her of verbal ok to extend visit. Primo Clemons stated understanding.

## 2022-08-01 ENCOUNTER — APPOINTMENT (OUTPATIENT)
Dept: GENERAL RADIOLOGY | Facility: HOSPITAL | Age: 82
End: 2022-08-01
Attending: EMERGENCY MEDICINE
Payer: MEDICARE

## 2022-08-01 ENCOUNTER — HOSPITAL ENCOUNTER (INPATIENT)
Facility: HOSPITAL | Age: 82
LOS: 2 days | Discharge: HOME | End: 2022-08-03
Attending: EMERGENCY MEDICINE | Admitting: HOSPITALIST
Payer: MEDICARE

## 2022-08-01 ENCOUNTER — HOSPITAL ENCOUNTER (INPATIENT)
Facility: HOSPITAL | Age: 82
LOS: 2 days | Discharge: HOME HEALTH | End: 2022-08-03
Attending: EMERGENCY MEDICINE | Admitting: HOSPITALIST
Payer: MEDICARE

## 2022-08-01 DIAGNOSIS — T45.1X5A ANEMIA ASSOCIATED WITH CHEMOTHERAPY: Primary | ICD-10-CM

## 2022-08-01 DIAGNOSIS — D69.6 THROMBOCYTOPENIA (HCC): ICD-10-CM

## 2022-08-01 DIAGNOSIS — I50.32 CHRONIC DIASTOLIC CHF (CONGESTIVE HEART FAILURE) (HCC): ICD-10-CM

## 2022-08-01 DIAGNOSIS — D64.81 ANEMIA ASSOCIATED WITH CHEMOTHERAPY: Primary | ICD-10-CM

## 2022-08-01 LAB
ALBUMIN SERPL-MCNC: 2.3 G/DL (ref 3.4–5)
ALBUMIN/GLOB SERPL: 0.9 {RATIO} (ref 1–2)
ALP LIVER SERPL-CCNC: 68 U/L
ALT SERPL-CCNC: 8 U/L
ANION GAP SERPL CALC-SCNC: 5 MMOL/L (ref 0–18)
ANTIBODY SCREEN: NEGATIVE
APTT PPP: 51.8 SECONDS (ref 23.3–35.6)
AST SERPL-CCNC: 5 U/L (ref 15–37)
BASOPHILS # BLD: 0.05 X10(3) UL (ref 0–0.2)
BASOPHILS NFR BLD: 5 %
BILIRUB SERPL-MCNC: 0.6 MG/DL (ref 0.1–2)
BUN BLD-MCNC: 15 MG/DL (ref 7–18)
CALCIUM BLD-MCNC: 8.1 MG/DL (ref 8.5–10.1)
CHLORIDE SERPL-SCNC: 107 MMOL/L (ref 98–112)
CO2 SERPL-SCNC: 29 MMOL/L (ref 21–32)
CREAT BLD-MCNC: 0.57 MG/DL
EOSINOPHIL # BLD: 0 X10(3) UL (ref 0–0.7)
EOSINOPHIL NFR BLD: 0 %
ERYTHROCYTE [DISTWIDTH] IN BLOOD BY AUTOMATED COUNT: 20.9 %
GLOBULIN PLAS-MCNC: 2.5 G/DL (ref 2.8–4.4)
GLUCOSE BLD-MCNC: 139 MG/DL (ref 70–99)
GLUCOSE BLD-MCNC: 166 MG/DL (ref 70–99)
GLUCOSE BLD-MCNC: 175 MG/DL (ref 70–99)
HCT VFR BLD AUTO: 19.5 %
HGB BLD-MCNC: 5.9 G/DL
INR BLD: 4.27 (ref 0.85–1.16)
LYMPHOCYTES NFR BLD: 0.06 X10(3) UL (ref 1–4)
LYMPHOCYTES NFR BLD: 7 %
MCH RBC QN AUTO: 30.4 PG (ref 26–34)
MCHC RBC AUTO-ENTMCNC: 30.3 G/DL (ref 31–37)
MCV RBC AUTO: 100.5 FL
MONOCYTES # BLD: 0.02 X10(3) UL (ref 0.1–1)
MONOCYTES NFR BLD: 2 %
MORPHOLOGY: NORMAL
NEUTROPHILS # BLD AUTO: 0.6 X10 (3) UL (ref 1.5–7.7)
NEUTROPHILS NFR BLD: 80 %
NEUTS BAND NFR BLD: 7 %
NEUTS HYPERSEG # BLD: 0.78 X10(3) UL (ref 1.5–7.7)
NT-PROBNP SERPL-MCNC: 5109 PG/ML (ref ?–450)
OSMOLALITY SERPL CALC.SUM OF ELEC: 297 MOSM/KG (ref 275–295)
PLATELET # BLD AUTO: 9 10(3)UL (ref 150–450)
PLATELET MORPHOLOGY: NORMAL
POTASSIUM SERPL-SCNC: 5 MMOL/L (ref 3.5–5.1)
PROT SERPL-MCNC: 4.8 G/DL (ref 6.4–8.2)
PROTHROMBIN TIME: 40.3 SECONDS (ref 11.6–14.8)
RBC # BLD AUTO: 1.94 X10(6)UL
RH BLOOD TYPE: POSITIVE
SARS-COV-2 RNA RESP QL NAA+PROBE: NOT DETECTED
SODIUM SERPL-SCNC: 141 MMOL/L (ref 136–145)
TOTAL CELLS COUNTED BLD: 60
TROPONIN I HIGH SENSITIVITY: 12 NG/L
WBC # BLD AUTO: 0.9 X10(3) UL (ref 4–11)

## 2022-08-01 PROCEDURE — 71045 X-RAY EXAM CHEST 1 VIEW: CPT | Performed by: EMERGENCY MEDICINE

## 2022-08-01 PROCEDURE — 30233R1 TRANSFUSION OF NONAUTOLOGOUS PLATELETS INTO PERIPHERAL VEIN, PERCUTANEOUS APPROACH: ICD-10-PCS | Performed by: INTERNAL MEDICINE

## 2022-08-01 PROCEDURE — 99223 1ST HOSP IP/OBS HIGH 75: CPT | Performed by: HOSPITALIST

## 2022-08-01 PROCEDURE — 99222 1ST HOSP IP/OBS MODERATE 55: CPT | Performed by: NURSE PRACTITIONER

## 2022-08-01 PROCEDURE — 30233N1 TRANSFUSION OF NONAUTOLOGOUS RED BLOOD CELLS INTO PERIPHERAL VEIN, PERCUTANEOUS APPROACH: ICD-10-PCS | Performed by: INTERNAL MEDICINE

## 2022-08-01 RX ORDER — DEXTROSE MONOHYDRATE 25 G/50ML
50 INJECTION, SOLUTION INTRAVENOUS
Status: DISCONTINUED | OUTPATIENT
Start: 2022-08-01 | End: 2022-08-03

## 2022-08-01 RX ORDER — ECHINACEA PURPUREA EXTRACT 125 MG
1 TABLET ORAL
Status: DISCONTINUED | OUTPATIENT
Start: 2022-08-01 | End: 2022-08-03

## 2022-08-01 RX ORDER — METOCLOPRAMIDE HYDROCHLORIDE 5 MG/ML
10 INJECTION INTRAMUSCULAR; INTRAVENOUS EVERY 8 HOURS PRN
Status: DISCONTINUED | OUTPATIENT
Start: 2022-08-01 | End: 2022-08-03

## 2022-08-01 RX ORDER — ONDANSETRON 2 MG/ML
4 INJECTION INTRAMUSCULAR; INTRAVENOUS EVERY 6 HOURS PRN
Status: DISCONTINUED | OUTPATIENT
Start: 2022-08-01 | End: 2022-08-03

## 2022-08-01 RX ORDER — ALLOPURINOL 300 MG/1
300 TABLET ORAL DAILY
Status: DISCONTINUED | OUTPATIENT
Start: 2022-08-02 | End: 2022-08-03

## 2022-08-01 RX ORDER — SULFAMETHOXAZOLE AND TRIMETHOPRIM 800; 160 MG/1; MG/1
1 TABLET ORAL
Status: DISCONTINUED | OUTPATIENT
Start: 2022-08-03 | End: 2022-08-03

## 2022-08-01 RX ORDER — NICOTINE POLACRILEX 4 MG
15 LOZENGE BUCCAL
Status: DISCONTINUED | OUTPATIENT
Start: 2022-08-01 | End: 2022-08-03

## 2022-08-01 RX ORDER — ACYCLOVIR 400 MG/1
400 TABLET ORAL 2 TIMES DAILY
Status: DISCONTINUED | OUTPATIENT
Start: 2022-08-01 | End: 2022-08-03

## 2022-08-01 RX ORDER — ALBUTEROL SULFATE 90 UG/1
2 AEROSOL, METERED RESPIRATORY (INHALATION) EVERY 4 HOURS PRN
Status: DISCONTINUED | OUTPATIENT
Start: 2022-08-01 | End: 2022-08-03

## 2022-08-01 RX ORDER — LEVOTHYROXINE SODIUM 0.05 MG/1
50 TABLET ORAL
Status: DISCONTINUED | OUTPATIENT
Start: 2022-08-02 | End: 2022-08-03

## 2022-08-01 RX ORDER — PANTOPRAZOLE SODIUM 20 MG/1
20 TABLET, DELAYED RELEASE ORAL
Status: DISCONTINUED | OUTPATIENT
Start: 2022-08-02 | End: 2022-08-03

## 2022-08-01 RX ORDER — SODIUM CHLORIDE 9 MG/ML
INJECTION, SOLUTION INTRAVENOUS ONCE
Status: COMPLETED | OUTPATIENT
Start: 2022-08-01 | End: 2022-08-01

## 2022-08-01 RX ORDER — FUROSEMIDE 20 MG/1
20 TABLET ORAL DAILY
Status: ON HOLD | COMMUNITY
End: 2022-08-14

## 2022-08-01 RX ORDER — ACETAMINOPHEN 500 MG
1000 TABLET ORAL EVERY 4 HOURS PRN
Status: DISCONTINUED | OUTPATIENT
Start: 2022-08-01 | End: 2022-08-03

## 2022-08-01 RX ORDER — NICOTINE POLACRILEX 4 MG
30 LOZENGE BUCCAL
Status: DISCONTINUED | OUTPATIENT
Start: 2022-08-01 | End: 2022-08-03

## 2022-08-01 RX ORDER — POLYETHYLENE GLYCOL 3350 17 G/17G
17 POWDER, FOR SOLUTION ORAL DAILY PRN
Status: DISCONTINUED | OUTPATIENT
Start: 2022-08-01 | End: 2022-08-03

## 2022-08-01 RX ORDER — MELATONIN
3 NIGHTLY PRN
Status: DISCONTINUED | OUTPATIENT
Start: 2022-08-01 | End: 2022-08-03

## 2022-08-01 RX ORDER — ATORVASTATIN CALCIUM 20 MG/1
20 TABLET, FILM COATED ORAL NIGHTLY
Status: DISCONTINUED | OUTPATIENT
Start: 2022-08-01 | End: 2022-08-03

## 2022-08-01 RX ORDER — FLUCONAZOLE 100 MG/1
200 TABLET ORAL
Status: DISCONTINUED | OUTPATIENT
Start: 2022-08-03 | End: 2022-08-03

## 2022-08-01 RX ORDER — ALLOPURINOL 300 MG/1
300 TABLET ORAL DAILY
COMMUNITY

## 2022-08-01 RX ORDER — SENNOSIDES 8.6 MG
17.2 TABLET ORAL NIGHTLY PRN
Status: DISCONTINUED | OUTPATIENT
Start: 2022-08-01 | End: 2022-08-03

## 2022-08-01 RX ORDER — FUROSEMIDE 10 MG/ML
20 INJECTION INTRAMUSCULAR; INTRAVENOUS ONCE
Status: COMPLETED | OUTPATIENT
Start: 2022-08-01 | End: 2022-08-01

## 2022-08-01 RX ORDER — PRAVASTATIN SODIUM 80 MG/1
80 TABLET ORAL NIGHTLY
COMMUNITY

## 2022-08-01 NOTE — ED QUICK NOTES
Orders for admission, patient is aware of plan and ready to go upstairs.  Any questions, please call ED RN Marlene Crisostomo at extension [phone]     Patient Covid vaccination status: Fully vaccinated     COVID Test Ordered in ED: Rapid SARS-CoV-2 by PCR    COVID Suspicion at Admission: N/A    Running Infusions:  Blood infusiion at a rate of 90 mLs/hr (    Mental Status/LOC at time of transport: Alert    Other pertinent information:   CIWA score: N/A   NIH score:  N/A

## 2022-08-01 NOTE — ED INITIAL ASSESSMENT (HPI)
Pt to the emergency room for fatigue and sob that started yesterday. Pt states she had trouble falling asleep. Pt states that she was with physical therapy and she was very short of breath with exertion, pt had to be placed on 2L NC while at home. Pt states she normally doesn't have to be on o2 until she goes to sleep.  Pt 99% on 3LNC

## 2022-08-01 NOTE — PLAN OF CARE
Admitted from ED with low hemoglobin and low platelets, had just finished her 4/6 cycle of chemo on 7/26/22. Patient is alert, pale and fatigued, had platelets transfusion in ED,  has an ongoing PRBC's infusion upon transfer to the floor. Pt denies pain, scattered bruises to BLE and skin tear to left shin, states she did not fall. Admitted to floor, blood transfusion completed without any untoward symptoms, Lasix given, voiding.  Assisted to the bathroom, managed to ambulate with one assist.  Problem: SKIN/TISSUE INTEGRITY - ADULT  Goal: Skin integrity remains intact  Description: INTERVENTIONS  - Assess and document risk factors for pressure ulcer development  - Assess and document skin integrity  - Monitor for areas of redness and/or skin breakdown  - Initiate interventions, skin care algorithm/standards of care as needed  Outcome: Not Progressing     Problem: HEMATOLOGIC - ADULT  Goal: Maintains hematologic stability  Description: INTERVENTIONS  - Assess for signs and symptoms of bleeding or hemorrhage  - Monitor labs and vital signs for trends  - Administer supportive blood products/factors, fluids and medications as ordered and appropriate  - Administer supportive blood products/factors as ordered and appropriate  Outcome: Not Progressing

## 2022-08-02 ENCOUNTER — APPOINTMENT (OUTPATIENT)
Dept: HEMATOLOGY/ONCOLOGY | Facility: HOSPITAL | Age: 82
End: 2022-08-02
Attending: INTERNAL MEDICINE
Payer: MEDICARE

## 2022-08-02 LAB
ANION GAP SERPL CALC-SCNC: 2 MMOL/L (ref 0–18)
BASOPHILS # BLD AUTO: 0.02 X10(3) UL (ref 0–0.2)
BASOPHILS NFR BLD AUTO: 10 %
BILIRUB UR QL STRIP.AUTO: NEGATIVE
BLOOD TYPE BARCODE: 5100
BLOOD TYPE BARCODE: 5100
BUN BLD-MCNC: 15 MG/DL (ref 7–18)
CALCIUM BLD-MCNC: 8.3 MG/DL (ref 8.5–10.1)
CHLORIDE SERPL-SCNC: 106 MMOL/L (ref 98–112)
CLARITY UR REFRACT.AUTO: CLEAR
CO2 SERPL-SCNC: 33 MMOL/L (ref 21–32)
COLOR UR AUTO: YELLOW
CREAT BLD-MCNC: 0.5 MG/DL
EOSINOPHIL # BLD AUTO: 0.01 X10(3) UL (ref 0–0.7)
EOSINOPHIL NFR BLD AUTO: 5 %
ERYTHROCYTE [DISTWIDTH] IN BLOOD BY AUTOMATED COUNT: 22.2 %
GLUCOSE BLD-MCNC: 142 MG/DL (ref 70–99)
GLUCOSE BLD-MCNC: 146 MG/DL (ref 70–99)
GLUCOSE BLD-MCNC: 167 MG/DL (ref 70–99)
GLUCOSE BLD-MCNC: 175 MG/DL (ref 70–99)
GLUCOSE BLD-MCNC: 216 MG/DL (ref 70–99)
GLUCOSE UR STRIP.AUTO-MCNC: NEGATIVE MG/DL
HCT VFR BLD AUTO: 19.3 %
HGB BLD-MCNC: 6.1 G/DL
HGB BLD-MCNC: 7.6 G/DL
IMM GRANULOCYTES # BLD AUTO: 0.01 X10(3) UL (ref 0–1)
IMM GRANULOCYTES NFR BLD: 5 %
KETONES UR STRIP.AUTO-MCNC: NEGATIVE MG/DL
LEUKOCYTE ESTERASE UR QL STRIP.AUTO: NEGATIVE
LYMPHOCYTES # BLD AUTO: 0.07 X10(3) UL (ref 1–4)
LYMPHOCYTES NFR BLD AUTO: 35 %
MAGNESIUM SERPL-MCNC: 1.2 MG/DL (ref 1.6–2.6)
MCH RBC QN AUTO: 29.6 PG (ref 26–34)
MCHC RBC AUTO-ENTMCNC: 31.6 G/DL (ref 31–37)
MCV RBC AUTO: 93.7 FL
MONOCYTES # BLD AUTO: 0.04 X10(3) UL (ref 0.1–1)
MONOCYTES NFR BLD AUTO: 20 %
NEUTROPHILS # BLD AUTO: 0.05 X10 (3) UL (ref 1.5–7.7)
NEUTROPHILS # BLD AUTO: 0.05 X10(3) UL (ref 1.5–7.7)
NEUTROPHILS NFR BLD AUTO: 25 %
NITRITE UR QL STRIP.AUTO: NEGATIVE
OSMOLALITY SERPL CALC.SUM OF ELEC: 295 MOSM/KG (ref 275–295)
PH UR STRIP.AUTO: 7 [PH] (ref 5–8)
PLATELET # BLD AUTO: 10 10(3)UL (ref 150–450)
PLATELET # BLD AUTO: 33 10(3)UL (ref 150–450)
POTASSIUM SERPL-SCNC: 4.3 MMOL/L (ref 3.5–5.1)
PROT UR STRIP.AUTO-MCNC: NEGATIVE MG/DL
RBC # BLD AUTO: 2.06 X10(6)UL
SODIUM SERPL-SCNC: 141 MMOL/L (ref 136–145)
SP GR UR STRIP.AUTO: 1.01 (ref 1–1.03)
UROBILINOGEN UR STRIP.AUTO-MCNC: 4 MG/DL
WBC # BLD AUTO: 0.2 X10(3) UL (ref 4–11)

## 2022-08-02 PROCEDURE — 99232 SBSQ HOSP IP/OBS MODERATE 35: CPT | Performed by: INTERNAL MEDICINE

## 2022-08-02 RX ORDER — MAGNESIUM SULFATE HEPTAHYDRATE 40 MG/ML
2 INJECTION, SOLUTION INTRAVENOUS ONCE
Status: DISCONTINUED | OUTPATIENT
Start: 2022-08-02 | End: 2022-08-02

## 2022-08-02 RX ORDER — SODIUM CHLORIDE 9 MG/ML
INJECTION, SOLUTION INTRAVENOUS ONCE
Status: COMPLETED | OUTPATIENT
Start: 2022-08-02 | End: 2022-08-02

## 2022-08-02 RX ORDER — FUROSEMIDE 10 MG/ML
20 INJECTION INTRAMUSCULAR; INTRAVENOUS ONCE
Status: COMPLETED | OUTPATIENT
Start: 2022-08-02 | End: 2022-08-02

## 2022-08-02 RX ORDER — MAGNESIUM OXIDE 400 MG/1
800 TABLET ORAL ONCE
Status: COMPLETED | OUTPATIENT
Start: 2022-08-02 | End: 2022-08-02

## 2022-08-02 NOTE — CM/SW NOTE
SW was notified that patient is current with [facility] for PT, OT, and RN services. SW entered LUANNE orders for cosign and notified [facility]. SW will continue to follow for plan of care changes and remain available for any additional DC needs or concerns.      Junior Qureshi MSW, LSW  Discharge Planner   [phone]

## 2022-08-02 NOTE — PLAN OF CARE
Pt received A&Ox4, forgetful at times. Afebrile. VSS, 2L NC. C/o mild dyspnea w/ exertion. Denies pain. Hgb 6.1 this AM, plt 10. 1 unit of plt and PRBCs ordered per Dr. Omari Christina. No transfusion reactions noted. Post-transfusion hgb 7.6. Repeat plt 33, ordered per MD. Lasix administered per MAR. Double lumen PICC line dressing changed, flushed w/ good blood return. Mg 1.2. Electrolytes replaced per protocol. Up in chair today, ambulating in room w/ [employer]. Fall precautions in place. Call light in reach.

## 2022-08-02 NOTE — PROGRESS NOTES
A&Ox4, VSS. meds given per MAR. Tolerated well. HGB redraw scheduled for AM. Ambulates with assistance to BR. Slept well overnight. Will continue to monitor.

## 2022-08-03 VITALS
SYSTOLIC BLOOD PRESSURE: 129 MMHG | HEART RATE: 85 BPM | OXYGEN SATURATION: 100 % | TEMPERATURE: 97 F | WEIGHT: 170.38 LBS | BODY MASS INDEX: 24 KG/M2 | DIASTOLIC BLOOD PRESSURE: 58 MMHG | RESPIRATION RATE: 18 BRPM

## 2022-08-03 LAB
ANION GAP SERPL CALC-SCNC: 0 MMOL/L (ref 0–18)
BASOPHILS # BLD: 0 X10(3) UL (ref 0–0.2)
BASOPHILS NFR BLD: 0 %
BLOOD TYPE BARCODE: 5100
BUN BLD-MCNC: 12 MG/DL (ref 7–18)
CALCIUM BLD-MCNC: 8.5 MG/DL (ref 8.5–10.1)
CHLORIDE SERPL-SCNC: 104 MMOL/L (ref 98–112)
CO2 SERPL-SCNC: 36 MMOL/L (ref 21–32)
CREAT BLD-MCNC: 0.45 MG/DL
EOSINOPHIL # BLD: 0 X10(3) UL (ref 0–0.7)
EOSINOPHIL NFR BLD: 3 %
ERYTHROCYTE [DISTWIDTH] IN BLOOD BY AUTOMATED COUNT: 22.8 %
GFR SERPLBLD BASED ON 1.73 SQ M-ARVRAT: 97 ML/MIN/1.73M2 (ref 60–?)
GLUCOSE BLD-MCNC: 161 MG/DL (ref 70–99)
GLUCOSE BLD-MCNC: 163 MG/DL (ref 70–99)
GLUCOSE BLD-MCNC: 176 MG/DL (ref 70–99)
HCT VFR BLD AUTO: 19.7 %
HGB BLD-MCNC: 7.7 G/DL
LYMPHOCYTES NFR BLD: 0.05 X10(3) UL (ref 1–4)
LYMPHOCYTES NFR BLD: 48 %
MAGNESIUM SERPL-MCNC: 1.3 MG/DL (ref 1.6–2.6)
MCH RBC QN AUTO: 34.5 PG (ref 26–34)
MCHC RBC AUTO-ENTMCNC: 39.1 G/DL (ref 31–37)
MCV RBC AUTO: 88.3 FL
MONOCYTES # BLD: 0.02 X10(3) UL (ref 0.1–1)
MONOCYTES NFR BLD: 16 %
MORPHOLOGY: NORMAL
NEUTROPHILS # BLD AUTO: 0.03 X10 (3) UL (ref 1.5–7.7)
NEUTROPHILS NFR BLD: 31 %
NEUTS BAND NFR BLD: 2 %
NEUTS HYPERSEG # BLD: 0.03 X10(3) UL (ref 1.5–7.7)
OSMOLALITY SERPL CALC.SUM OF ELEC: 293 MOSM/KG (ref 275–295)
PLATELET # BLD AUTO: 42 10(3)UL (ref 150–450)
PLATELET MORPHOLOGY: NORMAL
POTASSIUM SERPL-SCNC: 4.2 MMOL/L (ref 3.5–5.1)
RBC # BLD AUTO: 2.23 X10(6)UL
SODIUM SERPL-SCNC: 140 MMOL/L (ref 136–145)
TOTAL CELLS COUNTED BLD: 61
WBC # BLD AUTO: 0.1 X10(3) UL (ref 4–11)

## 2022-08-03 PROCEDURE — 99232 SBSQ HOSP IP/OBS MODERATE 35: CPT | Performed by: INTERNAL MEDICINE

## 2022-08-03 PROCEDURE — 99239 HOSP IP/OBS DSCHRG MGMT >30: CPT | Performed by: INTERNAL MEDICINE

## 2022-08-03 RX ORDER — MAGNESIUM SULFATE HEPTAHYDRATE 40 MG/ML
2 INJECTION, SOLUTION INTRAVENOUS ONCE
Status: COMPLETED | OUTPATIENT
Start: 2022-08-03 | End: 2022-08-03

## 2022-08-03 RX ORDER — ASPIRIN 81 MG/1
81 TABLET ORAL DAILY
Qty: 30 TABLET | Refills: 1 | Status: SHIPPED | OUTPATIENT
Start: 2022-08-03

## 2022-08-03 NOTE — PLAN OF CARE
Patient alert and oriented x4. Forgetful at times. VSS. Afebrile. No complaints of pain at this time. SOB upon exertion. 2L O2 satting in the high 90s-100. Medications administered per MAR. Ambulates to restroom with walker + x1 assist. Neutropenic precautions in place. Safety precautions continued. Call light with in reach. Will continue to monitor.    Problem: SKIN/TISSUE INTEGRITY - ADULT  Goal: Skin integrity remains intact  Description: INTERVENTIONS  - Assess and document risk factors for pressure ulcer development  - Assess and document skin integrity  - Monitor for areas of redness and/or skin breakdown  - Initiate interventions, skin care algorithm/standards of care as needed  Outcome: Progressing     Problem: HEMATOLOGIC - ADULT  Goal: Maintains hematologic stability  Description: INTERVENTIONS  - Assess for signs and symptoms of bleeding or hemorrhage  - Monitor labs and vital signs for trends  - Administer supportive blood products/factors, fluids and medications as ordered and appropriate  - Administer supportive blood products/factors as ordered and appropriate  Outcome: Progressing

## 2022-08-03 NOTE — PROGRESS NOTES
Pt received A&Ox4, forgetful at times. Afebrile. VSS, 2L NC. C/o mild dyspnea w/ exertion. Denies pain. Hgb 7.7 this AM, plt 42. No transfusions ordered at this time. Double lumen PICC line remains in place, flushes and good blood return noted. Mg 1.3, IV Mag given per protocol. Up in chair today for breakfast, requested to go back to bed after breakfast due to being cold near window. Fall precautions in place. Call light in reach. Discharge order placed. Awaiting patient to finish eating lunch and son will be picking up patient at . AVS given, instructed to wait to take xarelto and aspirin until after blood counts are redrawn outpatient. Pt denied questions regarding discharge instructions. Will continue to monitor until 1530 for discharge.

## 2022-08-04 LAB
ATRIAL RATE: 94 BPM
Q-T INTERVAL: 378 MS
QRS DURATION: 72 MS
QTC CALCULATION (BEZET): 422 MS
R AXIS: 40 DEGREES
T AXIS: 9 DEGREES
VENTRICULAR RATE: 75 BPM

## 2022-08-09 ENCOUNTER — HOSPITAL ENCOUNTER (OUTPATIENT)
Dept: NUCLEAR MEDICINE | Facility: HOSPITAL | Age: 82
Discharge: HOME | End: 2022-08-09
Attending: INTERNAL MEDICINE
Payer: MEDICARE

## 2022-08-09 ENCOUNTER — NURSE ONLY (OUTPATIENT)
Dept: HEMATOLOGY/ONCOLOGY | Facility: HOSPITAL | Age: 82
End: 2022-08-09
Attending: INTERNAL MEDICINE
Payer: MEDICARE

## 2022-08-09 VITALS
TEMPERATURE: 98 F | WEIGHT: 170 LBS | DIASTOLIC BLOOD PRESSURE: 65 MMHG | HEART RATE: 80 BPM | OXYGEN SATURATION: 97 % | RESPIRATION RATE: 18 BRPM | BODY MASS INDEX: 24 KG/M2 | SYSTOLIC BLOOD PRESSURE: 97 MMHG

## 2022-08-09 DIAGNOSIS — C83.38 DIFFUSE LARGE B-CELL LYMPHOMA OF LYMPH NODES OF MULTIPLE REGIONS (HCC): ICD-10-CM

## 2022-08-09 DIAGNOSIS — R53.1 WEAKNESS: ICD-10-CM

## 2022-08-09 LAB
ALBUMIN SERPL-MCNC: 2.7 G/DL (ref 3.4–5)
ALBUMIN/GLOB SERPL: 1.1 {RATIO} (ref 1–2)
ALP LIVER SERPL-CCNC: 80 U/L
ALT SERPL-CCNC: 8 U/L
ANION GAP SERPL CALC-SCNC: 4 MMOL/L (ref 0–18)
AST SERPL-CCNC: 7 U/L (ref 15–37)
BASOPHILS # BLD AUTO: 0.04 X10(3) UL (ref 0–0.2)
BASOPHILS NFR BLD AUTO: 0.5 %
BILIRUB SERPL-MCNC: 0.4 MG/DL (ref 0.1–2)
BUN BLD-MCNC: 10 MG/DL (ref 7–18)
CALCIUM BLD-MCNC: 8.5 MG/DL (ref 8.5–10.1)
CHLORIDE SERPL-SCNC: 104 MMOL/L (ref 98–112)
CO2 SERPL-SCNC: 30 MMOL/L (ref 21–32)
CREAT BLD-MCNC: 0.64 MG/DL
EOSINOPHIL # BLD AUTO: 0.02 X10(3) UL (ref 0–0.7)
EOSINOPHIL NFR BLD AUTO: 0.2 %
ERYTHROCYTE [DISTWIDTH] IN BLOOD BY AUTOMATED COUNT: 20.6 %
GFR SERPLBLD BASED ON 1.73 SQ M-ARVRAT: 89 ML/MIN/1.73M2 (ref 60–?)
GLOBULIN PLAS-MCNC: 2.4 G/DL (ref 2.8–4.4)
GLUCOSE BLD-MCNC: 117 MG/DL (ref 70–99)
GLUCOSE BLD-MCNC: 148 MG/DL (ref 70–99)
HCT VFR BLD AUTO: 24.7 %
HGB BLD-MCNC: 7.7 G/DL
IMM GRANULOCYTES # BLD AUTO: 0.18 X10(3) UL (ref 0–1)
IMM GRANULOCYTES NFR BLD: 2.2 %
LDH SERPL L TO P-CCNC: 144 U/L
LYMPHOCYTES # BLD AUTO: 0.16 X10(3) UL (ref 1–4)
LYMPHOCYTES NFR BLD AUTO: 1.9 %
MCH RBC QN AUTO: 30 PG (ref 26–34)
MCHC RBC AUTO-ENTMCNC: 31.2 G/DL (ref 31–37)
MCV RBC AUTO: 96.1 FL
MONOCYTES # BLD AUTO: 0.76 X10(3) UL (ref 0.1–1)
MONOCYTES NFR BLD AUTO: 9.1 %
NEUTROPHILS # BLD AUTO: 7.16 X10 (3) UL (ref 1.5–7.7)
NEUTROPHILS # BLD AUTO: 7.16 X10(3) UL (ref 1.5–7.7)
NEUTROPHILS NFR BLD AUTO: 86.1 %
OSMOLALITY SERPL CALC.SUM OF ELEC: 288 MOSM/KG (ref 275–295)
PLATELET # BLD AUTO: 152 10(3)UL (ref 150–450)
POTASSIUM SERPL-SCNC: 4.5 MMOL/L (ref 3.5–5.1)
PROT SERPL-MCNC: 5.1 G/DL (ref 6.4–8.2)
RBC # BLD AUTO: 2.57 X10(6)UL
SODIUM SERPL-SCNC: 138 MMOL/L (ref 136–145)
WBC # BLD AUTO: 8.3 X10(3) UL (ref 4–11)

## 2022-08-09 PROCEDURE — 85025 COMPLETE CBC W/AUTO DIFF WBC: CPT

## 2022-08-09 PROCEDURE — CW3NYZZ POSITRON EMISSION TOMOGRAPHIC (PET) IMAGING OF WHOLE BODY USING OTHER RADIONUCLIDE: ICD-10-PCS | Performed by: RADIOLOGY

## 2022-08-09 PROCEDURE — 83615 LACTATE (LD) (LDH) ENZYME: CPT | Performed by: INTERNAL MEDICINE

## 2022-08-09 PROCEDURE — 82962 GLUCOSE BLOOD TEST: CPT

## 2022-08-09 PROCEDURE — 80053 COMPREHEN METABOLIC PANEL: CPT

## 2022-08-09 PROCEDURE — 36592 COLLECT BLOOD FROM PICC: CPT

## 2022-08-09 PROCEDURE — 78815 PET IMAGE W/CT SKULL-THIGH: CPT | Performed by: INTERNAL MEDICINE

## 2022-08-09 NOTE — PROGRESS NOTES
Education Record    Learner:  Patient and Family Member    Disease / Diagnosis: Diffuse large B-cell lymphoma    Barriers / Limitations:  None   Comments:    Method:  Brief focused, Discussion and Reinforcement   Comments:    General Topics:  Side effects and symptom management, Plan of care reviewed and Fall risk and prevention   Comments:    Outcome:  Shows understanding   Comments:

## 2022-08-10 ENCOUNTER — TELEPHONE (OUTPATIENT)
Dept: HEMATOLOGY/ONCOLOGY | Facility: HOSPITAL | Age: 82
End: 2022-08-10

## 2022-08-10 RX ORDER — ACETAMINOPHEN 325 MG/1
650 TABLET ORAL ONCE
Status: CANCELLED | OUTPATIENT
Start: 2022-08-10

## 2022-08-10 RX ORDER — FUROSEMIDE 10 MG/ML
20 INJECTION INTRAMUSCULAR; INTRAVENOUS ONCE
Status: CANCELLED | OUTPATIENT
Start: 2022-08-10

## 2022-08-10 NOTE — TELEPHONE ENCOUNTER
Received a call from preadmission testing regarding this patient. Dr. Kassie Kate needs a type and screen drawn AFTER the unit of blood is transfused on 8/11. He also needs  Pt/PTT drawn (this can be drawn at anytime). Patient will have to go to the [facility] wing of the hospital after the blood is transfused to get her pre-op COVID swab. The lab orders will be entered by their team.       This RN passed on the information to the treating nurse for 8/11.

## 2022-08-11 ENCOUNTER — LAB ENCOUNTER (OUTPATIENT)
Dept: LAB | Facility: HOSPITAL | Age: 82
End: 2022-08-11
Attending: SURGERY
Payer: MEDICARE

## 2022-08-11 ENCOUNTER — OFFICE VISIT (OUTPATIENT)
Dept: HEMATOLOGY/ONCOLOGY | Facility: HOSPITAL | Age: 82
End: 2022-08-11
Attending: INTERNAL MEDICINE
Payer: MEDICARE

## 2022-08-11 VITALS
BODY MASS INDEX: 23.94 KG/M2 | TEMPERATURE: 99 F | WEIGHT: 167.19 LBS | DIASTOLIC BLOOD PRESSURE: 79 MMHG | OXYGEN SATURATION: 95 % | RESPIRATION RATE: 18 BRPM | HEART RATE: 85 BPM | SYSTOLIC BLOOD PRESSURE: 124 MMHG | HEIGHT: 70 IN

## 2022-08-11 DIAGNOSIS — Z01.818 PRE-OP TESTING: ICD-10-CM

## 2022-08-11 DIAGNOSIS — Z91.89 AT RISK FOR BLEEDING: ICD-10-CM

## 2022-08-11 DIAGNOSIS — D69.6 THROMBOCYTOPENIA (HCC): ICD-10-CM

## 2022-08-11 DIAGNOSIS — Z20.822 ENCOUNTER FOR PREOPERATIVE SCREENING LABORATORY TESTING FOR COVID-19 VIRUS: ICD-10-CM

## 2022-08-11 DIAGNOSIS — I71.4 AAA (ABDOMINAL AORTIC ANEURYSM) (HCC): ICD-10-CM

## 2022-08-11 DIAGNOSIS — D64.81 ANEMIA ASSOCIATED WITH CHEMOTHERAPY: ICD-10-CM

## 2022-08-11 DIAGNOSIS — C83.38 DIFFUSE LARGE B-CELL LYMPHOMA OF LYMPH NODES OF MULTIPLE REGIONS (HCC): Primary | ICD-10-CM

## 2022-08-11 DIAGNOSIS — Z01.812 ENCOUNTER FOR PREOPERATIVE SCREENING LABORATORY TESTING FOR COVID-19 VIRUS: ICD-10-CM

## 2022-08-11 DIAGNOSIS — T45.1X5A ANEMIA ASSOCIATED WITH CHEMOTHERAPY: ICD-10-CM

## 2022-08-11 LAB
ANTIBODY SCREEN: NEGATIVE
ANTIBODY SCREEN: NEGATIVE
APTT PPP: 32.7 SECONDS (ref 23.3–35.6)
INR BLD: 1.29 (ref 0.85–1.16)
PROTHROMBIN TIME: 16.1 SECONDS (ref 11.6–14.8)
RH BLOOD TYPE: POSITIVE
RH BLOOD TYPE: POSITIVE
SARS-COV-2 RNA RESP QL NAA+PROBE: NOT DETECTED

## 2022-08-11 PROCEDURE — 86901 BLOOD TYPING SEROLOGIC RH(D): CPT

## 2022-08-11 PROCEDURE — 86850 RBC ANTIBODY SCREEN: CPT

## 2022-08-11 PROCEDURE — 86900 BLOOD TYPING SEROLOGIC ABO: CPT

## 2022-08-11 PROCEDURE — 85610 PROTHROMBIN TIME: CPT

## 2022-08-11 PROCEDURE — 86920 COMPATIBILITY TEST SPIN: CPT

## 2022-08-11 PROCEDURE — 85730 THROMBOPLASTIN TIME PARTIAL: CPT

## 2022-08-11 PROCEDURE — 96374 THER/PROPH/DIAG INJ IV PUSH: CPT

## 2022-08-11 PROCEDURE — 36430 TRANSFUSION BLD/BLD COMPNT: CPT

## 2022-08-11 RX ORDER — FUROSEMIDE 10 MG/ML
20 INJECTION INTRAMUSCULAR; INTRAVENOUS ONCE
Status: CANCELLED | OUTPATIENT
Start: 2022-08-11

## 2022-08-11 RX ORDER — ACETAMINOPHEN 325 MG/1
650 TABLET ORAL ONCE
Status: COMPLETED | OUTPATIENT
Start: 2022-08-11 | End: 2022-08-11

## 2022-08-11 RX ORDER — FUROSEMIDE 10 MG/ML
20 INJECTION INTRAMUSCULAR; INTRAVENOUS ONCE
Status: COMPLETED | OUTPATIENT
Start: 2022-08-11 | End: 2022-08-11

## 2022-08-11 RX ORDER — ACETAMINOPHEN 325 MG/1
650 TABLET ORAL ONCE
Status: CANCELLED | OUTPATIENT
Start: 2022-08-11

## 2022-08-11 RX ADMIN — ACETAMINOPHEN 650 MG: 325 TABLET ORAL at 10:22:00

## 2022-08-11 RX ADMIN — FUROSEMIDE 20 MG: 10 INJECTION INTRAMUSCULAR; INTRAVENOUS at 13:15:00

## 2022-08-11 NOTE — PROGRESS NOTES
Education Record    Learner:  Patient    Disease / Diagnosis:1 unit of PRBC    Barriers / Limitations:  None   Comments:    Method:  Brief focused   Comments:    General Topics:  Infection, Medication, Pain, Precautions, Procedure, Side effects and symptom management, Plan of care reviewed and Fall risk and prevention   Comments:    Outcome:  Shows understanding   Comments:    Premedicated w Tylenol 650  And blood infused at 165 ml/hr due to cardiac issues.  Patient given LAsix 20 mg IVP after PRBC transfusion and another TX was drawn after transfusion as per instructions of yesterdays Charge RN  Patient was transferred with her son to \[facility] [place] [facility]\"" testing

## 2022-08-11 NOTE — H&P
[facility]    PATIENT'S NAME: Petey Nurse   ATTENDING PHYSICIAN: Maya Duke M.D. PATIENT ACCOUNT#:   [de-identified]    LOCATION:    MEDICAL RECORD #:   [id number]       YOB: 1940  ADMISSION DATE:       08/12/2022    HISTORY AND PHYSICAL EXAMINATION    HISTORY OF PRESENT ILLNESS:  This is an 80-year-old white female who has a history of abdominal aortic aneurysm stent graft placed for approximately 5 to 5.4 cm aneurysm approximately 2 years ago by myself, Dr. Alex Barnhart, and Dr. Saintclair Melnick. The patient had an enlarging aneurysm at that time, it was right up at almost 5.4 cm. The aorta was completely dilated between 29 and 31 mm the entire length, goes into the descending thoracic aorta. The neck was approximately 29 mm when we did the initial surgery; it was at least a 2 cm length of a neck. The patient had a [facility] Archbald Excluder stent graft placed at that time on 01/08/2020 of a 35 x 14.5 x 16 mm stent, main body right. Contralateral limb was 14.5 x 12 cm with a right limb extension of 14.5 x 7 cm. The patient has enlargement of the aneurysm, almost up to 6 cm, and followup since that time with most recent enlargement occurring over the last year. The patient was to be scheduled for surgery, but had reoccurrence of a lymphoma, which was initially diagnosed in her neck and treated with chemotherapy in the past by Dr. Lo Nolasco. She had radiation and chemotherapy of right neck lesion. She has had recurrence of this and she has some abdominal/GI complaints and had recurrence of a tumor mass inside her mesentery. She has been through 4 cycles already of chemotherapy with 2 more to go. Surgery was held off due to severe depression of the cell counts of white cells, platelets, and hemoglobin. The case was discussed this week with Dr. Lo Nolasco who thinks that she is ready for the procedure, is afraid to go any further.   Will hold off chemotherapy if need be for a week or 2 just to make sure she can get through this operation. Patient has no abdominal complaints, but the aneurysm has increased in size by at least 5 mm since the surgery of 2020. She has an endoleak seen on this, which could be compatible with a type 3 endoleak, although I cannot exclude a type 2 endoleak and definitely cannot completely exclude a type 1 endoleak. The patient is scheduled for an angiogram, diagnostic, as well as relining of the stent graft. If there is still a persistent type 2 endoleak, will deal with this here or otherwise refer to Dr. Sandra Thomas at [facility].  The patient currently has no chest pain or shortness of breath. She had a PTCA of the coronaries in the past when followed by [address] [place] [address] with 4 stents placed by Dr. Aniyah Menjivar and being followed by Dr. Beverley Klein. PAST MEDICAL HISTORY:  She has a history of esophageal myotomy done at [facility], prior cholecystectomy. She has had knee surgery, hernia surgery, right lobe thyroidectomy. MEDICATIONS:  Lasix, aspirin, Xarelto which has been held, acyclovir, Bactrim, potassium chloride, metoprolol tartrate, levothyroxine, allopurinol, metformin in the past, pravastatin. ALLERGIES:  Adhesive tape. FAMILY HISTORY:  Aneurysms. SOCIAL HISTORY:  She denies any EtOH abuse, drug abuse, or tobacco abuse. She is the mother of Dr. Cydney Plummer, who works in the emergency room. Daughter-in-law is an administrative nurse here at the hospital also. She has 4 children. She works part-time in a musical choir. She lives with one of her sons. REVIEW OF SYSTEMS:  Currently no chest pain or shortness of breath. No gangrene, tissue loss, ulceration of the lower legs. No hematuria, dysuria, hemoptysis, cough, sputum production. No weight loss, fever, chills. No hematemesis or bright red blood per rectum. PHYSICAL EXAMINATION:    GENERAL:  She is awake and alert. She is appropriate, in no acute distress.   VITAL SIGNS:  Blood pressure 130/70, heart rate 72, respirations 20. HEENT:  Pupils equal, round. Sclerae clear. Mouth without lesions. NECK:  No cervical bruit. No JVD. LUNGS:  Clear to auscultation. No rales or rhonchi. HEART:  Normal.  No murmur. ABDOMEN:  Soft. No tenderness or masses. The aorta was nontender, but slightly enlarged. EXTREMITIES:  Femoral pulses palpable. Popliteal and pedal pulses appear to be palpable. NEUROLOGIC:  She is moving all 4 extremities. LABORATORY DATA:  Her last creatinine was 0.64, with a BUN of 10. Blood sugar was just mildly elevated. CBC followed by Dr. Jang has the hemoglobin of 7.7, white count is up to 8.3, platelet count is 581,140. As previously stated, the aneurysm is slightly enlarged. 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 Risks and complications including death, myocardial infarction, bleeding, infection, rupture, thrombosis, future rupture, future thrombosis, renal failure, multisystem organ failure, sepsis, limb loss, colon ischemia were all explained. Need for followup was explained. Patient understands and agrees.     Dictated By Hilario Polo M.D.  d: 08/11/2022 11:25:44  t: 08/11/2022 14:20:37  Cathie Limon [id number]  PKV/

## 2022-08-12 ENCOUNTER — ANESTHESIA EVENT (OUTPATIENT)
Dept: CARDIAC SURGERY | Facility: HOSPITAL | Age: 82
End: 2022-08-12
Payer: MEDICARE

## 2022-08-12 ENCOUNTER — HOSPITAL ENCOUNTER (INPATIENT)
Facility: HOSPITAL | Age: 82
LOS: 2 days | Discharge: HOME | End: 2022-08-14
Attending: SURGERY | Admitting: SURGERY
Payer: MEDICARE

## 2022-08-12 ENCOUNTER — ANESTHESIA (OUTPATIENT)
Dept: CARDIAC SURGERY | Facility: HOSPITAL | Age: 82
End: 2022-08-12
Payer: MEDICARE

## 2022-08-12 DIAGNOSIS — Z01.812 ENCOUNTER FOR PREOPERATIVE SCREENING LABORATORY TESTING FOR COVID-19 VIRUS: ICD-10-CM

## 2022-08-12 DIAGNOSIS — Z91.89 AT RISK FOR BLEEDING: ICD-10-CM

## 2022-08-12 DIAGNOSIS — Z01.818 PRE-OP TESTING: ICD-10-CM

## 2022-08-12 DIAGNOSIS — Z20.822 ENCOUNTER FOR PREOPERATIVE SCREENING LABORATORY TESTING FOR COVID-19 VIRUS: ICD-10-CM

## 2022-08-12 DIAGNOSIS — I71.4 AAA (ABDOMINAL AORTIC ANEURYSM) (HCC): Primary | ICD-10-CM

## 2022-08-12 PROBLEM — D64.89 OTHER SPECIFIED ANEMIAS: Status: ACTIVE | Noted: 2022-08-12

## 2022-08-12 PROBLEM — D64.89 OTHER SPECIFIED ANEMIAS: Status: RESOLVED | Noted: 2022-08-12 | Resolved: 2022-08-12

## 2022-08-12 PROBLEM — T45.1X5A ANEMIA ASSOCIATED WITH CHEMOTHERAPY: Status: ACTIVE | Noted: 2022-08-12

## 2022-08-12 PROBLEM — I71.40 AAA (ABDOMINAL AORTIC ANEURYSM) (HCC): Status: ACTIVE | Noted: 2022-08-12

## 2022-08-12 PROBLEM — D64.81 ANEMIA ASSOCIATED WITH CHEMOTHERAPY: Status: ACTIVE | Noted: 2022-08-12

## 2022-08-12 PROBLEM — I71.40 AAA (ABDOMINAL AORTIC ANEURYSM): Status: ACTIVE | Noted: 2022-08-12

## 2022-08-12 LAB
APTT PPP: 41.7 SECONDS (ref 23.3–35.6)
BASE EXCESS BLD CALC-SCNC: 6 MMOL/L
BLOOD TYPE BARCODE: 5100
CA-I BLD-SCNC: 1.11 MMOL/L (ref 1.12–1.32)
CO2 BLD-SCNC: 31 MMOL/L (ref 22–32)
ERYTHROCYTE [DISTWIDTH] IN BLOOD BY AUTOMATED COUNT: 20.1 %
GLUCOSE BLD-MCNC: 125 MG/DL (ref 70–99)
GLUCOSE BLD-MCNC: 127 MG/DL (ref 70–99)
GLUCOSE BLD-MCNC: 134 MG/DL (ref 70–99)
GLUCOSE BLD-MCNC: 186 MG/DL (ref 70–99)
GLUCOSE BLD-MCNC: 233 MG/DL (ref 70–99)
HCO3 BLD-SCNC: 29.6 MEQ/L
HCT VFR BLD AUTO: 27.6 %
HCT VFR BLD CALC: 22 %
HGB BLD-MCNC: 8.7 G/DL
INR BLD: 1.47 (ref 0.85–1.16)
ISTAT ACTIVATED CLOTTING TIME: 161 SECONDS (ref 74–137)
ISTAT ACTIVATED CLOTTING TIME: 260 SECONDS (ref 74–137)
MCH RBC QN AUTO: 30.7 PG (ref 26–34)
MCHC RBC AUTO-ENTMCNC: 31.5 G/DL (ref 31–37)
MCV RBC AUTO: 97.5 FL
PCO2 BLD: 39 MMHG
PH BLD: 7.49 [PH]
PLATELET # BLD AUTO: 207 10(3)UL (ref 150–450)
PO2 BLD: 389 MMHG
POTASSIUM BLD-SCNC: 3.7 MMOL/L (ref 3.6–5.1)
PROTHROMBIN TIME: 17.8 SECONDS (ref 11.6–14.8)
RBC # BLD AUTO: 2.83 X10(6)UL
SAO2 % BLD: 100 %
SODIUM BLD-SCNC: 140 MMOL/L (ref 136–145)
WBC # BLD AUTO: 6.1 X10(3) UL (ref 4–11)

## 2022-08-12 PROCEDURE — 30233N1 TRANSFUSION OF NONAUTOLOGOUS RED BLOOD CELLS INTO PERIPHERAL VEIN, PERCUTANEOUS APPROACH: ICD-10-PCS | Performed by: SURGERY

## 2022-08-12 PROCEDURE — 04WY3DZ REVISION OF INTRALUMINAL DEVICE IN LOWER ARTERY, PERCUTANEOUS APPROACH: ICD-10-PCS | Performed by: SURGERY

## 2022-08-12 PROCEDURE — B410YZZ FLUOROSCOPY OF ABDOMINAL AORTA USING OTHER CONTRAST: ICD-10-PCS | Performed by: SURGERY

## 2022-08-12 PROCEDURE — 99222 1ST HOSP IP/OBS MODERATE 55: CPT | Performed by: HOSPITALIST

## 2022-08-12 PROCEDURE — 36430 TRANSFUSION BLD/BLD COMPNT: CPT | Performed by: INTERNAL MEDICINE

## 2022-08-12 PROCEDURE — 76942 ECHO GUIDE FOR BIOPSY: CPT | Performed by: INTERNAL MEDICINE

## 2022-08-12 PROCEDURE — 99222 1ST HOSP IP/OBS MODERATE 55: CPT | Performed by: INTERNAL MEDICINE

## 2022-08-12 DEVICE — EXCLUDER CONFORM AAA EXT ENDO 36MMX4.5CM 18FR
Type: IMPLANTABLE DEVICE | Status: FUNCTIONAL
Brand: GORE EXCLUDER CONF AAA ENDO - AORTIC EXTENDER

## 2022-08-12 DEVICE — EXCLUDER AAA ENDO CONTRA LEG 16MMX14.5MMX12CM 12FR
Type: IMPLANTABLE DEVICE | Status: FUNCTIONAL
Brand: GORE EXCLUDER AAA ENDOPROSTHESIS

## 2022-08-12 RX ORDER — HYDROMORPHONE HYDROCHLORIDE 1 MG/ML
0.2 INJECTION, SOLUTION INTRAMUSCULAR; INTRAVENOUS; SUBCUTANEOUS EVERY 5 MIN PRN
Status: ACTIVE | OUTPATIENT
Start: 2022-08-12 | End: 2022-08-12

## 2022-08-12 RX ORDER — ONDANSETRON 2 MG/ML
4 INJECTION INTRAMUSCULAR; INTRAVENOUS EVERY 6 HOURS PRN
Status: DISCONTINUED | OUTPATIENT
Start: 2022-08-12 | End: 2022-08-14

## 2022-08-12 RX ORDER — PANTOPRAZOLE SODIUM 40 MG/1
40 TABLET, DELAYED RELEASE ORAL
Status: DISCONTINUED | OUTPATIENT
Start: 2022-08-12 | End: 2022-08-12 | Stop reason: SDUPTHER

## 2022-08-12 RX ORDER — ALLOPURINOL 300 MG/1
300 TABLET ORAL DAILY
Status: DISCONTINUED | OUTPATIENT
Start: 2022-08-12 | End: 2022-08-14

## 2022-08-12 RX ORDER — ASPIRIN 81 MG/1
81 TABLET ORAL DAILY
Status: DISCONTINUED | OUTPATIENT
Start: 2022-08-12 | End: 2022-08-14

## 2022-08-12 RX ORDER — POTASSIUM CHLORIDE 750 MG/1
10 TABLET, EXTENDED RELEASE ORAL 2 TIMES DAILY
Status: DISCONTINUED | OUTPATIENT
Start: 2022-08-12 | End: 2022-08-14

## 2022-08-12 RX ORDER — PANTOPRAZOLE SODIUM 40 MG/1
40 TABLET, DELAYED RELEASE ORAL
Status: DISCONTINUED | OUTPATIENT
Start: 2022-08-13 | End: 2022-08-14

## 2022-08-12 RX ORDER — HYDROMORPHONE HYDROCHLORIDE 1 MG/ML
0.4 INJECTION, SOLUTION INTRAMUSCULAR; INTRAVENOUS; SUBCUTANEOUS EVERY 5 MIN PRN
Status: ACTIVE | OUTPATIENT
Start: 2022-08-12 | End: 2022-08-12

## 2022-08-12 RX ORDER — METOPROLOL TARTRATE 5 MG/5ML
2.5 INJECTION INTRAVENOUS ONCE
Status: DISCONTINUED | OUTPATIENT
Start: 2022-08-12 | End: 2022-08-12 | Stop reason: ALTCHOICE

## 2022-08-12 RX ORDER — HYDROCODONE BITARTRATE AND ACETAMINOPHEN 5; 325 MG/1; MG/1
1 TABLET ORAL EVERY 4 HOURS PRN
Status: DISCONTINUED | OUTPATIENT
Start: 2022-08-12 | End: 2022-08-14

## 2022-08-12 RX ORDER — CEFAZOLIN SODIUM/WATER 2 G/20 ML
2 SYRINGE (ML) INTRAVENOUS EVERY 8 HOURS
Status: COMPLETED | OUTPATIENT
Start: 2022-08-12 | End: 2022-08-12

## 2022-08-12 RX ORDER — BUPIVACAINE HYDROCHLORIDE 5 MG/ML
INJECTION, SOLUTION EPIDURAL; INTRACAUDAL AS NEEDED
Status: DISCONTINUED | OUTPATIENT
Start: 2022-08-12 | End: 2022-08-12 | Stop reason: HOSPADM

## 2022-08-12 RX ORDER — NALOXONE HYDROCHLORIDE 0.4 MG/ML
80 INJECTION, SOLUTION INTRAMUSCULAR; INTRAVENOUS; SUBCUTANEOUS AS NEEDED
Status: ACTIVE | OUTPATIENT
Start: 2022-08-12 | End: 2022-08-12

## 2022-08-12 RX ORDER — SODIUM CHLORIDE 9 MG/ML
INJECTION, SOLUTION INTRAVENOUS CONTINUOUS PRN
Status: DISCONTINUED | OUTPATIENT
Start: 2022-08-12 | End: 2022-08-12 | Stop reason: SURG

## 2022-08-12 RX ORDER — ASPIRIN 81 MG/1
TABLET, CHEWABLE ORAL
Status: COMPLETED
Start: 2022-08-12 | End: 2022-08-12

## 2022-08-12 RX ORDER — NITROGLYCERIN 20 MG/100ML
INJECTION INTRAVENOUS
Status: DISCONTINUED | OUTPATIENT
Start: 2022-08-12 | End: 2022-08-14

## 2022-08-12 RX ORDER — SODIUM CHLORIDE 9 MG/ML
INJECTION, SOLUTION INTRAVENOUS CONTINUOUS
Status: DISCONTINUED | OUTPATIENT
Start: 2022-08-12 | End: 2022-08-12

## 2022-08-12 RX ORDER — ROCURONIUM BROMIDE 10 MG/ML
INJECTION, SOLUTION INTRAVENOUS AS NEEDED
Status: DISCONTINUED | OUTPATIENT
Start: 2022-08-12 | End: 2022-08-12 | Stop reason: SURG

## 2022-08-12 RX ORDER — HEPARIN SODIUM 1000 [USP'U]/ML
INJECTION, SOLUTION INTRAVENOUS; SUBCUTANEOUS AS NEEDED
Status: DISCONTINUED | OUTPATIENT
Start: 2022-08-12 | End: 2022-08-12 | Stop reason: SURG

## 2022-08-12 RX ORDER — FLUCONAZOLE 100 MG/1
200 TABLET ORAL
Status: DISCONTINUED | OUTPATIENT
Start: 2022-08-12 | End: 2022-08-14

## 2022-08-12 RX ORDER — PRAVASTATIN SODIUM 20 MG
80 TABLET ORAL NIGHTLY
Status: DISCONTINUED | OUTPATIENT
Start: 2022-08-12 | End: 2022-08-14

## 2022-08-12 RX ORDER — SODIUM CHLORIDE 9 MG/ML
INJECTION, SOLUTION INTRAVENOUS CONTINUOUS
Status: DISCONTINUED | OUTPATIENT
Start: 2022-08-12 | End: 2022-08-14

## 2022-08-12 RX ORDER — GLYCOPYRROLATE 0.2 MG/ML
INJECTION, SOLUTION INTRAMUSCULAR; INTRAVENOUS AS NEEDED
Status: DISCONTINUED | OUTPATIENT
Start: 2022-08-12 | End: 2022-08-12 | Stop reason: SURG

## 2022-08-12 RX ORDER — IODIXANOL 320 MG/ML
100 INJECTION, SOLUTION INTRAVASCULAR
Status: COMPLETED | OUTPATIENT
Start: 2022-08-12 | End: 2022-08-12

## 2022-08-12 RX ORDER — ONDANSETRON 2 MG/ML
INJECTION INTRAMUSCULAR; INTRAVENOUS AS NEEDED
Status: DISCONTINUED | OUTPATIENT
Start: 2022-08-12 | End: 2022-08-12 | Stop reason: SURG

## 2022-08-12 RX ORDER — ACYCLOVIR 400 MG/1
400 TABLET ORAL 2 TIMES DAILY
Status: DISCONTINUED | OUTPATIENT
Start: 2022-08-12 | End: 2022-08-14

## 2022-08-12 RX ORDER — LIDOCAINE HYDROCHLORIDE 10 MG/ML
INJECTION, SOLUTION EPIDURAL; INFILTRATION; INTRACAUDAL; PERINEURAL AS NEEDED
Status: DISCONTINUED | OUTPATIENT
Start: 2022-08-12 | End: 2022-08-12 | Stop reason: SURG

## 2022-08-12 RX ORDER — HYDROMORPHONE HYDROCHLORIDE 1 MG/ML
0.6 INJECTION, SOLUTION INTRAMUSCULAR; INTRAVENOUS; SUBCUTANEOUS EVERY 5 MIN PRN
Status: ACTIVE | OUTPATIENT
Start: 2022-08-12 | End: 2022-08-12

## 2022-08-12 RX ORDER — NICOTINE POLACRILEX 4 MG
30 LOZENGE BUCCAL
Status: DISCONTINUED | OUTPATIENT
Start: 2022-08-12 | End: 2022-08-14

## 2022-08-12 RX ORDER — NICOTINE POLACRILEX 4 MG
15 LOZENGE BUCCAL
Status: DISCONTINUED | OUTPATIENT
Start: 2022-08-12 | End: 2022-08-14

## 2022-08-12 RX ORDER — SODIUM CHLORIDE, SODIUM LACTATE, POTASSIUM CHLORIDE, CALCIUM CHLORIDE 600; 310; 30; 20 MG/100ML; MG/100ML; MG/100ML; MG/100ML
INJECTION, SOLUTION INTRAVENOUS CONTINUOUS
Status: DISCONTINUED | OUTPATIENT
Start: 2022-08-12 | End: 2022-08-12

## 2022-08-12 RX ORDER — NEOSTIGMINE METHYLSULFATE 1 MG/ML
INJECTION, SOLUTION INTRAVENOUS AS NEEDED
Status: DISCONTINUED | OUTPATIENT
Start: 2022-08-12 | End: 2022-08-12 | Stop reason: SURG

## 2022-08-12 RX ORDER — HYDROCODONE BITARTRATE AND ACETAMINOPHEN 5; 325 MG/1; MG/1
2 TABLET ORAL EVERY 4 HOURS PRN
Status: DISCONTINUED | OUTPATIENT
Start: 2022-08-12 | End: 2022-08-14

## 2022-08-12 RX ORDER — PHENYLEPHRINE HCL 10 MG/ML
VIAL (ML) INJECTION AS NEEDED
Status: DISCONTINUED | OUTPATIENT
Start: 2022-08-12 | End: 2022-08-12 | Stop reason: SURG

## 2022-08-12 RX ORDER — DEXAMETHASONE SODIUM PHOSPHATE 4 MG/ML
VIAL (ML) INJECTION AS NEEDED
Status: DISCONTINUED | OUTPATIENT
Start: 2022-08-12 | End: 2022-08-12 | Stop reason: SURG

## 2022-08-12 RX ORDER — METOCLOPRAMIDE HYDROCHLORIDE 5 MG/ML
10 INJECTION INTRAMUSCULAR; INTRAVENOUS EVERY 8 HOURS PRN
Status: DISCONTINUED | OUTPATIENT
Start: 2022-08-12 | End: 2022-08-14

## 2022-08-12 RX ORDER — SULFAMETHOXAZOLE AND TRIMETHOPRIM 800; 160 MG/1; MG/1
1 TABLET ORAL
Status: DISCONTINUED | OUTPATIENT
Start: 2022-08-12 | End: 2022-08-14

## 2022-08-12 RX ORDER — DEXTROSE MONOHYDRATE 25 G/50ML
50 INJECTION, SOLUTION INTRAVENOUS
Status: DISCONTINUED | OUTPATIENT
Start: 2022-08-12 | End: 2022-08-14

## 2022-08-12 RX ORDER — ACETAMINOPHEN 325 MG/1
650 TABLET ORAL EVERY 4 HOURS PRN
Status: DISCONTINUED | OUTPATIENT
Start: 2022-08-12 | End: 2022-08-14

## 2022-08-12 RX ORDER — CEFAZOLIN SODIUM/WATER 2 G/20 ML
SYRINGE (ML) INTRAVENOUS AS NEEDED
Status: DISCONTINUED | OUTPATIENT
Start: 2022-08-12 | End: 2022-08-12 | Stop reason: SURG

## 2022-08-12 RX ORDER — LEVOTHYROXINE SODIUM 0.05 MG/1
50 TABLET ORAL
Status: DISCONTINUED | OUTPATIENT
Start: 2022-08-12 | End: 2022-08-14

## 2022-08-12 RX ADMIN — HEPARIN SODIUM 10000 UNITS: 1000 INJECTION, SOLUTION INTRAVENOUS; SUBCUTANEOUS at 08:23:00

## 2022-08-12 RX ADMIN — ONDANSETRON 4 MG: 2 INJECTION INTRAMUSCULAR; INTRAVENOUS at 09:04:00

## 2022-08-12 RX ADMIN — NEOSTIGMINE METHYLSULFATE 3 MG: 1 INJECTION, SOLUTION INTRAVENOUS at 09:08:00

## 2022-08-12 RX ADMIN — PHENYLEPHRINE HCL 100 MCG: 10 MG/ML VIAL (ML) INJECTION at 07:28:00

## 2022-08-12 RX ADMIN — LIDOCAINE HYDROCHLORIDE 50 MG: 10 INJECTION, SOLUTION EPIDURAL; INFILTRATION; INTRACAUDAL; PERINEURAL at 07:20:00

## 2022-08-12 RX ADMIN — CEFAZOLIN SODIUM/WATER 2 G: 2 G/20 ML SYRINGE (ML) INTRAVENOUS at 07:33:00

## 2022-08-12 RX ADMIN — SODIUM CHLORIDE: 9 INJECTION, SOLUTION INTRAVENOUS at 07:13:00

## 2022-08-12 RX ADMIN — HEPARIN SODIUM 2000 UNITS: 1000 INJECTION, SOLUTION INTRAVENOUS; SUBCUTANEOUS at 08:34:00

## 2022-08-12 RX ADMIN — DEXAMETHASONE SODIUM PHOSPHATE 4 MG: 4 MG/ML VIAL (ML) INJECTION at 07:28:00

## 2022-08-12 RX ADMIN — ROCURONIUM BROMIDE 10 MG: 10 INJECTION, SOLUTION INTRAVENOUS at 08:02:00

## 2022-08-12 RX ADMIN — SODIUM CHLORIDE: 9 INJECTION, SOLUTION INTRAVENOUS at 08:32:00

## 2022-08-12 RX ADMIN — GLYCOPYRROLATE 0.4 MG: 0.2 INJECTION, SOLUTION INTRAMUSCULAR; INTRAVENOUS at 09:08:00

## 2022-08-12 RX ADMIN — ROCURONIUM BROMIDE 50 MG: 10 INJECTION, SOLUTION INTRAVENOUS at 07:20:00

## 2022-08-12 NOTE — ANESTHESIA PROCEDURE NOTES
Arterial Line  Performed by: Joleen Novoa MD  Authorized by: Joleen Novoa MD     General Information and Staff    Procedure Start:  8/12/2022 7:35 AM  Procedure End:  8/12/2022 7:45 AM  Anesthesiologist:  Joleen Novoa MD  Performed By:  Anesthesiologist  Patient Location:  OR  Indication: continuous blood pressure monitoring and blood sampling needed    Site Identification: real time ultrasound guided, surface landmarks and image stored and retrievable    Preanesthetic Checklist: 2 patient identifiers, IV checked, risks and benefits discussed, monitors and equipment checked, pre-op evaluation, timeout performed, anesthesia consent and sterile technique used    Procedure Details    Catheter Size:  20 G  Catheter Length:  1 and 3/4 inchCatheter Type:  Arrow  Seldinger Technique?: Yes    Laterality:  RightSite:  Radial artery  Site Prep: chlorhexidine  Line Secured:  Wrist Brace, tape and Tegaderm    Assessment    Events: patient tolerated procedure well with no complications      Medications      Additional Comments

## 2022-08-12 NOTE — ANESTHESIA PROCEDURE NOTES
Airway  Date/Time: 8/12/2022 7:24 AM  Urgency: elective      General Information and Staff    Patient location during procedure: OR  Anesthesiologist: Kira Coleman MD  Performed: anesthesiologist     Indications and Patient Condition  Indications for airway management: anesthesia  Sedation level: deep  Preoxygenated: yes  Patient position: sniffing  Mask difficulty assessment: 1 - vent by mask    Final Airway Details  Final airway type: endotracheal airway      Successful airway: ETT  Cuffed: yes   Successful intubation technique: Video laryngoscopy  Endotracheal tube insertion site: oral  Blade: GlideScope  Blade size: #3  ETT size (mm): 7.5    Placement verified by: chest auscultation and capnometry   Cuff volume (mL): 7  Measured from: lips  ETT to lips (cm): 22  Number of attempts at approach: 1    Additional Comments  Atraumatic, anterior airway, easy passage with glidescope on first attempt, atraumatic

## 2022-08-12 NOTE — OPERATIVE REPORT
Duplex US access right/ left CFA. 18Fr Core Dry Seal right/ 12Fr Crestline Dry Seal left. 36x4.5cm aortic cuff. 14.5mmx 12cm right/ left iliac endoprosthesis. Perclose times 2 r/ l CFA. 8Fr Angioseal right CFA. Co-surgeons: Malinda/ Davian. Asst: Justin Kendall. EBL- 50cc.  Cx: none

## 2022-08-12 NOTE — PROGRESS NOTES
81 y/o female with hx AAA endograft 5 years ago now enlarging for neck extension and relining. Now being treated for recurrence of lymphoma with radiationa and chemo.  Hx PCI    Davian/Malinda  18 Citizen of [place] RFA and 12 LFA  Cuff placed 36 x 4.5 then 14.5 x 12 limbs bilateral  Excellent result

## 2022-08-13 LAB
ALBUMIN SERPL-MCNC: 2.1 G/DL (ref 3.4–5)
ALBUMIN/GLOB SERPL: 0.9 {RATIO} (ref 1–2)
ALP LIVER SERPL-CCNC: 59 U/L
ALT SERPL-CCNC: 8 U/L
ANION GAP SERPL CALC-SCNC: 5 MMOL/L (ref 0–18)
AST SERPL-CCNC: 6 U/L (ref 15–37)
BILIRUB SERPL-MCNC: 0.2 MG/DL (ref 0.1–2)
BUN BLD-MCNC: 13 MG/DL (ref 7–18)
CALCIUM BLD-MCNC: 7.7 MG/DL (ref 8.5–10.1)
CHLORIDE SERPL-SCNC: 109 MMOL/L (ref 98–112)
CO2 SERPL-SCNC: 27 MMOL/L (ref 21–32)
CREAT BLD-MCNC: 0.62 MG/DL
ERYTHROCYTE [DISTWIDTH] IN BLOOD BY AUTOMATED COUNT: 20 %
GFR SERPLBLD BASED ON 1.73 SQ M-ARVRAT: 89 ML/MIN/1.73M2 (ref 60–?)
GLOBULIN PLAS-MCNC: 2.3 G/DL (ref 2.8–4.4)
GLUCOSE BLD-MCNC: 154 MG/DL (ref 70–99)
GLUCOSE BLD-MCNC: 158 MG/DL (ref 70–99)
GLUCOSE BLD-MCNC: 167 MG/DL (ref 70–99)
GLUCOSE BLD-MCNC: 191 MG/DL (ref 70–99)
GLUCOSE BLD-MCNC: 198 MG/DL (ref 70–99)
HCT VFR BLD AUTO: 26.6 %
HGB BLD-MCNC: 8.7 G/DL
INR BLD: 1.4 (ref 0.85–1.16)
MCH RBC QN AUTO: 31.1 PG (ref 26–34)
MCHC RBC AUTO-ENTMCNC: 32.7 G/DL (ref 31–37)
MCV RBC AUTO: 95 FL
OSMOLALITY SERPL CALC.SUM OF ELEC: 295 MOSM/KG (ref 275–295)
PLATELET # BLD AUTO: 233 10(3)UL (ref 150–450)
POTASSIUM SERPL-SCNC: 4.4 MMOL/L (ref 3.5–5.1)
PROT SERPL-MCNC: 4.4 G/DL (ref 6.4–8.2)
PROTHROMBIN TIME: 17.1 SECONDS (ref 11.6–14.8)
RBC # BLD AUTO: 2.8 X10(6)UL
SODIUM SERPL-SCNC: 141 MMOL/L (ref 136–145)
WBC # BLD AUTO: 7.6 X10(3) UL (ref 4–11)

## 2022-08-13 PROCEDURE — 99232 SBSQ HOSP IP/OBS MODERATE 35: CPT | Performed by: HOSPITALIST

## 2022-08-13 NOTE — PLAN OF CARE
Received patient awake in bed. AOX4. Very pleasant. SUE  Denies any pain. Room air and then placed O2 at [facility] when she wanted to go to sleep. Dlear lungs. Afib on tele. Has 2+ LE edema, +1 pedals. Has chronic tingling to both feet. Both groin soft, no hematoma. PICC line to RUE intact, w/ IVF. Right radial art line intact. QID accuchecks- 233- gave 3units Novolog tonight. Chavez draining to yellow urine.  Will dc in am.  Doppler to LE in am.  ID to see in am.    Problem: CARDIOVASCULAR - ADULT  Goal: Absence of cardiac arrhythmias or at baseline  Description: INTERVENTIONS:  - Continuous cardiac monitoring, monitor vital signs, obtain 12 lead EKG if indicated  - Evaluate effectiveness of antiarrhythmic and heart rate control medications as ordered  - Initiate emergency measures for life threatening arrhythmias  - Monitor electrolytes and administer replacement therapy as ordered  Outcome: Progressing     Problem: METABOLIC/FLUID AND ELECTROLYTES - ADULT  Goal: Electrolytes maintained within normal limits  Description: INTERVENTIONS:  - Monitor labs and rhythm and assess patient for signs and symptoms of electrolyte imbalances  - Administer electrolyte replacement as ordered  - Monitor response to electrolyte replacements, including rhythm and repeat lab results as appropriate  - Fluid restriction as ordered  - Instruct patient on fluid and nutrition restrictions as appropriate  Outcome: Progressing     Problem: Impaired Functional Mobility  Goal: Achieve highest/safest level of mobility/gait  Description: Interventions:  - Assess patient's functional ability and stability  - Promote increasing activity/tolerance for mobility and gait  - Educate and engage patient/family in tolerated activity level and precautions    Outcome: Progressing     Problem: Impaired Activities of Daily Living  Goal: Achieve highest/safest level of independence in self care  Description: Interventions:  - Assess ability and encourage patient to participate in ADLs to maximize function  - Promote sitting position while performing ADLs such as feeding, grooming, and bathing  - Educate and encourage patient/family in tolerated functional activity level and precautions during self-care    Outcome: Progressing

## 2022-08-14 ENCOUNTER — APPOINTMENT (OUTPATIENT)
Dept: ULTRASOUND IMAGING | Facility: HOSPITAL | Age: 82
End: 2022-08-14
Attending: SURGERY
Payer: MEDICARE

## 2022-08-14 VITALS
WEIGHT: 172 LBS | OXYGEN SATURATION: 95 % | RESPIRATION RATE: 14 BRPM | SYSTOLIC BLOOD PRESSURE: 117 MMHG | HEART RATE: 82 BPM | DIASTOLIC BLOOD PRESSURE: 76 MMHG | HEIGHT: 70 IN | BODY MASS INDEX: 24.62 KG/M2 | TEMPERATURE: 98 F

## 2022-08-14 LAB
BASOPHILS # BLD AUTO: 0.03 X10(3) UL (ref 0–0.2)
BASOPHILS NFR BLD AUTO: 0.4 %
BLOOD TYPE BARCODE: 5100
EOSINOPHIL # BLD AUTO: 0.01 X10(3) UL (ref 0–0.7)
EOSINOPHIL NFR BLD AUTO: 0.1 %
ERYTHROCYTE [DISTWIDTH] IN BLOOD BY AUTOMATED COUNT: 20.2 %
GLUCOSE BLD-MCNC: 128 MG/DL (ref 70–99)
GLUCOSE BLD-MCNC: 165 MG/DL (ref 70–99)
HCT VFR BLD AUTO: 28.5 %
HGB BLD-MCNC: 8.9 G/DL
IMM GRANULOCYTES # BLD AUTO: 0.03 X10(3) UL (ref 0–1)
IMM GRANULOCYTES NFR BLD: 0.4 %
LYMPHOCYTES # BLD AUTO: 0.15 X10(3) UL (ref 1–4)
LYMPHOCYTES NFR BLD AUTO: 2.2 %
MCH RBC QN AUTO: 30.8 PG (ref 26–34)
MCHC RBC AUTO-ENTMCNC: 31.2 G/DL (ref 31–37)
MCV RBC AUTO: 98.6 FL
MONOCYTES # BLD AUTO: 0.73 X10(3) UL (ref 0.1–1)
MONOCYTES NFR BLD AUTO: 10.9 %
NEUTROPHILS # BLD AUTO: 5.73 X10 (3) UL (ref 1.5–7.7)
NEUTROPHILS # BLD AUTO: 5.73 X10(3) UL (ref 1.5–7.7)
NEUTROPHILS NFR BLD AUTO: 86 %
PLATELET # BLD AUTO: 238 10(3)UL (ref 150–450)
RBC # BLD AUTO: 2.89 X10(6)UL
WBC # BLD AUTO: 6.7 X10(3) UL (ref 4–11)

## 2022-08-14 PROCEDURE — 99232 SBSQ HOSP IP/OBS MODERATE 35: CPT | Performed by: HOSPITALIST

## 2022-08-14 PROCEDURE — 93925 LOWER EXTREMITY STUDY: CPT | Performed by: SURGERY

## 2022-08-14 RX ORDER — FUROSEMIDE 20 MG/1
20 TABLET ORAL DAILY PRN
Refills: 0 | Status: SHIPPED | COMMUNITY
Start: 2022-08-14 | End: 2022-08-16

## 2022-08-14 NOTE — PLAN OF CARE
POD #1 S/p AAA stent endograft leak repair 8/12  Received patient up sitting in a recliner. AOx4, very pleasant. Denies any pain. Both groin are SARWAT, soft, no hematoma. Afib on tele. 2+LE edema w/ chronic tingling to both feet from neuropapthy. Room air during the day, 2LNC at night. Clear lungs. Voided to the bathroom with 1 assist , gaitbelt and a walker. QID accuchecks, covered w/ 2units for 191 tonight. DL PICC line intact. Discharge planning: home with PT per Physical therapy recommendation. US arterial LE doppler in am 8/14.        Problem: CARDIOVASCULAR - ADULT  Goal: Absence of cardiac arrhythmias or at baseline  Description: INTERVENTIONS:  - Continuous cardiac monitoring, monitor vital signs, obtain 12 lead EKG if indicated  - Evaluate effectiveness of antiarrhythmic and heart rate control medications as ordered  - Initiate emergency measures for life threatening arrhythmias  - Monitor electrolytes and administer replacement therapy as ordered  Outcome: Progressing     Problem: METABOLIC/FLUID AND ELECTROLYTES - ADULT  Goal: Electrolytes maintained within normal limits  Description: INTERVENTIONS:  - Monitor labs and rhythm and assess patient for signs and symptoms of electrolyte imbalances  - Administer electrolyte replacement as ordered  - Monitor response to electrolyte replacements, including rhythm and repeat lab results as appropriate  - Fluid restriction as ordered  - Instruct patient on fluid and nutrition restrictions as appropriate  Outcome: Progressing     Problem: Impaired Functional Mobility  Goal: Achieve highest/safest level of mobility/gait  Description: Interventions:  - Assess patient's functional ability and stability  - Promote increasing activity/tolerance for mobility and gait  - Educate and engage patient/family in tolerated activity level and precautions  Outcome: Progressing     Problem: Impaired Activities of Daily Living  Goal: Achieve highest/safest level of independence in self care  Description: Interventions:  - Assess ability and encourage patient to participate in ADLs to maximize function  - Promote sitting position while performing ADLs such as feeding, grooming, and bathing  - Educate and encourage patient/family in tolerated functional activity level and precautions during self-care  Outcome: Progressing

## 2022-08-14 NOTE — DISCHARGE SUMMARY
[facility]    PATIENT'S NAME: Jonathan Gaming   ATTENDING PHYSICIAN: Marlee Solitario M.D. PATIENT ACCOUNT#:   [de-identified]    LOCATION:  [address] [place] [address]  MEDICAL RECORD #:   [id number]       YOB: 1940  ADMISSION DATE:       08/12/2022      DISCHARGE DATE:  08/14/2022    DISCHARGE SUMMARY      HISTORY AND HOSPITAL COURSE:  An 70-year-old white female with enlarging abdominal aortic aneurysm of about 5 to 6 mm from 2022 who is undergoing chemotherapy for recurrent lymphoma by Dr. Jaciel Khoury. After completion of her fourth cycle out of 6, was thought adequate at this time to do a procedure per Dr. Jaciel Khoury. Her white count and platelet count were normal.  She is still slightly anemic and she gets transfusion almost on a regular basis. The patient underwent a redo endovascular procedure and had realigning of the stent graft but also placement of aortic cuff approximately 7 to 8 mm higher than before, still right at the renal arteries. She overall is doing well. The wounds are clean and dry. She has no abdominal pain, no back pain, no lower extremity ischemic symptoms. Her vital signs are stable. She is afebrile. Wounds are clean and dry. The patient's labs were reviewed, and her creatinine is staying stable. Urine output has been adequate. Her white count is 6.7, hemoglobin is 8.9, platelet count is 010,092. She has been instructed on wound care and activity. Case was discussed with Dr. Amina Bishop. She is going to be on Xarelto and aspirin, and being followed up by Dr. Jaciel Khoury and Dr. Amina Bishop in the future. She is to see us, instructed on wound care and activity, in about 1 week or 2. All questions answered. Dictated By Marlee Solitario M.D.  d: 08/14/2022 08:35:10  t: 08/14/2022 13:22:56  [facility] [id number]  [facility]/    cc: KALIA Bridges M.D.

## 2022-08-14 NOTE — PROGRESS NOTES
[facility]   [facility] Progress Note    Grover Bernardo Patient Status:  Inpatient    1940 MRN [id number]   [facility] 6NE-A Attending Avinash Saini MD   Hosp Day # 2 PCP Rach Salgado MD     Subjective:  Resting in bed . Denies pain. Seen by PT and OT yesterday. US of bilateral groins now     Objective:            Intake/Output:      Last 3 Weights  22 0500 : 78 kg (172 lb)  22 0500 : 78.2 kg (172 lb 4.8 oz)  22 2230 : 75.8 kg (167 lb 1.7 oz)  08/10/22 1548 : 75.8 kg (167 lb)  22 0938 : 75.8 kg (167 lb 3.2 oz)  22 0946 : 77.1 kg (170 lb)          Allergies:    Adhesive Tape           RASH    Comment:Cerner Allergy Text Annotation: Adhesive Tape;             fragile skin-->skin tears    glucose (Dex4) 15 GM/59ML oral liquid 15 g, 15 g, Oral, Q15 Min PRN   Or  glucose (Glutose) 40 % oral gel 15 g, 15 g, Oral, Q15 Min PRN   Or  glucose-vitamin C (Dex-4) chewable tab 4 tablet, 4 tablet, Oral, Q15 Min PRN   Or  dextrose 50 % injection 50 mL, 50 mL, Intravenous, Q15 Min PRN   Or  glucose (Dex4) 15 GM/59ML oral liquid 30 g, 30 g, Oral, Q15 Min PRN   Or  glucose (Glutose) 40 % oral gel 30 g, 30 g, Oral, Q15 Min PRN   Or  glucose-vitamin C (Dex-4) chewable tab 8 tablet, 8 tablet, Oral, Q15 Min PRN  ondansetron (Zofran) 4 MG/2ML injection 4 mg, 4 mg, Intravenous, Q6H PRN  metoclopramide (Reglan) injection 10 mg, 10 mg, Intravenous, Q8H PRN  ondansetron (Zofran) 4 MG/2ML injection 4 mg, 4 mg, Intravenous, Q6H PRN  acetaminophen (Tylenol) tab 650 mg, 650 mg, Oral, Q4H PRN   Or  HYDROcodone-acetaminophen (Norco) 5-325 MG per tab 1 tablet, 1 tablet, Oral, Q4H PRN   Or  HYDROcodone-acetaminophen (Norco) 5-325 MG per tab 2 tablet, 2 tablet, Oral, Q4H PRN  nitroGLYCERIN in dextrose 5% 50 mg/250mL infusion premix, 5-400 mcg/min, Intravenous, Titrated  pantoprazole (Protonix) 40 mg in 0.9% NaCl (PF) 10 mL IV push, 40 mg, Intravenous, QAM AC   Or  pantoprazole (Protonix) DR tab 40 mg, 40 mg, Oral, QAM AC  sodium chloride 0.9% infusion, , Intravenous, Continuous  acyclovir (Zovirax) tab 400 mg, 400 mg, Oral, BID  levothyroxine (Synthroid) tab 50 mcg, 50 mcg, Oral, Before breakfast  aspirin DR tab 81 mg, 81 mg, Oral, Daily  allopurinol (Zyloprim) tab 300 mg, 300 mg, Oral, Daily  metoprolol tartrate (Lopressor) partial tab 12.5 mg, 12.5 mg, Oral, BID  potassium chloride (K-Dur) tab 10 mEq, 10 mEq, Oral, BID  pravastatin (Pravachol) tab 80 mg, 80 mg, Oral, Nightly  rivaroxaban (Xarelto) tab 20 mg, 20 mg, Oral, Daily with food  fluconazole (Diflucan) tab 200 mg, 200 mg, Oral, Q MWF  sulfamethoxazole-trimethoprim DS (Bactrim DS) 800-160 MG per tab 1 tablet, 1 tablet, Oral, Q MWF  insulin aspart (NovoLOG) 100 UNIT/ML FlexPen 1-5 Units, 1-5 Units, Subcutaneous, TID CC and HS        Labs:        Physical Exam:    Neuro:  Lungs:   Heart:   Abdomen:   Extremities:  Pulses:   Incisions:        Assessment/Plan:  Patient Active Problem List:     CAD (coronary artery disease)     Hyperlipemia     GERD (gastroesophageal reflux disease)     Dysphagia     Corkscrew esophagus     Chronic low back pain     Hammer toe, acquired     Vitamin D deficiency     Macular degeneration     History of total right knee replacement     At risk for falling     Enlarged thyroid     Thyroid nodule     Benign essential HTN     Type 2 diabetes mellitus without complication, with long-term current use of insulin (HCC)     Aortic atherosclerosis (HCC)     Renal artery stenosis (HCC)     Achalasia     Arthritis, lumbar spine     H/O partial thyroidectomy     Normocytic anemia     History of follicular lymphoma     Inflammatory spondylopathy of lumbar region (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)     Chronic obstructive pulmonary disease, unspecified COPD type ([facility] [address]     Uncontrolled type 2 diabetes mellitus with hyperglycemia (HCC)     Lymphoma (HCC)     Lymphoma, unspecified body region, unspecified lymphoma type ([address]     Intractable vomiting     Type 2 diabetes mellitus with hyperglycemia, with long-term current use of insulin (HCC)     Abdominal aneurysm (HCC)     Abdominal pain     Epigastric mass     History of follicular lymphoma     Abdominal mass     Slow transit constipation     Pain of both lower extremities due to ischemia     Atrial fibrillation, unspecified type (HCC)     Diffuse large B-cell lymphoma of lymph nodes of multiple regions (HCC)     Hyperuricemia     Ischemia of foot     JESSE (acute kidney injury) ([facility] [address]     Hyperkalemia     Tumor lysis syndrome     Edema     Constipation     Agranulocytosis secondary to cancer chemotherapy (HCC)     Anemia     Dehydration     Neutropenia, unspecified type (HCC)     Pneumonia due to infectious organism     Oral candida     Lower extremity edema     Chemotherapy-induced neutropenia (HCC)     Esophagitis     Abnormal LFTs     Hypokalemia     Chemotherapy induced nausea and vomiting     Type 2 diabetes mellitus with diabetic chronic kidney disease (HCC)     Nausea and vomiting     Nausea and vomiting, unspecified vomiting type     Hypomagnesemia     Metabolic alkalosis     Azotemia     Hypervolemia     Transfusion associated circulatory overload     Dyspnea, unspecified type     Anemia associated with chemotherapy     Pancytopenia (HCC)     AAA (abdominal aortic aneurysm) (HCC)     Anemia associated with chemotherapy     Hypothyroidism      POD# 2 S/P AAA Endograft     - HD stable   - Hx of Afib HTN cardiology following Xarelto resumed   - Pain management   - Encourage IS/ Ambulation   - PT/OT   - SW for discharge planning possible home today    - If ok with consults     D/W Emily Lopez RN  8/14/2022  7:58 AM

## 2022-08-14 NOTE — PLAN OF CARE
Pt progressing well. Able to ambulate in room with walker. PT eval done. Plan is to have home PT at discharge. Bilateral groins CDI and SARWAT. No s/sx of hematoma. VSS. POC discussed with pt.

## 2022-08-14 NOTE — CM/SW NOTE
Patient is current with [facility]  P:[phone]  F:[phone]. LUANNE order placed.      Franca Allen LCSW

## 2022-08-14 NOTE — PLAN OF CARE
Discharge order rec'd from Dr. Isaiah Harrison ok to discharge per all consultants as well. Discharge instructions reviewed with patient. Pt's son Ibeth Bonner whom she lives with is driving pt home. PCT Gildardo Crooks takes pt to [facility] at 1230 via wheelchair with all belongings.

## 2022-08-14 NOTE — PROGRESS NOTES
No complaints- wounds clean/dry. Neuro intact. Instructed wound care/ activity/ follow up.  Pulses present- no pseudoaneurysm

## 2022-08-16 ENCOUNTER — APPOINTMENT (OUTPATIENT)
Dept: HEMATOLOGY/ONCOLOGY | Facility: HOSPITAL | Age: 82
End: 2022-08-16
Attending: INTERNAL MEDICINE
Payer: MEDICARE

## 2022-08-16 ENCOUNTER — PATIENT OUTREACH (OUTPATIENT)
Dept: CASE MANAGEMENT | Age: 82
End: 2022-08-16

## 2022-08-16 DIAGNOSIS — E11.9 TYPE 2 DIABETES MELLITUS WITHOUT COMPLICATION, WITH LONG-TERM CURRENT USE OF INSULIN (HCC): ICD-10-CM

## 2022-08-16 DIAGNOSIS — Z02.9 ENCOUNTERS FOR UNSPECIFIED ADMINISTRATIVE PURPOSE: ICD-10-CM

## 2022-08-16 DIAGNOSIS — Z79.4 TYPE 2 DIABETES MELLITUS WITHOUT COMPLICATION, WITH LONG-TERM CURRENT USE OF INSULIN (HCC): ICD-10-CM

## 2022-08-16 DIAGNOSIS — I48.91 ATRIAL FIBRILLATION, UNSPECIFIED TYPE (HCC): ICD-10-CM

## 2022-08-16 DIAGNOSIS — J44.9 CHRONIC OBSTRUCTIVE PULMONARY DISEASE, UNSPECIFIED COPD TYPE (HCC): ICD-10-CM

## 2022-08-16 DIAGNOSIS — I50.9 ACUTE ON CHRONIC CONGESTIVE HEART FAILURE, UNSPECIFIED HEART FAILURE TYPE (HCC): Primary | ICD-10-CM

## 2022-08-16 PROCEDURE — 1111F DSCHRG MED/CURRENT MED MERGE: CPT

## 2022-08-16 RX ORDER — INSULIN DETEMIR 100 [IU]/ML
10 INJECTION, SOLUTION SUBCUTANEOUS DAILY
COMMUNITY

## 2022-08-16 RX ORDER — FUROSEMIDE 20 MG/1
20 TABLET ORAL DAILY PRN
Qty: 90 TABLET | Refills: 0 | Status: SHIPPED | OUTPATIENT
Start: 2022-08-16

## 2022-08-16 NOTE — TELEPHONE ENCOUNTER
Per AS, \"yes ok\". Called and spoke to pt. Pt said that she is going to be following up with Dr. Pilar Cade and Dr. Med Stevens. She saw Dr. Lori Cockayne on Sunday when admitted. Pt unsure if AS or Dr. Lori Cockayne is supposed to manage furosemide. Using PRN. Pt said that she is going to call Dr. Lori Cockayne to see if he wants to order furosemide or if he wants AS to manage. AS, just 30 tab refill for now given only using PRN?

## 2022-08-16 NOTE — OPERATIVE REPORT
[facility]    PATIENT'S NAME: Talita Sneed   ATTENDING PHYSICIAN: Mai Verdin M.D. OPERATING PHYSICIAN: Mai Verdin M.D. PATIENT ACCOUNT#:   [de-identified]    LOCATION:  [address] [place] [address]  MEDICAL RECORD #:   [id number]       YOB: 1940  ADMISSION DATE:       08/12/2022      OPERATION DATE:  08/12/2022    OPERATIVE REPORT    PREOPERATIVE DIAGNOSIS:  Enlarging abdominal aortic aneurysm status post stent graft repair in 2020 with a possible type I, type II, or type III endoleak. POSTOPERATIVE DIAGNOSIS:  Enlarging abdominal aortic aneurysm status post stent graft repair in 2020 with a possible type I, type II, or type III endoleak but no obvious endoleak seen on angiogram, and 2 small lumbar arteries noted at the end of the procedure for type II endoleak. PROCEDURE:  1. Duplex ultrasound access, right and left common femoral artery with micropuncture technique. 2.   Placement of 18-Solomon Islander Waldron DrySeal sheath, right femoral artery, and a 12-Sol[place] Islander Waldron DrySeal sheath, left femoral artery. 3.   Pigtail catheter through the right femoral artery in the suprarenal aorta with aortic angiogram.  4.   Placement of a proximal aortic cuff graft of 36 mm x 4.5 cm via the right femoral artery with a pigtail catheter on the left side placed above the previous stent and coming right into the inferior aspect of the renal arteries. 5.   Placement of a 14.5 x 12 cm right iliac endoprosthesis right at the flow divider going down to the iliac bifurcation. 6.   Placement of 14.5 x 12 cm iliac endoprosthesis, left side, via the 12-Sol[place] Islander sheath from the flow divider just below to the iliac bifurcation. 7.   Closure of right and left femoral arteries with Perclose closure devices x2, with 8-Sol[place] Islander Angio-Seal on the right side. CO-SURGEON:  Salomón Flores MD.    Kang Fritz. Doris Yates CST, CFA.      INDICATIONS:  An 70-year-old white female with a known abdominal aortic aneurysm 5.4 cm back in 2020, had a stent graft placed and was doing quite well. However, she had recurrence of a lymphoma that was initially in her neck, treated with radiation therapy, surgery, chemotherapy years ago, has recurred now in the mesentery of the abdomen. She completed her fourth cycle of 6 cycles of chemotherapy and was cleared for the surgery by Dr. Meliza Morfin. The patient is recommended for stent graft since the aneurysm has increased in size to about 6 mm over the last several months. Risks and complications including death, myocardial infarction, bleeding, infection, graft thrombosis, limb loss, future graft thrombosis, future limb loss, renal failure, colon ischemia, multisystem organ failure, sepsis, graft infection were explained. The risks and complications of effecting chemotherapy cycle was explained. The options of no treatment versus open repair were explained. The patient understood and agreed. All questions answered. Case had been discussed with her son. OPERATIVE TECHNIQUE:  Patient placed supine on the procedure table, underwent general anesthesia. Case also reviewed with Dr. John Raymundo. The patient received preoperative antibiotics. She has been followed by Infectious Disease and consultation also with Dr. Meliza Morfin from Hematology Oncology. She had a Chavez catheter placed by Surgery, art lines and IV by Anesthesia. She had the lower chest, abdomen, both groins, and thighs prepped and draped in usual sterile technique. Ioban Vi-Drape used to cover the operative field. SCDs on both lower legs. A time-out was done. The patient then had duplex ultrasound access with micropuncture technique used to access the right and left femoral arteries. This was then upsized to a 6-[place]ican sheath over a J-wire with 2 Perclose devices placed on both right and left side, and then an 8-[place] sheath. Patient fully anticoagulated. A pigtail catheter placed up on the right side to the suprarenal aorta.   An angiogram was performed showing the neck of the aorta, the renal arteries, the previous stent graft which was probably about 5 to 6 mm further down than anticipated, the iliac bifurcation. Small 2 lumbar arteries were seen as type II endoleak possibly and then the iliac bifurcation. There was no evidence for any obvious type I or type III endoleak. The patient then had an 18-Paraguayan sheath placed on the right side over Amplatz Super Stiff guidewires. A 12-Paraguayan sheath was placed on the left side. Through the right side sheath was placed an aortic cuff 36 mm x 4.5 cm. The patient had been fully anticoagulated. ACTs were monitored over the procedure to make sure they were over 250 seconds. Proper angulation with elimination of any parallax. The device was deployed almost 8 mm above the previous stent graft, still just below the renal arteries, and both renal arteries being perfused. The patient then had the right and then the left limb realigned, even though no type III endoleak was seen or type IV endoleak, but could not exclude this from the preoperative CT scan and it went right just at the flow divider on both sides and definitely went right down to the area of the iliac bifurcations on both sides. MOB balloon was placed up and splayed open the aortic cuff first at the inferior portion of the graft and touched up above and then overlapped the areas of the iliac limbs going down to the bifurcation on both sides. Repeat angiogram was performed showing no evidence for any type I, type III, or type IV endoleak. There was still 2 small attenuated lumbar arteries that were seen distally. This definitely was at least 8 mm coverage proximally of the previous stent graft up to the renal arteries. The patient had Perclose devices closed on both the right and left sides. An additional 8-Paraguayan Angio-Seal was placed on the right side. Pulses present in lower legs. Neurologically, she is intact. Vital signs remained stable throughout the procedure. Wounds were closed with 4-0 Vicryl subcuticular and then had a Dermabond dressing placed. The estimated left blood loss was less than 100 mL. Sponge and instrument counts were correct. The patient received probably less than 100 mL of contrast.    FINAL DIAGNOSIS:  Enlargement of abdominal aortic aneurysm treated with a proximal cuff graft and realigning of the iliac limbs bilaterally. Dictated By Ced Mckoy M.D.  d: 08/15/2022 13:23:13  t: 08/15/2022 35:30:02  Job [id number]  JJW/    cc: KALIA Morris M.D. Dave Crease, M.D. Esequiel Orf, M.D.

## 2022-08-16 NOTE — TELEPHONE ENCOUNTER
Pt only wanting to f/u with specialist, declines f/u here. AS, ok to only f/u with specialist? Joanne Wei for refill of furosemide?

## 2022-08-17 ENCOUNTER — TELEPHONE (OUTPATIENT)
Dept: INTERNAL MEDICINE CLINIC | Facility: CLINIC | Age: 82
End: 2022-08-17

## 2022-08-19 ENCOUNTER — TELEPHONE (OUTPATIENT)
Dept: HEMATOLOGY/ONCOLOGY | Facility: HOSPITAL | Age: 82
End: 2022-08-19

## 2022-08-19 NOTE — TELEPHONE ENCOUNTER
Patients tooth fell out. She wanted advise from Dr. Madhav Solorio on what she should to do. Please call when able. Thank you.

## 2022-08-23 ENCOUNTER — OFFICE VISIT (OUTPATIENT)
Dept: HEMATOLOGY/ONCOLOGY | Facility: HOSPITAL | Age: 82
End: 2022-08-23
Attending: INTERNAL MEDICINE
Payer: MEDICARE

## 2022-08-23 ENCOUNTER — TELEPHONE (OUTPATIENT)
Dept: INTERNAL MEDICINE CLINIC | Facility: CLINIC | Age: 82
End: 2022-08-23

## 2022-08-23 VITALS
HEART RATE: 85 BPM | RESPIRATION RATE: 18 BRPM | HEIGHT: 70 IN | BODY MASS INDEX: 24.54 KG/M2 | TEMPERATURE: 98 F | OXYGEN SATURATION: 97 % | SYSTOLIC BLOOD PRESSURE: 112 MMHG | WEIGHT: 171.38 LBS | DIASTOLIC BLOOD PRESSURE: 70 MMHG

## 2022-08-23 DIAGNOSIS — I71.4 ABDOMINAL AORTIC ANEURYSM (AAA) WITHOUT RUPTURE (HCC): ICD-10-CM

## 2022-08-23 DIAGNOSIS — C83.38 DIFFUSE LARGE B-CELL LYMPHOMA OF LYMPH NODES OF MULTIPLE REGIONS (HCC): Primary | ICD-10-CM

## 2022-08-23 DIAGNOSIS — T45.1X5A ANEMIA ASSOCIATED WITH CHEMOTHERAPY: ICD-10-CM

## 2022-08-23 DIAGNOSIS — D64.81 ANEMIA ASSOCIATED WITH CHEMOTHERAPY: ICD-10-CM

## 2022-08-23 DIAGNOSIS — C85.90 LYMPHOMA, UNSPECIFIED BODY REGION, UNSPECIFIED LYMPHOMA TYPE (HCC): ICD-10-CM

## 2022-08-23 LAB
ALBUMIN SERPL-MCNC: 2.6 G/DL (ref 3.4–5)
ALBUMIN/GLOB SERPL: 1 {RATIO} (ref 1–2)
ALP LIVER SERPL-CCNC: 68 U/L
ALT SERPL-CCNC: 8 U/L
ANION GAP SERPL CALC-SCNC: 1 MMOL/L (ref 0–18)
AST SERPL-CCNC: 7 U/L (ref 15–37)
BASOPHILS # BLD AUTO: 0.05 X10(3) UL (ref 0–0.2)
BASOPHILS NFR BLD AUTO: 0.8 %
BILIRUB SERPL-MCNC: 0.4 MG/DL (ref 0.1–2)
BUN BLD-MCNC: 7 MG/DL (ref 7–18)
CALCIUM BLD-MCNC: 8.4 MG/DL (ref 8.5–10.1)
CHLORIDE SERPL-SCNC: 105 MMOL/L (ref 98–112)
CO2 SERPL-SCNC: 33 MMOL/L (ref 21–32)
CREAT BLD-MCNC: 0.61 MG/DL
EOSINOPHIL # BLD AUTO: 0.02 X10(3) UL (ref 0–0.7)
EOSINOPHIL NFR BLD AUTO: 0.3 %
ERYTHROCYTE [DISTWIDTH] IN BLOOD BY AUTOMATED COUNT: 20.2 %
FASTING STATUS PATIENT QL REPORTED: NO
GFR SERPLBLD BASED ON 1.73 SQ M-ARVRAT: 90 ML/MIN/1.73M2 (ref 60–?)
GLOBULIN PLAS-MCNC: 2.7 G/DL (ref 2.8–4.4)
GLUCOSE BLD-MCNC: 150 MG/DL (ref 70–99)
HCT VFR BLD AUTO: 32.3 %
HGB BLD-MCNC: 10 G/DL
IMM GRANULOCYTES # BLD AUTO: 0.04 X10(3) UL (ref 0–1)
IMM GRANULOCYTES NFR BLD: 0.6 %
LDH SERPL L TO P-CCNC: 152 U/L
LYMPHOCYTES # BLD AUTO: 0.26 X10(3) UL (ref 1–4)
LYMPHOCYTES NFR BLD AUTO: 4 %
MCH RBC QN AUTO: 31.2 PG (ref 26–34)
MCHC RBC AUTO-ENTMCNC: 31 G/DL (ref 31–37)
MCV RBC AUTO: 100.6 FL
MONOCYTES # BLD AUTO: 0.93 X10(3) UL (ref 0.1–1)
MONOCYTES NFR BLD AUTO: 14.2 %
NEUTROPHILS # BLD AUTO: 5.25 X10 (3) UL (ref 1.5–7.7)
NEUTROPHILS # BLD AUTO: 5.25 X10(3) UL (ref 1.5–7.7)
NEUTROPHILS NFR BLD AUTO: 80.1 %
OSMOLALITY SERPL CALC.SUM OF ELEC: 289 MOSM/KG (ref 275–295)
PLATELET # BLD AUTO: 175 10(3)UL (ref 150–450)
POTASSIUM SERPL-SCNC: 4.4 MMOL/L (ref 3.5–5.1)
PROT SERPL-MCNC: 5.3 G/DL (ref 6.4–8.2)
RBC # BLD AUTO: 3.21 X10(6)UL
SODIUM SERPL-SCNC: 139 MMOL/L (ref 136–145)
WBC # BLD AUTO: 6.6 X10(3) UL (ref 4–11)

## 2022-08-23 PROCEDURE — 96375 TX/PRO/DX INJ NEW DRUG ADDON: CPT

## 2022-08-23 PROCEDURE — 96415 CHEMO IV INFUSION ADDL HR: CPT

## 2022-08-23 PROCEDURE — 96413 CHEMO IV INFUSION 1 HR: CPT

## 2022-08-23 PROCEDURE — 96377 APPLICATON ON-BODY INJECTOR: CPT

## 2022-08-23 PROCEDURE — 96549 UNLISTED CHEMOTHERAPY PX: CPT

## 2022-08-23 PROCEDURE — 83615 LACTATE (LD) (LDH) ENZYME: CPT

## 2022-08-23 PROCEDURE — 96411 CHEMO IV PUSH ADDL DRUG: CPT

## 2022-08-23 PROCEDURE — 99214 OFFICE O/P EST MOD 30 MIN: CPT | Performed by: INTERNAL MEDICINE

## 2022-08-23 PROCEDURE — 85025 COMPLETE CBC W/AUTO DIFF WBC: CPT

## 2022-08-23 PROCEDURE — 96365 THER/PROPH/DIAG IV INF INIT: CPT

## 2022-08-23 PROCEDURE — 80053 COMPREHEN METABOLIC PANEL: CPT

## 2022-08-23 RX ORDER — DIPHENHYDRAMINE HCL 25 MG
50 CAPSULE ORAL ONCE
Status: CANCELLED | OUTPATIENT
Start: 2022-08-23

## 2022-08-23 RX ORDER — ACYCLOVIR 400 MG/1
400 TABLET ORAL 2 TIMES DAILY
Qty: 60 TABLET | Refills: 0 | Status: SHIPPED | OUTPATIENT
Start: 2022-08-23

## 2022-08-23 RX ORDER — DOXORUBICIN HYDROCHLORIDE 2 MG/ML
50 INJECTION, SOLUTION INTRAVENOUS ONCE
Status: CANCELLED | OUTPATIENT
Start: 2022-08-23

## 2022-08-23 RX ORDER — DOXORUBICIN HYDROCHLORIDE 2 MG/ML
50 INJECTION, SOLUTION INTRAVENOUS ONCE
Status: COMPLETED | OUTPATIENT
Start: 2022-08-23 | End: 2022-08-23

## 2022-08-23 RX ORDER — ACETAMINOPHEN 325 MG/1
650 TABLET ORAL ONCE
Status: CANCELLED | OUTPATIENT
Start: 2022-08-23

## 2022-08-23 RX ORDER — DIPHENHYDRAMINE HCL 25 MG
50 CAPSULE ORAL ONCE
Status: COMPLETED | OUTPATIENT
Start: 2022-08-23 | End: 2022-08-23

## 2022-08-23 RX ORDER — ACETAMINOPHEN 325 MG/1
650 TABLET ORAL ONCE
Status: COMPLETED | OUTPATIENT
Start: 2022-08-23 | End: 2022-08-23

## 2022-08-23 RX ORDER — PREDNISONE 20 MG/1
100 TABLET ORAL DAILY
Qty: 25 TABLET | Refills: 1 | Status: SHIPPED | OUTPATIENT
Start: 2022-08-23 | End: 2022-08-28

## 2022-08-23 RX ADMIN — ACETAMINOPHEN 650 MG: 325 TABLET ORAL at 10:45:00

## 2022-08-23 RX ADMIN — DOXORUBICIN HYDROCHLORIDE 100 MG: 2 INJECTION, SOLUTION INTRAVENOUS at 14:56:00

## 2022-08-23 RX ADMIN — DIPHENHYDRAMINE HCL 50 MG: 25 MG CAPSULE ORAL at 10:45:00

## 2022-08-23 NOTE — PROGRESS NOTES
Pt here for C5D1 of  [facility]. Arrives Via wheelchair, accompanied by Self and Family member           Pregnancy screening: Not applicable    Modifications in dose or schedule: No    Drugs/infusions dual verified for appearance and physical integrity. IV pump settings were dual verified: yes     Frequency of blood return and site check throughout administration: Prior to administration   Discharged to Home, Via wheelchair, accompanied by:Self and Family member    Outpatient Oncology Care Plan  Problem list:  knowledge deficit  Problems related to:  chemotherapy  Interventions:  chemotherapy teaching  Expected outcomes:  optimal lab values  understands plan of care  Progress towards outcome:  making progress    Education Record    Learner:  Patient and Family Member  Barriers / Limitations:  None  Method:  Brief focused  Outcome:  Shows understanding  Comments:    Patient premedicated accordingly and she confirmed taking Prednisone at home Day 1-5. Daughter Renetta accompanied the patient  . She tolerated her treatment very well. Rituxin infused at Subsequent rate .  New AVS

## 2022-08-23 NOTE — PROGRESS NOTES
Patient is here for MD f/u and cycle 5 of [facility]. Patient had AAA surgery on 8/12. She had follow up with Dr Darrell Eubanks yesterday. Surgery and visit went well. Patient is in need of a possible dental procedure for a cracked tooth. Appetite has been ok. Eating small meals. + fatigue and SOB with exertion. Denies pain.        Education Record    Learner:  Patient and Family Member    Disease / Diagnosis:  Lymphoma     Barriers / Limitations:  None   Comments:    Method:  Discussion   Comments:    General Topics:  Plan of care reviewed   Comments:    Outcome:  Shows understanding   Comments:

## 2022-08-24 NOTE — PROGRESS NOTES
[facility]    PATIENT'S NAME: Lena MCCONNELL   ATTENDING PHYSICIAN: Jennifer Medeiros M.D. PATIENT ACCOUNT #: [de-identified] LOCATION: [address] [place] [address] RECORD #: [id number] YOB: 1940   DATE OF SERVICE: 08/23/2022       [facility] PROGRESS NOTE    CHIEF COMPLAINT:  Treatment of diffuse large B-cell lymphoma. HISTORY OF PRESENT ILLNESS:  The patient is an 58-year-old female. She has a history of diffuse large B-cell lymphoma with a relatively bulky mass that arose in her epigastrium. She had a pre-existing history of follicular lymphoma and the presumption was that this was a transformed area. A biopsy was undertaken on May 7, and she was started on chemotherapy with R-CHOP shortly thereafter. She has a history of some degree of diastolic heart failure that is compensated. We proceeded with treatment with the first cycle having been given in the hospital on May 14. She had a prolonged period of neutropenia and some degree of tumor lysis syndrome. She then ended up getting the second cycle on June 14 as an outpatient. Since then, she has been generally on time in getting full dose chemotherapy at 3-week intervals. Cycle 3 was July 5. Cycle 4 was July 26. She has had multiple admissions to the hospital with neutropenia, fever, and weakness. 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 Charlee Waldron. Because of the leak, we ended up reimaging her after 4 cycles of chemotherapy. When she was in a metabolic complete response with a negative PET scan, we gave her an extra week off, and Dr. Charlee Waldron did the repair last week. She tolerated this well. She has had good healing of her skin entry site. She has not had any problems with fevers or chills. She still has a small amount of lower extremity edema.   She is short of breath with exertion, but not at rest.  She has no fevers or chills. She did develop a cracked tooth in her right upper jaw, and she needed to go for a dental procedure. I told them to try to wait for a 2-week period if they could. The area is not particularly tender or feeling inflamed at present. Her appetite has been good. She is eating small meals. She has the ongoing fatigue. She was in the wheelchair today accompanied by her daughter. MEDICATIONS:  Current medications include acyclovir 400 mg b.i.d.; allopurinol 300 mg daily; aspirin 81 mg daily; vitamin D 1000 units daily; esomeprazole 20 mg daily; fluconazole 200 mg Monday, Wednesday, Friday; furosemide 20 mg daily p.r.n.; Levemir 10 units daily; levothyroxine 50 mcg daily; metformin 500 mg b.i.d.; metoprolol tartrate 12.5 mg b.i.d.; multivitamin daily; ondansetron 8 mg q.8 h. p.r.n.; prochlorperazine 10 mEq twice daily; pravastatin 80 mg nightly; prednisone 100 mg daily for 5 days; Xarelto 20 mg daily; and Bactrim DS 1 tablet Monday, Wednesday, Friday. Of note, the patient likely has been taking prednisone for the last cycle or two. With her multiple hospitalizations, the drug fell off of her medication list and they did know what I was taking about today when I discussed it with them. I reordered it and she will take it for the next 5 days. PHYSICAL EXAMINATION:    GENERAL:  She is a well-appearing female, in no acute distress. VITAL SIGNS:  Her performance status is 1 to 2. Her weight is 171 pounds. Blood pressure is 112/70, pulse 85, respiratory rate is 20, temperature is 97.5. HEENT:  Remarkable for alopecia. She has pale conjunctivae, anicteric sclerae. Pharynx without lesion. LYMPHATICS:  She has no cervical, supraclavicular, or axillary adenopathy. LUNGS:  Resonant to percussion and clear to auscultation with no wheezing, rales, or rhonchi. HEART:  Normal.  ABDOMEN:  No hepatosplenomegaly or tenderness.   EXTREMITIES:  She has 1 to 2+ edema up to her knees bilaterally. LABORATORY DATA:  White count 6.6, hemoglobin 10.0, platelets are 593. Albumin is 2.6. BUN is 7 and creatinine 0.61. Potassium is 4.4. Liver enzymes are normal.    IMPRESSION:  Cycle 5 of R-CHOP for a patient with diffuse large B-cell lymphoma arising as a transformation from follicular lymphoma. She is not being treated more aggressively given her age. She is barely making it through the current treatment, but she has achieved a molecular complete remission. She had an extra week off this time in order to fix this aneurysm sleeve and it appears to be doing well. The only complicating issue now is that she does have a cracked tooth. I told her to see the dentist after she is past her neutropenia, which should be 2 weeks from now. If the area starts to hurt, we will put her on antibiotics. Given her frailty, I will ask that she be seen on day 8 of this cycle by our nurse practitioner and have her blood counts checked. Dictated By Vangie Mccullough M.D.  d: 08/23/2022 12:45:53  t: 08/24/2022 01:51:15  Job [id number]  /    cc: Sean Nicholson M.D.   Varghese Vaca M.D.    King De La O M.D.   Sweetie Monsivais M.D.

## 2022-08-30 ENCOUNTER — OFFICE VISIT (OUTPATIENT)
Dept: HEMATOLOGY/ONCOLOGY | Facility: HOSPITAL | Age: 82
End: 2022-08-30
Attending: INTERNAL MEDICINE
Payer: MEDICARE

## 2022-08-30 VITALS
DIASTOLIC BLOOD PRESSURE: 66 MMHG | TEMPERATURE: 98 F | RESPIRATION RATE: 18 BRPM | SYSTOLIC BLOOD PRESSURE: 118 MMHG | OXYGEN SATURATION: 100 % | HEART RATE: 80 BPM

## 2022-08-30 DIAGNOSIS — Z51.11 ENCOUNTER FOR CHEMOTHERAPY MANAGEMENT: ICD-10-CM

## 2022-08-30 DIAGNOSIS — T45.1X5A AGRANULOCYTOSIS SECONDARY TO CANCER CHEMOTHERAPY (HCC): ICD-10-CM

## 2022-08-30 DIAGNOSIS — D61.818 PANCYTOPENIA (HCC): ICD-10-CM

## 2022-08-30 DIAGNOSIS — T45.1X5A ANEMIA ASSOCIATED WITH CHEMOTHERAPY: ICD-10-CM

## 2022-08-30 DIAGNOSIS — C83.38 DIFFUSE LARGE B-CELL LYMPHOMA OF LYMPH NODES OF MULTIPLE REGIONS (HCC): Primary | ICD-10-CM

## 2022-08-30 DIAGNOSIS — E86.0 DEHYDRATION: ICD-10-CM

## 2022-08-30 DIAGNOSIS — Z91.89 AT RISK FOR BLEEDING: ICD-10-CM

## 2022-08-30 DIAGNOSIS — T45.1X5A CHEMOTHERAPY-INDUCED THROMBOCYTOPENIA: ICD-10-CM

## 2022-08-30 DIAGNOSIS — D69.6 THROMBOCYTOPENIA (HCC): ICD-10-CM

## 2022-08-30 DIAGNOSIS — I95.89 HYPOTENSION DUE TO HYPOVOLEMIA: ICD-10-CM

## 2022-08-30 DIAGNOSIS — D69.59 CHEMOTHERAPY-INDUCED THROMBOCYTOPENIA: ICD-10-CM

## 2022-08-30 DIAGNOSIS — E86.1 HYPOTENSION DUE TO HYPOVOLEMIA: ICD-10-CM

## 2022-08-30 DIAGNOSIS — D64.81 ANEMIA ASSOCIATED WITH CHEMOTHERAPY: ICD-10-CM

## 2022-08-30 DIAGNOSIS — R53.1 WEAKNESS: ICD-10-CM

## 2022-08-30 DIAGNOSIS — D70.1 AGRANULOCYTOSIS SECONDARY TO CANCER CHEMOTHERAPY (HCC): ICD-10-CM

## 2022-08-30 LAB
ALBUMIN SERPL-MCNC: 2.5 G/DL (ref 3.4–5)
ALP LIVER SERPL-CCNC: 81 U/L
ALT SERPL-CCNC: 59 U/L
ANION GAP SERPL CALC-SCNC: 2 MMOL/L (ref 0–18)
ANTIBODY SCREEN: NEGATIVE
AST SERPL-CCNC: 18 U/L (ref 15–37)
BASOPHILS # BLD: 0 X10(3) UL (ref 0–0.2)
BASOPHILS NFR BLD: 0 %
BILIRUB DIRECT SERPL-MCNC: 0.6 MG/DL (ref 0–0.2)
BILIRUB SERPL-MCNC: 1.2 MG/DL (ref 0.1–2)
BUN BLD-MCNC: 25 MG/DL (ref 7–18)
CALCIUM BLD-MCNC: 8.6 MG/DL (ref 8.5–10.1)
CHLORIDE SERPL-SCNC: 101 MMOL/L (ref 98–112)
CO2 SERPL-SCNC: 33 MMOL/L (ref 21–32)
CREAT BLD-MCNC: 0.67 MG/DL
EOSINOPHIL # BLD: 0 X10(3) UL (ref 0–0.7)
EOSINOPHIL NFR BLD: 0 %
ERYTHROCYTE [DISTWIDTH] IN BLOOD BY AUTOMATED COUNT: 18.9 %
GFR SERPLBLD BASED ON 1.73 SQ M-ARVRAT: 88 ML/MIN/1.73M2 (ref 60–?)
GLUCOSE BLD-MCNC: 240 MG/DL (ref 70–99)
HCT VFR BLD AUTO: 23.7 %
HGB BLD-MCNC: 7.6 G/DL
LYMPHOCYTES NFR BLD: 0.09 X10(3) UL (ref 1–4)
LYMPHOCYTES NFR BLD: 15 %
MCH RBC QN AUTO: 31.9 PG (ref 26–34)
MCHC RBC AUTO-ENTMCNC: 32.1 G/DL (ref 31–37)
MCV RBC AUTO: 99.6 FL
MONOCYTES # BLD: 0.02 X10(3) UL (ref 0.1–1)
MONOCYTES NFR BLD: 4 %
NEUTROPHILS # BLD AUTO: 0.5 X10 (3) UL (ref 1.5–7.7)
NEUTROPHILS NFR BLD: 74 %
NEUTS BAND NFR BLD: 7 %
NEUTS HYPERSEG # BLD: 0.49 X10(3) UL (ref 1.5–7.7)
OSMOLALITY SERPL CALC.SUM OF ELEC: 294 MOSM/KG (ref 275–295)
PLATELET # BLD AUTO: 6 10(3)UL (ref 150–450)
PLATELET MORPHOLOGY: NORMAL
POTASSIUM SERPL-SCNC: 4.4 MMOL/L (ref 3.5–5.1)
PROT SERPL-MCNC: 4.8 G/DL (ref 6.4–8.2)
RBC # BLD AUTO: 2.38 X10(6)UL
RH BLOOD TYPE: POSITIVE
SODIUM SERPL-SCNC: 136 MMOL/L (ref 136–145)
TOTAL CELLS COUNTED BLD: 100
WBC # BLD AUTO: 0.6 X10(3) UL (ref 4–11)

## 2022-08-30 PROCEDURE — 80076 HEPATIC FUNCTION PANEL: CPT

## 2022-08-30 PROCEDURE — 86901 BLOOD TYPING SEROLOGIC RH(D): CPT

## 2022-08-30 PROCEDURE — 36430 TRANSFUSION BLD/BLD COMPNT: CPT

## 2022-08-30 PROCEDURE — 99215 OFFICE O/P EST HI 40 MIN: CPT | Performed by: NURSE PRACTITIONER

## 2022-08-30 PROCEDURE — 85027 COMPLETE CBC AUTOMATED: CPT

## 2022-08-30 PROCEDURE — 80048 BASIC METABOLIC PNL TOTAL CA: CPT

## 2022-08-30 PROCEDURE — 85007 BL SMEAR W/DIFF WBC COUNT: CPT

## 2022-08-30 PROCEDURE — 85025 COMPLETE CBC W/AUTO DIFF WBC: CPT

## 2022-08-30 PROCEDURE — 86850 RBC ANTIBODY SCREEN: CPT

## 2022-08-30 PROCEDURE — 86900 BLOOD TYPING SEROLOGIC ABO: CPT

## 2022-08-30 RX ORDER — ACETAMINOPHEN 325 MG/1
650 TABLET ORAL ONCE
Status: COMPLETED | OUTPATIENT
Start: 2022-08-30 | End: 2022-08-30

## 2022-08-30 RX ORDER — ACETAMINOPHEN 325 MG/1
650 TABLET ORAL ONCE
Status: CANCELLED | OUTPATIENT
Start: 2022-08-30

## 2022-08-30 RX ADMIN — ACETAMINOPHEN 650 MG: 325 TABLET ORAL at 14:33:00

## 2022-08-30 NOTE — PROGRESS NOTES
Education Record    Learner:  Patient    Disease / Diagnosis: lymphoma     Barriers / Limitations:  None   Comments:    Method:  Brief focused, Discussion and Reinforcement   Comments:    General Topics:  Medication, Pain, Precautions, Side effects and symptom management, Plan of care reviewed and Fall risk and prevention   Comments:    Outcome:  Shows understanding   Comments:    Patient's 5th cycle of RCHOP was last week. Here for followup. C/o extreme weakness, worse than usual. Unable to stand up out of chair without assistance; once she is up is able to pivot successfully to chair. Platelet count of 6 - received 1unit of platelets over 1 hour to prevent fluid overload. Patient tolerated well without issue. Reports stool has appeared a \"different color\" than usual but isn't sure if it was darker or if she had noticed any blood. Occult blood ordered and patient unable to obtain specimen today so kit was sent home with patient/patient's son.      Patient to return Friday 9/2 for cll, apn (Diane Lenz) and poss transfusion

## 2022-08-30 NOTE — PROGRESS NOTES
Patient presents with: Follow - Up: APN assessment    Pt is here for follow up; she was last treated on 08/23/2022 C5 D1 RCHOP and s/p AAA repair 08/10/2022. Pt states she is not doing very well; having difficulty with getting up from her chair and some self care. Not eating or drinking very much. Needing to use more oxygen and feels fatigued. She has a sickly appearance today.     Education Record    Learner:  Patient and Family Member    Disease / Diagnosis: DLBCL    Barriers / Limitations:  None   Comments:    Method:  Brief focused   Comments:    General Topics:  Diet, Medication, Pain, Side effects and symptom management and Plan of care reviewed   Comments:    Outcome:  Needs reinforcement   Comments:

## 2022-08-31 ENCOUNTER — HOSPITAL ENCOUNTER (OUTPATIENT)
Facility: HOSPITAL | Age: 82
Setting detail: OBSERVATION
Discharge: HOME HEALTH | DRG: 808 | End: 2022-09-03
Attending: EMERGENCY MEDICINE | Admitting: HOSPITALIST
Payer: MEDICARE

## 2022-08-31 ENCOUNTER — APPOINTMENT (OUTPATIENT)
Dept: GENERAL RADIOLOGY | Facility: HOSPITAL | Age: 82
DRG: 808 | End: 2022-08-31
Attending: EMERGENCY MEDICINE
Payer: MEDICARE

## 2022-08-31 ENCOUNTER — HOSPITAL ENCOUNTER (INPATIENT)
Facility: HOSPITAL | Age: 82
LOS: 3 days | Discharge: HOME HEALTH | End: 2022-09-03
Attending: EMERGENCY MEDICINE | Admitting: HOSPITALIST
Payer: MEDICARE

## 2022-08-31 ENCOUNTER — HOSPITAL ENCOUNTER (INPATIENT)
Facility: HOSPITAL | Age: 82
LOS: 3 days | Discharge: HOME HEALTH | DRG: 808 | End: 2022-09-03
Attending: EMERGENCY MEDICINE | Admitting: HOSPITALIST
Payer: MEDICARE

## 2022-08-31 ENCOUNTER — APPOINTMENT (OUTPATIENT)
Dept: CT IMAGING | Facility: HOSPITAL | Age: 82
End: 2022-08-31
Attending: HOSPITALIST
Payer: MEDICARE

## 2022-08-31 ENCOUNTER — APPOINTMENT (OUTPATIENT)
Dept: GENERAL RADIOLOGY | Facility: HOSPITAL | Age: 82
End: 2022-08-31
Attending: EMERGENCY MEDICINE
Payer: MEDICARE

## 2022-08-31 ENCOUNTER — APPOINTMENT (OUTPATIENT)
Dept: CT IMAGING | Facility: HOSPITAL | Age: 82
DRG: 808 | End: 2022-08-31
Attending: HOSPITALIST
Payer: MEDICARE

## 2022-08-31 DIAGNOSIS — D61.818 PANCYTOPENIA (HCC): ICD-10-CM

## 2022-08-31 DIAGNOSIS — T45.1X5A ANEMIA ASSOCIATED WITH CHEMOTHERAPY: ICD-10-CM

## 2022-08-31 DIAGNOSIS — D69.6 THROMBOCYTOPENIA (HCC): Primary | Chronic | ICD-10-CM

## 2022-08-31 DIAGNOSIS — R60.0 LOWER EXTREMITY EDEMA: ICD-10-CM

## 2022-08-31 DIAGNOSIS — D64.81 ANEMIA ASSOCIATED WITH CHEMOTHERAPY: ICD-10-CM

## 2022-08-31 DIAGNOSIS — C83.38 DIFFUSE LARGE B-CELL LYMPHOMA OF LYMPH NODES OF MULTIPLE REGIONS (HCC): ICD-10-CM

## 2022-08-31 DIAGNOSIS — D64.9 SYMPTOMATIC ANEMIA: ICD-10-CM

## 2022-08-31 LAB
ALBUMIN SERPL-MCNC: 2.6 G/DL (ref 3.4–5)
ALBUMIN/GLOB SERPL: 1.1 {RATIO} (ref 1–2)
ALP LIVER SERPL-CCNC: 89 U/L
ALT SERPL-CCNC: 55 U/L
ANION GAP SERPL CALC-SCNC: 4 MMOL/L (ref 0–18)
AST SERPL-CCNC: 14 U/L (ref 15–37)
BASOPHILS # BLD AUTO: 0 X10(3) UL (ref 0–0.2)
BASOPHILS # BLD: 0 X10(3) UL (ref 0–0.2)
BASOPHILS NFR BLD AUTO: 0 %
BASOPHILS NFR BLD: 0 %
BILIRUB SERPL-MCNC: 1.3 MG/DL (ref 0.1–2)
BILIRUB UR QL STRIP.AUTO: NEGATIVE
BLOOD TYPE BARCODE: 5100
BUN BLD-MCNC: 18 MG/DL (ref 7–18)
CALCIUM BLD-MCNC: 8.4 MG/DL (ref 8.5–10.1)
CHLORIDE SERPL-SCNC: 100 MMOL/L (ref 98–112)
CLARITY UR REFRACT.AUTO: CLEAR
CO2 SERPL-SCNC: 34 MMOL/L (ref 21–32)
COLOR UR AUTO: YELLOW
CREAT BLD-MCNC: 0.77 MG/DL
EOSINOPHIL # BLD AUTO: 0.04 X10(3) UL (ref 0–0.7)
EOSINOPHIL # BLD: 0.03 X10(3) UL (ref 0–0.7)
EOSINOPHIL NFR BLD AUTO: 12.5 %
EOSINOPHIL NFR BLD: 13 %
ERYTHROCYTE [DISTWIDTH] IN BLOOD BY AUTOMATED COUNT: 18.5 %
ERYTHROCYTE [DISTWIDTH] IN BLOOD BY AUTOMATED COUNT: 18.6 %
GFR SERPLBLD BASED ON 1.73 SQ M-ARVRAT: 77 ML/MIN/1.73M2 (ref 60–?)
GLOBULIN PLAS-MCNC: 2.4 G/DL (ref 2.8–4.4)
GLUCOSE BLD-MCNC: 131 MG/DL (ref 70–99)
GLUCOSE BLD-MCNC: 254 MG/DL (ref 70–99)
GLUCOSE BLD-MCNC: 79 MG/DL (ref 70–99)
GLUCOSE UR STRIP.AUTO-MCNC: NEGATIVE MG/DL
HCT VFR BLD AUTO: 21.6 %
HCT VFR BLD AUTO: 23.2 %
HGB BLD-MCNC: 6.8 G/DL
HGB BLD-MCNC: 7.5 G/DL
IMM GRANULOCYTES # BLD AUTO: 0 X10(3) UL (ref 0–1)
IMM GRANULOCYTES NFR BLD: 0 %
KETONES UR STRIP.AUTO-MCNC: NEGATIVE MG/DL
LEUKOCYTE ESTERASE UR QL STRIP.AUTO: NEGATIVE
LYMPHOCYTES # BLD AUTO: 0.07 X10(3) UL (ref 1–4)
LYMPHOCYTES NFR BLD AUTO: 21.9 %
LYMPHOCYTES NFR BLD: 0.06 X10(3) UL (ref 1–4)
LYMPHOCYTES NFR BLD: 30 %
MCH RBC QN AUTO: 31.3 PG (ref 26–34)
MCH RBC QN AUTO: 31.8 PG (ref 26–34)
MCHC RBC AUTO-ENTMCNC: 31.5 G/DL (ref 31–37)
MCHC RBC AUTO-ENTMCNC: 32.3 G/DL (ref 31–37)
MCV RBC AUTO: 98.3 FL
MCV RBC AUTO: 99.5 FL
MONOCYTES # BLD AUTO: 0.1 X10(3) UL (ref 0.1–1)
MONOCYTES # BLD: 0.05 X10(3) UL (ref 0.1–1)
MONOCYTES NFR BLD AUTO: 31.3 %
MONOCYTES NFR BLD: 25 %
MORPHOLOGY: NORMAL
NEUTROPHILS # BLD AUTO: 0.05 X10 (3) UL (ref 1.5–7.7)
NEUTROPHILS # BLD AUTO: 0.11 X10 (3) UL (ref 1.5–7.7)
NEUTROPHILS # BLD AUTO: 0.11 X10(3) UL (ref 1.5–7.7)
NEUTROPHILS NFR BLD AUTO: 34.3 %
NEUTROPHILS NFR BLD: 30 %
NEUTS BAND NFR BLD: 3 %
NEUTS HYPERSEG # BLD: 0.07 X10(3) UL (ref 1.5–7.7)
NITRITE UR QL STRIP.AUTO: NEGATIVE
NRBC BLD MANUAL-RTO: 3 %
NT-PROBNP SERPL-MCNC: 6403 PG/ML (ref ?–450)
OSMOLALITY SERPL CALC.SUM OF ELEC: 297 MOSM/KG (ref 275–295)
PH UR STRIP.AUTO: 7 [PH] (ref 5–8)
PLATELET # BLD AUTO: 19 10(3)UL (ref 150–450)
PLATELET # BLD AUTO: 45 10(3)UL (ref 150–450)
PLATELET MORPHOLOGY: NORMAL
POTASSIUM SERPL-SCNC: 4 MMOL/L (ref 3.5–5.1)
PROT SERPL-MCNC: 5 G/DL (ref 6.4–8.2)
PROT UR STRIP.AUTO-MCNC: NEGATIVE MG/DL
RBC # BLD AUTO: 2.17 X10(6)UL
RBC # BLD AUTO: 2.36 X10(6)UL
RBC UR QL AUTO: NEGATIVE
SARS-COV-2 RNA RESP QL NAA+PROBE: NOT DETECTED
SODIUM SERPL-SCNC: 138 MMOL/L (ref 136–145)
SP GR UR STRIP.AUTO: 1.02 (ref 1–1.03)
TOTAL CELLS COUNTED BLD: 40
TROPONIN I HIGH SENSITIVITY: 22 NG/L
UROBILINOGEN UR STRIP.AUTO-MCNC: 4 MG/DL
WBC # BLD AUTO: 0.2 X10(3) UL (ref 4–11)
WBC # BLD AUTO: 0.3 X10(3) UL (ref 4–11)

## 2022-08-31 PROCEDURE — 71045 X-RAY EXAM CHEST 1 VIEW: CPT | Performed by: EMERGENCY MEDICINE

## 2022-08-31 PROCEDURE — 30233N1 TRANSFUSION OF NONAUTOLOGOUS RED BLOOD CELLS INTO PERIPHERAL VEIN, PERCUTANEOUS APPROACH: ICD-10-PCS | Performed by: INTERNAL MEDICINE

## 2022-08-31 PROCEDURE — 30233H1 TRANSFUSION OF NONAUTOLOGOUS WHOLE BLOOD INTO PERIPHERAL VEIN, PERCUTANEOUS APPROACH: ICD-10-PCS | Performed by: INTERNAL MEDICINE

## 2022-08-31 PROCEDURE — 99223 1ST HOSP IP/OBS HIGH 75: CPT | Performed by: NURSE PRACTITIONER

## 2022-08-31 PROCEDURE — 70450 CT HEAD/BRAIN W/O DYE: CPT | Performed by: HOSPITALIST

## 2022-08-31 PROCEDURE — 99223 1ST HOSP IP/OBS HIGH 75: CPT | Performed by: HOSPITALIST

## 2022-08-31 PROCEDURE — 30233R1 TRANSFUSION OF NONAUTOLOGOUS PLATELETS INTO PERIPHERAL VEIN, PERCUTANEOUS APPROACH: ICD-10-PCS | Performed by: INTERNAL MEDICINE

## 2022-08-31 RX ORDER — DEXTROSE MONOHYDRATE 25 G/50ML
50 INJECTION, SOLUTION INTRAVENOUS
Status: DISCONTINUED | OUTPATIENT
Start: 2022-08-31 | End: 2022-09-03

## 2022-08-31 RX ORDER — SODIUM CHLORIDE 9 MG/ML
INJECTION, SOLUTION INTRAVENOUS ONCE
Status: COMPLETED | OUTPATIENT
Start: 2022-08-31 | End: 2022-08-31

## 2022-08-31 RX ORDER — ACYCLOVIR 400 MG/1
400 TABLET ORAL 2 TIMES DAILY
Status: DISCONTINUED | OUTPATIENT
Start: 2022-08-31 | End: 2022-09-03

## 2022-08-31 RX ORDER — FUROSEMIDE 10 MG/ML
20 INJECTION INTRAMUSCULAR; INTRAVENOUS ONCE
Status: COMPLETED | OUTPATIENT
Start: 2022-08-31 | End: 2022-08-31

## 2022-08-31 RX ORDER — INSULIN ASPART 100 [IU]/ML
0.1 INJECTION, SOLUTION INTRAVENOUS; SUBCUTANEOUS ONCE
Status: COMPLETED | OUTPATIENT
Start: 2022-08-31 | End: 2022-08-31

## 2022-08-31 RX ORDER — PANTOPRAZOLE SODIUM 40 MG/1
40 TABLET, DELAYED RELEASE ORAL
Status: DISCONTINUED | OUTPATIENT
Start: 2022-09-01 | End: 2022-09-03

## 2022-08-31 RX ORDER — SODIUM CHLORIDE 9 MG/ML
INJECTION, SOLUTION INTRAVENOUS ONCE
Status: COMPLETED | OUTPATIENT
Start: 2022-08-31 | End: 2022-09-01

## 2022-08-31 RX ORDER — ECHINACEA PURPUREA EXTRACT 125 MG
1 TABLET ORAL
Status: DISCONTINUED | OUTPATIENT
Start: 2022-08-31 | End: 2022-09-03

## 2022-08-31 RX ORDER — BENZONATATE 100 MG/1
200 CAPSULE ORAL 3 TIMES DAILY PRN
Status: DISCONTINUED | OUTPATIENT
Start: 2022-08-31 | End: 2022-09-03

## 2022-08-31 RX ORDER — LEVOTHYROXINE SODIUM 0.05 MG/1
50 TABLET ORAL
Status: DISCONTINUED | OUTPATIENT
Start: 2022-08-31 | End: 2022-09-03

## 2022-08-31 RX ORDER — ACETAMINOPHEN 500 MG
500 TABLET ORAL EVERY 4 HOURS PRN
Status: DISCONTINUED | OUTPATIENT
Start: 2022-08-31 | End: 2022-09-03

## 2022-08-31 RX ORDER — BISACODYL 10 MG
10 SUPPOSITORY, RECTAL RECTAL
Status: DISCONTINUED | OUTPATIENT
Start: 2022-08-31 | End: 2022-09-03

## 2022-08-31 RX ORDER — NICOTINE POLACRILEX 4 MG
15 LOZENGE BUCCAL
Status: DISCONTINUED | OUTPATIENT
Start: 2022-08-31 | End: 2022-09-03

## 2022-08-31 RX ORDER — POLYETHYLENE GLYCOL 3350 17 G/17G
17 POWDER, FOR SOLUTION ORAL DAILY PRN
Status: DISCONTINUED | OUTPATIENT
Start: 2022-08-31 | End: 2022-09-03

## 2022-08-31 RX ORDER — FLUCONAZOLE 100 MG/1
200 TABLET ORAL
Status: DISCONTINUED | OUTPATIENT
Start: 2022-09-02 | End: 2022-09-03

## 2022-08-31 RX ORDER — NICOTINE POLACRILEX 4 MG
30 LOZENGE BUCCAL
Status: DISCONTINUED | OUTPATIENT
Start: 2022-08-31 | End: 2022-09-03

## 2022-08-31 RX ORDER — SENNOSIDES 8.6 MG
17.2 TABLET ORAL NIGHTLY PRN
Status: DISCONTINUED | OUTPATIENT
Start: 2022-08-31 | End: 2022-09-03

## 2022-08-31 RX ORDER — SODIUM CHLORIDE 9 MG/ML
INJECTION, SOLUTION INTRAVENOUS ONCE
Status: CANCELLED | OUTPATIENT
Start: 2022-08-31

## 2022-08-31 RX ORDER — ALLOPURINOL 300 MG/1
300 TABLET ORAL DAILY
Status: DISCONTINUED | OUTPATIENT
Start: 2022-08-31 | End: 2022-09-03

## 2022-08-31 RX ORDER — ONDANSETRON 2 MG/ML
8 INJECTION INTRAMUSCULAR; INTRAVENOUS EVERY 6 HOURS PRN
Status: DISCONTINUED | OUTPATIENT
Start: 2022-08-31 | End: 2022-09-03

## 2022-08-31 RX ORDER — MELATONIN
3 NIGHTLY PRN
Status: DISCONTINUED | OUTPATIENT
Start: 2022-08-31 | End: 2022-09-03

## 2022-08-31 NOTE — ED QUICK NOTES
Received a call from lab regarding abnormal CBC. WBC 0.2, platelets 08,221, and ANC 0.05. Dr. Freddy Kasper notified.

## 2022-08-31 NOTE — ED QUICK NOTES
Orders for admission, patient is aware of plan and ready to go upstairs. Any questions, please call ED RN Nicki Arvizu  at extension [phone]. Vaccinated? Yes  Type of COVID test sent:  COVID Suspicion level: Low      Titratable drug(s) infusing:N/A  Rate:    LOC at time of transport:Alert and oriented    Other pertinent information:    CIWA score=N/A  NIH score=N/A

## 2022-08-31 NOTE — PLAN OF CARE
Admitted from ED with history of fall at home today, Patient also has thrombocytopenia with platelet count of 84.9, received a unit of platelets prior to transport to the floor. WBC 0.2, patient is afebrile. Alert and oriented on arrival to the unit, CT of the head was ordered and completed. For repeat platelet count at 7308. Has a right upper arm PICC, dressing is dry and intact, bruising to site is noted, no bleeding noted.    Problem: SKIN/TISSUE INTEGRITY - ADULT  Goal: Incision(s), wounds(s) or drain site(s) healing without S/S of infection  Description: INTERVENTIONS:  - Assess and document risk factors for pressure ulcer development  - Assess and document skin integrity  - Assess and document dressing/incision, wound bed, drain sites and surrounding tissue  - Implement wound care per orders  - Initiate isolation precautions as appropriate  - Initiate Pressure Ulcer prevention bundle as indicated  Outcome: Not Progressing     Problem: HEMATOLOGIC - ADULT  Goal: Maintains hematologic stability  Description: INTERVENTIONS  - Assess for signs and symptoms of bleeding or hemorrhage  - Monitor labs and vital signs for trends  - Administer supportive blood products/factors, fluids and medications as ordered and appropriate  - Administer supportive blood products/factors as ordered and appropriate  Outcome: Not Progressing     Problem: HEMATOLOGIC - ADULT  Goal: Free from bleeding injury  Description: (Example usage: patient with low platelets)  INTERVENTIONS:  - Avoid intramuscular injections, enemas and rectal medication administration  - Ensure safe mobilization of patient  - Hold pressure on venipuncture sites to achieve adequate hemostasis  - Assess for signs and symptoms of internal bleeding  - Monitor lab trends  - Patient is to report abnormal signs of bleeding to staff  - Avoid use of toothpicks and dental floss  - Use electric shaver for shaving  - Use soft bristle tooth brush  - Limit straining and forceful nose blowing  Outcome: Not Progressing     Problem: Impaired Functional Mobility  Goal: Achieve highest/safest level of mobility/gait  Description: Interventions:  - Assess patient's functional ability and stability  - Promote increasing activity/tolerance for mobility and gait  - Educate and engage patient/family in tolerated activity level and precautions    Outcome: Not Progressing

## 2022-08-31 NOTE — ED INITIAL ASSESSMENT (HPI)
Pt states increased weakness. Pt states she slide out of the bed today and scraped her back against side of bed. No LOC. Eating and drinking ok. Pt denies fevers.

## 2022-08-31 NOTE — ED QUICK NOTES
Small wound noted to distal LLE where oozing found. 4 x 4 and Kurlex applied. In and out cath performed. Tolerated well. Sheets changed, repositioned in bed.

## 2022-09-01 LAB
ANION GAP SERPL CALC-SCNC: 3 MMOL/L (ref 0–18)
ATRIAL RATE: 300 BPM
BLOOD TYPE BARCODE: 5100
BUN BLD-MCNC: 14 MG/DL (ref 7–18)
CALCIUM BLD-MCNC: 8.7 MG/DL (ref 8.5–10.1)
CHLORIDE SERPL-SCNC: 100 MMOL/L (ref 98–112)
CO2 SERPL-SCNC: 36 MMOL/L (ref 21–32)
CREAT BLD-MCNC: 0.47 MG/DL
ERYTHROCYTE [DISTWIDTH] IN BLOOD BY AUTOMATED COUNT: 17.5 %
GFR SERPLBLD BASED ON 1.73 SQ M-ARVRAT: 96 ML/MIN/1.73M2 (ref 60–?)
GLUCOSE BLD-MCNC: 150 MG/DL (ref 70–99)
GLUCOSE BLD-MCNC: 162 MG/DL (ref 70–99)
GLUCOSE BLD-MCNC: 169 MG/DL (ref 70–99)
GLUCOSE BLD-MCNC: 175 MG/DL (ref 70–99)
GLUCOSE BLD-MCNC: 254 MG/DL (ref 70–99)
HCT VFR BLD AUTO: 23.3 %
HGB BLD-MCNC: 7.5 G/DL
HGB BLD-MCNC: 7.5 G/DL
MCH RBC QN AUTO: 31.5 PG (ref 26–34)
MCHC RBC AUTO-ENTMCNC: 32.2 G/DL (ref 31–37)
MCV RBC AUTO: 97.9 FL
OSMOLALITY SERPL CALC.SUM OF ELEC: 292 MOSM/KG (ref 275–295)
PLATELET # BLD AUTO: 30 10(3)UL (ref 150–450)
POTASSIUM SERPL-SCNC: 3.7 MMOL/L (ref 3.5–5.1)
Q-T INTERVAL: 378 MS
QRS DURATION: 88 MS
QTC CALCULATION (BEZET): 444 MS
R AXIS: 9 DEGREES
RBC # BLD AUTO: 2.38 X10(6)UL
SODIUM SERPL-SCNC: 139 MMOL/L (ref 136–145)
T AXIS: -1 DEGREES
VENTRICULAR RATE: 83 BPM
WBC # BLD AUTO: 0.7 X10(3) UL (ref 4–11)

## 2022-09-01 PROCEDURE — 99232 SBSQ HOSP IP/OBS MODERATE 35: CPT | Performed by: INTERNAL MEDICINE

## 2022-09-01 RX ORDER — FUROSEMIDE 10 MG/ML
20 INJECTION INTRAMUSCULAR; INTRAVENOUS ONCE
Status: DISCONTINUED | OUTPATIENT
Start: 2022-09-01 | End: 2022-09-03

## 2022-09-01 RX ORDER — POTASSIUM CHLORIDE 20 MEQ/1
40 TABLET, EXTENDED RELEASE ORAL ONCE
Status: COMPLETED | OUTPATIENT
Start: 2022-09-01 | End: 2022-09-01

## 2022-09-01 RX ORDER — FUROSEMIDE 10 MG/ML
20 INJECTION INTRAMUSCULAR; INTRAVENOUS ONCE
Status: COMPLETED | OUTPATIENT
Start: 2022-09-01 | End: 2022-09-01

## 2022-09-01 NOTE — CM/SW NOTE
SW placed resume of care orders for [facility]. SW will continue to follow for plan of care changes and remain available for any additional DC needs or concerns.      Kendall Harman MSW, LSW  Discharge Planner   [phone]

## 2022-09-01 NOTE — PLAN OF CARE
Pt a/o x4. VSS on 2-3L NC. No c/o pain at this moment. Meds per MAR. Pt up w/ 1x assist to [facility] w/ adequate urine outputs. Pt c/o burning sensation w/ urination & difficulty starting stream.   Dr. Dan Collet notified of critical labs: WBC 0.3, Hgb 6.8, absolute neutrophils 0.11 & sepsis BPA firing. New order to transfuse 1 unit of RBC. Pt completed w/ no complications. Fall risk protocol followed, call light within reach.      Problem: SKIN/TISSUE INTEGRITY - ADULT  Goal: Incision(s), wounds(s) or drain site(s) healing without S/S of infection  Description: INTERVENTIONS:  - Assess and document risk factors for pressure ulcer development  - Assess and document skin integrity  - Assess and document dressing/incision, wound bed, drain sites and surrounding tissue  - Implement wound care per orders  - Initiate isolation precautions as appropriate  - Initiate Pressure Ulcer prevention bundle as indicated  Outcome: Progressing     Problem: HEMATOLOGIC - ADULT  Goal: Free from bleeding injury  Description: (Example usage: patient with low platelets)  INTERVENTIONS:  - Avoid intramuscular injections, enemas and rectal medication administration  - Ensure safe mobilization of patient  - Hold pressure on venipuncture sites to achieve adequate hemostasis  - Assess for signs and symptoms of internal bleeding  - Monitor lab trends  - Patient is to report abnormal signs of bleeding to staff  - Avoid use of toothpicks and dental floss  - Use electric shaver for shaving  - Use soft bristle tooth brush  - Limit straining and forceful nose blowing  Outcome: Progressing     Problem: Impaired Functional Mobility  Goal: Achieve highest/safest level of mobility/gait  Description: Interventions:  - Assess patient's functional ability and stability  - Promote increasing activity/tolerance for mobility and gait  - Educate and engage patient/family in tolerated activity level and precautions  Outcome: Progressing    Problem: HEMATOLOGIC - ADULT  Goal: Maintains hematologic stability  Description: INTERVENTIONS  - Assess for signs and symptoms of bleeding or hemorrhage  - Monitor labs and vital signs for trends  - Administer supportive blood products/factors, fluids and medications as ordered and appropriate  - Administer supportive blood products/factors as ordered and appropriate  Outcome: Not Progressing

## 2022-09-01 NOTE — PLAN OF CARE
Alert and oriented, WBC is improving at 0.7, Platelets and Hemoglobin are improved after transfusion, no signs of bleeding noted. Still has swelling to BLE L>R, had Lasix IVP, voiding in good amounts. Out of bed to the chair, still has difficulties getting out of the bed but managed to ambulate with walker with standby assist once she was standing up. SOB with exertion, O2 at 3-4 liters per nasal cannula, SOB improved at rest.   1500 Seen by PT, recommends [facility], patient states she would rather go home to her son. SW consulted for placement. Another dose of Lasix IVP was given, weighed 166 lbs this evening. Will continue to monitor.   Problem: SKIN/TISSUE INTEGRITY - ADULT  Goal: Incision(s), wounds(s) or drain site(s) healing without S/S of infection  Description: INTERVENTIONS:  - Assess and document risk factors for pressure ulcer development  - Assess and document skin integrity  - Assess and document dressing/incision, wound bed, drain sites and surrounding tissue  - Implement wound care per orders  - Initiate isolation precautions as appropriate  - Initiate Pressure Ulcer prevention bundle as indicated  Outcome: Progressing     Problem: HEMATOLOGIC - ADULT  Goal: Maintains hematologic stability  Description: INTERVENTIONS  - Assess for signs and symptoms of bleeding or hemorrhage  - Monitor labs and vital signs for trends  - Administer supportive blood products/factors, fluids and medications as ordered and appropriate  - Administer supportive blood products/factors as ordered and appropriate  Outcome: Progressing     Problem: HEMATOLOGIC - ADULT  Goal: Free from bleeding injury  Description: (Example usage: patient with low platelets)  INTERVENTIONS:  - Avoid intramuscular injections, enemas and rectal medication administration  - Ensure safe mobilization of patient  - Hold pressure on venipuncture sites to achieve adequate hemostasis  - Assess for signs and symptoms of internal bleeding  - Monitor lab trends  - Patient is to report abnormal signs of bleeding to staff  - Avoid use of toothpicks and dental floss  - Use electric shaver for shaving  - Use soft bristle tooth brush  - Limit straining and forceful nose blowing  Outcome: Progressing     Problem: Impaired Functional Mobility  Goal: Achieve highest/safest level of mobility/gait  Description: Interventions:  - Assess patient's functional ability and stability  - Promote increasing activity/tolerance for mobility and gait  - Educate and engage patient/family in tolerated activity level and precautions    Outcome: Progressing

## 2022-09-02 ENCOUNTER — APPOINTMENT (OUTPATIENT)
Dept: HEMATOLOGY/ONCOLOGY | Facility: HOSPITAL | Age: 82
End: 2022-09-02
Attending: INTERNAL MEDICINE
Payer: MEDICARE

## 2022-09-02 LAB
ANION GAP SERPL CALC-SCNC: 2 MMOL/L (ref 0–18)
BASOPHILS # BLD AUTO: 0.02 X10(3) UL (ref 0–0.2)
BASOPHILS NFR BLD AUTO: 0.7 %
BLOOD TYPE BARCODE: 5100
BUN BLD-MCNC: 13 MG/DL (ref 7–18)
CALCIUM BLD-MCNC: 8.7 MG/DL (ref 8.5–10.1)
CHLORIDE SERPL-SCNC: 100 MMOL/L (ref 98–112)
CO2 SERPL-SCNC: 37 MMOL/L (ref 21–32)
CREAT BLD-MCNC: 0.45 MG/DL
EOSINOPHIL # BLD AUTO: 0.07 X10(3) UL (ref 0–0.7)
EOSINOPHIL NFR BLD AUTO: 2.6 %
ERYTHROCYTE [DISTWIDTH] IN BLOOD BY AUTOMATED COUNT: 17.7 %
GFR SERPLBLD BASED ON 1.73 SQ M-ARVRAT: 97 ML/MIN/1.73M2 (ref 60–?)
GLUCOSE BLD-MCNC: 131 MG/DL (ref 70–99)
GLUCOSE BLD-MCNC: 146 MG/DL (ref 70–99)
GLUCOSE BLD-MCNC: 167 MG/DL (ref 70–99)
GLUCOSE BLD-MCNC: 188 MG/DL (ref 70–99)
GLUCOSE BLD-MCNC: 229 MG/DL (ref 70–99)
HCT VFR BLD AUTO: 22.2 %
HGB BLD-MCNC: 7.1 G/DL
HGB BLD-MCNC: 8.3 G/DL
IMM GRANULOCYTES # BLD AUTO: 0.04 X10(3) UL (ref 0–1)
IMM GRANULOCYTES NFR BLD: 1.5 %
LYMPHOCYTES # BLD AUTO: 0.09 X10(3) UL (ref 1–4)
LYMPHOCYTES NFR BLD AUTO: 3.4 %
MCH RBC QN AUTO: 31.3 PG (ref 26–34)
MCHC RBC AUTO-ENTMCNC: 32 G/DL (ref 31–37)
MCV RBC AUTO: 97.8 FL
MONOCYTES # BLD AUTO: 0.27 X10(3) UL (ref 0.1–1)
MONOCYTES NFR BLD AUTO: 10.1 %
NEUTROPHILS # BLD AUTO: 2.19 X10 (3) UL (ref 1.5–7.7)
NEUTROPHILS # BLD AUTO: 2.19 X10(3) UL (ref 1.5–7.7)
NEUTROPHILS NFR BLD AUTO: 81.7 %
OSMOLALITY SERPL CALC.SUM OF ELEC: 290 MOSM/KG (ref 275–295)
PLATELET # BLD AUTO: 29 10(3)UL (ref 150–450)
POTASSIUM SERPL-SCNC: 3.6 MMOL/L (ref 3.5–5.1)
POTASSIUM SERPL-SCNC: 3.6 MMOL/L (ref 3.5–5.1)
RBC # BLD AUTO: 2.27 X10(6)UL
SODIUM SERPL-SCNC: 139 MMOL/L (ref 136–145)
WBC # BLD AUTO: 2.7 X10(3) UL (ref 4–11)

## 2022-09-02 PROCEDURE — 99232 SBSQ HOSP IP/OBS MODERATE 35: CPT | Performed by: INTERNAL MEDICINE

## 2022-09-02 RX ORDER — FUROSEMIDE 10 MG/ML
20 INJECTION INTRAMUSCULAR; INTRAVENOUS ONCE
Status: COMPLETED | OUTPATIENT
Start: 2022-09-02 | End: 2022-09-02

## 2022-09-02 RX ORDER — SODIUM CHLORIDE 9 MG/ML
INJECTION, SOLUTION INTRAVENOUS ONCE
Status: COMPLETED | OUTPATIENT
Start: 2022-09-02 | End: 2022-09-02

## 2022-09-02 NOTE — CM/SW NOTE
09/02/22 1100   CM/SW Referral Data   Referral Source Social Work (self-referral)   Reason for Referral Discharge planning   Informant Patient;EMR;Clinical Staff Member   Patient [address]   Patient lives with Son   Discharge Needs   Anticipated D/C needs Home health care     SW met with patient to discuss [facility] recommendation from PT. Patient reported that she was discouraged by the recommendation. Patient politely refused [facility] placement stating \"I am having a lift chair delivered to my house today and have all the equipment I need\". [facility] with [facility]  and would like to continue services with them for RN and PT. Orders have already been placed for resume of care. SW will continue to follow for plan of care changes and remain available for any additional DC needs or concerns.      Vinicius ETIENNE, LSW  Discharge Planner   [phone]

## 2022-09-02 NOTE — PLAN OF CARE
Pt a/o x4. VSS on 3-4L NC. No c/o pain at the moment. Meds per MAR. No bleeding noted. Pt up to bathroom w/ walker & SBA. O2 maintained during ambulation. Fall risk protocol followed, call light within reach. Weaned down to 2L NC.      Problem: HEMATOLOGIC - ADULT  Goal: Free from bleeding injury  Description: (Example usage: patient with low platelets)  INTERVENTIONS:  - Avoid intramuscular injections, enemas and rectal medication administration  - Ensure safe mobilization of patient  - Hold pressure on venipuncture sites to achieve adequate hemostasis  - Assess for signs and symptoms of internal bleeding  - Monitor lab trends  - Patient is to report abnormal signs of bleeding to staff  - Avoid use of toothpicks and dental floss  - Use electric shaver for shaving  - Use soft bristle tooth brush  - Limit straining and forceful nose blowing  Outcome: Progressing     Problem: Impaired Functional Mobility  Goal: Achieve highest/safest level of mobility/gait  Description: Interventions:  - Assess patient's functional ability and stability  - Promote increasing activity/tolerance for mobility and gait  - Educate and engage patient/family in tolerated activity level and precautions  Outcome: Progressing

## 2022-09-03 VITALS
SYSTOLIC BLOOD PRESSURE: 137 MMHG | BODY MASS INDEX: 23.66 KG/M2 | TEMPERATURE: 97 F | HEIGHT: 70 IN | DIASTOLIC BLOOD PRESSURE: 63 MMHG | OXYGEN SATURATION: 97 % | RESPIRATION RATE: 18 BRPM | HEART RATE: 93 BPM | WEIGHT: 165.31 LBS

## 2022-09-03 DIAGNOSIS — D64.81 ANEMIA ASSOCIATED WITH CHEMOTHERAPY: Primary | ICD-10-CM

## 2022-09-03 DIAGNOSIS — T45.1X5A ANEMIA ASSOCIATED WITH CHEMOTHERAPY: Primary | ICD-10-CM

## 2022-09-03 LAB
ANION GAP SERPL CALC-SCNC: 3 MMOL/L (ref 0–18)
BASOPHILS # BLD AUTO: 0.03 X10(3) UL (ref 0–0.2)
BASOPHILS NFR BLD AUTO: 0.6 %
BLOOD TYPE BARCODE: 5100
BUN BLD-MCNC: 13 MG/DL (ref 7–18)
CALCIUM BLD-MCNC: 8.6 MG/DL (ref 8.5–10.1)
CHLORIDE SERPL-SCNC: 100 MMOL/L (ref 98–112)
CO2 SERPL-SCNC: 37 MMOL/L (ref 21–32)
CREAT BLD-MCNC: 0.48 MG/DL
EOSINOPHIL # BLD AUTO: 0.05 X10(3) UL (ref 0–0.7)
EOSINOPHIL NFR BLD AUTO: 1 %
ERYTHROCYTE [DISTWIDTH] IN BLOOD BY AUTOMATED COUNT: 18.5 %
GFR SERPLBLD BASED ON 1.73 SQ M-ARVRAT: 95 ML/MIN/1.73M2 (ref 60–?)
GLUCOSE BLD-MCNC: 151 MG/DL (ref 70–99)
GLUCOSE BLD-MCNC: 153 MG/DL (ref 70–99)
HCT VFR BLD AUTO: 25.2 %
HGB BLD-MCNC: 8.2 G/DL
IMM GRANULOCYTES # BLD AUTO: 0.06 X10(3) UL (ref 0–1)
IMM GRANULOCYTES NFR BLD: 1.2 %
LYMPHOCYTES # BLD AUTO: 0.13 X10(3) UL (ref 1–4)
LYMPHOCYTES NFR BLD AUTO: 2.6 %
MCH RBC QN AUTO: 31.5 PG (ref 26–34)
MCHC RBC AUTO-ENTMCNC: 32.5 G/DL (ref 31–37)
MCV RBC AUTO: 96.9 FL
MONOCYTES # BLD AUTO: 0.36 X10(3) UL (ref 0.1–1)
MONOCYTES NFR BLD AUTO: 7.1 %
NEUTROPHILS # BLD AUTO: 4.43 X10 (3) UL (ref 1.5–7.7)
NEUTROPHILS # BLD AUTO: 4.43 X10(3) UL (ref 1.5–7.7)
NEUTROPHILS NFR BLD AUTO: 87.5 %
OSMOLALITY SERPL CALC.SUM OF ELEC: 293 MOSM/KG (ref 275–295)
PLATELET # BLD AUTO: 39 10(3)UL (ref 150–450)
POTASSIUM SERPL-SCNC: 3.6 MMOL/L (ref 3.5–5.1)
RBC # BLD AUTO: 2.6 X10(6)UL
SODIUM SERPL-SCNC: 140 MMOL/L (ref 136–145)
WBC # BLD AUTO: 5.1 X10(3) UL (ref 4–11)

## 2022-09-03 PROCEDURE — 99232 SBSQ HOSP IP/OBS MODERATE 35: CPT | Performed by: INTERNAL MEDICINE

## 2022-09-03 PROCEDURE — 99239 HOSP IP/OBS DSCHRG MGMT >30: CPT | Performed by: INTERNAL MEDICINE

## 2022-09-03 RX ORDER — POTASSIUM CHLORIDE 20 MEQ/1
40 TABLET, EXTENDED RELEASE ORAL EVERY 4 HOURS
Status: DISCONTINUED | OUTPATIENT
Start: 2022-09-03 | End: 2022-09-03

## 2022-09-03 NOTE — DISCHARGE INSTRUCTIONS
Resume services with [facility]  [phone]    Please continue to hold aspirin and xarelto for now given low blood counts.

## 2022-09-03 NOTE — PLAN OF CARE
Pt received alert and oriented x4, vital stable, denies pain and sob. Wearing 2L of oxygen sating at 98% pt up to the BR voiding, , insulin given per [facility], will continue to monitor.

## 2022-09-04 NOTE — PLAN OF CARE
Problem: SKIN/TISSUE INTEGRITY - ADULT  Goal: Incision(s), wounds(s) or drain site(s) healing without S/S of infection  Description: INTERVENTIONS:  - Assess and document risk factors for pressure ulcer development  - Assess and document skin integrity  - Assess and document dressing/incision, wound bed, drain sites and surrounding tissue  - Implement wound care per orders  - Initiate isolation precautions as appropriate  - Initiate Pressure Ulcer prevention bundle as indicated  Outcome: Adequate for Discharge     Problem: HEMATOLOGIC - ADULT  Goal: Maintains hematologic stability  Description: INTERVENTIONS  - Assess for signs and symptoms of bleeding or hemorrhage  - Monitor labs and vital signs for trends  - Administer supportive blood products/factors, fluids and medications as ordered and appropriate  - Administer supportive blood products/factors as ordered and appropriate  Outcome: Adequate for Discharge  Goal: Free from bleeding injury  Description: (Example usage: patient with low platelets)  INTERVENTIONS:  - Avoid intramuscular injections, enemas and rectal medication administration  - Ensure safe mobilization of patient  - Hold pressure on venipuncture sites to achieve adequate hemostasis  - Assess for signs and symptoms of internal bleeding  - Monitor lab trends  - Patient is to report abnormal signs of bleeding to staff  - Avoid use of toothpicks and dental floss  - Use electric shaver for shaving  - Use soft bristle tooth brush  - Limit straining and forceful nose blowing  Outcome: Adequate for Discharge     Problem: Impaired Functional Mobility  Goal: Achieve highest/safest level of mobility/gait  Description: Interventions:  - Assess patient's functional ability and stability  - Promote increasing activity/tolerance for mobility and gait  - Educate and engage patient/family in tolerated activity level and precautions    Outcome: Adequate for Discharge     Patient cleared for DC from all services. AVS reviewed with patient and son (son is ER MD). PICC line CDI. Fall and safety precautions in place. Patient on electrolyte protocol, declines K replacement stating that she will resume her K at home. Patient discharged at approx 1150 in wheelchair to private vehicle accompanied by son. All belongings with patient. Patient and son updated and in agreement with POC. Patient progressing per POC.

## 2022-09-06 ENCOUNTER — PATIENT OUTREACH (OUTPATIENT)
Dept: CASE MANAGEMENT | Age: 82
End: 2022-09-06

## 2022-09-06 ENCOUNTER — NURSE ONLY (OUTPATIENT)
Dept: HEMATOLOGY/ONCOLOGY | Facility: HOSPITAL | Age: 82
End: 2022-09-06
Attending: INTERNAL MEDICINE
Payer: MEDICARE

## 2022-09-06 DIAGNOSIS — C83.38 DIFFUSE LARGE B-CELL LYMPHOMA OF LYMPH NODES OF MULTIPLE REGIONS (HCC): ICD-10-CM

## 2022-09-06 DIAGNOSIS — T45.1X5A ANEMIA ASSOCIATED WITH CHEMOTHERAPY: ICD-10-CM

## 2022-09-06 DIAGNOSIS — C83.38 DIFFUSE LARGE B-CELL LYMPHOMA OF LYMPH NODES OF MULTIPLE REGIONS (HCC): Primary | ICD-10-CM

## 2022-09-06 DIAGNOSIS — D64.81 ANEMIA ASSOCIATED WITH CHEMOTHERAPY: ICD-10-CM

## 2022-09-06 LAB
ANTIBODY SCREEN: NEGATIVE
BASOPHILS # BLD AUTO: 0.02 X10(3) UL (ref 0–0.2)
BASOPHILS NFR BLD AUTO: 0.3 %
EOSINOPHIL # BLD AUTO: 0.02 X10(3) UL (ref 0–0.7)
EOSINOPHIL NFR BLD AUTO: 0.3 %
ERYTHROCYTE [DISTWIDTH] IN BLOOD BY AUTOMATED COUNT: 18.6 %
HCT VFR BLD AUTO: 25.6 %
HGB BLD-MCNC: 8.3 G/DL
IMM GRANULOCYTES # BLD AUTO: 0.08 X10(3) UL (ref 0–1)
IMM GRANULOCYTES NFR BLD: 1.1 %
LYMPHOCYTES # BLD AUTO: 0.14 X10(3) UL (ref 1–4)
LYMPHOCYTES NFR BLD AUTO: 1.9 %
MCH RBC QN AUTO: 31.6 PG (ref 26–34)
MCHC RBC AUTO-ENTMCNC: 32.4 G/DL (ref 31–37)
MCV RBC AUTO: 97.3 FL
MONOCYTES # BLD AUTO: 0.36 X10(3) UL (ref 0.1–1)
MONOCYTES NFR BLD AUTO: 5 %
NEUTROPHILS # BLD AUTO: 6.64 X10 (3) UL (ref 1.5–7.7)
NEUTROPHILS # BLD AUTO: 6.64 X10(3) UL (ref 1.5–7.7)
NEUTROPHILS NFR BLD AUTO: 91.4 %
PLATELET # BLD AUTO: 95 10(3)UL (ref 150–450)
RBC # BLD AUTO: 2.63 X10(6)UL
RH BLOOD TYPE: POSITIVE
WBC # BLD AUTO: 7.3 X10(3) UL (ref 4–11)

## 2022-09-06 PROCEDURE — 86901 BLOOD TYPING SEROLOGIC RH(D): CPT

## 2022-09-06 PROCEDURE — 85025 COMPLETE CBC W/AUTO DIFF WBC: CPT

## 2022-09-06 PROCEDURE — 86850 RBC ANTIBODY SCREEN: CPT

## 2022-09-06 PROCEDURE — 86900 BLOOD TYPING SEROLOGIC ABO: CPT

## 2022-09-06 PROCEDURE — 36592 COLLECT BLOOD FROM PICC: CPT

## 2022-09-06 NOTE — PROGRESS NOTES
Attempted to contact pt for TCM however call rang twice, answered and then disconnected. NCM to try again at a later time.

## 2022-09-06 NOTE — PROGRESS NOTES
Pt tolerated PICC line dressing change without difficulty. CBC/T&S drawn as ordered. Pt reported that she was feeling well. Reviewed updated appt scheduled for next week.      Education Record    Learner:  Patient; family member    Disease / Diagnosis: lymphoma     Barriers / Limitations:  None   Comments:    Method:  Discussion   Comments:    General Topics:  Plan of care reviewed   Comments:    Outcome:  Shows understanding   Comments:

## 2022-09-13 ENCOUNTER — OFFICE VISIT (OUTPATIENT)
Dept: HEMATOLOGY/ONCOLOGY | Facility: HOSPITAL | Age: 82
End: 2022-09-13
Attending: INTERNAL MEDICINE
Payer: MEDICARE

## 2022-09-13 ENCOUNTER — TELEPHONE (OUTPATIENT)
Dept: INTERNAL MEDICINE CLINIC | Facility: CLINIC | Age: 82
End: 2022-09-13

## 2022-09-13 VITALS
OXYGEN SATURATION: 93 % | BODY MASS INDEX: 23.34 KG/M2 | RESPIRATION RATE: 18 BRPM | SYSTOLIC BLOOD PRESSURE: 117 MMHG | WEIGHT: 163 LBS | HEIGHT: 70 IN | DIASTOLIC BLOOD PRESSURE: 72 MMHG | HEART RATE: 88 BPM | TEMPERATURE: 98 F

## 2022-09-13 DIAGNOSIS — R53.1 WEAKNESS: ICD-10-CM

## 2022-09-13 DIAGNOSIS — H35.9: Primary | ICD-10-CM

## 2022-09-13 DIAGNOSIS — C83.38 DIFFUSE LARGE B-CELL LYMPHOMA OF LYMPH NODES OF MULTIPLE REGIONS (HCC): Primary | ICD-10-CM

## 2022-09-13 DIAGNOSIS — C85.90 LYMPHOMA, UNSPECIFIED BODY REGION, UNSPECIFIED LYMPHOMA TYPE (HCC): Primary | ICD-10-CM

## 2022-09-13 DIAGNOSIS — C83.38 DIFFUSE LARGE B-CELL LYMPHOMA OF LYMPH NODES OF MULTIPLE REGIONS (HCC): ICD-10-CM

## 2022-09-13 LAB
ALBUMIN SERPL-MCNC: 2.5 G/DL (ref 3.4–5)
ALBUMIN/GLOB SERPL: 0.9 {RATIO} (ref 1–2)
ALP LIVER SERPL-CCNC: 77 U/L
ALT SERPL-CCNC: 9 U/L
ANION GAP SERPL CALC-SCNC: 3 MMOL/L (ref 0–18)
AST SERPL-CCNC: 7 U/L (ref 15–37)
BASOPHILS # BLD AUTO: 0.02 X10(3) UL (ref 0–0.2)
BASOPHILS NFR BLD AUTO: 0.3 %
BILIRUB SERPL-MCNC: 0.5 MG/DL (ref 0.1–2)
BUN BLD-MCNC: 8 MG/DL (ref 7–18)
CALCIUM BLD-MCNC: 8.3 MG/DL (ref 8.5–10.1)
CHLORIDE SERPL-SCNC: 105 MMOL/L (ref 98–112)
CO2 SERPL-SCNC: 33 MMOL/L (ref 21–32)
CREAT BLD-MCNC: 0.64 MG/DL
EOSINOPHIL # BLD AUTO: 0.02 X10(3) UL (ref 0–0.7)
EOSINOPHIL NFR BLD AUTO: 0.3 %
ERYTHROCYTE [DISTWIDTH] IN BLOOD BY AUTOMATED COUNT: 19.8 %
FASTING STATUS PATIENT QL REPORTED: NO
GFR SERPLBLD BASED ON 1.73 SQ M-ARVRAT: 89 ML/MIN/1.73M2 (ref 60–?)
GLOBULIN PLAS-MCNC: 2.7 G/DL (ref 2.8–4.4)
GLUCOSE BLD-MCNC: 195 MG/DL (ref 70–99)
HCT VFR BLD AUTO: 27.6 %
HGB BLD-MCNC: 8.6 G/DL
IMM GRANULOCYTES # BLD AUTO: 0.03 X10(3) UL (ref 0–1)
IMM GRANULOCYTES NFR BLD: 0.4 %
LDH SERPL L TO P-CCNC: 160 U/L
LYMPHOCYTES # BLD AUTO: 0.17 X10(3) UL (ref 1–4)
LYMPHOCYTES NFR BLD AUTO: 2.4 %
MCH RBC QN AUTO: 31.6 PG (ref 26–34)
MCHC RBC AUTO-ENTMCNC: 31.2 G/DL (ref 31–37)
MCV RBC AUTO: 101.5 FL
MONOCYTES # BLD AUTO: 0.41 X10(3) UL (ref 0.1–1)
MONOCYTES NFR BLD AUTO: 5.7 %
NEUTROPHILS # BLD AUTO: 6.57 X10 (3) UL (ref 1.5–7.7)
NEUTROPHILS # BLD AUTO: 6.57 X10(3) UL (ref 1.5–7.7)
NEUTROPHILS NFR BLD AUTO: 90.9 %
OSMOLALITY SERPL CALC.SUM OF ELEC: 296 MOSM/KG (ref 275–295)
PLATELET # BLD AUTO: 186 10(3)UL (ref 150–450)
POTASSIUM SERPL-SCNC: 4.1 MMOL/L (ref 3.5–5.1)
PROT SERPL-MCNC: 5.2 G/DL (ref 6.4–8.2)
RBC # BLD AUTO: 2.72 X10(6)UL
SODIUM SERPL-SCNC: 141 MMOL/L (ref 136–145)
WBC # BLD AUTO: 7.2 X10(3) UL (ref 4–11)

## 2022-09-13 PROCEDURE — 83615 LACTATE (LD) (LDH) ENZYME: CPT

## 2022-09-13 PROCEDURE — 36592 COLLECT BLOOD FROM PICC: CPT

## 2022-09-13 PROCEDURE — 80053 COMPREHEN METABOLIC PANEL: CPT

## 2022-09-13 PROCEDURE — 85025 COMPLETE CBC W/AUTO DIFF WBC: CPT

## 2022-09-13 PROCEDURE — 99214 OFFICE O/P EST MOD 30 MIN: CPT | Performed by: INTERNAL MEDICINE

## 2022-09-13 RX ORDER — ASPIRIN 81 MG/1
81 TABLET ORAL DAILY
COMMUNITY

## 2022-09-13 NOTE — TELEPHONE ENCOUNTER
Pt is trying to get in to see them today at 4 asking for a call if we can or cant do the referral before 4pm       Specialty: opthalmology    Full Name of Specialist:Dr Terrance Olivia     Name of the Provider Group:  Address: [address]   Phone number:  Fax number :  NPI:    (NPI number of the service provider. the nurses need this information for the referral.  Its for service providers only like [employer], [facility], [employer]   We not NOT need physician NPI's)    Date of Appointment:they offered her an appt today at 4 if a referral can be generated as soon as possible     Reason for the Appointment (be specific with diagnosis code): retina issue     Has the patient seen a provider in our office for stated problem?: yes      Is this request for an out of network referral? Unsure   (if yes, please have patient contact referring provider and have them fax office visit notes to triage attention):

## 2022-09-13 NOTE — PROGRESS NOTES
Patient is here for MD f/u and possible chemo today. Patient reports appetite is fair. Aspirin and Xarelto are on hold. Denies cough. Mild SOB with exertion. Denies pain. Family and patient inquiring whether to proceed with cycle 6 of chemo. Patient uses a walker at home for assistance. Denies bleeding or bruising.        Education Record    Learner:  Patient and Family Member    Disease / Diagnosis:  Lymphoma     Barriers / Limitations:  None   Comments:    Method:  Discussion   Comments:    General Topics:  Plan of care reviewed   Comments:    Outcome:  Shows understanding   Comments:

## 2022-09-15 NOTE — PROGRESS NOTES
[facility]    PATIENT'S NAME: Noble MCCONNELL   ATTENDING PHYSICIAN: Felix Moralez M.D. PATIENT ACCOUNT #: [de-identified] LOCATION: [address] [place] [address] RECORD #: [id number] YOB: 1940   DATE OF SERVICE: 09/13/2022       CANCER CENTER PROGRESS NOTE    CHIEF COMPLAINT:  Followup for history of diffuse large B-cell lymphoma. HISTORY OF PRESENT ILLNESS:  The patient is an 80-year-old female. She presented with a history of a follicular lymphoma and then had transformation with a diffuse large B-cell lymphoma in the upper abdomen in the gastrohepatic space. She had substantial pain at the time and was started on chemotherapy with R-CHOP. She had a rapid response; however, she has had significant difficulty with her treatment. She has a history of diastolic heart failure. She has received a total of 5 cycles. She had a negative PET scan after 4 cycles. She, however, has had 10 hospitalizations since the initiation of treatment, many of which have been due to weakness, fatigue, heart failure, anemia, and cytopenia. She has required support on many levels, but has gotten through this. She is progressively weaker. She is in a wheelchair at the present time. She is accompanied by her son and daughter. She denies any fevers or chills at present. Her appetite is fair. She had her most recent hospitalization from August 31 to September 3. She had a fall. She was very weak. She had anemia. She was given a blood transfusion. She has a PICC line in place. She improved during the hospitalization, but is still quite limited. She is able to get up with a walker at home. She still has lower extremity edema to the mid-calf. She is not having any fevers or chills. She has no upper abdominal pain. She has a slight amount of peripheral neuropathy in her fingertips.     MEDICATIONS:  Her current medications include acyclovir 400 mg b.i.d.; allopurinol 300 mg daily; aspirin 81 mg daily; vitamin D 1000 units daily; esomeprazole 20 mg daily; furosemide 20 mg daily; Levemir insulin 10 units daily; levothyroxine 50 mcg daily; metformin 500 mg twice daily with meals; metoprolol tartrate 12.5 mg b.i.d.; multivitamin daily; ondansetron 4 mg q.8 h. p.r.n.; potassium chloride 10 mEq twice daily; pravastatin 80 mg nightly; rivaroxaban 20 mg daily; and Bactrim DS every Monday, Wednesday, Friday. PHYSICAL EXAMINATION:    GENERAL:  She is an elderly female. She is in no acute distress. She has total alopecia. She is alert and oriented. VITAL SIGNS:  Her performance status is 2 to 3. Her weight is 163 pounds. Blood pressure is 117/72, pulse 88, respiratory rate is 20, temperature is 97.8. HEENT:  Unremarkable. She has no thrush. LYMPHATICS:  She has no palpable adenopathy in the neck, supraclavicular, or axillary regions. LUNGS:  Clear. HEART:  Irregular S1 and S2.    ABDOMEN:  No hepatosplenomegaly or tenderness. EXTREMITIES:  She has no clubbing or cyanosis. She has 1 to 2+ edema to the mid-calf. LABORATORY DATA:  White count is 7.2, hemoglobin 8.6, platelets are 683. Her albumin is 2.5. Her LDH is normal at 160. BUN and creatinine are 8 and 0.64. Potassium is 4.1. IMPRESSION:  Diffuse large B-cell lymphoma. She has been in a metabolic complete response since after cycle 4; it could have happened even earlier. In any case, though, given the high-risk nature of her disease, normally I would like to give her 6 cycles; however, I think the chance that she would get through the sixth cycle safely is quite marginal.  She has had multiple hospitalizations, essentially once or twice between each cycle, and as a result, after a discussion with the patient and her children, we have opted to forego any further treatment. The plan at present would be to have her come and see us again on the 20th for repeat labs and possible PICC line removal.  I would like to see her again in a month. She is questioning when she would feel better. I told her that in general the likelihood is that by 3 months she should be probably at 80% or 90% of her premorbid functional state. We will plan on a followup PET scan, likely to be done in November or December. Her PICC line can come out at the next visit if she is doing well overall. I will be out of town, but she can be seen by our nurse practitioners. I will see her at the next visit in about a month. Dictated By Laisha Randolph M.D.  d: 09/15/2022 06:36:18  t: 09/15/2022 07:41:05  Job [id number]  IS/    cc: Nidhi Mahoney M.D.   KALIA Carrillo M.D.

## 2022-09-16 ENCOUNTER — TELEPHONE (OUTPATIENT)
Dept: HEMATOLOGY/ONCOLOGY | Facility: HOSPITAL | Age: 82
End: 2022-09-16

## 2022-09-19 NOTE — TELEPHONE ENCOUNTER
[id number] - [address] AT [facility] [address], [phone], [phone]    Pt stated she's on her last pen of the below medication and needs a refill.  High TE    insulin detemir (LEVEMIR FLEXTOUCH) 100 UNIT/ML Subcutaneous Solution Pen-injector

## 2022-09-20 ENCOUNTER — NURSE ONLY (OUTPATIENT)
Dept: HEMATOLOGY/ONCOLOGY | Facility: HOSPITAL | Age: 82
End: 2022-09-20
Attending: INTERNAL MEDICINE
Payer: MEDICARE

## 2022-09-20 VITALS
HEART RATE: 73 BPM | SYSTOLIC BLOOD PRESSURE: 122 MMHG | RESPIRATION RATE: 18 BRPM | OXYGEN SATURATION: 96 % | TEMPERATURE: 97 F | DIASTOLIC BLOOD PRESSURE: 73 MMHG

## 2022-09-20 DIAGNOSIS — C85.90 LYMPHOMA, UNSPECIFIED BODY REGION, UNSPECIFIED LYMPHOMA TYPE (HCC): Primary | ICD-10-CM

## 2022-09-20 LAB
ALBUMIN SERPL-MCNC: 2.5 G/DL (ref 3.4–5)
ALBUMIN/GLOB SERPL: 1 {RATIO} (ref 1–2)
ALP LIVER SERPL-CCNC: 65 U/L
ALT SERPL-CCNC: 9 U/L
ANION GAP SERPL CALC-SCNC: 3 MMOL/L (ref 0–18)
AST SERPL-CCNC: 6 U/L (ref 15–37)
BASOPHILS # BLD AUTO: 0.03 X10(3) UL (ref 0–0.2)
BASOPHILS NFR BLD AUTO: 0.5 %
BILIRUB SERPL-MCNC: 0.4 MG/DL (ref 0.1–2)
BUN BLD-MCNC: 11 MG/DL (ref 7–18)
CALCIUM BLD-MCNC: 8.6 MG/DL (ref 8.5–10.1)
CHLORIDE SERPL-SCNC: 106 MMOL/L (ref 98–112)
CO2 SERPL-SCNC: 31 MMOL/L (ref 21–32)
CREAT BLD-MCNC: 0.6 MG/DL
EOSINOPHIL # BLD AUTO: 0.02 X10(3) UL (ref 0–0.7)
EOSINOPHIL NFR BLD AUTO: 0.4 %
ERYTHROCYTE [DISTWIDTH] IN BLOOD BY AUTOMATED COUNT: 21.5 %
FASTING STATUS PATIENT QL REPORTED: NO
GFR SERPLBLD BASED ON 1.73 SQ M-ARVRAT: 90 ML/MIN/1.73M2 (ref 60–?)
GLOBULIN PLAS-MCNC: 2.4 G/DL (ref 2.8–4.4)
GLUCOSE BLD-MCNC: 187 MG/DL (ref 70–99)
HCT VFR BLD AUTO: 28.1 %
HGB BLD-MCNC: 8.7 G/DL
IMM GRANULOCYTES # BLD AUTO: 0.03 X10(3) UL (ref 0–1)
IMM GRANULOCYTES NFR BLD: 0.5 %
LDH SERPL L TO P-CCNC: 140 U/L
LYMPHOCYTES # BLD AUTO: 0.25 X10(3) UL (ref 1–4)
LYMPHOCYTES NFR BLD AUTO: 4.4 %
MCH RBC QN AUTO: 32.3 PG (ref 26–34)
MCHC RBC AUTO-ENTMCNC: 31 G/DL (ref 31–37)
MCV RBC AUTO: 104.5 FL
MONOCYTES # BLD AUTO: 0.63 X10(3) UL (ref 0.1–1)
MONOCYTES NFR BLD AUTO: 11.2 %
NEUTROPHILS # BLD AUTO: 4.66 X10 (3) UL (ref 1.5–7.7)
NEUTROPHILS # BLD AUTO: 4.66 X10(3) UL (ref 1.5–7.7)
NEUTROPHILS NFR BLD AUTO: 83 %
OSMOLALITY SERPL CALC.SUM OF ELEC: 294 MOSM/KG (ref 275–295)
PLATELET # BLD AUTO: 146 10(3)UL (ref 150–450)
POTASSIUM SERPL-SCNC: 4.7 MMOL/L (ref 3.5–5.1)
PROT SERPL-MCNC: 4.9 G/DL (ref 6.4–8.2)
RBC # BLD AUTO: 2.69 X10(6)UL
SODIUM SERPL-SCNC: 140 MMOL/L (ref 136–145)
WBC # BLD AUTO: 5.6 X10(3) UL (ref 4–11)

## 2022-09-20 PROCEDURE — 83615 LACTATE (LD) (LDH) ENZYME: CPT

## 2022-09-20 PROCEDURE — 85025 COMPLETE CBC W/AUTO DIFF WBC: CPT

## 2022-09-20 PROCEDURE — 80053 COMPREHEN METABOLIC PANEL: CPT

## 2022-09-20 PROCEDURE — 36592 COLLECT BLOOD FROM PICC: CPT

## 2022-09-20 RX ORDER — INSULIN DETEMIR 100 [IU]/ML
10 INJECTION, SOLUTION SUBCUTANEOUS DAILY
Qty: 9 ML | Refills: 1 | Status: SHIPPED | OUTPATIENT
Start: 2022-09-20 | End: 2022-12-19

## 2022-09-20 NOTE — PROGRESS NOTES
Education Record    Learner:  Patient    Disease / Diagnosis: lymphoma    Barriers / Limitations:  None    Method:  Brief focused, printed material and  reinforcement    General Topics:  Plan of care reviewed    Outcome:  Shows understanding    Here for labs and possible PICC removal. Seen by MD last week and chemo not done/decided pt was completed with treatment. Per NP, CBC okay, and pt is feeling good, feels like she's starting to get some of her strength back. PICC line okay to be pulled by NP--removed, pt tolerated well. Neosporin gauze covered site with tegaderm and pressure dressing. Left in stable condition with daughter. Has f/u with MD next month.

## 2022-09-22 ENCOUNTER — TELEPHONE (OUTPATIENT)
Dept: HEMATOLOGY/ONCOLOGY | Facility: HOSPITAL | Age: 82
End: 2022-09-22

## 2022-09-22 NOTE — TELEPHONE ENCOUNTER
Received fax from [facility] requesting clearance for dental cleaning/procedure. Form signed and faxed to [phone].

## 2022-09-23 DIAGNOSIS — C85.90 LYMPHOMA, UNSPECIFIED BODY REGION, UNSPECIFIED LYMPHOMA TYPE (HCC): ICD-10-CM

## 2022-09-26 RX ORDER — ACYCLOVIR 400 MG/1
TABLET ORAL
Qty: 60 TABLET | Refills: 0 | Status: SHIPPED | OUTPATIENT
Start: 2022-09-26

## 2022-10-03 ENCOUNTER — TELEPHONE (OUTPATIENT)
Dept: INTERNAL MEDICINE CLINIC | Facility: CLINIC | Age: 82
End: 2022-10-03

## 2022-10-03 NOTE — TELEPHONE ENCOUNTER
Brad Delarosa calling she will be dropping off a handicap parking placard form to be completed by AS and mailed to [facility] [employer].

## 2022-10-05 ENCOUNTER — TELEPHONE (OUTPATIENT)
Dept: INTERNAL MEDICINE CLINIC | Facility: CLINIC | Age: 82
End: 2022-10-05

## 2022-10-05 NOTE — TELEPHONE ENCOUNTER
Greg Rhoades stated plan of care for pt will be extend to every other week till November for her current PT plan. Katherine added pt had a very large skin tear on left elbow and forearm as she hit a doorway at the eye doctor. Pt has very low platelets and is on blood thinners. Greg Rhoades stated she called their nurse at [facility] and they want to know if labs should be drawn on pt.

## 2022-10-05 NOTE — TELEPHONE ENCOUNTER
LOV 7/1/22    Please advise if there's an issue with \"plan of care for pt will extend to every other week until November for her current PT plan\". Spoke to [place] about skin tear. States while at a doctor visit yesterday, she was being pushed in a wheelchair and her elbow hit the doorway causing a large skin tear. Her son, who is an ER doctor has looked at it. Last night, she initially bled through several dressings; her son then put \"some medicine on it\", dressed it and wrapped with coban. No signs of bleeding since. Pt would no removed from [place] to look at. Sharlene Blanchard called the [facility] nurse to see if anything else needed to be done. The home health nurse wanted PCP called to see if any labs are needed due to her history and Eliquis.

## 2022-10-06 ENCOUNTER — TELEPHONE (OUTPATIENT)
Dept: INTERNAL MEDICINE CLINIC | Facility: CLINIC | Age: 82
End: 2022-10-06

## 2022-10-06 NOTE — TELEPHONE ENCOUNTER
Called and spoke to Becca. Informed her ok to extend nursing visits and wound care. Becca said that there is specific wound care instructions they are doing they need verbal for. Skin tear on her arm is about 3 cm x 2 cm circular. No longer bleeding. Her recommendation for this is a foam dressing and coban wrap. Pt also asking if she can put neosporin on it. Becca does not think this is needed, but pt would like to use it. [facility] needs verbal to be able to use this. Pt has also been bumping her toes on her R foot causing small skin tears. They are cleaning these and using medihoney/gauze. [place] stated she will put in nursing care extension order, but would like a call back with ok for wound care listed above. AS, ok for wound care listed including neosporin?

## 2022-10-06 NOTE — TELEPHONE ENCOUNTER
Diabetic eye exam report received from [facility] ; DM flowsheet updated ; health maintenance updated ; care teams updated and report placed in Dr. Demetrius sloan for signature. Barcode printed and report to be sent to scanning once signed.

## 2022-10-11 ENCOUNTER — TELEPHONE (OUTPATIENT)
Dept: INTERNAL MEDICINE CLINIC | Facility: CLINIC | Age: 82
End: 2022-10-11

## 2022-10-11 ENCOUNTER — OFFICE VISIT (OUTPATIENT)
Dept: HEMATOLOGY/ONCOLOGY | Facility: HOSPITAL | Age: 82
End: 2022-10-11
Attending: INTERNAL MEDICINE
Payer: MEDICARE

## 2022-10-11 VITALS
OXYGEN SATURATION: 90 % | BODY MASS INDEX: 23.77 KG/M2 | HEIGHT: 70 IN | SYSTOLIC BLOOD PRESSURE: 105 MMHG | TEMPERATURE: 97 F | DIASTOLIC BLOOD PRESSURE: 68 MMHG | RESPIRATION RATE: 16 BRPM | WEIGHT: 166 LBS | HEART RATE: 94 BPM

## 2022-10-11 DIAGNOSIS — C85.90 LYMPHOMA, UNSPECIFIED BODY REGION, UNSPECIFIED LYMPHOMA TYPE (HCC): ICD-10-CM

## 2022-10-11 DIAGNOSIS — D64.81 ANEMIA ASSOCIATED WITH CHEMOTHERAPY: Primary | ICD-10-CM

## 2022-10-11 DIAGNOSIS — T45.1X5A ANEMIA ASSOCIATED WITH CHEMOTHERAPY: Primary | ICD-10-CM

## 2022-10-11 DIAGNOSIS — J44.9 CHRONIC OBSTRUCTIVE PULMONARY DISEASE, UNSPECIFIED COPD TYPE (HCC): Primary | ICD-10-CM

## 2022-10-11 LAB
ALBUMIN SERPL-MCNC: 2.5 G/DL (ref 3.4–5)
ALBUMIN/GLOB SERPL: 0.8 {RATIO} (ref 1–2)
ALP LIVER SERPL-CCNC: 68 U/L
ALT SERPL-CCNC: 10 U/L
ANION GAP SERPL CALC-SCNC: 6 MMOL/L (ref 0–18)
AST SERPL-CCNC: 9 U/L (ref 15–37)
BASOPHILS # BLD AUTO: 0.04 X10(3) UL (ref 0–0.2)
BASOPHILS NFR BLD AUTO: 0.4 %
BILIRUB SERPL-MCNC: 0.4 MG/DL (ref 0.1–2)
BUN BLD-MCNC: 9 MG/DL (ref 7–18)
CALCIUM BLD-MCNC: 8.8 MG/DL (ref 8.5–10.1)
CHLORIDE SERPL-SCNC: 106 MMOL/L (ref 98–112)
CO2 SERPL-SCNC: 29 MMOL/L (ref 21–32)
CREAT BLD-MCNC: 0.6 MG/DL
EOSINOPHIL # BLD AUTO: 0.2 X10(3) UL (ref 0–0.7)
EOSINOPHIL NFR BLD AUTO: 2.1 %
ERYTHROCYTE [DISTWIDTH] IN BLOOD BY AUTOMATED COUNT: 18.7 %
GFR SERPLBLD BASED ON 1.73 SQ M-ARVRAT: 90 ML/MIN/1.73M2 (ref 60–?)
GLOBULIN PLAS-MCNC: 3.1 G/DL (ref 2.8–4.4)
GLUCOSE BLD-MCNC: 126 MG/DL (ref 70–99)
HCT VFR BLD AUTO: 35.2 %
HGB BLD-MCNC: 10.7 G/DL
IMM GRANULOCYTES # BLD AUTO: 0.03 X10(3) UL (ref 0–1)
IMM GRANULOCYTES NFR BLD: 0.3 %
LDH SERPL L TO P-CCNC: 189 U/L
LYMPHOCYTES # BLD AUTO: 0.26 X10(3) UL (ref 1–4)
LYMPHOCYTES NFR BLD AUTO: 2.8 %
MCH RBC QN AUTO: 33.4 PG (ref 26–34)
MCHC RBC AUTO-ENTMCNC: 30.4 G/DL (ref 31–37)
MCV RBC AUTO: 110 FL
MONOCYTES # BLD AUTO: 0.44 X10(3) UL (ref 0.1–1)
MONOCYTES NFR BLD AUTO: 4.7 %
NEUTROPHILS # BLD AUTO: 8.35 X10 (3) UL (ref 1.5–7.7)
NEUTROPHILS # BLD AUTO: 8.35 X10(3) UL (ref 1.5–7.7)
NEUTROPHILS NFR BLD AUTO: 89.7 %
OSMOLALITY SERPL CALC.SUM OF ELEC: 292 MOSM/KG (ref 275–295)
PLATELET # BLD AUTO: 139 10(3)UL (ref 150–450)
POTASSIUM SERPL-SCNC: 4.4 MMOL/L (ref 3.5–5.1)
PROT SERPL-MCNC: 5.6 G/DL (ref 6.4–8.2)
RBC # BLD AUTO: 3.2 X10(6)UL
SODIUM SERPL-SCNC: 141 MMOL/L (ref 136–145)
WBC # BLD AUTO: 9.3 X10(3) UL (ref 4–11)

## 2022-10-11 PROCEDURE — 99214 OFFICE O/P EST MOD 30 MIN: CPT | Performed by: INTERNAL MEDICINE

## 2022-10-11 NOTE — PROGRESS NOTES
Patient is here for MD f/u for Lymphoma. Patient reports ongoing vomiting after meals r/t achalasia. Appetite is fair. Mild fatigue and weakness but overall fair. Patient has edema in BLE. Patient has a blister on left big toe. Denies fevers, night sweats or chills.      Education Record    Learner:  Patient and Family Member    Disease / Diagnosis:  Lymphoma     Barriers / Limitations:  None   Comments:    Method:  Discussion   Comments:    General Topics:  Plan of care reviewed   Comments:    Outcome:  Shows understanding   Comments:

## 2022-10-11 NOTE — TELEPHONE ENCOUNTER
Referral request received from [facility] for Portable Oxygen concentrator ; referral entered.

## 2022-10-12 ENCOUNTER — TELEPHONE (OUTPATIENT)
Dept: INTERNAL MEDICINE CLINIC | Facility: CLINIC | Age: 82
End: 2022-10-12

## 2022-10-12 RX ORDER — SULFAMETHOXAZOLE AND TRIMETHOPRIM 800; 160 MG/1; MG/1
TABLET ORAL
Qty: 12 TABLET | Refills: 3 | Status: CANCELLED | OUTPATIENT
Start: 2022-10-12

## 2022-10-12 RX ORDER — ONDANSETRON 4 MG/1
TABLET, FILM COATED ORAL EVERY 8 HOURS PRN
Qty: 30 TABLET | Refills: 2 | Status: SHIPPED | OUTPATIENT
Start: 2022-10-12 | End: 2023-01-01

## 2022-10-12 NOTE — TELEPHONE ENCOUNTER
[facility] called stating Pt started on lasix yesterday  from Dr. Patrice Campuzano has on her L great toe a large fluid filled vesicle-pt has had before-If it pops, how do you want them to  Dress it or just leave open to air?

## 2022-10-12 NOTE — PROGRESS NOTES
[facility]    PATIENT'S NAME: Chapincito MCCONNELL   ATTENDING PHYSICIAN: Ismael Graf M.D. PATIENT ACCOUNT #: [de-identified] LOCATION: [address] [place] [address] RECORD #: [id number] YOB: 1940   DATE OF SERVICE: 10/11/2022       CANCER CENTER PROGRESS NOTE    CHIEF COMPLAINT:  Followup for history of diffuse large B-cell lymphoma. HISTORY OF PRESENT ILLNESS:  The patient is an 80-year-old female. She returns for a followup visit. She had a prior history of a follicular lymphoma and then had transformation with a diffuse large B-cell lymphoma in the upper abdomen in the gastrohepatic space. She had substantial pain at the time. She was started on therapy with R-CHOP. She had a rapid response; however, she had difficulty with her treatment with multiple hospitalizations. She has a history of diastolic heart failure. She had a negative PET scan after 4 cycles. She received a total of 5 cycles. She was not felt to be well enough to proceed with the 6th cycle. She returns for a followup visit about 1 month from her last visit. She is somewhat better. She is still having some lower extremity edema, which is from her mid-calf down. She has not been taking her Lasix. She has been taking her potassium. Her appetite is fair. She has gained a couple of pounds. She has not had any further hospitalizations. Her breathing is limited when she is exerting herself, but it is fine when she is at rest.  She does have some minimal neuropathic symptoms in 1 finger and her thumb on the right hand, but they are only at the tip. 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 I have encouraged her to continue to try to do this. She has no fevers or chills. Her PICC line has been removed. She has no upper abdominal pain. She has no neuropathy in her feet.   She is seeing Dr. Amy Correa for her primary care and she has not been back to her cardiologist as yet. MEDICATIONS:  Her current medications include acyclovir 400 mg b.i.d.; allopurinol 300 mg daily; aspirin 81 mg daily; vitamin D 1000 units daily; esomeprazole 20 mg daily; furosemide was on hold but I have asked her to start 20 mg every other day; insulin Levemir 10 units daily; levothyroxine 50 mcg daily; metformin 500 mg b.i.d.; metoprolol tartrate 12.5 mg b.i.d.; multivitamin daily; ondansetron 8 mg q.8 h. p.r.n.; potassium chloride 10 mEq twice daily; pravastatin 80 mg nightly; rivaroxaban 20 mg daily. PHYSICAL EXAMINATION:    GENERAL:  She is an elderly female in no acute distress. VITAL SIGNS:  Her performance status is 2. Her weight is 166, which is up 3 pounds. Blood pressure is 105/68, pulse 94, respiratory rate is 20, temperature is 96.8. HEENT:  Largely unremarkable. She has no oral lesions. LYMPHATICS:  She has no palpable nodes in the neck, supraclavicular, or axillary regions. LUNGS:  Clear. HEART:  Irregular S1 and S2.      ABDOMEN:  No hepatosplenomegaly. She has minimal tenderness in the epigastrium to deep palpation. EXTREMITIES:  She has no clubbing or cyanosis. She has 1 to 2+ edema to the mid-calf. LABORATORY DATA:  BUN and creatinine are 9 and 0.6. Her albumin is still low at 2.5. Her LDH is normal.  Hemoglobin has gone from 8.7 to 10.7. Platelets are 764. White count is 9.3. IMPRESSION:  Diffuse large B-cell lymphoma. I have asked her to get another PET scan done in mid-November. This will be 3 months from the last.  She had an incomplete course of treatment due to her physical frailty, but there is certainly still a chance that she will remain in a complete response. She had minimal tenderness in the epigastrium. It is unclear to me what this is related to. I will see her again in followup in mid-November after her PET scan.   I did encourage her to take her diuretic at least on an alternate day schedule, and we may have to bump the dose up in order to get rid of the last of her peripheral edema. This should improve as her hemoglobin climbs. Dictated By Humberto Elmore M.D.  d: 10/11/2022 12:44:35  t: 10/11/2022 23:59:53  [facility] [id number]  HI/    cc: KALIA Wiggins M.D. Mitcheal Friend, M.D.

## 2022-10-12 NOTE — TELEPHONE ENCOUNTER
AS, if fluid filled vesicle on pt's toe pops, would you like [facility] to dress it or leave it open?

## 2022-10-13 NOTE — TELEPHONE ENCOUNTER
Spoke with [facility] RN. She will cleanse with saline and wrap if needed, open to air when possible. She took picture of original appearance and will fax us this with any changes if seen or if any concerns. No further questions at this time.

## 2022-10-14 NOTE — TELEPHONE ENCOUNTER
Handicap Placard Application signed by Dr. Idalia Rivera. Application mailed to [facility] per PT request and copt sent to scanning with barcode.

## 2022-10-17 ENCOUNTER — TELEPHONE (OUTPATIENT)
Dept: INTERNAL MEDICINE CLINIC | Facility: CLINIC | Age: 82
End: 2022-10-17

## 2022-10-18 ENCOUNTER — APPOINTMENT (OUTPATIENT)
Dept: GENERAL RADIOLOGY | Age: 82
End: 2022-10-18
Attending: EMERGENCY MEDICINE
Payer: MEDICARE

## 2022-10-18 ENCOUNTER — HOSPITAL ENCOUNTER (OUTPATIENT)
Age: 82
Discharge: ANOTHER HOSPITAL | End: 2022-10-18
Attending: EMERGENCY MEDICINE
Payer: MEDICARE

## 2022-10-18 ENCOUNTER — HOSPITAL ENCOUNTER (INPATIENT)
Facility: HOSPITAL | Age: 82
LOS: 3 days | Discharge: HOME | End: 2022-10-21
Attending: EMERGENCY MEDICINE | Admitting: HOSPITALIST
Payer: MEDICARE

## 2022-10-18 ENCOUNTER — TELEPHONE (OUTPATIENT)
Dept: INTERNAL MEDICINE CLINIC | Facility: CLINIC | Age: 82
End: 2022-10-18

## 2022-10-18 ENCOUNTER — TELEPHONE (OUTPATIENT)
Dept: HEMATOLOGY/ONCOLOGY | Facility: HOSPITAL | Age: 82
End: 2022-10-18

## 2022-10-18 VITALS
OXYGEN SATURATION: 91 % | DIASTOLIC BLOOD PRESSURE: 65 MMHG | RESPIRATION RATE: 24 BRPM | SYSTOLIC BLOOD PRESSURE: 126 MMHG | HEART RATE: 82 BPM

## 2022-10-18 DIAGNOSIS — J18.9 NOSOCOMIAL PNEUMONIA: Primary | ICD-10-CM
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
## 2022-10-18 DIAGNOSIS — R23.8 SKIN BULLA: ICD-10-CM

## 2022-10-18 DIAGNOSIS — J18.9 COMMUNITY ACQUIRED PNEUMONIA OF LEFT LOWER LOBE OF LUNG: Primary | ICD-10-CM

## 2022-10-18 DIAGNOSIS — J90 PLEURAL EFFUSION: ICD-10-CM

## 2022-10-18 DIAGNOSIS — S90.822A BLISTER OF LEFT FOOT, INITIAL ENCOUNTER: ICD-10-CM

## 2022-10-18 LAB
ALBUMIN SERPL-MCNC: 2.4 G/DL (ref 3.4–5)
ALBUMIN/GLOB SERPL: 0.8 {RATIO} (ref 1–2)
ALP LIVER SERPL-CCNC: 66 U/L
ALT SERPL-CCNC: 10 U/L
ANION GAP SERPL CALC-SCNC: 5 MMOL/L (ref 0–18)
AST SERPL-CCNC: 10 U/L (ref 15–37)
BASOPHILS # BLD AUTO: 0.01 X10(3) UL (ref 0–0.2)
BASOPHILS NFR BLD AUTO: 0.1 %
BILIRUB SERPL-MCNC: 0.5 MG/DL (ref 0.1–2)
BUN BLD-MCNC: 12 MG/DL (ref 7–18)
CALCIUM BLD-MCNC: 8.5 MG/DL (ref 8.5–10.1)
CHLORIDE SERPL-SCNC: 99 MMOL/L (ref 98–112)
CHOLEST SERPL-MCNC: 139 MG/DL (ref ?–200)
CO2 SERPL-SCNC: 35 MMOL/L (ref 21–32)
CREAT BLD-MCNC: 0.65 MG/DL
EOSINOPHIL # BLD AUTO: 0.14 X10(3) UL (ref 0–0.7)
EOSINOPHIL NFR BLD AUTO: 0.9 %
ERYTHROCYTE [DISTWIDTH] IN BLOOD BY AUTOMATED COUNT: 17.6 %
GFR SERPLBLD BASED ON 1.73 SQ M-ARVRAT: 88 ML/MIN/1.73M2 (ref 60–?)
GLOBULIN PLAS-MCNC: 3.1 G/DL (ref 2.8–4.4)
GLUCOSE BLD-MCNC: 104 MG/DL (ref 70–99)
HCT VFR BLD AUTO: 30.2 %
HDLC SERPL-MCNC: 80 MG/DL (ref 40–59)
HGB BLD-MCNC: 9.3 G/DL
IMM GRANULOCYTES # BLD AUTO: 0.06 X10(3) UL (ref 0–1)
IMM GRANULOCYTES NFR BLD: 0.4 %
LACTATE SERPL-SCNC: 1.1 MMOL/L (ref 0.4–2)
LDLC SERPL CALC-MCNC: 41 MG/DL (ref ?–100)
LYMPHOCYTES # BLD AUTO: 0.25 X10(3) UL (ref 1–4)
LYMPHOCYTES NFR BLD AUTO: 1.7 %
MCH RBC QN AUTO: 32.3 PG (ref 26–34)
MCHC RBC AUTO-ENTMCNC: 30.8 G/DL (ref 31–37)
MCV RBC AUTO: 104.9 FL
MONOCYTES # BLD AUTO: 1.11 X10(3) UL (ref 0.1–1)
MONOCYTES NFR BLD AUTO: 7.5 %
NEUTROPHILS # BLD AUTO: 13.31 X10 (3) UL (ref 1.5–7.7)
NEUTROPHILS # BLD AUTO: 13.31 X10(3) UL (ref 1.5–7.7)
NEUTROPHILS NFR BLD AUTO: 89.4 %
NONHDLC SERPL-MCNC: 59 MG/DL (ref ?–130)
NT-PROBNP SERPL-MCNC: 6769 PG/ML (ref ?–450)
OSMOLALITY SERPL CALC.SUM OF ELEC: 288 MOSM/KG (ref 275–295)
PLATELET # BLD AUTO: 100 10(3)UL (ref 150–450)
POTASSIUM SERPL-SCNC: 4 MMOL/L (ref 3.5–5.1)
PROT SERPL-MCNC: 5.5 G/DL (ref 6.4–8.2)
RBC # BLD AUTO: 2.88 X10(6)UL
SARS-COV-2 RNA RESP QL NAA+PROBE: NOT DETECTED
SODIUM SERPL-SCNC: 139 MMOL/L (ref 136–145)
TRIGL SERPL-MCNC: 97 MG/DL (ref 30–149)
TROPONIN I HIGH SENSITIVITY: 80 NG/L
VLDLC SERPL CALC-MCNC: 13 MG/DL (ref 0–30)
WBC # BLD AUTO: 14.9 X10(3) UL (ref 4–11)

## 2022-10-18 PROCEDURE — 71046 X-RAY EXAM CHEST 2 VIEWS: CPT | Performed by: EMERGENCY MEDICINE

## 2022-10-18 PROCEDURE — 99223 1ST HOSP IP/OBS HIGH 75: CPT | Performed by: HOSPITALIST

## 2022-10-18 PROCEDURE — 99214 OFFICE O/P EST MOD 30 MIN: CPT

## 2022-10-18 RX ORDER — SULFAMETHOXAZOLE AND TRIMETHOPRIM 800; 160 MG/1; MG/1
1 TABLET ORAL
Qty: 12 TABLET | Refills: 3 | Status: SHIPPED | OUTPATIENT
Start: 2022-10-19

## 2022-10-18 NOTE — TELEPHONE ENCOUNTER
Pedro calling with update on blister on top of left foot. She states it is 13cm in length and 7.5cm in width. It is extremely packed with fluid. She is very concerned about the rist of infection if it ruptures.

## 2022-10-18 NOTE — TELEPHONE ENCOUNTER
Called home health nurse. Patient needs a refill on Bactrim, if she is to remain on it for the next 3 months. Refilled sent in.

## 2022-10-18 NOTE — TELEPHONE ENCOUNTER
Kaitlynn Watters from [facility] requesting a call back regarding Sulfamethoxazole Trimethoprim DS 800mg was prescribed for every M-W-F. Is this script ongoing and if it is patient needs refill. Call [phone].

## 2022-10-18 NOTE — TELEPHONE ENCOUNTER
Pt was advised to be seen in UC yesterday per 10/17/22 TE. Attempted to call Thomas White to inform her of this. TCB 10/18. Also called pt.  Please see other TE.

## 2022-10-18 NOTE — ED PROVIDER NOTES
Patient Seen in: [facility]      History   Patient presents with:  Cough/URI    Stated Complaint: URI, foot wound     Subjective:   HPI    54-year-old female presents for evaluation of 2 complaints. First the patient has had a enlarging blister on the top of her left foot for the past 1 week. She has had home health care nurse look at it as well as her PCP, both of whom were reluctant to de-roof it due to risk of infection. Patient has history of lower extremity swelling with blistering and is on Lasix. Does not think she has a history of CHF. Patient tells me she has had hot and cold spells over the past 24 hours. No significant pain to the foot. Secondly the patient complains of increasing dry cough over the past 24 hours. Patient typically uses oxygen at night but today she was very short of breath and had to use oxygen during the day when the visiting nurse was there. No chest pain. Cough worse at night and lying down    Objective:   No pertinent past medical history. No pertinent past surgical history. No pertinent social history. Review of Systems    Positive for stated complaint: URI, foot wound   Other systems are as noted in HPI. Constitutional and vital signs reviewed. All other systems reviewed and negative except as noted above. Physical Exam     ED Triage Vitals [10/18/22 1704]   BP 99/59   Pulse 95   Resp 26   Temp    Temp src    SpO2 (!) 86 %   O2 Device None (Room air)       Current:BP 99/59   Pulse 95   Resp 26   SpO2 (!) 86%         Physical Exam    General: Alert, oriented, no apparent distress  Neck: Supple  Lungs: Left lower lobe crackles, no wheezing, good air  Heart: Regular rate and rhythm. Abdomen: Soft, nontender. Skin: Large blister to the dorsum of the left foot, mild surrounding erythema, 1+ pitting edema to both calves, small ulcerations top of right toes  Neurologic: No focal neurologic deficits.   Normal speech pattern  Musculoskeletal: No tenderness or deformity noted. ED Course     Labs Reviewed   RAPID SARS-COV-2 BY PCR - Normal          MDM         COVID-negative    XR CHEST PA + LAT CHEST (CPT=71046)    Result Date: 10/18/2022  PROCEDURE:  XR CHEST PA + LAT CHEST (CPT=71046)  LOCATION:  [facility]  INDICATIONS:  URI, foot wound  COMPARISON:  None. TECHNIQUE:  PA and lateral chest radiographs were obtained. PATIENT STATED HISTORY: (As transcribed by Technologist)  Patient presents with a productive cough and fatigue for a week. FINDINGS:  LUNGS:  Hyperinflation both lungs consistent with COPD. Patchy infiltrate in the left perihilar region and atelectasis/consolidation left lung base. Charlott Blake CARDIAC:  Cardiomegaly. MEDIASTINUM:  Tortuous calcified thoracic aorta. PLEURA:  Moderate size left pleural effusion. Mild blunting right costophrenic angle. BONES:  Hypertrophic endplate changes thoracic spine. .            CONCLUSION:  Left perihilar and basilar airspace disease with left pleural effusion. Trace effusion right lung base. Cardiomegaly. COPD. Dictated by (CST): Shimon Walter MD on 10/18/2022 at 6:29 PM     Finalized by (CST): Shimon Walter MD on 10/18/2022 at 6:30 PM       Due to increased oxygen requirements and chest x-ray as above, patient and son will proceed to the ER. Disposition and Plan     Clinical Impression:  Nosocomial pneumonia  (primary encounter diagnosis)  Pleural effusion  Blister of left foot, initial encounter     Disposition: Ic to ed  10/18/2022  6:47 pm    Follow-up:  No follow-up provider specified.         Medications Prescribed:  Current Discharge Medication List

## 2022-10-18 NOTE — TELEPHONE ENCOUNTER
Charissa Hargrove returned call. Informed her of plan (per other TE, pt advised to be seen in UC). Charissa Hargrove unsure if UC will help pt. Explained to Charissa Hargrove that we want to rule out infection given pt is immunocompromised. Angelita recommended wound clinic. Informed her we could discuss wound clinic with [facility] for pt, but should be evaluated today. Charissa Hargrove stated understanding and was thankful for the update.

## 2022-10-18 NOTE — TELEPHONE ENCOUNTER
Called to let them know per Dr Kellie Valdez patient was to continue [facility] for 3 months after chemo, she verbalizes understanding. They will watch for refill.

## 2022-10-19 ENCOUNTER — APPOINTMENT (OUTPATIENT)
Dept: GENERAL RADIOLOGY | Facility: HOSPITAL | Age: 82
End: 2022-10-19
Attending: HOSPITALIST
Payer: MEDICARE

## 2022-10-19 ENCOUNTER — APPOINTMENT (OUTPATIENT)
Dept: CV DIAGNOSTICS | Facility: HOSPITAL | Age: 82
End: 2022-10-19
Attending: INTERNAL MEDICINE
Payer: MEDICARE

## 2022-10-19 ENCOUNTER — APPOINTMENT (OUTPATIENT)
Dept: CT IMAGING | Facility: HOSPITAL | Age: 82
End: 2022-10-19
Attending: INTERNAL MEDICINE
Payer: MEDICARE

## 2022-10-19 PROBLEM — I50.33 ACUTE ON CHRONIC DIASTOLIC CONGESTIVE HEART FAILURE (HCC): Status: ACTIVE | Noted: 2022-10-19

## 2022-10-19 PROBLEM — D64.9 ANEMIA, UNSPECIFIED: Status: ACTIVE | Noted: 2022-10-19

## 2022-10-19 LAB
ADENOVIRUS PCR:: NOT DETECTED
ATRIAL RATE: 84 BPM
B PARAPERT DNA SPEC QL NAA+PROBE: NOT DETECTED
B PERT DNA SPEC QL NAA+PROBE: NOT DETECTED
C PNEUM DNA SPEC QL NAA+PROBE: NOT DETECTED
CORONAVIRUS 229E PCR:: NOT DETECTED
CORONAVIRUS HKU1 PCR:: NOT DETECTED
CORONAVIRUS NL63 PCR:: NOT DETECTED
CORONAVIRUS OC43 PCR:: NOT DETECTED
DEPRECATED HBV CORE AB SER IA-ACNC: 275 NG/ML
EST. AVERAGE GLUCOSE BLD GHB EST-MCNC: 91 MG/DL (ref 68–126)
FLUAV RNA SPEC QL NAA+PROBE: NOT DETECTED
FLUBV RNA SPEC QL NAA+PROBE: NOT DETECTED
GLUCOSE BLD-MCNC: 108 MG/DL (ref 70–99)
GLUCOSE BLD-MCNC: 114 MG/DL (ref 70–99)
GLUCOSE BLD-MCNC: 130 MG/DL (ref 70–99)
GLUCOSE BLD-MCNC: 136 MG/DL (ref 70–99)
HBA1C MFR BLD: 4.8 % (ref ?–5.7)
IRON SATN MFR SERPL: 11 %
IRON SERPL-MCNC: 18 UG/DL
METAPNEUMOVIRUS PCR:: NOT DETECTED
MRSA DNA SPEC QL NAA+PROBE: NEGATIVE
MYCOPLASMA PNEUMONIA PCR:: NOT DETECTED
PARAINFLUENZA 1 PCR:: NOT DETECTED
PARAINFLUENZA 2 PCR:: NOT DETECTED
PARAINFLUENZA 3 PCR:: NOT DETECTED
PARAINFLUENZA 4 PCR:: NOT DETECTED
PROCALCITONIN SERPL-MCNC: <0.05 NG/ML (ref ?–0.16)
Q-T INTERVAL: 386 MS
QRS DURATION: 84 MS
QTC CALCULATION (BEZET): 461 MS
R AXIS: -22 DEGREES
RHINOVIRUS/ENTERO PCR:: NOT DETECTED
RSV RNA SPEC QL NAA+PROBE: NOT DETECTED
SARS-COV-2 RNA NPH QL NAA+NON-PROBE: NOT DETECTED
T AXIS: 32 DEGREES
TIBC SERPL-MCNC: 171 UG/DL (ref 240–450)
TRANSFERRIN SERPL-MCNC: 115 MG/DL (ref 200–360)
TROPONIN I HIGH SENSITIVITY: 69 NG/L
TROPONIN I HIGH SENSITIVITY: 96 NG/L
VENTRICULAR RATE: 86 BPM

## 2022-10-19 PROCEDURE — 71045 X-RAY EXAM CHEST 1 VIEW: CPT | Performed by: HOSPITALIST

## 2022-10-19 PROCEDURE — 71270 CT THORAX DX C-/C+: CPT | Performed by: INTERNAL MEDICINE

## 2022-10-19 PROCEDURE — 99223 1ST HOSP IP/OBS HIGH 75: CPT | Performed by: INTERNAL MEDICINE

## 2022-10-19 PROCEDURE — 99232 SBSQ HOSP IP/OBS MODERATE 35: CPT | Performed by: INTERNAL MEDICINE

## 2022-10-19 PROCEDURE — 93306 TTE W/DOPPLER COMPLETE: CPT | Performed by: INTERNAL MEDICINE

## 2022-10-19 RX ORDER — SENNOSIDES 8.6 MG
17.2 TABLET ORAL NIGHTLY PRN
Status: DISCONTINUED | OUTPATIENT
Start: 2022-10-19 | End: 2022-10-21

## 2022-10-19 RX ORDER — SODIUM PHOSPHATE, DIBASIC AND SODIUM PHOSPHATE, MONOBASIC 7; 19 G/133ML; G/133ML
1 ENEMA RECTAL ONCE AS NEEDED
Status: DISCONTINUED | OUTPATIENT
Start: 2022-10-19 | End: 2022-10-21

## 2022-10-19 RX ORDER — IPRATROPIUM BROMIDE AND ALBUTEROL SULFATE 2.5; .5 MG/3ML; MG/3ML
3 SOLUTION RESPIRATORY (INHALATION) 4 TIMES DAILY PRN
Status: DISCONTINUED | OUTPATIENT
Start: 2022-10-19 | End: 2022-10-21

## 2022-10-19 RX ORDER — DEXTROSE MONOHYDRATE 25 G/50ML
50 INJECTION, SOLUTION INTRAVENOUS
Status: DISCONTINUED | OUTPATIENT
Start: 2022-10-19 | End: 2022-10-21

## 2022-10-19 RX ORDER — IPRATROPIUM BROMIDE AND ALBUTEROL SULFATE 2.5; .5 MG/3ML; MG/3ML
3 SOLUTION RESPIRATORY (INHALATION) EVERY 4 HOURS
Status: DISCONTINUED | OUTPATIENT
Start: 2022-10-19 | End: 2022-10-19

## 2022-10-19 RX ORDER — IPRATROPIUM BROMIDE AND ALBUTEROL SULFATE 2.5; .5 MG/3ML; MG/3ML
3 SOLUTION RESPIRATORY (INHALATION)
Status: DISCONTINUED | OUTPATIENT
Start: 2022-10-19 | End: 2022-10-21

## 2022-10-19 RX ORDER — IPRATROPIUM BROMIDE AND ALBUTEROL SULFATE 2.5; .5 MG/3ML; MG/3ML
SOLUTION RESPIRATORY (INHALATION)
Status: COMPLETED
Start: 2022-10-19 | End: 2022-10-19

## 2022-10-19 RX ORDER — SULFAMETHOXAZOLE AND TRIMETHOPRIM 800; 160 MG/1; MG/1
1 TABLET ORAL
Status: DISCONTINUED | OUTPATIENT
Start: 2022-10-19 | End: 2022-10-21

## 2022-10-19 RX ORDER — ACYCLOVIR 400 MG/1
400 TABLET ORAL 2 TIMES DAILY
Status: DISCONTINUED | OUTPATIENT
Start: 2022-10-19 | End: 2022-10-21

## 2022-10-19 RX ORDER — ALLOPURINOL 300 MG/1
300 TABLET ORAL DAILY
Status: DISCONTINUED | OUTPATIENT
Start: 2022-10-19 | End: 2022-10-21

## 2022-10-19 RX ORDER — POLYETHYLENE GLYCOL 3350 17 G/17G
17 POWDER, FOR SOLUTION ORAL DAILY PRN
Status: DISCONTINUED | OUTPATIENT
Start: 2022-10-19 | End: 2022-10-21

## 2022-10-19 RX ORDER — NICOTINE POLACRILEX 4 MG
30 LOZENGE BUCCAL
Status: DISCONTINUED | OUTPATIENT
Start: 2022-10-19 | End: 2022-10-21

## 2022-10-19 RX ORDER — IOHEXOL 350 MG/ML
75 INJECTION, SOLUTION INTRAVENOUS
Status: COMPLETED | OUTPATIENT
Start: 2022-10-19 | End: 2022-10-19

## 2022-10-19 RX ORDER — ACETAMINOPHEN 500 MG
500 TABLET ORAL EVERY 4 HOURS PRN
Status: DISCONTINUED | OUTPATIENT
Start: 2022-10-19 | End: 2022-10-21

## 2022-10-19 RX ORDER — ASPIRIN 81 MG/1
81 TABLET ORAL DAILY
Status: DISCONTINUED | OUTPATIENT
Start: 2022-10-19 | End: 2022-10-21

## 2022-10-19 RX ORDER — FUROSEMIDE 10 MG/ML
40 INJECTION INTRAMUSCULAR; INTRAVENOUS DAILY
Status: DISCONTINUED | OUTPATIENT
Start: 2022-10-19 | End: 2022-10-21

## 2022-10-19 RX ORDER — LEVOTHYROXINE SODIUM 0.05 MG/1
50 TABLET ORAL
Status: DISCONTINUED | OUTPATIENT
Start: 2022-10-19 | End: 2022-10-21

## 2022-10-19 RX ORDER — BISACODYL 10 MG
10 SUPPOSITORY, RECTAL RECTAL
Status: DISCONTINUED | OUTPATIENT
Start: 2022-10-19 | End: 2022-10-21

## 2022-10-19 RX ORDER — ONDANSETRON 2 MG/ML
4 INJECTION INTRAMUSCULAR; INTRAVENOUS EVERY 6 HOURS PRN
Status: DISCONTINUED | OUTPATIENT
Start: 2022-10-19 | End: 2022-10-21

## 2022-10-19 RX ORDER — PANTOPRAZOLE SODIUM 20 MG/1
20 TABLET, DELAYED RELEASE ORAL
Status: DISCONTINUED | OUTPATIENT
Start: 2022-10-19 | End: 2022-10-21

## 2022-10-19 RX ORDER — NICOTINE POLACRILEX 4 MG
15 LOZENGE BUCCAL
Status: DISCONTINUED | OUTPATIENT
Start: 2022-10-19 | End: 2022-10-21

## 2022-10-19 RX ORDER — MELATONIN
3 NIGHTLY PRN
Status: DISCONTINUED | OUTPATIENT
Start: 2022-10-19 | End: 2022-10-21

## 2022-10-19 RX ORDER — BENZONATATE 200 MG/1
200 CAPSULE ORAL 3 TIMES DAILY PRN
Status: DISCONTINUED | OUTPATIENT
Start: 2022-10-19 | End: 2022-10-21

## 2022-10-19 RX ORDER — PRAVASTATIN SODIUM 20 MG
20 TABLET ORAL NIGHTLY
Status: DISCONTINUED | OUTPATIENT
Start: 2022-10-19 | End: 2022-10-21

## 2022-10-19 RX ORDER — METOCLOPRAMIDE HYDROCHLORIDE 5 MG/ML
10 INJECTION INTRAMUSCULAR; INTRAVENOUS EVERY 8 HOURS PRN
Status: DISCONTINUED | OUTPATIENT
Start: 2022-10-19 | End: 2022-10-21

## 2022-10-19 NOTE — ED INITIAL ASSESSMENT (HPI)
Patient presents to the ED with c/o neal. She was recently diagnosed with pneumonia and had low O2 sats at the IC. Here she is 94% on RA. Patient also has complaints of left foot wound. Denies fevers.

## 2022-10-19 NOTE — ED QUICK NOTES
Orders for admission, patient is aware of plan and ready to go upstairs. Any questions, please call ED RN Stuart Kolb  at extension [phone]. Vaccinated? Yes  Type of COVID test sent: Rapid PCR, negative.  (AT IC)  COVID Suspicion level: Low      Titratable drug(s) infusing:  Rate: None    LOC at time of transport: A&Ox3    Other pertinent information:    CIWA score=  NIH score=

## 2022-10-19 NOTE — PROGRESS NOTES
NURSING ADMISSION NOTE      Patient admitted via Cart  Oriented to room 529. Safety precautions initiated. Bed in low position. Call light in reach. Bed alarm in place. Belongings at bedside. Admission navigator complete, home meds placed and need to be reconciled. Spoke with Dr. Avelino Rivera MD aware. VSS. + orthostatics. WCTM.

## 2022-10-19 NOTE — PROGRESS NOTES
10/19/22 0159   Provider Notification   Reason for Communication Review case   Provider Name Elena Sterling MD   Method of Communication Page   Response Waiting for response   Notification Time 4715   903 states she at a sandwich, then laid down and started to aspirate on food. C/o of sob, and wheezing. Will give antiemetic and prn for cough. Still feels sob may benefit from neb treatment? Thanks.

## 2022-10-19 NOTE — PLAN OF CARE
See flowsheets. Axo x 4, glasses, pleasant. RA/BL at rest, 2L at night. Currently on 2-3L at rest and with exertion. Pt aspirated on sandwich last night, c/o of sob, audible wheezing, MD notified. See new orders. Collected sputum. Afebrile. Tele-controlled afib. Continent. C/o of L foot pain. BLE swelling. x1 with walker. [id number] accucheck- . IV rocephin. IV zithromax. WC on consult. Plan for CT of chest, and 2D echo. Updated patient on plan of care. Frequent roundings, needs met. Call light within reach. WCTM. VSS. Bed alarm on.      Problem: Patient/Family Goals  Goal: Patient/Family Long Term Goal  Description: Patient's Long Term Goal: Discharge with proper resources    Interventions:  - Follow plan of care    - See additional Care Plan goals for specific interventions  Outcome: Progressing  Goal: Patient/Family Short Term Goal  Description: Patient's Short Term Goal:   10/18 ([place] [address] [facility]): Able to sleep well        Interventions:   -Offer sleeping aids  - See additional Care Plan goals for specific interventions  Outcome: Progressing     Problem: RESPIRATORY - ADULT  Goal: Achieves optimal ventilation and oxygenation  Description: INTERVENTIONS:  - Assess for changes in respiratory status  - Assess for changes in mentation and behavior  - Position to facilitate oxygenation and minimize respiratory effort  - Oxygen supplementation based on oxygen saturation or ABGs  - Provide Smoking Cessation handout, if applicable  - Encourage broncho-pulmonary hygiene including cough, deep breathe, Incentive Spirometry  - Assess the need for suctioning and perform as needed  - Assess and instruct to report SOB or any respiratory difficulty  - Respiratory Therapy support as indicated  - Manage/alleviate anxiety  - Monitor for signs/symptoms of CO2 retention  Outcome: Progressing

## 2022-10-19 NOTE — CM/SW NOTE
10/19/22 1400   CM/SW Referral Data   Referral Source Social Work (self-referral)   Reason for Referral Discharge planning   Informant EMR   Patient was admitted for pneumonia. She is an alert and oriented 80year old female who lives with her son in a home. The patient is currently sleeping and not available for consultation. [facility]  P:[phone]  F:[phone] which will be resumed upon dc. SW will continue to follow.     Yolanda Stern LCSW

## 2022-10-20 ENCOUNTER — APPOINTMENT (OUTPATIENT)
Dept: ULTRASOUND IMAGING | Facility: HOSPITAL | Age: 82
End: 2022-10-20
Attending: INTERNAL MEDICINE
Payer: MEDICARE

## 2022-10-20 LAB
ANION GAP SERPL CALC-SCNC: 3 MMOL/L (ref 0–18)
BASOPHILS # BLD AUTO: 0.01 X10(3) UL (ref 0–0.2)
BASOPHILS NFR BLD AUTO: 0.1 %
BUN BLD-MCNC: 13 MG/DL (ref 7–18)
CALCIUM BLD-MCNC: 8.8 MG/DL (ref 8.5–10.1)
CHLORIDE SERPL-SCNC: 103 MMOL/L (ref 98–112)
CO2 SERPL-SCNC: 32 MMOL/L (ref 21–32)
CREAT BLD-MCNC: 0.52 MG/DL
EOSINOPHIL # BLD AUTO: 0.24 X10(3) UL (ref 0–0.7)
EOSINOPHIL NFR BLD AUTO: 2.4 %
ERYTHROCYTE [DISTWIDTH] IN BLOOD BY AUTOMATED COUNT: 17.8 %
GFR SERPLBLD BASED ON 1.73 SQ M-ARVRAT: 93 ML/MIN/1.73M2 (ref 60–?)
GLUCOSE BLD-MCNC: 130 MG/DL (ref 70–99)
GLUCOSE BLD-MCNC: 135 MG/DL (ref 70–99)
GLUCOSE BLD-MCNC: 139 MG/DL (ref 70–99)
GLUCOSE BLD-MCNC: 147 MG/DL (ref 70–99)
GLUCOSE BLD-MCNC: 79 MG/DL (ref 70–99)
HCT VFR BLD AUTO: 29.3 %
HGB BLD-MCNC: 9.4 G/DL
IMM GRANULOCYTES # BLD AUTO: 0.04 X10(3) UL (ref 0–1)
IMM GRANULOCYTES NFR BLD: 0.4 %
LYMPHOCYTES # BLD AUTO: 0.22 X10(3) UL (ref 1–4)
LYMPHOCYTES NFR BLD AUTO: 2.2 %
MCH RBC QN AUTO: 34.3 PG (ref 26–34)
MCHC RBC AUTO-ENTMCNC: 32.1 G/DL (ref 31–37)
MCV RBC AUTO: 106.9 FL
MONOCYTES # BLD AUTO: 0.79 X10(3) UL (ref 0.1–1)
MONOCYTES NFR BLD AUTO: 7.9 %
NEUTROPHILS # BLD AUTO: 8.67 X10 (3) UL (ref 1.5–7.7)
NEUTROPHILS # BLD AUTO: 8.67 X10(3) UL (ref 1.5–7.7)
NEUTROPHILS NFR BLD AUTO: 87 %
OSMOLALITY SERPL CALC.SUM OF ELEC: 288 MOSM/KG (ref 275–295)
PLATELET # BLD AUTO: 109 10(3)UL (ref 150–450)
POTASSIUM SERPL-SCNC: 3.9 MMOL/L (ref 3.5–5.1)
RBC # BLD AUTO: 2.74 X10(6)UL
SODIUM SERPL-SCNC: 138 MMOL/L (ref 136–145)
WBC # BLD AUTO: 10 X10(3) UL (ref 4–11)

## 2022-10-20 PROCEDURE — 99232 SBSQ HOSP IP/OBS MODERATE 35: CPT | Performed by: INTERNAL MEDICINE

## 2022-10-20 PROCEDURE — 93925 LOWER EXTREMITY STUDY: CPT | Performed by: INTERNAL MEDICINE

## 2022-10-20 PROCEDURE — 0H9NXZX DRAINAGE OF LEFT FOOT SKIN, EXTERNAL APPROACH, DIAGNOSTIC: ICD-10-PCS | Performed by: INTERNAL MEDICINE

## 2022-10-20 RX ORDER — PRAVASTATIN SODIUM 80 MG/1
TABLET ORAL
Qty: 90 TABLET | Refills: 0 | Status: SHIPPED | OUTPATIENT
Start: 2022-10-20

## 2022-10-20 NOTE — CM/SW NOTE
Notified by RN pt has questions regarding [facility] coverage. CAT met with pt at bedside. Pt is inquiring if a three day hospital stay is needed to ensure insurance coverage for [facility]. CAT informed pt a three day hospital stay is not needed for [facility] coverage and confirmed that with [facility] liaison. Pt inquired if a different nurse would be managing pt's wound at home. CAT informed pt the RN with [facility] will manage her wound once wound care puts in the orders/recommendations. Pt verbalized understanding. CAT uploaded wound care note to [address] [place] [address] referral. Gianfranco Wells will continue to follow.      NIKHIL Lorenzana  Discharge Planner

## 2022-10-20 NOTE — PLAN OF CARE
Assumed care of pt @ 7759. Pt is A/Ox 4. On 1-2L, VSS, afib on tele. IV saline locked, flushed. Tolerating diet. No need for insulin coverage  PT/OT recommending- home with [facility] PT  Pt denies pain  Up as tolerated  Intake/outputs WNL. Plan: awaiting for wound care consult to come see/assess patient. Updated POC with patient and family. Will continue to monitor.         Problem: Patient/Family Goals  Goal: Patient/Family Long Term Goal  Description: Patient's Long Term Goal: Discharge with proper resources    Interventions:  - Follow plan of care    - See additional Care Plan goals for specific interventions  Outcome: Progressing  Goal: Patient/Family Short Term Goal  Description: Patient's Short Term Goal:   10/18 ([place] [address] [facility]): Able to sleep well        Interventions:   -Offer sleeping aids  - See additional Care Plan goals for specific interventions  Outcome: Progressing     Problem: RESPIRATORY - ADULT  Goal: Achieves optimal ventilation and oxygenation  Description: INTERVENTIONS:  - Assess for changes in respiratory status  - Assess for changes in mentation and behavior  - Position to facilitate oxygenation and minimize respiratory effort  - Oxygen supplementation based on oxygen saturation or ABGs  - Provide Smoking Cessation handout, if applicable  - Encourage broncho-pulmonary hygiene including cough, deep breathe, Incentive Spirometry  - Assess the need for suctioning and perform as needed  - Assess and instruct to report SOB or any respiratory difficulty  - Respiratory Therapy support as indicated  - Manage/alleviate anxiety  - Monitor for signs/symptoms of CO2 retention  Outcome: Progressing

## 2022-10-20 NOTE — PLAN OF CARE
See flowsheets. Axo x 4, glasses, pleasant. RA/BL at rest, 2L at night. Currently on 2L at rest and with exertion. Afebrile. Tele-controlled afib. Continent. C/o of L foot pain, declined medication. BLE swelling. x1 with walker. [id number] accucheck- BS 79. IV rocephin. IV zithromax. WC on consult. Updated patient on plan of care. Frequent roundings, needs met. Call light within reach. WCTM. VSS. Bed alarm on.        Problem: Patient/Family Goals  Goal: Patient/Family Long Term Goal  Description: Patient's Long Term Goal: Discharge with proper resources    Interventions:  - Follow plan of care    - See additional Care Plan goals for specific interventions  Outcome: Progressing  Goal: Patient/Family Short Term Goal  Description: Patient's Short Term Goal:   10/18 ([place] [address] [place]): Able to sleep well  10/19 ([place] [address] [place]): Able to sleep well, feel better        Interventions:   -Offer sleeping aids  - See additional Care Plan goals for specific interventions  Outcome: Progressing     Problem: RESPIRATORY - ADULT  Goal: Achieves optimal ventilation and oxygenation  Description: INTERVENTIONS:  - Assess for changes in respiratory status  - Assess for changes in mentation and behavior  - Position to facilitate oxygenation and minimize respiratory effort  - Oxygen supplementation based on oxygen saturation or ABGs  - Provide Smoking Cessation handout, if applicable  - Encourage broncho-pulmonary hygiene including cough, deep breathe, Incentive Spirometry  - Assess the need for suctioning and perform as needed  - Assess and instruct to report SOB or any respiratory difficulty  - Respiratory Therapy support as indicated  - Manage/alleviate anxiety  - Monitor for signs/symptoms of CO2 retention  Outcome: Progressing

## 2022-10-21 VITALS
HEIGHT: 70 IN | BODY MASS INDEX: 23.89 KG/M2 | HEART RATE: 79 BPM | OXYGEN SATURATION: 98 % | DIASTOLIC BLOOD PRESSURE: 63 MMHG | WEIGHT: 166.88 LBS | RESPIRATION RATE: 20 BRPM | SYSTOLIC BLOOD PRESSURE: 108 MMHG | TEMPERATURE: 98 F

## 2022-10-21 LAB
GLUCOSE BLD-MCNC: 101 MG/DL (ref 70–99)
GLUCOSE BLD-MCNC: 130 MG/DL (ref 70–99)

## 2022-10-21 PROCEDURE — 99232 SBSQ HOSP IP/OBS MODERATE 35: CPT | Performed by: INTERNAL MEDICINE

## 2022-10-21 PROCEDURE — 99239 HOSP IP/OBS DSCHRG MGMT >30: CPT | Performed by: INTERNAL MEDICINE

## 2022-10-21 RX ORDER — CEFUROXIME AXETIL 500 MG/1
500 TABLET ORAL 2 TIMES DAILY
Qty: 8 TABLET | Refills: 0 | Status: SHIPPED | OUTPATIENT
Start: 2022-10-22 | End: 2022-10-26

## 2022-10-21 RX ORDER — FUROSEMIDE 20 MG/1
20 TABLET ORAL DAILY
Qty: 30 TABLET | Refills: 3 | Status: SHIPPED | OUTPATIENT
Start: 2022-10-21

## 2022-10-21 NOTE — DISCHARGE INSTRUCTIONS
[facility]  P:[phone]     Keep the opened blister area clean, apply non-stick dressing [adaptic] and gauze over site. Change every 48 hrs or as needed.

## 2022-10-21 NOTE — PLAN OF CARE
Pt A&Ox4, pleasant. VSS, afebrile, O2 sat 88-96% on ra. Pt short of breath with exertion, O2 sats 88% after coming back from bathroom, recovers quickly, sats up to 94% after 1 minute. Lasix IV given, pt voiding clear, yellow urine. Tolerating diet, miralax given this am. Left foot dressing cdi. Pt up to bathroom and sat in chair for breakfast and lunch, tolerated well. Possible discharge today if ok with cardiology. Call light in reach, bed low & locked, sr up x 2.

## 2022-10-21 NOTE — PROGRESS NOTES
Resumed care at . Pt is admitted for PNA. Pt is AOx4. VSS, afebrile and denies any pain. SpO2 maintained on RA. . Denies any SOB, cough. Tele-Afib. PO Xarelto. Carb control  Diet/QID ACCU check. PO Lasik. Denies any n/v/d. Voids. Up with SBA/walker. IV ABX. WCTM. Pt is updated with plan of care. Pt is DC ing at 1700.

## 2022-10-21 NOTE — PLAN OF CARE
Assumed care of pt @ 0730. Pt is A/Ox 4. On 1-2L, VSS, afib on tele. IV saline locked, flushed. Tolerating diet. No need for insulin coverage  PT/OT recommending- home with [facility] PT  Pt states pain where blister was  Up as tolerated with 1 assist and walker  Intake/outputs WNL. Plan: drsg changes q48h or prn, pain control, and diuresis. Updated POC with patient and family. Will continue to monitor.         Problem: Patient/Family Goals  Goal: Patient/Family Long Term Goal  Description: Patient's Long Term Goal: Discharge with proper resources    Interventions:  - Follow plan of care    - See additional Care Plan goals for specific interventions  Outcome: Progressing  Goal: Patient/Family Short Term Goal  Description: Patient's Short Term Goal:   10/18 ([place] [address] [place]): Able to sleep well  10/19 ([place] [address] [place]): Able to sleep well, feel better        Interventions:   -Offer sleeping aids  - See additional Care Plan goals for specific interventions  Outcome: Progressing     Problem: RESPIRATORY - ADULT  Goal: Achieves optimal ventilation and oxygenation  Description: INTERVENTIONS:  - Assess for changes in respiratory status  - Assess for changes in mentation and behavior  - Position to facilitate oxygenation and minimize respiratory effort  - Oxygen supplementation based on oxygen saturation or ABGs  - Provide Smoking Cessation handout, if applicable  - Encourage broncho-pulmonary hygiene including cough, deep breathe, Incentive Spirometry  - Assess the need for suctioning and perform as needed  - Assess and instruct to report SOB or any respiratory difficulty  - Respiratory Therapy support as indicated  - Manage/alleviate anxiety  - Monitor for signs/symptoms of CO2 retention  Outcome: Progressing

## 2022-10-21 NOTE — CM/SW NOTE
JAIMIE met with the patient at bedside to discuss discharge home. JAIMIE informed [facility]  P:[phone]  F:[phone]. She will call her family to see if they can pick her up this evening.     Shaniqua Dexter LCSW

## 2022-10-21 NOTE — PLAN OF CARE
Assumed care of pt at [address] pt down in [facility] 3822 lower ext during walking rounds back to room around 2030. Pt denies pain at that time. Lungs diminished sat 96% on 1L will wean off oxygen. Abdomen soft non tender. Bilateral lower ext with pitting edema noted. Dressing to left foot in place no drainage noted c/d/i. Pt remains on iv antibx x2 and po antibx. Will continue to monitor.

## 2022-10-24 ENCOUNTER — TELEPHONE (OUTPATIENT)
Dept: INTERNAL MEDICINE CLINIC | Facility: CLINIC | Age: 82
End: 2022-10-24

## 2022-10-24 ENCOUNTER — PATIENT OUTREACH (OUTPATIENT)
Dept: CASE MANAGEMENT | Age: 82
End: 2022-10-24

## 2022-10-24 DIAGNOSIS — Z02.9 ENCOUNTERS FOR ADMINISTRATIVE PURPOSE: ICD-10-CM

## 2022-10-24 PROCEDURE — 1111F DSCHRG MED/CURRENT MED MERGE: CPT

## 2022-10-24 NOTE — TELEPHONE ENCOUNTER
Luis Carlos Villela stated pt was discarged from hospital and resumed [facility] yesterday and will see her twice a week for two weeks. [place] added that pt is on the below medication and it interacts with esomeprazole but she has not had any effects. cefuroxime 500 MG Oral Tab 8 tablet 0 10/22/2022 10/26/2022    Sig - Route:  Take 1 tablet (500 mg total) by mouth 2 (two) times daily for 4 days. - Oral    Sent to pharmacy as: Cefuroxime Axetil 500 MG Oral Tablet (Ceftin)

## 2022-10-24 NOTE — TELEPHONE ENCOUNTER
Sent to [facility]- resumed HH since dc/c. Ok with medication given patient tolerating well? Scheduled 11/1/2022 with CS.

## 2022-10-24 NOTE — PROGRESS NOTES
VAIBHAVMEG for post hospital follow up. [facility] contact information provided as well as [facility] office number, [phone].

## 2022-10-26 ENCOUNTER — TELEPHONE (OUTPATIENT)
Dept: INTERNAL MEDICINE CLINIC | Facility: CLINIC | Age: 82
End: 2022-10-26

## 2022-10-26 NOTE — TELEPHONE ENCOUNTER
Calling from  Keo [address] from [facility] calling   Wanting to continue nursing 1X a week and PT for the full medicare  episode (60) days.   Patient just came home from the hospital,with peumonia and also has an open would

## 2022-10-27 DIAGNOSIS — C85.90 LYMPHOMA, UNSPECIFIED BODY REGION, UNSPECIFIED LYMPHOMA TYPE (HCC): ICD-10-CM

## 2022-10-27 RX ORDER — ACYCLOVIR 400 MG/1
TABLET ORAL
Qty: 60 TABLET | Refills: 0 | Status: SHIPPED | OUTPATIENT
Start: 2022-10-27

## 2022-11-01 ENCOUNTER — OFFICE VISIT (OUTPATIENT)
Dept: INTERNAL MEDICINE CLINIC | Facility: CLINIC | Age: 82
End: 2022-11-01
Payer: MEDICARE

## 2022-11-01 VITALS
TEMPERATURE: 97 F | BODY MASS INDEX: 22.33 KG/M2 | DIASTOLIC BLOOD PRESSURE: 58 MMHG | HEIGHT: 70 IN | WEIGHT: 156 LBS | SYSTOLIC BLOOD PRESSURE: 100 MMHG | HEART RATE: 62 BPM

## 2022-11-01 DIAGNOSIS — T14.8XXA BLISTER: ICD-10-CM

## 2022-11-01 DIAGNOSIS — Z09 HOSPITAL DISCHARGE FOLLOW-UP: Primary | ICD-10-CM

## 2022-11-01 DIAGNOSIS — C83.38 DIFFUSE LARGE B-CELL LYMPHOMA OF LYMPH NODES OF MULTIPLE REGIONS (HCC): ICD-10-CM

## 2022-11-01 DIAGNOSIS — J18.9 PNEUMONIA DUE TO INFECTIOUS ORGANISM, UNSPECIFIED LATERALITY, UNSPECIFIED PART OF LUNG: ICD-10-CM

## 2022-11-01 DIAGNOSIS — I50.32 CHRONIC DIASTOLIC CHF (CONGESTIVE HEART FAILURE) (HCC): ICD-10-CM

## 2022-11-01 DIAGNOSIS — R06.02 SOB (SHORTNESS OF BREATH): ICD-10-CM

## 2022-11-01 PROCEDURE — 3074F SYST BP LT 130 MM HG: CPT | Performed by: PHYSICIAN ASSISTANT

## 2022-11-01 PROCEDURE — 1111F DSCHRG MED/CURRENT MED MERGE: CPT | Performed by: PHYSICIAN ASSISTANT

## 2022-11-01 PROCEDURE — 3078F DIAST BP <80 MM HG: CPT | Performed by: PHYSICIAN ASSISTANT

## 2022-11-01 PROCEDURE — G0008 ADMIN INFLUENZA VIRUS VAC: HCPCS | Performed by: PHYSICIAN ASSISTANT

## 2022-11-01 PROCEDURE — 3008F BODY MASS INDEX DOCD: CPT | Performed by: PHYSICIAN ASSISTANT

## 2022-11-01 PROCEDURE — 90662 IIV NO PRSV INCREASED AG IM: CPT | Performed by: PHYSICIAN ASSISTANT

## 2022-11-01 PROCEDURE — 99495 TRANSJ CARE MGMT MOD F2F 14D: CPT | Performed by: PHYSICIAN ASSISTANT

## 2022-11-01 NOTE — PROGRESS NOTES
Several attempts made to reach the patient with no return call. Patient completed HFU on 11/1/22. Closing encounter.

## 2022-11-01 NOTE — PATIENT INSTRUCTIONS
Continue to monitor the blister and look for any signs of infection (redness, swelling, warmth, fever, etc). We can try to get home health wound care for you to continue to manage the blister.    Continue to monitor your weight and your breathing per the instructions of your cardiologist.

## 2022-11-02 ENCOUNTER — TELEPHONE (OUTPATIENT)
Dept: INTERNAL MEDICINE CLINIC | Facility: CLINIC | Age: 82
End: 2022-11-02

## 2022-11-02 NOTE — TELEPHONE ENCOUNTER
Mar calling to let [facility] know that pt will be having PT once every other week for the next 8 weeks. She is cleared [facility].

## 2022-11-07 RX ORDER — ALLOPURINOL 300 MG/1
300 TABLET ORAL DAILY
Qty: 30 TABLET | Refills: 0 | Status: SHIPPED | OUTPATIENT
Start: 2022-11-07 | End: 2022-11-07

## 2022-11-18 ENCOUNTER — HOSPITAL ENCOUNTER (OUTPATIENT)
Dept: NUCLEAR MEDICINE | Facility: HOSPITAL | Age: 82
Discharge: HOME | End: 2022-11-18
Attending: INTERNAL MEDICINE
Payer: MEDICARE

## 2022-11-18 DIAGNOSIS — C85.90 LYMPHOMA, UNSPECIFIED BODY REGION, UNSPECIFIED LYMPHOMA TYPE (HCC): ICD-10-CM

## 2022-11-18 LAB — GLUCOSE BLD-MCNC: 111 MG/DL (ref 70–99)

## 2022-11-18 PROCEDURE — 78815 PET IMAGE W/CT SKULL-THIGH: CPT | Performed by: INTERNAL MEDICINE

## 2022-11-18 PROCEDURE — 82962 GLUCOSE BLOOD TEST: CPT

## 2022-11-21 NOTE — TELEPHONE ENCOUNTER
PASSED per protocol, refill pending listed as discontinued. Last PE 3.16.22  No future appointments.

## 2022-11-22 ENCOUNTER — TELEPHONE (OUTPATIENT)
Dept: HEMATOLOGY/ONCOLOGY | Facility: HOSPITAL | Age: 82
End: 2022-11-22

## 2022-11-22 RX ORDER — LANCETS
EACH MISCELLANEOUS
Qty: 100 EACH | Refills: 2 | Status: SHIPPED | OUTPATIENT
Start: 2022-11-22

## 2022-11-22 NOTE — TELEPHONE ENCOUNTER
Patient called regarding Pet Scan results. She said they need to be shared with Dr. Izaiah Puente and Dr. Rubina Martinez.

## 2022-11-30 ENCOUNTER — TELEPHONE (OUTPATIENT)
Dept: HEMATOLOGY/ONCOLOGY | Facility: HOSPITAL | Age: 82
End: 2022-11-30

## 2022-12-02 ENCOUNTER — OFFICE VISIT (OUTPATIENT)
Dept: HEMATOLOGY/ONCOLOGY | Facility: HOSPITAL | Age: 82
End: 2022-12-02
Attending: INTERNAL MEDICINE
Payer: MEDICARE

## 2022-12-02 VITALS
BODY MASS INDEX: 21.9 KG/M2 | HEART RATE: 69 BPM | SYSTOLIC BLOOD PRESSURE: 96 MMHG | WEIGHT: 153 LBS | RESPIRATION RATE: 16 BRPM | TEMPERATURE: 98 F | DIASTOLIC BLOOD PRESSURE: 61 MMHG | OXYGEN SATURATION: 95 % | HEIGHT: 70 IN

## 2022-12-02 DIAGNOSIS — R53.1 WEAKNESS: Primary | ICD-10-CM

## 2022-12-02 DIAGNOSIS — C85.90 LYMPHOMA, UNSPECIFIED BODY REGION, UNSPECIFIED LYMPHOMA TYPE (HCC): ICD-10-CM

## 2022-12-02 DIAGNOSIS — R60.9 EDEMA, UNSPECIFIED TYPE: ICD-10-CM

## 2022-12-02 LAB
ALBUMIN SERPL-MCNC: 2.9 G/DL (ref 3.4–5)
ALBUMIN/GLOB SERPL: 1 {RATIO} (ref 1–2)
ALP LIVER SERPL-CCNC: 61 U/L
ALT SERPL-CCNC: 10 U/L
ANION GAP SERPL CALC-SCNC: 2 MMOL/L (ref 0–18)
AST SERPL-CCNC: 7 U/L (ref 15–37)
BASOPHILS # BLD AUTO: 0.02 X10(3) UL (ref 0–0.2)
BASOPHILS NFR BLD AUTO: 0.5 %
BILIRUB SERPL-MCNC: 0.4 MG/DL (ref 0.1–2)
BUN BLD-MCNC: 12 MG/DL (ref 7–18)
CALCIUM BLD-MCNC: 9 MG/DL (ref 8.5–10.1)
CHLORIDE SERPL-SCNC: 103 MMOL/L (ref 98–112)
CO2 SERPL-SCNC: 33 MMOL/L (ref 21–32)
CREAT BLD-MCNC: 0.71 MG/DL
EOSINOPHIL # BLD AUTO: 0.12 X10(3) UL (ref 0–0.7)
EOSINOPHIL NFR BLD AUTO: 2.7 %
ERYTHROCYTE [DISTWIDTH] IN BLOOD BY AUTOMATED COUNT: 16 %
GFR SERPLBLD BASED ON 1.73 SQ M-ARVRAT: 85 ML/MIN/1.73M2 (ref 60–?)
GLOBULIN PLAS-MCNC: 2.9 G/DL (ref 2.8–4.4)
GLUCOSE BLD-MCNC: 157 MG/DL (ref 70–99)
HCT VFR BLD AUTO: 35.2 %
HGB BLD-MCNC: 10.7 G/DL
IMM GRANULOCYTES # BLD AUTO: 0.05 X10(3) UL (ref 0–1)
IMM GRANULOCYTES NFR BLD: 1.1 %
LDH SERPL L TO P-CCNC: 213 U/L
LYMPHOCYTES # BLD AUTO: 0.28 X10(3) UL (ref 1–4)
LYMPHOCYTES NFR BLD AUTO: 6.4 %
MCH RBC QN AUTO: 31.4 PG (ref 26–34)
MCHC RBC AUTO-ENTMCNC: 30.4 G/DL (ref 31–37)
MCV RBC AUTO: 103.2 FL
MONOCYTES # BLD AUTO: 0.39 X10(3) UL (ref 0.1–1)
MONOCYTES NFR BLD AUTO: 8.9 %
NEUTROPHILS # BLD AUTO: 3.52 X10 (3) UL (ref 1.5–7.7)
NEUTROPHILS # BLD AUTO: 3.52 X10(3) UL (ref 1.5–7.7)
NEUTROPHILS NFR BLD AUTO: 80.4 %
OSMOLALITY SERPL CALC.SUM OF ELEC: 289 MOSM/KG (ref 275–295)
PLATELET # BLD AUTO: 148 10(3)UL (ref 150–450)
POTASSIUM SERPL-SCNC: 4.2 MMOL/L (ref 3.5–5.1)
PROT SERPL-MCNC: 5.8 G/DL (ref 6.4–8.2)
RBC # BLD AUTO: 3.41 X10(6)UL
SODIUM SERPL-SCNC: 138 MMOL/L (ref 136–145)
WBC # BLD AUTO: 4.4 X10(3) UL (ref 4–11)

## 2022-12-02 PROCEDURE — 99214 OFFICE O/P EST MOD 30 MIN: CPT | Performed by: INTERNAL MEDICINE

## 2022-12-02 RX ORDER — TORSEMIDE 20 MG/1
TABLET ORAL
COMMUNITY
Start: 2022-11-22

## 2022-12-02 NOTE — PROGRESS NOTES
Patient is here for MD f/u for Lymphoma. Patient has ongoing edema in BLE. She continues on Torsemide daily. Patient states she is feeling well. Per patient, appetite has improved despite weight loss. She had a PET scan on 11/18. Here to review the results. No fevers, night sweats or chills.        Education Record    Learner:  Patient and Family Member    Disease / Diagnosis:  Lymphoma     Barriers / Limitations:  None   Comments:    Method:  Discussion   Comments:    General Topics:  Plan of care reviewed   Comments:    Outcome:  Shows understanding   Comments:

## 2022-12-27 ENCOUNTER — LAB ENCOUNTER (OUTPATIENT)
Dept: LAB | Age: 82
End: 2022-12-27
Attending: NURSE PRACTITIONER
Payer: MEDICARE

## 2022-12-27 DIAGNOSIS — I50.32 CHRONIC DIASTOLIC CHF (CONGESTIVE HEART FAILURE) (HCC): Primary | ICD-10-CM

## 2022-12-27 LAB
ANION GAP SERPL CALC-SCNC: 5 MMOL/L (ref 0–18)
BUN BLD-MCNC: 17 MG/DL (ref 7–18)
CALCIUM BLD-MCNC: 9.3 MG/DL (ref 8.5–10.1)
CHLORIDE SERPL-SCNC: 102 MMOL/L (ref 98–112)
CO2 SERPL-SCNC: 33 MMOL/L (ref 21–32)
CREAT BLD-MCNC: 0.85 MG/DL
FASTING STATUS PATIENT QL REPORTED: NO
GFR SERPLBLD BASED ON 1.73 SQ M-ARVRAT: 68 ML/MIN/1.73M2 (ref 60–?)
GLUCOSE BLD-MCNC: 135 MG/DL (ref 70–99)
NT-PROBNP SERPL-MCNC: 4749 PG/ML (ref ?–450)
OSMOLALITY SERPL CALC.SUM OF ELEC: 294 MOSM/KG (ref 275–295)
POTASSIUM SERPL-SCNC: 3.9 MMOL/L (ref 3.5–5.1)
SODIUM SERPL-SCNC: 140 MMOL/L (ref 136–145)

## 2022-12-27 PROCEDURE — 83880 ASSAY OF NATRIURETIC PEPTIDE: CPT

## 2022-12-27 PROCEDURE — 80048 BASIC METABOLIC PNL TOTAL CA: CPT

## 2022-12-27 PROCEDURE — 36415 COLL VENOUS BLD VENIPUNCTURE: CPT

## 2023-01-01 ENCOUNTER — HOSPITAL ENCOUNTER (OUTPATIENT)
Dept: GENERAL RADIOLOGY | Age: 83
Discharge: HOME | End: 2023-01-01
Attending: CLINICAL NURSE SPECIALIST
Payer: MEDICARE

## 2023-01-01 ENCOUNTER — PATIENT OUTREACH (OUTPATIENT)
Dept: CASE MANAGEMENT | Age: 83
End: 2023-01-01

## 2023-01-01 ENCOUNTER — TELEPHONE (OUTPATIENT)
Dept: INTERNAL MEDICINE CLINIC | Facility: CLINIC | Age: 83
End: 2023-01-01

## 2023-01-01 ENCOUNTER — LAB ENCOUNTER (OUTPATIENT)
Dept: LAB | Age: 83
End: 2023-01-01
Attending: CLINICAL NURSE SPECIALIST
Payer: MEDICARE

## 2023-01-01 DIAGNOSIS — I25.10 CORONARY ATHEROSCLEROSIS OF NATIVE CORONARY ARTERY: ICD-10-CM

## 2023-01-01 DIAGNOSIS — I50.32 CHRONIC DIASTOLIC HEART FAILURE (HCC): Primary | ICD-10-CM

## 2023-01-01 DIAGNOSIS — R06.02 SOB (SHORTNESS OF BREATH): ICD-10-CM

## 2023-01-01 LAB
BILIRUB UR QL STRIP.AUTO: NEGATIVE
COLOR UR AUTO: YELLOW
GLUCOSE UR STRIP.AUTO-MCNC: NORMAL MG/DL
KETONES UR STRIP.AUTO-MCNC: NEGATIVE MG/DL
LEUKOCYTE ESTERASE UR QL STRIP.AUTO: 500
NITRITE UR QL STRIP.AUTO: NEGATIVE
PH UR STRIP.AUTO: 5.5 [PH] (ref 5–8)
PROT UR STRIP.AUTO-MCNC: 200 MG/DL
RBC #/AREA URNS AUTO: >10 /HPF
SP GR UR STRIP.AUTO: 1.02 (ref 1–1.03)
UROBILINOGEN UR STRIP.AUTO-MCNC: NORMAL MG/DL
WBC #/AREA URNS AUTO: >50 /HPF
WBC CLUMPS UR QL AUTO: PRESENT /HPF

## 2023-01-01 PROCEDURE — 81001 URINALYSIS AUTO W/SCOPE: CPT

## 2023-01-01 PROCEDURE — 87086 URINE CULTURE/COLONY COUNT: CPT

## 2023-01-01 PROCEDURE — 71046 X-RAY EXAM CHEST 2 VIEWS: CPT | Performed by: CLINICAL NURSE SPECIALIST

## 2023-01-01 RX ORDER — NITROFURANTOIN 25; 75 MG/1; MG/1
100 CAPSULE ORAL 2 TIMES DAILY
Qty: 14 CAPSULE | Refills: 0 | Status: SHIPPED | OUTPATIENT
Start: 2023-01-01 | End: 2023-11-20

## 2023-01-03 ENCOUNTER — TELEPHONE (OUTPATIENT)
Dept: INTERNAL MEDICINE CLINIC | Facility: CLINIC | Age: 83
End: 2023-01-03

## 2023-01-11 ENCOUNTER — TELEPHONE (OUTPATIENT)
Dept: INTERNAL MEDICINE CLINIC | Facility: CLINIC | Age: 83
End: 2023-01-11

## 2023-01-11 DIAGNOSIS — R23.9 RECENT SKIN CHANGES: Primary | ICD-10-CM

## 2023-01-11 NOTE — TELEPHONE ENCOUNTER
Spoke with pt she stated she will need to call her ins to find out if they are in her network. She stated she saw Dr Josselin Agudelo and had a biopsy that came back abnormal and he recommend she gets surgery. Before she was able to get surgery she developed lymphedema and she was seeing Dr Naye Mcnally for that so she was not able to follow through with Dr Cameron Ryan recommendations. She then started chemo she is now done with chemo and was told by Dr Naye Mcnally she is 80% healed and that the PET scan shows no cancer. Now she wants to do the surgery Dr Josselin Agudelo recommended.  She will find out who is covered with her ins and will check with Dr Josselin Agudelo who they recommend and will have their office (Dr Josselin Agudelo fax any note pertaining to all of this)

## 2023-01-11 NOTE — TELEPHONE ENCOUNTER
Please clarify the need for both if one is doing ov and the other is doing procedure? Did patient confirm they are in network- if not will need to do so for referral to be placed.

## 2023-01-11 NOTE — TELEPHONE ENCOUNTER
Pt stated both docs are in her network. I asked pt to check with their office to see why both docs are needed for referral. Pt will call them and call us back.

## 2023-01-11 NOTE — TELEPHONE ENCOUNTER
Pt stated she's having surgery with the below docs and needs a referral.    Specialty: Dermotologist    Full Name of Specialist:  Per pt referral for both docs - Dr. Kaity Rock and Dr. Gino Kauffman    Name of the Provider Group:   Address: [address] [place] [address] Dionna wade   Phone number: [phone]  Fax number : [phone]  NPI:    (NPI number of the service provider. the nurses need this information for the referral.  Its for service providers only like Surgery*, [employer], [facility], Etc. We not NOT need physician NPI's)    *Surgery: The NPI # should be of the location of the Surgery.     Date of Appointment: Surgery is on Feb 3/2023 at 10am    Reason for the Appointment (be specific with diagnosis code): remove spot on face    Has the patient seen a provider in our office for stated problem?: yes    Is this request for an out of network referral? Doesn't know  (if yes, please have patient contact referring provider and have them fax office visit notes to triage attention):

## 2023-01-19 ENCOUNTER — HOSPITAL ENCOUNTER (EMERGENCY)
Facility: HOSPITAL | Age: 83
Discharge: HOME | End: 2023-01-19
Attending: EMERGENCY MEDICINE
Payer: MEDICARE

## 2023-01-19 ENCOUNTER — APPOINTMENT (OUTPATIENT)
Dept: GENERAL RADIOLOGY | Facility: HOSPITAL | Age: 83
End: 2023-01-19
Attending: EMERGENCY MEDICINE
Payer: MEDICARE

## 2023-01-19 VITALS
BODY MASS INDEX: 20.76 KG/M2 | OXYGEN SATURATION: 96 % | DIASTOLIC BLOOD PRESSURE: 49 MMHG | WEIGHT: 145 LBS | HEART RATE: 58 BPM | RESPIRATION RATE: 34 BRPM | TEMPERATURE: 99 F | HEIGHT: 70 IN | SYSTOLIC BLOOD PRESSURE: 98 MMHG

## 2023-01-19 DIAGNOSIS — R05.1 ACUTE COUGH: Primary | ICD-10-CM

## 2023-01-19 DIAGNOSIS — R06.02 SHORTNESS OF BREATH: ICD-10-CM

## 2023-01-19 DIAGNOSIS — R79.89 ELEVATED BRAIN NATRIURETIC PEPTIDE (BNP) LEVEL: ICD-10-CM

## 2023-01-19 LAB
ALBUMIN SERPL-MCNC: 3.2 G/DL (ref 3.4–5)
ALBUMIN/GLOB SERPL: 1.1 {RATIO} (ref 1–2)
ALP LIVER SERPL-CCNC: 66 U/L
ALT SERPL-CCNC: 10 U/L
ANION GAP SERPL CALC-SCNC: 2 MMOL/L (ref 0–18)
AST SERPL-CCNC: 11 U/L (ref 15–37)
BASOPHILS # BLD AUTO: 0.03 X10(3) UL (ref 0–0.2)
BASOPHILS NFR BLD AUTO: 0.3 %
BILIRUB SERPL-MCNC: 0.5 MG/DL (ref 0.1–2)
BUN BLD-MCNC: 18 MG/DL (ref 7–18)
CALCIUM BLD-MCNC: 9.1 MG/DL (ref 8.5–10.1)
CHLORIDE SERPL-SCNC: 100 MMOL/L (ref 98–112)
CO2 SERPL-SCNC: 35 MMOL/L (ref 21–32)
CREAT BLD-MCNC: 0.76 MG/DL
EOSINOPHIL # BLD AUTO: 0.11 X10(3) UL (ref 0–0.7)
EOSINOPHIL NFR BLD AUTO: 1.2 %
ERYTHROCYTE [DISTWIDTH] IN BLOOD BY AUTOMATED COUNT: 17.1 %
FLUAV + FLUBV RNA SPEC NAA+PROBE: NEGATIVE
FLUAV + FLUBV RNA SPEC NAA+PROBE: NEGATIVE
GFR SERPLBLD BASED ON 1.73 SQ M-ARVRAT: 78 ML/MIN/1.73M2 (ref 60–?)
GLOBULIN PLAS-MCNC: 2.9 G/DL (ref 2.8–4.4)
GLUCOSE BLD-MCNC: 153 MG/DL (ref 70–99)
HCT VFR BLD AUTO: 35 %
HGB BLD-MCNC: 11.1 G/DL
IMM GRANULOCYTES # BLD AUTO: 0.17 X10(3) UL (ref 0–1)
IMM GRANULOCYTES NFR BLD: 1.8 %
LYMPHOCYTES # BLD AUTO: 0.36 X10(3) UL (ref 1–4)
LYMPHOCYTES NFR BLD AUTO: 3.9 %
MCH RBC QN AUTO: 31.3 PG (ref 26–34)
MCHC RBC AUTO-ENTMCNC: 31.7 G/DL (ref 31–37)
MCV RBC AUTO: 98.6 FL
MONOCYTES # BLD AUTO: 0.83 X10(3) UL (ref 0.1–1)
MONOCYTES NFR BLD AUTO: 9 %
NEUTROPHILS # BLD AUTO: 7.76 X10 (3) UL (ref 1.5–7.7)
NEUTROPHILS # BLD AUTO: 7.76 X10(3) UL (ref 1.5–7.7)
NEUTROPHILS NFR BLD AUTO: 83.8 %
NT-PROBNP SERPL-MCNC: 5615 PG/ML (ref ?–450)
OSMOLALITY SERPL CALC.SUM OF ELEC: 289 MOSM/KG (ref 275–295)
PLATELET # BLD AUTO: 141 10(3)UL (ref 150–450)
POTASSIUM SERPL-SCNC: 4.2 MMOL/L (ref 3.5–5.1)
PROT SERPL-MCNC: 6.1 G/DL (ref 6.4–8.2)
Q-T INTERVAL: 386 MS
QRS DURATION: 86 MS
QTC CALCULATION (BEZET): 416 MS
R AXIS: 14 DEGREES
RBC # BLD AUTO: 3.55 X10(6)UL
RSV RNA SPEC NAA+PROBE: NEGATIVE
SARS-COV-2 RNA RESP QL NAA+PROBE: NOT DETECTED
SODIUM SERPL-SCNC: 137 MMOL/L (ref 136–145)
T AXIS: 38 DEGREES
TROPONIN I HIGH SENSITIVITY: 26 NG/L
VENTRICULAR RATE: 70 BPM
WBC # BLD AUTO: 9.3 X10(3) UL (ref 4–11)

## 2023-01-19 PROCEDURE — 0241U SARS-COV-2/FLU A AND B/RSV BY PCR (GENEXPERT): CPT | Performed by: EMERGENCY MEDICINE

## 2023-01-19 PROCEDURE — 93010 ELECTROCARDIOGRAM REPORT: CPT

## 2023-01-19 PROCEDURE — 84484 ASSAY OF TROPONIN QUANT: CPT | Performed by: EMERGENCY MEDICINE

## 2023-01-19 PROCEDURE — 71046 X-RAY EXAM CHEST 2 VIEWS: CPT | Performed by: EMERGENCY MEDICINE

## 2023-01-19 PROCEDURE — 99284 EMERGENCY DEPT VISIT MOD MDM: CPT

## 2023-01-19 PROCEDURE — 96360 HYDRATION IV INFUSION INIT: CPT

## 2023-01-19 PROCEDURE — 85025 COMPLETE CBC W/AUTO DIFF WBC: CPT | Performed by: EMERGENCY MEDICINE

## 2023-01-19 PROCEDURE — 80053 COMPREHEN METABOLIC PANEL: CPT | Performed by: EMERGENCY MEDICINE

## 2023-01-19 PROCEDURE — 93005 ELECTROCARDIOGRAM TRACING: CPT

## 2023-01-19 PROCEDURE — 96361 HYDRATE IV INFUSION ADD-ON: CPT

## 2023-01-19 PROCEDURE — 99285 EMERGENCY DEPT VISIT HI MDM: CPT

## 2023-01-19 PROCEDURE — 83880 ASSAY OF NATRIURETIC PEPTIDE: CPT | Performed by: EMERGENCY MEDICINE

## 2023-01-19 RX ORDER — FLUCONAZOLE 200 MG/1
200 TABLET ORAL AS DIRECTED
COMMUNITY
Start: 2022-06-14

## 2023-01-19 RX ORDER — PEN NEEDLE, DIABETIC 32GX 5/32"
NEEDLE, DISPOSABLE MISCELLANEOUS
COMMUNITY
Start: 2022-12-07

## 2023-01-19 RX ORDER — PROCHLORPERAZINE MALEATE 10 MG
1 TABLET ORAL EVERY 6 HOURS PRN
COMMUNITY
Start: 2022-05-20

## 2023-01-19 RX ORDER — SODIUM CHLORIDE 9 MG/ML
125 INJECTION, SOLUTION INTRAVENOUS CONTINUOUS
Status: DISCONTINUED | OUTPATIENT
Start: 2023-01-19 | End: 2023-01-19

## 2023-01-19 NOTE — ED INITIAL ASSESSMENT (HPI)
Pt here due to HAYLEE that started last night. Pt stated that her SpO2 was dropping down into the 30's. Pt is at 100% right now but feels like she cannot get enough air in.

## 2023-01-20 ENCOUNTER — TELEPHONE (OUTPATIENT)
Dept: ADMINISTRATIVE | Age: 83
End: 2023-01-20

## 2023-01-20 ENCOUNTER — PATIENT OUTREACH (OUTPATIENT)
Dept: CASE MANAGEMENT | Age: 83
End: 2023-01-20

## 2023-01-20 ENCOUNTER — TELEPHONE (OUTPATIENT)
Dept: INTERNAL MEDICINE CLINIC | Facility: CLINIC | Age: 83
End: 2023-01-20

## 2023-01-20 ENCOUNTER — MED REC SCAN ONLY (OUTPATIENT)
Dept: INTERNAL MEDICINE CLINIC | Facility: CLINIC | Age: 83
End: 2023-01-20

## 2023-01-20 NOTE — TELEPHONE ENCOUNTER
Certificate Information  Certification Number  [id number]    Status  CERTIFIED IN TOTAL    Message  Authorization is based on information provided; it is not a guarantee of payment. Billed services are subject to medical necessity, appropriate setting, billing/coding, plan limits, eligibility at time of service. Verify benefits online or call Customer Service.   Member Information  Patient Name  Norma Sethi    Patient Date of Birth  1940-11-05    Patient Gender  Female    Member ID  [id number]    Relationship to [address] Name  Norma Sethi    Requesting Provider     Name  Jarrett Chavezbrea, [address] [place] [address]  [id number]    Specialty  [id number]  Provider Role  Provider    Address  [address] [facility], [facility] [address] [facility], [address]    Phone  [phone]  Fax  [phone]    Contact Name  Leonila Reid  Service Type  1 - Medical Care    Service From - To Date  2023-02-03 - 2023-09-30    Quantity  8 Visits  Diagnosis Code 1   - Basal cell carcinoma of skin unspecified    Diagnosis Code 2  R239 - Unspecified skin changes    Procedure Code 1 (CPT/HCPCS)  82703 - MOHS 1 STAGE H/N/HF/G    Quantity  1 Units    Procedure From - To Date  2023-02-03 - 2023-09-30    Procedure Code 2 (CPT/HCPCS)  92042 - MOHS ADDL STAGE    Quantity  1 Units    Procedure From - To Date  2023-02-03 - 2023-09-30    Procedure Code 3 (CPT/HCPCS)  77563 - INTMD RPR FACE/MM 2.5 CM/<    Quantity  1 Units    Procedure From - To Date  2023-02-03 - 2023-09-30    Procedure Code 4 (CPT/HCPCS)  06721 - INTMD RPR FACE/MM 2.6-5.0 CM    Quantity  1 Units    Procedure From - To Date  2023-02-03 - 2023-09-30    Procedure Code 5 (CPT/HCPCS)  42939 - CMPLX RPR F/C/C/M/N/AX/G/H/F    Quantity  1 Units    Procedure From - To Date  2023-02-03 - 2023-09-30    Procedure Code 6 (CPT/HCPCS)  14478 - CMPLX RPR F/C/C/M/N/AX/G/H/F    Quantity  1 Units    Procedure From - To Date  2023-02-03 - 2023-09-30    Procedure Code 7 (CPT/\[facility]\"")  72067 - TIS TRNFR F/C/C/M/N/A/G/H/F    Quantity  1 Units    Procedure From - To Date  2023-02-03 - 2023-09-30    Procedure Code 8 (CPT/HCPCS)  22484 - CMPLX RPR F/C/C/M/N/AX/G/H/F    Quantity  1 Units    Procedure From - To Date  2023-02-03 - 2023-09-30    Rendering Provider/Facility     Provider 1  [facility]  [id number]    Specialty  [id number]  Provider Role  Service Provider    Address  [address], [facility]    Phone  [phone]  Provider 2  Name  [facility]  [id number]    Provider Role  Facility    Address  [address], [facility] [place]    Phone  [phone]

## 2023-01-20 NOTE — PROGRESS NOTES
1st attempt; pt had recent ED visit, calling to offer PCP f/u apt (dc 1/19)      Dr. Yoly Ames  [address] [place] [address]  [phone]    Pt has existing PCP appt 3/2 @ 10:00 AM and doesn't want sooner appt     Closing encounter

## 2023-01-20 NOTE — TELEPHONE ENCOUNTER
Specialty: Patito Alegre    Full Name of Specialist:Dr. Brandy Fajardo    Name of the Provider Group:  [address] [place] [address], [address] [place] [address]  Phone number:[phone]  Fax number :[phone]  NPI:    (NPI number of the service provider. the nurses need this information for the referral.  Its for service providers only like Surgery*, [employer], [facility], Etc. We not NOT need physician NPI's)    *Surgery: The NPI # should be of the location of the Surgery. Date of Appointment:2/3/23  Reason for the Appointment (be specific with diagnosis code):  Skin Cancer--Mohs Surgery    Has the patient seen a provider in our office for stated problem? :Yes      Is this request for an out of network referral? No    (if yes, please have patient contact referring provider and have them fax office visit notes to triage attention):

## 2023-01-20 NOTE — TELEPHONE ENCOUNTER
Referral for this provider was 1/11/2023 and is authorized. Please inform patient.  This is a duplicate request.

## 2023-01-25 ENCOUNTER — TELEPHONE (OUTPATIENT)
Dept: INTERNAL MEDICINE CLINIC | Facility: CLINIC | Age: 83
End: 2023-01-25

## 2023-01-25 NOTE — TELEPHONE ENCOUNTER
LOV 11/1/22    Pt having MOHS surgery on February 3. Dx codes and CPT codes need added to referral. CPT codes all related to procedure. AS, okay to add?     Diagnosis code: C44.310    CPT codes:  77291  54031  14800  93498  73173  32720  96337  14726

## 2023-01-25 NOTE — TELEPHONE ENCOUNTER
Received fax from Carlos Leon with attached request to update referral with diagnosis codes. Placed in RN buckets to make sure not urgent.

## 2023-01-25 NOTE — TELEPHONE ENCOUNTER
Went to add codes and they are already added and authorized. Faxed to Dr. Evangelist Siddiqui office.  [employer]

## 2023-01-27 ENCOUNTER — APPOINTMENT (OUTPATIENT)
Dept: HEMATOLOGY/ONCOLOGY | Facility: HOSPITAL | Age: 83
End: 2023-01-27
Attending: INTERNAL MEDICINE
Payer: MEDICARE

## 2023-02-10 ENCOUNTER — OFFICE VISIT (OUTPATIENT)
Dept: HEMATOLOGY/ONCOLOGY | Facility: HOSPITAL | Age: 83
End: 2023-02-10
Attending: INTERNAL MEDICINE
Payer: MEDICARE

## 2023-02-10 VITALS
RESPIRATION RATE: 16 BRPM | BODY MASS INDEX: 21 KG/M2 | OXYGEN SATURATION: 100 % | TEMPERATURE: 97 F | DIASTOLIC BLOOD PRESSURE: 52 MMHG | SYSTOLIC BLOOD PRESSURE: 88 MMHG | WEIGHT: 143 LBS | HEART RATE: 87 BPM

## 2023-02-10 DIAGNOSIS — C85.90 LYMPHOMA, UNSPECIFIED BODY REGION, UNSPECIFIED LYMPHOMA TYPE (HCC): ICD-10-CM

## 2023-02-10 DIAGNOSIS — R53.1 WEAKNESS: Primary | ICD-10-CM

## 2023-02-10 LAB
ALBUMIN SERPL-MCNC: 3.1 G/DL (ref 3.4–5)
ALBUMIN/GLOB SERPL: 1 {RATIO} (ref 1–2)
ALP LIVER SERPL-CCNC: 60 U/L
ALT SERPL-CCNC: 9 U/L
ANION GAP SERPL CALC-SCNC: 5 MMOL/L (ref 0–18)
AST SERPL-CCNC: 7 U/L (ref 15–37)
BASOPHILS # BLD AUTO: 0.02 X10(3) UL (ref 0–0.2)
BASOPHILS NFR BLD AUTO: 0.4 %
BILIRUB SERPL-MCNC: 0.3 MG/DL (ref 0.1–2)
BUN BLD-MCNC: 21 MG/DL (ref 7–18)
CALCIUM BLD-MCNC: 9.3 MG/DL (ref 8.5–10.1)
CHLORIDE SERPL-SCNC: 101 MMOL/L (ref 98–112)
CO2 SERPL-SCNC: 34 MMOL/L (ref 21–32)
CREAT BLD-MCNC: 0.78 MG/DL
EOSINOPHIL # BLD AUTO: 0.15 X10(3) UL (ref 0–0.7)
EOSINOPHIL NFR BLD AUTO: 2.8 %
ERYTHROCYTE [DISTWIDTH] IN BLOOD BY AUTOMATED COUNT: 16.9 %
FASTING STATUS PATIENT QL REPORTED: NO
GFR SERPLBLD BASED ON 1.73 SQ M-ARVRAT: 76 ML/MIN/1.73M2 (ref 60–?)
GLOBULIN PLAS-MCNC: 3 G/DL (ref 2.8–4.4)
GLUCOSE BLD-MCNC: 214 MG/DL (ref 70–99)
HCT VFR BLD AUTO: 36.4 %
HGB BLD-MCNC: 11.1 G/DL
IMM GRANULOCYTES # BLD AUTO: 0.09 X10(3) UL (ref 0–1)
IMM GRANULOCYTES NFR BLD: 1.7 %
LDH SERPL L TO P-CCNC: 177 U/L
LYMPHOCYTES # BLD AUTO: 0.3 X10(3) UL (ref 1–4)
LYMPHOCYTES NFR BLD AUTO: 5.5 %
MCH RBC QN AUTO: 29.7 PG (ref 26–34)
MCHC RBC AUTO-ENTMCNC: 30.5 G/DL (ref 31–37)
MCV RBC AUTO: 97.3 FL
MONOCYTES # BLD AUTO: 0.49 X10(3) UL (ref 0.1–1)
MONOCYTES NFR BLD AUTO: 9 %
NEUTROPHILS # BLD AUTO: 4.4 X10 (3) UL (ref 1.5–7.7)
NEUTROPHILS # BLD AUTO: 4.4 X10(3) UL (ref 1.5–7.7)
NEUTROPHILS NFR BLD AUTO: 80.6 %
OSMOLALITY SERPL CALC.SUM OF ELEC: 299 MOSM/KG (ref 275–295)
PLATELET # BLD AUTO: 145 10(3)UL (ref 150–450)
POTASSIUM SERPL-SCNC: 4 MMOL/L (ref 3.5–5.1)
PROT SERPL-MCNC: 6.1 G/DL (ref 6.4–8.2)
RBC # BLD AUTO: 3.74 X10(6)UL
SODIUM SERPL-SCNC: 140 MMOL/L (ref 136–145)
WBC # BLD AUTO: 5.5 X10(3) UL (ref 4–11)

## 2023-02-10 PROCEDURE — 99214 OFFICE O/P EST MOD 30 MIN: CPT | Performed by: INTERNAL MEDICINE

## 2023-02-10 RX ORDER — ONDANSETRON HYDROCHLORIDE 8 MG/1
8 TABLET, FILM COATED ORAL EVERY 8 HOURS PRN
Qty: 60 TABLET | Refills: 3 | Status: SHIPPED | OUTPATIENT
Start: 2023-02-10 | End: 2023-02-10

## 2023-02-10 RX ORDER — ONDANSETRON HYDROCHLORIDE 8 MG/1
8 TABLET, FILM COATED ORAL EVERY 8 HOURS PRN
Qty: 90 TABLET | Refills: 3 | Status: SHIPPED | OUTPATIENT
Start: 2023-02-10

## 2023-02-10 NOTE — PROGRESS NOTES
Patient is here for MD f/u for Lymphoma. Patient has had a 10 lb weight loss since December. Not much of an appetite. No dizziness but does feel weak. She finds she is sleeping more. Blood pressure runs low. Patient had MOHS procedure on the right side of her head. No fevers, night sweats or chills. Denies pain.        Education Record    Learner:  Patient and Family Member    Disease / Diagnosis:  Lymphoma     Barriers / Limitations:  None   Comments:    Method:  Discussion   Comments:    General Topics:  Plan of care reviewed   Comments:    Outcome:  Shows understanding   Comments:

## 2023-02-10 NOTE — TELEPHONE ENCOUNTER
Omeprazole not on med list. She has esomeprazole listed from an external prescriber. It looks like she has seen [facility]. Recommend f/u with GI for refills.

## 2023-02-11 NOTE — PROGRESS NOTES
[facility]    PATIENT'S NAME: Lily MCCONNELL   ATTENDING PHYSICIAN: Nic Carlton M.D. PATIENT ACCOUNT #: [de-identified] LOCATION: [address] [place] [address] RECORD #: [id number] YOB: 1940   DATE OF SERVICE: 02/10/2023       CANCER CENTER PROGRESS NOTE    CHIEF COMPLAINT:  Followup for prior history of diffuse large B-cell lymphoma. HISTORY OF PRESENT ILLNESS:  The patient is an 41-year-old female. 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 She had a large mass that arose near the gastrohepatic ligament. We ended up treating it with R-CHOP. She did not have a MYC rearrangement. She ended up getting 5 of 6 planned cycles of chemotherapy with R-CHOP. She is elderly, relatively frail, and ended up hospitalized multiple times through the course of the treatment. She has pre-existing congestive heart failure with diastolic dysfunction, and she is followed closely by Cardiology as well. We were unable to give her the sixth cycle. She has had a negative PET scan since partway through the treatment, and she did have one in November after completion of treatment that continued to show evidence of a complete response. She does have pleural effusions. She does have a history of achalasia and a variety of other medical problems. She sees Dr. Dewey Bonilla for her general medical care. She is still relatively weak. She is in a wheelchair today. She has had hair regrowth. She does not have much appetite, and she has lost about 10 pounds since December which she attributes to her swallowing difficulties. She has no dizziness. She has been sleeping more. Her blood pressure runs on the low side. She did have a Mohs procedure on the right temporal area recently for a cutaneous malignancy. It is healing. She has no fevers, no night sweats, no chills. She has no upper abdominal pain.       MEDICATIONS:  Her current medications include acyclovir 400 mg twice daily, allopurinol 300 mg in the morning, aspirin 81 mg daily, cholecalciferol 1000 units daily, esomeprazole 20 mg in the morning, levothyroxine 50 mcg daily, metformin 500 mg daily, metoprolol tartrate 12.5 mg twice daily, multivitamin daily, ondansetron 8 mg q.8 h. p.r.n., potassium chloride 10 mEq twice daily, pravastatin 80 mg daily, prochlorperazine 10 mg q.6 h. p.r.n., rivaroxaban 20 mg daily, torsemide 20 mg daily. PHYSICAL EXAMINATION:    GENERAL:  She is an elderly female who looks her stated age. VITAL SIGNS:  Her performance status is 1 to 2. Her weight is 143 pounds, which is down by about 10 pounds since December 2. Blood pressure is 188/52, pulse 87, respiratory rate is 20, temperature is 97.1. HEENT:  Mohs surgery in the right temporal area with some ecchymosis around her right eye. LYMPHATICS:  She has no palpable nodes in the neck, supraclavicular, or axillary regions. LUNGS:  Clear. HEART:  Normal.  ABDOMEN:  A mildly protuberant abdomen with no palpable masses. EXTREMITIES:  She has no significant lower extremity edema. LABORATORY DATA:  White count is 5.5, hemoglobin 11.1, platelets are 163. Her LDH is normal.  Her chemistries are largely normal with a BUN of 21 and a creatinine 0.78. IMPRESSION:  Diffuse large B-cell lymphoma. She has had no clinical evidence of disease progression, but she does need a repeat PET scan done because she is at high risk for progression. I will have this done in early May. She is aware that I am retiring at the end of March, and I am transitioning her care to Dr. Dell Rashid. Her son is Dr. Manav Forrester from the emergency room, and I discussed this with him. She will also be switching from Dr. Kenrick Mejía, who is retiring and will likely be seeing Dr. Driss Perez for follow-up of her heart failure.       Dictated By Ronald Lobato M.D.  d: 02/10/2023 16:19:37  t: 02/10/2023 20:34:14  [facility] [id number]  DZ/    cc: KALIA Contreras MD

## 2023-02-15 RX ORDER — NICOTINE POLACRILEX 4 MG/1
20 GUM, CHEWING ORAL DAILY
Qty: 90 TABLET | Refills: 0 | OUTPATIENT
Start: 2023-02-15 | End: 2023-03-17

## 2023-02-24 RX ORDER — PRAVASTATIN SODIUM 80 MG/1
TABLET ORAL
Qty: 90 TABLET | Refills: 0 | Status: SHIPPED | OUTPATIENT
Start: 2023-02-24

## 2023-03-15 ENCOUNTER — OFFICE VISIT (OUTPATIENT)
Dept: INTERNAL MEDICINE CLINIC | Facility: CLINIC | Age: 83
End: 2023-03-15
Payer: MEDICARE

## 2023-03-15 ENCOUNTER — LAB ENCOUNTER (OUTPATIENT)
Dept: LAB | Age: 83
End: 2023-03-15
Attending: PHYSICIAN ASSISTANT
Payer: MEDICARE

## 2023-03-15 VITALS
RESPIRATION RATE: 18 BRPM | DIASTOLIC BLOOD PRESSURE: 66 MMHG | HEART RATE: 70 BPM | SYSTOLIC BLOOD PRESSURE: 120 MMHG | OXYGEN SATURATION: 95 %

## 2023-03-15 DIAGNOSIS — D64.9 NORMOCYTIC ANEMIA: ICD-10-CM

## 2023-03-15 DIAGNOSIS — E89.0 H/O PARTIAL THYROIDECTOMY: ICD-10-CM

## 2023-03-15 DIAGNOSIS — Z87.81 HISTORY OF FEMUR FRACTURE: ICD-10-CM

## 2023-03-15 DIAGNOSIS — Z86.73 HISTORY OF EMBOLIC STROKE: ICD-10-CM

## 2023-03-15 DIAGNOSIS — E11.29 MICROALBUMINURIA DUE TO TYPE 2 DIABETES MELLITUS (HCC): ICD-10-CM

## 2023-03-15 DIAGNOSIS — J44.9 CHRONIC OBSTRUCTIVE PULMONARY DISEASE, UNSPECIFIED COPD TYPE (HCC): ICD-10-CM

## 2023-03-15 DIAGNOSIS — R53.1 GENERALIZED WEAKNESS: ICD-10-CM

## 2023-03-15 DIAGNOSIS — Z95.5 STENTED CORONARY ARTERY: ICD-10-CM

## 2023-03-15 DIAGNOSIS — D69.6 THROMBOCYTOPENIA (HCC): ICD-10-CM

## 2023-03-15 DIAGNOSIS — R80.9 MICROALBUMINURIA DUE TO TYPE 2 DIABETES MELLITUS (HCC): ICD-10-CM

## 2023-03-15 DIAGNOSIS — I10 BENIGN ESSENTIAL HTN: ICD-10-CM

## 2023-03-15 DIAGNOSIS — Z00.00 ENCOUNTER FOR ANNUAL HEALTH EXAMINATION: Primary | ICD-10-CM

## 2023-03-15 DIAGNOSIS — M47.816 ARTHRITIS, LUMBAR SPINE: ICD-10-CM

## 2023-03-15 DIAGNOSIS — I25.2 HISTORY OF MI (MYOCARDIAL INFARCTION): ICD-10-CM

## 2023-03-15 DIAGNOSIS — Z85.72 HISTORY OF FOLLICULAR LYMPHOMA: ICD-10-CM

## 2023-03-15 DIAGNOSIS — I25.10 CORONARY ARTERY DISEASE INVOLVING NATIVE CORONARY ARTERY OF NATIVE HEART WITHOUT ANGINA PECTORIS: ICD-10-CM

## 2023-03-15 DIAGNOSIS — Z96.651 HISTORY OF TOTAL RIGHT KNEE REPLACEMENT: ICD-10-CM

## 2023-03-15 DIAGNOSIS — I48.91 ATRIAL FIBRILLATION, UNSPECIFIED TYPE (HCC): ICD-10-CM

## 2023-03-15 DIAGNOSIS — M46.96 INFLAMMATORY SPONDYLOPATHY OF LUMBAR REGION (HCC): ICD-10-CM

## 2023-03-15 DIAGNOSIS — E78.00 PURE HYPERCHOLESTEROLEMIA: ICD-10-CM

## 2023-03-15 DIAGNOSIS — K21.00 GASTROESOPHAGEAL REFLUX DISEASE WITH ESOPHAGITIS WITHOUT HEMORRHAGE: ICD-10-CM

## 2023-03-15 DIAGNOSIS — K22.4 CORKSCREW ESOPHAGUS: ICD-10-CM

## 2023-03-15 DIAGNOSIS — Z91.81 AT HIGH RISK FOR FALLS: ICD-10-CM

## 2023-03-15 DIAGNOSIS — M20.40 HAMMER TOE, ACQUIRED: ICD-10-CM

## 2023-03-15 DIAGNOSIS — E04.1 THYROID NODULE: ICD-10-CM

## 2023-03-15 DIAGNOSIS — Z95.828 HISTORY OF REPAIR OF ANEURYSM OF ABDOMINAL AORTA USING ENDOVASCULAR STENT GRAFT: ICD-10-CM

## 2023-03-15 DIAGNOSIS — G89.29 CHRONIC BILATERAL LOW BACK PAIN WITHOUT SCIATICA: ICD-10-CM

## 2023-03-15 DIAGNOSIS — I50.32 CHRONIC DIASTOLIC CHF (CONGESTIVE HEART FAILURE) (HCC): ICD-10-CM

## 2023-03-15 DIAGNOSIS — H35.30 MACULAR DEGENERATION, UNSPECIFIED LATERALITY, UNSPECIFIED TYPE: ICD-10-CM

## 2023-03-15 DIAGNOSIS — I70.0 AORTIC ATHEROSCLEROSIS (HCC): ICD-10-CM

## 2023-03-15 DIAGNOSIS — M54.50 CHRONIC BILATERAL LOW BACK PAIN WITHOUT SCIATICA: ICD-10-CM

## 2023-03-15 DIAGNOSIS — D61.818 OTHER PANCYTOPENIA (HCC): ICD-10-CM

## 2023-03-15 DIAGNOSIS — R35.0 URINARY FREQUENCY: ICD-10-CM

## 2023-03-15 DIAGNOSIS — E55.9 VITAMIN D DEFICIENCY: ICD-10-CM

## 2023-03-15 DIAGNOSIS — E04.9 ENLARGED THYROID: ICD-10-CM

## 2023-03-15 DIAGNOSIS — I70.1 RENAL ARTERY STENOSIS (HCC): ICD-10-CM

## 2023-03-15 PROBLEM — E86.0 DEHYDRATION: Status: RESOLVED | Noted: 2022-05-25 | Resolved: 2023-01-01

## 2023-03-15 PROBLEM — R60.0 LOWER EXTREMITY EDEMA: Status: RESOLVED | Noted: 2022-08-31 | Resolved: 2023-01-01

## 2023-03-15 PROBLEM — E87.3 METABOLIC ALKALOSIS: Status: RESOLVED | Noted: 2022-07-12 | Resolved: 2023-01-01

## 2023-03-15 PROBLEM — D70.9 NEUTROPENIA, UNSPECIFIED TYPE (HCC): Status: RESOLVED | Noted: 2022-05-25 | Resolved: 2023-03-15

## 2023-03-15 PROBLEM — R10.9 ABDOMINAL PAIN: Status: RESOLVED | Noted: 2022-05-05 | Resolved: 2023-01-01

## 2023-03-15 PROBLEM — E87.70 HYPERVOLEMIA: Status: RESOLVED | Noted: 2022-07-12 | Resolved: 2023-03-15

## 2023-03-15 PROBLEM — R60.0 LOWER EXTREMITY EDEMA: Status: RESOLVED | Noted: 2022-08-31 | Resolved: 2023-03-15

## 2023-03-15 PROBLEM — R06.00 DYSPNEA, UNSPECIFIED TYPE: Status: RESOLVED | Noted: 2022-07-12 | Resolved: 2023-03-15

## 2023-03-15 PROBLEM — I50.9 ACUTE ON CHRONIC CONGESTIVE HEART FAILURE, UNSPECIFIED HEART FAILURE TYPE (HCC): Status: RESOLVED | Noted: 2020-01-27 | Resolved: 2023-03-15

## 2023-03-15 PROBLEM — E87.6 HYPOKALEMIA: Status: RESOLVED | Noted: 2022-07-05 | Resolved: 2023-01-01

## 2023-03-15 PROBLEM — Z79.4 TYPE 2 DIABETES MELLITUS WITH HYPERGLYCEMIA, WITH LONG-TERM CURRENT USE OF INSULIN (HCC): Status: RESOLVED | Noted: 2022-05-05 | Resolved: 2023-03-15

## 2023-03-15 PROBLEM — E83.42 HYPOMAGNESEMIA: Status: RESOLVED | Noted: 2022-07-05 | Resolved: 2023-01-01

## 2023-03-15 PROBLEM — R06.00 DYSPNEA, UNSPECIFIED TYPE: Status: RESOLVED | Noted: 2022-07-12 | Resolved: 2023-01-01

## 2023-03-15 PROBLEM — D70.9 NEUTROPENIA, UNSPECIFIED TYPE (HCC): Status: RESOLVED | Noted: 2022-05-25 | Resolved: 2023-01-01

## 2023-03-15 PROBLEM — R19.00 ABDOMINAL MASS: Status: RESOLVED | Noted: 2022-05-07 | Resolved: 2023-01-01

## 2023-03-15 PROBLEM — D64.81 ANEMIA ASSOCIATED WITH CHEMOTHERAPY: Status: RESOLVED | Noted: 2022-08-12 | Resolved: 2023-03-15

## 2023-03-15 PROBLEM — R10.9 ABDOMINAL PAIN: Status: RESOLVED | Noted: 2022-05-05 | Resolved: 2023-03-15

## 2023-03-15 PROBLEM — R19.00 ABDOMINAL MASS: Status: RESOLVED | Noted: 2022-05-07 | Resolved: 2023-03-15

## 2023-03-15 PROBLEM — E11.65 UNCONTROLLED TYPE 2 DIABETES MELLITUS WITH HYPERGLYCEMIA (HCC): Status: RESOLVED | Noted: 2022-04-19 | Resolved: 2023-01-01

## 2023-03-15 PROBLEM — R11.2 NAUSEA AND VOMITING, UNSPECIFIED VOMITING TYPE: Status: RESOLVED | Noted: 2022-07-05 | Resolved: 2023-01-01

## 2023-03-15 PROBLEM — R79.89 AZOTEMIA: Status: RESOLVED | Noted: 2022-07-12 | Resolved: 2023-01-01

## 2023-03-15 PROBLEM — R19.06 EPIGASTRIC MASS: Status: RESOLVED | Noted: 2022-05-06 | Resolved: 2023-01-01

## 2023-03-15 PROBLEM — I99.8 ISCHEMIA OF FOOT: Status: RESOLVED | Noted: 2022-05-13 | Resolved: 2023-03-15

## 2023-03-15 PROBLEM — R23.8 SKIN BULLA: Status: RESOLVED | Noted: 2022-10-18 | Resolved: 2023-03-15

## 2023-03-15 PROBLEM — D64.81 ANEMIA ASSOCIATED WITH CHEMOTHERAPY: Status: RESOLVED | Noted: 2022-08-12 | Resolved: 2023-01-01

## 2023-03-15 PROBLEM — T45.1X5A ANEMIA ASSOCIATED WITH CHEMOTHERAPY: Status: RESOLVED | Noted: 2022-08-12 | Resolved: 2023-03-15

## 2023-03-15 PROBLEM — R79.89 AZOTEMIA: Status: RESOLVED | Noted: 2022-07-12 | Resolved: 2023-03-15

## 2023-03-15 PROBLEM — I71.40 AAA (ABDOMINAL AORTIC ANEURYSM) (HCC): Status: RESOLVED | Noted: 2022-08-12 | Resolved: 2023-03-15

## 2023-03-15 PROBLEM — E87.70 HYPERVOLEMIA: Status: RESOLVED | Noted: 2022-07-12 | Resolved: 2023-01-01

## 2023-03-15 PROBLEM — I50.33 ACUTE ON CHRONIC DIASTOLIC CONGESTIVE HEART FAILURE (HCC): Status: RESOLVED | Noted: 2022-10-19 | Resolved: 2023-03-15

## 2023-03-15 PROBLEM — J18.9 COMMUNITY ACQUIRED PNEUMONIA OF LEFT LOWER LOBE OF LUNG: Status: RESOLVED | Noted: 2022-10-18 | Resolved: 2023-03-15

## 2023-03-15 PROBLEM — Z79.4 TYPE 2 DIABETES MELLITUS WITH HYPERGLYCEMIA, WITH LONG-TERM CURRENT USE OF INSULIN (HCC): Status: RESOLVED | Noted: 2022-05-05 | Resolved: 2023-01-01

## 2023-03-15 PROBLEM — R11.10 INTRACTABLE VOMITING: Status: RESOLVED | Noted: 2022-05-05 | Resolved: 2023-01-01

## 2023-03-15 PROBLEM — E83.42 HYPOMAGNESEMIA: Status: RESOLVED | Noted: 2022-07-05 | Resolved: 2023-03-15

## 2023-03-15 PROBLEM — I99.8 ISCHEMIA OF FOOT: Status: RESOLVED | Noted: 2022-05-13 | Resolved: 2023-01-01

## 2023-03-15 PROBLEM — R11.2 NAUSEA AND VOMITING, UNSPECIFIED VOMITING TYPE: Status: RESOLVED | Noted: 2022-07-05 | Resolved: 2023-03-15

## 2023-03-15 PROBLEM — C82.11 GRADE 2 FOLLICULAR LYMPHOMA OF LYMPH NODES OF NECK (HCC): Status: RESOLVED | Noted: 2022-03-16 | Resolved: 2023-01-01

## 2023-03-15 PROBLEM — C82.11 GRADE 2 FOLLICULAR LYMPHOMA OF LYMPH NODES OF NECK (HCC): Status: RESOLVED | Noted: 2022-03-16 | Resolved: 2023-03-15

## 2023-03-15 PROBLEM — T45.1X5A ANEMIA ASSOCIATED WITH CHEMOTHERAPY: Status: RESOLVED | Noted: 2022-08-12 | Resolved: 2023-01-01

## 2023-03-15 PROBLEM — R11.10 INTRACTABLE VOMITING: Status: RESOLVED | Noted: 2022-05-05 | Resolved: 2023-03-15

## 2023-03-15 PROBLEM — E11.65 TYPE 2 DIABETES MELLITUS WITH HYPERGLYCEMIA, WITH LONG-TERM CURRENT USE OF INSULIN (HCC): Status: RESOLVED | Noted: 2022-05-05 | Resolved: 2023-03-15

## 2023-03-15 PROBLEM — E87.6 HYPOKALEMIA: Status: RESOLVED | Noted: 2022-07-05 | Resolved: 2023-03-15

## 2023-03-15 PROBLEM — E79.0 HYPERURICEMIA: Status: RESOLVED | Noted: 2022-05-13 | Resolved: 2023-03-15

## 2023-03-15 PROBLEM — R19.06 EPIGASTRIC MASS: Status: RESOLVED | Noted: 2022-05-06 | Resolved: 2023-03-15

## 2023-03-15 PROBLEM — I71.40 AAA (ABDOMINAL AORTIC ANEURYSM) (HCC): Status: RESOLVED | Noted: 2022-08-12 | Resolved: 2023-01-01

## 2023-03-15 PROBLEM — E79.0 HYPERURICEMIA: Status: RESOLVED | Noted: 2022-05-13 | Resolved: 2023-01-01

## 2023-03-15 PROBLEM — J18.9 COMMUNITY ACQUIRED PNEUMONIA OF LEFT LOWER LOBE OF LUNG: Status: RESOLVED | Noted: 2022-10-18 | Resolved: 2023-01-01

## 2023-03-15 PROBLEM — E11.65 UNCONTROLLED TYPE 2 DIABETES MELLITUS WITH HYPERGLYCEMIA (HCC): Status: RESOLVED | Noted: 2022-04-19 | Resolved: 2023-03-15

## 2023-03-15 PROBLEM — E11.65 TYPE 2 DIABETES MELLITUS WITH HYPERGLYCEMIA, WITH LONG-TERM CURRENT USE OF INSULIN (HCC): Status: RESOLVED | Noted: 2022-05-05 | Resolved: 2023-01-01

## 2023-03-15 PROBLEM — I50.9 ACUTE ON CHRONIC CONGESTIVE HEART FAILURE, UNSPECIFIED HEART FAILURE TYPE (HCC): Status: RESOLVED | Noted: 2020-01-27 | Resolved: 2023-01-01

## 2023-03-15 PROBLEM — E86.0 DEHYDRATION: Status: RESOLVED | Noted: 2022-05-25 | Resolved: 2023-03-15

## 2023-03-15 PROBLEM — E87.3 METABOLIC ALKALOSIS: Status: RESOLVED | Noted: 2022-07-12 | Resolved: 2023-03-15

## 2023-03-15 PROBLEM — I50.33 ACUTE ON CHRONIC DIASTOLIC CONGESTIVE HEART FAILURE (HCC): Status: RESOLVED | Noted: 2022-10-19 | Resolved: 2023-01-01

## 2023-03-15 PROBLEM — R23.8 SKIN BULLA: Status: RESOLVED | Noted: 2022-10-18 | Resolved: 2023-01-01

## 2023-03-15 LAB
ALBUMIN SERPL-MCNC: 2.8 G/DL (ref 3.4–5)
ALBUMIN/GLOB SERPL: 0.9 {RATIO} (ref 1–2)
ALP LIVER SERPL-CCNC: 61 U/L
ALT SERPL-CCNC: 10 U/L
ANION GAP SERPL CALC-SCNC: 5 MMOL/L (ref 0–18)
APPEARANCE: CLEAR
AST SERPL-CCNC: 9 U/L (ref 15–37)
BASOPHILS # BLD AUTO: 0.03 X10(3) UL (ref 0–0.2)
BASOPHILS NFR BLD AUTO: 0.8 %
BILIRUB SERPL-MCNC: 0.4 MG/DL (ref 0.1–2)
BILIRUBIN: NEGATIVE
BUN BLD-MCNC: 16 MG/DL (ref 7–18)
CALCIUM BLD-MCNC: 8.9 MG/DL (ref 8.5–10.1)
CHLORIDE SERPL-SCNC: 102 MMOL/L (ref 98–112)
CO2 SERPL-SCNC: 32 MMOL/L (ref 21–32)
CREAT BLD-MCNC: 0.85 MG/DL
CREAT UR-SCNC: 74.8 MG/DL
EOSINOPHIL # BLD AUTO: 0.07 X10(3) UL (ref 0–0.7)
EOSINOPHIL NFR BLD AUTO: 1.8 %
ERYTHROCYTE [DISTWIDTH] IN BLOOD BY AUTOMATED COUNT: 17.9 %
EST. AVERAGE GLUCOSE BLD GHB EST-MCNC: 128 MG/DL (ref 68–126)
FASTING STATUS PATIENT QL REPORTED: NO
GFR SERPLBLD BASED ON 1.73 SQ M-ARVRAT: 68 ML/MIN/1.73M2 (ref 60–?)
GLOBULIN PLAS-MCNC: 3.2 G/DL (ref 2.8–4.4)
GLUCOSE (URINE DIPSTICK): NEGATIVE MG/DL
GLUCOSE BLD-MCNC: 180 MG/DL (ref 70–99)
HBA1C MFR BLD: 6.1 % (ref ?–5.7)
HCT VFR BLD AUTO: 33.4 %
HGB BLD-MCNC: 9.8 G/DL
IMM GRANULOCYTES # BLD AUTO: 0.09 X10(3) UL (ref 0–1)
IMM GRANULOCYTES NFR BLD: 2.3 %
KETONES (URINE DIPSTICK): NEGATIVE MG/DL
LEUKOCYTES: NEGATIVE
LYMPHOCYTES # BLD AUTO: 0.26 X10(3) UL (ref 1–4)
LYMPHOCYTES NFR BLD AUTO: 6.5 %
MCH RBC QN AUTO: 29.3 PG (ref 26–34)
MCHC RBC AUTO-ENTMCNC: 29.3 G/DL (ref 31–37)
MCV RBC AUTO: 99.7 FL
MICROALBUMIN UR-MCNC: 0.53 MG/DL
MICROALBUMIN/CREAT 24H UR-RTO: 7.1 UG/MG (ref ?–30)
MONOCYTES # BLD AUTO: 0.6 X10(3) UL (ref 0.1–1)
MONOCYTES NFR BLD AUTO: 15.1 %
NEUTROPHILS # BLD AUTO: 2.92 X10 (3) UL (ref 1.5–7.7)
NEUTROPHILS # BLD AUTO: 2.92 X10(3) UL (ref 1.5–7.7)
NEUTROPHILS NFR BLD AUTO: 73.5 %
NITRITE, URINE: NEGATIVE
OCCULT BLOOD: NEGATIVE
OSMOLALITY SERPL CALC.SUM OF ELEC: 294 MOSM/KG (ref 275–295)
PH, URINE: 5.5 (ref 4.5–8)
PLATELET # BLD AUTO: 130 10(3)UL (ref 150–450)
POTASSIUM SERPL-SCNC: 4.8 MMOL/L (ref 3.5–5.1)
PROT SERPL-MCNC: 6 G/DL (ref 6.4–8.2)
PROTEIN (URINE DIPSTICK): NEGATIVE MG/DL
RBC # BLD AUTO: 3.35 X10(6)UL
SODIUM SERPL-SCNC: 139 MMOL/L (ref 136–145)
SPECIFIC GRAVITY: 1 (ref 1–1.03)
UROBILINOGEN,SEMI-QN: 0.2 MG/DL (ref 0–1.9)
WBC # BLD AUTO: 4 X10(3) UL (ref 4–11)

## 2023-03-15 PROCEDURE — 82043 UR ALBUMIN QUANTITATIVE: CPT | Performed by: PHYSICIAN ASSISTANT

## 2023-03-15 PROCEDURE — 82570 ASSAY OF URINE CREATININE: CPT | Performed by: PHYSICIAN ASSISTANT

## 2023-03-15 PROCEDURE — 99397 PER PM REEVAL EST PAT 65+ YR: CPT | Performed by: PHYSICIAN ASSISTANT

## 2023-03-15 PROCEDURE — 82728 ASSAY OF FERRITIN: CPT

## 2023-03-15 PROCEDURE — 3074F SYST BP LT 130 MM HG: CPT | Performed by: PHYSICIAN ASSISTANT

## 2023-03-15 PROCEDURE — G0439 PPPS, SUBSEQ VISIT: HCPCS | Performed by: PHYSICIAN ASSISTANT

## 2023-03-15 PROCEDURE — 36415 COLL VENOUS BLD VENIPUNCTURE: CPT

## 2023-03-15 PROCEDURE — 87086 URINE CULTURE/COLONY COUNT: CPT | Performed by: PHYSICIAN ASSISTANT

## 2023-03-15 PROCEDURE — 1126F AMNT PAIN NOTED NONE PRSNT: CPT | Performed by: PHYSICIAN ASSISTANT

## 2023-03-15 PROCEDURE — 96160 PT-FOCUSED HLTH RISK ASSMT: CPT | Performed by: PHYSICIAN ASSISTANT

## 2023-03-15 PROCEDURE — 3078F DIAST BP <80 MM HG: CPT | Performed by: PHYSICIAN ASSISTANT

## 2023-03-15 PROCEDURE — 85025 COMPLETE CBC W/AUTO DIFF WBC: CPT

## 2023-03-15 PROCEDURE — 83036 HEMOGLOBIN GLYCOSYLATED A1C: CPT

## 2023-03-15 PROCEDURE — 3008F BODY MASS INDEX DOCD: CPT | Performed by: PHYSICIAN ASSISTANT

## 2023-03-15 PROCEDURE — 83921 ORGANIC ACID SINGLE QUANT: CPT

## 2023-03-15 PROCEDURE — 81003 URINALYSIS AUTO W/O SCOPE: CPT | Performed by: PHYSICIAN ASSISTANT

## 2023-03-15 PROCEDURE — 85045 AUTOMATED RETICULOCYTE COUNT: CPT

## 2023-03-15 PROCEDURE — 80053 COMPREHEN METABOLIC PANEL: CPT

## 2023-03-15 PROCEDURE — 83550 IRON BINDING TEST: CPT

## 2023-03-15 PROCEDURE — 83540 ASSAY OF IRON: CPT

## 2023-03-15 PROCEDURE — 82607 VITAMIN B-12: CPT

## 2023-03-15 RX ORDER — OMEPRAZOLE 40 MG/1
40 CAPSULE, DELAYED RELEASE ORAL DAILY
Qty: 90 CAPSULE | Refills: 1 | Status: SHIPPED | OUTPATIENT
Start: 2023-03-15

## 2023-03-15 RX ORDER — OMEPRAZOLE 40 MG/1
40 CAPSULE, DELAYED RELEASE ORAL DAILY
COMMUNITY
End: 2023-03-15

## 2023-03-17 ENCOUNTER — TELEPHONE (OUTPATIENT)
Dept: HEMATOLOGY/ONCOLOGY | Facility: HOSPITAL | Age: 83
End: 2023-03-17

## 2023-03-17 DIAGNOSIS — D64.9 NORMOCYTIC ANEMIA: Primary | ICD-10-CM

## 2023-03-17 LAB
DEPRECATED HBV CORE AB SER IA-ACNC: 124.7 NG/ML
HGB RETIC QN AUTO: 27.9 PG (ref 28.2–36.6)
IMM RETICS NFR: 0.2 RATIO (ref 0.1–0.3)
IRON SATN MFR SERPL: 11 %
IRON SERPL-MCNC: 26 UG/DL
RETICS # AUTO: 64.8 X10(3) UL (ref 22.5–147.5)
RETICS/RBC NFR AUTO: 1.9 %
TIBC SERPL-MCNC: 228 UG/DL (ref 240–450)
TRANSFERRIN SERPL-MCNC: 153 MG/DL (ref 200–360)
VIT B12 SERPL-MCNC: 231 PG/ML (ref 193–986)

## 2023-03-17 NOTE — TELEPHONE ENCOUNTER
Spoke with Dr Carole Robbins. She remains weak. Hgb a bit lower and she has had some sweats. Wi;ll move her PET scan up. Her iron sat is low and B12 borderline. They are to start 1000 mcg B12 daily and we will need to arrange an infed infusion - orders placed. He will figure out the schedule to get her in for the infusion.   JAYESH Ruelas

## 2023-03-20 ENCOUNTER — APPOINTMENT (OUTPATIENT)
Dept: CT IMAGING | Facility: HOSPITAL | Age: 83
End: 2023-03-20
Attending: EMERGENCY MEDICINE
Payer: MEDICARE

## 2023-03-20 ENCOUNTER — HOSPITAL ENCOUNTER (EMERGENCY)
Facility: HOSPITAL | Age: 83
Discharge: HOME | End: 2023-03-20
Attending: EMERGENCY MEDICINE
Payer: MEDICARE

## 2023-03-20 VITALS
TEMPERATURE: 97 F | HEIGHT: 70 IN | SYSTOLIC BLOOD PRESSURE: 118 MMHG | HEART RATE: 74 BPM | RESPIRATION RATE: 20 BRPM | OXYGEN SATURATION: 95 % | WEIGHT: 142 LBS | DIASTOLIC BLOOD PRESSURE: 74 MMHG | BODY MASS INDEX: 20.33 KG/M2

## 2023-03-20 DIAGNOSIS — N12 PYELONEPHRITIS: Primary | ICD-10-CM

## 2023-03-20 LAB
ALBUMIN SERPL-MCNC: 2.6 G/DL (ref 3.4–5)
ALBUMIN/GLOB SERPL: 0.8 {RATIO} (ref 1–2)
ALP LIVER SERPL-CCNC: 59 U/L
ALT SERPL-CCNC: 10 U/L
ANION GAP SERPL CALC-SCNC: 6 MMOL/L (ref 0–18)
AST SERPL-CCNC: 8 U/L (ref 15–37)
BASOPHILS # BLD AUTO: 0.03 X10(3) UL (ref 0–0.2)
BASOPHILS NFR BLD AUTO: 0.6 %
BILIRUB SERPL-MCNC: 0.4 MG/DL (ref 0.1–2)
BILIRUB UR QL STRIP.AUTO: NEGATIVE
BUN BLD-MCNC: 19 MG/DL (ref 7–18)
CALCIUM BLD-MCNC: 8.8 MG/DL (ref 8.5–10.1)
CHLORIDE SERPL-SCNC: 105 MMOL/L (ref 98–112)
CO2 SERPL-SCNC: 29 MMOL/L (ref 21–32)
COLOR UR AUTO: YELLOW
CREAT BLD-MCNC: 1.03 MG/DL
EOSINOPHIL # BLD AUTO: 0.1 X10(3) UL (ref 0–0.7)
EOSINOPHIL NFR BLD AUTO: 2 %
ERYTHROCYTE [DISTWIDTH] IN BLOOD BY AUTOMATED COUNT: 17.4 %
GFR SERPLBLD BASED ON 1.73 SQ M-ARVRAT: 54 ML/MIN/1.73M2 (ref 60–?)
GLOBULIN PLAS-MCNC: 3.1 G/DL (ref 2.8–4.4)
GLUCOSE BLD-MCNC: 136 MG/DL (ref 70–99)
GLUCOSE UR STRIP.AUTO-MCNC: NEGATIVE MG/DL
HCT VFR BLD AUTO: 31.7 %
HGB BLD-MCNC: 9.6 G/DL
IMM GRANULOCYTES # BLD AUTO: 0.18 X10(3) UL (ref 0–1)
IMM GRANULOCYTES NFR BLD: 3.5 %
KETONES UR STRIP.AUTO-MCNC: NEGATIVE MG/DL
LYMPHOCYTES # BLD AUTO: 0.27 X10(3) UL (ref 1–4)
LYMPHOCYTES NFR BLD AUTO: 5.3 %
MCH RBC QN AUTO: 28.6 PG (ref 26–34)
MCHC RBC AUTO-ENTMCNC: 30.3 G/DL (ref 31–37)
MCV RBC AUTO: 94.3 FL
MONOCYTES # BLD AUTO: 0.63 X10(3) UL (ref 0.1–1)
MONOCYTES NFR BLD AUTO: 12.4 %
NEUTROPHILS # BLD AUTO: 3.87 X10 (3) UL (ref 1.5–7.7)
NEUTROPHILS # BLD AUTO: 3.87 X10(3) UL (ref 1.5–7.7)
NEUTROPHILS NFR BLD AUTO: 76.2 %
NITRITE UR QL STRIP.AUTO: NEGATIVE
OSMOLALITY SERPL CALC.SUM OF ELEC: 294 MOSM/KG (ref 275–295)
PH UR STRIP.AUTO: 7 [PH] (ref 5–8)
PLATELET # BLD AUTO: 178 10(3)UL (ref 150–450)
POTASSIUM SERPL-SCNC: 5.2 MMOL/L (ref 3.5–5.1)
PROT SERPL-MCNC: 5.7 G/DL (ref 6.4–8.2)
PROT UR STRIP.AUTO-MCNC: >=500 MG/DL
RBC # BLD AUTO: 3.36 X10(6)UL
RBC #/AREA URNS AUTO: >10 /HPF
SARS-COV-2 RNA RESP QL NAA+PROBE: NOT DETECTED
SODIUM SERPL-SCNC: 140 MMOL/L (ref 136–145)
SP GR UR STRIP.AUTO: 1.01 (ref 1–1.03)
UROBILINOGEN UR STRIP.AUTO-MCNC: 4 MG/DL
WBC # BLD AUTO: 5.1 X10(3) UL (ref 4–11)
WBC #/AREA URNS AUTO: >50 /HPF
WBC CLUMPS UR QL AUTO: PRESENT /HPF

## 2023-03-20 PROCEDURE — 87086 URINE CULTURE/COLONY COUNT: CPT | Performed by: EMERGENCY MEDICINE

## 2023-03-20 PROCEDURE — 83921 ORGANIC ACID SINGLE QUANT: CPT | Performed by: INTERNAL MEDICINE

## 2023-03-20 PROCEDURE — 74177 CT ABD & PELVIS W/CONTRAST: CPT | Performed by: EMERGENCY MEDICINE

## 2023-03-20 PROCEDURE — 99285 EMERGENCY DEPT VISIT HI MDM: CPT

## 2023-03-20 PROCEDURE — 85025 COMPLETE CBC W/AUTO DIFF WBC: CPT | Performed by: EMERGENCY MEDICINE

## 2023-03-20 PROCEDURE — 87186 SC STD MICRODIL/AGAR DIL: CPT | Performed by: EMERGENCY MEDICINE

## 2023-03-20 PROCEDURE — 87088 URINE BACTERIA CULTURE: CPT | Performed by: EMERGENCY MEDICINE

## 2023-03-20 PROCEDURE — 81001 URINALYSIS AUTO W/SCOPE: CPT | Performed by: EMERGENCY MEDICINE

## 2023-03-20 PROCEDURE — 80053 COMPREHEN METABOLIC PANEL: CPT | Performed by: EMERGENCY MEDICINE

## 2023-03-20 PROCEDURE — 96365 THER/PROPH/DIAG IV INF INIT: CPT

## 2023-03-20 RX ORDER — PHENAZOPYRIDINE HYDROCHLORIDE 200 MG/1
200 TABLET, FILM COATED ORAL 3 TIMES DAILY PRN
Qty: 6 TABLET | Refills: 0 | Status: SHIPPED | OUTPATIENT
Start: 2023-03-20 | End: 2023-03-27

## 2023-03-20 RX ORDER — CEFPODOXIME PROXETIL 200 MG/1
200 TABLET, FILM COATED ORAL 2 TIMES DAILY
Qty: 28 TABLET | Refills: 0 | Status: SHIPPED | OUTPATIENT
Start: 2023-03-20 | End: 2023-04-03

## 2023-03-20 RX ORDER — HYDROCODONE BITARTRATE AND ACETAMINOPHEN 5; 325 MG/1; MG/1
1-2 TABLET ORAL EVERY 6 HOURS PRN
Qty: 10 TABLET | Refills: 0 | Status: SHIPPED | OUTPATIENT
Start: 2023-03-20 | End: 2023-03-25

## 2023-03-20 NOTE — ED INITIAL ASSESSMENT (HPI)
Pt presents to ED with c/o abdominal pain, lower back/bilateral flank pain, nights sweat, and urinary frequency. Pt c/o of dysuria.

## 2023-03-20 NOTE — ANESTHESIA PROCEDURE NOTES
[facility]  Performed by: Aster Cardona MD  Authorized by: Aster Cardona MD       General Information and Staff    Start Time:  3/3/2020 6:17 PM  End Time:  3/3/2020 6:22 PM  Anesthesiologist:  Aster Cardona MD  Patient Location:  OR    [facility]
Normal

## 2023-03-21 ENCOUNTER — TELEPHONE (OUTPATIENT)
Dept: INTERNAL MEDICINE CLINIC | Facility: CLINIC | Age: 83
End: 2023-03-21

## 2023-03-21 NOTE — TELEPHONE ENCOUNTER
No, this is something that Dr. Patience Thomas would do and has already communicated with me that he wants. The PET is already ordered. She would contact Dr. Patience Thomas or talk with her son, Dr. Jennifer Sosa (ER). Dr. Daryn Jesus messages said he was wanting PET sooner and would communicate with Dr. Jennifer Sosa (son).

## 2023-03-21 NOTE — PLAN OF CARE
[email or web address]. com    Melatonin- 2-10 mg qhs for insomnia as needed    Headache management instructions  - When patient has a moderate to severe headache, they should seek rest, initiate relaxation and apply cold compresses to the head. - Maintain regular sleep schedule. Adults need at least 7-8 hours of uninterrupted a night. - Limit over the counter medications such as Tylenol, Ibuprofen, Aleve, Excedrin. (No more than 2- 3 times a week or max 10 a month). - Maintain headache diary. Free JAY for a smart phone, which can be used is "Migraine buddy"  - Limit caffeine to 1-2 cups 8 to 16 oz a day or less. - Avoid dietary trigger. (aged cheese, peanuts, MSG, aspartame and nitrates). - Patient is to have regular frequent meals to prevent headache onset. - Please drink at least 64 ounces of water a day to help remain hydrated. See flowsheets. Axo x 4, pleasant. RA,  sating well > 90%. No c/o of sob or coughing. C/o of sweating, afebrile. QID accuchecks,- , see MAR. Positive orthostatics, c/o of dizziness when standing up, high fall risk, bed alarm on. Tele-controlled afib, HR 70s. Primo Lord. Continent. BM 4/19. No c/o of pain. Refuses SCDs. C/o of slight nausea after eating, poor appetite. ED8129 diet. SBA x walker. IV rocephin. IVF. Updated patient on plan of care. Frequent roundings, needs met. Call light within reach. WCTM. VSS. GI to see tomorrow 4/20. Problem: Patient/Family Goals  Goal: Patient/Family Long Term Goal  Description: Patient's Long Term Goal: Discharge home with proper resources    Interventions:  - Follow plan of care  - See additional Care Plan goals for specific interventions  4/19/2022 2306 by Juvenal Armendariz RN  Outcome: Progressing  4/19/2022 2306 by Juvenal Armendariz RN  Outcome: Progressing  Goal: Patient/Family Short Term Goal  Description: Patient's Short Term Goal:  4/19 ([address] [facility]): Able to sleep well, stable BS    Interventions:   - Offer sleeping kit  -Offer sleeping aid  -Dim lighting  -Cluster care  -Scheduled insulin/QID accuchecks  - See additional Care Plan goals for specific interventions  4/19/2022 2306 by Juvenal Armendariz RN  Outcome: Progressing  4/19/2022 2306 by Juvenal Armendariz RN  Outcome: Progressing     Problem: SAFETY ADULT - FALL  Goal: Free from fall injury  Description: INTERVENTIONS:  - Assess pt frequently for physical needs  - Identify cognitive and physical deficits and behaviors that affect risk of falls.   - [place] fall precautions as indicated by assessment.  - Educate pt/family on patient safety including physical limitations  - Instruct pt to call for assistance with activity based on assessment  - Modify environment to reduce risk of injury  - Provide assistive devices as appropriate  - Consider OT/PT consult to assist with strengthening/mobility  - Encourage toileting schedule  4/19/2022 2306 by Oswaldo Hughes RN  Outcome: Progressing  4/19/2022 2306 by Oswaldo Hughes RN  Outcome: Progressing

## 2023-03-21 NOTE — TELEPHONE ENCOUNTER
Dr. Med Stevens wanted pt to get a pet scan done before he retires. Pt is scheduled for 3-28 and is asking us if a referral can be entered by PCP?

## 2023-03-23 ENCOUNTER — OFFICE VISIT (OUTPATIENT)
Dept: HEMATOLOGY/ONCOLOGY | Facility: HOSPITAL | Age: 83
End: 2023-03-23
Attending: INTERNAL MEDICINE
Payer: MEDICARE

## 2023-03-23 VITALS
HEART RATE: 59 BPM | BODY MASS INDEX: 22 KG/M2 | TEMPERATURE: 98 F | RESPIRATION RATE: 18 BRPM | WEIGHT: 150 LBS | DIASTOLIC BLOOD PRESSURE: 62 MMHG | SYSTOLIC BLOOD PRESSURE: 94 MMHG | OXYGEN SATURATION: 98 %

## 2023-03-23 DIAGNOSIS — C83.38 DIFFUSE LARGE B-CELL LYMPHOMA OF LYMPH NODES OF MULTIPLE REGIONS (HCC): ICD-10-CM

## 2023-03-23 DIAGNOSIS — T45.1X5A AGRANULOCYTOSIS SECONDARY TO CANCER CHEMOTHERAPY (HCC): ICD-10-CM

## 2023-03-23 DIAGNOSIS — R11.2 CHEMOTHERAPY INDUCED NAUSEA AND VOMITING: Primary | ICD-10-CM

## 2023-03-23 DIAGNOSIS — D70.1 AGRANULOCYTOSIS SECONDARY TO CANCER CHEMOTHERAPY (HCC): ICD-10-CM

## 2023-03-23 DIAGNOSIS — T45.1X5A CHEMOTHERAPY INDUCED NAUSEA AND VOMITING: Primary | ICD-10-CM

## 2023-03-23 PROCEDURE — 96365 THER/PROPH/DIAG IV INF INIT: CPT

## 2023-03-23 PROCEDURE — 96376 TX/PRO/DX INJ SAME DRUG ADON: CPT

## 2023-03-26 LAB — MMA: 0.83 UMOL/L

## 2023-03-27 ENCOUNTER — TELEPHONE (OUTPATIENT)
Dept: INTERNAL MEDICINE CLINIC | Facility: CLINIC | Age: 83
End: 2023-03-27

## 2023-03-27 NOTE — TELEPHONE ENCOUNTER
Katherine from [facility] calling with plan of care needing a verbal    1X week every other week for 5 weeks for PT  No OT therapy  Also   Please call   Patient was in to see Cori Rivera last week and then was in the hospital on th 20th. 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 Possible bladder cancer. It was said for her to follow up in the office, but it also states that she may need to follow up with vascular.     Elisabeth Johnson would like to talk to a nurse to get on the same page with everything

## 2023-03-27 NOTE — TELEPHONE ENCOUNTER
[facility] pharmacy:[id number] - [address] AT [address], [phone], [phone]    Prescription Refill Request - Patient advised can take 48-72 hours.   Patient is out of medication    Name of Medication (strength, dose, qty requested:   Levimir 3 ml pen 10 units daily  flex pen

## 2023-03-28 ENCOUNTER — HOSPITAL ENCOUNTER (OUTPATIENT)
Dept: NUCLEAR MEDICINE | Facility: HOSPITAL | Age: 83
Discharge: HOME | End: 2023-03-28
Attending: INTERNAL MEDICINE
Payer: MEDICARE

## 2023-03-28 ENCOUNTER — TELEPHONE (OUTPATIENT)
Dept: INTERNAL MEDICINE CLINIC | Facility: CLINIC | Age: 83
End: 2023-03-28

## 2023-03-28 DIAGNOSIS — C85.90 LYMPHOMA, UNSPECIFIED BODY REGION, UNSPECIFIED LYMPHOMA TYPE (HCC): ICD-10-CM

## 2023-03-28 DIAGNOSIS — T82.9XXD: Primary | ICD-10-CM

## 2023-03-28 LAB — GLUCOSE BLD-MCNC: 100 MG/DL (ref 70–99)

## 2023-03-28 PROCEDURE — 82962 GLUCOSE BLOOD TEST: CPT

## 2023-03-28 PROCEDURE — 78815 PET IMAGE W/CT SKULL-THIGH: CPT | Performed by: INTERNAL MEDICINE

## 2023-03-28 NOTE — TELEPHONE ENCOUNTER
Katherine PT [facility] notified ok for PT extension and Nursing order. Katherine notified Dr Charles Echavarria, pt's son and ER Physician should be point of contact. We will await Dr Charles cEhavarria contacting [facility].. Katherine verbalizes understanding.

## 2023-03-28 NOTE — TELEPHONE ENCOUNTER
OK for HHPT and RN. Pt had her PET today, no results available yet. Waiting on this in regards to her lymphoma and on-going/new issues. Dr. Ariane Dunne (ER physician, son) should be point of contact regarding the AAA repair. Yes, she will need to see vascular in follow up, looks like it was Dr. Cely Vo, last surgery was in Aug of 2022. I am happy to see pt in follow up after ER visit. She has a lot going on and I know her son coordinates her care. I sent a message to him as well to clarify with him what he needs help with, if he would like me to contact Cely Vo.

## 2023-03-28 NOTE — TELEPHONE ENCOUNTER
Specialty:  Interventional radiology    Full Name of Specialist:  Ranulfo Jamison    Name of the Provider Group: [facility]  Address:  [address] [place] [address]  Phone number:   Fax number :[phone]  NPI: [id number]  (NPI number of the service provider. the nurses need this information for the referral.  Its for service providers only like Surgery*, [employer], [facility] [facility], Etc. We not NOT need physician NPI's)    *Surgery: The NPI # should be of the location of the Surgery.     Date of Appointment:  4/26/23    Reason for the Appointment (be specific with diagnosis code):  Endo leak/consult    Has the patient seen a provider in our office for stated problem?: yes    Is this request for an out of network referral? no  (if yes, please have patient contact referring provider and have them fax office visit notes to triage attention):

## 2023-03-29 NOTE — TELEPHONE ENCOUNTER
Yes, Dr. Palma Lane (son) and I communicated today. Dr. Yodit Valentin referred pt to Dr. Jim Abreu regarding her endoleak. Pt already has an appt. I placed referral and let Dr. Palma Lane know.

## 2023-03-30 RX ORDER — INSULIN DETEMIR 100 [IU]/ML
10 INJECTION, SOLUTION SUBCUTANEOUS DAILY
Qty: 9 ML | Refills: 1 | Status: SHIPPED | OUTPATIENT
Start: 2023-03-30 | End: 2023-06-28

## 2023-04-04 ENCOUNTER — TELEPHONE (OUTPATIENT)
Dept: INTERNAL MEDICINE CLINIC | Facility: CLINIC | Age: 83
End: 2023-04-04

## 2023-04-05 NOTE — TELEPHONE ENCOUNTER
What are my choices in network? This pt was referred by Dr. Max Wright, her vascular surgeon to Dr. Melva Chery regarding this matter that is why the referral was entered for him.   Thx.

## 2023-04-11 RX ORDER — LEVOTHYROXINE SODIUM 0.05 MG/1
TABLET ORAL
Qty: 90 TABLET | Refills: 0 | Status: SHIPPED | OUTPATIENT
Start: 2023-04-11

## 2023-04-11 RX ORDER — BLOOD SUGAR DIAGNOSTIC
STRIP MISCELLANEOUS
Qty: 200 STRIP | Refills: 2 | Status: SHIPPED | OUTPATIENT
Start: 2023-04-11

## 2023-04-12 NOTE — TELEPHONE ENCOUNTER
Can have patient reach out to insurance. Pending situation and circumstances of patient can see if DM could help? Is patient independent to obtain information on her own?

## 2023-04-14 ENCOUNTER — TELEPHONE (OUTPATIENT)
Dept: INTERNAL MEDICINE CLINIC | Facility: CLINIC | Age: 83
End: 2023-04-14

## 2023-04-14 NOTE — TELEPHONE ENCOUNTER
[facility] calling needing verbal ok to move pt's visit from 4-20 to 4-27. (patients request). This will be a discharge appt as well. John Verde said his voicemail is secure and ok to leave a message.

## 2023-05-03 ENCOUNTER — TELEPHONE (OUTPATIENT)
Dept: HEMATOLOGY/ONCOLOGY | Age: 83
End: 2023-05-03

## 2023-05-03 NOTE — TELEPHONE ENCOUNTER
Patient requesting refill on medication      Acyclovir  400mg   2x a day    [facility]    Do not see on patients medication list    Please advise

## 2023-05-09 ENCOUNTER — HOSPITAL ENCOUNTER (EMERGENCY)
Facility: HOSPITAL | Age: 83
Discharge: HOME | End: 2023-05-09
Attending: EMERGENCY MEDICINE
Payer: MEDICARE

## 2023-05-09 ENCOUNTER — APPOINTMENT (OUTPATIENT)
Dept: CT IMAGING | Facility: HOSPITAL | Age: 83
End: 2023-05-09
Attending: EMERGENCY MEDICINE
Payer: MEDICARE

## 2023-05-09 VITALS
TEMPERATURE: 98 F | DIASTOLIC BLOOD PRESSURE: 68 MMHG | OXYGEN SATURATION: 97 % | HEART RATE: 84 BPM | RESPIRATION RATE: 15 BRPM | SYSTOLIC BLOOD PRESSURE: 99 MMHG

## 2023-05-09 DIAGNOSIS — R11.2 NAUSEA AND VOMITING, UNSPECIFIED VOMITING TYPE: Primary | ICD-10-CM

## 2023-05-09 LAB
ALBUMIN SERPL-MCNC: 3.5 G/DL (ref 3.4–5)
ALBUMIN/GLOB SERPL: 1.3 {RATIO} (ref 1–2)
ALP LIVER SERPL-CCNC: 68 U/L
ALT SERPL-CCNC: 11 U/L
ANION GAP SERPL CALC-SCNC: 5 MMOL/L (ref 0–18)
AST SERPL-CCNC: 19 U/L (ref 15–37)
BASOPHILS # BLD AUTO: 0.02 X10(3) UL (ref 0–0.2)
BASOPHILS NFR BLD AUTO: 0.3 %
BILIRUB SERPL-MCNC: 0.8 MG/DL (ref 0.1–2)
BILIRUB UR QL STRIP.AUTO: NEGATIVE
BUN BLD-MCNC: 17 MG/DL (ref 7–18)
CALCIUM BLD-MCNC: 9.2 MG/DL (ref 8.5–10.1)
CHLORIDE SERPL-SCNC: 101 MMOL/L (ref 98–112)
CLARITY UR REFRACT.AUTO: CLEAR
CO2 SERPL-SCNC: 31 MMOL/L (ref 21–32)
COLOR UR AUTO: YELLOW
CREAT BLD-MCNC: 0.74 MG/DL
EOSINOPHIL # BLD AUTO: 0 X10(3) UL (ref 0–0.7)
EOSINOPHIL NFR BLD AUTO: 0 %
ERYTHROCYTE [DISTWIDTH] IN BLOOD BY AUTOMATED COUNT: 17.5 %
GFR SERPLBLD BASED ON 1.73 SQ M-ARVRAT: 81 ML/MIN/1.73M2 (ref 60–?)
GLOBULIN PLAS-MCNC: 2.7 G/DL (ref 2.8–4.4)
GLUCOSE BLD-MCNC: 149 MG/DL (ref 70–99)
GLUCOSE UR STRIP.AUTO-MCNC: NEGATIVE MG/DL
HCT VFR BLD AUTO: 35.8 %
HGB BLD-MCNC: 11.5 G/DL
IMM GRANULOCYTES # BLD AUTO: 0.09 X10(3) UL (ref 0–1)
IMM GRANULOCYTES NFR BLD: 1.4 %
KETONES UR STRIP.AUTO-MCNC: NEGATIVE MG/DL
LEUKOCYTE ESTERASE UR QL STRIP.AUTO: NEGATIVE
LIPASE SERPL-CCNC: 9 U/L (ref 13–75)
LYMPHOCYTES # BLD AUTO: 0.26 X10(3) UL (ref 1–4)
LYMPHOCYTES NFR BLD AUTO: 4 %
MCH RBC QN AUTO: 30.3 PG (ref 26–34)
MCHC RBC AUTO-ENTMCNC: 32.1 G/DL (ref 31–37)
MCV RBC AUTO: 94.2 FL
MONOCYTES # BLD AUTO: 0.51 X10(3) UL (ref 0.1–1)
MONOCYTES NFR BLD AUTO: 7.9 %
NEUTROPHILS # BLD AUTO: 5.55 X10 (3) UL (ref 1.5–7.7)
NEUTROPHILS # BLD AUTO: 5.55 X10(3) UL (ref 1.5–7.7)
NEUTROPHILS NFR BLD AUTO: 86.4 %
NITRITE UR QL STRIP.AUTO: NEGATIVE
OSMOLALITY SERPL CALC.SUM OF ELEC: 288 MOSM/KG (ref 275–295)
PH UR STRIP.AUTO: 7 [PH] (ref 5–8)
PLATELET # BLD AUTO: 126 10(3)UL (ref 150–450)
POTASSIUM SERPL-SCNC: 4.1 MMOL/L (ref 3.5–5.1)
PROT SERPL-MCNC: 6.2 G/DL (ref 6.4–8.2)
PROT UR STRIP.AUTO-MCNC: NEGATIVE MG/DL
RBC # BLD AUTO: 3.8 X10(6)UL
RBC UR QL AUTO: NEGATIVE
SODIUM SERPL-SCNC: 137 MMOL/L (ref 136–145)
SP GR UR STRIP.AUTO: 1.01 (ref 1–1.03)
UROBILINOGEN UR STRIP.AUTO-MCNC: <2 MG/DL
WBC # BLD AUTO: 6.4 X10(3) UL (ref 4–11)

## 2023-05-09 PROCEDURE — 99285 EMERGENCY DEPT VISIT HI MDM: CPT

## 2023-05-09 PROCEDURE — 96375 TX/PRO/DX INJ NEW DRUG ADDON: CPT

## 2023-05-09 PROCEDURE — 81003 URINALYSIS AUTO W/O SCOPE: CPT | Performed by: EMERGENCY MEDICINE

## 2023-05-09 PROCEDURE — 74176 CT ABD & PELVIS W/O CONTRAST: CPT | Performed by: EMERGENCY MEDICINE

## 2023-05-09 PROCEDURE — 99284 EMERGENCY DEPT VISIT MOD MDM: CPT

## 2023-05-09 PROCEDURE — 93005 ELECTROCARDIOGRAM TRACING: CPT

## 2023-05-09 PROCEDURE — 93010 ELECTROCARDIOGRAM REPORT: CPT

## 2023-05-09 PROCEDURE — 83690 ASSAY OF LIPASE: CPT | Performed by: EMERGENCY MEDICINE

## 2023-05-09 PROCEDURE — 80053 COMPREHEN METABOLIC PANEL: CPT | Performed by: EMERGENCY MEDICINE

## 2023-05-09 PROCEDURE — 96361 HYDRATE IV INFUSION ADD-ON: CPT

## 2023-05-09 PROCEDURE — 85025 COMPLETE CBC W/AUTO DIFF WBC: CPT | Performed by: EMERGENCY MEDICINE

## 2023-05-09 PROCEDURE — 96374 THER/PROPH/DIAG INJ IV PUSH: CPT

## 2023-05-09 RX ORDER — DIPHENHYDRAMINE HYDROCHLORIDE 50 MG/ML
INJECTION INTRAMUSCULAR; INTRAVENOUS
Status: COMPLETED
Start: 2023-05-09 | End: 2023-05-09

## 2023-05-09 RX ORDER — METOCLOPRAMIDE HYDROCHLORIDE 5 MG/ML
10 INJECTION INTRAMUSCULAR; INTRAVENOUS ONCE
Status: COMPLETED | OUTPATIENT
Start: 2023-05-09 | End: 2023-05-09

## 2023-05-09 RX ORDER — METOCLOPRAMIDE HYDROCHLORIDE 5 MG/ML
INJECTION INTRAMUSCULAR; INTRAVENOUS
Status: COMPLETED
Start: 2023-05-09 | End: 2023-05-09

## 2023-05-09 RX ORDER — DIPHENHYDRAMINE HYDROCHLORIDE 50 MG/ML
25 INJECTION INTRAMUSCULAR; INTRAVENOUS ONCE
Status: COMPLETED | OUTPATIENT
Start: 2023-05-09 | End: 2023-05-09

## 2023-05-09 RX ORDER — METOCLOPRAMIDE 10 MG/1
10 TABLET ORAL 3 TIMES DAILY PRN
Qty: 20 TABLET | Refills: 0 | Status: SHIPPED | OUTPATIENT
Start: 2023-05-09 | End: 2023-06-08

## 2023-05-09 NOTE — ED INITIAL ASSESSMENT (HPI)
Pt. Had outpatient procedure yesterday and started to feel nausea last night. Overnight started vomiting, feeling lightheaded, chills. Pt. Also c/o lower abdominal pain 4/10.

## 2023-05-10 LAB
Q-T INTERVAL: 410 MS
QRS DURATION: 90 MS
QTC CALCULATION (BEZET): 463 MS
R AXIS: 0 DEGREES
T AXIS: -25 DEGREES
VENTRICULAR RATE: 77 BPM

## 2023-05-10 NOTE — DISCHARGE INSTRUCTIONS
Return to the emergency department for new or worsening symptoms. You may consider taking a mild laxative for constipation.

## 2023-05-12 ENCOUNTER — PATIENT OUTREACH (OUTPATIENT)
Dept: CASE MANAGEMENT | Age: 83
End: 2023-05-12

## 2023-05-12 ENCOUNTER — APPOINTMENT (OUTPATIENT)
Dept: HEMATOLOGY/ONCOLOGY | Age: 83
End: 2023-05-12
Attending: INTERNAL MEDICINE
Payer: MEDICARE

## 2023-05-12 NOTE — PROGRESS NOTES
1st attempt ED hfu apt request    Arianna JOSEPH  PCP  [address]  [phone]  Follow up in 1 week  Apt: May 25 @10:30am     Confirmed w/ pt  Closing encounter

## 2023-05-19 ENCOUNTER — NURSE ONLY (OUTPATIENT)
Dept: HEMATOLOGY/ONCOLOGY | Age: 83
End: 2023-05-19
Attending: INTERNAL MEDICINE
Payer: MEDICARE

## 2023-05-19 ENCOUNTER — OFFICE VISIT (OUTPATIENT)
Dept: HEMATOLOGY/ONCOLOGY | Age: 83
End: 2023-05-19
Attending: INTERNAL MEDICINE
Payer: MEDICARE

## 2023-05-19 VITALS
WEIGHT: 153.5 LBS | OXYGEN SATURATION: 94 % | HEART RATE: 57 BPM | HEIGHT: 70 IN | TEMPERATURE: 99 F | BODY MASS INDEX: 21.98 KG/M2 | DIASTOLIC BLOOD PRESSURE: 69 MMHG | SYSTOLIC BLOOD PRESSURE: 120 MMHG | RESPIRATION RATE: 18 BRPM

## 2023-05-19 DIAGNOSIS — Z85.72 HISTORY OF FOLLICULAR LYMPHOMA: ICD-10-CM

## 2023-05-19 DIAGNOSIS — I48.91 ATRIAL FIBRILLATION, UNSPECIFIED TYPE (HCC): ICD-10-CM

## 2023-05-19 DIAGNOSIS — D50.0 IRON DEFICIENCY ANEMIA SECONDARY TO BLOOD LOSS (CHRONIC): ICD-10-CM

## 2023-05-19 DIAGNOSIS — I25.10 CORONARY ARTERY DISEASE INVOLVING NATIVE CORONARY ARTERY OF NATIVE HEART WITHOUT ANGINA PECTORIS: ICD-10-CM

## 2023-05-19 DIAGNOSIS — C83.38 DIFFUSE LARGE B-CELL LYMPHOMA OF LYMPH NODES OF MULTIPLE REGIONS (HCC): ICD-10-CM

## 2023-05-19 DIAGNOSIS — C83.38 DIFFUSE LARGE B-CELL LYMPHOMA OF LYMPH NODES OF MULTIPLE REGIONS (HCC): Primary | ICD-10-CM

## 2023-05-19 DIAGNOSIS — D64.9 NORMOCYTIC ANEMIA: ICD-10-CM

## 2023-05-19 LAB
ALBUMIN SERPL-MCNC: 2.8 G/DL (ref 3.4–5)
ALBUMIN/GLOB SERPL: 0.9 {RATIO} (ref 1–2)
ALP LIVER SERPL-CCNC: 70 U/L
ALT SERPL-CCNC: 10 U/L
ANION GAP SERPL CALC-SCNC: 1 MMOL/L (ref 0–18)
AST SERPL-CCNC: 7 U/L (ref 15–37)
B2 MICROGLOB SERPL-MCNC: 0.33 MG/DL (ref 0.11–0.25)
BASOPHILS # BLD AUTO: 0.02 X10(3) UL (ref 0–0.2)
BASOPHILS NFR BLD AUTO: 0.3 %
BILIRUB SERPL-MCNC: 0.3 MG/DL (ref 0.1–2)
BUN BLD-MCNC: 11 MG/DL (ref 7–18)
CALCIUM BLD-MCNC: 8.9 MG/DL (ref 8.5–10.1)
CHLORIDE SERPL-SCNC: 104 MMOL/L (ref 98–112)
CO2 SERPL-SCNC: 33 MMOL/L (ref 21–32)
CREAT BLD-MCNC: 0.6 MG/DL
DEPRECATED HBV CORE AB SER IA-ACNC: 320.2 NG/ML
EOSINOPHIL # BLD AUTO: 0.21 X10(3) UL (ref 0–0.7)
EOSINOPHIL NFR BLD AUTO: 3.6 %
ERYTHROCYTE [DISTWIDTH] IN BLOOD BY AUTOMATED COUNT: 17.9 %
GFR SERPLBLD BASED ON 1.73 SQ M-ARVRAT: 90 ML/MIN/1.73M2 (ref 60–?)
GLOBULIN PLAS-MCNC: 3.2 G/DL (ref 2.8–4.4)
GLUCOSE BLD-MCNC: 183 MG/DL (ref 70–99)
HCT VFR BLD AUTO: 33.9 %
HGB BLD-MCNC: 10.4 G/DL
HGB RETIC QN AUTO: 22.9 PG (ref 28.2–36.6)
IMM GRANULOCYTES # BLD AUTO: 0.08 X10(3) UL (ref 0–1)
IMM GRANULOCYTES NFR BLD: 1.4 %
IMM RETICS NFR: 0.13 RATIO (ref 0.1–0.3)
IRON SATN MFR SERPL: 14 %
IRON SERPL-MCNC: 24 UG/DL
LDH SERPL L TO P-CCNC: 187 U/L
LYMPHOCYTES # BLD AUTO: 0.25 X10(3) UL (ref 1–4)
LYMPHOCYTES NFR BLD AUTO: 4.3 %
MCH RBC QN AUTO: 30.3 PG (ref 26–34)
MCHC RBC AUTO-ENTMCNC: 30.7 G/DL (ref 31–37)
MCV RBC AUTO: 98.8 FL
MONOCYTES # BLD AUTO: 0.38 X10(3) UL (ref 0.1–1)
MONOCYTES NFR BLD AUTO: 6.5 %
NEUTROPHILS # BLD AUTO: 4.93 X10 (3) UL (ref 1.5–7.7)
NEUTROPHILS # BLD AUTO: 4.93 X10(3) UL (ref 1.5–7.7)
NEUTROPHILS NFR BLD AUTO: 83.9 %
OSMOLALITY SERPL CALC.SUM OF ELEC: 290 MOSM/KG (ref 275–295)
PLATELET # BLD AUTO: 152 10(3)UL (ref 150–450)
POTASSIUM SERPL-SCNC: 4.9 MMOL/L (ref 3.5–5.1)
PROT SERPL-MCNC: 6 G/DL (ref 6.4–8.2)
RBC # BLD AUTO: 3.43 X10(6)UL
RETICS # AUTO: 47.3 X10(3) UL (ref 22.5–147.5)
RETICS/RBC NFR AUTO: 1.4 %
SODIUM SERPL-SCNC: 138 MMOL/L (ref 136–145)
TIBC SERPL-MCNC: 167 UG/DL (ref 240–450)
TRANSFERRIN SERPL-MCNC: 112 MG/DL (ref 200–360)
VIT B12 SERPL-MCNC: 1144 PG/ML (ref 193–986)
WBC # BLD AUTO: 5.9 X10(3) UL (ref 4–11)

## 2023-05-19 PROCEDURE — G2212 PROLONG OUTPT/OFFICE VIS: HCPCS | Performed by: INTERNAL MEDICINE

## 2023-05-19 PROCEDURE — 80053 COMPREHEN METABOLIC PANEL: CPT

## 2023-05-19 PROCEDURE — 83615 LACTATE (LD) (LDH) ENZYME: CPT

## 2023-05-19 PROCEDURE — 82728 ASSAY OF FERRITIN: CPT

## 2023-05-19 PROCEDURE — 82232 ASSAY OF BETA-2 PROTEIN: CPT

## 2023-05-19 PROCEDURE — 99215 OFFICE O/P EST HI 40 MIN: CPT | Performed by: INTERNAL MEDICINE

## 2023-05-19 PROCEDURE — 83550 IRON BINDING TEST: CPT

## 2023-05-19 PROCEDURE — 85025 COMPLETE CBC W/AUTO DIFF WBC: CPT

## 2023-05-19 PROCEDURE — 82607 VITAMIN B-12: CPT

## 2023-05-19 PROCEDURE — 83540 ASSAY OF IRON: CPT

## 2023-05-19 PROCEDURE — 85045 AUTOMATED RETICULOCYTE COUNT: CPT

## 2023-05-19 NOTE — PROGRESS NOTES
Outpatient Oncology Care Plan  Problem list:  fatigue  knowledge deficit    Problems related to:    combined modality therapy    Interventions:  provided general teaching    Expected outcomes:  understands plan of care    Progress towards outcome:  making progress    Education Record    Learner:  Patient and Family Member  Barriers / Limitations:  None  Method:  Brief focused  Outcome:  Shows understanding  Comments: here with son new transfer of care from Dr. Meliza Morfin, dx DLBCL. Pt here for 3 month f/up. Pt states she has been feeling well, energy is good and she has been eating and drinking. Denies any pain. States she had aneurism repair on 5/8 and was sick for 1 week after. Pt states she is feeling better now.

## 2023-05-21 PROBLEM — D50.0 IRON DEFICIENCY ANEMIA SECONDARY TO BLOOD LOSS (CHRONIC): Status: ACTIVE | Noted: 2023-01-01

## 2023-05-21 PROBLEM — D50.0 IRON DEFICIENCY ANEMIA SECONDARY TO BLOOD LOSS (CHRONIC): Status: ACTIVE | Noted: 2023-05-21

## 2023-05-22 ENCOUNTER — TELEPHONE (OUTPATIENT)
Dept: HEMATOLOGY/ONCOLOGY | Facility: HOSPITAL | Age: 83
End: 2023-05-22

## 2023-05-26 ENCOUNTER — TELEPHONE (OUTPATIENT)
Dept: INTERNAL MEDICINE CLINIC | Facility: CLINIC | Age: 83
End: 2023-05-26

## 2023-05-26 ENCOUNTER — OFFICE VISIT (OUTPATIENT)
Dept: HEMATOLOGY/ONCOLOGY | Facility: HOSPITAL | Age: 83
End: 2023-05-26
Attending: INTERNAL MEDICINE
Payer: MEDICARE

## 2023-05-26 ENCOUNTER — OFFICE VISIT (OUTPATIENT)
Dept: INTERNAL MEDICINE CLINIC | Facility: CLINIC | Age: 83
End: 2023-05-26
Payer: MEDICARE

## 2023-05-26 VITALS
DIASTOLIC BLOOD PRESSURE: 53 MMHG | TEMPERATURE: 97 F | OXYGEN SATURATION: 95 % | SYSTOLIC BLOOD PRESSURE: 109 MMHG | HEART RATE: 55 BPM | RESPIRATION RATE: 17 BRPM

## 2023-05-26 VITALS — DIASTOLIC BLOOD PRESSURE: 58 MMHG | SYSTOLIC BLOOD PRESSURE: 116 MMHG

## 2023-05-26 DIAGNOSIS — I10 BENIGN ESSENTIAL HTN: Primary | ICD-10-CM

## 2023-05-26 DIAGNOSIS — I71.40 ABDOMINAL AORTIC ANEURYSM (AAA) WITHOUT RUPTURE, UNSPECIFIED PART (HCC): ICD-10-CM

## 2023-05-26 DIAGNOSIS — D50.9 IRON DEFICIENCY ANEMIA, UNSPECIFIED IRON DEFICIENCY ANEMIA TYPE: ICD-10-CM

## 2023-05-26 DIAGNOSIS — D50.0 IRON DEFICIENCY ANEMIA SECONDARY TO BLOOD LOSS (CHRONIC): Primary | ICD-10-CM

## 2023-05-26 DIAGNOSIS — Z95.828 HISTORY OF REPAIR OF ANEURYSM OF ABDOMINAL AORTA USING ENDOVASCULAR STENT GRAFT: ICD-10-CM

## 2023-05-26 PROCEDURE — 1170F FXNL STATUS ASSESSED: CPT | Performed by: PHYSICIAN ASSISTANT

## 2023-05-26 PROCEDURE — 1159F MED LIST DOCD IN RCRD: CPT | Performed by: PHYSICIAN ASSISTANT

## 2023-05-26 PROCEDURE — 1160F RVW MEDS BY RX/DR IN RCRD: CPT | Performed by: PHYSICIAN ASSISTANT

## 2023-05-26 PROCEDURE — 3078F DIAST BP <80 MM HG: CPT | Performed by: PHYSICIAN ASSISTANT

## 2023-05-26 PROCEDURE — 3074F SYST BP LT 130 MM HG: CPT | Performed by: PHYSICIAN ASSISTANT

## 2023-05-26 PROCEDURE — 99214 OFFICE O/P EST MOD 30 MIN: CPT | Performed by: PHYSICIAN ASSISTANT

## 2023-05-26 PROCEDURE — 96365 THER/PROPH/DIAG IV INF INIT: CPT

## 2023-05-26 NOTE — TELEPHONE ENCOUNTER
Received fax with attached form regarding O2. Pt seeing CS today so will hand form to her if needed for the visit.

## 2023-05-26 NOTE — PROGRESS NOTES
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 Observed for 30 min.  VSS

## 2023-05-31 NOTE — TELEPHONE ENCOUNTER
Yes, issue has been resolved has note reviewed with CS from last week and pt has already had procedure by IR.

## 2023-06-05 NOTE — TELEPHONE ENCOUNTER
Hypertension Medications Protocol Passed 06/03/2023 01:47 PM   Protocol Details  CMP or BMP in past 12 months    Last serum creatinine< 2.0    Appointment in past 6 or next 3 months     Last visit 5/26/23    Return in about 3 months (around 8/26/2023).

## 2023-06-07 ENCOUNTER — TELEPHONE (OUTPATIENT)
Dept: INTERNAL MEDICINE CLINIC | Facility: CLINIC | Age: 83
End: 2023-06-07

## 2023-06-07 NOTE — TELEPHONE ENCOUNTER
Kori Cyndee called back and is putting it through.   If she needs any help from us she will call us back    Dr Rosalinda Tong has ordered CTA scan for the patient CPT [id number] diagnsis I71.40    [facility] is calling to get the referall started by Dr Jermaine Irwin

## 2023-06-08 ENCOUNTER — HOSPITAL ENCOUNTER (OUTPATIENT)
Dept: CT IMAGING | Age: 83
Discharge: HOME | End: 2023-06-08
Attending: RADIOLOGY
Payer: MEDICARE

## 2023-06-08 DIAGNOSIS — I71.40 AAA (ABDOMINAL AORTIC ANEURYSM) (HCC): ICD-10-CM

## 2023-06-08 LAB
CREAT BLD-MCNC: 0.8 MG/DL
GFR SERPLBLD BASED ON 1.73 SQ M-ARVRAT: 74 ML/MIN/1.73M2 (ref 60–?)

## 2023-06-08 PROCEDURE — 82565 ASSAY OF CREATININE: CPT

## 2023-06-08 PROCEDURE — 74174 CTA ABD&PLVS W/CONTRAST: CPT | Performed by: RADIOLOGY

## 2023-06-15 ENCOUNTER — TELEPHONE (OUTPATIENT)
Dept: INTERNAL MEDICINE CLINIC | Facility: CLINIC | Age: 83
End: 2023-06-15

## 2023-06-15 NOTE — TELEPHONE ENCOUNTER
Received DME from [address] [place] [address] regarding home oxygen. Placed in CS bin.

## 2023-06-27 ENCOUNTER — HOSPITAL ENCOUNTER (OUTPATIENT)
Dept: GENERAL RADIOLOGY | Age: 83
Discharge: HOME | End: 2023-06-27
Attending: INTERNAL MEDICINE
Payer: MEDICARE

## 2023-06-27 ENCOUNTER — LAB ENCOUNTER (OUTPATIENT)
Dept: LAB | Age: 83
End: 2023-06-27
Attending: INTERNAL MEDICINE
Payer: MEDICARE

## 2023-06-27 DIAGNOSIS — D64.9 NORMOCYTIC ANEMIA: ICD-10-CM

## 2023-06-27 DIAGNOSIS — I10 ESSENTIAL HYPERTENSION, BENIGN: ICD-10-CM

## 2023-06-27 DIAGNOSIS — I48.91 ATRIAL FIBRILLATION (HCC): ICD-10-CM

## 2023-06-27 DIAGNOSIS — I50.32 CHRONIC DIASTOLIC HEART FAILURE (HCC): ICD-10-CM

## 2023-06-27 DIAGNOSIS — D50.0 IRON DEFICIENCY ANEMIA SECONDARY TO BLOOD LOSS (CHRONIC): ICD-10-CM

## 2023-06-27 LAB
ALBUMIN SERPL-MCNC: 3.1 G/DL (ref 3.4–5)
ALBUMIN/GLOB SERPL: 1 {RATIO} (ref 1–2)
ALP LIVER SERPL-CCNC: 66 U/L
ALT SERPL-CCNC: 10 U/L
ANION GAP SERPL CALC-SCNC: 7 MMOL/L (ref 0–18)
AST SERPL-CCNC: 11 U/L (ref 15–37)
BASOPHILS # BLD AUTO: 0.03 X10(3) UL (ref 0–0.2)
BASOPHILS NFR BLD AUTO: 0.3 %
BILIRUB SERPL-MCNC: 0.4 MG/DL (ref 0.1–2)
BUN BLD-MCNC: 15 MG/DL (ref 7–18)
CALCIUM BLD-MCNC: 9.1 MG/DL (ref 8.5–10.1)
CHLORIDE SERPL-SCNC: 99 MMOL/L (ref 98–112)
CO2 SERPL-SCNC: 31 MMOL/L (ref 21–32)
CREAT BLD-MCNC: 0.74 MG/DL
DEPRECATED HBV CORE AB SER IA-ACNC: 458.6 NG/ML
EOSINOPHIL # BLD AUTO: 0.12 X10(3) UL (ref 0–0.7)
EOSINOPHIL NFR BLD AUTO: 1.4 %
ERYTHROCYTE [DISTWIDTH] IN BLOOD BY AUTOMATED COUNT: 18.4 %
FASTING STATUS PATIENT QL REPORTED: NO
FOLATE SERPL-MCNC: 6.3 NG/ML (ref 8.7–?)
GFR SERPLBLD BASED ON 1.73 SQ M-ARVRAT: 81 ML/MIN/1.73M2 (ref 60–?)
GLOBULIN PLAS-MCNC: 3.2 G/DL (ref 2.8–4.4)
GLUCOSE BLD-MCNC: 122 MG/DL (ref 70–99)
HCT VFR BLD AUTO: 36.5 %
HGB BLD-MCNC: 10.8 G/DL
HGB RETIC QN AUTO: 30.9 PG (ref 28.2–36.6)
IMM GRANULOCYTES # BLD AUTO: 0.02 X10(3) UL (ref 0–1)
IMM GRANULOCYTES NFR BLD: 0.2 %
IMM RETICS NFR: 0.2 RATIO (ref 0.1–0.3)
IRON SATN MFR SERPL: 22 %
IRON SERPL-MCNC: 43 UG/DL
LYMPHOCYTES # BLD AUTO: 0.45 X10(3) UL (ref 1–4)
LYMPHOCYTES NFR BLD AUTO: 5.1 %
MCH RBC QN AUTO: 29.7 PG (ref 26–34)
MCHC RBC AUTO-ENTMCNC: 29.6 G/DL (ref 31–37)
MCV RBC AUTO: 100.3 FL
MONOCYTES # BLD AUTO: 0.59 X10(3) UL (ref 0.1–1)
MONOCYTES NFR BLD AUTO: 6.7 %
NEUTROPHILS # BLD AUTO: 7.57 X10 (3) UL (ref 1.5–7.7)
NEUTROPHILS # BLD AUTO: 7.57 X10(3) UL (ref 1.5–7.7)
NEUTROPHILS NFR BLD AUTO: 86.3 %
OSMOLALITY SERPL CALC.SUM OF ELEC: 286 MOSM/KG (ref 275–295)
PLATELET # BLD AUTO: 157 10(3)UL (ref 150–450)
POTASSIUM SERPL-SCNC: 4.1 MMOL/L (ref 3.5–5.1)
PROT SERPL-MCNC: 6.3 G/DL (ref 6.4–8.2)
RBC # BLD AUTO: 3.64 X10(6)UL
RETICS # AUTO: 56.1 X10(3) UL (ref 22.5–147.5)
RETICS/RBC NFR AUTO: 1.5 %
SODIUM SERPL-SCNC: 137 MMOL/L (ref 136–145)
TIBC SERPL-MCNC: 195 UG/DL (ref 240–450)
TRANSFERRIN SERPL-MCNC: 131 MG/DL (ref 200–360)
WBC # BLD AUTO: 8.8 X10(3) UL (ref 4–11)

## 2023-06-27 PROCEDURE — 85045 AUTOMATED RETICULOCYTE COUNT: CPT

## 2023-06-27 PROCEDURE — 83550 IRON BINDING TEST: CPT

## 2023-06-27 PROCEDURE — 82746 ASSAY OF FOLIC ACID SERUM: CPT

## 2023-06-27 PROCEDURE — 36415 COLL VENOUS BLD VENIPUNCTURE: CPT

## 2023-06-27 PROCEDURE — 83540 ASSAY OF IRON: CPT

## 2023-06-27 PROCEDURE — 80053 COMPREHEN METABOLIC PANEL: CPT

## 2023-06-27 PROCEDURE — 85025 COMPLETE CBC W/AUTO DIFF WBC: CPT

## 2023-06-27 PROCEDURE — 71046 X-RAY EXAM CHEST 2 VIEWS: CPT | Performed by: INTERNAL MEDICINE

## 2023-06-27 PROCEDURE — 82728 ASSAY OF FERRITIN: CPT

## 2023-06-28 ENCOUNTER — TELEPHONE (OUTPATIENT)
Dept: HEMATOLOGY/ONCOLOGY | Facility: HOSPITAL | Age: 83
End: 2023-06-28

## 2023-06-28 RX ORDER — FOLIC ACID 1 MG/1
1 TABLET ORAL DAILY
Qty: 30 TABLET | Refills: 0 | Status: SHIPPED | OUTPATIENT
Start: 2023-06-28

## 2023-07-14 ENCOUNTER — OFFICE VISIT (OUTPATIENT)
Dept: HEMATOLOGY/ONCOLOGY | Age: 83
End: 2023-07-14
Attending: INTERNAL MEDICINE
Payer: MEDICARE

## 2023-07-14 VITALS
HEIGHT: 70 IN | TEMPERATURE: 99 F | BODY MASS INDEX: 21.1 KG/M2 | SYSTOLIC BLOOD PRESSURE: 106 MMHG | DIASTOLIC BLOOD PRESSURE: 69 MMHG | WEIGHT: 147.38 LBS | HEART RATE: 96 BPM | RESPIRATION RATE: 18 BRPM

## 2023-07-14 DIAGNOSIS — D52.8 OTHER FOLATE DEFICIENCY ANEMIAS: ICD-10-CM

## 2023-07-14 DIAGNOSIS — I48.19 PERSISTENT ATRIAL FIBRILLATION (HCC): ICD-10-CM

## 2023-07-14 DIAGNOSIS — D64.9 NORMOCYTIC ANEMIA: ICD-10-CM

## 2023-07-14 DIAGNOSIS — D51.8 OTHER VITAMIN B12 DEFICIENCY ANEMIA: ICD-10-CM

## 2023-07-14 DIAGNOSIS — D50.0 IRON DEFICIENCY ANEMIA SECONDARY TO BLOOD LOSS (CHRONIC): ICD-10-CM

## 2023-07-14 DIAGNOSIS — Z85.72 HISTORY OF FOLLICULAR LYMPHOMA: ICD-10-CM

## 2023-07-14 DIAGNOSIS — C83.38 DIFFUSE LARGE B-CELL LYMPHOMA OF LYMPH NODES OF MULTIPLE REGIONS (HCC): Primary | ICD-10-CM

## 2023-07-14 PROBLEM — D51.9 VITAMIN B12 DEFICIENCY ANEMIA: Status: ACTIVE | Noted: 2023-01-01

## 2023-07-14 PROBLEM — D51.9 VITAMIN B12 DEFICIENCY ANEMIA: Status: ACTIVE | Noted: 2023-07-14

## 2023-07-14 PROCEDURE — 99215 OFFICE O/P EST HI 40 MIN: CPT | Performed by: INTERNAL MEDICINE

## 2023-07-14 RX ORDER — FOLIC ACID 1 MG/1
1 TABLET ORAL DAILY
Qty: 90 TABLET | Refills: 3 | Status: SHIPPED | OUTPATIENT
Start: 2023-07-14

## 2023-07-14 RX ORDER — ACYCLOVIR 400 MG/1
TABLET ORAL
COMMUNITY
Start: 2023-05-05

## 2023-07-14 NOTE — PROGRESS NOTES
Patient is here for 2 month MD f/u for Lymphoma. Labs drawn on 6/27. Patient started Folic Acid as advised 2 weeks ago. Last Infed infusion was on 5/26. Patient  states she feels strong. Today was the first time she walked in using her walker, she is usually in a wheelchair. Following up with cardiology d/t A-fib and SOB. Appetite is ok.        Education Record    Learner:  Patient and Family Member    Disease / Diagnosis:  Lymphoma     Barriers / Limitations:  None   Comments:    Method:  Discussion   Comments:    General Topics:  Plan of care reviewed   Comments:    Outcome:  Shows understanding   Comments:

## 2023-07-17 NOTE — TELEPHONE ENCOUNTER
Hypertension Medications Protocol [id number]/14/2023 11:56 AM   Protocol Details CMP or BMP in past 12 months    Last serum creatinine< 2.0    Appointment in past 6 or next 3 months      Last seen by CS for HTN 5/26/23      Return in about 3 months (around 8/26/2023).

## 2023-07-20 NOTE — TELEPHONE ENCOUNTER
I believe her torsemide is managed by Dr Joan James (cardiology). Please advise her to request refills from cardiology.

## 2023-07-21 RX ORDER — TORSEMIDE 20 MG/1
20 TABLET ORAL DAILY
Qty: 90 TABLET | Refills: 0 | OUTPATIENT
Start: 2023-07-21

## 2023-07-27 NOTE — TELEPHONE ENCOUNTER
Hypertension Medications Protocol [id number]/24/2023 02:27 PM   Protocol Details CMP or BMP in past 12 months    Last serum creatinine< 2.0    Appointment in past 6 or next 3 months       Last visit- 5/26/23    Return in about 3 months (around 8/26/2023). No future appointment scheduled. Sent MCM  Good morning,      We received a message from the pharmacy for a refill for Torsemide. It appears previously you were receiving this from the Cardiologist. Have you obtained any refills from them, or is this something you are asking for from us?       Monico Damon

## 2023-07-31 RX ORDER — TORSEMIDE 20 MG/1
20 TABLET ORAL DAILY
Refills: 0 | OUTPATIENT
Start: 2023-07-31

## 2023-07-31 NOTE — TELEPHONE ENCOUNTER
Pt confirmed she picked up medication from pharmacy ordered by cardiologist.  Pt had no further questions or concerns.

## 2023-08-02 ENCOUNTER — LAB ENCOUNTER (OUTPATIENT)
Dept: LAB | Age: 83
End: 2023-08-02
Attending: INTERNAL MEDICINE
Payer: MEDICARE

## 2023-08-02 ENCOUNTER — HOSPITAL ENCOUNTER (OUTPATIENT)
Dept: GENERAL RADIOLOGY | Age: 83
Discharge: HOME | End: 2023-08-02
Attending: INTERNAL MEDICINE
Payer: MEDICARE

## 2023-08-02 DIAGNOSIS — I50.32 CHRONIC DIASTOLIC CONGESTIVE HEART FAILURE (HCC): ICD-10-CM

## 2023-08-02 DIAGNOSIS — I10 BENIGN ESSENTIAL HYPERTENSION: ICD-10-CM

## 2023-08-02 DIAGNOSIS — J90 PLEURAL EFFUSION: ICD-10-CM

## 2023-08-02 DIAGNOSIS — I25.10 CORONARY ARTERY DISEASE INVOLVING NATIVE CORONARY ARTERY OF NATIVE HEART WITHOUT ANGINA PECTORIS: ICD-10-CM

## 2023-08-02 DIAGNOSIS — I25.10 CORONARY ATHEROSCLEROSIS OF NATIVE CORONARY ARTERY: ICD-10-CM

## 2023-08-02 DIAGNOSIS — Z95.5 STENTED CORONARY ARTERY: ICD-10-CM

## 2023-08-02 DIAGNOSIS — I48.91 ATRIAL FIBRILLATION (HCC): Primary | ICD-10-CM

## 2023-08-02 DIAGNOSIS — I25.2 HISTORY OF MI (MYOCARDIAL INFARCTION): ICD-10-CM

## 2023-08-02 DIAGNOSIS — I25.2 OLD MYOCARDIAL INFARCTION: ICD-10-CM

## 2023-08-02 DIAGNOSIS — I10 ESSENTIAL HYPERTENSION, MALIGNANT: ICD-10-CM

## 2023-08-02 DIAGNOSIS — I48.91 ATRIAL FIBRILLATION WITH CONTROLLED VENTRICULAR RESPONSE (HCC): ICD-10-CM

## 2023-08-02 DIAGNOSIS — E78.00 PURE HYPERCHOLESTEROLEMIA: ICD-10-CM

## 2023-08-02 DIAGNOSIS — R06.02 SOB (SHORTNESS OF BREATH): ICD-10-CM

## 2023-08-02 DIAGNOSIS — R06.02 SHORTNESS OF BREATH: ICD-10-CM

## 2023-08-02 DIAGNOSIS — I50.32 CHRONIC DIASTOLIC HEART FAILURE (HCC): ICD-10-CM

## 2023-08-02 LAB
ALBUMIN SERPL-MCNC: 3 G/DL (ref 3.4–5)
ALBUMIN/GLOB SERPL: 0.8 {RATIO} (ref 1–2)
ALP LIVER SERPL-CCNC: 60 U/L
ALT SERPL-CCNC: 12 U/L
ANION GAP SERPL CALC-SCNC: 7 MMOL/L (ref 0–18)
AST SERPL-CCNC: 9 U/L (ref 15–37)
BASOPHILS # BLD AUTO: 0.02 X10(3) UL (ref 0–0.2)
BASOPHILS NFR BLD AUTO: 0.8 %
BILIRUB SERPL-MCNC: 0.4 MG/DL (ref 0.1–2)
BUN BLD-MCNC: 22 MG/DL (ref 7–18)
CALCIUM BLD-MCNC: 9.4 MG/DL (ref 8.5–10.1)
CHLORIDE SERPL-SCNC: 96 MMOL/L (ref 98–112)
CO2 SERPL-SCNC: 32 MMOL/L (ref 21–32)
CREAT BLD-MCNC: 0.97 MG/DL
EGFRCR SERPLBLD CKD-EPI 2021: 58 ML/MIN/1.73M2 (ref 60–?)
EOSINOPHIL # BLD AUTO: 0.11 X10(3) UL (ref 0–0.7)
EOSINOPHIL NFR BLD AUTO: 4.5 %
ERYTHROCYTE [DISTWIDTH] IN BLOOD BY AUTOMATED COUNT: 17.1 %
FASTING STATUS PATIENT QL REPORTED: NO
GLOBULIN PLAS-MCNC: 3.9 G/DL (ref 2.8–4.4)
GLUCOSE BLD-MCNC: 206 MG/DL (ref 70–99)
HCT VFR BLD AUTO: 36.7 %
HGB BLD-MCNC: 11 G/DL
IMM GRANULOCYTES # BLD AUTO: 0.01 X10(3) UL (ref 0–1)
IMM GRANULOCYTES NFR BLD: 0.4 %
LYMPHOCYTES # BLD AUTO: 0.38 X10(3) UL (ref 1–4)
LYMPHOCYTES NFR BLD AUTO: 15.6 %
MCH RBC QN AUTO: 27.9 PG (ref 26–34)
MCHC RBC AUTO-ENTMCNC: 30 G/DL (ref 31–37)
MCV RBC AUTO: 93.1 FL
MONOCYTES # BLD AUTO: 0.69 X10(3) UL (ref 0.1–1)
MONOCYTES NFR BLD AUTO: 28.3 %
NEUTROPHILS # BLD AUTO: 1.23 X10 (3) UL (ref 1.5–7.7)
NEUTROPHILS # BLD AUTO: 1.23 X10(3) UL (ref 1.5–7.7)
NEUTROPHILS NFR BLD AUTO: 50.4 %
NT-PROBNP SERPL-MCNC: 7749 PG/ML (ref ?–450)
OSMOLALITY SERPL CALC.SUM OF ELEC: 289 MOSM/KG (ref 275–295)
PLATELET # BLD AUTO: 174 10(3)UL (ref 150–450)
POTASSIUM SERPL-SCNC: 3.8 MMOL/L (ref 3.5–5.1)
PROT SERPL-MCNC: 6.9 G/DL (ref 6.4–8.2)
RBC # BLD AUTO: 3.94 X10(6)UL
SODIUM SERPL-SCNC: 135 MMOL/L (ref 136–145)
TSI SER-ACNC: 1.39 MIU/ML (ref 0.36–3.74)
WBC # BLD AUTO: 2.4 X10(3) UL (ref 4–11)

## 2023-08-02 PROCEDURE — 80053 COMPREHEN METABOLIC PANEL: CPT

## 2023-08-02 PROCEDURE — 36415 COLL VENOUS BLD VENIPUNCTURE: CPT

## 2023-08-02 PROCEDURE — 83880 ASSAY OF NATRIURETIC PEPTIDE: CPT

## 2023-08-02 PROCEDURE — 85025 COMPLETE CBC W/AUTO DIFF WBC: CPT

## 2023-08-02 PROCEDURE — 84443 ASSAY THYROID STIM HORMONE: CPT

## 2023-08-02 PROCEDURE — 71046 X-RAY EXAM CHEST 2 VIEWS: CPT | Performed by: INTERNAL MEDICINE

## 2023-08-21 NOTE — TELEPHONE ENCOUNTER
Appt notes state paperwork in \"upcoming appts folder\". Cannot find any paperwork for this patient. Will check with [facility] [place], have you seen \"paperwork\" for this patient. Not specified of type on appt notes. Chart audit performed on [facility] topic. No results found in chart.

## 2023-08-21 NOTE — PLAN OF CARE
Patient requesting a refill on tramadol prescription. Please advise.     Thanks,   Aysha DILLON    Pt A&Ox4. On ra  & scds in place tolerating diet. Chavez cath dced this am, pot voiding small unmeasurable amounts, pt last urinated 250ml in commode, pvr showing 250ml in bladder, im md updated, will continue to monitor.  Up with mod assist walker & ga

## 2023-09-03 ENCOUNTER — APPOINTMENT (OUTPATIENT)
Dept: CT IMAGING | Facility: HOSPITAL | Age: 83
End: 2023-09-03
Attending: STUDENT IN AN ORGANIZED HEALTH CARE EDUCATION/TRAINING PROGRAM
Payer: MEDICARE

## 2023-09-03 ENCOUNTER — APPOINTMENT (OUTPATIENT)
Dept: GENERAL RADIOLOGY | Facility: HOSPITAL | Age: 83
End: 2023-09-03
Attending: STUDENT IN AN ORGANIZED HEALTH CARE EDUCATION/TRAINING PROGRAM
Payer: MEDICARE

## 2023-09-03 ENCOUNTER — HOSPITAL ENCOUNTER (INPATIENT)
Facility: HOSPITAL | Age: 83
LOS: 4 days | Discharge: HOME HEALTH | End: 2023-09-07
Attending: STUDENT IN AN ORGANIZED HEALTH CARE EDUCATION/TRAINING PROGRAM | Admitting: HOSPITALIST
Payer: MEDICARE

## 2023-09-03 DIAGNOSIS — S09.8XXD BLUNT HEAD TRAUMA, SUBSEQUENT ENCOUNTER: ICD-10-CM

## 2023-09-03 DIAGNOSIS — R55 SYNCOPE AND COLLAPSE: Primary | ICD-10-CM

## 2023-09-03 LAB
ALBUMIN SERPL-MCNC: 2.6 G/DL (ref 3.4–5)
ALBUMIN/GLOB SERPL: 0.8 {RATIO} (ref 1–2)
ALP LIVER SERPL-CCNC: 62 U/L
ALT SERPL-CCNC: 12 U/L
ANION GAP SERPL CALC-SCNC: 6 MMOL/L (ref 0–18)
ANTIBODY SCREEN: NEGATIVE
APTT PPP: 40 SECONDS (ref 23.3–35.6)
AST SERPL-CCNC: 25 U/L (ref 15–37)
BASOPHILS # BLD AUTO: 0.02 X10(3) UL (ref 0–0.2)
BASOPHILS NFR BLD AUTO: 0.2 %
BILIRUB SERPL-MCNC: 0.4 MG/DL (ref 0.1–2)
BUN BLD-MCNC: 16 MG/DL (ref 7–18)
CALCIUM BLD-MCNC: 8.1 MG/DL (ref 8.5–10.1)
CHLORIDE SERPL-SCNC: 99 MMOL/L (ref 98–112)
CO2 SERPL-SCNC: 34 MMOL/L (ref 21–32)
CREAT BLD-MCNC: 0.79 MG/DL
EGFRCR SERPLBLD CKD-EPI 2021: 75 ML/MIN/1.73M2 (ref 60–?)
EOSINOPHIL # BLD AUTO: 0.13 X10(3) UL (ref 0–0.7)
EOSINOPHIL NFR BLD AUTO: 1.2 %
ERYTHROCYTE [DISTWIDTH] IN BLOOD BY AUTOMATED COUNT: 19.2 %
EST. AVERAGE GLUCOSE BLD GHB EST-MCNC: 128 MG/DL (ref 68–126)
GLOBULIN PLAS-MCNC: 3.2 G/DL (ref 2.8–4.4)
GLUCOSE BLD-MCNC: 129 MG/DL (ref 70–99)
GLUCOSE BLD-MCNC: 133 MG/DL (ref 70–99)
GLUCOSE BLD-MCNC: 157 MG/DL (ref 70–99)
HBA1C MFR BLD: 6.1 % (ref ?–5.7)
HCT VFR BLD AUTO: 27.6 %
HGB BLD-MCNC: 8.5 G/DL
IMM GRANULOCYTES # BLD AUTO: 0.05 X10(3) UL (ref 0–1)
IMM GRANULOCYTES NFR BLD: 0.4 %
INR BLD: 2.33 (ref 0.85–1.16)
LYMPHOCYTES # BLD AUTO: 0.45 X10(3) UL (ref 1–4)
LYMPHOCYTES NFR BLD AUTO: 4 %
MCH RBC QN AUTO: 27.2 PG (ref 26–34)
MCHC RBC AUTO-ENTMCNC: 30.8 G/DL (ref 31–37)
MCV RBC AUTO: 88.2 FL
MONOCYTES # BLD AUTO: 0.65 X10(3) UL (ref 0.1–1)
MONOCYTES NFR BLD AUTO: 5.8 %
NEUTROPHILS # BLD AUTO: 9.97 X10 (3) UL (ref 1.5–7.7)
NEUTROPHILS # BLD AUTO: 9.97 X10(3) UL (ref 1.5–7.7)
NEUTROPHILS NFR BLD AUTO: 88.4 %
OSMOLALITY SERPL CALC.SUM OF ELEC: 291 MOSM/KG (ref 275–295)
PLATELET # BLD AUTO: 143 10(3)UL (ref 150–450)
POTASSIUM SERPL-SCNC: 5.3 MMOL/L (ref 3.5–5.1)
PROT SERPL-MCNC: 5.8 G/DL (ref 6.4–8.2)
PROTHROMBIN TIME: 25.3 SECONDS (ref 11.6–14.8)
RBC # BLD AUTO: 3.13 X10(6)UL
RH BLOOD TYPE: POSITIVE
SODIUM SERPL-SCNC: 139 MMOL/L (ref 136–145)
TROPONIN I HIGH SENSITIVITY: 16 NG/L
WBC # BLD AUTO: 11.3 X10(3) UL (ref 4–11)

## 2023-09-03 PROCEDURE — 72125 CT NECK SPINE W/O DYE: CPT | Performed by: STUDENT IN AN ORGANIZED HEALTH CARE EDUCATION/TRAINING PROGRAM

## 2023-09-03 PROCEDURE — 70486 CT MAXILLOFACIAL W/O DYE: CPT | Performed by: STUDENT IN AN ORGANIZED HEALTH CARE EDUCATION/TRAINING PROGRAM

## 2023-09-03 PROCEDURE — 76377 3D RENDER W/INTRP POSTPROCES: CPT | Performed by: STUDENT IN AN ORGANIZED HEALTH CARE EDUCATION/TRAINING PROGRAM

## 2023-09-03 PROCEDURE — 73110 X-RAY EXAM OF WRIST: CPT | Performed by: STUDENT IN AN ORGANIZED HEALTH CARE EDUCATION/TRAINING PROGRAM

## 2023-09-03 PROCEDURE — 70450 CT HEAD/BRAIN W/O DYE: CPT | Performed by: STUDENT IN AN ORGANIZED HEALTH CARE EDUCATION/TRAINING PROGRAM

## 2023-09-03 PROCEDURE — 72170 X-RAY EXAM OF PELVIS: CPT | Performed by: STUDENT IN AN ORGANIZED HEALTH CARE EDUCATION/TRAINING PROGRAM

## 2023-09-03 PROCEDURE — 99223 1ST HOSP IP/OBS HIGH 75: CPT | Performed by: HOSPITALIST

## 2023-09-03 PROCEDURE — 71045 X-RAY EXAM CHEST 1 VIEW: CPT | Performed by: STUDENT IN AN ORGANIZED HEALTH CARE EDUCATION/TRAINING PROGRAM

## 2023-09-03 PROCEDURE — 73552 X-RAY EXAM OF FEMUR 2/>: CPT | Performed by: STUDENT IN AN ORGANIZED HEALTH CARE EDUCATION/TRAINING PROGRAM

## 2023-09-03 RX ORDER — ONDANSETRON 2 MG/ML
4 INJECTION INTRAMUSCULAR; INTRAVENOUS EVERY 6 HOURS PRN
Status: DISCONTINUED | OUTPATIENT
Start: 2023-09-03 | End: 2023-09-07

## 2023-09-03 RX ORDER — METOCLOPRAMIDE HYDROCHLORIDE 5 MG/ML
10 INJECTION INTRAMUSCULAR; INTRAVENOUS EVERY 8 HOURS PRN
Status: DISCONTINUED | OUTPATIENT
Start: 2023-09-03 | End: 2023-09-07

## 2023-09-03 RX ORDER — NICOTINE POLACRILEX 4 MG
30 LOZENGE BUCCAL
Status: DISCONTINUED | OUTPATIENT
Start: 2023-09-03 | End: 2023-09-07

## 2023-09-03 RX ORDER — ONDANSETRON 2 MG/ML
INJECTION INTRAMUSCULAR; INTRAVENOUS
Status: COMPLETED
Start: 2023-09-03 | End: 2023-09-03

## 2023-09-03 RX ORDER — DEXTROSE MONOHYDRATE 25 G/50ML
50 INJECTION, SOLUTION INTRAVENOUS
Status: DISCONTINUED | OUTPATIENT
Start: 2023-09-03 | End: 2023-09-07

## 2023-09-03 RX ORDER — ONDANSETRON 2 MG/ML
INJECTION INTRAMUSCULAR; INTRAVENOUS
Status: DISPENSED
Start: 2023-09-03 | End: 2023-09-04

## 2023-09-03 RX ORDER — ACETAMINOPHEN 500 MG
500 TABLET ORAL EVERY 4 HOURS PRN
Status: DISCONTINUED | OUTPATIENT
Start: 2023-09-03 | End: 2023-09-07

## 2023-09-03 RX ORDER — NICOTINE POLACRILEX 4 MG
15 LOZENGE BUCCAL
Status: DISCONTINUED | OUTPATIENT
Start: 2023-09-03 | End: 2023-09-07

## 2023-09-03 RX ORDER — ONDANSETRON 2 MG/ML
4 INJECTION INTRAMUSCULAR; INTRAVENOUS ONCE
Status: COMPLETED | OUTPATIENT
Start: 2023-09-03 | End: 2023-09-03

## 2023-09-03 NOTE — ED INITIAL ASSESSMENT (HPI)
Pt fell at home, on thinners, +LOC. Pt A/Ox4 upon arrival, on O2, per medics pt uses home O2. Pt reports that she felt dizzy and felt like she was going to pass out while in bed at home. Noted bruising the face, chin, chest, left clavicle. Steri strips and dressing to right wrist. Pt reports pain and swelling to right upper leg. C-collar applied upon arrival to ED. Laceration to chin.

## 2023-09-04 LAB
BASOPHILS # BLD AUTO: 0.01 X10(3) UL (ref 0–0.2)
BASOPHILS NFR BLD AUTO: 0.1 %
EOSINOPHIL # BLD AUTO: 0.04 X10(3) UL (ref 0–0.7)
EOSINOPHIL NFR BLD AUTO: 0.4 %
ERYTHROCYTE [DISTWIDTH] IN BLOOD BY AUTOMATED COUNT: 19.3 %
GLUCOSE BLD-MCNC: 144 MG/DL (ref 70–99)
GLUCOSE BLD-MCNC: 184 MG/DL (ref 70–99)
GLUCOSE BLD-MCNC: 197 MG/DL (ref 70–99)
GLUCOSE BLD-MCNC: 205 MG/DL (ref 70–99)
HCT VFR BLD AUTO: 24.1 %
HGB BLD-MCNC: 7.5 G/DL
IMM GRANULOCYTES # BLD AUTO: 0.02 X10(3) UL (ref 0–1)
IMM GRANULOCYTES NFR BLD: 0.2 %
LYMPHOCYTES # BLD AUTO: 0.38 X10(3) UL (ref 1–4)
LYMPHOCYTES NFR BLD AUTO: 4 %
MCH RBC QN AUTO: 27.2 PG (ref 26–34)
MCHC RBC AUTO-ENTMCNC: 31.1 G/DL (ref 31–37)
MCV RBC AUTO: 87.3 FL
MONOCYTES # BLD AUTO: 0.6 X10(3) UL (ref 0.1–1)
MONOCYTES NFR BLD AUTO: 6.3 %
NEUTROPHILS # BLD AUTO: 8.41 X10 (3) UL (ref 1.5–7.7)
NEUTROPHILS # BLD AUTO: 8.41 X10(3) UL (ref 1.5–7.7)
NEUTROPHILS NFR BLD AUTO: 89 %
PLATELET # BLD AUTO: 125 10(3)UL (ref 150–450)
RBC # BLD AUTO: 2.76 X10(6)UL
WBC # BLD AUTO: 9.5 X10(3) UL (ref 4–11)

## 2023-09-04 PROCEDURE — 99232 SBSQ HOSP IP/OBS MODERATE 35: CPT | Performed by: HOSPITALIST

## 2023-09-04 RX ORDER — ATORVASTATIN CALCIUM 20 MG/1
20 TABLET, FILM COATED ORAL NIGHTLY
Status: DISCONTINUED | OUTPATIENT
Start: 2023-09-04 | End: 2023-09-07

## 2023-09-04 RX ORDER — PANTOPRAZOLE SODIUM 40 MG/1
40 TABLET, DELAYED RELEASE ORAL
Status: DISCONTINUED | OUTPATIENT
Start: 2023-09-04 | End: 2023-09-07

## 2023-09-04 RX ORDER — MELATONIN
3 NIGHTLY
Status: DISCONTINUED | OUTPATIENT
Start: 2023-09-04 | End: 2023-09-07

## 2023-09-04 RX ORDER — ASPIRIN 81 MG/1
81 TABLET ORAL DAILY
Status: DISCONTINUED | OUTPATIENT
Start: 2023-09-04 | End: 2023-09-07

## 2023-09-04 RX ORDER — LEVOTHYROXINE SODIUM 0.05 MG/1
50 TABLET ORAL
Status: DISCONTINUED | OUTPATIENT
Start: 2023-09-04 | End: 2023-09-07

## 2023-09-04 RX ORDER — MECLIZINE HCL 12.5 MG/1
12.5 TABLET ORAL 3 TIMES DAILY PRN
Status: DISCONTINUED | OUTPATIENT
Start: 2023-09-04 | End: 2023-09-07

## 2023-09-04 RX ORDER — FOLIC ACID 1 MG/1
1 TABLET ORAL DAILY
Status: DISCONTINUED | OUTPATIENT
Start: 2023-09-04 | End: 2023-09-07

## 2023-09-04 NOTE — PROGRESS NOTES
09/04/23 0441 09/04/23 0443 09/04/23 0445   Vital Signs   Pulse 76 74 73   Heart Rate Source  --   --  Monitor   Cardiac Rhythm  --   --  Afib   Respiratory Quality  --   --  Normal   /49 93/45 (!) 83/45   MAP (mmHg) 63 57 54   BP Location Right arm Right arm Right arm   BP Method Automatic Automatic Automatic   Patient Position Lying Sitting Standing       Pt states she is very lightheaded and weak

## 2023-09-04 NOTE — ED QUICK NOTES
Orders for admission, patient is aware of plan and ready to go upstairs. Any questions, please call ED RN Marly at extension [phone].      Patient Covid vaccination status: Fully vaccinated     COVID Test Ordered in ED: None    COVID Suspicion at Admission: N/A    Running Infusions:  None    Mental Status/LOC at time of transport: a&ox4    Other pertinent information: wears home o2 via nasal cannula, from home  CIWA score: N/A   NIH score:  N/A

## 2023-09-04 NOTE — PLAN OF CARE
Assumed care of patient 0730 in bed asleep. AO x4. Afib on tele monitor. O2 sat adequate on RA. Denies chest pain and shortness of breath. Ambulates with SB and walker. Declines ordered compression stockings. Plan of care updated. Bed locked, in lowest position, with alarm on. Call light and personal items in reach. All needs met. Problem: PAIN - ADULT  Goal: Verbalizes/displays adequate comfort level or patient's stated pain goal  Description: INTERVENTIONS:  - Encourage pt to monitor pain and request assistance  - Assess pain using appropriate pain scale  - Administer analgesics based on type and severity of pain and evaluate response  - Implement non-pharmacological measures as appropriate and evaluate response  - Consider cultural and social influences on pain and pain management  - Manage/alleviate anxiety  - Utilize distraction and/or relaxation techniques  - Monitor for opioid side effects  - Notify MD/LIP if interventions unsuccessful or patient reports new pain  - Anticipate increased pain with activity and pre-medicate as appropriate  Outcome: Progressing     Problem: SAFETY ADULT - FALL  Goal: Free from fall injury  Description: INTERVENTIONS:  - Assess pt frequently for physical needs  - Identify cognitive and physical deficits and behaviors that affect risk of falls.   - [place] fall precautions as indicated by assessment.  - Educate pt/family on patient safety including physical limitations  - Instruct pt to call for assistance with activity based on assessment  - Modify environment to reduce risk of injury  - Provide assistive devices as appropriate  - Consider OT/PT consult to assist with strengthening/mobility  - Encourage toileting schedule  Outcome: Progressing     Problem: DISCHARGE PLANNING  Goal: Discharge to home or other facility with appropriate resources  Description: INTERVENTIONS:  - Identify barriers to discharge w/pt and caregiver  - Include patient/family/discharge partner in discharge planning  - Arrange for needed discharge resources and transportation as appropriate  - Identify discharge learning needs (meds, wound care, etc)  - Arrange for interpreters to assist at discharge as needed  - Consider post-discharge preferences of patient/family/discharge partner  - Complete POLST form as appropriate  - Assess patient's ability to be responsible for managing their own health  - Refer to Case Management Department for coordinating discharge planning if the patient needs post-hospital services based on physician/LIP order or complex needs related to functional status, cognitive ability or social support system  Outcome: Progressing

## 2023-09-04 NOTE — PLAN OF CARE
Received pt from ED. Admission navigator completed. A&Ox4  O2 sat WNL on RA/2L   Afib on tele   Continent of bladder and bowel   Pivoting to commode. Pt states she is weak and dizzy. Up with SBA and walker      Problem: PAIN - ADULT  Goal: Verbalizes/displays adequate comfort level or patient's stated pain goal  Description: INTERVENTIONS:  - Encourage pt to monitor pain and request assistance  - Assess pain using appropriate pain scale  - Administer analgesics based on type and severity of pain and evaluate response  - Implement non-pharmacological measures as appropriate and evaluate response  - Consider cultural and social influences on pain and pain management  - Manage/alleviate anxiety  - Utilize distraction and/or relaxation techniques  - Monitor for opioid side effects  - Notify MD/LIP if interventions unsuccessful or patient reports new pain  - Anticipate increased pain with activity and pre-medicate as appropriate  Outcome: Progressing     Problem: SAFETY ADULT - FALL  Goal: Free from fall injury  Description: INTERVENTIONS:  - Assess pt frequently for physical needs  - Identify cognitive and physical deficits and behaviors that affect risk of falls.   - [place] fall precautions as indicated by assessment.  - Educate pt/family on patient safety including physical limitations  - Instruct pt to call for assistance with activity based on assessment  - Modify environment to reduce risk of injury  - Provide assistive devices as appropriate  - Consider OT/PT consult to assist with strengthening/mobility  - Encourage toileting schedule  Outcome: Progressing     Problem: DISCHARGE PLANNING  Goal: Discharge to home or other facility with appropriate resources  Description: INTERVENTIONS:  - Identify barriers to discharge w/pt and caregiver  - Include patient/family/discharge partner in discharge planning  - Arrange for needed discharge resources and transportation as appropriate  - Identify discharge learning needs (meds, wound care, etc)  - Arrange for interpreters to assist at discharge as needed  - Consider post-discharge preferences of patient/family/discharge partner  - Complete POLST form as appropriate  - Assess patient's ability to be responsible for managing their own health  - Refer to Case Management Department for coordinating discharge planning if the patient needs post-hospital services based on physician/LIP order or complex needs related to functional status, cognitive ability or social support system  Outcome: Progressing

## 2023-09-04 NOTE — ED QUICK NOTES
Bedside report with Gavin Motta RN. Assumed care of pt at this time. Pt is a&ox4. Family at bedside. Additional warm blankets given as requested. Awaiting C-spine clearance for X-ray.

## 2023-09-04 NOTE — PROGRESS NOTES
09/03/23 2218 09/03/23 2220   Vital Signs   Pulse 72 78   Resp (!) 28  --    Respiratory Quality Shallow  --    /58 99/72   MAP (mmHg) 71 77   BP Location Left arm Left arm   BP Method Automatic Automatic   Patient Position Lying Sitting       Pt states she is too weak to stand, feels light headed and weak when going from a lying to sitting position

## 2023-09-04 NOTE — ED QUICK NOTES
Rounding Completed    Plan of Care reviewed. Waiting for XR results. Elimination needs assessed. Pt denies needs at this time. Bed is locked and in lowest position. Call light within reach.

## 2023-09-05 LAB
ANION GAP SERPL CALC-SCNC: 3 MMOL/L (ref 0–18)
BUN BLD-MCNC: 13 MG/DL (ref 7–18)
CALCIUM BLD-MCNC: 8.7 MG/DL (ref 8.5–10.1)
CHLORIDE SERPL-SCNC: 103 MMOL/L (ref 98–112)
CO2 SERPL-SCNC: 35 MMOL/L (ref 21–32)
CREAT BLD-MCNC: 0.6 MG/DL
DEPRECATED HBV CORE AB SER IA-ACNC: 534.7 NG/ML
EGFRCR SERPLBLD CKD-EPI 2021: 90 ML/MIN/1.73M2 (ref 60–?)
ERYTHROCYTE [DISTWIDTH] IN BLOOD BY AUTOMATED COUNT: 19.9 %
GLUCOSE BLD-MCNC: 143 MG/DL (ref 70–99)
GLUCOSE BLD-MCNC: 149 MG/DL (ref 70–99)
GLUCOSE BLD-MCNC: 153 MG/DL (ref 70–99)
GLUCOSE BLD-MCNC: 162 MG/DL (ref 70–99)
GLUCOSE BLD-MCNC: 193 MG/DL (ref 70–99)
HCT VFR BLD AUTO: 27.5 %
HGB BLD-MCNC: 8.4 G/DL
IRON SATN MFR SERPL: 17 %
IRON SERPL-MCNC: 25 UG/DL
MCH RBC QN AUTO: 27.4 PG (ref 26–34)
MCHC RBC AUTO-ENTMCNC: 30.5 G/DL (ref 31–37)
MCV RBC AUTO: 89.6 FL
OSMOLALITY SERPL CALC.SUM OF ELEC: 295 MOSM/KG (ref 275–295)
PLATELET # BLD AUTO: 126 10(3)UL (ref 150–450)
POTASSIUM SERPL-SCNC: 3.9 MMOL/L (ref 3.5–5.1)
Q-T INTERVAL: 412 MS
QRS DURATION: 80 MS
QTC CALCULATION (BEZET): 453 MS
R AXIS: 23 DEGREES
RBC # BLD AUTO: 3.07 X10(6)UL
SODIUM SERPL-SCNC: 141 MMOL/L (ref 136–145)
T AXIS: -16 DEGREES
TIBC SERPL-MCNC: 146 UG/DL (ref 240–450)
TRANSFERRIN SERPL-MCNC: 98 MG/DL (ref 200–360)
VENTRICULAR RATE: 73 BPM
WBC # BLD AUTO: 9.9 X10(3) UL (ref 4–11)

## 2023-09-05 PROCEDURE — 99232 SBSQ HOSP IP/OBS MODERATE 35: CPT | Performed by: HOSPITALIST

## 2023-09-05 RX ORDER — DOCUSATE SODIUM 100 MG/1
100 CAPSULE, LIQUID FILLED ORAL 2 TIMES DAILY PRN
Status: DISCONTINUED | OUTPATIENT
Start: 2023-09-05 | End: 2023-09-07

## 2023-09-05 NOTE — PROGRESS NOTES
09/05/23 0601 09/05/23 0603 09/05/23 0605   Vital Signs   Pulse 76 96 85   /54 (!) 87/53 (!) 73/43   MAP (mmHg) 66 59 49   BP Location Right arm Right arm Right arm   BP Method Automatic Automatic Automatic   Patient Position Lying Sitting Standing     Pt states she is dizzy and that room is spinning.

## 2023-09-05 NOTE — PROGRESS NOTES
09/05/23 1244 09/05/23 1246 09/05/23 1248   Vital Signs   /66 115/58 114/50   MAP (mmHg) 73 74 65   BP Location Left arm Left arm Left arm   BP Method Automatic Automatic Automatic   Patient Position Lying Sitting Standing

## 2023-09-05 NOTE — PLAN OF CARE
A&Ox4. O2 sat WNL on RA. Afib on tele. Continent of bladder and bowel. Up with SBA and walker. POC: orthos     Problem: PAIN - ADULT  Goal: Verbalizes/displays adequate comfort level or patient's stated pain goal  Description: INTERVENTIONS:  - Encourage pt to monitor pain and request assistance  - Assess pain using appropriate pain scale  - Administer analgesics based on type and severity of pain and evaluate response  - Implement non-pharmacological measures as appropriate and evaluate response  - Consider cultural and social influences on pain and pain management  - Manage/alleviate anxiety  - Utilize distraction and/or relaxation techniques  - Monitor for opioid side effects  - Notify MD/LIP if interventions unsuccessful or patient reports new pain  - Anticipate increased pain with activity and pre-medicate as appropriate  Outcome: Progressing     Problem: SAFETY ADULT - FALL  Goal: Free from fall injury  Description: INTERVENTIONS:  - Assess pt frequently for physical needs  - Identify cognitive and physical deficits and behaviors that affect risk of falls.   - [place] fall precautions as indicated by assessment.  - Educate pt/family on patient safety including physical limitations  - Instruct pt to call for assistance with activity based on assessment  - Modify environment to reduce risk of injury  - Provide assistive devices as appropriate  - Consider OT/PT consult to assist with strengthening/mobility  - Encourage toileting schedule  Outcome: Progressing     Problem: DISCHARGE PLANNING  Goal: Discharge to home or other facility with appropriate resources  Description: INTERVENTIONS:  - Identify barriers to discharge w/pt and caregiver  - Include patient/family/discharge partner in discharge planning  - Arrange for needed discharge resources and transportation as appropriate  - Identify discharge learning needs (meds, wound care, etc)  - Arrange for interpreters to assist at discharge as needed  - Consider post-discharge preferences of patient/family/discharge partner  - Complete POLST form as appropriate  - Assess patient's ability to be responsible for managing their own health  - Refer to Case Management Department for coordinating discharge planning if the patient needs post-hospital services based on physician/LIP order or complex needs related to functional status, cognitive ability or social support system  Outcome: Progressing

## 2023-09-05 NOTE — CM/SW NOTE
Chart reviewed for discharge needs. PT is recommending home health. SW initiated [facility] referrals in [place]. Order/F2F entered.  Await acceptances to provide choice list.

## 2023-09-05 NOTE — PHYSICAL THERAPY NOTE
PHYSICAL THERAPY EVALUATION - INPATIENT     Room Number: 6981/5856-L  Evaluation Date: 9/5/2023  Type of Evaluation: Initial  Physician Order: PT Eval and Treat    Presenting Problem: syncopal episode causing fall  Co-Morbidities : atrial fibrillation on Xarelto, DMII, CAD, CHF, HTN, hypothryoidism, bilateral carotid stenosis, HLD, achalsia, follicular lymphoma  Reason for Therapy: Mobility Dysfunction and Discharge Planning    History related to current admission: Patient is a 80year old female admitted on 9/3/2023 from home for syncopal episode causing a fall. Per pt, she was throwing a bottle into trash can when she lost her balance and fell. IMAGING:  XRAY PELVIS  There are surgical pins in the left femoral neck. There is no acute fracture detected. XRAY RIGHT WRIST:  There is marked soft tissue swelling over the dorsum of the wrist at the level of the distal radius. There is otherwise no acute fracture detected. XRAY CHEST  1. There is dependent atelectasis at left lung base. 2. There is mild cardiomegaly. 3. There is otherwise no acute abnormality detected. XRAY Right FEMUR  1. There is no acute fracture detected. 2. There is osteoarthritis in the hip. 3. There is a right knee prosthesis noted. CT FACIAL BONES:  1. There is soft tissue swelling over the right superior orbital rim consistent with soft tissue contusion and superficial hematoma. There is no fracture. 2. There is rightward deviation of nasal septum and a right directed nasal septal spur. CT SPINE CERVICAL:  1. There is no acute fracture or traumatic subluxation in the cervical spine. 2. Disc protrusions are noted above at C3-C4 and C4-C5. CT BRAIN:  1. There is chronic small vessel ischemic change and atrophy evident. 2. The superficial contusion is noted anterior to the right superior orbital rim. 3. There is otherwise no acute abnormality.       ASSESSMENT   In this PT evaluation, the patient presents with the following impairments decr static and dynamic standing balance, decr activity tolerance/endurance, decr functional mobility . These impairments and comorbidities manifest themselves as functional limitations in independent bed mobility, transfers, and gait. The patient is below baseline and would benefit from skilled inpatient PT to address the above deficits to assist patient in returning to prior to level of function. [facility] The AM-PAC '6-Clicks' Inpatient Basic Mobility Short Form was completed and this patient is demonstrating a Approx Degree of Impairment: 41.77%  degree of impairment in mobility. Research supports that patients with this level of impairment may benefit from HOME w/ HHPT/OT. DISCHARGE RECOMMENDATIONS  PT Discharge Recommendations: Home with home health PT/OT; Intermittent Supervision    PLAN  PT Treatment Plan: Bed mobility; Body mechanics; Endurance; Energy conservation;Patient education; Family education;Gait training;Neuromuscular re-educate;Range of motion;Strengthening;Stoop training;Stair training;Transfer training;Balance training  Rehab Potential : Good  Frequency (Obs): 3-5x/week  Number of Visits to Meet Established Goals: 3      CURRENT GOALS    Goal #1 Patient is able to demonstrate supine - sit EOB @ level: supervision     Goal #2 Patient is able to demonstrate transfers Sit to/from Stand at assistance level: supervision     Goal #3 Patient is able to ambulate 100 feet with assist device: walker - rolling at assistance level: supervision     Goal #4    Goal #5    Goal #6    Goal Comments: Goals established on 9/5/2023    HOME SITUATION  Type of Home: House   Home Layout: One level  Stairs to Enter : 1             Lives With: Caregiver part-time  Drives: No  Patient Owned Equipment: Rolling walker; Other (Comment) (lift recliner chair)       Prior Level of [place]: Per pt- lives with son in one level home with 1 MORGAN. Pt has caregiver for 4 hours per week who helps clean the home, change linens, grocery shopping, and watering her plants. Son that lives at home works during the day, therefore pt typically alone during day. Ambulates with rollator. Denies recent history of falls other than fall that admitted her. SUBJECTIVE  \"I just want to make sure I am safe to go home. \"      OBJECTIVE  Precautions: Bed/chair alarm (orthostatics)  Fall Risk: Standard fall risk    WEIGHT BEARING RESTRICTION  Weight Bearing Restriction: None                PAIN ASSESSMENT  Ratin  Location: wrist  Management Techniques: Activity promotion; Body mechanics;Repositioning    COGNITION  Overall Cognitive Status:  WFL - within functional limits    RANGE OF MOTION AND STRENGTH ASSESSMENT  Upper extremity ROM and strength are within functional limits     Lower extremity ROM is within functional limits     Lower extremity strength is within functional limits       BALANCE  Static Sitting: Good  Dynamic Sitting: Fair +  Static Standing: Fair  Dynamic Standing: Fair -    ADDITIONAL TESTS                                    ACTIVITY TOLERANCE                         O2 WALK       NEUROLOGICAL FINDINGS                        AM-PAC '6-Clicks' INPATIENT SHORT FORM - BASIC MOBILITY  How much difficulty does the patient currently have. .. Patient Difficulty: Turning over in bed (including adjusting bedclothes, sheets and blankets)?: None   Patient Difficulty: Sitting down on and standing up from a chair with arms (e.g., wheelchair, bedside commode, etc.): A Little   Patient Difficulty: Moving from lying on back to sitting on the side of the bed?: A Little   How much help from another person does the patient currently need. ..    Help from Another: Moving to and from a bed to a chair (including a wheelchair)?: A Little   Help from Another: Need to walk in hospital room?: A Little   Help from Another: Climbing 3-5 steps with a railing?: A Little       AM-PAC Score:  Raw Score: 19   Approx Degree of Impairment: 41.77%   Standardized Score (AM-PAC Scale): 45.44   CMS Modifier (G-Code): CK    FUNCTIONAL ABILITY STATUS  Gait Assessment   Functional Mobility/Gait Assessment  Gait Assistance: Contact guard assist  Distance (ft): 100  Assistive Device: Rolling walker  Pattern:  (decr LLE swing through, decr roxy, wider base of support)    Skilled Therapy Provided     Bed Mobility:  Rolling: ind  Supine to sit: w/ HOB elevated significantly- supervision    Sit to supine: NT    Transfer Mobility:  Sit to stand: SBA to RW- verbal cue for hand placement    Stand to sit: SBA   Gait = CGA x 100 feet, decr LLE swing through (incr RLE stance time), wider base of support, decr roxy     Therapist's Comments: Patient presents sitting up in bed. Discussed role and goal of physical therapy. Pt reporting pain in right wrist. Pt in agreement to session. Bed mobility with HOB elevated significantly completed at supervision. Sit to stand to RW at A. Ambulated at [facility] 62 x 100 feet with RW, see above for specifics. Pt with incr work of breathing observed, HR >120. Educated on pursed lip breathing to slow RR down. Pt upright in chair at end of session. Educated on importance of continued out of bed functional mobility. Encouraged pt to continue to ambulate with RN/PCT staff. Pt will continue to benefit from IP PT interventions. Upon discharge, recommendation to HHPT/OT and int supervision. Pt verbalizes understanding. RN and SW aware. Exercise/Education Provided:  Bed mobility  Body mechanics  Energy conservation  Functional activity tolerated  Gait training  Transfer training    Patient End of Session: Up in chair;Call light within reach; Needs met;RN aware of session/findings; All patient questions and concerns addressed; Alarm set; Discussed recommendations with /      Patient Evaluation Complexity Level:  History Moderate - 1 or 2 personal factors and/or co-morbidities   Examination of body systems Low - addressing 1-2 elements   Clinical Presentation Low - Stable   Clinical Decision Making Low - Stable       PT Session Time: 30 minutes  Gait Training: 10 minutes  Therapeutic Activity:  minutes  Neuromuscular Re-education:  minutes  Therapeutic Exercise:  minutes

## 2023-09-05 NOTE — PLAN OF CARE
Received pt at 0700. Pt is A&Ox4, complaints of R eye pain/R wrist pain, PRN tylenol available. Pt is on room air, lungs are clear/diminished, no coughing. She is in A fib, no complaints of chest pain, orthos (+) this AM with night shift RN, reports dizziness/lightheaded with movement. Pt is continent of B&B, active bowel sounds, abdomen non-tender, last BM 9-3- complaints of constipation: MD notified for colace. R hand dressing reinforced. Bruising present to R eye/ R thigh. Pt updated with plan of care. Approx 1130- pt requesting to speak with MD regarding iron transfusion. MD ordering/reviewing labs & will look into. Problem: PAIN - ADULT  Goal: Verbalizes/displays adequate comfort level or patient's stated pain goal  Description: INTERVENTIONS:  - Encourage pt to monitor pain and request assistance  - Assess pain using appropriate pain scale  - Administer analgesics based on type and severity of pain and evaluate response  - Implement non-pharmacological measures as appropriate and evaluate response  - Consider cultural and social influences on pain and pain management  - Manage/alleviate anxiety  - Utilize distraction and/or relaxation techniques  - Monitor for opioid side effects  - Notify MD/LIP if interventions unsuccessful or patient reports new pain  - Anticipate increased pain with activity and pre-medicate as appropriate  Outcome: Not Progressing     Problem: SAFETY ADULT - FALL  Goal: Free from fall injury  Description: INTERVENTIONS:  - Assess pt frequently for physical needs  - Identify cognitive and physical deficits and behaviors that affect risk of falls.   - [place] fall precautions as indicated by assessment.  - Educate pt/family on patient safety including physical limitations  - Instruct pt to call for assistance with activity based on assessment  - Modify environment to reduce risk of injury  - Provide assistive devices as appropriate  - Consider OT/PT consult to assist with strengthening/mobility  - Encourage toileting schedule  Outcome: Progressing     Problem: DISCHARGE PLANNING  Goal: Discharge to home or other facility with appropriate resources  Description: INTERVENTIONS:  - Identify barriers to discharge w/pt and caregiver  - Include patient/family/discharge partner in discharge planning  - Arrange for needed discharge resources and transportation as appropriate  - Identify discharge learning needs (meds, wound care, etc)  - Arrange for interpreters to assist at discharge as needed  - Consider post-discharge preferences of patient/family/discharge partner  - Complete POLST form as appropriate  - Assess patient's ability to be responsible for managing their own health  - Refer to Case Management Department for coordinating discharge planning if the patient needs post-hospital services based on physician/LIP order or complex needs related to functional status, cognitive ability or social support system  Outcome: Progressing     Problem: Patient/Family Goals  Goal: Patient/Family Long Term Goal  Description: Patient's Long Term Goal: stay out of hospital 9-5    Interventions:  - med compliance  - follow ups    - See additional Care Plan goals for specific interventions  Outcome: Progressing  Goal: Patient/Family Short Term Goal  Description: Patient's Short Term Goal: no pain 9-5    Interventions:   - PRN pain med, hot/cold packs, tell nurse if in pain     - See additional Care Plan goals for specific interventions  Outcome: Progressing

## 2023-09-06 LAB
ANION GAP SERPL CALC-SCNC: 4 MMOL/L (ref 0–18)
BASOPHILS # BLD AUTO: 0.02 X10(3) UL (ref 0–0.2)
BASOPHILS NFR BLD AUTO: 0.3 %
BUN BLD-MCNC: 13 MG/DL (ref 7–18)
CALCIUM BLD-MCNC: 8.8 MG/DL (ref 8.5–10.1)
CHLORIDE SERPL-SCNC: 105 MMOL/L (ref 98–112)
CO2 SERPL-SCNC: 33 MMOL/L (ref 21–32)
CREAT BLD-MCNC: 0.55 MG/DL
EGFRCR SERPLBLD CKD-EPI 2021: 91 ML/MIN/1.73M2 (ref 60–?)
EOSINOPHIL # BLD AUTO: 0.13 X10(3) UL (ref 0–0.7)
EOSINOPHIL NFR BLD AUTO: 1.8 %
ERYTHROCYTE [DISTWIDTH] IN BLOOD BY AUTOMATED COUNT: 20 %
GLUCOSE BLD-MCNC: 158 MG/DL (ref 70–99)
GLUCOSE BLD-MCNC: 158 MG/DL (ref 70–99)
GLUCOSE BLD-MCNC: 176 MG/DL (ref 70–99)
GLUCOSE BLD-MCNC: 189 MG/DL (ref 70–99)
GLUCOSE BLD-MCNC: 195 MG/DL (ref 70–99)
HCT VFR BLD AUTO: 25.2 %
HGB BLD-MCNC: 7.8 G/DL
IMM GRANULOCYTES # BLD AUTO: 0.02 X10(3) UL (ref 0–1)
IMM GRANULOCYTES NFR BLD: 0.3 %
LYMPHOCYTES # BLD AUTO: 0.3 X10(3) UL (ref 1–4)
LYMPHOCYTES NFR BLD AUTO: 4.1 %
MCH RBC QN AUTO: 27.9 PG (ref 26–34)
MCHC RBC AUTO-ENTMCNC: 31 G/DL (ref 31–37)
MCV RBC AUTO: 90 FL
MONOCYTES # BLD AUTO: 0.61 X10(3) UL (ref 0.1–1)
MONOCYTES NFR BLD AUTO: 8.4 %
NEUTROPHILS # BLD AUTO: 6.19 X10 (3) UL (ref 1.5–7.7)
NEUTROPHILS # BLD AUTO: 6.19 X10(3) UL (ref 1.5–7.7)
NEUTROPHILS NFR BLD AUTO: 85.1 %
OSMOLALITY SERPL CALC.SUM OF ELEC: 297 MOSM/KG (ref 275–295)
PLATELET # BLD AUTO: 128 10(3)UL (ref 150–450)
POTASSIUM SERPL-SCNC: 3.6 MMOL/L (ref 3.5–5.1)
POTASSIUM SERPL-SCNC: 5 MMOL/L (ref 3.5–5.1)
RBC # BLD AUTO: 2.8 X10(6)UL
SODIUM SERPL-SCNC: 142 MMOL/L (ref 136–145)
WBC # BLD AUTO: 7.3 X10(3) UL (ref 4–11)

## 2023-09-06 PROCEDURE — 99232 SBSQ HOSP IP/OBS MODERATE 35: CPT | Performed by: HOSPITALIST

## 2023-09-06 RX ORDER — MIDODRINE HYDROCHLORIDE 2.5 MG/1
2.5 TABLET ORAL
Status: DISCONTINUED | OUTPATIENT
Start: 2023-09-06 | End: 2023-09-07

## 2023-09-06 RX ORDER — POTASSIUM CHLORIDE 20 MEQ/1
40 TABLET, EXTENDED RELEASE ORAL EVERY 4 HOURS
Status: COMPLETED | OUTPATIENT
Start: 2023-09-06 | End: 2023-09-06

## 2023-09-06 NOTE — PLAN OF CARE
Pt. Alert and oriented times 4. Afib on tele. On RA. Pt. Denies Chest pain or shortness of breath. Pt. Has bruising to right eye. Right wrist injury. Dressing to right rist dry clean and intact. Pt. Complains of some dizziness while standing. Pt.  Is up with one person assist and a walker. Pt. Off anticoagulation. No deficits in bedside neuro assessment. Pt. Updated on plan of care. Call light within reach. Problem: PAIN - ADULT  Goal: Verbalizes/displays adequate comfort level or patient's stated pain goal  Description: INTERVENTIONS:  - Encourage pt to monitor pain and request assistance  - Assess pain using appropriate pain scale  - Administer analgesics based on type and severity of pain and evaluate response  - Implement non-pharmacological measures as appropriate and evaluate response  - Consider cultural and social influences on pain and pain management  - Manage/alleviate anxiety  - Utilize distraction and/or relaxation techniques  - Monitor for opioid side effects  - Notify MD/LIP if interventions unsuccessful or patient reports new pain  - Anticipate increased pain with activity and pre-medicate as appropriate  Outcome: Progressing     Problem: SAFETY ADULT - FALL  Goal: Free from fall injury  Description: INTERVENTIONS:  - Assess pt frequently for physical needs  - Identify cognitive and physical deficits and behaviors that affect risk of falls.   - [place] fall precautions as indicated by assessment.  - Educate pt/family on patient safety including physical limitations  - Instruct pt to call for assistance with activity based on assessment  - Modify environment to reduce risk of injury  - Provide assistive devices as appropriate  - Consider OT/PT consult to assist with strengthening/mobility  - Encourage toileting schedule  Outcome: Progressing     Problem: DISCHARGE PLANNING  Goal: Discharge to home or other facility with appropriate resources  Description: INTERVENTIONS:  - Identify barriers to discharge w/pt and caregiver  - Include patient/family/discharge partner in discharge planning  - Arrange for needed discharge resources and transportation as appropriate  - Identify discharge learning needs (meds, wound care, etc)  - Arrange for interpreters to assist at discharge as needed  - Consider post-discharge preferences of patient/family/discharge partner  - Complete POLST form as appropriate  - Assess patient's ability to be responsible for managing their own health  - Refer to Case Management Department for coordinating discharge planning if the patient needs post-hospital services based on physician/LIP order or complex needs related to functional status, cognitive ability or social support system  Outcome: Progressing     Problem: Patient/Family Goals  Goal: Patient/Family Long Term Goal  Description: Patient's Long Term Goal: stay out of hospital 9-5    Interventions:  - med compliance  - follow ups    - See additional Care Plan goals for specific interventions  Outcome: Progressing

## 2023-09-06 NOTE — CM/SW NOTE
09/06/23 1535   Choice of Post-Acute Provider   Informed patient of right to choose their preferred provider Yes   List of appropriate post-acute services provided to patient/family with quality data Yes   Patient/family choice [address] [place] given to Patient       SW met with pt at bedside to discuss dc planning. Pt is alert and oriented. Discussed that PT/OT are recommending [address] at discharge. List given, agreeable to [facility]. Pt voiced interest in [facility], however, they are not in network with pt's insurance. Pt understanding. [facility] [address] [facility]. Will send AVS once discharged.      Kenneth Haile MSW, [facility]  Discharge Planner  [phone]

## 2023-09-06 NOTE — PHYSICAL THERAPY NOTE
PHYSICAL THERAPY TREATMENT NOTE - INPATIENT    Room Number: 5355/6015-K     Session: 1     Number of Visits to Meet Established Goals: 3    Presenting Problem: syncopal episode causing fall  Co-Morbidities : atrial fibrillation on Xarelto, DMII, CAD, CHF, HTN, hypothryoidism, bilateral carotid stenosis, HLD, achalsia, follicular lymphoma  ASSESSMENT     Pt motivated to move with PT/OT - Pt's only questions are about her L hand trigger finger - recommended to speak with MD/hand specialist for any questions. Pt up without physical assist with use of RW - she prefers rollator. Patient currently does not meet criteria for skilled inpatient physical therapy services, however patient will continue to benefit from QID ambulation with nursing staff. Pt is hereby discharged from [address] RN aware to re-order if there is a decline in mobility status. DISCHARGE RECOMMENDATIONS  PT Discharge Recommendations: Home with home health PT/OT; Intermittent Supervision     PLAN  PT Treatment Plan: Bed mobility; Body mechanics; Endurance; Energy conservation;Patient education; Family education;Gait training;Neuromuscular re-educate;Range of motion;Strengthening;Stoop training;Stair training;Transfer training;Balance training  Rehab Potential : Good  Frequency (Obs): 3-5x/week    CURRENT GOALS     Goal #1 Patient is able to demonstrate supine - sit EOB @ level: supervision      Goal #2 Patient is able to demonstrate transfers Sit to/from Stand at assistance level: supervision      Goal #3 Patient is able to ambulate 100 feet with assist device: walker - rolling at assistance level: supervision      Goal #4     Goal #5     Goal #6     Goal Comments: Goals established on 9/5/2023 9/6/2023 all goals ongoing       Mae James its a bummer about my hands, I can't play the piano much anymore    OBJECTIVE  Precautions: Bed/chair alarm (orthostatics)    WEIGHT BEARING RESTRICTION  Weight Bearing Restriction: None PAIN ASSESSMENT   Ratin  Location: denied  Management Techniques: Activity promotion; Body mechanics;Repositioning    BALANCE                                                                                                                       Static Sitting: Good  Dynamic Sitting: Fair +           Static Standing: Fair  Dynamic Standing: Fair -    ACTIVITY TOLERANCE                         O2 WALK         AM-PAC '6-Clicks' INPATIENT SHORT FORM - BASIC MOBILITY  How much difficulty does the patient currently have. .. Patient Difficulty: Turning over in bed (including adjusting bedclothes, sheets and blankets)?: None   Patient Difficulty: Sitting down on and standing up from a chair with arms (e.g., wheelchair, bedside commode, etc.): None   Patient Difficulty: Moving from lying on back to sitting on the side of the bed?: None   How much help from another person does the patient currently need. .. Help from Another: Moving to and from a bed to a chair (including a wheelchair)?: None   Help from Another: Need to walk in hospital room?: None   Help from Another: Climbing 3-5 steps with a railing?: A Little       AM-PAC Score:  Raw Score: 23   Approx Degree of Impairment: 11.2%   Standardized Score (AM-PAC Scale): 56.93   CMS Modifier (G-Code): CI    FUNCTIONAL ABILITY STATUS  Gait Assessment   Functional Mobility/Gait Assessment  Gait Assistance: Modified independent  Distance (ft): 200  Assistive Device: Rolling walker  Pattern:  (decr LLE swing through, decr roxy, wider base of support)    Skilled Therapy Provided    Bed Mobility:  Rolling: NT   Supine<>Sit: NA   Sit<>Supine: NA     Transfer Mobility:  Sit<>Stand:  Mo I   Stand<>Sit: Mod I   Gait: Mod I with RW    Therapist's Comments: NA      THERAPEUTIC EXERCISES  Lower Extremity Ankle pumps     Upper Extremity  - open/close     Position Sitting     Repetitions   10   Sets   1     Patient End of Session: Up in chair;Needs met;Call light within reach;RN aware of session/findings; All patient questions and concerns addressed    PT Session Time: 15 minutes  Gait Training: 15 minutes  Therapeutic Activity: 0 minutes  Therapeutic Exercise: 0 minutes   Neuromuscular Re-education: 0 minutes

## 2023-09-06 NOTE — PLAN OF CARE
A&Ox4  O2 sat WNL on RA  Afib on tele. Rates controlled. Continent of bladder and bowel  Pt walking to bathroom. Pt states that she is feeling better and stronger. Up with SBA and walker    Plan: repeat orthos     Problem: PAIN - ADULT  Goal: Verbalizes/displays adequate comfort level or patient's stated pain goal  Description: INTERVENTIONS:  - Encourage pt to monitor pain and request assistance  - Assess pain using appropriate pain scale  - Administer analgesics based on type and severity of pain and evaluate response  - Implement non-pharmacological measures as appropriate and evaluate response  - Consider cultural and social influences on pain and pain management  - Manage/alleviate anxiety  - Utilize distraction and/or relaxation techniques  - Monitor for opioid side effects  - Notify MD/LIP if interventions unsuccessful or patient reports new pain  - Anticipate increased pain with activity and pre-medicate as appropriate  Outcome: Progressing     Problem: SAFETY ADULT - FALL  Goal: Free from fall injury  Description: INTERVENTIONS:  - Assess pt frequently for physical needs  - Identify cognitive and physical deficits and behaviors that affect risk of falls.   - [place] fall precautions as indicated by assessment.  - Educate pt/family on patient safety including physical limitations  - Instruct pt to call for assistance with activity based on assessment  - Modify environment to reduce risk of injury  - Provide assistive devices as appropriate  - Consider OT/PT consult to assist with strengthening/mobility  - Encourage toileting schedule  Outcome: Progressing     Problem: DISCHARGE PLANNING  Goal: Discharge to home or other facility with appropriate resources  Description: INTERVENTIONS:  - Identify barriers to discharge w/pt and caregiver  - Include patient/family/discharge partner in discharge planning  - Arrange for needed discharge resources and transportation as appropriate  - Identify discharge learning needs (meds, wound care, etc)  - Arrange for interpreters to assist at discharge as needed  - Consider post-discharge preferences of patient/family/discharge partner  - Complete POLST form as appropriate  - Assess patient's ability to be responsible for managing their own health  - Refer to Case Management Department for coordinating discharge planning if the patient needs post-hospital services based on physician/LIP order or complex needs related to functional status, cognitive ability or social support system  Outcome: Progressing     Problem: Patient/Family Goals  Goal: Patient/Family Long Term Goal  Description: Patient's Long Term Goal: stay out of hospital 9-5    Interventions:  - med compliance  - follow ups    - See additional Care Plan goals for specific interventions  Outcome: Progressing  Goal: Patient/Family Short Term Goal  Description: Patient's Short Term Goal: no pain 9-5    Interventions:   - PRN pain med, hot/cold packs, tell nurse if in pain     - See additional Care Plan goals for specific interventions  Outcome: Progressing

## 2023-09-06 NOTE — PROGRESS NOTES
09/06/23 1820 09/06/23 1825 09/06/23 1830   Vital Signs   /69 132/75 103/68   MAP (mmHg) 86 89 75   BP Location Left arm Left arm Left arm   BP Method Automatic Automatic Automatic   Patient Position Lying Sitting Standing     Orthos

## 2023-09-06 NOTE — DISCHARGE INSTRUCTIONS
[address], [address] ([address])  [place], [address]  Phone: [phone]  Fax: [phone]    Sometimes managing your health at home requires assistance. The [place] team has recognized your preference to use [id number] [facility]: [phone]. A representative from the home health agency will contact you or your family to schedule your first visit. Please have a CBC completed at any [facility] lab in 1 week.  This is a non-fasting lab test.

## 2023-09-07 VITALS
SYSTOLIC BLOOD PRESSURE: 120 MMHG | HEIGHT: 70 IN | RESPIRATION RATE: 18 BRPM | OXYGEN SATURATION: 100 % | DIASTOLIC BLOOD PRESSURE: 68 MMHG | WEIGHT: 147 LBS | TEMPERATURE: 97 F | HEART RATE: 88 BPM | BODY MASS INDEX: 21.05 KG/M2

## 2023-09-07 LAB
BASOPHILS # BLD AUTO: 0.02 X10(3) UL (ref 0–0.2)
BASOPHILS NFR BLD AUTO: 0.3 %
EOSINOPHIL # BLD AUTO: 0.19 X10(3) UL (ref 0–0.7)
EOSINOPHIL NFR BLD AUTO: 2.6 %
ERYTHROCYTE [DISTWIDTH] IN BLOOD BY AUTOMATED COUNT: 20.6 %
GLUCOSE BLD-MCNC: 162 MG/DL (ref 70–99)
GLUCOSE BLD-MCNC: 188 MG/DL (ref 70–99)
HCT VFR BLD AUTO: 26.9 %
HGB BLD-MCNC: 7.9 G/DL
IMM GRANULOCYTES # BLD AUTO: 0.03 X10(3) UL (ref 0–1)
IMM GRANULOCYTES NFR BLD: 0.4 %
LYMPHOCYTES # BLD AUTO: 0.35 X10(3) UL (ref 1–4)
LYMPHOCYTES NFR BLD AUTO: 4.8 %
MCH RBC QN AUTO: 27.1 PG (ref 26–34)
MCHC RBC AUTO-ENTMCNC: 29.4 G/DL (ref 31–37)
MCV RBC AUTO: 92.1 FL
MONOCYTES # BLD AUTO: 0.56 X10(3) UL (ref 0.1–1)
MONOCYTES NFR BLD AUTO: 7.7 %
NEUTROPHILS # BLD AUTO: 6.13 X10 (3) UL (ref 1.5–7.7)
NEUTROPHILS # BLD AUTO: 6.13 X10(3) UL (ref 1.5–7.7)
NEUTROPHILS NFR BLD AUTO: 84.2 %
PLATELET # BLD AUTO: 125 10(3)UL (ref 150–450)
RBC # BLD AUTO: 2.92 X10(6)UL
WBC # BLD AUTO: 7.3 X10(3) UL (ref 4–11)

## 2023-09-07 RX ORDER — MIDODRINE HYDROCHLORIDE 5 MG/1
5 TABLET ORAL
Qty: 180 TABLET | Refills: 2 | Status: SHIPPED | OUTPATIENT
Start: 2023-09-07 | End: 2023-09-07

## 2023-09-07 RX ORDER — MIDODRINE HYDROCHLORIDE 2.5 MG/1
2.5 TABLET ORAL
Qty: 60 TABLET | Refills: 3 | Status: SHIPPED | OUTPATIENT
Start: 2023-09-07 | End: 2023-09-07

## 2023-09-07 RX ORDER — MIDODRINE HYDROCHLORIDE 2.5 MG/1
2.5 TABLET ORAL
Status: DISCONTINUED | OUTPATIENT
Start: 2023-09-07 | End: 2023-09-07

## 2023-09-07 RX ORDER — MIDODRINE HYDROCHLORIDE 2.5 MG/1
2.5 TABLET ORAL
Qty: 60 TABLET | Refills: 3 | Status: SHIPPED | OUTPATIENT
Start: 2023-09-07

## 2023-09-07 RX ORDER — POTASSIUM CHLORIDE 750 MG/1
10 TABLET, EXTENDED RELEASE ORAL DAILY
Qty: 30 TABLET | Refills: 3 | Status: SHIPPED | COMMUNITY
Start: 2023-09-07 | End: 2023-09-07

## 2023-09-07 RX ORDER — MIDODRINE HYDROCHLORIDE 5 MG/1
5 TABLET ORAL
Status: DISCONTINUED | OUTPATIENT
Start: 2023-09-07 | End: 2023-09-07

## 2023-09-07 NOTE — PLAN OF CARE
Received patient at 0730. Alert and Oriented x4. Tele Rhythm A fib. Pt on RA. Breath sounds clear/diminished. Bed is locked and in low position. Call light and personal items within reach. No C/O chest pain or shortness of breath. Pt voiding with no issue. Pt ambulates with standby assist.  Reviewed plan of care and patient verbalizes understanding. Plan:  Orthos q shift  Cards to see  Wound care    Problem: PAIN - ADULT  Goal: Verbalizes/displays adequate comfort level or patient's stated pain goal  Description: INTERVENTIONS:  - Encourage pt to monitor pain and request assistance  - Assess pain using appropriate pain scale  - Administer analgesics based on type and severity of pain and evaluate response  - Implement non-pharmacological measures as appropriate and evaluate response  - Consider cultural and social influences on pain and pain management  - Manage/alleviate anxiety  - Utilize distraction and/or relaxation techniques  - Monitor for opioid side effects  - Notify MD/LIP if interventions unsuccessful or patient reports new pain  - Anticipate increased pain with activity and pre-medicate as appropriate  Outcome: Progressing     Problem: SAFETY ADULT - FALL  Goal: Free from fall injury  Description: INTERVENTIONS:  - Assess pt frequently for physical needs  - Identify cognitive and physical deficits and behaviors that affect risk of falls.   - [place] fall precautions as indicated by assessment.  - Educate pt/family on patient safety including physical limitations  - Instruct pt to call for assistance with activity based on assessment  - Modify environment to reduce risk of injury  - Provide assistive devices as appropriate  - Consider OT/PT consult to assist with strengthening/mobility  - Encourage toileting schedule  Outcome: Progressing

## 2023-09-07 NOTE — PROGRESS NOTES
09/07/23 0439 09/07/23 0441 09/07/23 0442   Vital Signs   /57 114/65 100/59   MAP (mmHg) 72 75 65   BP Location Right arm Left arm Left arm   BP Method Automatic Automatic Automatic   Patient Position Lying Sitting Standing       Asymptomatic

## 2023-09-07 NOTE — PLAN OF CARE
Pt is a/ox4. On room air, wears 2L while sleeping. A-fib on tele. Continent. Voiding. Up with sba and walker in the room. Orthos (+) this morning but asymptomatic. Only c/o feeling sore after the fall yesterday. All needs met at this time. Problem: PAIN - ADULT  Goal: Verbalizes/displays adequate comfort level or patient's stated pain goal  Description: INTERVENTIONS:  - Encourage pt to monitor pain and request assistance  - Assess pain using appropriate pain scale  - Administer analgesics based on type and severity of pain and evaluate response  - Implement non-pharmacological measures as appropriate and evaluate response  - Consider cultural and social influences on pain and pain management  - Manage/alleviate anxiety  - Utilize distraction and/or relaxation techniques  - Monitor for opioid side effects  - Notify MD/LIP if interventions unsuccessful or patient reports new pain  - Anticipate increased pain with activity and pre-medicate as appropriate  Outcome: Progressing     Problem: SAFETY ADULT - FALL  Goal: Free from fall injury  Description: INTERVENTIONS:  - Assess pt frequently for physical needs  - Identify cognitive and physical deficits and behaviors that affect risk of falls.   - [place] fall precautions as indicated by assessment.  - Educate pt/family on patient safety including physical limitations  - Instruct pt to call for assistance with activity based on assessment  - Modify environment to reduce risk of injury  - Provide assistive devices as appropriate  - Consider OT/PT consult to assist with strengthening/mobility  - Encourage toileting schedule  Outcome: Progressing     Problem: DISCHARGE PLANNING  Goal: Discharge to home or other facility with appropriate resources  Description: INTERVENTIONS:  - Identify barriers to discharge w/pt and caregiver  - Include patient/family/discharge partner in discharge planning  - Arrange for needed discharge resources and transportation as appropriate  - Identify discharge learning needs (meds, wound care, etc)  - Arrange for interpreters to assist at discharge as needed  - Consider post-discharge preferences of patient/family/discharge partner  - Complete POLST form as appropriate  - Assess patient's ability to be responsible for managing their own health  - Refer to Case Management Department for coordinating discharge planning if the patient needs post-hospital services based on physician/LIP order or complex needs related to functional status, cognitive ability or social support system  Outcome: Progressing     Problem: Patient/Family Goals  Goal: Patient/Family Long Term Goal  Description: Patient's Long Term Goal: stay out of hospital 9-5    Interventions:  - med compliance  - follow ups    - See additional Care Plan goals for specific interventions  Outcome: Progressing  Goal: Patient/Family Short Term Goal  Description: Patient's Short Term Goal: no pain 9-5    Interventions:   - PRN pain med, hot/cold packs, tell nurse if in pain     - See additional Care Plan goals for specific interventions  Outcome: Progressing

## 2023-09-08 ENCOUNTER — TELEPHONE (OUTPATIENT)
Dept: INTERNAL MEDICINE CLINIC | Facility: CLINIC | Age: 83
End: 2023-09-08

## 2023-09-08 ENCOUNTER — PATIENT OUTREACH (OUTPATIENT)
Dept: CASE MANAGEMENT | Age: 83
End: 2023-09-08

## 2023-09-08 DIAGNOSIS — Z02.9 ENCOUNTERS FOR ADMINISTRATIVE PURPOSE: Primary | ICD-10-CM

## 2023-09-08 PROCEDURE — 1126F AMNT PAIN NOTED NONE PRSNT: CPT

## 2023-09-08 PROCEDURE — 1111F DSCHRG MED/CURRENT MED MERGE: CPT

## 2023-09-08 PROCEDURE — 1159F MED LIST DOCD IN RCRD: CPT

## 2023-09-08 NOTE — TELEPHONE ENCOUNTER
S/w pt for TCM. Levemir is discontinued on discharge paperwork but pt states that she was never told to stop it and believes that it was a mistake. NCM could not find any documentation as to why Levemir was stopped. Blood sugar this morning was 184. Pt wants to continue Levemir as her blood sugars have been good on it and now they are higher. Please advise if pt can continue taking Levemir 10 units. Thank you     Pt does not have an appointment scheduled at this time. TCM appt recommended by 9/14/23 as pt is a high risk for readmission. She states that she has appts set up with cardiology at this point and will be following up with them.     BOOK BY DATE (last date for TCM): 9/21/23

## 2023-09-08 NOTE — TELEPHONE ENCOUNTER
Pt informed ok to continue levemir as previous. Advised to monitor BG closely to watch for lows. Pt stated understanding and agreed to plan.

## 2023-09-12 ENCOUNTER — TELEPHONE (OUTPATIENT)
Dept: INTERNAL MEDICINE CLINIC | Facility: CLINIC | Age: 83
End: 2023-09-12

## 2023-09-15 ENCOUNTER — APPOINTMENT (OUTPATIENT)
Dept: HEMATOLOGY/ONCOLOGY | Age: 83
End: 2023-09-15
Attending: INTERNAL MEDICINE
Payer: MEDICARE

## 2023-09-22 NOTE — PROGRESS NOTES
I called for ccm enrollment she was home with a friend and starts PT at 1:30pm. Patient requested a cb.

## 2023-09-27 ENCOUNTER — TELEPHONE (OUTPATIENT)
Dept: HEMATOLOGY/ONCOLOGY | Age: 83
End: 2023-09-27

## 2023-09-27 NOTE — TELEPHONE ENCOUNTER
Dr Tammy Armendariz has ordered CT  for Pt  which is scheduled. Directions states she needs to drink 2 containers of water. PT has Dysphagia. And right know  anything time she drinks and eats  she throws up. Stated there is no way she will be able to dinks what is ordered.     Please call PT to advise

## 2023-09-28 RX ORDER — PRAVASTATIN SODIUM 80 MG/1
TABLET ORAL
Qty: 90 TABLET | Refills: 0 | Status: SHIPPED | OUTPATIENT
Start: 2023-09-28

## 2023-09-28 NOTE — TELEPHONE ENCOUNTER
PASSED per protocol, refill sent.   Last PE 3/15/23  Future Appointments   Date Time Provider Nahid Faby   10/2/2023  9:00 PM PF CT RM1 PF CT [place]   10/6/2023  9:45 AM Lily Carrasco MD PF HEM ONC [place]   10/6/2023 10:00 AM PF TX RN2 PF CHEMO I [place]

## 2023-09-28 NOTE — TELEPHONE ENCOUNTER
Spoke with pt to just do best she can with oral contrast. Md does not want to change order at this time. Pt states she will try and drink as much as she can. Pt states she has achalasia and does not think she will be able to get much down.

## 2023-10-02 ENCOUNTER — HOSPITAL ENCOUNTER (OUTPATIENT)
Dept: CT IMAGING | Age: 83
Discharge: HOME | End: 2023-10-02
Attending: INTERNAL MEDICINE
Payer: MEDICARE

## 2023-10-02 DIAGNOSIS — C83.38 DIFFUSE LARGE B-CELL LYMPHOMA OF LYMPH NODES OF MULTIPLE REGIONS (HCC): ICD-10-CM

## 2023-10-02 DIAGNOSIS — Z85.72 HISTORY OF FOLLICULAR LYMPHOMA: ICD-10-CM

## 2023-10-02 PROCEDURE — 70491 CT SOFT TISSUE NECK W/DYE: CPT | Performed by: INTERNAL MEDICINE

## 2023-10-02 PROCEDURE — 71260 CT THORAX DX C+: CPT | Performed by: INTERNAL MEDICINE

## 2023-10-02 PROCEDURE — 74177 CT ABD & PELVIS W/CONTRAST: CPT | Performed by: INTERNAL MEDICINE

## 2023-10-02 RX ORDER — INSULIN DETEMIR 100 [IU]/ML
14 INJECTION, SOLUTION SUBCUTANEOUS DAILY
Qty: 5 EACH | Refills: 0 | Status: SHIPPED | OUTPATIENT
Start: 2023-10-02

## 2023-10-02 NOTE — TELEPHONE ENCOUNTER
Which pharmacy: [facility] [address] AT [address], [phone], [phone]     Prescription Refill Request - Patient advised can take 48-72 hours. Patient states there was some confusion over her insulin and it was discontinued at the hospital and  now [facility] will not fill it. Patient is out of insulin.     Name of Medication (strength, dose, qty requested:   Vincenzo Dawson

## 2023-10-02 NOTE — TELEPHONE ENCOUNTER
Requested Prescriptions     Pending Prescriptions Disp Refills    insulin detemir (LEVEMIR FLEXTOUCH) 100 UNIT/ML Subcutaneous Solution Pen-injector 5 each 0     Sig: Inject 14 Units into the skin daily. LOV: 5--cs-problem visit    LAST CPE: 3---cb-physical    Last Labs: 9-5-2023-cbc,bmp    Last Refill: discontinued     Your appointments       Date & Time Appointment Department [facility])    Oct 02, 2023  9:00 PM CDT CT SOFT TISSUE NECK CHEST ABDOMEN PELVIS WITH CONTRAST with PF CT [address] [facility])    You will need to drink oral contrast prior to your arrival at your appointment. INSTRUCTIONS FOR PATIENTS DRINKING ORAL CONTRAST:    Your doctor has requested your test to include oral contrast:    If your physician has provided you with oral contrast please follow drinking instructions provided to you. If you need to  oral contrast and your doctor has provided you with a copy of the order, you must bring this order with you to  the oral contrast and drinking instructions. Please plan on picking up oral contrast no later than the day before your exam as you will be drinking contrast at home prior to your appointment. Contrast must be dispensed by a CT Technologist or nurse. Generally you will not encounter a wait, however please be aware you may experience a wait of up to 15 minutes if technologists are helping other patients. Oral contrast can be picked up at the following locations:    [facility] Reception Desk:  Mon-Fri 7:00am-8:00pm and Sat  7:00am-3:00pm  [address] [facility], [address]    [address] in the [address] [place] [address] Registration:  Mon-Fri 7:00am-8:00pm and Sat 7:00am-3:00pm  [address], [address] [facility]    [address] [facility] Reception Desk:  Mon-Fri 8:00am-7:30pm Sat-8:00am-3:30pm  [address] .   [facility], [address] INVASIVE SURGERY [facility] Reception Desk:  Mon-Fri 8:00am-7:30pm [id number]  [address], [address]Joelle [address]    Drinking Instructions will be included when you  contrast or if your physician has dispensed contrast directly to you.     If you suspect you may be pregnant, please consult with your physician prior to your exam.      Oct 06, 2023  9:45 AM CDT HEMATOLOGY ONCOLOGY FOLLOW UP with Ashlyn Trotter MD [facility] in [place] ([address])        Oct 06, 2023 10:00 AM CDT Infusion Visit with [address] in [place] ([address])              [facility] in [facility] [place]  [phone] [facility] in [facility]  [id number] [place] [address] [address] [place] [address] [address]

## 2023-10-03 PROBLEM — I95.1 ORTHOSTATIC HYPOTENSION: Status: ACTIVE | Noted: 2023-01-01

## 2023-10-03 PROBLEM — I95.1 ORTHOSTATIC HYPOTENSION: Status: ACTIVE | Noted: 2023-10-03

## 2023-10-06 ENCOUNTER — OFFICE VISIT (OUTPATIENT)
Dept: HEMATOLOGY/ONCOLOGY | Age: 83
End: 2023-10-06
Attending: INTERNAL MEDICINE
Payer: MEDICARE

## 2023-10-06 VITALS
TEMPERATURE: 99 F | OXYGEN SATURATION: 97 % | DIASTOLIC BLOOD PRESSURE: 76 MMHG | SYSTOLIC BLOOD PRESSURE: 123 MMHG | HEART RATE: 75 BPM | BODY MASS INDEX: 19.76 KG/M2 | WEIGHT: 138 LBS | RESPIRATION RATE: 18 BRPM | HEIGHT: 70 IN

## 2023-10-06 DIAGNOSIS — D64.9 NORMOCYTIC ANEMIA: ICD-10-CM

## 2023-10-06 DIAGNOSIS — D51.8 OTHER VITAMIN B12 DEFICIENCY ANEMIA: ICD-10-CM

## 2023-10-06 DIAGNOSIS — D50.0 IRON DEFICIENCY ANEMIA SECONDARY TO BLOOD LOSS (CHRONIC): ICD-10-CM

## 2023-10-06 DIAGNOSIS — D50.0 IRON DEFICIENCY ANEMIA SECONDARY TO BLOOD LOSS (CHRONIC): Primary | ICD-10-CM

## 2023-10-06 DIAGNOSIS — D52.8 OTHER FOLATE DEFICIENCY ANEMIAS: ICD-10-CM

## 2023-10-06 DIAGNOSIS — C83.38 DIFFUSE LARGE B-CELL LYMPHOMA OF LYMPH NODES OF MULTIPLE REGIONS (HCC): Primary | ICD-10-CM

## 2023-10-06 DIAGNOSIS — Z85.72 HISTORY OF FOLLICULAR LYMPHOMA: ICD-10-CM

## 2023-10-06 DIAGNOSIS — I48.91 ATRIAL FIBRILLATION, UNSPECIFIED TYPE (HCC): ICD-10-CM

## 2023-10-06 LAB
ALBUMIN SERPL-MCNC: 3 G/DL (ref 3.4–5)
ALBUMIN/GLOB SERPL: 1 {RATIO} (ref 1–2)
ALP LIVER SERPL-CCNC: 103 U/L
ALT SERPL-CCNC: 10 U/L
ANION GAP SERPL CALC-SCNC: 3 MMOL/L (ref 0–18)
AST SERPL-CCNC: 8 U/L (ref 15–37)
B2 MICROGLOB SERPL-MCNC: 0.33 MG/DL (ref 0.11–0.25)
BASOPHILS # BLD AUTO: 0.02 X10(3) UL (ref 0–0.2)
BASOPHILS NFR BLD AUTO: 0.2 %
BILIRUB SERPL-MCNC: 0.4 MG/DL (ref 0.1–2)
BUN BLD-MCNC: 10 MG/DL (ref 7–18)
CALCIUM BLD-MCNC: 8.7 MG/DL (ref 8.5–10.1)
CHLORIDE SERPL-SCNC: 99 MMOL/L (ref 98–112)
CO2 SERPL-SCNC: 34 MMOL/L (ref 21–32)
CREAT BLD-MCNC: 0.6 MG/DL
DEPRECATED HBV CORE AB SER IA-ACNC: 551.1 NG/ML
EGFRCR SERPLBLD CKD-EPI 2021: 90 ML/MIN/1.73M2 (ref 60–?)
EOSINOPHIL # BLD AUTO: 0.12 X10(3) UL (ref 0–0.7)
EOSINOPHIL NFR BLD AUTO: 1.4 %
ERYTHROCYTE [DISTWIDTH] IN BLOOD BY AUTOMATED COUNT: 21.4 %
FASTING STATUS PATIENT QL REPORTED: NO
GLOBULIN PLAS-MCNC: 3.1 G/DL (ref 2.8–4.4)
GLUCOSE BLD-MCNC: 154 MG/DL (ref 70–99)
HCT VFR BLD AUTO: 33.2 %
HGB BLD-MCNC: 9.8 G/DL
HGB RETIC QN AUTO: 26.8 PG (ref 28.2–36.6)
IMM GRANULOCYTES # BLD AUTO: 0.04 X10(3) UL (ref 0–1)
IMM GRANULOCYTES NFR BLD: 0.5 %
IMM RETICS NFR: 0.2 RATIO (ref 0.1–0.3)
IRON SATN MFR SERPL: 21 %
IRON SERPL-MCNC: 36 UG/DL
LDH SERPL L TO P-CCNC: 141 U/L
LYMPHOCYTES # BLD AUTO: 0.39 X10(3) UL (ref 1–4)
LYMPHOCYTES NFR BLD AUTO: 4.7 %
MCH RBC QN AUTO: 29.3 PG (ref 26–34)
MCHC RBC AUTO-ENTMCNC: 29.5 G/DL (ref 31–37)
MCV RBC AUTO: 99.1 FL
MONOCYTES # BLD AUTO: 0.44 X10(3) UL (ref 0.1–1)
MONOCYTES NFR BLD AUTO: 5.3 %
NEUTROPHILS # BLD AUTO: 7.32 X10 (3) UL (ref 1.5–7.7)
NEUTROPHILS # BLD AUTO: 7.32 X10(3) UL (ref 1.5–7.7)
NEUTROPHILS NFR BLD AUTO: 87.9 %
OSMOLALITY SERPL CALC.SUM OF ELEC: 284 MOSM/KG (ref 275–295)
PLATELET # BLD AUTO: 160 10(3)UL (ref 150–450)
POTASSIUM SERPL-SCNC: 3.7 MMOL/L (ref 3.5–5.1)
PROT SERPL-MCNC: 6.1 G/DL (ref 6.4–8.2)
RBC # BLD AUTO: 3.35 X10(6)UL
RETICS # AUTO: 86.8 X10(3) UL (ref 22.5–147.5)
RETICS/RBC NFR AUTO: 2.6 %
SODIUM SERPL-SCNC: 136 MMOL/L (ref 136–145)
TIBC SERPL-MCNC: 174 UG/DL (ref 240–450)
TRANSFERRIN SERPL-MCNC: 117 MG/DL (ref 200–360)
WBC # BLD AUTO: 8.3 X10(3) UL (ref 4–11)

## 2023-10-06 PROCEDURE — 99215 OFFICE O/P EST HI 40 MIN: CPT | Performed by: INTERNAL MEDICINE

## 2023-10-06 RX ORDER — TORSEMIDE 20 MG/1
20 TABLET ORAL DAILY
COMMUNITY

## 2023-10-06 RX ORDER — POTASSIUM CHLORIDE 1500 MG/1
20 TABLET, EXTENDED RELEASE ORAL DAILY
COMMUNITY

## 2023-10-06 NOTE — PROGRESS NOTES
Outpatient Oncology Care Plan  Problem list:  fatigue  knowledge deficit     Problems related to:    combined modality therapy     Interventions:  provided general teaching     Expected outcomes:  understands plan of care     Progress towards outcome:  making progress     Education Record     Learner:  Patient and Family Member  Barriers / Limitations:  None  Method:  Brief focused  Outcome:  Shows understanding  Comments: 5 month f/up dx DLBCL. States she has had some fall recently and was in the hospital early September. Med list updated. Pt states she has been feeling well. Denies any bleeding, SOB, dizziness, fevers or night sweats. Looking to discuss CT scan.

## 2023-10-09 ENCOUNTER — TELEPHONE (OUTPATIENT)
Dept: INTERNAL MEDICINE CLINIC | Facility: CLINIC | Age: 83
End: 2023-10-09

## 2023-10-09 ENCOUNTER — TELEPHONE (OUTPATIENT)
Dept: HEMATOLOGY/ONCOLOGY | Facility: HOSPITAL | Age: 83
End: 2023-10-09

## 2023-10-09 NOTE — TELEPHONE ENCOUNTER
----- Message from Jovany Garrido MD sent at 10/6/2023  5:47 PM CDT -----  Please call patient/son and advised that the reticulocyte hemoglobin equivalent is low indicating the beginning of iron deficiency again and therefore I think she would probably benefit from some additional IV iron at this point to carry her over the next few months. Please arrange for her to come back for a 1.5-hour IV INFeD infusion within the next couple weeks. Order for the iron is in. Thanks  [employer] with pt sh v/u. Asked to call her son since he is her . LVMTCB on [employer] .

## 2023-10-09 NOTE — TELEPHONE ENCOUNTER
Zulma Mason [phone] said he missed a call from nurse would like a return call.  Thanks [facility]

## 2023-10-09 NOTE — TELEPHONE ENCOUNTER
Froylan Meraz calling to see if our office will sign home health orders for pt. Last saw CS for ER follow-up and CB for AWV, not sure who to route too?  Ok to leave VM with Froylan Meraz

## 2023-10-12 NOTE — TELEPHONE ENCOUNTER
Corbin Tao from [facility] calling to see if CB will sign home health orders. Patient fell in September and was admitted to [facility]. Home health RN and PT was ordered for patient. Per verbal from CB she has  not seen patient since 3/15/23 and was not aware of what happened to patient for her to need home health. CB declines to sign home health orders and advised Corbin Tao the patient will need face to face visit. Corbin Tao will notify the patient to schedule an appointment.

## 2023-10-13 ENCOUNTER — TELEPHONE (OUTPATIENT)
Dept: INTERNAL MEDICINE CLINIC | Facility: CLINIC | Age: 83
End: 2023-10-13

## 2023-10-13 DIAGNOSIS — I48.91 ATRIAL FIBRILLATION, UNSPECIFIED TYPE (HCC): Primary | ICD-10-CM

## 2023-10-13 NOTE — TELEPHONE ENCOUNTER
Specialty: Cardiology    Full Name of Specialist:Dr. Tamie Roberts    Name of the Provider Group:[facility]  Phone number:[phone]  Fax number :  NPI:    (NPI number of the service provider. the nurses need this information for the referral.  Its for service providers only like Surgery*, [employer], [facility], Etc. We not NOT need physician NPI's)    *Surgery: The NPI # should be of the location of the Surgery. Date of Appointment:No appointment scheduled as yet. Reason for the Appointment (be specific with diagnosis code): Watchman Procedure    Has the patient seen a provider in our office for stated problem?:Patient has been referred from her Cardiologist, Dr. Aimee Child to have the Watchman Procedure with this Cardiologist--Patient having Dr. Aimee Child' office fax us their office notes.     Is this request for an out of network referral? No  (if yes, please have patient contact referring provider and have them fax office visit notes to triage attention):

## 2023-10-16 ENCOUNTER — OFFICE VISIT (OUTPATIENT)
Dept: HEMATOLOGY/ONCOLOGY | Facility: HOSPITAL | Age: 83
End: 2023-10-16
Attending: INTERNAL MEDICINE
Payer: MEDICARE

## 2023-10-16 VITALS
HEART RATE: 58 BPM | DIASTOLIC BLOOD PRESSURE: 65 MMHG | TEMPERATURE: 98 F | RESPIRATION RATE: 18 BRPM | SYSTOLIC BLOOD PRESSURE: 99 MMHG | OXYGEN SATURATION: 95 %

## 2023-10-16 DIAGNOSIS — D50.0 IRON DEFICIENCY ANEMIA SECONDARY TO BLOOD LOSS (CHRONIC): Primary | ICD-10-CM

## 2023-10-16 PROCEDURE — 96365 THER/PROPH/DIAG IV INF INIT: CPT

## 2023-10-16 RX ORDER — LEVOTHYROXINE SODIUM 0.05 MG/1
50 TABLET ORAL DAILY
Qty: 90 TABLET | Refills: 10 | Status: SHIPPED | OUTPATIENT
Start: 2023-10-16

## 2023-10-16 NOTE — TELEPHONE ENCOUNTER
Requested Prescriptions     Pending Prescriptions Disp Refills    LEVOTHYROXINE 50 MCG Oral Tab [Pharmacy Med Name: LEVOTHYROXINE SODIUM 50 MCG Tablet] 90 tablet 10     Sig: TAKE 1 TABLET EVERY DAY     FILLED- 4/11/23  LOV- 8/5/21    No f/u scheduled

## 2023-10-16 NOTE — PROGRESS NOTES
Pt here for Infed . Pt denies any issues or concerns. Ordering MD: dr Shayy Claudio Exp:  on going     Pt tolerated infusion without difficulty or complaint. Reviewed next apt date/time: 1-2024      Education Record  Learner:  Patient and Family Member  Disease / Diagnosis: schedule reviewed  Barriers / Limitations:  None  Method:  Brief focused  General Topics:  Plan of care reviewed  Outcome:  Shows understanding    Infed infused without any complications. Observed for half hour after infusion. Vital signs taken at the end of the 30-minute observation. Patient denied any complaints/issues. Discharged in good condition. Advised to call for any questions/concerns/issues.

## 2023-10-19 ENCOUNTER — OFFICE VISIT (OUTPATIENT)
Dept: INTERNAL MEDICINE CLINIC | Facility: CLINIC | Age: 83
End: 2023-10-19
Payer: MEDICARE

## 2023-10-19 VITALS
HEART RATE: 118 BPM | WEIGHT: 145 LBS | BODY MASS INDEX: 20.76 KG/M2 | HEIGHT: 70 IN | SYSTOLIC BLOOD PRESSURE: 122 MMHG | RESPIRATION RATE: 22 BRPM | OXYGEN SATURATION: 95 % | DIASTOLIC BLOOD PRESSURE: 62 MMHG

## 2023-10-19 DIAGNOSIS — Z91.81 AT RISK FOR FALLING: ICD-10-CM

## 2023-10-19 DIAGNOSIS — D52.8 OTHER FOLATE DEFICIENCY ANEMIAS: ICD-10-CM

## 2023-10-19 DIAGNOSIS — L84 FOOT CALLUS: ICD-10-CM

## 2023-10-19 DIAGNOSIS — Z79.4 TYPE 2 DIABETES MELLITUS WITH STAGE 2 CHRONIC KIDNEY DISEASE, WITH LONG-TERM CURRENT USE OF INSULIN (HCC): Primary | ICD-10-CM

## 2023-10-19 DIAGNOSIS — D50.0 IRON DEFICIENCY ANEMIA SECONDARY TO BLOOD LOSS (CHRONIC): ICD-10-CM

## 2023-10-19 DIAGNOSIS — D51.9 ANEMIA DUE TO VITAMIN B12 DEFICIENCY, UNSPECIFIED B12 DEFICIENCY TYPE: ICD-10-CM

## 2023-10-19 DIAGNOSIS — Z85.72 HISTORY OF FOLLICULAR LYMPHOMA: ICD-10-CM

## 2023-10-19 DIAGNOSIS — E11.22 TYPE 2 DIABETES MELLITUS WITH STAGE 2 CHRONIC KIDNEY DISEASE, WITH LONG-TERM CURRENT USE OF INSULIN (HCC): Primary | ICD-10-CM

## 2023-10-19 DIAGNOSIS — I48.11 LONGSTANDING PERSISTENT ATRIAL FIBRILLATION (HCC): ICD-10-CM

## 2023-10-19 DIAGNOSIS — N18.2 TYPE 2 DIABETES MELLITUS WITH STAGE 2 CHRONIC KIDNEY DISEASE, WITH LONG-TERM CURRENT USE OF INSULIN (HCC): Primary | ICD-10-CM

## 2023-10-19 DIAGNOSIS — Z23 NEED FOR INFLUENZA VACCINATION: ICD-10-CM

## 2023-10-19 DIAGNOSIS — I95.1 ORTHOSTATIC HYPOTENSION: ICD-10-CM

## 2023-10-19 DIAGNOSIS — I97.89 TYPE II ENDOLEAK OF AORTIC GRAFT: ICD-10-CM

## 2023-10-19 PROBLEM — E87.71 TRANSFUSION ASSOCIATED CIRCULATORY OVERLOAD: Status: RESOLVED | Noted: 2022-07-12 | Resolved: 2023-10-19

## 2023-10-19 PROBLEM — S09.8XXD BLUNT HEAD TRAUMA, SUBSEQUENT ENCOUNTER: Status: RESOLVED | Noted: 2023-09-03 | Resolved: 2023-10-19

## 2023-10-19 PROBLEM — T45.1X5A CHEMOTHERAPY INDUCED NAUSEA AND VOMITING: Status: RESOLVED | Noted: 2022-06-20 | Resolved: 2023-10-19

## 2023-10-19 PROBLEM — C83.38 DIFFUSE LARGE B-CELL LYMPHOMA OF LYMPH NODES OF MULTIPLE REGIONS (HCC): Status: RESOLVED | Noted: 2022-05-13 | Resolved: 2023-01-01

## 2023-10-19 PROBLEM — T45.1X5A AGRANULOCYTOSIS SECONDARY TO CANCER CHEMOTHERAPY  (HCC): Status: RESOLVED | Noted: 2022-05-23 | Resolved: 2023-01-01

## 2023-10-19 PROBLEM — N12 PYELONEPHRITIS: Status: RESOLVED | Noted: 2023-03-20 | Resolved: 2023-10-19

## 2023-10-19 PROBLEM — D70.1 AGRANULOCYTOSIS SECONDARY TO CANCER CHEMOTHERAPY: Status: RESOLVED | Noted: 2022-05-23 | Resolved: 2023-01-01

## 2023-10-19 PROBLEM — T45.1X5A AGRANULOCYTOSIS SECONDARY TO CANCER CHEMOTHERAPY: Status: RESOLVED | Noted: 2022-05-23 | Resolved: 2023-10-19

## 2023-10-19 PROBLEM — D70.1 AGRANULOCYTOSIS SECONDARY TO CANCER CHEMOTHERAPY: Status: RESOLVED | Noted: 2022-05-23 | Resolved: 2023-10-19

## 2023-10-19 PROBLEM — S09.8XXD BLUNT HEAD TRAUMA, SUBSEQUENT ENCOUNTER: Status: RESOLVED | Noted: 2023-01-01 | Resolved: 2023-01-01

## 2023-10-19 PROBLEM — D70.1 AGRANULOCYTOSIS SECONDARY TO CANCER CHEMOTHERAPY (HCC): Status: RESOLVED | Noted: 2022-05-23 | Resolved: 2023-01-01

## 2023-10-19 PROBLEM — C85.90 LYMPHOMA (HCC): Status: RESOLVED | Noted: 2022-05-05 | Resolved: 2023-01-01

## 2023-10-19 PROBLEM — T45.1X5A ANEMIA ASSOCIATED WITH CHEMOTHERAPY: Status: RESOLVED | Noted: 2022-07-26 | Resolved: 2023-01-01

## 2023-10-19 PROBLEM — T45.1X5A CHEMOTHERAPY INDUCED NAUSEA AND VOMITING: Status: RESOLVED | Noted: 2022-06-20 | Resolved: 2023-01-01

## 2023-10-19 PROBLEM — T45.1X5A ANEMIA ASSOCIATED WITH CHEMOTHERAPY: Status: RESOLVED | Noted: 2022-07-26 | Resolved: 2023-10-19

## 2023-10-19 PROBLEM — R11.2 CHEMOTHERAPY INDUCED NAUSEA AND VOMITING: Status: RESOLVED | Noted: 2022-06-20 | Resolved: 2023-01-01

## 2023-10-19 PROBLEM — T45.1X5A AGRANULOCYTOSIS SECONDARY TO CANCER CHEMOTHERAPY: Status: RESOLVED | Noted: 2022-05-23 | Resolved: 2023-01-01

## 2023-10-19 PROBLEM — D64.81 ANEMIA ASSOCIATED WITH CHEMOTHERAPY: Status: RESOLVED | Noted: 2022-07-26 | Resolved: 2023-10-19

## 2023-10-19 PROBLEM — C83.38 DIFFUSE LARGE B-CELL LYMPHOMA OF LYMPH NODES OF MULTIPLE REGIONS (HCC): Status: RESOLVED | Noted: 2022-05-13 | Resolved: 2023-10-19

## 2023-10-19 PROBLEM — E87.71 TRANSFUSION ASSOCIATED CIRCULATORY OVERLOAD: Status: RESOLVED | Noted: 2022-07-12 | Resolved: 2023-01-01

## 2023-10-19 PROBLEM — R11.2 CHEMOTHERAPY INDUCED NAUSEA AND VOMITING: Status: RESOLVED | Noted: 2022-06-20 | Resolved: 2023-10-19

## 2023-10-19 PROBLEM — D61.818 PANCYTOPENIA (HCC): Status: RESOLVED | Noted: 2022-08-31 | Resolved: 2023-01-01

## 2023-10-19 PROBLEM — N12 PYELONEPHRITIS: Status: RESOLVED | Noted: 2023-01-01 | Resolved: 2023-01-01

## 2023-10-19 PROBLEM — D70.1 AGRANULOCYTOSIS SECONDARY TO CANCER CHEMOTHERAPY  (HCC): Status: RESOLVED | Noted: 2022-05-23 | Resolved: 2023-01-01

## 2023-10-19 PROBLEM — C85.90 LYMPHOMA (HCC): Status: RESOLVED | Noted: 2022-05-05 | Resolved: 2023-10-19

## 2023-10-19 PROBLEM — D64.81 ANEMIA ASSOCIATED WITH CHEMOTHERAPY: Status: RESOLVED | Noted: 2022-07-26 | Resolved: 2023-01-01

## 2023-10-19 PROBLEM — T45.1X5A AGRANULOCYTOSIS SECONDARY TO CANCER CHEMOTHERAPY (HCC): Status: RESOLVED | Noted: 2022-05-23 | Resolved: 2023-01-01

## 2023-10-19 PROBLEM — D61.818 PANCYTOPENIA (HCC): Status: RESOLVED | Noted: 2022-08-31 | Resolved: 2023-10-19

## 2023-10-19 PROCEDURE — 3008F BODY MASS INDEX DOCD: CPT | Performed by: PHYSICIAN ASSISTANT

## 2023-10-19 PROCEDURE — 90662 IIV NO PRSV INCREASED AG IM: CPT | Performed by: PHYSICIAN ASSISTANT

## 2023-10-19 PROCEDURE — G0008 ADMIN INFLUENZA VIRUS VAC: HCPCS | Performed by: PHYSICIAN ASSISTANT

## 2023-10-19 PROCEDURE — 3074F SYST BP LT 130 MM HG: CPT | Performed by: PHYSICIAN ASSISTANT

## 2023-10-19 PROCEDURE — 99215 OFFICE O/P EST HI 40 MIN: CPT | Performed by: PHYSICIAN ASSISTANT

## 2023-10-19 PROCEDURE — 1170F FXNL STATUS ASSESSED: CPT | Performed by: PHYSICIAN ASSISTANT

## 2023-10-19 PROCEDURE — 3078F DIAST BP <80 MM HG: CPT | Performed by: PHYSICIAN ASSISTANT

## 2023-10-24 ENCOUNTER — TELEPHONE (OUTPATIENT)
Dept: INTERNAL MEDICINE CLINIC | Facility: CLINIC | Age: 83
End: 2023-10-24

## 2023-10-24 NOTE — TELEPHONE ENCOUNTER
Paperwork was received from [facility] dated 9/2/23. It was placed on CB desk for review and signature.

## 2023-10-31 NOTE — TELEPHONE ENCOUNTER
Diabetic Medication Protocol Jnsevx19/31/2023 10:45 AM   Protocol Details HgBA1C procedure resulted in past 6 months    Last HgBA1C < 7.5    Microalbumin procedure in past 12 months or taking ACE/ARB    Appointment in past 6 or next 3 months        LOV 10/19/23 CB    LAST LAB  9/3/23    LAST RX  2/24/23 180     Next OV   Future Appointments   Date Time Provider Nahid Hobbs   1/11/2024 11:15 AM Vicky Brenner MD [facility]   1/11/2024 11:30 AM Sina Staples [address] [place] [address]         PROTOCOL pass

## 2023-11-02 ENCOUNTER — TELEPHONE (OUTPATIENT)
Dept: INTERNAL MEDICINE CLINIC | Facility: CLINIC | Age: 83
End: 2023-11-02

## 2023-11-02 NOTE — TELEPHONE ENCOUNTER
Paperwork was received from [facility] dated 9/2/23 and 9/8/23 and ANT#[id number]. It was placed on CB desk for review and signature.

## 2023-11-16 NOTE — PROGRESS NOTES
Patient identified with a potential need for Chronic Care Management services. Called patient to introduce self and availability of Chronic Care Management services. Patient informed about the following service elements:  Health information sharing - complying with all laws related to privacy and security of their health information  Patient/Insurance Cost sharing - includes deductible and any ongoing copays and/or coinsurance  Only one provider can bill for this service monthly  Patient right to discontinue services at any time for any reason effective last day of current month  Patient verbally declines to participate in Chronic Care Management services and any associated charges. Patient stated that her Son Yoan Sepulveda helps her with everything that she needs he works for [employer]. She does not want to be overloading with phone calls.

## 2023-11-17 NOTE — TELEPHONE ENCOUNTER
LOV 10/19/23    UA done at Cardiologist OV yesterday, see in results.     Routing to CB as well as AD on call for review and recommendations.

## 2023-11-17 NOTE — TELEPHONE ENCOUNTER
Called and spoke w/ pt. Notified CB reviewed results and sent in Macrobid. Notified sent to [facility] and can start taking today. Take 2x day. Notified once we receive culture and sensitivities, if abx needs to be changed we will call the pt. Pt verbalizes understanding of results and agreeable to start taking abx.

## 2023-11-17 NOTE — TELEPHONE ENCOUNTER
Patient was in to see Cardiologist yesterday and mentioned to them that she has been urinating a lot and has pain.  They did a UA but suggested that she call her PCP and have them look at the results.

## 2023-11-17 NOTE — TELEPHONE ENCOUNTER
UA positive.  Cx pending.    Pt should start Macrobid BID.  Will await C & S results.  Last ur cx from earlier this year showed pan sensitive E Coli.  Rx sent.    Afsaneh - ROSHAN as you are on call.

## 2023-11-20 ENCOUNTER — TELEPHONE (OUTPATIENT)
Dept: INTERNAL MEDICINE CLINIC | Facility: CLINIC | Age: 83
End: 2023-11-20

## 2023-11-21 NOTE — TELEPHONE ENCOUNTER
Copy of signed and completed certificate faxed to [phone]. Confirmation received.     Copy sent to scan

## 2023-12-05 ENCOUNTER — TELEPHONE (OUTPATIENT)
Dept: INTERNAL MEDICINE CLINIC | Facility: CLINIC | Age: 83
End: 2023-12-05

## 2023-12-05 NOTE — TELEPHONE ENCOUNTER
Paperwork was received from [facility] KZPIM#[id number]. It was placed on CB desk for review and signature.

## 2023-12-07 ENCOUNTER — TELEPHONE (OUTPATIENT)
Dept: INTERNAL MEDICINE CLINIC | Facility: CLINIC | Age: 83
End: 2023-12-07

## 2023-12-07 NOTE — TELEPHONE ENCOUNTER
Signed paperwork was faxed to [facility]. Confirmation of receipt was received and paperwork was sent to scan.

## 2023-12-07 NOTE — TELEPHONE ENCOUNTER
Paperwork was received from [facility] order #[id number]. It was placed on CB desk for review and signature.

## 2023-12-12 ENCOUNTER — TELEPHONE (OUTPATIENT)
Dept: INTERNAL MEDICINE CLINIC | Facility: CLINIC | Age: 83
End: 2023-12-12

## 2023-12-12 NOTE — TELEPHONE ENCOUNTER
Paperwork was received from [id number] orders. They were placed on CB desk for review and signature.

## 2023-12-14 NOTE — TELEPHONE ENCOUNTER
Signed paperwork was sent to scan It was faxed to [facility] and confirmation of receipt was received.

## 2024-01-08 DIAGNOSIS — K22.4 CORKSCREW ESOPHAGUS: ICD-10-CM

## 2024-01-08 RX ORDER — OMEPRAZOLE 40 MG/1
40 CAPSULE, DELAYED RELEASE ORAL DAILY
Qty: 90 CAPSULE | Refills: 1 | OUTPATIENT
Start: 2024-01-08

## 2024-01-09 ENCOUNTER — TELEPHONE (OUTPATIENT)
Dept: INTERNAL MEDICINE CLINIC | Facility: CLINIC | Age: 84
End: 2024-01-09

## 2024-01-09 NOTE — TELEPHONE ENCOUNTER
Signed paperwork was faxed to [facility] on 11/30/23 order#[id number]. Confirmation of receipt was received and paperwork was sent to scan.

## 2024-01-11 ENCOUNTER — APPOINTMENT (OUTPATIENT)
Dept: HEMATOLOGY/ONCOLOGY | Facility: HOSPITAL | Age: 84
End: 2024-01-11
Attending: INTERNAL MEDICINE
Payer: MEDICARE

## 2024-01-11 ENCOUNTER — TELEPHONE (OUTPATIENT)
Dept: INTERNAL MEDICINE CLINIC | Facility: CLINIC | Age: 84
End: 2024-01-11

## 2024-01-11 NOTE — TELEPHONE ENCOUNTER
Signed paperwork was faxed to [facility]. Confirmation of receipt was received and paperwork was sent to [facility].

## 2024-01-11 NOTE — TELEPHONE ENCOUNTER
[facility] 9/2/23. It was placed on CB desk for review and signature.

## 2024-02-01 ENCOUNTER — TELEPHONE (OUTPATIENT)
Dept: INTERNAL MEDICINE CLINIC | Facility: CLINIC | Age: 84
End: 2024-02-01

## 2024-02-01 NOTE — TELEPHONE ENCOUNTER
Paperwork was received from [employer]  order#[id number]. It was placed on CB desk for review and signature.

## 2024-02-06 ENCOUNTER — TELEPHONE (OUTPATIENT)
Dept: INTERNAL MEDICINE CLINIC | Facility: CLINIC | Age: 84
End: 2024-02-06

## 2024-02-06 NOTE — TELEPHONE ENCOUNTER
[facility]23 It was placed on CB desk for review and signature.

## 2024-02-08 ENCOUNTER — TELEPHONE (OUTPATIENT)
Dept: INTERNAL MEDICINE CLINIC | Facility: CLINIC | Age: 84
End: 2024-02-08

## 2024-02-08 NOTE — TELEPHONE ENCOUNTER
Signed paperwork was faxed to [facility]. Confirmation of receipt was received and paperwork was sent to [facility].

## 2024-02-08 NOTE — TELEPHONE ENCOUNTER
Signed paperwork from [facility] order#[id number] was faxed to [facility] fax#[phone]. Confirmation of receipt was received and paperwork was sent to [facility],

## 2024-02-20 ENCOUNTER — TELEPHONE (OUTPATIENT)
Dept: INTERNAL MEDICINE CLINIC | Facility: CLINIC | Age: 84
End: 2024-02-20

## 2024-02-20 NOTE — TELEPHONE ENCOUNTER
[facility]2/[facility]. It was placed on CB desk for review and signature.

## 2024-02-22 NOTE — TELEPHONE ENCOUNTER
Signed paperwork was faxed to [facility] fax#[phone]. Confirmation of receipt was received and paperwork was sent to scan.

## 2024-03-12 NOTE — PROGRESS NOTES
Hub staff attempted to follow warm transfer process and was unsuccessful     Caller: JEFFERSON    Relationship to patient: PAT (PRE ADMISSIONS TESTING)    Best call back number: [phone]    PAT IS NEEDING CLEARANCE AND SENT A FORM OVER AND IS NEEDING IT BACK. IF YOU DIDN'T RECEIVE IT CALL THEM TO RESEND IT          Notes read- discussed with Dr. Jang- start chemotherapy.  Reviewed CTA with Dr. 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

## 2024-03-21 NOTE — HOME CARE LIAISON
Patient is current with [facility]. Please enter LUANNE order upon discharge. RN PT OT.
[FreeTextEntry1] : Mr. ANDREY MIRELES, 82 year old male, never smoker, w/ hx of HTN, HLD, stroke, AdenoCA of lung s/p LULobectomy in 2014 by Dr. Timothy Escamilla, who presented with new lung nodules, referred by PCP Dr. Mir Sebastian.  CT Chest on 12/29/23 at [facility]: - stable post-op changes in the Lt lung - stable mediastinal LNs including a 1.3cm Rt paratracheal LN (3: 102) - increased in trace Lt pleural effusion - a new 4mm solid RLL nodule (3: 189) - a stable 4mm RUL nodule (3: 41)  PET/CT on 02/04/2024 ([facility]): - There are several slightly hyperdense active mediastinal lymph nodes measuring up to 10 mm in short axis (SUV max 5.9). - postoperative changes of the left upper lobe. - Scattered left pulmonary scarring is noted.  - Previously noted 4 mm right lower lobe pulmonary nodule is not clearly seen on the current exam.  - Calcified granuloma again noted in the right middle lobe.   Now s/p FB, EBUS biopsy on 3/7/2024. Path of Level 7 LN and RUL BAL negative for malignancy.  Patient is here today for follow up. Admits to chronic cough with blood-tinged sputum.

## 2024-08-30 NOTE — PROGRESS NOTES
[facility] Cardiology  Consultation Note      Kelly Guzman Patient Status:  Inpatient    1940 MRN [id number]   [facility] 3SW-A Attending Jj Lanier MD   Hosp Day # 2 PCP Ángela Murillo MD     ADDENDUM:  The patient Resolving, prednisone as above.        Glucose-Vitamin C (DEX-4) 4-0.006 g chewable tab 4 tablet 4 tablet Oral Q15 Min PRN   Or      dextrose 50% injection 50 mL 50 mL Intravenous Q15 Min PRN   Or      glucose (DEX4) oral liquid 30 g 30 g Oral Q15 Min PRN   Or      Glucose-Vitamin C (DEX-4) 4 Barium Swallow Esophagram  No date: THYROIDECTOMY  No date: TUBAL LIGATION    Family History  family history includes Diabetes in her maternal grandmother; Heart Disorder in her brother and brother; Hypertension in her brother and brother;  Other in her mot extremities normally  Psychiatric: Normal mood and affect; answers questions appropriately  Dermatologic: No rashes; normal skin turgor    Diagnostic testing:    EKG: Normal sinus rhythm    Labs:     Lab Results  Component Value Date   INR 1.06 07/22/2016

## 2025-02-20 NOTE — TELEPHONE ENCOUNTER
AS, would you refer pt to wound care?  Or need to be seen here first?
Can attempt home wound care vs being seen in wound care at [facility], I worry about infeciton at this point as well. Have wound see pt asap. Please call her son as well, he is an md at [facility] [employer] [facility].
Dr Christina Sloan indicates pt was admitted yesterday into [facility] with pneumonia. Dr Christina Sloan wanted AS to know.
LM for pt's son Dr Abbey Carvajal MD at [facility] at [phone] (M) to call back to discuss recommendations from AS. LM for pt at [phone] (H) to call back.
Patient robert has a huge blister on the top of her left foot that started on Wednesday. Patient states the blister measures 9 inches by 5 inches over the top of her foot and if filled with fluid.
Pt daughter in law calling (not on hippa). Pt's son the ER doc thinks she needs to be seen at the wound care clinic regarding her foot. They are asking if a referral needs to be put in to get her seen quicker or should they schedule here?
ROSHAN    See 10/12/22 TE. Spoke to pt. States a person she has helping her measured the blister; it's 4 in. X 5.5 in and 1 inch \"high\". It's not painful. No redness or warmth surrounding the area. Pt diabetic. She wants to know if she can have her son the ED doctor, \"pop it\". Advised against that. Spoke to on call while she spoke to her son; he told her to pop it and put neosporin and a band-aid on it. JAKE Simpson on call- agrees with having evaluated. No appts here today- Recommended patient be seen at IC/UC today as the blister grew rapidly over the past couple days. No fever. Pt aware to update us on decision.
Was unaware of previous recs for pt to be seen in UC. HH also called today with update. Per 10/18 TE, \"Pedro calling with update on blister on top of left foot. She states it is 13cm in length and 7.5cm in width. It is extremely packed with fluid. She is very concerned about the rist of infection if it ruptures\". Called and spoke to pt. Pt said that she is now experiencing occasional \"shooting\" pain through foot. Again advised pt to be seen in UC. Explained the importance of ruling out infection given she is immunocompromised. We can discuss wound care with AS, but should be evaluated today. Pt stated understanding and agreed to plan. Triage- please update Pedro RN on AS thoughts on wound care. Phone [phone]        Routing to AS to update. What are your thoughts on wound care for pt?  believes she would benefit.
20-Feb-2025 02:35:14

## 2025-08-08 NOTE — TELEPHONE ENCOUNTER
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     Recommendation:  {Melanoma Tumor Board Recommendations:37382}    Review Multidisciplinary Cutaneous Oncology Conference recommendation with patient.  Continue routine follow up and total body skin exams with Dermatology.    Follow Up:  ***      History and Physical Exam  Dermatologic History:   78 y.o. 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     Past Medical History:  Medical History[1]    Family History of DNS/MM:  Unknown    Skin:  Red lesion noted on right buttock, well-circumscribed. Red flat lesion that blanches, seven by seven centimeters in size.     Lymphatic:  Not assessed.      Pathology  DERMATOPATHOLOGY -DERMPATH LAB: [id number]   Collected 7/31/2025 16:49   FINAL DIAGNOSIS   VULVA, RIGHT BUTTOCK, VULVA BIOPSY:  EXTRAMAMMARY PAGET'S DISEASE, PRESENT ON THE PERIPHERAL MARGIN, SEE NOTE.     Note: Microscopic examination reveals a specimen that extends into the subcutaneous fat. There are nested and single atypical epithelioid cells with abundant pink cytoplasm in the epithelium. There are foci of mucin staining seen on a Mucicarmine stain. These cells stain with antibodies against CK7 and do not stain with antibodies against CK20 or SOX-10. All control slides stain appropriately.  The atypical epithelioid cells in the epidermis stain with antibodies against CEA.  All control slides stain appropriately.        ** Electronically signed out by Nikia Kraft MD **         Electronically signed by Nikia Kraft MD on 8/6/2025 at 1506 EDT       Radiology  ***      Clinical Images  7/31/2025      Referral placed.     [1]   Past Medical History:  Diagnosis Date    Encounter for examination of ears and hearing without abnormal findings     Encounter for hearing test    Personal history of other endocrine, nutritional and metabolic disease 07/28/2015    History of hypercholesterolemia    Personal history of other mental and behavioral disorders     History of depression

## (undated) DIAGNOSIS — E11.59 TYPE 2 DIABETES MELLITUS WITH OTHER CIRCULATORY COMPLICATION, WITHOUT LONG-TERM CURRENT USE OF INSULIN (HCC): Primary | ICD-10-CM

## (undated) DIAGNOSIS — N95.0 POSTMENOPAUSAL VAGINAL BLEEDING: Primary | ICD-10-CM

## (undated) DIAGNOSIS — Z02.9 ENCOUNTERS FOR ADMINISTRATIVE PURPOSE: ICD-10-CM

## (undated) DIAGNOSIS — Z02.9 ENCOUNTERS FOR UNSPECIFIED ADMINISTRATIVE PURPOSE: ICD-10-CM

## (undated) DEVICE — SOL  .9 1000ML BTL

## (undated) DEVICE — Device: Brand: DEFENDO AIR/WATER/SUCTION AND BIOPSY VALVE

## (undated) DEVICE — EXACTO SNARE

## (undated) DEVICE — WIRE AMPLATZ SUPER STIFF 035X1

## (undated) DEVICE — 3M™ RED DOT™ MONITORING ELECTRODE WITH FOAM TAPE AND STICKY GEL, 50/BAG, 20/CASE, 72/PLT 2570: Brand: RED DOT™

## (undated) DEVICE — POSITIONER OR KT PT CR

## (undated) DEVICE — FILTERLINE NASAL ADULT O2/CO2

## (undated) DEVICE — Device: Brand: BOOT LINER, DISPOSABLE

## (undated) DEVICE — SHEATH DRYSEAL FLEX 18FX33

## (undated) DEVICE — 1200CC GUARDIAN II: Brand: GUARDIAN

## (undated) DEVICE — RADIFOCUS GLIDEWIRE: Brand: GLIDEWIRE

## (undated) DEVICE — 3M™ BAIR HUGGER® UNDERBODY BLANKET, FULL ACCESS, 10 PER CASE 63500: Brand: BAIR HUGGER™

## (undated) DEVICE — BEACON TIP TORCON NB ADVANTAGE CATHETER: Brand: BEACON TIP TORCON NB

## (undated) DEVICE — SLEEVE KENDALL SCD EXPRESS MED

## (undated) DEVICE — GOWN,SIRUS,FABRIC-REINFORCED,LARGE: Brand: MEDLINE

## (undated) DEVICE — GUIDEPIN ORTH 12IN 3.2MM P/T

## (undated) DEVICE — STANDARD HYPODERMIC NEEDLE,POLYPROPYLENE HUB: Brand: MONOJECT

## (undated) DEVICE — IMPLANTABLE DEVICE
Type: IMPLANTABLE DEVICE | Site: HIP | Status: NON-FUNCTIONAL
Removed: 2020-03-03

## (undated) DEVICE — GAMMEX® PI HYBRID SIZE 7.5, STERILE POWDER-FREE SURGICAL GLOVE, POLYISOPRENE AND NEOPRENE BLEND: Brand: GAMMEX

## (undated) DEVICE — DECANTER BAG 9": Brand: MEDLINE INDUSTRIES, INC.

## (undated) DEVICE — SHEATH DRYSEAL FLEX 12FX33

## (undated) DEVICE — SOL  .9 500ML

## (undated) DEVICE — CV PACK-LF: Brand: MEDLINE INDUSTRIES, INC.

## (undated) DEVICE — CATH BALLOON CRE 18-20MM 5838

## (undated) DEVICE — TRAY SURESTEP 16 BARDEX UMETR

## (undated) DEVICE — FIXED CORE WIRE GUIDE SAFE-T-J, CURVED: Brand: COOK

## (undated) DEVICE — STERILE POLYISOPRENE POWDER-FREE SURGICAL GLOVES: Brand: PROTEXIS

## (undated) DEVICE — TRAP 4 CPTR CHMBR N EZ INLN

## (undated) DEVICE — CELL SAVER RESERVOIR BRAT

## (undated) DEVICE — PINNACLE INTRODUCER SHEATH: Brand: PINNACLE

## (undated) DEVICE — HIP PINNING CDS: Brand: MEDLINE INDUSTRIES, INC.

## (undated) DEVICE — ANGIO-SEAL VIP VASCULAR CLOSURE DEVICE: Brand: ANGIO-SEAL

## (undated) DEVICE — ENDOSCOPY PACK - LOWER: Brand: MEDLINE INDUSTRIES, INC.

## (undated) DEVICE — SUTURE VICRYL 2-0 FSL

## (undated) DEVICE — SYRINGE 10ML LL TIP

## (undated) DEVICE — GUIDEWIRE J 035X150

## (undated) DEVICE — ENDOSCOPY PACK UPPER: Brand: MEDLINE INDUSTRIES, INC.

## (undated) DEVICE — CELL SAVER 5/5+ BOWL KIT-225ML: Brand: HAEMONETICS CELL SAVER 5/5+ SYSTEMS

## (undated) DEVICE — CHLORAPREP 26ML APPLICATOR

## (undated) DEVICE — SYRINGE/GUAGE ASSEMBLY

## (undated) DEVICE — SUTURE VICRYL 3-0 PS-1

## (undated) DEVICE — GAMMEX® PI HYBRID SIZE 8, STERILE POWDER-FREE SURGICAL GLOVE, POLYISOPRENE AND NEOPRENE BLEND: Brand: GAMMEX

## (undated) DEVICE — 3M™ IOBAN™ 2 ANTIMICROBIAL INCISE DRAPE 6651EZ: Brand: IOBAN™ 2

## (undated) DEVICE — 3M™ MICROFOAM™ TAPE 1528-4: Brand: 3M™ MICROFOAM™

## (undated) DEVICE — CATH PIGTAIL 5X65 10SH

## (undated) DEVICE — SHEATH MICROPUNCTURE STIFF

## (undated) DEVICE — TRANSPOSAL ULTRAFLEX DUO/QUAD ULTRA CART MANIFOLD

## (undated) DEVICE — KENDALL SCD EXPRESS SLEEVES, KNEE LENGTH, MEDIUM: Brand: KENDALL SCD

## (undated) DEVICE — PERCLOSE PROGLIDE™ SUTURE-MEDIATED CLOSURE SYSTEM: Brand: PERCLOSE PROGLIDE™

## (undated) DEVICE — ANGIO PACK-LF: Brand: MEDLINE INDUSTRIES, INC.

## (undated) DEVICE — BALLOON MOLDING OCCU 37

## (undated) DEVICE — CELL SAVER TUBING BRAT

## (undated) DEVICE — SUTURE VICRYL 0 CP-1

## (undated) DEVICE — SUTURE VICRYL 2-0 CT-1

## (undated) DEVICE — CLIP RESOLUTION 235CM

## (undated) DEVICE — FORCEP BIOPSY RJ4 LG CAP W/ND

## (undated) DEVICE — BASIC DOUBLE BASIN 1-LF: Brand: MEDLINE INDUSTRIES, INC.

## (undated) DEVICE — NON-ADHERENT STRIPS,OIL EMULSION: Brand: CURITY

## (undated) DEVICE — CLOSURE EXOFIN 1.0ML

## (undated) DEVICE — Device

## (undated) DEVICE — FORCEP RADIAL JAW 4

## (undated) DEVICE — ENCORE® PERRY STYLE 42 PF SIZE 7.5, STERILE LATEX POWDER-FREE SURGICAL GLOVE: Brand: ENCORE

## (undated) DEVICE — SKIN AFFIX .4ML

## (undated) DEVICE — ENCORE® PERRY STYLE 42 PF SIZE 7, STERILE LATEX POWDER-FREE SURGICAL GLOVE: Brand: ENCORE

## (undated) DEVICE — CELL SAVER BAG 600ML 4R2023

## (undated) NOTE — IP AVS SNAPSHOT
Patient Demographics     Address  [address] [place] Phone  [phone] (Home) *Preferred*  [phone] [facility]) E-mail Address  [email or web address]      Emergency Contact(s)     Name Relation Home Work Mobile    Yomi Moralez 125-6 TAKE 1 TABLET BY MOUTH ONE TIME A DAY   LARRY Garcia         MetFORMIN HCl 1000 MG Tabs  Commonly known as:  GLUCOPHAGE      Take 0.5 tablets (500 mg total) by mouth 2 (two) times daily with meals.    Bronwyn Braga PA-C         Metoprolol Tartrate Lab Results Last 24 Hours      HEMOGLOBIN [[id number]] (Abnormal)  Resulted: 01/21/18 1418, Result status: Final result   Ordering provider:  Johan Ennis MD  01/21/18 1211 Resulting lab:  [facility]    Specimen Information    Type Source Collected On   B Past Medical History:[AR.1]  Past Medical History:   Diagnosis Date   • AAA (abdominal aortic aneurysm) ([facility] [address]    • Arrhythmia     a-fib   • CAD (coronary artery disease)    • Cataracts, bilateral 4/16/2014   • Disorder of thyroid    • Dysphagia    • GERD ( • Other[other] [OTHER] Mother      Stroke at 64   • Hypertension Brother    • Heart Disorder Brother    • Diabetes Maternal Grandmother    • Heart Disorder Brother    • Hypertension Brother[AR.2]        Allergies:[AR.1]   Adhesive Tape           Rash[AR.2] Neck: No lymphadenopathy. No JVD. No carotid bruits. Respiratory: Clear to auscultation bilaterally. No wheezes. No rhonchi. Cardiovascular: S1, S2. Regular rate and rhythm. No murmurs, rubs or gallops. Equal pulses.    Chest and Back: No tenderness or de No notes of this type exist for this encounter.          Physical Therapy Notes (last 72 hours) (Notes from 1/18/2018  2:47 PM through 1/21/2018  2:47 PM)      Physical Therapy Note signed by Ashanti Kauffman PT at 1/21/2018  9:47 AM  Version 1 of 1 chokes very easily on food   • Type II or unspecified type diabetes mellitus without mention of complication, not stated as uncontrolled    • Unspecified essential hypertension    • Visual impairment     glasses[TF.2]       Past Surgical History[TF.1]  Pa How much difficulty does the patient currently have. ..[TF.1]  -   Turning over in bed (including adjusting bedclothes, sheets and blankets)?: None   -   Sitting down on and standing up from a chair with arms (e.g., wheelchair, bedside commode, etc.): A Lit on AROM on B LE while seated and supine and it's rationale. Instructed to walk short distances all throughout the day to avoid stiffness and to use RW at all times. Pt has a RW at home. Addressed all issues and concerns in this visit.  D/c as plan with  Rec Type of Evaluation: Initial  Physician Order: PT Eval and Treat    Presenting Problem: S/p Fall on 1/19/18 - Left Hip hematoma  Reason for Therapy: Mobility Dysfunction and Discharge Planning    History related to current admission: Patient was admitted on No date: COLONOSCOPY  12/6/2016: EGD N/A      Comment: Procedure: ESOPHAGOGASTRODUODENOSCOPY (EGD);                  Surgeon: Ivan Tanner MD;  Location: [facility]                ENDOSCOPY  No date: HERNIA SURGERY      Comment: Jose Perez  No date: HYSTERECTOM techniques;Relaxation;Repositioning    COGNITION  · Overall Cognitive Status:  WFL - within functional limits  · Safety Judgement:  decreased awareness of need for assistance and decreased awareness of need for safety  · Awareness of Errors:  decreased fran Gait Assistance: Dependent assistance (Actual assist - min for safety)  Distance (ft): 15 ft  Assistive Device: Rolling walker  Pattern: L Decreased stance time (Decreased weight bearing on left LE)  Stoop/Curb Assistance: Not tested  Comment : Above score management skills. The patient is below baseline and would benefit from skilled inpatient PT to address the above deficits to assist patient in returning to prior to level of function.   DISCHARGE RECOMMENDATIONS  PT Discharge Recommendations: Home with ho Filed:  1/21/2018  1:37 PM Date of Service:  1/21/2018  1:33 PM Status:  Signed    :  Nato Day OT (Occupational Therapist)       OCCUPATIONAL THERAPY TREATMENT NOTE - INPATIENT     Room Number: 436/387-V  Session: 1   Number of Visits to Meet Comment: Procedure: ESOPHAGOGASTRODUODENOSCOPY (EGD);                  Surgeon: Ines Carlton MD;  Location: [address] [place] [address]                ENDOSCOPY  No date: HERNIA SURGERY      Comment: Angel Zhou  No date: HYSTERECTOMY      Comment: partial, still has ovaries to EOB, Min A with LE dressing simulation, recommending use of LH a/e for same, with pt reporting to live at [place] and have access to all equipment as needed. Supervision for sit to stand, Supervision for ambulation with RW down hallway x 200 feet.  Pt s UE Exercise Program Goal  Patient will be independent with bilateral AROM HEP (home exercise program). ... Goal met     Additional Goals:  Pt will state at least 3 home safety recommendation ideas. ..... Goal met[MS. 1]     Attribution Key    MS. 1 - Enrique a-fib   • CAD (coronary artery disease)    • Cataracts, bilateral 4/16/2014   • Disorder of thyroid    • Dysphagia    • GERD (gastroesophageal reflux disease)    • Hammer toe 2013   • Heart attack 2008   • Myocardial infarction    • Other and unspecified ADL, IADL. Enjoys swimming 4 times/day. 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 Per daughter, pt has been resistive to the idea of using a RW, even though family has been telling her to use it. SUBJECTIVE   \"You know, I am supposed to be playing for [employer] tomorrow. supine to sit, sit to stand, and to ambulate in the hallway, chair follow (hx of being light headed). /73, HR 74 after ambulation. No report of dizziness. Daughter present during the session.  Daughter was instructing the patient to start using the Clinical Decision Making LOW - Analysis of occupational profile, problem-focused assessments, limited treatment options    Overall Complexity LOW     OT Discharge Recommendations: Home with home health PT/OT       PLAN  OT Treatment Plan: Balance activitie

## (undated) NOTE — LETTER
05/14/21        [address] [place] [facility] [id number]      Dear Liban Pelayo,    [facility] records indicate that you have outstanding lab work and or testing that was ordered for you and has not yet been completed:  Orders Placed This Encounte

## (undated) NOTE — LETTER
Lalitha Taveras 182 6 [address] [address]  [place], [address] [facility]    Consent for Operation  Date: __________________                                Time: _______________    1.  I authorize the performance upon [address] [facility],[address] the following operation:  Procedur procedure has been videotaped, the surgeon will obtain the original videotape. The hospital will not be responsible for storage or maintenance of this tape.   7. For the purpose of advancing medical education, I consent to the admittance of observers to the STATEMENTS REQUIRING INSERTION OR COMPLETION WERE FILLED IN.     Signature of Patient:   ___________________________    When the patient is a minor or mentally incompetent to give consent:  Signature of person authorized to consent for patient: ____________ supplements, and pills I can buy without a prescription (including street drugs/illegal medications). Failure to inform my anesthesiologist about these medicines may increase my risk of anesthetic complications. iv.  If I am allergic to anything or have ha Anesthesiologist Signature     Date   Time  I have discussed the procedure and information above with the patient (or patient’s representative) and answered their questions. The patient or their representative has agreed to have anesthesia services.     ___

## (undated) NOTE — LETTER
Patient Name: Larry Lange  YOB: 1940          MRN number:  [id number]  Date:  2/23/2018  Referring Physician:  LARRY Norwood    Discharge Summary    Dx:  Fall, subsequent encounter (P73.HCXQ), Unsteady gait (R26.81)  Authorized # of V Pt will be independent and compliant with comprehensive HEP to maintain progress achieved in PT- met    Discharge G Code: Mobility: Walking and Moving Around CK: 40-59% impaired, limited, or restricted  Projected G Code:  Mobility: Walking and Moving Around

## (undated) NOTE — Clinical Note
FYI, Dr.Schriedel patient Brad Delarosa declined services,  sending letter on your behalf for patient to re-consider CCM services.    Thank you

## (undated) NOTE — LETTER
Lalitha Taveras 182 6 [address] [address]  [place], 209 [facility]    Consent for Operation  Date: __________________                                Time: _______________    1.  I authorize the performance upon [address] [facility],[address] the following operation:  Procedur procedure has been videotaped, the surgeon will obtain the original videotape. The hospital will not be responsible for storage or maintenance of this tape.   7. For the purpose of advancing medical education, I consent to the admittance of observers to the STATEMENTS REQUIRING INSERTION OR COMPLETION WERE FILLED IN.     Signature of Patient:   ___________________________    When the patient is a minor or mentally incompetent to give consent:  Signature of person authorized to consent for patient: ____________ supplements, and pills I can buy without a prescription (including street drugs/illegal medications). Failure to inform my anesthesiologist about these medicines may increase my risk of anesthetic complications. iv.  If I am allergic to anything or have ha Anesthesiologist Signature     Date   Time  I have discussed the procedure and information above with the patient (or patient’s representative) and answered their questions. The patient or their representative has agreed to have anesthesia services.     ___

## (undated) NOTE — LETTER
[address] [facility] FOR BLOOD OR BLOOD COMPONENTS      In the course of your treatment, it may become necessary to administer a transfusion of blood or blood components. This form provides basic information concerning this procedure and, if signed by you, authorizes its performance by qualified medical personnel. DESCRIPTION OF PROCEDURE:  Blood is introduced into one of your veins, commonly in the arm, using a sterilized disposable needle. The amount of blood transfused, and whether the transfusion will be of blood or blood components is a judgment the physician will make based on your particular needs. RISKS:  The transfusion is a common procedure of low risk. MINOR AND TEMPORARY REACTIONS ARE NOT UNCOMMON, including a slight bruise, swelling or local reaction in the area where the needle pierces your skin, or a non-serious reaction to the transfused material itself, including headache, fever or a mild skin reaction, such as rash. Serious reactions are possible, though very unlikely and include severe allergic reaction (shock)  and destruction (hemolysis) of transfused blood cells. Infectious diseases which are known to be transmitted by blood transfusion include CERTAIN TYPES OF VIRAL HEPATITIS, a viral infection of the liver, HUMAN IMMUNODEFICIENCY VIRUS (HIV-1,2) infection, a viral infection known to cause ACQUIRED IMMUNODEFICIENCY SYNDROME (AIDS) AS WELL AS CERTAIN OTHER BACTERIAL, VIRAL AND PARASITIC DISEASES. While a minimal risk of acquiring an infectious disease from transfused blood exists, in accordance with Federal and State law all due care has been taken in donor selection and testing to avoid transmission of disease. ALTERNATIVES:  If loss of blood poses serious threats in the course of your treatment, THERE IS NO EFFECTIVE ALTERNATIVE TO BLOOD TRANSFUSION.  However, if you have any further questions on this matter, your physician will fully explain the alternatives to you if it has not already been done. I,Shantel Su, have read/had read to me the above. I understand the matters bearing on the decision whether or not to authorize a transfusion of blood or blood components. I have no questions which have not been answered to my full satisfaction.  I hereby consent to such transfusion as  my physician may deem necessary or advisable in the course of my treatment.        _______________   __________________________________________________  Date     Signature of Patient, Parent or Legal Guardian      ([employer])      __________________________________________  Witness to Signature (title or relationship to patient)    Patient Name: Jan Barber     : 1940                 Printed: 2022     Medical Record #: [id number]                    Page 1 of 1

## (undated) NOTE — LETTER
Dear Elizabeth Urias:  I wanted to reach out to you personally as I feel you would benefit from our Chronic Care Management program here at [facility] 254. [address] Salo Dubose has recently reached out to you on my behalf to introduce the program and all its benefits. To help you take control of your health and provide you with optimal quality care, I am recommending this program to you. If you choose to enroll in this Chronic Care Management program you will be working closely with your very own Health  Care Manager. They will help provide you with the extra support and education needed to keep you healthy, as well as keeping me informed about you and your health in between office visits. I ask that you take the time to review the information the Health  sent you, and consider all of the outstanding benefits available to you. Please bring this letter with you to your next office visit with me so that we can discuss in more detail if you have any concerns. We have had many positive outcomes for people that have enrolled in this program. Some have reported a decrease in their blood glucose levels, others a decrease in their weight and or no weight gain, and an overall satisfaction with their health and wellness. Your health is important to me! The Health  will be calling you again soon to discuss your final decision and any questions you may have.  You may also call them at [phone]  Thank you for considering,  Dr. Vanita Escobar

## (undated) NOTE — IP AVS SNAPSHOT
[address]            (For Outpatient Use Only) Initial Admit Date: 1/19/2018   Inpt/Obs Admit Date: Inpt: 1/19/18 / Obs: N/A   Discharge Date:    You Binet:  [de-identified]   MRN: [de-identified]   CSN: [id number]        ENCOUNTER  Patient Hospital Account Financial Class: Commercial    January 21, 2018

## (undated) NOTE — LETTER
Myra Harris M.D., F.A.C.S. Radha Yao M.D., F.A.C.S. Natalia Rolle M.D., Venda Gaucher. MESHA Quintero M.D., F.A.C.SJoelle Bass. Nallely Morgan M.D., F.A.C.S. Jacob Galeazzi, M.D. THADDEUS Prabhakar M.D., F.A.C.SJoelle Lora. Raphael Ta M.D., F.A.C.S. Severiano Marvel, M.D., F.A.C.S. Gabbi Lepe M.D., F.A.C.S. Fidel Lindsay M.D. F.A.C.S. Elías Ennis. Jorge Escobar M.D., F.A.C.S. Daron Lennon M.D., F.A.C.S. Olivia Bolanos M.D., F.A.C.S., F.A.C.CJoelle Mehta M.D., F.A.C.SJoelle Augustin M.D., F.A.C.S. Ghislaine Acharya M.D., F.A.C.SJoelle Chaudhary. Sandi Campos M.D., F.A.C.S. KALIA Martinez M.D., Venda Gaucher. C.S  Paradise Tran M.D. Saima Romero M.D., F.A.C.S. Jack Smith. Angel Saenz M.D., F.A.C.S. Severiano Norman M.D. Santy Corona M.D.  Jaylen Garcia M.D. PhD.  Sol Win M.D. Su Davey M.D. F A DAKOTA Decker M.D. Floresita Flores M.D. Malissa Lima M.D. Maryann Seip, M.D.   Barclay Cooks, D.O., AMBER.A.CJoelleSJoelle Guzman M.D., F.A.C.S. Deloris Mack M.D. Welcome to [facility] As you contemplate possible surgical treatment, it is very important to us that you understand fully what is being discussed, that all of your questions have been answered, and that your options for treatment have been fully explained. To that end, on the following page we will ask you some questions to make certain that you understand everything which has been explained to you. Included in this understanding is that there are both surgical and nonsurgical treatments available for you, that you have options regarding where your care is given, and what doctors are involved in your care. Included in these options would, of course, be the option to elect for no treatment whatsoever.  We especially want to be sure that you have had a chance to have all of your questions and concerns answered. If there are any issues which have not been adequately addressed, we ask you to bring them forward so that we can thoroughly address them. A patient who is fully informed and understands their condition and options for treatment, as well as potential adverse effects of treatment, is going to be a patient who receives the most benefit out of care rendered. Our goal in addition to providing excellent surgical care is to provide the necessary information to you and your family in order to make decisions which are appropriate relative to your own care. Please take the time necessary to read and answer the questions on the next page. Again, if you have any questions, bring them forward and we will certainly address them. Sincerely,    [facility].    _______________________  ____________________________________  Date:                                             Patient Signature  ________________________  Darryl Kong  Witness Consent Form         Revised: 2015  Patient Name: Darryl Knog     : 1940                 Printed: 6/15/13 9:43 AM     Medical Record #: [id number]                Page: 1 of  2        [facility] Supplemental Consent Form    A [facility] ([facility]) surgeon has met with me and explained the matter of my illness, and what treatments might be available to improve my condition. As a result of that conversation, I understand the following:    A [facility] surgeon met with me and explained, in detail, the nature of my condition for which surgery is being contemplated. The procedure to be performed  Is: RELINING OF ABDOMINAL AORTIC STENT GRAFT            Yes _____ No _____    A [facility] surgeon has explained to me that there are alternatives to surgery which might include no surgery, medical therapy, or interventional treatment, among other options and the risks and benefits of the different treatment options:     Yes _____ No _____    A [facility] surgeon as explained to me that if I should so desire, he/she is willing to explain my case and the surgical and non-surgical options to family members: Yes _____ No _____    A [facility] surgeon has answered all of my questions regarding the topics we have discussed. I have been invited to ask more questions:  Yes _____ No _____    A [facility] surgeon has explained to me that if I seek other options or wish treatment at another facility in [place] or [place], or anywhere in the [place], that his/her office will assist me in making such accommodations:   Yes _____ No _____    A [facility] surgeon has explained to me, that death, risk of bleeding, stroke, multi-organ failure, heart attack or other complications are risks for the proposed surgical procedure: Yes _____ No _____    A [facility] surgeon has explained to me that I have the right to cancel or postpone the surgery at any time prior to the start of surgery: Yes _____ No _____    The nature and options for treatment for my condition have been explained to me, in detail, by a [facility] surgeon and all questions have been answered to my satisfaction. I understand that I am not required to undergo surgery, and further, that if I so desire, I could have surgery accomplished by another surgeon or at another institution. I understand and accept that which has been explained to me.  I am able to make my decisions knowingly and willfully based on the data.    ______________________   __________________________________  Date       Patient Signature  ______________________________ Gerianne Hires  Witness Consent Form   Patient Name: Viola Loya: 11/5/1940                 Medical Record #: [id number]    Page: 2 of 2        Printed: August 10, 2022

## (undated) NOTE — Clinical Note
Pt was contacted for TCM. Attempted to offer an HFU appt at [facility] first and then with PCP however pt declined. Pt stated she is to have another procedure over the next couple of weeks. Pt feels she is doing better since d/c.  Pt denies CP and s/ s of infectio

## (undated) NOTE — Clinical Note
Rodolfo Andrew, I saw Alecia Cedric today for pre op eval for EGD with pneumatic dilation with Dr Jessa Rutledge at [facility].  She has had a recent thyroidectomy, eval with Angie Paris for her abdominal aoritc aneursym/dissection which appears chronic and had a holter with paroxysmal faizan

## (undated) NOTE — LETTER
Jeovany Rivas M.D., F.A.C.S. Chau Real M.D., F.A.C.S. Todd Larsen M.D., Guillermo Vizcaino. MESHA Venegas M.D., F.A.C.S. Emiliano Hinton M.D., F.A.C.S. KALIA Olguin M. Beaulah Bidding A.D. Dallie Genta, M.D., F. chance to have all of your questions and concerns answered. If there are any issues which have not been adequately addressed, we ask you to bring them forward so that we can thoroughly address them.     A patient who is fully informed and understands their treatment, among other options and the risks and benefits of the different treatment options:    Yes _____ No _____    A [facility] surgeon as explained to me that if I should so desire, he/she is willing to explain my case and the surgical and non-surgical optio

## (undated) NOTE — LETTER
[address] [facility] FOR BLOOD OR BLOOD COMPONENTS      In the course of your treatment, it may become necessary to administer a transfusion of blood or blood components.  This form provides basic information concerning this proc explain the alternatives to you if it has not already been done. I,Shantel uS, have read/had read to me the above. I understand the matters bearing on the decision whether or not to authorize a transfusion of blood or blood components.  I have no que

## (undated) NOTE — Clinical Note
Rodolfo Andrew, I just saw Cruz Tracey Georges(Kamran's mom), she is having POEM procedure for achalasia at [facility]. Just wanted to give you a heads up if you wanted to see her prior. She had a negative stress test last year, I ok'd holding her aspirin.    Thanks, Mera Nino

## (undated) NOTE — LETTER
03/19/21        [place] [id number]      Dear Meche Keenan,    [address] records indicate that you have outstanding lab work and or testing that was ordered for you and has not yet been completed:  No orders of the defined ty

## (undated) NOTE — ED AVS SNAPSHOT
[facility] in [address] [place] [address] Drive,[address] Floor    [address]    Phone:  [phone]    Fax:  [address] [facility]   MRN: [id number]    Department:  [facility]   Date of Visit:  6/7/2017 Disclosure     Insurance plans vary and the physician(s) referred by the Immediate Care may not be covered by your plan. Please contact your insurance company to determine coverage for follow-up care and referrals. [facility]  [address]  [phone] If you have been prescribed any medication(s), please fill your prescription right away and begin taking the medication(s) as directed.     If the Immediate Care Provider has read X-rays, these will be re-interpreted by a radiologist.  If there is a signifi can help with your Affordable Care Act coverage, as well as all types of Medicaid plans. To get signed up and covered, please call [phone] and ask to get set up for an insurance coverage that is in-network with Maria Elena Marie PROCEDURE:  XR FOOT, COMPLETE (MIN 3 VIEWS), RIGHT (CPT=73630)     TECHNIQUE:  AP, oblique, and lateral views were obtained. COMPARISON:  None.      INDICATIONS:  rt foot inj     PATIENT STATED HISTORY: (As transcribed by Technologist)  Patient has tristian

## (undated) NOTE — LETTER
01/30/19        [place] Mar[place] [address]      Dear Dick Durán,    [facility] records indicate that you have outstanding FASTING lab work and or testing that was ordered for you and has not yet been completed:  Orders Placed This Encou

## (undated) NOTE — LETTER
May 19, 2021    [place] [address] Cardona [id number]      Dear Breanne Montano: The following are the results of your recent tests ordered by [address] [place], [place] [email or web address]. Please review the list of test results.   Your result is the number on the

## (undated) NOTE — MR AVS SNAPSHOT
[facility]  [address] [address] [phone]               Thank you for choosing us for your health care visit with Rivera Ruth MD.  We are glad to serve you and happy to provide you with this summa Reason for Today's Visit     Well Adult     Pre-Op Exam           Medical Issues Discussed Today     Abdominal aortic aneurysm (AAA) without rupture (HCC)    Achalasia    Aortic atherosclerosis (HCC)    Arthritis, lumbar spine (HCC)    At risk for falling GLUCOSE (mg/dL)   Date Value   11/29/2016 143*   02/18/2015 116*   01/11/2013 125*   ----------  GLUCOSE, BLD (no units)   Date Value   06/22/2016 157   ---------- Medicare covers annually or at 6-month intervals for prediabetic patients        Cardiovascu  Americans over age 72 Data entered on: 6/13/2016   Last Dilated Eye Exam 6/1/2016    OK to schedule if you are in this risk group, make sure you have a referral   Bone Density Screening      Bone density screening   Covered every 2 yrs after age 10 Hemophiliacs who received Factor VIII or IX concentrates   Clients of institutions for the mentally retarded   Persons who live in the same house as a HepB virus carrier   Homosexual men   Illicit injectable drug abusers     Tetanus Toxoid- Only covered wi Take 10 mg by mouth daily.    Commonly known as:  PRINIVIL,ZESTRIL           * lisinopril 10 MG Tabs   TAKE 1 TABLET BY MOUTH ONE TIME A DAY   Commonly known as:  PRINIVIL,ZESTRIL           MetFORMIN HCl 1000 MG Tabs   Take 0.5 tablets (500 mg total) by luana Healthy Diet and Regular Exercise  [employer] [facility] [employer] for making healthy food choices  -   Enjoy your food, but eat less. Fully enjoy your food when eating. Don’t eat while distracted and slow down. Avoid over sized portions.    Pinky Fallen

## (undated) NOTE — LETTER
Your patient was recently seen at the [facility] for a hospital follow-up visit. The visit note is attached. Please contact the clinic with any questions at [phone].     Thank you,  WILLY Rivera

## (undated) NOTE — LETTER
Lalitha Taveras 182 6 [address] [address]  [place], [address] [facility]    Consent for Operation  Date: __________________                                Time: _______________    1.  I authorize the performance upon [address] [facility] the following operation:  Procedur procedure has been videotaped, the surgeon will obtain the original videotape. The hospital will not be responsible for storage or maintenance of this tape.   7. For the purpose of advancing medical education, I consent to the admittance of observers to the STATEMENTS REQUIRING INSERTION OR COMPLETION WERE FILLED IN.     Signature of Patient:   ___________________________    When the patient is a minor or mentally incompetent to give consent:  Signature of person authorized to consent for patient: ____________ supplements, and pills I can buy without a prescription (including street drugs/illegal medications). Failure to inform my anesthesiologist about these medicines may increase my risk of anesthetic complications. iv.  If I am allergic to anything or have ha Anesthesiologist Signature     Date   Time  I have discussed the procedure and information above with the patient (or patient’s representative) and answered their questions. The patient or their representative has agreed to have anesthesia services.     ___

## (undated) NOTE — LETTER
[facility] [address], [address]  Consent for Procedure/Sedation    Date:        Time:       1.  I authorize the performance upon Shantel Su the following:cardiac catheterization, left ventricular cineangiography, bilateral molly period, the physician will determine when the applicable recovery period ends for purposes of reinstating the Do Not Resuscitate (DNR) order.     Signature of Patient: ____________________________________________________    Signature of person authorized

## (undated) NOTE — ED AVS SNAPSHOT
Nguyen Ngo   MRN: [id number]    Department:  [facility] Emergency Department   Date of Visit:  5/18/2019           Disclosure     Insurance plans vary and the physician(s) referred by the ER may not be covered by your plan.  Please contact yo tell this physician (or your personal doctor if your instructions are to return to your personal doctor) about any new or lasting problems. The primary care or specialist physician will see patients referred from the [facility] Emergency Department.  Cesar Echeverria

## (undated) NOTE — LETTER
[address] [facility] [place] FOR BLOOD OR BLOOD COMPONENTS      In the course of your treatment, it may become necessary to administer a transfusion of blood or blood components. This form provides basic information concerning this procedure and, if signed by you, authorizes its performance by qualified medical personnel. DESCRIPTION OF PROCEDURE:  Blood is introduced into one of your veins, commonly in the arm, using a sterilized disposable needle. The amount of blood transfused, and whether the transfusion will be of blood or blood components is a judgment the physician will make based on your particular needs. RISKS:  The transfusion is a common procedure of low risk. MINOR AND TEMPORARY REACTIONS ARE NOT UNCOMMON, including a slight bruise, swelling or local reaction in the area where the needle pierces your skin, or a non-serious reaction to the transfused material itself, including headache, fever or a mild skin reaction, such as rash. Serious reactions are possible, though very unlikely and include severe allergic reaction (shock)  and destruction (hemolysis) of transfused blood cells. Infectious diseases which are known to be transmitted by blood transfusion include CERTAIN TYPES OF VIRAL HEPATITIS, a viral infection of the liver, HUMAN IMMUNODEFICIENCY VIRUS (HIV-1,2) infection, a viral infection known to cause ACQUIRED IMMUNODEFICIENCY SYNDROME (AIDS) AS WELL AS CERTAIN OTHER BACTERIAL, VIRAL AND PARASITIC DISEASES. While a minimal risk of acquiring an infectious disease from transfused blood exists, in accordance with Federal and State law all due care has been taken in donor selection and testing to avoid transmission of disease. ALTERNATIVES:  If loss of blood poses serious threats in the course of your treatment, THERE IS NO EFFECTIVE ALTERNATIVE TO BLOOD TRANSFUSION.  However, if you have any further questions on this matter, your physician will fully explain the alternatives to you if it has not already been done. I,Shantel Su, have read/had read to me the above. I understand the matters bearing on the decision whether or not to authorize a transfusion of blood or blood components. I have no questions which have not been answered to my full satisfaction.  I hereby consent to such transfusion as  my physician may deem necessary or advisable in the course of my treatment.        _______________   __________________________________________________  Date     Signature of Patient, Parent or Legal Guardian      ([place])      __________________________________________  Witness to Signature (title or relationship to patient)    Patient Name: Timi Tajik     : 1940                 Printed: 2022     Medical Record #: [id number]                    Page 1 of 1

## (undated) NOTE — LETTER
[facility] [address], [address]  Consent for Procedure/Sedation    Date: 08/23/2019    Time: 0130 am      1.  I authorize the performance upon Shantel Su the following:cardiac catheterization, left ventricular cineangiography, period, the physician will determine when the applicable recovery period ends for purposes of reinstating the Do Not Resuscitate (DNR) order.     Signature of Patient: ____________________________________________________    Signature of person authorized

## (undated) NOTE — MR AVS SNAPSHOT
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 No flowsheet data found. Fecal Occult Blood   Covered Annually No results found for: FOB, OCCULTSTOOL No flowsheet data found.      Barium Enema-   uncomf Orders placed or performed in visit on 05/21/15  -PNEUMOCOCCAL VACC, 13 OZZY IM    Please get once after your 65th birthday    Pneumococcal 23 (Pneumovax)  Covered Once after 65   Orders placed or performed in visit on 04/16/14  -PNEUMOCOCCAL IMMUNIZATION conditions such as allergies, colds, cough, fever, rash, sore throat, headache and pink eye. The cost for a Video Visit is currently $10.         If you receive a survey from [employer], please take a few minutes to complete it and provide feedback.  We s

## (undated) NOTE — Clinical Note
MARYAMI, TCM template completed.  Pt declined appt at this time stating that she is following up with cardiology

## (undated) NOTE — Clinical Note
Delaney Sales is having a reduced exercise toelrance, no chest pain. DM2 is significantly worse, reluctant to trial new drug like trulicity or ozempic, will titrate up her glyburide. Her ldl is not to goal, has been on pravastatin forever, would you consider flipping to lipitor?   Becca Alfaro

## (undated) NOTE — IP AVS SNAPSHOT
Patient Demographics     Address  [address]  [address] Phone  [phone] (Home) *Preferred*  [phone] [facility]) E-mail Address  [email or web address]      Emergency Contact(s)     Name Relation Home Work Mobile    Yomi Moralez o Do not wash incision. o Remove entire wrapping and old dressing (if Medipore/coverlet or gauze) after showering. Pat dry with a CLEAN TOWEL if necessary and cover incision with new Medipore/coverlet or gauze. Incision care/Dressing changes  ?  Wash h ? Usually allowed after six weeks. Discuss specific work activities with your surgeon. Restrictions  ? Follow instructions provided by physical therapy    No smoking  ? Avoid smoking.  It is known to cause breathing problems and can decrease the rate o times a day, especially after therapy. Be sure there is a thin cloth barrier between skin and ice or cold therapy. ? Change position at least every 45 minutes while awake to avoid stiffness or increased discomfort. ?  Deep breathing and relaxation techniq ? Schedule one week follow up after surgery WITH PRIMARY CARE PHYSICIAN; review your medications over last 6 months.   Your body gets stressed by surgery and that stress can affect all your other health issues (such as high blood pressure, diabetes, CHF, af metal detector. The doctors no longer provide an identification card for this as they are easily copied.  ALSO request a wheelchair the first year to board and get off a plane…this aids in priority seating and you should sit on the aisle or at the bulkhead Commonly known as:  Divya [place]  Next dose due:  TOMORROW AM      Take 20 mg by mouth every morning before breakfast.          furosemide 20 MG Tabs  Commonly known as:  Lasix      Take 0.5 tablets (10 mg total) by mouth daily.    Samantha Tran MD         glyB Please  your prescriptions at the location directed by your doctor or nurse    Bring a paper prescription for each of these medications  HYDROcodone-acetaminophen 5-325 MG Tabs           [id number] - MAR ACTION REPORT  (last 24 hrs)    ** SITE UNKNOW Ordering provider:  Sayda Youngblood MD  03/08/20 2300 Resulting lab:  [facility]   Narrative: The following orders were created for panel order CBC WITH DIFFERENTIAL WITH PLATELET.   Procedure                               Abnormality         Status History of Present Illness: Nguyen Ngo is a 78year old female with a PMH of CAD, afib, AAA, DMII, and GERD who presented to ED after suffering a trip and fall at home resulting in left hip pain. Patient tripped over rug and fell to her left.  Patient • ESOPHAGEAL MANOMETRY N/A 11/18/2016    Performed by Merlene Parker MD at [facility] ENDOSCOPY   • ESOPHAGOGASTRODUODENOSCOPY (EGD) N/A 5/9/2019    Performed by Merlene Parker MD at [facility] ENDOSCOPY   • ESOPHAGOGASTRODUODENOSCOPY (EGD) N/A 12/6/2016    Performe Medications:  No current facility-administered medications on file prior to encounter.    METFORMIN HCL 1000 MG Oral Tab, TAKE 1/2 TABLET BY MOUTH TWO TIMES A DAY WITH MEALS, Disp: 90 tablet, Rfl: 0  mupirocin 2 % External Ointment, Apply to wounds 3 times HEENT: Normocephalic atraumatic. Moist mucous membranes. Neck:  No carotid bruits. Respiratory: Diminished but clear. Cardiovascular: Irregularly irregular. Chest and Back: No tenderness or deformity. Abdomen: Soft, nontender, nondistended.   Jenifer Trejo Electronically signed by Yamila Hernandez DO on 3/1/2020  9:48 PM   Attribution Alex    NG. 1 - Yamila Hernandez DO on 3/1/2020  9:39 PM                        Consults - MD Consult Notes      Consults signed by Yoel Vogel MD at 3/4/2020  4:02 PM     Connie Bazzi Premorbid Functional Status: Patient was independent with mobility/ambulation, transfers, ADL's, IADL's.   Support System and Family Circumstances (i.e., potential caregivers): children / family / friends to help, patient and son  Debra Garcia / Harris Rg Performed by Giselle Gonzales MD at [address] [place] [address] ENDOSCOPY   • ESOPHAGOGASTRODUODENOSCOPY (EGD) N/A 5/9/2019    Performed by Giselle Gonzales MD at [address] [place] [address] ENDOSCOPY   • ESOPHAGOGASTRODUODENOSCOPY (EGD) N/A 12/6/2016    Performed by Giselle Gonzales MD at [address] [place] [address] ENDOSCOP lidocaine PF (XYLOCAINE) 1% injection, 2 mL, Injection, PRN  ropivacaine (NAROPIN) 0.5% injection, 30 mL, Injection, PRN  EPINEPHrine HCl (ADRENALIN) 1 MG/ML injection (Allergic Reaction Kit) 1 mg, 1 mg, Injection, PRN  Sodium Chloride 0.9 % solution 10 mL [COMPLETED] Metoclopramide HCl (REGLAN) injection 5 mg, 5 mg, Intravenous, Once        Social History    Tobacco Use      Smoking status: Never Smoker      Smokeless tobacco: Never Used    Alcohol use: No      Alcohol/week: 0.0 standard drinks      Family Ears/Nose/Throat: Hearing intact. Lips, mucosa, and tongue normal. Teeth and gums normal. Moist mucous membranes. Neck: No neck masses or thyroid enlargement/tenderness/nodules. Lungs:   Resonant, clear breath sounds, quiet accessory muscles.    C Risk for thromboembolic disease with need for careful DVT prophylaxis, potential for bleeding and hematoma or hemarthrosis (and aggravating anemia) with anticoagulation    PLAN:                   Based on patient's current functional status, pt would benef base with blunting left costophrenic angle without significant change. Hyperinflation consistent with COPD. CARDIAC:  Heart size and pulmonary vascularity is within normal limits.        CONCLUSION:  Slight atelectasis retrocardiac left lung base with smal • Cataracts, bilateral 4/16/2014   • Coronary atherosclerosis    • Disorder of thyroid    • Dysphagia    • Easy bruising    • Enlarged lymph node    • Exposure to medical diagnostic radiation 8/20148   • GERD (gastroesophageal reflux disease)    • Glaucoma right knee replacement 2008   • OTHER SURGICAL HISTORY      stent of LAD heart 2005   • OTHER SURGICAL HISTORY  04/12/07    EGD- double contrast Barium Swallow Esophagram    • REMOVAL GALLBLADDER     • REPAIR ING HERNIA,5+Y/O,REDUCIBL     • THYROIDECTOMY Raw Score: 22   Approx Degree of Impairment: 20.91%   Standardized Score (AM-PAC Scale): 53.28   CMS Modifier (G-Code): CJ    FUNCTIONAL ABILITY STATUS  Gait Assessment   Gait Assistance: Supervision  Distance (ft): 50 ft  Assistive Device: Rolling walker PT Treatment Plan: Bed mobility; Endurance; Energy conservation;Patient education;Gait training;Range of motion;Strengthening;Transfer training  Rehab Potential : Good  Frequency (Obs): Daily    CURRENT GOALS      Goal #1 Patient is able to demonstrate supin Past Medical History[CM. 1]  Past Medical History:   Diagnosis Date   • AAA (abdominal aortic aneurysm) ([facility] [address]    • Abdominal hernia    • Arrhythmia     a-fib   • Arthritis    • Atherosclerosis of coronary artery    • CAD (coronary artery disease)    • C umbilica'   • HYSTERECTOMY      partial, still has ovaries   • KNEE REPLACEMENT SURGERY Right    • [id number], SCREENING     •      • OTHER      right hammer toe surgery   • OTHER SURGICAL HISTORY      Hernia 2010- ventral or umbilical   • OTHER PLAZA -   Moving from lying on back to sitting on the side of the bed?: None[CM. 2]   How much help from another person does the patient currently need. .. [CM.1]   -   Moving to and from a bed to a chair (including a wheelchair)?: None   -   Need to walk in hospit place; Alarm set; Discussed recommendations with /[CM. 2]    ASSESSMENT   Pt with significant progression in overall functional mobility. Pt Mod I for bed mobility, supervision for transfers and ambulation.  Pt able to ascend/descend 1 Author:  Ioana Barroso PT Service:  Rehab Author Type:  Physical Therapist    Filed:  3/7/2020 12:48 PM Date of Service:  3/7/2020 12:40 PM Status:  Signed    :  Ioana Barroso PT (Physical Therapist)        PHYSICAL THERAPY TREATMENT NOTE - INPA Past Surgical History:   Procedure Laterality Date   • ABDOMINAL AORTIC ANEURYSM ENDOSCOPIC N/A 1/8/2020    Performed by Lorrie Keys MD at [address] [place] [address] [address] [facility]    • CATH DRUG ELUTING STENT     • CHOLECYSTECTOMY     • COLONOSCOPY  04/21/2003 Management Techniques: Activity promotion; Body mechanics;Breathing techniques;Relaxation;Repositioning    BALANCE                                                                                                                     Static Sitting: Good  Yumiko Static standing activities x 1 min each CGA with RW  Bed>BSC min A, vcs for LLE placement and WB status, stand step transfer  Toileting supervision  Amb 20ft with RW min A, vcs for WB status, chair follow  Education: Discussed WB status and demonstrated ma updated 3/7[AE.1]       Attribution Key    AE. 1 - Tiffani President, PT on 3/7/2020 12:40 PM               Physical Therapy Note signed by Siria Mary PT at 3/6/2020  4:01 PM  Version 3 of 3    Author:  Siria Mary PT Service:  Rehab Author Type:  P • PONV (postoperative nausea and vomiting)    • Problems with swallowing     chokes very easily on food   • Stented coronary artery    • Type II or unspecified type diabetes mellitus without mention of complication, not stated as uncontrolled     NIDDM   • • TUBAL LIGATION[SP.2]         SUBJECTIVE  \"I feel weak since I passed out this morning\". Spoke with rn, Linda Melendez, prior to session, who reported pt had a vasovagal response while on the bedside commode with the pct this morning.   Ok per rn to see for PT Pattern: L Decreased stance time  Stoop/Curb Assistance: Not tested  Comment : fim def[SP.2]    Skilled Therapy Provided: ok to see per rn, Carlton Otero, pt did have vasovagal response this am.  Pt recd in supine, educated in goals for session, cont 50% wt beari fair tolerance. Pt cont to present with left LE pain, dec strength and ROM, dec activity tolerance, and remains below her baseline.   Patient will benefit from continued inpatient physical therapy to address above issues so that patient may achieve highest Filed:  3/6/2020  4:00 PM Date of Service:  3/6/2020 12:43 PM Status:  Addendum    :  Margreta Angelucci, PT (Physical Therapist)    Related Notes:  Addendum by Margreta Angelucci, PT (Physical Therapist) filed at 3/6/2020  4:01 PM  Original Note by PAPITO complication, not stated as uncontrolled     NIDDM   • Unspecified essential hypertension    • Visual impairment     glasses[SP.2]       Past Surgical History[SP.1]  Past Surgical History:   Procedure Laterality Date   • ABDOMINAL AORTIC ANEURYSM ENDOSCOPI bedside commode with the pct this morning. Ok per rn to see for PT treatment.       Patient’s self-stated goal is to go home and return to being independent    OBJECTIVE[SP.1]  Precautions: (50% wt bearing left LE)[SP.2]    WEIGHT BEARING RESTRICTION[SP.1] response this am.  Pt recd in supine, educated in goals for session, cont 50% wt bearing left LE, ankle pumps for DVT prevention, pt reports she has been performing. Pt did appear more lethargic this am, but expressed motivation to work with therapy.     P so that patient may achieve highest functional mobility level. The AM-PAC '6-Clicks' Inpatient Basic Mobility Short Form was completed and this patient is demonstrating a 61.29% degree of impairment in mobility.  Research supports that patients with this l Number of Visits to Meet Established Goals: 5    Presenting Problem: s/p left hip percutaneous screw fixation 3/3/2020[SP.2]  History related to current admission:  Pt admitted due to fall resulting in left hip fracture, s/p left hip percutaneous screw fix Screening- Normal   • COLONOSCOPY     • COLONOSCOPY N/A 5/9/2019    Performed by May Davies MD at [facility] ENDOSCOPY   • [place], INFRARENL ABDOM AORTIC ANEURYSM/DISSECT     • ESOPHAGEAL MANOMETRY N/A 11/18/2016    Performed by Mick Rendon Management Techniques:  Activity promotion[SP.2]    BALANCE[SP.1]                                                                                                                       Static Sitting: Good  Dynamic Sitting: Fair           Static Standing: Po Orthostatics:  Supine 134/67, sitting 132/71, standing 115/48. Pt denied feeling light headed in standing. Pt requesting use of commode, transferred to [facility] with min a.  Pt instructed in performing intentional breathing to prevent vasovagal respons participate with therapy, and has supportive family. DISCHARGE RECOMMENDATIONS[SP.1]  PT Discharge Recommendations: Acute rehabilitation[SP.2]     PLAN[SP.1]  PT Treatment Plan: Bed mobility; Endurance; Energy conservation;Patient education;Gait ajit 3/3338: Acute diastolic CHF       Problem List[MR.1]  Principal Problem:    Closed fracture of neck of left femur, initial encounter ([facility] [address]  Active Problems:    Normocytic anemia    Closed fracture of neck of left femur (HCC)    Hyperglycemia[MR.2]      Pas Performed by Talita Keen MD at [address] [place] [address] ENDOSCOPY   • ESOPHAGOGASTRODUODENOSCOPY (EGD) N/A 12/6/2016    Performed by Talita Keen MD at [address] [place] [address] ENDOSCOPY   • ESOPHAGUS SURG PROCEDURE UNLISTED  11/2017    done at [facility]   • [address] [place] [address] [place] -   Putting on and taking off regular upper body clothing?: None  -   Taking care of personal grooming such as brushing teeth?: None  -   Eating meals?: None[MR. 2]    AM-PAC Score:[MR.1]  Score: 21  Approx Degree of Impairment: 32.79%  Standardized Score ( simplification techniques;ADL training;Functional transfer training;UE strengthening/ROM; Endurance training;Patient/Family education;Patient/Family training;Cognitive reorientation;Equipment eval/education; Fine motor coordination activities; Compensatory te Additional significant PMH:   8/2019: NSTEMI  8/6129: Acute diastolic CHF       Problem List  Principal Problem:    Closed fracture of neck of left femur, initial encounter ([facility] [address]  Active Problems:    Normocytic anemia    Closed fracture of neck of left fem Performed by Talita Keen MD at [address] [place] [address] ENDOSCOPY   • ESOPHAGOGASTRODUODENOSCOPY (EGD) N/A 12/6/2016    Performed by Talita Keen MD at [address] [place] [address] ENDOSCOPY   • ESOPHAGUS SURG PROCEDURE UNLISTED  11/2017    done at [facility]   • [address] [place] [address] [place] -   Taking care of personal grooming such as brushing teeth?: None  -   Eating meals?: None    AM-PAC Score:  Score: 17  Approx Degree of Impairment: 50.11%  Standardized Score (AM-PAC Scale): 37.26  CMS Modifier (G-Code): AMBROCIO    FUNCTIONAL TRANSFER ASSESSM education;Patient/Family training;Cognitive reorientation;Equipment eval/education; Fine motor coordination activities; Compensatory technique education  Rehab Potential : Good  Frequency (Obs): 5x/week    Ongoing as of 3/6/20:  ADL GOALS:  Patient will perf Description:  Patient's Short Term Goal: Have Sx to fix my hip    Interventions:   - Follow MD orders  - Ortho to see and set up Sx  - See additional Care Plan goals for specific interventions

## (undated) NOTE — LETTER
Lalitha Taveras [address]  1401 [facility], [address] [facility]    Consent for Operation  Date: __________________                                Time: _______________    1.  I authorize the performance upon [address] [facility] the following operation:  Lenard procedure has been videotaped, the surgeon will obtain the original videotape. The hospital will not be responsible for storage or maintenance of this tape.   7. For the purpose of advancing medical education, I consent to the admittance of observers to the STATEMENTS REQUIRING INSERTION OR COMPLETION WERE FILLED IN.     Signature of Patient:   ___________________________    When the patient is a minor or mentally incompetent to give consent:  Signature of person authorized to consent for patient: ____________ supplements, and pills I can buy without a prescription (including street drugs/illegal medications). Failure to inform my anesthesiologist about these medicines may increase my risk of anesthetic complications. iv.  If I am allergic to anything or have ha Anesthesiologist Signature     Date   Time  I have discussed the procedure and information above with the patient (or patient’s representative) and answered their questions. The patient or their representative has agreed to have anesthesia services.     ___

## (undated) NOTE — LETTER
Ghislaine Garza M.D., F.A.C.S. Kelly Marie M.D., F.A.C.S. Lynne Borjas M.D., Pretty Nogueira. MESHA Olson M.D., F.A.C.S. Natalie Perkins. Josselyn Ramirez M.D., F.A.C.S. KALIA Martinez M. Anton Garbe A.D. Mardella Blacker, M.D., F. chance to have all of your questions and concerns answered. If there are any issues which have not been adequately addressed, we ask you to bring them forward so that we can thoroughly address them.     A patient who is fully informed and understands their treatment, among other options and the risks and benefits of the different treatment options:    Yes _____ No _____    A [facility] surgeon as explained to me that if I should so desire, he/she is willing to explain my case and the surgical and non-surgical optio

## (undated) NOTE — IP AVS SNAPSHOT
[address]            (For Outpatient Use Only) Initial Admit Date: 3/1/2020   Inpt/Obs Admit Date: Inpt: 3/1/20 / Obs: N/A   Discharge Date:    Marina Lin:  [de-identified]   MRN: [de-identified]   CSN: [id number]   CEID: [id number] Subscriber ID:  Pt Rel to Subscriber:    Hospital Account Financial Class: Medicare Advantage    March 9, 2020

## (undated) NOTE — LETTER
[facility] FOR BLOOD OR BLOOD COMPONENTS      In the course of your treatment, it may become necessary to administer a transfusion of blood or blood components. This form provides basic information concerning this procedure and, if signed by you, authorizes its performance by qualified medical personnel. DESCRIPTION OF PROCEDURE:  Blood is introduced into one of your veins, commonly in the arm, using a sterilized disposable needle. The amount of blood transfused, and whether the transfusion will be of blood or blood components is a judgment the physician will make based on your particular needs. RISKS:  The transfusion is a common procedure of low risk. MINOR AND TEMPORARY REACTIONS ARE NOT UNCOMMON, including a slight bruise, swelling or local reaction in the area where the needle pierces your skin, or a non-serious reaction to the transfused material itself, including headache, fever or a mild skin reaction, such as rash. Serious reactions are possible, though very unlikely and include severe allergic reaction (shock)  and destruction (hemolysis) of transfused blood cells. Infectious diseases which are known to be transmitted by blood transfusion include CERTAIN TYPES OF VIRAL HEPATITIS, a viral infection of the liver, HUMAN IMMUNODEFICIENCY VIRUS (HIV-1,2) infection, a viral infection known to cause ACQUIRED IMMUNODEFICIENCY SYNDROME (AIDS) AS WELL AS CERTAIN OTHER BACTERIAL, VIRAL AND PARASITIC DISEASES. While a minimal risk of acquiring an infectious disease from transfused blood exists, in accordance with Federal and State law all due care has been taken in donor selection and testing to avoid transmission of disease. ALTERNATIVES:  If loss of blood poses serious threats in the course of your treatment, THERE IS NO EFFECTIVE ALTERNATIVE TO BLOOD TRANSFUSION.  However, if you have any further questions on this matter, your physician will fully explain the alternatives to you if it has not already been done. I,Shantel Su, have read/had read to me the above. I understand the matters bearing on the decision whether or not to authorize a transfusion of blood or blood components. I have no questions which have not been answered to my full satisfaction.  I hereby consent to such transfusion as  my physician may deem necessary or advisable in the course of my treatment.        _______________   __________________________________________________  Date     Signature of Patient, Parent or Legal Guardian      ([facility])      __________________________________________  Witness to Signature (title or relationship to patient)    Patient Name: Jared Stewart     : 1940                 Printed: 2022     Medical Record #: [id number]                    Page 1 of 1

## (undated) NOTE — LETTER
02/28/18        [place] [id number]      Dear Yohana Palomares,    [facility] records indicate that you have outstanding lab work and or testing that was ordered for you and has not yet been completed:    CMP  Lipids  Microalb/Creat Ra